# Patient Record
Sex: MALE | Race: BLACK OR AFRICAN AMERICAN | Employment: OTHER | URBAN - METROPOLITAN AREA
[De-identification: names, ages, dates, MRNs, and addresses within clinical notes are randomized per-mention and may not be internally consistent; named-entity substitution may affect disease eponyms.]

---

## 2018-01-25 ENCOUNTER — HOSPITAL ENCOUNTER (EMERGENCY)
Age: 73
Discharge: HOME | End: 2018-01-25
Attending: EMERGENCY MEDICINE
Payer: MEDICARE

## 2018-01-25 VITALS
DIASTOLIC BLOOD PRESSURE: 73 MMHG | TEMPERATURE: 98 F | SYSTOLIC BLOOD PRESSURE: 147 MMHG | HEIGHT: 69 IN | HEART RATE: 69 BPM | RESPIRATION RATE: 16 BRPM | OXYGEN SATURATION: 100 % | WEIGHT: 223 LBS | BODY MASS INDEX: 33.03 KG/M2

## 2018-01-25 DIAGNOSIS — M54.2 CHRONIC NECK PAIN: ICD-10-CM

## 2018-01-25 DIAGNOSIS — G89.29 CHRONIC NECK PAIN: ICD-10-CM

## 2018-01-25 DIAGNOSIS — Z86.39 HISTORY OF GOITER: Primary | ICD-10-CM

## 2018-01-25 PROCEDURE — 99282 EMERGENCY DEPT VISIT SF MDM: CPT

## 2018-01-25 RX ORDER — HYDROCODONE BITARTRATE AND ACETAMINOPHEN 5; 325 MG/1; MG/1
1 TABLET ORAL EVERY 6 HOURS PRN
Qty: 20 TABLET | Refills: 0 | Status: SHIPPED | OUTPATIENT
Start: 2018-01-25 | End: 2018-02-01

## 2018-01-25 RX ORDER — CYCLOBENZAPRINE HCL 10 MG
10 TABLET ORAL 3 TIMES DAILY PRN
Qty: 20 TABLET | Refills: 0 | Status: SHIPPED | OUTPATIENT
Start: 2018-01-25 | End: 2018-02-04

## 2018-01-25 RX ORDER — ZINC GLUCONATE 50 MG
50 TABLET ORAL DAILY
COMMUNITY
End: 2019-05-24 | Stop reason: ALTCHOICE

## 2018-01-25 ASSESSMENT — ENCOUNTER SYMPTOMS
COLOR CHANGE: 0
VOMITING: 0
NAUSEA: 0
SORE THROAT: 0
TROUBLE SWALLOWING: 0
SHORTNESS OF BREATH: 0
BACK PAIN: 0
COUGH: 0
ABDOMINAL PAIN: 0
VOICE CHANGE: 0

## 2018-01-25 ASSESSMENT — PAIN SCALES - GENERAL: PAINLEVEL_OUTOF10: 10

## 2018-01-25 ASSESSMENT — PAIN DESCRIPTION - ORIENTATION: ORIENTATION: POSTERIOR

## 2018-01-25 ASSESSMENT — PAIN SCALES - WONG BAKER: WONGBAKER_NUMERICALRESPONSE: 0

## 2018-01-25 ASSESSMENT — PAIN DESCRIPTION - LOCATION: LOCATION: NECK

## 2018-01-25 ASSESSMENT — PAIN DESCRIPTION - DESCRIPTORS: DESCRIPTORS: SHARP;STABBING;ACHING

## 2018-01-25 ASSESSMENT — PAIN DESCRIPTION - PAIN TYPE: TYPE: CHRONIC PAIN

## 2018-04-27 ENCOUNTER — HOSPITAL ENCOUNTER (EMERGENCY)
Age: 73
Discharge: HOME | End: 2018-04-27
Attending: EMERGENCY MEDICINE
Payer: MEDICARE

## 2018-04-27 ENCOUNTER — APPOINTMENT (OUTPATIENT)
Dept: CT IMAGING | Age: 73
End: 2018-04-27
Payer: MEDICARE

## 2018-04-27 ENCOUNTER — APPOINTMENT (OUTPATIENT)
Dept: GENERAL RADIOLOGY | Age: 73
End: 2018-04-27
Payer: MEDICARE

## 2018-04-27 VITALS
HEART RATE: 51 BPM | SYSTOLIC BLOOD PRESSURE: 150 MMHG | RESPIRATION RATE: 16 BRPM | OXYGEN SATURATION: 99 % | DIASTOLIC BLOOD PRESSURE: 77 MMHG | TEMPERATURE: 98 F

## 2018-04-27 DIAGNOSIS — J44.1 COPD EXACERBATION (HCC): Primary | ICD-10-CM

## 2018-04-27 DIAGNOSIS — E04.1 THYROID NODULE: ICD-10-CM

## 2018-04-27 LAB
% CKMB: 2 % (ref 0–3.5)
ABSOLUTE EOS #: 0.1 K/UL (ref 0–0.4)
ABSOLUTE IMMATURE GRANULOCYTE: ABNORMAL K/UL (ref 0–0.3)
ABSOLUTE LYMPH #: 1.6 K/UL (ref 1–4.8)
ABSOLUTE MONO #: 0.6 K/UL (ref 0.2–0.8)
ANION GAP SERPL CALCULATED.3IONS-SCNC: 10 MMOL/L (ref 9–17)
BASOPHILS # BLD: 1 % (ref 0–2)
BASOPHILS ABSOLUTE: 0 K/UL (ref 0–0.2)
BNP INTERPRETATION: NORMAL
BUN BLDV-MCNC: 11 MG/DL (ref 8–23)
BUN/CREAT BLD: 9 (ref 9–20)
CALCIUM SERPL-MCNC: 9.5 MG/DL (ref 8.6–10.4)
CHLORIDE BLD-SCNC: 101 MMOL/L (ref 98–107)
CK MB: 8.5 NG/ML
CKMB INTERPRETATION: ABNORMAL
CO2: 28 MMOL/L (ref 20–31)
CREAT SERPL-MCNC: 1.16 MG/DL (ref 0.7–1.2)
D-DIMER QUANTITATIVE: 1.61 MG/L FEU
DIFFERENTIAL TYPE: ABNORMAL
EKG ATRIAL RATE: 44 BPM
EKG P AXIS: 8 DEGREES
EKG P-R INTERVAL: 142 MS
EKG Q-T INTERVAL: 476 MS
EKG QRS DURATION: 84 MS
EKG QTC CALCULATION (BAZETT): 406 MS
EKG R AXIS: -34 DEGREES
EKG T AXIS: 23 DEGREES
EKG VENTRICULAR RATE: 44 BPM
EOSINOPHILS RELATIVE PERCENT: 2 % (ref 1–4)
GFR AFRICAN AMERICAN: >60 ML/MIN
GFR NON-AFRICAN AMERICAN: >60 ML/MIN
GFR SERPL CREATININE-BSD FRML MDRD: ABNORMAL ML/MIN/{1.73_M2}
GFR SERPL CREATININE-BSD FRML MDRD: ABNORMAL ML/MIN/{1.73_M2}
GLUCOSE BLD-MCNC: 153 MG/DL (ref 70–99)
HCT VFR BLD CALC: 42.3 % (ref 41–53)
HEMOGLOBIN: 13.5 G/DL (ref 13.5–17.5)
IMMATURE GRANULOCYTES: ABNORMAL %
INR BLD: 1
LYMPHOCYTES # BLD: 22 % (ref 24–44)
MAGNESIUM: 2.1 MG/DL (ref 1.6–2.6)
MCH RBC QN AUTO: 24.8 PG (ref 26–34)
MCHC RBC AUTO-ENTMCNC: 32 G/DL (ref 31–37)
MCV RBC AUTO: 77.6 FL (ref 80–100)
MONOCYTES # BLD: 8 % (ref 1–7)
MYOGLOBIN: 184 NG/ML (ref 28–72)
NRBC AUTOMATED: ABNORMAL PER 100 WBC
PARTIAL THROMBOPLASTIN TIME: 26.1 SEC (ref 23–31)
PDW BLD-RTO: 16 % (ref 11.5–14.5)
PLATELET # BLD: 164 K/UL (ref 130–400)
PLATELET ESTIMATE: ABNORMAL
PMV BLD AUTO: ABNORMAL FL (ref 6–12)
POTASSIUM SERPL-SCNC: 4.6 MMOL/L (ref 3.7–5.3)
PRO-BNP: 224 PG/ML
PROTHROMBIN TIME: 10 SEC (ref 9.7–11.6)
RBC # BLD: 5.45 M/UL (ref 4.5–5.9)
RBC # BLD: ABNORMAL 10*6/UL
SEG NEUTROPHILS: 67 % (ref 36–66)
SEGMENTED NEUTROPHILS ABSOLUTE COUNT: 4.8 K/UL (ref 1.8–7.7)
SODIUM BLD-SCNC: 139 MMOL/L (ref 135–144)
TOTAL CK: 433 U/L (ref 39–308)
TROPONIN INTERP: ABNORMAL
TROPONIN T: <0.03 NG/ML
WBC # BLD: 7.1 K/UL (ref 3.5–11)
WBC # BLD: ABNORMAL 10*3/UL

## 2018-04-27 PROCEDURE — 83874 ASSAY OF MYOGLOBIN: CPT

## 2018-04-27 PROCEDURE — 85025 COMPLETE CBC W/AUTO DIFF WBC: CPT

## 2018-04-27 PROCEDURE — 82553 CREATINE MB FRACTION: CPT

## 2018-04-27 PROCEDURE — 83735 ASSAY OF MAGNESIUM: CPT

## 2018-04-27 PROCEDURE — 96375 TX/PRO/DX INJ NEW DRUG ADDON: CPT

## 2018-04-27 PROCEDURE — 93005 ELECTROCARDIOGRAM TRACING: CPT

## 2018-04-27 PROCEDURE — 2580000003 HC RX 258: Performed by: EMERGENCY MEDICINE

## 2018-04-27 PROCEDURE — 94150 VITAL CAPACITY TEST: CPT

## 2018-04-27 PROCEDURE — 6360000002 HC RX W HCPCS: Performed by: EMERGENCY MEDICINE

## 2018-04-27 PROCEDURE — 71260 CT THORAX DX C+: CPT

## 2018-04-27 PROCEDURE — 94640 AIRWAY INHALATION TREATMENT: CPT

## 2018-04-27 PROCEDURE — 85730 THROMBOPLASTIN TIME PARTIAL: CPT

## 2018-04-27 PROCEDURE — 84484 ASSAY OF TROPONIN QUANT: CPT

## 2018-04-27 PROCEDURE — 6360000004 HC RX CONTRAST MEDICATION: Performed by: EMERGENCY MEDICINE

## 2018-04-27 PROCEDURE — 96365 THER/PROPH/DIAG IV INF INIT: CPT

## 2018-04-27 PROCEDURE — 94664 DEMO&/EVAL PT USE INHALER: CPT

## 2018-04-27 PROCEDURE — 80048 BASIC METABOLIC PNL TOTAL CA: CPT

## 2018-04-27 PROCEDURE — 85610 PROTHROMBIN TIME: CPT

## 2018-04-27 PROCEDURE — 71046 X-RAY EXAM CHEST 2 VIEWS: CPT

## 2018-04-27 PROCEDURE — 85379 FIBRIN DEGRADATION QUANT: CPT

## 2018-04-27 PROCEDURE — 83880 ASSAY OF NATRIURETIC PEPTIDE: CPT

## 2018-04-27 PROCEDURE — 99285 EMERGENCY DEPT VISIT HI MDM: CPT

## 2018-04-27 PROCEDURE — 82550 ASSAY OF CK (CPK): CPT

## 2018-04-27 RX ORDER — ALBUTEROL SULFATE 90 UG/1
2 AEROSOL, METERED RESPIRATORY (INHALATION)
Status: DISCONTINUED | OUTPATIENT
Start: 2018-04-27 | End: 2018-04-27 | Stop reason: HOSPADM

## 2018-04-27 RX ORDER — SODIUM CHLORIDE 9 MG/ML
INJECTION, SOLUTION INTRAVENOUS CONTINUOUS
Status: DISCONTINUED | OUTPATIENT
Start: 2018-04-27 | End: 2018-04-27 | Stop reason: HOSPADM

## 2018-04-27 RX ORDER — PREDNISONE 20 MG/1
TABLET ORAL
Qty: 10 TABLET | Refills: 0 | Status: SHIPPED | OUTPATIENT
Start: 2018-04-27 | End: 2018-10-21 | Stop reason: ALTCHOICE

## 2018-04-27 RX ORDER — IPRATROPIUM BROMIDE AND ALBUTEROL SULFATE 2.5; .5 MG/3ML; MG/3ML
1 SOLUTION RESPIRATORY (INHALATION)
Status: DISCONTINUED | OUTPATIENT
Start: 2018-04-27 | End: 2018-04-27 | Stop reason: HOSPADM

## 2018-04-27 RX ORDER — MAGNESIUM SULFATE 1 G/100ML
1 INJECTION INTRAVENOUS
Status: DISPENSED | OUTPATIENT
Start: 2018-04-27 | End: 2018-04-27

## 2018-04-27 RX ORDER — ALBUTEROL SULFATE 2.5 MG/3ML
5 SOLUTION RESPIRATORY (INHALATION)
Status: DISCONTINUED | OUTPATIENT
Start: 2018-04-27 | End: 2018-04-27 | Stop reason: HOSPADM

## 2018-04-27 RX ORDER — SODIUM CHLORIDE 0.9 % (FLUSH) 0.9 %
10 SYRINGE (ML) INJECTION
Status: COMPLETED | OUTPATIENT
Start: 2018-04-27 | End: 2018-04-27

## 2018-04-27 RX ORDER — METHYLPREDNISOLONE SODIUM SUCCINATE 125 MG/2ML
125 INJECTION, POWDER, LYOPHILIZED, FOR SOLUTION INTRAMUSCULAR; INTRAVENOUS ONCE
Status: COMPLETED | OUTPATIENT
Start: 2018-04-27 | End: 2018-04-27

## 2018-04-27 RX ORDER — 0.9 % SODIUM CHLORIDE 0.9 %
50 INTRAVENOUS SOLUTION INTRAVENOUS ONCE
Status: COMPLETED | OUTPATIENT
Start: 2018-04-27 | End: 2018-04-27

## 2018-04-27 RX ADMIN — IOPAMIDOL 80 ML: 755 INJECTION, SOLUTION INTRAVENOUS at 13:34

## 2018-04-27 RX ADMIN — METHYLPREDNISOLONE SODIUM SUCCINATE 125 MG: 125 INJECTION, POWDER, FOR SOLUTION INTRAMUSCULAR; INTRAVENOUS at 12:23

## 2018-04-27 RX ADMIN — MAGNESIUM SULFATE HEPTAHYDRATE 1 G: 1 INJECTION, SOLUTION INTRAVENOUS at 12:51

## 2018-04-27 RX ADMIN — ALBUTEROL SULFATE 5 MG: 5 SOLUTION RESPIRATORY (INHALATION) at 12:36

## 2018-04-27 RX ADMIN — ALBUTEROL SULFATE 2.5 MG: 5 SOLUTION RESPIRATORY (INHALATION) at 12:41

## 2018-04-27 RX ADMIN — Medication 10 ML: at 13:34

## 2018-04-27 RX ADMIN — SODIUM CHLORIDE 50 ML: 9 INJECTION, SOLUTION INTRAVENOUS at 13:34

## 2018-07-30 ENCOUNTER — OFFICE VISIT (OUTPATIENT)
Dept: PULMONOLOGY | Age: 73
End: 2018-07-30
Payer: MEDICARE

## 2018-07-30 VITALS
HEIGHT: 69 IN | BODY MASS INDEX: 33.92 KG/M2 | WEIGHT: 229 LBS | DIASTOLIC BLOOD PRESSURE: 88 MMHG | OXYGEN SATURATION: 98 % | SYSTOLIC BLOOD PRESSURE: 157 MMHG | RESPIRATION RATE: 12 BRPM | HEART RATE: 50 BPM | TEMPERATURE: 97 F

## 2018-07-30 VITALS — HEART RATE: 54 BPM | WEIGHT: 229 LBS | HEIGHT: 69 IN | BODY MASS INDEX: 33.92 KG/M2 | OXYGEN SATURATION: 99 %

## 2018-07-30 DIAGNOSIS — J98.4 MIXED RESTRICTIVE AND OBSTRUCTIVE LUNG DISEASE (HCC): Primary | ICD-10-CM

## 2018-07-30 DIAGNOSIS — J43.9 MIXED RESTRICTIVE AND OBSTRUCTIVE LUNG DISEASE (HCC): Primary | ICD-10-CM

## 2018-07-30 DIAGNOSIS — G47.33 OSA (OBSTRUCTIVE SLEEP APNEA): ICD-10-CM

## 2018-07-30 DIAGNOSIS — J44.9 CHRONIC OBSTRUCTIVE PULMONARY DISEASE, UNSPECIFIED COPD TYPE (HCC): Primary | ICD-10-CM

## 2018-07-30 DIAGNOSIS — J43.2 CENTRILOBULAR EMPHYSEMA (HCC): ICD-10-CM

## 2018-07-30 DIAGNOSIS — R06.02 SHORTNESS OF BREATH: ICD-10-CM

## 2018-07-30 PROCEDURE — 94729 DIFFUSING CAPACITY: CPT | Performed by: INTERNAL MEDICINE

## 2018-07-30 PROCEDURE — 99204 OFFICE O/P NEW MOD 45 MIN: CPT | Performed by: INTERNAL MEDICINE

## 2018-07-30 PROCEDURE — 94375 RESPIRATORY FLOW VOLUME LOOP: CPT | Performed by: INTERNAL MEDICINE

## 2018-07-30 PROCEDURE — 94726 PLETHYSMOGRAPHY LUNG VOLUMES: CPT | Performed by: INTERNAL MEDICINE

## 2018-07-30 RX ORDER — KETOTIFEN FUMARATE 0.35 MG/ML
1 SOLUTION/ DROPS OPHTHALMIC 2 TIMES DAILY PRN
COMMUNITY

## 2018-07-30 RX ORDER — FERROUS SULFATE 325(65) MG
325 TABLET ORAL
COMMUNITY
End: 2019-05-24 | Stop reason: ALTCHOICE

## 2018-07-30 RX ORDER — FINASTERIDE 5 MG/1
5 TABLET, FILM COATED ORAL DAILY
COMMUNITY

## 2018-07-30 RX ORDER — AZITHROMYCIN 250 MG/1
250 TABLET, FILM COATED ORAL DAILY
COMMUNITY
End: 2018-10-21 | Stop reason: ALTCHOICE

## 2018-07-30 RX ORDER — ISOSORBIDE MONONITRATE 60 MG/1
30 TABLET, EXTENDED RELEASE ORAL DAILY
COMMUNITY

## 2018-07-30 NOTE — PROGRESS NOTES
REASON FOR THE CONSULTATION:  Question COPD  HISTORY OF PRESENT ILLNESS:    Caron Stratton is a 71y.o. year old male here for evaluation of questionable COPD. Patient walks 3 miles every day, which has improved from quarter of mile as he has been slowly improving his exercise capacity. He denies any cough, sputum or hemoptysis. He is on albuterol as needed which is a nebulized form and he needs that really only when he has a cold, but otherwise he is using Symbicort twice a day, which improved his ventilatory capacity and his exercise capacity. He did not have asthma as a child. Mostly his bronchitis acts up when the weather is changing. He is not sure if he ever had a PFT. I reviewed his PFT today with him. His FEV1 is 60%. Ratio of FEV1 to FVC 74%. Flow volume loop shows obstructive and restrictive flow volume loop. His total lung capacity and RV are decreased. Diffusion capacity is also decreased, but corrected for alveolar volume is better if resistance is increased. I believe this is due to an obstructive component due to obstructive lung disease like COPD but also due to restrictive lung disease due to obesity. His CT of the chest does not show any interstitial lung disease except for mild emphysema    He does have obstructive sleep apnea but he could not tolerate CPAP, so he is not using              LUNG CANCER SCREENING     1. CRITERIA MET    []     CT ORDERED  []      2. CRITERIA NOT MET   [x]      3. REFUSED                    []        REASON CRITERIA NOT MET     1. SMOKING LESS THAN 30 PY  []      2. AGE LESS THAN 55 or GREATER 77 YEARS  []      3. QUIT SMOKING 15 YEARS OR GREATER   []      4. RECENT CT WITH IN 11 MONTHS    [x]      5. LIFE EXPECTANCY < 5 YEARS   []      6.  SIGNS  AND SYMPTOMS OF LUNG CANCER   []         Immunization   Immunization History   Administered Date(s) Administered    Pneumococcal 13-valent Conjugate ([id number]) 07/30/2016        Pneumococcal Vaccine     [x] (65 Fe) MG tablet Take 325 mg by mouth daily (with breakfast)   Yes Historical Provider, MD   predniSONE (DELTASONE) 20 MG tablet Take 2 tablets by mouth every morning till gone.  4/27/18  Yes Ashley Uriarte MD   zinc gluconate 50 MG tablet Take 50 mg by mouth daily   Yes Historical Provider, MD   tamsulosin (FLOMAX) 0.4 MG capsule Take 0.4 mg by mouth daily   Yes Historical Provider, MD   fluticasone (FLONASE) 50 MCG/ACT nasal spray 1 spray by Nasal route 2 times daily 5/31/16  Yes Mary Connor DO   metoprolol (LOPRESSOR) 50 MG tablet Take 25 mg by mouth 2 times daily (Pt takes one-half of a 50mg tablet = 25mg BID)   Yes Historical Provider, MD   albuterol sulfate  (90 BASE) MCG/ACT inhaler Inhale 2 puffs into the lungs every 6 hours as needed for Wheezing   Yes Historical Provider, MD   albuterol (PROVENTIL) (2.5 MG/3ML) 0.083% nebulizer solution Take 2.5 mg by nebulization every 6 hours as needed for Wheezing   Yes Historical Provider, MD   aspirin 81 MG EC tablet Take 81 mg by mouth daily   Yes Historical Provider, MD   losartan (COZAAR) 25 MG tablet Take 25 mg by mouth daily   Yes Historical Provider, MD   nitroGLYCERIN (NITROSTAT) 0.4 MG SL tablet Place 0.4 mg under the tongue every 5 minutes as needed for Chest pain   Yes Historical Provider, MD   vitamin D (CHOLECALCIFEROL) 1000 UNIT TABS tablet Take 2,000 Units by mouth daily   Yes Historical Provider, MD   FLUoxetine (PROZAC) 20 MG capsule Take 20 mg by mouth daily   Yes Historical Provider, MD   furosemide (LASIX) 20 MG tablet Take 20 mg by mouth every morning 30 minutes before a meal   Yes Historical Provider, MD   metFORMIN ER (GLUCOPHAGE-XR) 500 MG XR tablet Take 1,000 mg by mouth daily (with breakfast)   Yes Historical Provider, MD   clopidogrel (PLAVIX) 75 MG tablet Take 75 mg by mouth daily   Yes Historical Provider, MD   atorvastatin (LIPITOR) 80 MG tablet Take 40 mg by mouth daily (Pt takes one-half of an 80mg tab = 40mg)   Yes Historical Provider, MD   Tiotropium Bromide Monohydrate (SPIRIVA HANDIHALER IN) Inhale 1 each into the lungs daily    Yes Historical Provider, MD   ROPINIROLE HCL PO Take 0.25 mg by mouth nightly as needed    Yes Historical Provider, MD   budesonide-formoterol (SYMBICORT) 160-4.5 MCG/ACT AERO Inhale 2 puffs into the lungs 2 times daily.    Yes Historical Provider, MD              REVIEW OF SYSTEMS:    CONSTITUTIONAL:  negative for  fevers, chills, sweats, fatigue, malaise, anorexia and weight loss  EYES:  negative for  double vision, blurred vision, dry eyes, eye discharge and redness  HEENT:  negative for  hearing loss, tinnitus, ear drainage, earaches and nasal congestion  RESPIRATORY:  See hpi  CARDIOVASCULAR:  negative for  chest pain,, palpitations, orthopnea, PND  GASTROINTESTINAL:  negative for nausea, vomiting, change in bowel habits, diarrhea, constipation, abdominal pain, pruritus, abdominal mass and abdominal distention  GENITOURINARY:  negative for frequency, dysuria, nocturia, urinary incontinence and hesitancy  INTEGUMENT  negative for rash, skin lesion(s), dryness, skin color change, changes in lesion, pruritus and changes in hair  HEMATOLOGIC/LYMPHATIC:  negative for easy bruising, bleeding, lymphadenopathy, petechiae and swelling/edema  ALLERGIC/IMMUNOLOGIC:  negative for recurrent infections, urticaria and drug reactions  ENDOCRINE:  negative for heat intolerance, cold intolerance, tremor, weight changes and change in bowel habits  MUSCULOSKELETAL:  negative for  myalgias, arthralgias, pain, joint swelling, stiff joints and decreased range of motion  NEUROLOGICAL:  negative for headaches, dizziness, seizures, memory problems, speech problems, visual disturbance and coordination problems  BEHAVIOR/PSYCH:  negative for poor appetite, increased appetite, decreased sleep, increased sleep, decreased energy level, increased energy level and poor concentration  Skin no rash no dermatitis  Vitals:  BP (!) 157/88 Pulse 50   Temp 97 °F (36.1 °C)   Resp 12   Ht 5' 9\" (1.753 m)   Wt 229 lb (103.9 kg)   SpO2 98%   BMI 33.82 kg/m²     PHYSICAL EXAM:  General Appearance:    Alert, cooperative, no distress, appears stated age   Head:    Normocephalic, without obvious abnormality, atraumatic      Eyes:    PERRL, conjunctiva/corneas clear, EOM's intact   Ears:    Normal  external ear canals, both ears   Nose:   Nares normal, septum midline, mucosa normal, no drainage        or sinus tenderness   Throat:   Lips, mucosa, and tongue normal; teeth and gums normal   Neck:    Short thick neck, low hanging soft palate, large tongue, large uvula. No adenopathy, no goiter,    Back:     Symmetric, no curvature, ROM normal, no CVA tenderness   Lungs:     Ventilating all lobes without any rales, rhonchi, wheezes. No dullness. No bronchial breath sounds mildly prolonged expiration    Chest Wall:    No tenderness or deformity      Heart:    Regular rate and rhythm, S1 and S2 normal, no murmur, rub        or gallop no rvh                           Abdomen:                                                 Pulses:                              Skin:                  Lymph nodes:                    Neurologic:                  Soft, non-tender, bowel sounds active all four quadrants,     no masses, no organomegaly         2+ and symmetric all extremities     Skin color, texture, turgor normal, no rashes or lesions       Cervical, supraclavicular not enlarged or matted or tender      CNII-XII intact, normal strength 5/5 . Sensation grossly normal  and reflexes normal 2+  throughout     Clubbing No  Lower ext edema no  Upper ext edema no         Musculoskeletal no synovitis.   No joint swelling or tenderness      4/27/2018  No evidence of pulmonary embolism or acute pulmonary abnormality.       Mild emphysema and pulmonary arterial enlargement are again demonstrated,   suggestive of pulmonary hypertension.       Thyroid enlargement with suspected

## 2018-07-31 PROBLEM — J43.2 CENTRILOBULAR EMPHYSEMA (HCC): Status: ACTIVE | Noted: 2018-07-31

## 2018-07-31 PROBLEM — J43.9 MIXED RESTRICTIVE AND OBSTRUCTIVE LUNG DISEASE (HCC): Status: ACTIVE | Noted: 2018-07-31

## 2018-07-31 PROBLEM — J98.4 MIXED RESTRICTIVE AND OBSTRUCTIVE LUNG DISEASE (HCC): Status: ACTIVE | Noted: 2018-07-31

## 2018-10-21 ENCOUNTER — APPOINTMENT (OUTPATIENT)
Dept: GENERAL RADIOLOGY | Age: 73
End: 2018-10-21
Payer: MEDICARE

## 2018-10-21 ENCOUNTER — HOSPITAL ENCOUNTER (EMERGENCY)
Age: 73
Discharge: HOME | End: 2018-10-21
Attending: EMERGENCY MEDICINE
Payer: MEDICARE

## 2018-10-21 VITALS
TEMPERATURE: 98.6 F | OXYGEN SATURATION: 100 % | HEART RATE: 60 BPM | HEIGHT: 69 IN | RESPIRATION RATE: 16 BRPM | SYSTOLIC BLOOD PRESSURE: 162 MMHG | DIASTOLIC BLOOD PRESSURE: 89 MMHG | WEIGHT: 233 LBS | BODY MASS INDEX: 34.51 KG/M2

## 2018-10-21 DIAGNOSIS — M25.552 HIP PAIN, ACUTE, LEFT: Primary | ICD-10-CM

## 2018-10-21 LAB
-: NORMAL
AMORPHOUS: NORMAL
BACTERIA: NORMAL
BILIRUBIN URINE: NEGATIVE
CASTS UA: NORMAL /LPF
COLOR: YELLOW
COMMENT UA: ABNORMAL
CRYSTALS, UA: NORMAL /HPF
EPITHELIAL CELLS UA: NORMAL /HPF (ref 0–5)
GLUCOSE URINE: NEGATIVE
KETONES, URINE: NEGATIVE
LEUKOCYTE ESTERASE, URINE: NEGATIVE
MUCUS: NORMAL
NITRITE, URINE: NEGATIVE
OTHER OBSERVATIONS UA: NORMAL
PH UA: 6 (ref 5–8)
PROTEIN UA: ABNORMAL
RBC UA: NORMAL /HPF (ref 0–2)
RENAL EPITHELIAL, UA: NORMAL /HPF
SPECIFIC GRAVITY UA: 1.02 (ref 1–1.03)
TRICHOMONAS: NORMAL
TURBIDITY: CLEAR
URINE HGB: NEGATIVE
UROBILINOGEN, URINE: NORMAL
WBC UA: NORMAL /HPF (ref 0–5)
YEAST: NORMAL

## 2018-10-21 PROCEDURE — 99283 EMERGENCY DEPT VISIT LOW MDM: CPT

## 2018-10-21 PROCEDURE — 81001 URINALYSIS AUTO W/SCOPE: CPT

## 2018-10-21 PROCEDURE — 73502 X-RAY EXAM HIP UNI 2-3 VIEWS: CPT

## 2018-10-21 PROCEDURE — 6360000002 HC RX W HCPCS: Performed by: EMERGENCY MEDICINE

## 2018-10-21 PROCEDURE — 96372 THER/PROPH/DIAG INJ SC/IM: CPT

## 2018-10-21 RX ORDER — AMOXICILLIN 875 MG/1
875 TABLET, COATED ORAL 2 TIMES DAILY
COMMUNITY
Start: 2018-10-19 | End: 2019-05-24 | Stop reason: ALTCHOICE

## 2018-10-21 RX ORDER — KETOROLAC TROMETHAMINE 30 MG/ML
30 INJECTION, SOLUTION INTRAMUSCULAR; INTRAVENOUS ONCE
Status: COMPLETED | OUTPATIENT
Start: 2018-10-21 | End: 2018-10-21

## 2018-10-21 RX ORDER — AZITHROMYCIN 250 MG/1
250 TABLET, FILM COATED ORAL DAILY
COMMUNITY
Start: 2018-10-19 | End: 2019-05-24 | Stop reason: ALTCHOICE

## 2018-10-21 RX ADMIN — KETOROLAC TROMETHAMINE 30 MG: 30 INJECTION, SOLUTION INTRAMUSCULAR at 13:29

## 2018-10-21 ASSESSMENT — PAIN DESCRIPTION - ORIENTATION: ORIENTATION: LOWER;RIGHT

## 2018-10-21 ASSESSMENT — PAIN SCALES - GENERAL
PAINLEVEL_OUTOF10: 10
PAINLEVEL_OUTOF10: 0
PAINLEVEL_OUTOF10: 10

## 2018-10-21 ASSESSMENT — PAIN DESCRIPTION - LOCATION: LOCATION: BACK;FLANK

## 2018-12-05 ENCOUNTER — APPOINTMENT (OUTPATIENT)
Dept: GENERAL RADIOLOGY | Age: 73
End: 2018-12-05
Payer: OTHER MISCELLANEOUS

## 2018-12-05 ENCOUNTER — APPOINTMENT (OUTPATIENT)
Dept: CT IMAGING | Age: 73
End: 2018-12-05
Payer: OTHER MISCELLANEOUS

## 2018-12-05 ENCOUNTER — HOSPITAL ENCOUNTER (EMERGENCY)
Age: 73
Discharge: HOME | End: 2018-12-05
Attending: EMERGENCY MEDICINE
Payer: OTHER MISCELLANEOUS

## 2018-12-05 VITALS
BODY MASS INDEX: 34.36 KG/M2 | HEIGHT: 69 IN | DIASTOLIC BLOOD PRESSURE: 87 MMHG | OXYGEN SATURATION: 100 % | WEIGHT: 232 LBS | HEART RATE: 51 BPM | TEMPERATURE: 98 F | RESPIRATION RATE: 18 BRPM | SYSTOLIC BLOOD PRESSURE: 188 MMHG

## 2018-12-05 DIAGNOSIS — S09.90XA INJURY OF HEAD, INITIAL ENCOUNTER: ICD-10-CM

## 2018-12-05 DIAGNOSIS — S16.1XXA STRAIN OF NECK MUSCLE, INITIAL ENCOUNTER: ICD-10-CM
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
## 2018-12-05 PROCEDURE — 6370000000 HC RX 637 (ALT 250 FOR IP): Performed by: NURSE PRACTITIONER

## 2018-12-05 PROCEDURE — 99284 EMERGENCY DEPT VISIT MOD MDM: CPT

## 2018-12-05 PROCEDURE — 70450 CT HEAD/BRAIN W/O DYE: CPT

## 2018-12-05 PROCEDURE — 72125 CT NECK SPINE W/O DYE: CPT

## 2018-12-05 PROCEDURE — 72040 X-RAY EXAM NECK SPINE 2-3 VW: CPT

## 2018-12-05 RX ORDER — ACETAMINOPHEN 325 MG/1
650 TABLET ORAL ONCE
Status: COMPLETED | OUTPATIENT
Start: 2018-12-05 | End: 2018-12-05

## 2018-12-05 RX ORDER — HYDROCODONE BITARTRATE AND ACETAMINOPHEN 5; 325 MG/1; MG/1
1 TABLET ORAL EVERY 6 HOURS PRN
Qty: 20 TABLET | Refills: 0 | Status: SHIPPED | OUTPATIENT
Start: 2018-12-05 | End: 2018-12-08

## 2018-12-05 RX ORDER — METHOCARBAMOL 500 MG/1
500 TABLET, FILM COATED ORAL 3 TIMES DAILY PRN
Qty: 20 TABLET | Refills: 0 | Status: SHIPPED | OUTPATIENT
Start: 2018-12-05 | End: 2018-12-15

## 2018-12-05 RX ADMIN — ACETAMINOPHEN 650 MG: 325 TABLET ORAL at 20:45

## 2018-12-05 ASSESSMENT — PAIN SCALES - GENERAL
PAINLEVEL_OUTOF10: 5
PAINLEVEL_OUTOF10: 5
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
## 2019-01-28 ENCOUNTER — OFFICE VISIT (OUTPATIENT)
Dept: PULMONOLOGY | Age: 74
End: 2019-01-28
Payer: MEDICARE

## 2019-01-28 VITALS
RESPIRATION RATE: 16 BRPM | HEIGHT: 69 IN | WEIGHT: 230 LBS | SYSTOLIC BLOOD PRESSURE: 138 MMHG | DIASTOLIC BLOOD PRESSURE: 82 MMHG | BODY MASS INDEX: 34.07 KG/M2 | HEART RATE: 66 BPM | OXYGEN SATURATION: 96 %

## 2019-01-28 DIAGNOSIS — J43.2 CENTRILOBULAR EMPHYSEMA (HCC): ICD-10-CM

## 2019-01-28 DIAGNOSIS — Z23 NEED FOR PNEUMOCOCCAL VACCINE: ICD-10-CM

## 2019-01-28 DIAGNOSIS — J43.9 MIXED RESTRICTIVE AND OBSTRUCTIVE LUNG DISEASE (HCC): Primary | ICD-10-CM

## 2019-01-28 DIAGNOSIS — Z87.891 PERSONAL HISTORY OF TOBACCO USE: ICD-10-CM

## 2019-01-28 DIAGNOSIS — J98.4 MIXED RESTRICTIVE AND OBSTRUCTIVE LUNG DISEASE (HCC): Primary | ICD-10-CM

## 2019-01-28 PROCEDURE — G0296 VISIT TO DETERM LDCT ELIG: HCPCS | Performed by: INTERNAL MEDICINE

## 2019-01-28 PROCEDURE — 99214 OFFICE O/P EST MOD 30 MIN: CPT | Performed by: INTERNAL MEDICINE

## 2019-04-17 ENCOUNTER — PATIENT MESSAGE (OUTPATIENT)
Dept: PULMONOLOGY | Age: 74
End: 2019-04-17

## 2019-04-17 NOTE — TELEPHONE ENCOUNTER
From: Gracie Favorite  To: Jaya Frausto MD  Sent: 4/17/2019 1:23 PM EDT  Subject: Non-Urgent Medical Question    Need a letter on doctor's letter head to confirm respiratory issues [ Roman Ortiz to sit idle without dosing off due to COPD issues.] to be excused from [place] duty at [address] on April 22 & 23 2019.

## 2019-04-17 NOTE — TELEPHONE ENCOUNTER
Dr Kirit Bernal, please see patient's my chart message. If this is something you are willing to write, please do so and I will make sure to get it onto a letterhead. If you are not willing to write this, please let me know. Thank you.

## 2019-04-22 ENCOUNTER — TELEPHONE (OUTPATIENT)
Dept: ONCOLOGY | Age: 74
End: 2019-04-22

## 2019-04-22 NOTE — LETTER
4/22/2019        Francine Naqvi  [address] [facility] [address] [facility]    Dear Francine Naqvi:    Your healthcare provider has ordered a low dose CT scan of the chest for lung cancer screening. You will find enclosed, information about CT lung screening. Please review the statement of understanding, you will be asked to sign a copy of this at the time of your CT scan    If you have not already been contacted to make the appointment for your scan, please call our scheduling department at [phone]    Keep in mind that CT lung screening does not take the place of smoking cessation. If you are a current smoker, you will find enclosed smoking cessation resources. Please do not hesitate to contact me if you have any questions or concerns.     [address] [facility],      [facility] Lung Screening Program  [phone]

## 2019-05-24 ENCOUNTER — APPOINTMENT (OUTPATIENT)
Dept: GENERAL RADIOLOGY | Age: 74
End: 2019-05-24
Payer: MEDICARE

## 2019-05-24 ENCOUNTER — APPOINTMENT (OUTPATIENT)
Dept: CT IMAGING | Age: 74
End: 2019-05-24
Payer: MEDICARE

## 2019-05-24 ENCOUNTER — HOSPITAL ENCOUNTER (EMERGENCY)
Age: 74
Discharge: HOME | End: 2019-05-24
Attending: EMERGENCY MEDICINE
Payer: MEDICARE

## 2019-05-24 VITALS
WEIGHT: 232 LBS | SYSTOLIC BLOOD PRESSURE: 127 MMHG | RESPIRATION RATE: 23 BRPM | TEMPERATURE: 97.9 F | HEART RATE: 74 BPM | DIASTOLIC BLOOD PRESSURE: 52 MMHG | OXYGEN SATURATION: 94 % | HEIGHT: 69 IN | BODY MASS INDEX: 34.36 KG/M2

## 2019-05-24 DIAGNOSIS — E04.1 THYROID NODULE: ICD-10-CM

## 2019-05-24 DIAGNOSIS — J40 BRONCHITIS: ICD-10-CM

## 2019-05-24 DIAGNOSIS — R93.89 ABNORMAL CT OF THE CHEST: ICD-10-CM

## 2019-05-24 DIAGNOSIS — J44.1 COPD EXACERBATION (HCC): Primary | ICD-10-CM

## 2019-05-24 LAB
ABSOLUTE EOS #: 0 K/UL (ref 0–0.44)
ABSOLUTE IMMATURE GRANULOCYTE: 0.17 K/UL (ref 0–0.3)
ABSOLUTE LYMPH #: 1.33 K/UL (ref 1.1–3.7)
ABSOLUTE MONO #: 1.66 K/UL (ref 0.1–1.2)
ANION GAP SERPL CALCULATED.3IONS-SCNC: 13 MMOL/L (ref 9–17)
BASOPHILS # BLD: 0 % (ref 0–2)
BASOPHILS ABSOLUTE: 0 K/UL (ref 0–0.2)
BNP INTERPRETATION: NORMAL
BUN BLDV-MCNC: 18 MG/DL (ref 8–23)
BUN/CREAT BLD: 12 (ref 9–20)
CALCIUM SERPL-MCNC: 9.1 MG/DL (ref 8.6–10.4)
CHLORIDE BLD-SCNC: 98 MMOL/L (ref 98–107)
CO2: 23 MMOL/L (ref 20–31)
CREAT SERPL-MCNC: 1.49 MG/DL (ref 0.7–1.2)
D-DIMER QUANTITATIVE: 0.72 MG/L FEU
DIFFERENTIAL TYPE: ABNORMAL
EOSINOPHILS RELATIVE PERCENT: 0 % (ref 1–4)
GFR AFRICAN AMERICAN: 56 ML/MIN
GFR NON-AFRICAN AMERICAN: 46 ML/MIN
GFR SERPL CREATININE-BSD FRML MDRD: ABNORMAL ML/MIN/{1.73_M2}
GFR SERPL CREATININE-BSD FRML MDRD: ABNORMAL ML/MIN/{1.73_M2}
GLUCOSE BLD-MCNC: 186 MG/DL (ref 70–99)
HCT VFR BLD CALC: 40.2 % (ref 40.7–50.3)
HEMOGLOBIN: 12.6 G/DL (ref 13–17)
IMMATURE GRANULOCYTES: 1 %
INR BLD: 1.1
LYMPHOCYTES # BLD: 8 % (ref 24–43)
MCH RBC QN AUTO: 24.3 PG (ref 25.2–33.5)
MCHC RBC AUTO-ENTMCNC: 31.3 G/DL (ref 28.4–34.8)
MCV RBC AUTO: 77.6 FL (ref 82.6–102.9)
MONOCYTES # BLD: 10 % (ref 3–12)
NRBC AUTOMATED: 0 PER 100 WBC
PARTIAL THROMBOPLASTIN TIME: 28.5 SEC (ref 23–31)
PDW BLD-RTO: 17.1 % (ref 11.8–14.4)
PLATELET # BLD: 137 K/UL (ref 138–453)
PLATELET ESTIMATE: ABNORMAL
PMV BLD AUTO: 13.5 FL (ref 8.1–13.5)
POTASSIUM SERPL-SCNC: 4 MMOL/L (ref 3.7–5.3)
PRO-BNP: 192 PG/ML
PROTHROMBIN TIME: 11.4 SEC (ref 9.7–11.6)
RBC # BLD: 5.18 M/UL (ref 4.21–5.77)
RBC # BLD: ABNORMAL 10*6/UL
SEG NEUTROPHILS: 81 % (ref 36–65)
SEGMENTED NEUTROPHILS ABSOLUTE COUNT: 13.44 K/UL (ref 1.5–8.1)
SODIUM BLD-SCNC: 134 MMOL/L (ref 135–144)
TROPONIN INTERP: ABNORMAL
TROPONIN T: ABNORMAL NG/ML
TROPONIN, HIGH SENSITIVITY: 39 NG/L (ref 0–22)
WBC # BLD: 16.6 K/UL (ref 3.5–11.3)
WBC # BLD: ABNORMAL 10*3/UL

## 2019-05-24 PROCEDURE — 6360000004 HC RX CONTRAST MEDICATION: Performed by: EMERGENCY MEDICINE

## 2019-05-24 PROCEDURE — 93005 ELECTROCARDIOGRAM TRACING: CPT | Performed by: EMERGENCY MEDICINE

## 2019-05-24 PROCEDURE — 85730 THROMBOPLASTIN TIME PARTIAL: CPT

## 2019-05-24 PROCEDURE — 80048 BASIC METABOLIC PNL TOTAL CA: CPT

## 2019-05-24 PROCEDURE — 71046 X-RAY EXAM CHEST 2 VIEWS: CPT

## 2019-05-24 PROCEDURE — 6360000002 HC RX W HCPCS: Performed by: EMERGENCY MEDICINE

## 2019-05-24 PROCEDURE — 85610 PROTHROMBIN TIME: CPT

## 2019-05-24 PROCEDURE — 85025 COMPLETE CBC W/AUTO DIFF WBC: CPT

## 2019-05-24 PROCEDURE — 6370000000 HC RX 637 (ALT 250 FOR IP): Performed by: EMERGENCY MEDICINE

## 2019-05-24 PROCEDURE — 85379 FIBRIN DEGRADATION QUANT: CPT

## 2019-05-24 PROCEDURE — 96374 THER/PROPH/DIAG INJ IV PUSH: CPT

## 2019-05-24 PROCEDURE — 94150 VITAL CAPACITY TEST: CPT

## 2019-05-24 PROCEDURE — 83880 ASSAY OF NATRIURETIC PEPTIDE: CPT

## 2019-05-24 PROCEDURE — 71275 CT ANGIOGRAPHY CHEST: CPT

## 2019-05-24 PROCEDURE — 99285 EMERGENCY DEPT VISIT HI MDM: CPT

## 2019-05-24 PROCEDURE — 2580000003 HC RX 258: Performed by: EMERGENCY MEDICINE

## 2019-05-24 PROCEDURE — 84484 ASSAY OF TROPONIN QUANT: CPT

## 2019-05-24 PROCEDURE — 94640 AIRWAY INHALATION TREATMENT: CPT

## 2019-05-24 RX ORDER — SODIUM CHLORIDE 0.9 % (FLUSH) 0.9 %
10 SYRINGE (ML) INJECTION PRN
Status: DISCONTINUED | OUTPATIENT
Start: 2019-05-24 | End: 2019-05-24 | Stop reason: HOSPADM

## 2019-05-24 RX ORDER — ALBUTEROL SULFATE 90 UG/1
2 AEROSOL, METERED RESPIRATORY (INHALATION)
Status: DISCONTINUED | OUTPATIENT
Start: 2019-05-24 | End: 2019-05-24

## 2019-05-24 RX ORDER — PREDNISONE 20 MG/1
60 TABLET ORAL DAILY
Qty: 30 TABLET | Refills: 0 | Status: SHIPPED | OUTPATIENT
Start: 2019-05-24 | End: 2019-06-03

## 2019-05-24 RX ORDER — METOPROLOL SUCCINATE 50 MG/1
50 TABLET, EXTENDED RELEASE ORAL DAILY
COMMUNITY

## 2019-05-24 RX ORDER — IPRATROPIUM BROMIDE AND ALBUTEROL SULFATE 2.5; .5 MG/3ML; MG/3ML
1 SOLUTION RESPIRATORY (INHALATION)
Status: DISCONTINUED | OUTPATIENT
Start: 2019-05-24 | End: 2019-05-24

## 2019-05-24 RX ORDER — METHYLPREDNISOLONE SODIUM SUCCINATE 125 MG/2ML
125 INJECTION, POWDER, LYOPHILIZED, FOR SOLUTION INTRAMUSCULAR; INTRAVENOUS ONCE
Status: COMPLETED | OUTPATIENT
Start: 2019-05-24 | End: 2019-05-24

## 2019-05-24 RX ORDER — BENZONATATE 100 MG/1
100 CAPSULE ORAL 2 TIMES DAILY PRN
Qty: 20 CAPSULE | Refills: 0 | Status: SHIPPED | OUTPATIENT
Start: 2019-05-24 | End: 2019-05-31

## 2019-05-24 RX ORDER — IPRATROPIUM BROMIDE AND ALBUTEROL SULFATE 2.5; .5 MG/3ML; MG/3ML
1 SOLUTION RESPIRATORY (INHALATION) EVERY 4 HOURS PRN
Status: DISCONTINUED | OUTPATIENT
Start: 2019-05-24 | End: 2019-05-24 | Stop reason: HOSPADM

## 2019-05-24 RX ORDER — 0.9 % SODIUM CHLORIDE 0.9 %
50 INTRAVENOUS SOLUTION INTRAVENOUS ONCE
Status: COMPLETED | OUTPATIENT
Start: 2019-05-24 | End: 2019-05-24

## 2019-05-24 RX ORDER — AZITHROMYCIN 250 MG/1
250 TABLET, FILM COATED ORAL SEE ADMIN INSTRUCTIONS
Qty: 6 TABLET | Refills: 0 | Status: SHIPPED | OUTPATIENT
Start: 2019-05-24 | End: 2019-05-29

## 2019-05-24 RX ORDER — ALBUTEROL SULFATE 2.5 MG/3ML
5 SOLUTION RESPIRATORY (INHALATION)
Status: DISCONTINUED | OUTPATIENT
Start: 2019-05-24 | End: 2019-05-24

## 2019-05-24 RX ORDER — AZITHROMYCIN 250 MG/1
500 TABLET, FILM COATED ORAL ONCE
Status: COMPLETED | OUTPATIENT
Start: 2019-05-24 | End: 2019-05-24

## 2019-05-24 RX ADMIN — METHYLPREDNISOLONE SODIUM SUCCINATE 125 MG: 125 INJECTION, POWDER, FOR SOLUTION INTRAMUSCULAR; INTRAVENOUS at 09:05

## 2019-05-24 RX ADMIN — SODIUM CHLORIDE 75 ML: 9 INJECTION, SOLUTION INTRAVENOUS at 10:56

## 2019-05-24 RX ADMIN — ALBUTEROL SULFATE 5 MG: 5 SOLUTION RESPIRATORY (INHALATION) at 09:05

## 2019-05-24 RX ADMIN — AZITHROMYCIN 500 MG: 250 TABLET, FILM COATED ORAL at 11:57

## 2019-05-24 RX ADMIN — IOPAMIDOL 100 ML: 755 INJECTION, SOLUTION INTRAVENOUS at 10:56

## 2019-05-24 RX ADMIN — Medication 10 ML: at 10:57

## 2019-05-24 RX ADMIN — ALBUTEROL SULFATE 5 MG: 5 SOLUTION RESPIRATORY (INHALATION) at 08:55

## 2019-05-24 ASSESSMENT — ENCOUNTER SYMPTOMS
SHORTNESS OF BREATH: 1
ABDOMINAL DISTENTION: 0
COUGH: 1
BACK PAIN: 0
EYE DISCHARGE: 0
ABDOMINAL PAIN: 0
CHEST TIGHTNESS: 0
FACIAL SWELLING: 0
EYE PAIN: 0

## 2019-05-24 NOTE — ED PROVIDER NOTES
HISTORY       Past Surgical History:   Procedure Laterality Date    CARDIAC SURGERY  07/2015    1 stent    NECK SURGERY      TOE AMPUTATION Right greater     CURRENT MEDICATIONS       Previous Medications    ALBUTEROL (PROVENTIL) (2.5 MG/3ML) 0.083% NEBULIZER SOLUTION    Take 2.5 mg by nebulization every 6 hours as needed for Wheezing    ALBUTEROL SULFATE  (90 BASE) MCG/ACT INHALER    Inhale 2 puffs into the lungs every 6 hours as needed for Wheezing    ASPIRIN 81 MG EC TABLET    Take 81 mg by mouth daily    ATORVASTATIN (LIPITOR) 80 MG TABLET    Take 40 mg by mouth daily (Pt takes one-half of an 80mg tab = 40mg)    BUDESONIDE-FORMOTEROL (SYMBICORT) 160-4.5 MCG/ACT AERO    Inhale 2 puffs into the lungs 2 times daily.     CARBOXYMETHYLCELLULOSE 1 % OPHTHALMIC SOLUTION    Place 1 drop into both eyes 3 times daily as needed for Dry Eyes     CLOPIDOGREL (PLAVIX) 75 MG TABLET    Take 75 mg by mouth daily    FINASTERIDE (PROSCAR) 5 MG TABLET    Take 5 mg by mouth daily    FLUOXETINE (PROZAC) 20 MG CAPSULE    Take 20 mg by mouth daily    FLUTICASONE (FLONASE) 50 MCG/ACT NASAL SPRAY    1 spray by Nasal route 2 times daily    FUROSEMIDE (LASIX) 20 MG TABLET    Take 20 mg by mouth daily as needed (swelling)     ISOSORBIDE MONONITRATE (IMDUR) 60 MG EXTENDED RELEASE TABLET    Take 60 mg by mouth daily    KETOTIFEN (ZADITOR) 0.025 % OPHTHALMIC SOLUTION    Place 1 drop into both eyes 2 times daily as needed (itchy eyes)     LOSARTAN (COZAAR) 25 MG TABLET    Take 25 mg by mouth daily    METOPROLOL (LOPRESSOR) 50 MG TABLET    Take 25 mg by mouth 2 times daily (Pt takes one-half of a 50mg tablet = 25mg BID)    NITROGLYCERIN (NITROSTAT) 0.4 MG SL TABLET    Place 0.4 mg under the tongue every 5 minutes as needed for Chest pain    ROPINIROLE HCL PO    Take 0.25 mg by mouth nightly as needed     TAMSULOSIN (FLOMAX) 0.4 MG CAPSULE    Take 0.4 mg by mouth daily    TIOTROPIUM BROMIDE MONOHYDRATE (SPIRIVA HANDIHALER IN)    Inhale 1 each into the lungs daily      ALLERGIES     has No Known Allergies. FAMILY HISTORY     has no family status information on file. SOCIAL HISTORY       Social History     Tobacco Use    Smoking status: Former Smoker     Packs/day: 0.75     Years: 56.00     Pack years: 42.00     Start date: 1957     Last attempt to quit: 2013     Years since quittin.8    Smokeless tobacco: Never Used   Substance Use Topics    Alcohol use: No    Drug use: No     PHYSICAL EXAM     INITIAL VITALS: BP (!) 127/52   Pulse 74   Temp 97.9 °F (36.6 °C) (Oral)   Resp 23   Ht 5' 9\" (1.753 m)   Wt 232 lb (105.2 kg)   SpO2 94%   BMI 34.26 kg/m²    Physical Exam   Constitutional: He is oriented to person, place, and time. He appears well-developed and well-nourished. HENT:   Head: Normocephalic and atraumatic. Eyes: Pupils are equal, round, and reactive to light. EOM are normal.   Neck: Normal range of motion. Neck supple. Cardiovascular: Normal rate and regular rhythm. Pulmonary/Chest: Effort normal and breath sounds normal. No respiratory distress. Bilateral expiratory wheezing, no tachypnea, no retraction or sensory muscle use appreciated   Abdominal: Soft. Bowel sounds are normal.   Musculoskeletal: Normal range of motion. Neurological: He is alert and oriented to person, place, and time. Skin: Skin is warm. Nursing note and vitals reviewed. MEDICAL DECISION MAKING:   The patient was seen and examined. Patient 67-year-old male presented to the ED secondary to cough and congestion. Differential diagnoses included acute COPD exacerbation, pneumonia versus bronchitis, CHF exacerbation. PE.  EKG obtained, labs patient received DuoNeb breathing treatments received Solu-Medrol. Patient will be reevaluated. Laboratory analysis revealed the slightly elevated white count of 16 slightly elevated d-dimer patient underwent a CTA was negative for PE but did reveal thyroid nodule.   Results discussed with patient. Patient initiated on antibiotics with Z-Jim. Discharged home with a prescription for Z-Jim, steroids and outpatient follow-up. CRITICAL CARE:              NIH STROKE SCALE:            PROCEDURES:    Procedures    DIAGNOSTIC RESULTS   EKG:All EKG's are interpreted by the Emergency Department Physician who either signs or Co-signs this chart in the absence of a cardiologist.        RADIOLOGY:All plain film, CT, MRI, and formal ultrasound images (except ED bedside ultrasound) are read by the radiologist, see reports below, unless otherwisenoted in MDM or here. CTA PULMONARY W CONTRAST   Preliminary Result   1. No evidence of pulmonary embolism. 2.  Enlarged heterogeneous thyroid with dominant nodule of 18 mm, substernal.   Ultrasound follow-up advised. This can be performed on a nonemergent basis. 3.  Emphysematous changes in the lungs without worrisome nodules or localized   consolidation. 4.  Abdominal aortic aneurysm. Follow-up below. 5.  Enlarged right axillary lymph nodes. RECOMMENDATIONS:   1. Ultrasound of the right axilla. 2.  Ultrasound of the thyroid. Managing Abdominal Aortic Aneurysms      3.0-3.4 cm: Every 3 years. *For abdominal aortas with maximum diameter of 2.6-2.9 cm meeting criteria   for AAA (>50% of proximal normal segment). Reference:      J Vasc Surg. 2009 Oct;50(4 Suppl):S2-49         XR CHEST STANDARD (2 VW)   Final Result   No acute cardiopulmonary process. LABS: All lab results were reviewed by myself, and all abnormals are listed below.   Labs Reviewed   BASIC METABOLIC PANEL W/ REFLEX TO MG FOR LOW K - Abnormal; Notable for the following components:       Result Value    Glucose 186 (*)     CREATININE 1.49 (*)     Sodium 134 (*)     GFR Non- 46 (*)     GFR  56 (*)     All other components within normal limits   CBC WITH AUTO DIFFERENTIAL - Abnormal; Notable for the following chest          DISPOSITION/PLAN   DISPOSITION        PATIENT REFERRED TO:  No follow-up provider specified.   DISCHARGE MEDICATIONS:  New Prescriptions    No medications on file     Chery Moore MD  Attending Emergency Physician                    Chery Moore MD  [phone]

## 2019-05-24 NOTE — PROGRESS NOTES
Transitions of Care Pharmacy Service   Medication Review    The patient's list of current home medications has been reviewed and updated. Source(s) of information: [address] [facility] pharmacy, patient    Please feel free to call with any questions about this encounter. Thank you. Nga India, [address] [facility]  Transitions of Care Pharmacy Service  Phone:  [phone]  Fax: [phone]      Prior to Admission medications    Medication Sig Start Date End Date Taking? Authorizing Provider                           metoprolol succinate (TOPROL XL) 50 MG extended release tablet Take 50 mg by mouth daily   Yes Historical Provider, MD   tiotropium (SPIRIVA RESPIMAT) 2.5 MCG/ACT AERS inhaler Inhale 2 puffs into the lungs daily   Yes Historical Provider, MD   isosorbide mononitrate (IMDUR) 60 MG extended release tablet Take 30 mg by mouth daily Indications: 1/2 tablet (30mg)    Yes Historical Provider, MD   albuterol sulfate  (90 BASE) MCG/ACT inhaler Inhale 2 puffs into the lungs every 6 hours as needed for Wheezing   Yes Historical Provider, MD   albuterol (PROVENTIL) (2.5 MG/3ML) 0.083% nebulizer solution Take 2.5 mg by nebulization every 6 hours as needed for Wheezing   Yes Historical Provider, MD   aspirin 81 MG EC tablet Take 81 mg by mouth daily   Yes Historical Provider, MD   losartan (COZAAR) 100 MG tablet Take 100 mg by mouth daily   Yes Historical Provider, MD   nitroGLYCERIN (NITROSTAT) 0.4 MG SL tablet Place 0.4 mg under the tongue every 5 minutes as needed for Chest pain   Yes Historical Provider, MD   FLUoxetine (PROZAC) 20 MG capsule Take 40 mg by mouth daily   Yes Historical Provider, MD   atorvastatin (LIPITOR) 80 MG tablet Take 40 mg by mouth daily (Pt takes one-half of an 80mg tab = 40mg)   Yes Historical Provider, MD   budesonide-formoterol (SYMBICORT) 160-4.5 MCG/ACT AERO Inhale 2 puffs into the lungs 2 times daily.    Yes Historical Provider, MD                   carboxymethylcellulose 1 % ophthalmic solution Place 1 drop into both eyes 3 times daily as needed for Dry Eyes     Historical Provider, MD   ketotifen (ZADITOR) 0.025 % ophthalmic solution Place 1 drop into both eyes 2 times daily as needed (itchy eyes)     Historical Provider, MD   finasteride (PROSCAR) 5 MG tablet Take 5 mg by mouth daily    Historical Provider, MD                   tamsulosin (FLOMAX) 0.4 MG capsule Take 0.4 mg by mouth daily    Historical Provider, MD   fluticasone (FLONASE) 50 MCG/ACT nasal spray 1 spray by Nasal route 2 times daily  Patient taking differently: 1 spray by Nasal route 2 times daily as needed for Rhinitis  5/31/16   Francisco Connor DO   furosemide (LASIX) 20 MG tablet Take 20 mg by mouth daily as needed (swelling)     Historical Provider, MD                   clopidogrel (PLAVIX) 75 MG tablet Take 75 mg by mouth daily    Historical Provider, MD   rOPINIRole (REQUIP) 0.25 MG tablet Take 0.25 mg by mouth nightly as needed     Historical Provider, MD

## 2019-05-24 NOTE — ED NOTES
Pt presents to er with c/o shortness of breath. Pt states he has hx of copd and has been feeling sob since April. Pt states he was seen at the [place] in April and was put on a round of prednisone. Pt states he was not put on any antibiotics. Pt states this morning he had increased sob. Pt denies recent fever or chills. Pt denies chest pain. Pt a&ox3. Skin warm and dry. resp labored.       Seda Reese RN  05/24/19 [id number]

## 2019-05-24 NOTE — PROGRESS NOTES
· Bronchodilator assessment   []    Bronchodilator Assessment    FEV1 % PREDICTED 34%  FEV1 actual: 0.9  PEFR  164  PEFR % Predicted 36%  RR 29  Bronchodilator assessment at level  4  BRONCHODILATOR ASSESSMENT SCORE  Score 1 2 3 4   Breath Sounds   []  Clear []  Mild Wheezing with good aeration []  Moderate I/E wheezing with adequate aeration [x]  Poor Aeration or diffuse wheezing   Respiratory Rate []  Less than 20 []  20-25 [x]  Greater than 25  []  Greater than 35    Dyspnea []  No SOB  []  SOB with minimal activity [x]  Speaking in partial sentences []  Acute/ At rest   Peakflow (asthma) []  80 % or greater predicted/PB  []  Unable []  70% or greater predicted/PB  []  Unable []  51%-70% predicted/PB  []  Unable [x]  Less than 50% predicted/PB  []  Unable due to distress   FEV1 % Predicted []  Greater than 69%  []  Unable  []  Less than 50%-69%  []  Unable  []  Less than 35%-49%  []  Unable  [x]  Less than 35%  []  Unable due to distress     · Bronchodilator assessment   []    Bronchodilator Assessment    FEV1 % PREDICTED 34%  FEV1 actual: 0.9  PEFR  177  PEFR % Predicted 8% increase  RR 20  Bronchodilator assessment at level  4  BRONCHODILATOR ASSESSMENT SCORE  Score 1 2 3 4   Breath Sounds   []  Clear []  Mild Wheezing with good aeration []  Moderate I/E wheezing with adequate aeration [x]  Poor Aeration or diffuse wheezing   Respiratory Rate []  Less than 20 [x]  20-25 []  Greater than 25  []  Greater than 35    Dyspnea []  No SOB  []  SOB with minimal activity []  Speaking in partial sentences []  Acute/ At rest   Peakflow (asthma) []  80 % or greater predicted/PB  []  Unable []  70% or greater predicted/PB  []  Unable []  51%-70% predicted/PB  []  Unable [x]  Less than 50% predicted/PB  []  Unable due to distress   FEV1 % Predicted []  Greater than 69%  []  Unable  []  Less than 50%-69%  []  Unable  []  Less than 35%-49%  []  Unable  [x]  Less than 35%  []  Unable due to distress   · Bronchodilator assessment   []    Bronchodilator Assessment    FEV1 % PREDICTED ***  FEV1 actual: ***  PEFR  ***  PEFR % Predicted ***  RR ***  Bronchodilator assessment at level  ***  BRONCHODILATOR ASSESSMENT SCORE  Score 1 2 3 4   Breath Sounds   []  Clear []  Mild Wheezing with good aeration []  Moderate I/E wheezing with adequate aeration []  Poor Aeration or diffuse wheezing   Respiratory Rate []  Less than 20 []  20-25 []  Greater than 25  []  Greater than 35    Dyspnea []  No SOB  []  SOB with minimal activity []  Speaking in partial sentences []  Acute/ At rest   Peakflow (asthma) []  80 % or greater predicted/PB  []  Unable []  70% or greater predicted/PB  []  Unable []  51%-70% predicted/PB  []  Unable []  Less than 50% predicted/PB  []  Unable due to distress   FEV1 % Predicted []  Greater than 69%  []  Unable  []  Less than 50%-69%  []  Unable  []  Less than 35%-49%  []  Unable  []  Less than 35%  []  Unable due to distress     MDI Instruction ***

## 2019-05-25 LAB
EKG ATRIAL RATE: 70 BPM
EKG P-R INTERVAL: 140 MS
EKG Q-T INTERVAL: 402 MS
EKG QRS DURATION: 90 MS
EKG QTC CALCULATION (BAZETT): 434 MS
EKG R AXIS: -46 DEGREES
EKG T AXIS: 80 DEGREES
EKG VENTRICULAR RATE: 70 BPM

## 2019-07-10 ENCOUNTER — APPOINTMENT (OUTPATIENT)
Dept: GENERAL RADIOLOGY | Age: 74
End: 2019-07-10
Payer: COMMERCIAL

## 2019-07-10 ENCOUNTER — HOSPITAL ENCOUNTER (EMERGENCY)
Age: 74
Discharge: HOME | End: 2019-07-10
Attending: EMERGENCY MEDICINE
Payer: COMMERCIAL

## 2019-07-10 VITALS
OXYGEN SATURATION: 98 % | RESPIRATION RATE: 18 BRPM | HEART RATE: 53 BPM | TEMPERATURE: 98.6 F | SYSTOLIC BLOOD PRESSURE: 151 MMHG | DIASTOLIC BLOOD PRESSURE: 76 MMHG
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
## 2019-07-10 DIAGNOSIS — R03.0 ELEVATED BLOOD PRESSURE READING: ICD-10-CM

## 2019-07-10 DIAGNOSIS — S16.1XXA ACUTE STRAIN OF NECK MUSCLE, INITIAL ENCOUNTER: Primary | ICD-10-CM

## 2019-07-10 PROCEDURE — 6370000000 HC RX 637 (ALT 250 FOR IP): Performed by: PHYSICIAN ASSISTANT

## 2019-07-10 PROCEDURE — 99283 EMERGENCY DEPT VISIT LOW MDM: CPT

## 2019-07-10 PROCEDURE — 72040 X-RAY EXAM NECK SPINE 2-3 VW: CPT

## 2019-07-10 RX ORDER — IBUPROFEN 800 MG/1
800 TABLET ORAL ONCE
Status: COMPLETED | OUTPATIENT
Start: 2019-07-10 | End: 2019-07-10

## 2019-07-10 RX ORDER — METHOCARBAMOL 750 MG/1
750 TABLET, FILM COATED ORAL 4 TIMES DAILY
Qty: 40 TABLET | Refills: 0 | Status: SHIPPED | OUTPATIENT
Start: 2019-07-10 | End: 2019-07-20

## 2019-07-10 RX ORDER — NAPROXEN 500 MG/1
500 TABLET ORAL 2 TIMES DAILY WITH MEALS
Qty: 20 TABLET | Refills: 0 | Status: SHIPPED | OUTPATIENT
Start: 2019-07-10 | End: 2021-01-01 | Stop reason: HOSPADM

## 2019-07-10 RX ORDER — METAXALONE 800 MG/1
800 TABLET ORAL ONCE
Status: COMPLETED | OUTPATIENT
Start: 2019-07-10 | End: 2019-07-10

## 2019-07-10 RX ADMIN — IBUPROFEN 800 MG: 800 TABLET, FILM COATED ORAL at 11:42

## 2019-07-10 RX ADMIN — METAXALONE 800 MG: 800 TABLET ORAL at 11:42

## 2019-07-10 ASSESSMENT — PAIN SCALES - GENERAL
PAINLEVEL_OUTOF10: 9
PAINLEVEL_OUTOF10: 9

## 2019-07-18 ENCOUNTER — TELEPHONE (OUTPATIENT)
Dept: PULMONOLOGY | Age: 74
End: 2019-07-18

## 2019-07-24 ENCOUNTER — TELEPHONE (OUTPATIENT)
Dept: PULMONOLOGY | Age: 74
End: 2019-07-24

## 2019-09-04 ENCOUNTER — APPOINTMENT (OUTPATIENT)
Dept: CT IMAGING | Age: 74
End: 2019-09-04
Payer: MEDICARE

## 2019-09-04 ENCOUNTER — APPOINTMENT (OUTPATIENT)
Dept: GENERAL RADIOLOGY | Age: 74
End: 2019-09-04
Payer: MEDICARE

## 2019-09-04 ENCOUNTER — HOSPITAL ENCOUNTER (EMERGENCY)
Age: 74
Discharge: HOME | End: 2019-09-04
Attending: EMERGENCY MEDICINE
Payer: MEDICARE

## 2019-09-04 VITALS
TEMPERATURE: 98.5 F | RESPIRATION RATE: 21 BRPM | SYSTOLIC BLOOD PRESSURE: 152 MMHG | DIASTOLIC BLOOD PRESSURE: 74 MMHG | OXYGEN SATURATION: 98 % | HEART RATE: 52 BPM

## 2019-09-04 DIAGNOSIS — G89.29 CHRONIC LEFT SHOULDER PAIN: ICD-10-CM

## 2019-09-04 DIAGNOSIS — R42 LIGHTHEADEDNESS: Primary | ICD-10-CM

## 2019-09-04 DIAGNOSIS — M25.512 CHRONIC LEFT SHOULDER PAIN: ICD-10-CM

## 2019-09-04 LAB
ABSOLUTE EOS #: 0.06 K/UL (ref 0–0.44)
ABSOLUTE IMMATURE GRANULOCYTE: 0.03 K/UL (ref 0–0.3)
ABSOLUTE LYMPH #: 1.6 K/UL (ref 1.1–3.7)
ABSOLUTE MONO #: 0.48 K/UL (ref 0.1–1.2)
ANION GAP SERPL CALCULATED.3IONS-SCNC: 12 MMOL/L (ref 9–17)
BASOPHILS # BLD: 1 % (ref 0–2)
BASOPHILS ABSOLUTE: 0.04 K/UL (ref 0–0.2)
BUN BLDV-MCNC: 17 MG/DL (ref 8–23)
BUN/CREAT BLD: 14 (ref 9–20)
CALCIUM SERPL-MCNC: 9.1 MG/DL (ref 8.6–10.4)
CHLORIDE BLD-SCNC: 101 MMOL/L (ref 98–107)
CO2: 24 MMOL/L (ref 20–31)
CREAT SERPL-MCNC: 1.19 MG/DL (ref 0.7–1.2)
D-DIMER QUANTITATIVE: 0.76 MG/L FEU
DIFFERENTIAL TYPE: ABNORMAL
EKG ATRIAL RATE: 52 BPM
EKG P AXIS: 59 DEGREES
EKG P-R INTERVAL: 146 MS
EKG Q-T INTERVAL: 442 MS
EKG QRS DURATION: 84 MS
EKG QTC CALCULATION (BAZETT): 411 MS
EKG R AXIS: -26 DEGREES
EKG T AXIS: 12 DEGREES
EKG VENTRICULAR RATE: 52 BPM
EOSINOPHILS RELATIVE PERCENT: 1 % (ref 1–4)
GFR AFRICAN AMERICAN: >60 ML/MIN
GFR NON-AFRICAN AMERICAN: >60 ML/MIN
GFR SERPL CREATININE-BSD FRML MDRD: ABNORMAL ML/MIN/{1.73_M2}
GFR SERPL CREATININE-BSD FRML MDRD: ABNORMAL ML/MIN/{1.73_M2}
GLUCOSE BLD-MCNC: 135 MG/DL (ref 70–99)
HCT VFR BLD CALC: 39.5 % (ref 40.7–50.3)
HEMOGLOBIN: 12.1 G/DL (ref 13–17)
IMMATURE GRANULOCYTES: 0 %
LYMPHOCYTES # BLD: 23 % (ref 24–43)
MCH RBC QN AUTO: 24 PG (ref 25.2–33.5)
MCHC RBC AUTO-ENTMCNC: 30.6 G/DL (ref 28.4–34.8)
MCV RBC AUTO: 78.2 FL (ref 82.6–102.9)
MONOCYTES # BLD: 7 % (ref 3–12)
MYOGLOBIN: 173 NG/ML (ref 28–72)
NRBC AUTOMATED: 0 PER 100 WBC
PDW BLD-RTO: 16.3 % (ref 11.8–14.4)
PLATELET # BLD: 145 K/UL (ref 138–453)
PLATELET ESTIMATE: ABNORMAL
PMV BLD AUTO: 12.2 FL (ref 8.1–13.5)
POTASSIUM SERPL-SCNC: 3.8 MMOL/L (ref 3.7–5.3)
RBC # BLD: 5.05 M/UL (ref 4.21–5.77)
RBC # BLD: ABNORMAL 10*6/UL
SEG NEUTROPHILS: 68 % (ref 36–65)
SEGMENTED NEUTROPHILS ABSOLUTE COUNT: 4.72 K/UL (ref 1.5–8.1)
SODIUM BLD-SCNC: 137 MMOL/L (ref 135–144)
TROPONIN INTERP: ABNORMAL
TROPONIN T: ABNORMAL NG/ML
TROPONIN, HIGH SENSITIVITY: 42 NG/L (ref 0–22)
WBC # BLD: 6.9 K/UL (ref 3.5–11.3)
WBC # BLD: ABNORMAL 10*3/UL

## 2019-09-04 PROCEDURE — 99284 EMERGENCY DEPT VISIT MOD MDM: CPT

## 2019-09-04 PROCEDURE — 85025 COMPLETE CBC W/AUTO DIFF WBC: CPT

## 2019-09-04 PROCEDURE — 6360000004 HC RX CONTRAST MEDICATION: Performed by: EMERGENCY MEDICINE

## 2019-09-04 PROCEDURE — 81003 URINALYSIS AUTO W/O SCOPE: CPT

## 2019-09-04 PROCEDURE — 2580000003 HC RX 258: Performed by: EMERGENCY MEDICINE

## 2019-09-04 PROCEDURE — 71045 X-RAY EXAM CHEST 1 VIEW: CPT

## 2019-09-04 PROCEDURE — 84484 ASSAY OF TROPONIN QUANT: CPT

## 2019-09-04 PROCEDURE — 83874 ASSAY OF MYOGLOBIN: CPT

## 2019-09-04 PROCEDURE — 80048 BASIC METABOLIC PNL TOTAL CA: CPT

## 2019-09-04 PROCEDURE — 85379 FIBRIN DEGRADATION QUANT: CPT

## 2019-09-04 PROCEDURE — 71260 CT THORAX DX C+: CPT

## 2019-09-04 PROCEDURE — 93005 ELECTROCARDIOGRAM TRACING: CPT | Performed by: EMERGENCY MEDICINE

## 2019-09-04 RX ORDER — SODIUM CHLORIDE 0.9 % (FLUSH) 0.9 %
10 SYRINGE (ML) INJECTION PRN
Status: DISCONTINUED | OUTPATIENT
Start: 2019-09-04 | End: 2019-09-04 | Stop reason: HOSPADM

## 2019-09-04 RX ORDER — HYDROCODONE BITARTRATE AND ACETAMINOPHEN 5; 325 MG/1; MG/1
1 TABLET ORAL EVERY 6 HOURS PRN
Qty: 8 TABLET | Refills: 0 | Status: SHIPPED | OUTPATIENT
Start: 2019-09-04 | End: 2019-09-06

## 2019-09-04 RX ORDER — 0.9 % SODIUM CHLORIDE 0.9 %
80 INTRAVENOUS SOLUTION INTRAVENOUS ONCE
Status: COMPLETED | OUTPATIENT
Start: 2019-09-04 | End: 2019-09-04

## 2019-09-04 RX ADMIN — IOPAMIDOL 75 ML: 755 INJECTION, SOLUTION INTRAVENOUS at 16:05

## 2019-09-04 RX ADMIN — SODIUM CHLORIDE 80 ML: 9 INJECTION, SOLUTION INTRAVENOUS at 16:05

## 2019-09-04 RX ADMIN — Medication 10 ML: at 16:05

## 2019-09-04 ASSESSMENT — PAIN DESCRIPTION - FREQUENCY: FREQUENCY: INTERMITTENT

## 2019-09-04 ASSESSMENT — ENCOUNTER SYMPTOMS
RHINORRHEA: 0
VOMITING: 0
SINUS PRESSURE: 0
WHEEZING: 0
SHORTNESS OF BREATH: 0
COUGH: 0
SORE THROAT: 0
COLOR CHANGE: 0
DIARRHEA: 0
CONSTIPATION: 0
NAUSEA: 0
ABDOMINAL PAIN: 0

## 2019-09-04 ASSESSMENT — PAIN SCALES - GENERAL: PAINLEVEL_OUTOF10: 5

## 2019-09-04 ASSESSMENT — PAIN DESCRIPTION - DESCRIPTORS: DESCRIPTORS: SHARP

## 2019-10-15 ENCOUNTER — TELEPHONE (OUTPATIENT)
Dept: ADMINISTRATIVE | Age: 74
End: 2019-10-15

## 2020-06-02 ENCOUNTER — APPOINTMENT (OUTPATIENT)
Dept: CT IMAGING | Age: 75
End: 2020-06-02
Payer: OTHER GOVERNMENT

## 2020-06-02 ENCOUNTER — HOSPITAL ENCOUNTER (EMERGENCY)
Age: 75
Discharge: HOME | End: 2020-06-02
Attending: EMERGENCY MEDICINE
Payer: OTHER GOVERNMENT

## 2020-06-02 VITALS
DIASTOLIC BLOOD PRESSURE: 100 MMHG | SYSTOLIC BLOOD PRESSURE: 177 MMHG | WEIGHT: 230 LBS | OXYGEN SATURATION: 96 % | HEIGHT: 69 IN | HEART RATE: 66 BPM | BODY MASS INDEX: 34.07 KG/M2 | RESPIRATION RATE: 18 BRPM | TEMPERATURE: 98.4 F

## 2020-06-02 LAB
ABSOLUTE EOS #: 0.09 K/UL (ref 0–0.44)
ABSOLUTE IMMATURE GRANULOCYTE: 0.03 K/UL (ref 0–0.3)
ABSOLUTE LYMPH #: 2.04 K/UL (ref 1.1–3.7)
ABSOLUTE MONO #: 0.84 K/UL (ref 0.1–1.2)
ALBUMIN SERPL-MCNC: 4.2 G/DL (ref 3.5–5.2)
ALBUMIN/GLOBULIN RATIO: ABNORMAL (ref 1–2.5)
ALP BLD-CCNC: 77 U/L (ref 40–129)
ALT SERPL-CCNC: 37 U/L (ref 5–41)
ANION GAP SERPL CALCULATED.3IONS-SCNC: 14 MMOL/L (ref 9–17)
AST SERPL-CCNC: 71 U/L
BASOPHILS # BLD: 1 % (ref 0–2)
BASOPHILS ABSOLUTE: 0.05 K/UL (ref 0–0.2)
BILIRUB SERPL-MCNC: 0.5 MG/DL (ref 0.3–1.2)
BUN BLDV-MCNC: 12 MG/DL (ref 8–23)
BUN/CREAT BLD: 9 (ref 9–20)
CALCIUM SERPL-MCNC: 9.7 MG/DL (ref 8.6–10.4)
CHLORIDE BLD-SCNC: 101 MMOL/L (ref 98–107)
CO2: 24 MMOL/L (ref 20–31)
CREAT SERPL-MCNC: 1.4 MG/DL (ref 0.7–1.2)
DIFFERENTIAL TYPE: ABNORMAL
EOSINOPHILS RELATIVE PERCENT: 1 % (ref 1–4)
GFR AFRICAN AMERICAN: >60 ML/MIN
GFR NON-AFRICAN AMERICAN: 50 ML/MIN
GFR SERPL CREATININE-BSD FRML MDRD: ABNORMAL ML/MIN/{1.73_M2}
GFR SERPL CREATININE-BSD FRML MDRD: ABNORMAL ML/MIN/{1.73_M2}
GLUCOSE BLD-MCNC: 161 MG/DL (ref 70–99)
HCT VFR BLD CALC: 43.4 % (ref 40.7–50.3)
HEMOGLOBIN: 13.4 G/DL (ref 13–17)
IMMATURE GRANULOCYTES: 0 %
LYMPHOCYTES # BLD: 25 % (ref 24–43)
MCH RBC QN AUTO: 24.3 PG (ref 25.2–33.5)
MCHC RBC AUTO-ENTMCNC: 30.9 G/DL (ref 28.4–34.8)
MCV RBC AUTO: 78.6 FL (ref 82.6–102.9)
MONOCYTES # BLD: 10 % (ref 3–12)
NRBC AUTOMATED: 0 PER 100 WBC
PDW BLD-RTO: 16.2 % (ref 11.8–14.4)
PLATELET # BLD: 160 K/UL (ref 138–453)
PLATELET ESTIMATE: ABNORMAL
PMV BLD AUTO: 12.4 FL (ref 8.1–13.5)
POTASSIUM SERPL-SCNC: 3.7 MMOL/L (ref 3.7–5.3)
RBC # BLD: 5.52 M/UL (ref 4.21–5.77)
RBC # BLD: ABNORMAL 10*6/UL
SEG NEUTROPHILS: 62 % (ref 36–65)
SEGMENTED NEUTROPHILS ABSOLUTE COUNT: 4.99 K/UL (ref 1.5–8.1)
SODIUM BLD-SCNC: 139 MMOL/L (ref 135–144)
TOTAL PROTEIN: 7.6 G/DL (ref 6.4–8.3)
WBC # BLD: 8 K/UL (ref 3.5–11.3)
WBC # BLD: ABNORMAL 10*3/UL

## 2020-06-02 PROCEDURE — 96375 TX/PRO/DX INJ NEW DRUG ADDON: CPT

## 2020-06-02 PROCEDURE — 96376 TX/PRO/DX INJ SAME DRUG ADON: CPT

## 2020-06-02 PROCEDURE — 6360000002 HC RX W HCPCS: Performed by: EMERGENCY MEDICINE

## 2020-06-02 PROCEDURE — 6370000000 HC RX 637 (ALT 250 FOR IP): Performed by: EMERGENCY MEDICINE

## 2020-06-02 PROCEDURE — 72131 CT LUMBAR SPINE W/O DYE: CPT

## 2020-06-02 PROCEDURE — 80053 COMPREHEN METABOLIC PANEL: CPT

## 2020-06-02 PROCEDURE — 96374 THER/PROPH/DIAG INJ IV PUSH: CPT

## 2020-06-02 PROCEDURE — 2580000003 HC RX 258: Performed by: EMERGENCY MEDICINE

## 2020-06-02 PROCEDURE — 85025 COMPLETE CBC W/AUTO DIFF WBC: CPT

## 2020-06-02 PROCEDURE — 99283 EMERGENCY DEPT VISIT LOW MDM: CPT

## 2020-06-02 RX ORDER — IBUPROFEN 800 MG/1
800 TABLET ORAL EVERY 8 HOURS PRN
Qty: 30 TABLET | Refills: 0 | Status: SHIPPED | OUTPATIENT
Start: 2020-06-02

## 2020-06-02 RX ORDER — HYDROMORPHONE HYDROCHLORIDE 1 MG/ML
0.5 INJECTION, SOLUTION INTRAMUSCULAR; INTRAVENOUS; SUBCUTANEOUS ONCE
Status: COMPLETED | OUTPATIENT
Start: 2020-06-02 | End: 2020-06-02

## 2020-06-02 RX ORDER — METHOCARBAMOL 750 MG/1
750 TABLET, FILM COATED ORAL 4 TIMES DAILY
Qty: 40 TABLET | Refills: 0 | Status: SHIPPED | OUTPATIENT
Start: 2020-06-02 | End: 2020-06-12

## 2020-06-02 RX ORDER — LIDOCAINE 50 MG/G
1 PATCH TOPICAL DAILY
Qty: 30 PATCH | Refills: 0 | Status: SHIPPED | OUTPATIENT
Start: 2020-06-02

## 2020-06-02 RX ORDER — LOSARTAN POTASSIUM 100 MG/1
100 TABLET ORAL ONCE
Status: COMPLETED | OUTPATIENT
Start: 2020-06-02 | End: 2020-06-02

## 2020-06-02 RX ORDER — OXYCODONE HYDROCHLORIDE AND ACETAMINOPHEN 5; 325 MG/1; MG/1
1 TABLET ORAL EVERY 4 HOURS PRN
Qty: 20 TABLET | Refills: 0 | Status: SHIPPED | OUTPATIENT
Start: 2020-06-02 | End: 2020-06-07

## 2020-06-02 RX ORDER — ONDANSETRON 2 MG/ML
8 INJECTION INTRAMUSCULAR; INTRAVENOUS ONCE
Status: DISCONTINUED | OUTPATIENT
Start: 2020-06-02 | End: 2020-06-03 | Stop reason: HOSPADM

## 2020-06-02 RX ORDER — METAXALONE 800 MG/1
800 TABLET ORAL ONCE
Status: COMPLETED | OUTPATIENT
Start: 2020-06-02 | End: 2020-06-02

## 2020-06-02 RX ORDER — KETOROLAC TROMETHAMINE 30 MG/ML
30 INJECTION, SOLUTION INTRAMUSCULAR; INTRAVENOUS ONCE
Status: COMPLETED | OUTPATIENT
Start: 2020-06-02 | End: 2020-06-02

## 2020-06-02 RX ORDER — TAMSULOSIN HYDROCHLORIDE 0.4 MG/1
0.4 CAPSULE ORAL ONCE
Status: COMPLETED | OUTPATIENT
Start: 2020-06-02 | End: 2020-06-02

## 2020-06-02 RX ORDER — HYDROMORPHONE HYDROCHLORIDE 1 MG/ML
1 INJECTION, SOLUTION INTRAMUSCULAR; INTRAVENOUS; SUBCUTANEOUS ONCE
Status: COMPLETED | OUTPATIENT
Start: 2020-06-02 | End: 2020-06-02

## 2020-06-02 RX ORDER — SODIUM CHLORIDE 9 MG/ML
INJECTION, SOLUTION INTRAVENOUS CONTINUOUS
Status: DISCONTINUED | OUTPATIENT
Start: 2020-06-02 | End: 2020-06-03 | Stop reason: HOSPADM

## 2020-06-02 RX ADMIN — METAXALONE 800 MG: 800 TABLET ORAL at 22:36

## 2020-06-02 RX ADMIN — METOPROLOL TARTRATE 50 MG: 25 TABLET, FILM COATED ORAL at 21:20

## 2020-06-02 RX ADMIN — KETOROLAC TROMETHAMINE 30 MG: 30 INJECTION, SOLUTION INTRAMUSCULAR at 22:36

## 2020-06-02 RX ADMIN — LOSARTAN POTASSIUM 100 MG: 100 TABLET, FILM COATED ORAL at 21:47

## 2020-06-02 RX ADMIN — TAMSULOSIN HYDROCHLORIDE 0.4 MG: 0.4 CAPSULE ORAL at 21:20

## 2020-06-02 RX ADMIN — HYDROMORPHONE HYDROCHLORIDE 1 MG: 1 INJECTION, SOLUTION INTRAMUSCULAR; INTRAVENOUS; SUBCUTANEOUS at 21:20

## 2020-06-02 RX ADMIN — SODIUM CHLORIDE: 9 INJECTION, SOLUTION INTRAVENOUS at 21:20

## 2020-06-02 RX ADMIN — HYDROMORPHONE HYDROCHLORIDE 0.5 MG: 1 INJECTION, SOLUTION INTRAMUSCULAR; INTRAVENOUS; SUBCUTANEOUS at 22:37

## 2020-06-02 ASSESSMENT — PAIN SCALES - GENERAL
PAINLEVEL_OUTOF10: 10
PAINLEVEL_OUTOF10: 5
PAINLEVEL_OUTOF10: 10

## 2020-06-02 ASSESSMENT — PAIN DESCRIPTION - FREQUENCY: FREQUENCY: CONTINUOUS

## 2020-06-02 ASSESSMENT — PAIN DESCRIPTION - LOCATION: LOCATION: BACK

## 2020-06-03 ENCOUNTER — CARE COORDINATION (OUTPATIENT)
Dept: CARE COORDINATION | Age: 75
End: 2020-06-03

## 2020-06-03 NOTE — ED NOTES
Pt presents to the ED via private auto for lower back pain. Pt states the pain is lower on his right side. Pt denies any history of back pain. States he has been moving and carrying heavy boxes over the past 3 days. Pt states the pain is a 10/10 and he can barely walk. Pt states the pain is so bad he could not take any of his medications today.       Nataliai Balderas RN  06/02/20 2133       Nataliia Balderas RN  06/02/20 2134

## 2020-06-03 NOTE — ED PROVIDER NOTES
EC TABLET    Take 81 mg by mouth daily    ATORVASTATIN (LIPITOR) 80 MG TABLET    Take 40 mg by mouth daily (Pt takes one-half of an 80mg tab = 40mg)    BUDESONIDE-FORMOTEROL (SYMBICORT) 160-4.5 MCG/ACT AERO    Inhale 2 puffs into the lungs 2 times daily.     CARBOXYMETHYLCELLULOSE 1 % OPHTHALMIC SOLUTION    Place 1 drop into both eyes 3 times daily as needed for Dry Eyes     CLOPIDOGREL (PLAVIX) 75 MG TABLET    Take 75 mg by mouth daily    FINASTERIDE (PROSCAR) 5 MG TABLET    Take 5 mg by mouth daily    FLUOXETINE (PROZAC) 20 MG CAPSULE    Take 40 mg by mouth daily     FLUTICASONE (FLONASE) 50 MCG/ACT NASAL SPRAY    1 spray by Nasal route 2 times daily    FUROSEMIDE (LASIX) 20 MG TABLET    Take 20 mg by mouth daily as needed (swelling)     ISOSORBIDE MONONITRATE (IMDUR) 60 MG EXTENDED RELEASE TABLET    Take 30 mg by mouth daily Indications: 1/2 tablet (30mg)     KETOTIFEN (ZADITOR) 0.025 % OPHTHALMIC SOLUTION    Place 1 drop into both eyes 2 times daily as needed (itchy eyes)     LOSARTAN (COZAAR) 100 MG TABLET    Take 100 mg by mouth daily     METOPROLOL SUCCINATE (TOPROL XL) 50 MG EXTENDED RELEASE TABLET    Take 50 mg by mouth daily    NAPROXEN (NAPROSYN) 500 MG TABLET    Take 1 tablet by mouth 2 times daily (with meals)    NITROGLYCERIN (NITROSTAT) 0.4 MG SL TABLET    Place 0.4 mg under the tongue every 5 minutes as needed for Chest pain    ROPINIROLE (REQUIP) 0.25 MG TABLET    Take 0.25 mg by mouth nightly as needed     TAMSULOSIN (FLOMAX) 0.4 MG CAPSULE    Take 0.4 mg by mouth daily    TIOTROPIUM (SPIRIVA RESPIMAT) 2.5 MCG/ACT AERS INHALER    Inhale 2 puffs into the lungs daily       PAST MEDICAL HISTORY         Diagnosis Date    Centrilobular emphysema ([address]     COPD (chronic obstructive pulmonary disease) ([address]     Depression     Diabetes mellitus ([address]     pt refuses to take previously prescribed metformin (states PCP aware)    Former smoker     Hyperlipidemia     on 4/27/18 pt states \"I stopped taking images such as CT, Ultrasound and MRI are read by the radiologist. Plain radiographic images are visualized and preliminarily interpreted by the emergency physician with the below findings:        Interpretation per the Radiologist below, if available at the time of this note:    CT LUMBAR SPINE WO CONTRAST   Final Result   Mild diffuse degenerative disc disease. LABS:  Labs Reviewed   CBC WITH AUTO DIFFERENTIAL - Abnormal; Notable for the following components:       Result Value    MCV 78.6 (*)     MCH 24.3 (*)     RDW 16.2 (*)     All other components within normal limits   COMPREHENSIVE METABOLIC PANEL - Abnormal; Notable for the following components:    Glucose 161 (*)     CREATININE 1.40 (*)     AST 71 (*)     GFR Non- 50 (*)     All other components within normal limits       All other labs were within normal range or not returned as of this dictation. EMERGENCY DEPARTMENT COURSE and DIFFERENTIAL DIAGNOSIS/MDM:   Vitals:    Vitals:    06/02/20 2044   BP: (!) 177/100   Pulse: 66   Resp: 18   Temp: 98.4 °F (36.9 °C)   TempSrc: Oral   SpO2: 96%   Weight: 230 lb (104.3 kg)   Height: 5' 9\" (1.753 m)     Patient is treated symptomatically. He does have some relief with medications given. Given the severity of his presentation laboratory and imaging studies are reviewed. He does demonstrate diffuse degenerative disc disease although no other acute pathology. I did discuss findings with him. We did discuss disposition. Patient will prefer outpatient management. He is discharged home with prescriptions for pain inflammation and muscle spasm. He is advised further follow-up with his family physician    CONSULTS:  None    PROCEDURES:  None    FINAL IMPRESSION      1. Acute right-sided low back pain, unspecified whether sciatica present          DISPOSITION/PLAN   DISPOSITION    Decision to DIscharge    PATIENT REFERRED TO:   [facility]  [address].   [place]

## 2020-06-03 NOTE — CARE COORDINATION
1st outreach call to follow up with patient for ED visit,no answer, lvm to return call to this nurse at [phone]. If no return call, ACM will attempt outreach call again tomorrow.

## 2020-06-04 ENCOUNTER — CARE COORDINATION (OUTPATIENT)
Dept: CARE COORDINATION | Age: 75
End: 2020-06-04

## 2020-06-04 NOTE — CARE COORDINATION
2ndoutreach call to follow up with patient for ED visit,no answer, left detailed vm to return call to this nurse. Patient returns call and inquires \"what do you want, this is your last chance\". Introduced self and informed of reason for call. Patient states \"I am just fine, thank you for calling\" and then hung up the phone. No further outreach calls per ACM.

## 2021-01-01 ENCOUNTER — APPOINTMENT (OUTPATIENT)
Dept: MRI IMAGING | Age: 76
DRG: 056 | End: 2021-01-01
Attending: PHYSICAL MEDICINE & REHABILITATION
Payer: MEDICARE

## 2021-01-01 ENCOUNTER — HOSPITAL ENCOUNTER (INPATIENT)
Age: 76
LOS: 7 days | Discharge: NURSING, REHAB OR LONG-TERM CARE | DRG: 065 | End: 2021-07-20
Attending: EMERGENCY MEDICINE | Admitting: INTERNAL MEDICINE
Payer: OTHER GOVERNMENT

## 2021-01-01 ENCOUNTER — APPOINTMENT (OUTPATIENT)
Dept: GENERAL RADIOLOGY | Age: 76
DRG: 065 | End: 2021-01-01
Payer: OTHER GOVERNMENT

## 2021-01-01 ENCOUNTER — ANESTHESIA (OUTPATIENT)
Dept: ENDOSCOPY | Age: 76
End: 2021-01-01

## 2021-01-01 ENCOUNTER — OFFICE VISIT (OUTPATIENT)
Dept: PRIMARY CARE CLINIC | Age: 76
End: 2021-01-01
Payer: MEDICARE

## 2021-01-01 ENCOUNTER — APPOINTMENT (OUTPATIENT)
Dept: ULTRASOUND IMAGING | Age: 76
DRG: 065 | End: 2021-01-01
Payer: OTHER GOVERNMENT

## 2021-01-01 ENCOUNTER — APPOINTMENT (OUTPATIENT)
Dept: MRI IMAGING | Age: 76
DRG: 065 | End: 2021-01-01
Payer: OTHER GOVERNMENT

## 2021-01-01 ENCOUNTER — APPOINTMENT (OUTPATIENT)
Dept: GENERAL RADIOLOGY | Age: 76
End: 2021-01-01
Payer: MEDICARE

## 2021-01-01 ENCOUNTER — APPOINTMENT (OUTPATIENT)
Dept: CT IMAGING | Age: 76
DRG: 065 | End: 2021-01-01
Payer: OTHER GOVERNMENT

## 2021-01-01 ENCOUNTER — HOSPITAL ENCOUNTER (INPATIENT)
Age: 76
LOS: 8 days | Discharge: HOSPICE | DRG: 064 | End: 2021-08-17
Attending: INTERNAL MEDICINE | Admitting: INTERNAL MEDICINE
Payer: OTHER GOVERNMENT

## 2021-01-01 ENCOUNTER — APPOINTMENT (OUTPATIENT)
Dept: CT IMAGING | Age: 76
End: 2021-01-01
Payer: OTHER GOVERNMENT

## 2021-01-01 ENCOUNTER — HOSPITAL ENCOUNTER (OUTPATIENT)
Dept: CARDIAC CATH/INVASIVE PROCEDURES | Age: 76
Discharge: HOME | End: 2021-08-10
Payer: OTHER GOVERNMENT

## 2021-01-01 ENCOUNTER — APPOINTMENT (OUTPATIENT)
Dept: GENERAL RADIOLOGY | Age: 76
DRG: 064 | End: 2021-01-01
Attending: INTERNAL MEDICINE
Payer: OTHER GOVERNMENT

## 2021-01-01 ENCOUNTER — TELEPHONE (OUTPATIENT)
Dept: INTERNAL MEDICINE CLINIC | Age: 76
End: 2021-01-01

## 2021-01-01 ENCOUNTER — ANESTHESIA EVENT (OUTPATIENT)
Dept: ENDOSCOPY | Age: 76
End: 2021-01-01

## 2021-01-01 ENCOUNTER — HOSPITAL ENCOUNTER (EMERGENCY)
Age: 76
Discharge: HOME | End: 2021-05-02
Attending: EMERGENCY MEDICINE
Payer: OTHER GOVERNMENT

## 2021-01-01 ENCOUNTER — APPOINTMENT (OUTPATIENT)
Dept: CT IMAGING | Age: 76
DRG: 056 | End: 2021-01-01
Attending: PHYSICAL MEDICINE & REHABILITATION
Payer: MEDICARE

## 2021-01-01 ENCOUNTER — HOSPITAL ENCOUNTER (INPATIENT)
Age: 76
LOS: 19 days | Discharge: ANOTHER HOSPITAL | DRG: 056 | End: 2021-08-08
Attending: PHYSICAL MEDICINE & REHABILITATION | Admitting: PHYSICAL MEDICINE & REHABILITATION
Payer: MEDICARE

## 2021-01-01 ENCOUNTER — HOSPITAL ENCOUNTER (EMERGENCY)
Age: 76
Discharge: HOME | End: 2021-01-04
Payer: MEDICARE

## 2021-01-01 ENCOUNTER — APPOINTMENT (OUTPATIENT)
Dept: CT IMAGING | Age: 76
DRG: 064 | End: 2021-01-01
Attending: INTERNAL MEDICINE
Payer: OTHER GOVERNMENT

## 2021-01-01 VITALS
HEIGHT: 70 IN | SYSTOLIC BLOOD PRESSURE: 162 MMHG | WEIGHT: 210 LBS | BODY MASS INDEX: 30.06 KG/M2 | TEMPERATURE: 97.8 F | HEART RATE: 60 BPM | DIASTOLIC BLOOD PRESSURE: 81 MMHG | OXYGEN SATURATION: 98 % | RESPIRATION RATE: 20 BRPM

## 2021-01-01 VITALS
SYSTOLIC BLOOD PRESSURE: 124 MMHG | HEIGHT: 70 IN | WEIGHT: 214.29 LBS | DIASTOLIC BLOOD PRESSURE: 69 MMHG | TEMPERATURE: 98.8 F | HEART RATE: 76 BPM | BODY MASS INDEX: 30.68 KG/M2 | RESPIRATION RATE: 18 BRPM | OXYGEN SATURATION: 99 %

## 2021-01-01 VITALS
HEART RATE: 84 BPM | OXYGEN SATURATION: 94 % | DIASTOLIC BLOOD PRESSURE: 64 MMHG | RESPIRATION RATE: 20 BRPM | WEIGHT: 198 LBS | SYSTOLIC BLOOD PRESSURE: 121 MMHG | HEIGHT: 70 IN | TEMPERATURE: 98.4 F | BODY MASS INDEX: 28.35 KG/M2

## 2021-01-01 VITALS
TEMPERATURE: 98 F | SYSTOLIC BLOOD PRESSURE: 144 MMHG | OXYGEN SATURATION: 100 % | DIASTOLIC BLOOD PRESSURE: 91 MMHG | HEART RATE: 65 BPM

## 2021-01-01 VITALS
HEART RATE: 79 BPM | HEIGHT: 69 IN | OXYGEN SATURATION: 98 % | DIASTOLIC BLOOD PRESSURE: 94 MMHG | RESPIRATION RATE: 18 BRPM | WEIGHT: 230 LBS | TEMPERATURE: 98.3 F | BODY MASS INDEX: 34.07 KG/M2 | SYSTOLIC BLOOD PRESSURE: 124 MMHG

## 2021-01-01 VITALS
SYSTOLIC BLOOD PRESSURE: 117 MMHG | TEMPERATURE: 98 F | HEART RATE: 60 BPM | DIASTOLIC BLOOD PRESSURE: 53 MMHG | RESPIRATION RATE: 22 BRPM | OXYGEN SATURATION: 100 %

## 2021-01-01 VITALS
SYSTOLIC BLOOD PRESSURE: 169 MMHG | RESPIRATION RATE: 24 BRPM | DIASTOLIC BLOOD PRESSURE: 81 MMHG | OXYGEN SATURATION: 100 %

## 2021-01-01 VITALS
DIASTOLIC BLOOD PRESSURE: 105 MMHG | BODY MASS INDEX: 33.97 KG/M2 | TEMPERATURE: 98.3 F | RESPIRATION RATE: 16 BRPM | OXYGEN SATURATION: 98 % | WEIGHT: 230 LBS | HEART RATE: 85 BPM | SYSTOLIC BLOOD PRESSURE: 191 MMHG
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
## 2021-01-01 DIAGNOSIS — S16.1XXA CERVICAL STRAIN, ACUTE, INITIAL ENCOUNTER: Primary | ICD-10-CM

## 2021-01-01 DIAGNOSIS — M54.50 ACUTE EXACERBATION OF CHRONIC LOW BACK PAIN: Primary | ICD-10-CM

## 2021-01-01 DIAGNOSIS — M54.2 NECK PAIN: Primary | ICD-10-CM

## 2021-01-01 DIAGNOSIS — T79.6XXA TRAUMATIC RHABDOMYOLYSIS, INITIAL ENCOUNTER (HCC): ICD-10-CM

## 2021-01-01 DIAGNOSIS — M79.89 LEFT LEG SWELLING: ICD-10-CM
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
## 2021-01-01 DIAGNOSIS — E04.1 THYROID NODULE INCIDENTALLY NOTED ON IMAGING STUDY: ICD-10-CM

## 2021-01-01 DIAGNOSIS — I63.9 CEREBROVASCULAR ACCIDENT (CVA), UNSPECIFIED MECHANISM (HCC): Primary | ICD-10-CM

## 2021-01-01 DIAGNOSIS — G89.29 ACUTE EXACERBATION OF CHRONIC LOW BACK PAIN: Primary | ICD-10-CM

## 2021-01-01 LAB
% CKMB: 1 % (ref 0–3.5)
-: ABNORMAL
-: NORMAL
-: NORMAL
ABO/RH: NORMAL
ABSOLUTE EOS #: 0 K/UL (ref 0–0.4)
ABSOLUTE EOS #: 0.07 K/UL (ref 0–0.44)
ABSOLUTE EOS #: 0.1 K/UL (ref 0–0.44)
ABSOLUTE EOS #: 0.15 K/UL (ref 0–0.4)
ABSOLUTE EOS #: <0.03 K/UL (ref 0–0.44)
ABSOLUTE IMMATURE GRANULOCYTE: 0 K/UL (ref 0–0.3)
ABSOLUTE IMMATURE GRANULOCYTE: 0 K/UL (ref 0–0.3)
ABSOLUTE IMMATURE GRANULOCYTE: 0.08 K/UL (ref 0–0.3)
ABSOLUTE IMMATURE GRANULOCYTE: 0.09 K/UL (ref 0–0.3)
ABSOLUTE IMMATURE GRANULOCYTE: 0.1 K/UL (ref 0–0.3)
ABSOLUTE IMMATURE GRANULOCYTE: 0.33 K/UL (ref 0–0.3)
ABSOLUTE IMMATURE GRANULOCYTE: 0.34 K/UL (ref 0–0.3)
ABSOLUTE IMMATURE GRANULOCYTE: 0.35 K/UL (ref 0–0.3)
ABSOLUTE IMMATURE GRANULOCYTE: 0.64 K/UL (ref 0–0.3)
ABSOLUTE IMMATURE GRANULOCYTE: ABNORMAL K/UL (ref 0–0.3)
ABSOLUTE IMMATURE GRANULOCYTE: ABNORMAL K/UL (ref 0–0.3)
ABSOLUTE LYMPH #: 1.03 K/UL (ref 1.1–3.7)
ABSOLUTE LYMPH #: 1.14 K/UL (ref 1–4.8)
ABSOLUTE LYMPH #: 1.3 K/UL (ref 1.1–3.7)
ABSOLUTE LYMPH #: 1.4 K/UL (ref 1.1–3.7)
ABSOLUTE LYMPH #: 1.41 K/UL (ref 1.1–3.7)
ABSOLUTE LYMPH #: 1.68 K/UL (ref 1–4.8)
ABSOLUTE LYMPH #: 1.75 K/UL (ref 1–4.8)
ABSOLUTE LYMPH #: 1.8 K/UL (ref 1.1–3.7)
ABSOLUTE LYMPH #: 2.12 K/UL (ref 1–4.8)
ABSOLUTE LYMPH #: 2.78 K/UL (ref 1–4.8)
ABSOLUTE LYMPH #: 4.28 K/UL (ref 1–4.8)
ABSOLUTE MONO #: 0.15 K/UL (ref 0.1–0.8)
ABSOLUTE MONO #: 0.21 K/UL (ref 0.1–1.2)
ABSOLUTE MONO #: 0.57 K/UL (ref 0.1–1.3)
ABSOLUTE MONO #: 0.61 K/UL (ref 0.1–1.2)
ABSOLUTE MONO #: 0.7 K/UL (ref 0.1–0.8)
ABSOLUTE MONO #: 0.73 K/UL (ref 0.1–1.2)
ABSOLUTE MONO #: 0.8 K/UL (ref 0.1–0.8)
ABSOLUTE MONO #: 0.9 K/UL (ref 0.1–1.2)
ABSOLUTE MONO #: 0.98 K/UL (ref 0.1–1.3)
ABSOLUTE MONO #: 1.14 K/UL (ref 0.1–1.2)
ABSOLUTE MONO #: 1.24 K/UL (ref 0.1–0.8)
ABSOLUTE RETIC #: 0.1 M/UL (ref 0.03–0.08)
ALBUMIN SERPL-MCNC: 2.6 G/DL (ref 3.5–5.2)
ALBUMIN SERPL-MCNC: 2.8 G/DL (ref 3.5–5.2)
ALBUMIN SERPL-MCNC: 2.9 G/DL (ref 3.5–5.2)
ALBUMIN SERPL-MCNC: 2.9 G/DL (ref 3.5–5.2)
ALBUMIN SERPL-MCNC: 3.1 G/DL (ref 3.5–5.2)
ALBUMIN SERPL-MCNC: 3.3 G/DL (ref 3.5–5.2)
ALBUMIN SERPL-MCNC: 3.8 G/DL (ref 3.5–5.2)
ALBUMIN/GLOBULIN RATIO: 1.1 (ref 1–2.5)
ALBUMIN/GLOBULIN RATIO: 1.1 (ref 1–2.5)
ALBUMIN/GLOBULIN RATIO: ABNORMAL (ref 1–2.5)
ALLEN TEST: ABNORMAL
ALLEN TEST: ABNORMAL
ALP BLD-CCNC: 111 U/L (ref 40–129)
ALP BLD-CCNC: 184 U/L (ref 40–129)
ALP BLD-CCNC: 192 U/L (ref 40–129)
ALP BLD-CCNC: 227 U/L (ref 40–129)
ALP BLD-CCNC: 237 U/L (ref 40–129)
ALP BLD-CCNC: 263 U/L (ref 40–129)
ALP BLD-CCNC: 93 U/L (ref 40–129)
ALT SERPL-CCNC: 163 U/L (ref 5–41)
ALT SERPL-CCNC: 245 U/L (ref 5–41)
ALT SERPL-CCNC: 54 U/L (ref 5–41)
ALT SERPL-CCNC: 65 U/L (ref 5–41)
ALT SERPL-CCNC: 79 U/L (ref 5–41)
ALT SERPL-CCNC: 83 U/L (ref 5–41)
ALT SERPL-CCNC: 94 U/L (ref 5–41)
AMMONIA: 18 UMOL/L (ref 16–60)
AMMONIA: 30 UMOL/L (ref 16–60)
AMORPHOUS: ABNORMAL
AMPHETAMINE SCREEN URINE: NEGATIVE
ANION GAP SERPL CALCULATED.3IONS-SCNC: 10 MMOL/L (ref 9–17)
ANION GAP SERPL CALCULATED.3IONS-SCNC: 11 MMOL/L (ref 9–17)
ANION GAP SERPL CALCULATED.3IONS-SCNC: 12 MMOL/L (ref 9–17)
ANION GAP SERPL CALCULATED.3IONS-SCNC: 13 MMOL/L (ref 9–17)
ANION GAP SERPL CALCULATED.3IONS-SCNC: 13 MMOL/L (ref 9–17)
ANION GAP SERPL CALCULATED.3IONS-SCNC: 14 MMOL/L (ref 9–17)
ANION GAP SERPL CALCULATED.3IONS-SCNC: 15 MMOL/L (ref 9–17)
ANION GAP SERPL CALCULATED.3IONS-SCNC: 5 MMOL/L (ref 9–17)
ANION GAP SERPL CALCULATED.3IONS-SCNC: 8 MMOL/L (ref 9–17)
ANION GAP SERPL CALCULATED.3IONS-SCNC: 9 MMOL/L (ref 9–17)
ANION GAP: 9 MMOL/L (ref 7–16)
ANTI-XA UNFRAC HEPARIN: 0.21 IU/L (ref 0.3–0.7)
ANTI-XA UNFRAC HEPARIN: 0.29 IU/L (ref 0.3–0.7)
ANTI-XA UNFRAC HEPARIN: 0.37 IU/L (ref 0.3–0.7)
ANTI-XA UNFRAC HEPARIN: 0.38 IU/L (ref 0.3–0.7)
ANTI-XA UNFRAC HEPARIN: 0.39 IU/L (ref 0.3–0.7)
ANTI-XA UNFRAC HEPARIN: 0.45 IU/L (ref 0.3–0.7)
ANTI-XA UNFRAC HEPARIN: 0.48 IU/L (ref 0.3–0.7)
ANTI-XA UNFRAC HEPARIN: 0.49 IU/L (ref 0.3–0.7)
ANTI-XA UNFRAC HEPARIN: 0.55 IU/L (ref 0.3–0.7)
ANTI-XA UNFRAC HEPARIN: 0.67 IU/L (ref 0.3–0.7)
ANTI-XA UNFRAC HEPARIN: 0.73 IU/L (ref 0.3–0.7)
ANTI-XA UNFRAC HEPARIN: 0.75 IU/L (ref 0.3–0.7)
ANTI-XA UNFRAC HEPARIN: 0.76 IU/L (ref 0.3–0.7)
ANTIBODY IDENTIFICATION: NORMAL
ANTIBODY SCREEN: POSITIVE
ARM BAND NUMBER: NORMAL
AST SERPL-CCNC: 129 U/L
AST SERPL-CCNC: 161 U/L
AST SERPL-CCNC: 187 U/L
AST SERPL-CCNC: 62 U/L
AST SERPL-CCNC: 63 U/L
AST SERPL-CCNC: 74 U/L
AST SERPL-CCNC: 81 U/L
BACTERIA: ABNORMAL
BARBITURATE SCREEN URINE: NEGATIVE
BASOPHILS # BLD: 0 % (ref 0–2)
BASOPHILS # BLD: 1 % (ref 0–2)
BASOPHILS ABSOLUTE: 0 K/UL (ref 0–0.2)
BASOPHILS ABSOLUTE: 0.03 K/UL (ref 0–0.2)
BASOPHILS ABSOLUTE: 0.04 K/UL (ref 0–0.2)
BASOPHILS ABSOLUTE: 0.07 K/UL (ref 0–0.2)
BASOPHILS ABSOLUTE: 0.1 K/UL (ref 0–0.2)
BASOPHILS ABSOLUTE: 0.1 K/UL (ref 0–0.2)
BASOPHILS ABSOLUTE: 0.14 K/UL (ref 0–0.2)
BASOPHILS ABSOLUTE: <0.03 K/UL (ref 0–0.2)
BENZODIAZEPINE SCREEN, URINE: NEGATIVE
BILIRUB SERPL-MCNC: 0.21 MG/DL (ref 0.3–1.2)
BILIRUB SERPL-MCNC: 0.24 MG/DL (ref 0.3–1.2)
BILIRUB SERPL-MCNC: 0.26 MG/DL (ref 0.3–1.2)
BILIRUB SERPL-MCNC: 0.32 MG/DL (ref 0.3–1.2)
BILIRUB SERPL-MCNC: 0.44 MG/DL (ref 0.3–1.2)
BILIRUB SERPL-MCNC: 0.8 MG/DL (ref 0.3–1.2)
BILIRUB SERPL-MCNC: 1.03 MG/DL (ref 0.3–1.2)
BILIRUBIN DIRECT: 0.1 MG/DL
BILIRUBIN DIRECT: 0.1 MG/DL
BILIRUBIN DIRECT: 0.12 MG/DL
BILIRUBIN DIRECT: 0.14 MG/DL
BILIRUBIN DIRECT: 0.3 MG/DL
BILIRUBIN URINE: NEGATIVE
BILIRUBIN, INDIRECT: 0.11 MG/DL (ref 0–1)
BILIRUBIN, INDIRECT: 0.14 MG/DL (ref 0–1)
BILIRUBIN, INDIRECT: 0.14 MG/DL (ref 0–1)
BILIRUBIN, INDIRECT: 0.18 MG/DL (ref 0–1)
BILIRUBIN, INDIRECT: 0.5 MG/DL (ref 0–1)
BLD PROD TYP BPU: NORMAL
BLOOD BANK SPECIMEN: NORMAL
BUN BLDV-MCNC: 22 MG/DL (ref 8–23)
BUN BLDV-MCNC: 23 MG/DL (ref 8–23)
BUN BLDV-MCNC: 24 MG/DL (ref 8–23)
BUN BLDV-MCNC: 25 MG/DL (ref 8–23)
BUN BLDV-MCNC: 26 MG/DL (ref 8–23)
BUN BLDV-MCNC: 27 MG/DL (ref 8–23)
BUN BLDV-MCNC: 27 MG/DL (ref 8–23)
BUN BLDV-MCNC: 28 MG/DL (ref 8–23)
BUN BLDV-MCNC: 29 MG/DL (ref 8–23)
BUN BLDV-MCNC: 31 MG/DL (ref 8–23)
BUN BLDV-MCNC: 32 MG/DL (ref 8–23)
BUN BLDV-MCNC: 33 MG/DL (ref 8–23)
BUN BLDV-MCNC: 34 MG/DL (ref 8–23)
BUN BLDV-MCNC: 35 MG/DL (ref 8–23)
BUN BLDV-MCNC: 35 MG/DL (ref 8–23)
BUN/CREAT BLD: ABNORMAL (ref 9–20)
BUPRENORPHINE URINE: NORMAL
CALCIUM SERPL-MCNC: 7.8 MG/DL (ref 8.6–10.4)
CALCIUM SERPL-MCNC: 7.9 MG/DL (ref 8.6–10.4)
CALCIUM SERPL-MCNC: 8 MG/DL (ref 8.6–10.4)
CALCIUM SERPL-MCNC: 8 MG/DL (ref 8.6–10.4)
CALCIUM SERPL-MCNC: 8.1 MG/DL (ref 8.6–10.4)
CALCIUM SERPL-MCNC: 8.2 MG/DL (ref 8.6–10.4)
CALCIUM SERPL-MCNC: 8.3 MG/DL (ref 8.6–10.4)
CALCIUM SERPL-MCNC: 8.4 MG/DL (ref 8.6–10.4)
CALCIUM SERPL-MCNC: 8.5 MG/DL (ref 8.6–10.4)
CALCIUM SERPL-MCNC: 8.6 MG/DL (ref 8.6–10.4)
CALCIUM SERPL-MCNC: 8.7 MG/DL (ref 8.6–10.4)
CALCIUM SERPL-MCNC: 8.8 MG/DL (ref 8.6–10.4)
CALCIUM SERPL-MCNC: 8.9 MG/DL (ref 8.6–10.4)
CALCIUM SERPL-MCNC: 9 MG/DL (ref 8.6–10.4)
CALCIUM SERPL-MCNC: 9.1 MG/DL (ref 8.6–10.4)
CALCIUM SERPL-MCNC: 9.1 MG/DL (ref 8.6–10.4)
CALCIUM SERPL-MCNC: 9.2 MG/DL (ref 8.6–10.4)
CALCIUM SERPL-MCNC: 9.3 MG/DL (ref 8.6–10.4)
CALCIUM SERPL-MCNC: 9.3 MG/DL (ref 8.6–10.4)
CALCIUM SERPL-MCNC: 9.6 MG/DL (ref 8.6–10.4)
CALCIUM SERPL-MCNC: 9.8 MG/DL (ref 8.6–10.4)
CANNABINOID SCREEN URINE: NEGATIVE
CARBOXYHEMOGLOBIN: 2.1 % (ref 0–5)
CASTS UA: ABNORMAL /LPF
CHLORIDE BLD-SCNC: 102 MMOL/L (ref 98–107)
CHLORIDE BLD-SCNC: 102 MMOL/L (ref 98–107)
CHLORIDE BLD-SCNC: 103 MMOL/L (ref 98–107)
CHLORIDE BLD-SCNC: 103 MMOL/L (ref 98–107)
CHLORIDE BLD-SCNC: 105 MMOL/L (ref 98–107)
CHLORIDE BLD-SCNC: 107 MMOL/L (ref 98–107)
CHLORIDE BLD-SCNC: 109 MMOL/L (ref 98–107)
CHLORIDE BLD-SCNC: 110 MMOL/L (ref 98–107)
CHLORIDE BLD-SCNC: 111 MMOL/L (ref 98–107)
CHLORIDE BLD-SCNC: 111 MMOL/L (ref 98–107)
CHLORIDE BLD-SCNC: 112 MMOL/L (ref 98–107)
CHLORIDE BLD-SCNC: 112 MMOL/L (ref 98–107)
CHLORIDE BLD-SCNC: 114 MMOL/L (ref 98–107)
CHLORIDE BLD-SCNC: 114 MMOL/L (ref 98–107)
CHLORIDE BLD-SCNC: 115 MMOL/L (ref 98–107)
CHLORIDE BLD-SCNC: 116 MMOL/L (ref 98–107)
CHLORIDE BLD-SCNC: 116 MMOL/L (ref 98–107)
CHLORIDE BLD-SCNC: 117 MMOL/L (ref 98–107)
CHLORIDE BLD-SCNC: 119 MMOL/L (ref 98–107)
CHLORIDE BLD-SCNC: 120 MMOL/L (ref 98–107)
CHLORIDE BLD-SCNC: 120 MMOL/L (ref 98–107)
CHLORIDE BLD-SCNC: 121 MMOL/L (ref 98–107)
CHLORIDE BLD-SCNC: 122 MMOL/L (ref 98–107)
CHLORIDE BLD-SCNC: 124 MMOL/L (ref 98–107)
CHOLESTEROL/HDL RATIO: 4.4
CHOLESTEROL: 186 MG/DL
CK MB: 14.6 NG/ML
CKMB INTERPRETATION: ABNORMAL
CO2: 17 MMOL/L (ref 20–31)
CO2: 17 MMOL/L (ref 20–31)
CO2: 18 MMOL/L (ref 20–31)
CO2: 19 MMOL/L (ref 20–31)
CO2: 19 MMOL/L (ref 20–31)
CO2: 20 MMOL/L (ref 20–31)
CO2: 21 MMOL/L (ref 20–31)
CO2: 22 MMOL/L (ref 20–31)
CO2: 23 MMOL/L (ref 20–31)
CO2: 24 MMOL/L (ref 20–31)
CO2: 24 MMOL/L (ref 20–31)
CO2: 25 MMOL/L (ref 20–31)
CO2: 26 MMOL/L (ref 20–31)
CO2: 27 MMOL/L (ref 20–31)
CO2: 29 MMOL/L (ref 20–31)
COCAINE METABOLITE, URINE: NEGATIVE
COLOR: YELLOW
COMMENT UA: ABNORMAL
CORTISOL COLLECTION INFO: NORMAL
CORTISOL: 17.9 UG/DL (ref 2.7–18.4)
CREAT SERPL-MCNC: 1 MG/DL (ref 0.7–1.2)
CREAT SERPL-MCNC: 1.05 MG/DL (ref 0.7–1.2)
CREAT SERPL-MCNC: 1.15 MG/DL (ref 0.7–1.2)
CREAT SERPL-MCNC: 1.15 MG/DL (ref 0.7–1.2)
CREAT SERPL-MCNC: 1.17 MG/DL (ref 0.7–1.2)
CREAT SERPL-MCNC: 1.21 MG/DL (ref 0.7–1.2)
CREAT SERPL-MCNC: 1.26 MG/DL (ref 0.7–1.2)
CREAT SERPL-MCNC: 1.34 MG/DL (ref 0.7–1.2)
CREAT SERPL-MCNC: 1.34 MG/DL (ref 0.7–1.2)
CREAT SERPL-MCNC: 1.35 MG/DL (ref 0.7–1.2)
CREAT SERPL-MCNC: 1.4 MG/DL (ref 0.7–1.2)
CREAT SERPL-MCNC: 1.41 MG/DL (ref 0.7–1.2)
CREAT SERPL-MCNC: 1.43 MG/DL (ref 0.7–1.2)
CREAT SERPL-MCNC: 1.45 MG/DL (ref 0.7–1.2)
CREAT SERPL-MCNC: 1.45 MG/DL (ref 0.7–1.2)
CREAT SERPL-MCNC: 1.46 MG/DL (ref 0.7–1.2)
CREAT SERPL-MCNC: 1.47 MG/DL (ref 0.7–1.2)
CREAT SERPL-MCNC: 1.48 MG/DL (ref 0.7–1.2)
CREAT SERPL-MCNC: 1.49 MG/DL (ref 0.7–1.2)
CREAT SERPL-MCNC: 1.49 MG/DL (ref 0.7–1.2)
CREAT SERPL-MCNC: 1.54 MG/DL (ref 0.7–1.2)
CREAT SERPL-MCNC: 1.55 MG/DL (ref 0.7–1.2)
CREAT SERPL-MCNC: 1.56 MG/DL (ref 0.7–1.2)
CREAT SERPL-MCNC: 1.57 MG/DL (ref 0.7–1.2)
CREAT SERPL-MCNC: 1.59 MG/DL (ref 0.7–1.2)
CREAT SERPL-MCNC: 1.6 MG/DL (ref 0.7–1.2)
CREAT SERPL-MCNC: 1.64 MG/DL (ref 0.7–1.2)
CREAT SERPL-MCNC: 1.71 MG/DL (ref 0.7–1.2)
CREAT SERPL-MCNC: 1.79 MG/DL (ref 0.7–1.2)
CREAT SERPL-MCNC: 1.86 MG/DL (ref 0.7–1.2)
CREAT SERPL-MCNC: 1.89 MG/DL (ref 0.7–1.2)
CREAT SERPL-MCNC: 1.96 MG/DL (ref 0.7–1.2)
CROSSMATCH RESULT: NORMAL
CROSSMATCH RESULT: NORMAL
CRYSTALS, UA: ABNORMAL /HPF
CULTURE: NORMAL
CULTURE: NORMAL
DAT IGG: POSITIVE
DIFFERENTIAL TYPE: ABNORMAL
DIRECT EXAM: NORMAL
DISPENSE STATUS BLOOD BANK: NORMAL
EKG ATRIAL RATE: 39 BPM
EKG ATRIAL RATE: 40 BPM
EKG ATRIAL RATE: 44 BPM
EKG ATRIAL RATE: 54 BPM
EKG ATRIAL RATE: 71 BPM
EKG ATRIAL RATE: 82 BPM
EKG P AXIS: 110 DEGREES
EKG P AXIS: 35 DEGREES
EKG P AXIS: 54 DEGREES
EKG P AXIS: 59 DEGREES
EKG P AXIS: 60 DEGREES
EKG P AXIS: 65 DEGREES
EKG P-R INTERVAL: 136 MS
EKG P-R INTERVAL: 136 MS
EKG P-R INTERVAL: 140 MS
EKG P-R INTERVAL: 142 MS
EKG P-R INTERVAL: 142 MS
EKG P-R INTERVAL: 146 MS
EKG Q-T INTERVAL: 382 MS
EKG Q-T INTERVAL: 426 MS
EKG Q-T INTERVAL: 456 MS
EKG Q-T INTERVAL: 556 MS
EKG Q-T INTERVAL: 568 MS
EKG Q-T INTERVAL: 576 MS
EKG QRS DURATION: 104 MS
EKG QRS DURATION: 106 MS
EKG QRS DURATION: 106 MS
EKG QRS DURATION: 78 MS
EKG QRS DURATION: 80 MS
EKG QRS DURATION: 92 MS
EKG QTC CALCULATION (BAZETT): 403 MS
EKG QTC CALCULATION (BAZETT): 446 MS
EKG QTC CALCULATION (BAZETT): 462 MS
EKG QTC CALCULATION (BAZETT): 463 MS
EKG QTC CALCULATION (BAZETT): 475 MS
EKG QTC CALCULATION (BAZETT): 495 MS
EKG R AXIS: -25 DEGREES
EKG R AXIS: -26 DEGREES
EKG R AXIS: -26 DEGREES
EKG R AXIS: -27 DEGREES
EKG R AXIS: -35 DEGREES
EKG R AXIS: -44 DEGREES
EKG T AXIS: -14 DEGREES
EKG T AXIS: -60 DEGREES
EKG T AXIS: -77 DEGREES
EKG T AXIS: -85 DEGREES
EKG T AXIS: 68 DEGREES
EKG T AXIS: 73 DEGREES
EKG VENTRICULAR RATE: 39 BPM
EKG VENTRICULAR RATE: 40 BPM
EKG VENTRICULAR RATE: 44 BPM
EKG VENTRICULAR RATE: 54 BPM
EKG VENTRICULAR RATE: 71 BPM
EKG VENTRICULAR RATE: 82 BPM
EOSINOPHILS RELATIVE PERCENT: 0 % (ref 0–4)
EOSINOPHILS RELATIVE PERCENT: 0 % (ref 0–4)
EOSINOPHILS RELATIVE PERCENT: 0 % (ref 1–4)
EOSINOPHILS RELATIVE PERCENT: 1 % (ref 1–4)
EPITHELIAL CELLS UA: ABNORMAL /HPF
ESTIMATED AVERAGE GLUCOSE: 174 MG/DL
EXPIRATION DATE: NORMAL
FIBRINOGEN: 507 MG/DL (ref 140–420)
FIO2: ABNORMAL
FIO2: ABNORMAL
FOLATE: 5.2 NG/ML
GFR AFRICAN AMERICAN: 41 ML/MIN
GFR AFRICAN AMERICAN: 42 ML/MIN
GFR AFRICAN AMERICAN: 43 ML/MIN
GFR AFRICAN AMERICAN: 45 ML/MIN
GFR AFRICAN AMERICAN: 48 ML/MIN
GFR AFRICAN AMERICAN: 50 ML/MIN
GFR AFRICAN AMERICAN: 51 ML/MIN
GFR AFRICAN AMERICAN: 52 ML/MIN
GFR AFRICAN AMERICAN: 53 ML/MIN
GFR AFRICAN AMERICAN: 54 ML/MIN
GFR AFRICAN AMERICAN: 56 ML/MIN
GFR AFRICAN AMERICAN: 57 ML/MIN
GFR AFRICAN AMERICAN: 57 ML/MIN
GFR AFRICAN AMERICAN: 58 ML/MIN
GFR AFRICAN AMERICAN: 58 ML/MIN
GFR AFRICAN AMERICAN: 59 ML/MIN
GFR AFRICAN AMERICAN: 60 ML/MIN
GFR AFRICAN AMERICAN: >60 ML/MIN
GFR NON-AFRICAN AMERICAN: 34 ML/MIN
GFR NON-AFRICAN AMERICAN: 35 ML/MIN
GFR NON-AFRICAN AMERICAN: 36 ML/MIN
GFR NON-AFRICAN AMERICAN: 37 ML/MIN
GFR NON-AFRICAN AMERICAN: 39 ML/MIN
GFR NON-AFRICAN AMERICAN: 41 ML/MIN
GFR NON-AFRICAN AMERICAN: 42 ML/MIN
GFR NON-AFRICAN AMERICAN: 43 ML/MIN
GFR NON-AFRICAN AMERICAN: 43 ML/MIN
GFR NON-AFRICAN AMERICAN: 44 ML/MIN
GFR NON-AFRICAN AMERICAN: 46 ML/MIN
GFR NON-AFRICAN AMERICAN: 47 ML/MIN
GFR NON-AFRICAN AMERICAN: 47 ML/MIN
GFR NON-AFRICAN AMERICAN: 48 ML/MIN
GFR NON-AFRICAN AMERICAN: 49 ML/MIN
GFR NON-AFRICAN AMERICAN: 50 ML/MIN
GFR NON-AFRICAN AMERICAN: 52 ML/MIN
GFR NON-AFRICAN AMERICAN: 56 ML/MIN
GFR NON-AFRICAN AMERICAN: 59 ML/MIN
GFR NON-AFRICAN AMERICAN: >60 ML/MIN
GFR NON-AFRICAN AMERICAN: ABNORMAL ML/MIN
GFR SERPL CREATININE-BSD FRML MDRD: ABNORMAL ML/MIN
GFR SERPL CREATININE-BSD FRML MDRD: ABNORMAL ML/MIN/{1.73_M2}
GLOBULIN: ABNORMAL G/DL (ref 1.5–3.8)
GLUCOSE BLD-MCNC: 103 MG/DL (ref 75–110)
GLUCOSE BLD-MCNC: 104 MG/DL (ref 70–99)
GLUCOSE BLD-MCNC: 105 MG/DL (ref 70–99)
GLUCOSE BLD-MCNC: 105 MG/DL (ref 75–110)
GLUCOSE BLD-MCNC: 105 MG/DL (ref 75–110)
GLUCOSE BLD-MCNC: 106 MG/DL (ref 75–110)
GLUCOSE BLD-MCNC: 107 MG/DL (ref 75–110)
GLUCOSE BLD-MCNC: 108 MG/DL (ref 75–110)
GLUCOSE BLD-MCNC: 109 MG/DL (ref 75–110)
GLUCOSE BLD-MCNC: 111 MG/DL (ref 70–99)
GLUCOSE BLD-MCNC: 112 MG/DL (ref 75–110)
GLUCOSE BLD-MCNC: 112 MG/DL (ref 75–110)
GLUCOSE BLD-MCNC: 113 MG/DL (ref 75–110)
GLUCOSE BLD-MCNC: 114 MG/DL (ref 75–110)
GLUCOSE BLD-MCNC: 115 MG/DL (ref 75–110)
GLUCOSE BLD-MCNC: 117 MG/DL (ref 70–99)
GLUCOSE BLD-MCNC: 117 MG/DL (ref 70–99)
GLUCOSE BLD-MCNC: 117 MG/DL (ref 75–110)
GLUCOSE BLD-MCNC: 117 MG/DL (ref 75–110)
GLUCOSE BLD-MCNC: 118 MG/DL (ref 75–110)
GLUCOSE BLD-MCNC: 119 MG/DL (ref 75–110)
GLUCOSE BLD-MCNC: 119 MG/DL (ref 75–110)
GLUCOSE BLD-MCNC: 120 MG/DL (ref 75–110)
GLUCOSE BLD-MCNC: 121 MG/DL (ref 75–110)
GLUCOSE BLD-MCNC: 122 MG/DL (ref 70–99)
GLUCOSE BLD-MCNC: 123 MG/DL (ref 70–99)
GLUCOSE BLD-MCNC: 123 MG/DL (ref 75–110)
GLUCOSE BLD-MCNC: 124 MG/DL (ref 75–110)
GLUCOSE BLD-MCNC: 124 MG/DL (ref 75–110)
GLUCOSE BLD-MCNC: 127 MG/DL (ref 70–99)
GLUCOSE BLD-MCNC: 127 MG/DL (ref 75–110)
GLUCOSE BLD-MCNC: 127 MG/DL (ref 75–110)
GLUCOSE BLD-MCNC: 128 MG/DL (ref 75–110)
GLUCOSE BLD-MCNC: 130 MG/DL (ref 75–110)
GLUCOSE BLD-MCNC: 130 MG/DL (ref 75–110)
GLUCOSE BLD-MCNC: 131 MG/DL (ref 75–110)
GLUCOSE BLD-MCNC: 131 MG/DL (ref 75–110)
GLUCOSE BLD-MCNC: 132 MG/DL (ref 75–110)
GLUCOSE BLD-MCNC: 132 MG/DL (ref 75–110)
GLUCOSE BLD-MCNC: 133 MG/DL (ref 70–99)
GLUCOSE BLD-MCNC: 133 MG/DL (ref 75–110)
GLUCOSE BLD-MCNC: 134 MG/DL (ref 75–110)
GLUCOSE BLD-MCNC: 135 MG/DL (ref 75–110)
GLUCOSE BLD-MCNC: 136 MG/DL (ref 75–110)
GLUCOSE BLD-MCNC: 138 MG/DL (ref 75–110)
GLUCOSE BLD-MCNC: 139 MG/DL (ref 75–110)
GLUCOSE BLD-MCNC: 140 MG/DL (ref 75–110)
GLUCOSE BLD-MCNC: 141 MG/DL (ref 75–110)
GLUCOSE BLD-MCNC: 143 MG/DL (ref 75–110)
GLUCOSE BLD-MCNC: 143 MG/DL (ref 75–110)
GLUCOSE BLD-MCNC: 144 MG/DL (ref 75–110)
GLUCOSE BLD-MCNC: 147 MG/DL (ref 70–99)
GLUCOSE BLD-MCNC: 147 MG/DL (ref 75–110)
GLUCOSE BLD-MCNC: 147 MG/DL (ref 75–110)
GLUCOSE BLD-MCNC: 148 MG/DL (ref 70–99)
GLUCOSE BLD-MCNC: 148 MG/DL (ref 75–110)
GLUCOSE BLD-MCNC: 149 MG/DL (ref 75–110)
GLUCOSE BLD-MCNC: 150 MG/DL (ref 70–99)
GLUCOSE BLD-MCNC: 152 MG/DL (ref 75–110)
GLUCOSE BLD-MCNC: 152 MG/DL (ref 75–110)
GLUCOSE BLD-MCNC: 153 MG/DL (ref 75–110)
GLUCOSE BLD-MCNC: 154 MG/DL (ref 70–99)
GLUCOSE BLD-MCNC: 154 MG/DL (ref 75–110)
GLUCOSE BLD-MCNC: 155 MG/DL (ref 75–110)
GLUCOSE BLD-MCNC: 157 MG/DL (ref 75–110)
GLUCOSE BLD-MCNC: 157 MG/DL (ref 75–110)
GLUCOSE BLD-MCNC: 158 MG/DL (ref 75–110)
GLUCOSE BLD-MCNC: 159 MG/DL (ref 75–110)
GLUCOSE BLD-MCNC: 161 MG/DL (ref 75–110)
GLUCOSE BLD-MCNC: 163 MG/DL (ref 75–110)
GLUCOSE BLD-MCNC: 163 MG/DL (ref 75–110)
GLUCOSE BLD-MCNC: 164 MG/DL (ref 75–110)
GLUCOSE BLD-MCNC: 165 MG/DL (ref 70–99)
GLUCOSE BLD-MCNC: 165 MG/DL (ref 75–110)
GLUCOSE BLD-MCNC: 165 MG/DL (ref 75–110)
GLUCOSE BLD-MCNC: 166 MG/DL (ref 75–110)
GLUCOSE BLD-MCNC: 166 MG/DL (ref 75–110)
GLUCOSE BLD-MCNC: 167 MG/DL (ref 75–110)
GLUCOSE BLD-MCNC: 168 MG/DL (ref 75–110)
GLUCOSE BLD-MCNC: 168 MG/DL (ref 75–110)
GLUCOSE BLD-MCNC: 169 MG/DL (ref 75–110)
GLUCOSE BLD-MCNC: 170 MG/DL (ref 75–110)
GLUCOSE BLD-MCNC: 171 MG/DL (ref 75–110)
GLUCOSE BLD-MCNC: 173 MG/DL (ref 75–110)
GLUCOSE BLD-MCNC: 174 MG/DL (ref 75–110)
GLUCOSE BLD-MCNC: 175 MG/DL (ref 75–110)
GLUCOSE BLD-MCNC: 176 MG/DL (ref 70–99)
GLUCOSE BLD-MCNC: 176 MG/DL (ref 75–110)
GLUCOSE BLD-MCNC: 179 MG/DL (ref 70–99)
GLUCOSE BLD-MCNC: 179 MG/DL (ref 75–110)
GLUCOSE BLD-MCNC: 179 MG/DL (ref 75–110)
GLUCOSE BLD-MCNC: 180 MG/DL (ref 70–99)
GLUCOSE BLD-MCNC: 180 MG/DL (ref 70–99)
GLUCOSE BLD-MCNC: 182 MG/DL (ref 75–110)
GLUCOSE BLD-MCNC: 183 MG/DL (ref 70–99)
GLUCOSE BLD-MCNC: 184 MG/DL (ref 75–110)
GLUCOSE BLD-MCNC: 184 MG/DL (ref 75–110)
GLUCOSE BLD-MCNC: 185 MG/DL (ref 75–110)
GLUCOSE BLD-MCNC: 186 MG/DL (ref 75–110)
GLUCOSE BLD-MCNC: 188 MG/DL (ref 70–99)
GLUCOSE BLD-MCNC: 188 MG/DL (ref 75–110)
GLUCOSE BLD-MCNC: 189 MG/DL (ref 75–110)
GLUCOSE BLD-MCNC: 189 MG/DL (ref 75–110)
GLUCOSE BLD-MCNC: 190 MG/DL (ref 75–110)
GLUCOSE BLD-MCNC: 190 MG/DL (ref 75–110)
GLUCOSE BLD-MCNC: 192 MG/DL (ref 70–99)
GLUCOSE BLD-MCNC: 192 MG/DL (ref 75–110)
GLUCOSE BLD-MCNC: 195 MG/DL (ref 70–99)
GLUCOSE BLD-MCNC: 195 MG/DL (ref 75–110)
GLUCOSE BLD-MCNC: 197 MG/DL (ref 75–110)
GLUCOSE BLD-MCNC: 198 MG/DL (ref 75–110)
GLUCOSE BLD-MCNC: 200 MG/DL (ref 70–99)
GLUCOSE BLD-MCNC: 202 MG/DL (ref 70–99)
GLUCOSE BLD-MCNC: 203 MG/DL (ref 70–99)
GLUCOSE BLD-MCNC: 207 MG/DL (ref 70–99)
GLUCOSE BLD-MCNC: 207 MG/DL (ref 74–100)
GLUCOSE BLD-MCNC: 207 MG/DL (ref 75–110)
GLUCOSE BLD-MCNC: 210 MG/DL (ref 75–110)
GLUCOSE BLD-MCNC: 211 MG/DL (ref 75–110)
GLUCOSE BLD-MCNC: 211 MG/DL (ref 75–110)
GLUCOSE BLD-MCNC: 214 MG/DL (ref 75–110)
GLUCOSE BLD-MCNC: 222 MG/DL (ref 75–110)
GLUCOSE BLD-MCNC: 223 MG/DL (ref 75–110)
GLUCOSE BLD-MCNC: 225 MG/DL (ref 75–110)
GLUCOSE BLD-MCNC: 226 MG/DL (ref 75–110)
GLUCOSE BLD-MCNC: 227 MG/DL (ref 75–110)
GLUCOSE BLD-MCNC: 228 MG/DL (ref 75–110)
GLUCOSE BLD-MCNC: 232 MG/DL (ref 75–110)
GLUCOSE BLD-MCNC: 233 MG/DL (ref 75–110)
GLUCOSE BLD-MCNC: 236 MG/DL (ref 75–110)
GLUCOSE BLD-MCNC: 238 MG/DL (ref 75–110)
GLUCOSE BLD-MCNC: 248 MG/DL (ref 75–110)
GLUCOSE BLD-MCNC: 252 MG/DL (ref 75–110)
GLUCOSE BLD-MCNC: 253 MG/DL (ref 75–110)
GLUCOSE BLD-MCNC: 253 MG/DL (ref 75–110)
GLUCOSE BLD-MCNC: 257 MG/DL (ref 75–110)
GLUCOSE BLD-MCNC: 259 MG/DL (ref 75–110)
GLUCOSE BLD-MCNC: 262 MG/DL (ref 75–110)
GLUCOSE BLD-MCNC: 263 MG/DL (ref 75–110)
GLUCOSE BLD-MCNC: 270 MG/DL (ref 75–110)
GLUCOSE BLD-MCNC: 280 MG/DL (ref 75–110)
GLUCOSE BLD-MCNC: 288 MG/DL (ref 75–110)
GLUCOSE BLD-MCNC: 291 MG/DL (ref 75–110)
GLUCOSE BLD-MCNC: 299 MG/DL (ref 70–99)
GLUCOSE BLD-MCNC: 301 MG/DL (ref 70–99)
GLUCOSE BLD-MCNC: 88 MG/DL (ref 70–99)
GLUCOSE BLD-MCNC: 92 MG/DL (ref 70–99)
GLUCOSE BLD-MCNC: 92 MG/DL (ref 75–110)
GLUCOSE BLD-MCNC: 93 MG/DL (ref 75–110)
GLUCOSE BLD-MCNC: 95 MG/DL (ref 70–99)
GLUCOSE BLD-MCNC: 95 MG/DL (ref 75–110)
GLUCOSE BLD-MCNC: 96 MG/DL (ref 75–110)
GLUCOSE BLD-MCNC: 97 MG/DL (ref 75–110)
GLUCOSE BLD-MCNC: 98 MG/DL (ref 70–99)
GLUCOSE BLD-MCNC: 99 MG/DL (ref 75–110)
GLUCOSE URINE: NEGATIVE
HBA1C MFR BLD: 7.7 % (ref 4–6)
HCO3 VENOUS: 26.1 MMOL/L (ref 22–29)
HCO3 VENOUS: 28.2 MMOL/L (ref 24–30)
HCT VFR BLD CALC: 31.9 % (ref 40.7–50.3)
HCT VFR BLD CALC: 33.8 % (ref 40.7–50.3)
HCT VFR BLD CALC: 34.4 % (ref 40.7–50.3)
HCT VFR BLD CALC: 34.5 % (ref 40.7–50.3)
HCT VFR BLD CALC: 34.6 % (ref 40.7–50.3)
HCT VFR BLD CALC: 34.6 % (ref 41–53)
HCT VFR BLD CALC: 34.6 % (ref 41–53)
HCT VFR BLD CALC: 35.2 % (ref 41–53)
HCT VFR BLD CALC: 35.4 % (ref 41–53)
HCT VFR BLD CALC: 35.8 % (ref 41–53)
HCT VFR BLD CALC: 36 % (ref 41–53)
HCT VFR BLD CALC: 36.3 % (ref 41–53)
HCT VFR BLD CALC: 36.4 % (ref 41–53)
HCT VFR BLD CALC: 36.5 % (ref 41–53)
HCT VFR BLD CALC: 36.8 % (ref 41–53)
HCT VFR BLD CALC: 37.3 % (ref 41–53)
HCT VFR BLD CALC: 37.4 % (ref 40.7–50.3)
HCT VFR BLD CALC: 37.7 % (ref 40.7–50.3)
HCT VFR BLD CALC: 37.7 % (ref 41–53)
HCT VFR BLD CALC: 38.9 % (ref 41–53)
HCT VFR BLD CALC: 39.4 % (ref 40.7–50.3)
HCT VFR BLD CALC: 41.3 % (ref 40.7–50.3)
HCT VFR BLD CALC: 42.3 % (ref 41–53)
HCT VFR BLD CALC: 43.9 % (ref 41–53)
HDLC SERPL-MCNC: 42 MG/DL
HEMOGLOBIN: 10 G/DL (ref 13–17)
HEMOGLOBIN: 10 G/DL (ref 13–17)
HEMOGLOBIN: 10.2 G/DL (ref 13–17)
HEMOGLOBIN: 10.4 G/DL (ref 13–17)
HEMOGLOBIN: 10.7 G/DL (ref 13.5–17.5)
HEMOGLOBIN: 10.9 G/DL (ref 13.5–17.5)
HEMOGLOBIN: 11.2 G/DL (ref 13–17)
HEMOGLOBIN: 11.3 G/DL (ref 13.5–17.5)
HEMOGLOBIN: 11.4 G/DL (ref 13.5–17.5)
HEMOGLOBIN: 11.6 G/DL (ref 13.5–17.5)
HEMOGLOBIN: 11.7 G/DL (ref 13.5–17.5)
HEMOGLOBIN: 11.7 G/DL (ref 13.5–17.5)
HEMOGLOBIN: 11.7 G/DL (ref 13–17)
HEMOGLOBIN: 11.7 G/DL (ref 13–17)
HEMOGLOBIN: 11.9 G/DL (ref 13.5–17.5)
HEMOGLOBIN: 12 G/DL (ref 13.5–17.5)
HEMOGLOBIN: 12.1 G/DL (ref 13–17)
HEMOGLOBIN: 13.4 G/DL (ref 13.5–17.5)
HEMOGLOBIN: 13.7 G/DL (ref 13.5–17.5)
HEMOGLOBIN: 9.1 G/DL (ref 13–17)
HEPATITIS C ANTIBODY: NONREACTIVE
HIV AG/AB: NONREACTIVE
IMMATURE GRANULOCYTES: 0 %
IMMATURE GRANULOCYTES: 0 %
IMMATURE GRANULOCYTES: 1 %
IMMATURE GRANULOCYTES: 2 %
IMMATURE GRANULOCYTES: 4 %
IMMATURE GRANULOCYTES: ABNORMAL %
IMMATURE GRANULOCYTES: ABNORMAL %
IMMATURE RETIC FRACT: 29.5 % (ref 2.7–18.3)
INR BLD: 1.1
INR BLD: 1.1
INR BLD: 1.4
KETONES, URINE: NEGATIVE
LACTIC ACID, WHOLE BLOOD: 1.9 MMOL/L (ref 0.7–2.1)
LACTIC ACID, WHOLE BLOOD: 2.4 MMOL/L (ref 0.7–2.1)
LACTIC ACID: 2 MMOL/L (ref 0.5–2.2)
LDL CHOLESTEROL: 129 MG/DL (ref 0–130)
LEGIONELLA PNEUMOPHILIA AG, URINE: NEGATIVE
LEUKOCYTE ESTERASE, URINE: NEGATIVE
LV EF: 53 %
LVEF MODALITY: NORMAL
LYMPHOCYTES # BLD: 10 % (ref 24–43)
LYMPHOCYTES # BLD: 11 % (ref 24–44)
LYMPHOCYTES # BLD: 12 % (ref 24–44)
LYMPHOCYTES # BLD: 13 % (ref 24–43)
LYMPHOCYTES # BLD: 18 % (ref 24–43)
LYMPHOCYTES # BLD: 20 % (ref 24–44)
LYMPHOCYTES # BLD: 28 % (ref 24–44)
LYMPHOCYTES # BLD: 8 % (ref 24–43)
LYMPHOCYTES # BLD: 9 % (ref 24–43)
Lab: NORMAL
MAGNESIUM: 2.2 MG/DL (ref 1.6–2.6)
MAGNESIUM: 2.2 MG/DL (ref 1.6–2.6)
MAGNESIUM: 2.3 MG/DL (ref 1.6–2.6)
MAGNESIUM: 2.4 MG/DL (ref 1.6–2.6)
MAGNESIUM: 2.5 MG/DL (ref 1.6–2.6)
MAGNESIUM: 2.6 MG/DL (ref 1.6–2.6)
MCH RBC QN AUTO: 23.8 PG (ref 25.2–33.5)
MCH RBC QN AUTO: 23.8 PG (ref 25.2–33.5)
MCH RBC QN AUTO: 23.9 PG (ref 25.2–33.5)
MCH RBC QN AUTO: 23.9 PG (ref 26–34)
MCH RBC QN AUTO: 24 PG (ref 25.2–33.5)
MCH RBC QN AUTO: 24 PG (ref 26–34)
MCH RBC QN AUTO: 24.1 PG (ref 25.2–33.5)
MCH RBC QN AUTO: 24.1 PG (ref 25.2–33.5)
MCH RBC QN AUTO: 24.1 PG (ref 26–34)
MCH RBC QN AUTO: 24.1 PG (ref 26–34)
MCH RBC QN AUTO: 24.3 PG (ref 25.2–33.5)
MCH RBC QN AUTO: 24.3 PG (ref 26–34)
MCH RBC QN AUTO: 24.3 PG (ref 26–34)
MCH RBC QN AUTO: 24.4 PG (ref 26–34)
MCH RBC QN AUTO: 24.5 PG (ref 26–34)
MCH RBC QN AUTO: 24.7 PG (ref 26–34)
MCH RBC QN AUTO: 24.7 PG (ref 26–34)
MCH RBC QN AUTO: 24.9 PG (ref 26–34)
MCHC RBC AUTO-ENTMCNC: 28.5 G/DL (ref 28.4–34.8)
MCHC RBC AUTO-ENTMCNC: 28.9 G/DL (ref 28.4–34.8)
MCHC RBC AUTO-ENTMCNC: 29.3 G/DL (ref 28.4–34.8)
MCHC RBC AUTO-ENTMCNC: 29.6 G/DL (ref 28.4–34.8)
MCHC RBC AUTO-ENTMCNC: 29.7 G/DL (ref 28.4–34.8)
MCHC RBC AUTO-ENTMCNC: 30.1 G/DL (ref 28.4–34.8)
MCHC RBC AUTO-ENTMCNC: 30.4 G/DL (ref 31–37)
MCHC RBC AUTO-ENTMCNC: 30.6 G/DL (ref 31–37)
MCHC RBC AUTO-ENTMCNC: 30.8 G/DL (ref 31–37)
MCHC RBC AUTO-ENTMCNC: 30.9 G/DL (ref 31–37)
MCHC RBC AUTO-ENTMCNC: 31 G/DL (ref 31–37)
MCHC RBC AUTO-ENTMCNC: 31.3 G/DL (ref 28.4–34.8)
MCHC RBC AUTO-ENTMCNC: 31.3 G/DL (ref 31–37)
MCHC RBC AUTO-ENTMCNC: 31.5 G/DL (ref 31–37)
MCHC RBC AUTO-ENTMCNC: 31.6 G/DL (ref 31–37)
MCHC RBC AUTO-ENTMCNC: 31.7 G/DL (ref 31–37)
MCHC RBC AUTO-ENTMCNC: 31.8 G/DL (ref 31–37)
MCHC RBC AUTO-ENTMCNC: 32 G/DL (ref 31–37)
MCHC RBC AUTO-ENTMCNC: 32.1 G/DL (ref 31–37)
MCV RBC AUTO: 76.6 FL (ref 80–100)
MCV RBC AUTO: 76.6 FL (ref 80–100)
MCV RBC AUTO: 76.8 FL (ref 80–100)
MCV RBC AUTO: 77 FL (ref 82.6–102.9)
MCV RBC AUTO: 77.4 FL (ref 80–100)
MCV RBC AUTO: 77.6 FL (ref 80–100)
MCV RBC AUTO: 77.7 FL (ref 80–100)
MCV RBC AUTO: 77.8 FL (ref 80–100)
MCV RBC AUTO: 77.9 FL (ref 80–100)
MCV RBC AUTO: 77.9 FL (ref 80–100)
MCV RBC AUTO: 78.2 FL (ref 80–100)
MCV RBC AUTO: 78.4 FL (ref 80–100)
MCV RBC AUTO: 78.7 FL (ref 80–100)
MCV RBC AUTO: 78.8 FL (ref 80–100)
MCV RBC AUTO: 78.9 FL (ref 80–100)
MCV RBC AUTO: 79 FL (ref 80–100)
MCV RBC AUTO: 79.7 FL (ref 82.6–102.9)
MCV RBC AUTO: 80 FL (ref 82.6–102.9)
MCV RBC AUTO: 80.4 FL (ref 82.6–102.9)
MCV RBC AUTO: 80.7 FL (ref 82.6–102.9)
MCV RBC AUTO: 81.2 FL (ref 82.6–102.9)
MCV RBC AUTO: 81.6 FL (ref 82.6–102.9)
MCV RBC AUTO: 83.9 FL (ref 82.6–102.9)
MCV RBC AUTO: 84.2 FL (ref 82.6–102.9)
MDMA URINE: NORMAL
METHADONE SCREEN, URINE: NEGATIVE
METHAMPHETAMINE, URINE: NORMAL
METHEMOGLOBIN: 0.8 % (ref 0–1.9)
MODE: ABNORMAL
MODE: ABNORMAL
MONOCYTES # BLD: 1 % (ref 1–7)
MONOCYTES # BLD: 2 % (ref 3–12)
MONOCYTES # BLD: 4 % (ref 3–12)
MONOCYTES # BLD: 5 % (ref 1–7)
MONOCYTES # BLD: 5 % (ref 1–7)
MONOCYTES # BLD: 6 % (ref 1–7)
MONOCYTES # BLD: 7 % (ref 1–7)
MONOCYTES # BLD: 7 % (ref 1–7)
MONOCYTES # BLD: 7 % (ref 3–12)
MONOCYTES # BLD: 7 % (ref 3–12)
MONOCYTES # BLD: 9 % (ref 3–12)
MORPHOLOGY: ABNORMAL
MUCUS: ABNORMAL
MYCOPLASMA PNEUMONIAE IGM: 0.24
MYOGLOBIN: 1147 NG/ML (ref 28–72)
MYOGLOBIN: 1482 NG/ML (ref 28–72)
MYOGLOBIN: 1551 NG/ML (ref 28–72)
MYOGLOBIN: 280 NG/ML (ref 28–72)
MYOGLOBIN: 3030 NG/ML (ref 28–72)
MYOGLOBIN: 950 NG/ML (ref 28–72)
NEGATIVE BASE EXCESS, VEN: ABNORMAL (ref 0–2)
NEGATIVE BASE EXCESS, VEN: ABNORMAL MMOL/L (ref 0–2)
NITRITE, URINE: NEGATIVE
NOTIFICATION TIME: ABNORMAL
NOTIFICATION: ABNORMAL
NRBC AUTOMATED: 0.2 PER 100 WBC
NRBC AUTOMATED: 0.3 PER 100 WBC
NRBC AUTOMATED: 0.6 PER 100 WBC
NRBC AUTOMATED: 0.6 PER 100 WBC
NRBC AUTOMATED: 1.3 PER 100 WBC
NRBC AUTOMATED: 1.4 PER 100 WBC
NRBC AUTOMATED: 1.5 PER 100 WBC
NRBC AUTOMATED: 2.1 PER 100 WBC
NRBC AUTOMATED: 4 PER 100 WBC
NRBC AUTOMATED: ABNORMAL PER 100 WBC
NUCLEATED RED BLOOD CELLS: 1 PER 100 WBC
NUCLEATED RED BLOOD CELLS: 3 PER 100 WBC
NUCLEATED RED BLOOD CELLS: 3 PER 100 WBC
NUCLEATED RED BLOOD CELLS: 4 PER 100 WBC
O2 DEVICE/FLOW/%: ABNORMAL
O2 DEVICE/FLOW/%: ABNORMAL
O2 SAT, VEN: 19 % (ref 60–85)
O2 SAT, VEN: 69.2 % (ref 60–85)
OPIATES, URINE: NEGATIVE
OTHER OBSERVATIONS UA: ABNORMAL
OXYCODONE SCREEN URINE: NEGATIVE
OXYHEMOGLOBIN: ABNORMAL % (ref 95–98)
PARTIAL THROMBOPLASTIN TIME: 18 SEC (ref 20.5–30.5)
PARTIAL THROMBOPLASTIN TIME: 45.7 SEC (ref 24–36)
PATHOLOGIST REVIEW: NORMAL
PATHOLOGIST REVIEW: NORMAL
PATIENT TEMP: ABNORMAL
PATIENT TEMP: ABNORMAL
PCO2, VEN, TEMP ADJ: ABNORMAL MMHG (ref 39–55)
PCO2, VEN: 43.4 MM HG (ref 41–51)
PCO2, VEN: 44.5 (ref 39–55)
PDW BLD-RTO: 15.7 % (ref 11.8–14.4)
PDW BLD-RTO: 16.1 % (ref 11.8–14.4)
PDW BLD-RTO: 16.2 % (ref 11.5–14.9)
PDW BLD-RTO: 16.6 % (ref 11.5–14.9)
PDW BLD-RTO: 17 % (ref 11.5–14.9)
PDW BLD-RTO: 17.1 % (ref 11.5–14.9)
PDW BLD-RTO: 17.2 % (ref 11.8–14.4)
PDW BLD-RTO: 17.3 % (ref 11.5–14.9)
PDW BLD-RTO: 17.6 % (ref 11.5–14.9)
PDW BLD-RTO: 17.7 % (ref 11.5–14.9)
PDW BLD-RTO: 17.7 % (ref 11.5–14.9)
PDW BLD-RTO: 17.7 % (ref 11.8–14.4)
PDW BLD-RTO: 17.8 % (ref 11.5–14.9)
PDW BLD-RTO: 17.8 % (ref 11.5–14.9)
PDW BLD-RTO: 17.8 % (ref 11.8–14.4)
PDW BLD-RTO: 17.9 % (ref 11.5–14.9)
PDW BLD-RTO: 18 % (ref 11.5–14.9)
PDW BLD-RTO: 18.1 % (ref 11.5–14.9)
PDW BLD-RTO: 18.2 % (ref 11.5–14.9)
PDW BLD-RTO: 18.2 % (ref 11.5–14.9)
PDW BLD-RTO: 18.6 % (ref 11.8–14.4)
PDW BLD-RTO: 18.7 % (ref 11.8–14.4)
PEEP/CPAP: ABNORMAL
PH UA: 6.5 (ref 5–8)
PH VENOUS: 7.39 (ref 7.32–7.43)
PH VENOUS: 7.41 (ref 7.32–7.42)
PH, VEN, TEMP ADJ: ABNORMAL (ref 7.32–7.42)
PHENCYCLIDINE, URINE: NEGATIVE
PHOSPHORUS: 2.7 MG/DL (ref 2.5–4.5)
PHOSPHORUS: 2.8 MG/DL (ref 2.5–4.5)
PHOSPHORUS: 3.3 MG/DL (ref 2.5–4.5)
PHOSPHORUS: 3.4 MG/DL (ref 2.5–4.5)
PHOSPHORUS: 3.9 MG/DL (ref 2.5–4.5)
PHOSPHORUS: 4.6 MG/DL (ref 2.5–4.5)
PHOSPHORUS: 4.7 MG/DL (ref 2.5–4.5)
PLATELET # BLD: 103 K/UL (ref 150–450)
PLATELET # BLD: 121 K/UL (ref 150–450)
PLATELET # BLD: 122 K/UL (ref 150–450)
PLATELET # BLD: 132 K/UL (ref 150–450)
PLATELET # BLD: 135 K/UL (ref 150–450)
PLATELET # BLD: 136 K/UL (ref 150–450)
PLATELET # BLD: 140 K/UL (ref 150–450)
PLATELET # BLD: 141 K/UL (ref 150–450)
PLATELET # BLD: 153 K/UL (ref 150–450)
PLATELET # BLD: 172 K/UL (ref 150–450)
PLATELET # BLD: 218 K/UL (ref 150–450)
PLATELET # BLD: 231 K/UL (ref 150–450)
PLATELET # BLD: 71 K/UL (ref 150–450)
PLATELET # BLD: 76 K/UL (ref 150–450)
PLATELET # BLD: ABNORMAL K/UL (ref 138–453)
PLATELET # BLD: ABNORMAL K/UL (ref 150–450)
PLATELET # BLD: NORMAL K/UL (ref 138–453)
PLATELET ESTIMATE: ABNORMAL
PLATELET, FLUORESCENCE: 11 K/UL (ref 138–453)
PLATELET, FLUORESCENCE: 11 K/UL (ref 138–453)
PLATELET, FLUORESCENCE: 118 K/UL (ref 138–453)
PLATELET, FLUORESCENCE: 125 K/UL (ref 138–453)
PLATELET, FLUORESCENCE: 134 K/UL (ref 138–453)
PLATELET, FLUORESCENCE: 4 K/UL (ref 138–453)
PLATELET, FLUORESCENCE: 42 K/UL (ref 138–453)
PLATELET, FLUORESCENCE: 73 K/UL (ref 138–453)
PLATELET, FLUORESCENCE: 9 K/UL (ref 138–453)
PLATELET, FLUORESCENCE: 93 K/UL (ref 138–453)
PLATELET, IMMATURE FRACTION: 10.9 % (ref 1.1–10.3)
PLATELET, IMMATURE FRACTION: 12.1 % (ref 1.1–10.3)
PLATELET, IMMATURE FRACTION: 14 % (ref 1.1–10.3)
PLATELET, IMMATURE FRACTION: 17.5 % (ref 1.1–10.3)
PLATELET, IMMATURE FRACTION: 20.7 % (ref 1.1–10.3)
PLATELET, IMMATURE FRACTION: 24.3 % (ref 1.1–10.3)
PLATELET, IMMATURE FRACTION: 33.3 % (ref 1.1–10.3)
PLATELET, IMMATURE FRACTION: 39.8 % (ref 1.1–10.3)
PLATELET, IMMATURE FRACTION: 44.9 % (ref 1.1–10.3)
PMV BLD AUTO: 10.4 FL (ref 6–12)
PMV BLD AUTO: 10.5 FL (ref 6–12)
PMV BLD AUTO: 10.5 FL (ref 6–12)
PMV BLD AUTO: 10.7 FL (ref 6–12)
PMV BLD AUTO: 10.8 FL (ref 6–12)
PMV BLD AUTO: 10.9 FL (ref 6–12)
PMV BLD AUTO: 11 FL (ref 6–12)
PMV BLD AUTO: 11.4 FL (ref 6–12)
PMV BLD AUTO: 11.6 FL (ref 6–12)
PMV BLD AUTO: 11.9 FL (ref 6–12)
PMV BLD AUTO: 11.9 FL (ref 6–12)
PMV BLD AUTO: 12 FL (ref 6–12)
PMV BLD AUTO: 12.2 FL (ref 6–12)
PMV BLD AUTO: 12.3 FL (ref 6–12)
PMV BLD AUTO: 12.4 FL (ref 6–12)
PMV BLD AUTO: ABNORMAL FL (ref 8.1–13.5)
PO2, VEN, TEMP ADJ: ABNORMAL MMHG (ref 30–50)
PO2, VEN: 15.2 MM HG (ref 30–50)
PO2, VEN: 37.7 (ref 30–50)
POC BUN: 34 MG/DL (ref 8–26)
POC CHLORIDE: 110 MMOL/L (ref 98–107)
POC CREATININE: 1.46 MG/DL (ref 0.51–1.19)
POC HEMATOCRIT: 39 % (ref 41–53)
POC HEMOGLOBIN: 13.2 G/DL (ref 13.5–17.5)
POC IONIZED CALCIUM: 1.18 MMOL/L (ref 1.15–1.33)
POC LACTIC ACID: 1.83 MMOL/L (ref 0.56–1.39)
POC PCO2 TEMP: ABNORMAL MM HG
POC PH TEMP: ABNORMAL
POC PO2 TEMP: ABNORMAL MM HG
POC POTASSIUM: 3.9 MMOL/L (ref 3.5–4.5)
POC SODIUM: 145 MMOL/L (ref 138–146)
POC TCO2: 27 MMOL/L (ref 22–30)
POSITIVE BASE EXCESS, VEN: 1 (ref 0–3)
POSITIVE BASE EXCESS, VEN: 3.5 MMOL/L (ref 0–2)
POTASSIUM SERPL-SCNC: 3.5 MMOL/L (ref 3.7–5.3)
POTASSIUM SERPL-SCNC: 3.7 MMOL/L (ref 3.7–5.3)
POTASSIUM SERPL-SCNC: 3.7 MMOL/L (ref 3.7–5.3)
POTASSIUM SERPL-SCNC: 3.8 MMOL/L (ref 3.7–5.3)
POTASSIUM SERPL-SCNC: 3.9 MMOL/L (ref 3.7–5.3)
POTASSIUM SERPL-SCNC: 4 MMOL/L (ref 3.7–5.3)
POTASSIUM SERPL-SCNC: 4.1 MMOL/L (ref 3.7–5.3)
POTASSIUM SERPL-SCNC: 4.2 MMOL/L (ref 3.7–5.3)
POTASSIUM SERPL-SCNC: 4.3 MMOL/L (ref 3.7–5.3)
POTASSIUM SERPL-SCNC: 4.3 MMOL/L (ref 3.7–5.3)
POTASSIUM SERPL-SCNC: 4.4 MMOL/L (ref 3.7–5.3)
POTASSIUM SERPL-SCNC: 4.8 MMOL/L (ref 3.7–5.3)
POTASSIUM SERPL-SCNC: 5.1 MMOL/L (ref 3.7–5.3)
POTASSIUM SERPL-SCNC: 5.3 MMOL/L (ref 3.7–5.3)
POTASSIUM SERPL-SCNC: 5.4 MMOL/L (ref 3.7–5.3)
POTASSIUM SERPL-SCNC: 5.6 MMOL/L (ref 3.7–5.3)
POTASSIUM SERPL-SCNC: 5.8 MMOL/L (ref 3.7–5.3)
PREALBUMIN: 13.4 MG/DL (ref 20–40)
PROCALCITONIN: 0.26 NG/ML
PROPOXYPHENE, URINE: NORMAL
PROTEIN UA: ABNORMAL
PROTHROMBIN TIME: 11.8 SEC (ref 9.1–12.3)
PROTHROMBIN TIME: 14.7 SEC (ref 11.8–14.6)
PROTHROMBIN TIME: 17.1 SEC (ref 11.8–14.6)
PSV: ABNORMAL
PT. POSITION: ABNORMAL
RBC # BLD: 3.8 M/UL (ref 4.21–5.77)
RBC # BLD: 4.11 M/UL (ref 4.21–5.77)
RBC # BLD: 4.19 M/UL (ref 4.21–5.77)
RBC # BLD: 4.28 M/UL (ref 4.21–5.77)
RBC # BLD: 4.33 M/UL (ref 4.21–5.77)
RBC # BLD: 4.38 M/UL (ref 4.5–5.9)
RBC # BLD: 4.41 M/UL (ref 4.5–5.9)
RBC # BLD: 4.48 M/UL (ref 4.5–5.9)
RBC # BLD: 4.48 M/UL (ref 4.5–5.9)
RBC # BLD: 4.67 M/UL (ref 4.5–5.9)
RBC # BLD: 4.68 M/UL (ref 4.5–5.9)
RBC # BLD: 4.7 M/UL (ref 4.5–5.9)
RBC # BLD: 4.71 M/UL (ref 4.21–5.77)
RBC # BLD: 4.71 M/UL (ref 4.5–5.9)
RBC # BLD: 4.71 M/UL (ref 4.5–5.9)
RBC # BLD: 4.73 M/UL (ref 4.5–5.9)
RBC # BLD: 4.74 M/UL (ref 4.5–5.9)
RBC # BLD: 4.85 M/UL (ref 4.21–5.77)
RBC # BLD: 4.86 M/UL (ref 4.21–5.77)
RBC # BLD: 4.86 M/UL (ref 4.5–5.9)
RBC # BLD: 5.01 M/UL (ref 4.5–5.9)
RBC # BLD: 5.06 M/UL (ref 4.21–5.77)
RBC # BLD: 5.43 M/UL (ref 4.5–5.9)
RBC # BLD: 5.64 M/UL (ref 4.5–5.9)
RBC # BLD: ABNORMAL 10*6/UL
RBC UA: ABNORMAL /HPF
REASON FOR REJECTION: NORMAL
REASON FOR REJECTION: NORMAL
RENAL EPITHELIAL, UA: ABNORMAL /HPF
RESPIRATORY RATE: ABNORMAL
RETIC %: 2.2 % (ref 0.5–1.9)
RETIC HEMOGLOBIN: 27.8 PG (ref 28.2–35.7)
SAMPLE SITE: ABNORMAL
SAMPLE SITE: ABNORMAL
SARS-COV-2, RAPID: NOT DETECTED
SEG NEUTROPHILS: 70 % (ref 36–65)
SEG NEUTROPHILS: 70 % (ref 36–66)
SEG NEUTROPHILS: 75 % (ref 36–66)
SEG NEUTROPHILS: 78 % (ref 36–65)
SEG NEUTROPHILS: 79 % (ref 36–66)
SEG NEUTROPHILS: 80 % (ref 36–66)
SEG NEUTROPHILS: 80 % (ref 36–66)
SEG NEUTROPHILS: 81 % (ref 36–66)
SEG NEUTROPHILS: 82 % (ref 36–65)
SEG NEUTROPHILS: 86 % (ref 36–65)
SEG NEUTROPHILS: 87 % (ref 36–65)
SEGMENTED NEUTROPHILS ABSOLUTE COUNT: 10.42 K/UL (ref 1.8–7.7)
SEGMENTED NEUTROPHILS ABSOLUTE COUNT: 10.72 K/UL (ref 1.8–7.7)
SEGMENTED NEUTROPHILS ABSOLUTE COUNT: 11.2 K/UL (ref 1.3–9.1)
SEGMENTED NEUTROPHILS ABSOLUTE COUNT: 12.71 K/UL (ref 1.8–7.7)
SEGMENTED NEUTROPHILS ABSOLUTE COUNT: 13.55 K/UL (ref 1.5–8.1)
SEGMENTED NEUTROPHILS ABSOLUTE COUNT: 13.99 K/UL (ref 1.8–7.7)
SEGMENTED NEUTROPHILS ABSOLUTE COUNT: 14.04 K/UL (ref 1.5–8.1)
SEGMENTED NEUTROPHILS ABSOLUTE COUNT: 7 K/UL (ref 1.5–8.1)
SEGMENTED NEUTROPHILS ABSOLUTE COUNT: 7.69 K/UL (ref 1.3–9.1)
SEGMENTED NEUTROPHILS ABSOLUTE COUNT: 8.33 K/UL (ref 1.5–8.1)
SEGMENTED NEUTROPHILS ABSOLUTE COUNT: 9.32 K/UL (ref 1.5–8.1)
SET RATE: ABNORMAL
SODIUM BLD-SCNC: 135 MMOL/L (ref 135–144)
SODIUM BLD-SCNC: 135 MMOL/L (ref 135–144)
SODIUM BLD-SCNC: 136 MMOL/L (ref 135–144)
SODIUM BLD-SCNC: 139 MMOL/L (ref 135–144)
SODIUM BLD-SCNC: 141 MMOL/L (ref 135–144)
SODIUM BLD-SCNC: 142 MMOL/L (ref 135–144)
SODIUM BLD-SCNC: 142 MMOL/L (ref 135–144)
SODIUM BLD-SCNC: 143 MMOL/L (ref 135–144)
SODIUM BLD-SCNC: 144 MMOL/L (ref 135–144)
SODIUM BLD-SCNC: 145 MMOL/L (ref 135–144)
SODIUM BLD-SCNC: 146 MMOL/L (ref 135–144)
SODIUM BLD-SCNC: 146 MMOL/L (ref 135–144)
SODIUM BLD-SCNC: 147 MMOL/L (ref 135–144)
SODIUM BLD-SCNC: 149 MMOL/L (ref 135–144)
SODIUM BLD-SCNC: 149 MMOL/L (ref 135–144)
SODIUM BLD-SCNC: 150 MMOL/L (ref 135–144)
SODIUM BLD-SCNC: 151 MMOL/L (ref 135–144)
SODIUM BLD-SCNC: 152 MMOL/L (ref 135–144)
SODIUM BLD-SCNC: 154 MMOL/L (ref 135–144)
SOURCE: NORMAL
SPECIFIC GRAVITY UA: 1.02 (ref 1–1.03)
SPECIMEN DESCRIPTION: NORMAL
STREP PNEUMONIAE ANTIGEN: NEGATIVE
SURGICAL PATHOLOGY REPORT: NORMAL
TEST INFORMATION: NORMAL
TEXT FOR RESPIRATORY: ABNORMAL
THYROXINE, FREE: 1.18 NG/DL (ref 0.93–1.7)
TOTAL CK: 1422 U/L (ref 39–308)
TOTAL CK: 151 U/L (ref 39–308)
TOTAL CK: 3552 U/L (ref 39–308)
TOTAL CK: 7454 U/L (ref 39–308)
TOTAL CO2, VENOUS: ABNORMAL MMOL/L (ref 23–30)
TOTAL HB: ABNORMAL G/DL (ref 12–16)
TOTAL PROTEIN: 5.9 G/DL (ref 6.4–8.3)
TOTAL PROTEIN: 5.9 G/DL (ref 6.4–8.3)
TOTAL PROTEIN: 6.1 G/DL (ref 6.4–8.3)
TOTAL PROTEIN: 6.3 G/DL (ref 6.4–8.3)
TOTAL PROTEIN: 6.3 G/DL (ref 6.4–8.3)
TOTAL PROTEIN: 6.6 G/DL (ref 6.4–8.3)
TOTAL PROTEIN: 7.2 G/DL (ref 6.4–8.3)
TOTAL RATE: ABNORMAL
TRANSFUSION STATUS: NORMAL
TRICHOMONAS: ABNORMAL
TRICYCLIC ANTIDEPRESSANTS, UR: NORMAL
TRIGL SERPL-MCNC: 59 MG/DL
TRIGL SERPL-MCNC: 77 MG/DL
TROPONIN INTERP: ABNORMAL
TROPONIN T: ABNORMAL NG/ML
TROPONIN, HIGH SENSITIVITY: 46 NG/L (ref 0–22)
TROPONIN, HIGH SENSITIVITY: 48 NG/L (ref 0–22)
TROPONIN, HIGH SENSITIVITY: 50 NG/L (ref 0–22)
TROPONIN, HIGH SENSITIVITY: 52 NG/L (ref 0–22)
TSH SERPL DL<=0.05 MIU/L-ACNC: 0.28 MIU/L (ref 0.3–5)
TURBIDITY: CLEAR
UNIT DIVISION: 0
UNIT NUMBER: NORMAL
URINE HGB: NEGATIVE
UROBILINOGEN, URINE: NORMAL
VITAMIN B-12: 667 PG/ML (ref 232–1245)
VLDLC SERPL CALC-MCNC: NORMAL MG/DL (ref 1–30)
VT: ABNORMAL
WBC # BLD: 10 K/UL (ref 3.5–11.3)
WBC # BLD: 10.4 K/UL (ref 3.5–11)
WBC # BLD: 10.7 K/UL (ref 3.5–11.3)
WBC # BLD: 10.7 K/UL (ref 3.5–11.3)
WBC # BLD: 11.4 K/UL (ref 3.5–11)
WBC # BLD: 13.5 K/UL (ref 3.5–11)
WBC # BLD: 13.5 K/UL (ref 3.5–11)
WBC # BLD: 13.9 K/UL (ref 3.5–11.3)
WBC # BLD: 14 K/UL (ref 3.5–11)
WBC # BLD: 14.4 K/UL (ref 3.5–11)
WBC # BLD: 15.3 K/UL (ref 3.5–11.3)
WBC # BLD: 15.8 K/UL (ref 3.5–11)
WBC # BLD: 15.9 K/UL (ref 3.5–11.3)
WBC # BLD: 16.3 K/UL (ref 3.5–11.3)
WBC # BLD: 16.5 K/UL (ref 3.5–11)
WBC # BLD: 16.5 K/UL (ref 3.5–11.3)
WBC # BLD: 16.6 K/UL (ref 3.5–11)
WBC # BLD: 17.7 K/UL (ref 3.5–11.3)
WBC # BLD: 8.3 K/UL (ref 3.5–11)
WBC # BLD: 9 K/UL (ref 3.5–11)
WBC # BLD: 9.1 K/UL (ref 3.5–11)
WBC # BLD: 9.2 K/UL (ref 3.5–11)
WBC # BLD: 9.3 K/UL (ref 3.5–11)
WBC # BLD: 9.5 K/UL (ref 3.5–11)
WBC # BLD: ABNORMAL 10*3/UL
WBC UA: ABNORMAL /HPF
YEAST: ABNORMAL
ZZ NTE CLEAN UP: ORDERED TEST: NORMAL
ZZ NTE CLEAN UP: ORDERED TEST: NORMAL
ZZ NTE WITH NAME CLEAN UP: SPECIMEN SOURCE: NORMAL
ZZ NTE WITH NAME CLEAN UP: SPECIMEN SOURCE: NORMAL

## 2021-01-01 PROCEDURE — 6370000000 HC RX 637 (ALT 250 FOR IP): Performed by: INTERNAL MEDICINE

## 2021-01-01 PROCEDURE — 6370000000 HC RX 637 (ALT 250 FOR IP): Performed by: PHYSICAL MEDICINE & REHABILITATION

## 2021-01-01 PROCEDURE — 6360000002 HC RX W HCPCS: Performed by: STUDENT IN AN ORGANIZED HEALTH CARE EDUCATION/TRAINING PROGRAM

## 2021-01-01 PROCEDURE — 99233 SBSQ HOSP IP/OBS HIGH 50: CPT | Performed by: PSYCHIATRY & NEUROLOGY

## 2021-01-01 PROCEDURE — 85520 HEPARIN ASSAY: CPT

## 2021-01-01 PROCEDURE — 97530 THERAPEUTIC ACTIVITIES: CPT

## 2021-01-01 PROCEDURE — 92526 ORAL FUNCTION THERAPY: CPT

## 2021-01-01 PROCEDURE — 82800 BLOOD PH: CPT

## 2021-01-01 PROCEDURE — 3700000000 HC ANESTHESIA ATTENDED CARE: Performed by: SURGERY

## 2021-01-01 PROCEDURE — 6370000000 HC RX 637 (ALT 250 FOR IP)

## 2021-01-01 PROCEDURE — 97535 SELF CARE MNGMENT TRAINING: CPT

## 2021-01-01 PROCEDURE — 94640 AIRWAY INHALATION TREATMENT: CPT

## 2021-01-01 PROCEDURE — 82947 ASSAY GLUCOSE BLOOD QUANT: CPT

## 2021-01-01 PROCEDURE — 97110 THERAPEUTIC EXERCISES: CPT

## 2021-01-01 PROCEDURE — 80048 BASIC METABOLIC PNL TOTAL CA: CPT

## 2021-01-01 PROCEDURE — 93970 EXTREMITY STUDY: CPT

## 2021-01-01 PROCEDURE — 99232 SBSQ HOSP IP/OBS MODERATE 35: CPT | Performed by: INTERNAL MEDICINE

## 2021-01-01 PROCEDURE — 93005 ELECTROCARDIOGRAM TRACING: CPT | Performed by: PSYCHIATRY & NEUROLOGY

## 2021-01-01 PROCEDURE — 92507 TX SP LANG VOICE COMM INDIV: CPT

## 2021-01-01 PROCEDURE — 83874 ASSAY OF MYOGLOBIN: CPT

## 2021-01-01 PROCEDURE — 70450 CT HEAD/BRAIN W/O DYE: CPT

## 2021-01-01 PROCEDURE — 7100000000 HC PACU RECOVERY - FIRST 15 MIN: Performed by: SURGERY

## 2021-01-01 PROCEDURE — 82550 ASSAY OF CK (CPK): CPT

## 2021-01-01 PROCEDURE — 87040 BLOOD CULTURE FOR BACTERIA: CPT

## 2021-01-01 PROCEDURE — 97542 WHEELCHAIR MNGMENT TRAINING: CPT

## 2021-01-01 PROCEDURE — 71045 X-RAY EXAM CHEST 1 VIEW: CPT

## 2021-01-01 PROCEDURE — 0DH63UZ INSERTION OF FEEDING DEVICE INTO STOMACH, PERCUTANEOUS APPROACH: ICD-10-PCS | Performed by: SURGERY

## 2021-01-01 PROCEDURE — 6370000000 HC RX 637 (ALT 250 FOR IP): Performed by: PSYCHIATRY & NEUROLOGY

## 2021-01-01 PROCEDURE — 36415 COLL VENOUS BLD VENIPUNCTURE: CPT

## 2021-01-01 PROCEDURE — 94761 N-INVAS EAR/PLS OXIMETRY MLT: CPT

## 2021-01-01 PROCEDURE — 80051 ELECTROLYTE PANEL: CPT

## 2021-01-01 PROCEDURE — 99223 1ST HOSP IP/OBS HIGH 75: CPT | Performed by: INTERNAL MEDICINE

## 2021-01-01 PROCEDURE — 99215 OFFICE O/P EST HI 40 MIN: CPT | Performed by: PSYCHIATRY & NEUROLOGY

## 2021-01-01 PROCEDURE — 6360000002 HC RX W HCPCS

## 2021-01-01 PROCEDURE — 85027 COMPLETE CBC AUTOMATED: CPT

## 2021-01-01 PROCEDURE — 51798 US URINE CAPACITY MEASURE: CPT

## 2021-01-01 PROCEDURE — 82607 VITAMIN B-12: CPT

## 2021-01-01 PROCEDURE — 6360000004 HC RX CONTRAST MEDICATION: Performed by: GENERAL PRACTICE

## 2021-01-01 PROCEDURE — 1200000000 HC SEMI PRIVATE

## 2021-01-01 PROCEDURE — 85025 COMPLETE CBC W/AUTO DIFF WBC: CPT

## 2021-01-01 PROCEDURE — 84295 ASSAY OF SERUM SODIUM: CPT

## 2021-01-01 PROCEDURE — 1180000000 HC REHAB R&B

## 2021-01-01 PROCEDURE — 83735 ASSAY OF MAGNESIUM: CPT

## 2021-01-01 PROCEDURE — 94660 CPAP INITIATION&MGMT: CPT

## 2021-01-01 PROCEDURE — 99232 SBSQ HOSP IP/OBS MODERATE 35: CPT | Performed by: PSYCHIATRY & NEUROLOGY

## 2021-01-01 PROCEDURE — 82805 BLOOD GASES W/O2 SATURATION: CPT

## 2021-01-01 PROCEDURE — 84132 ASSAY OF SERUM POTASSIUM: CPT

## 2021-01-01 PROCEDURE — 82803 BLOOD GASES ANY COMBINATION: CPT

## 2021-01-01 PROCEDURE — 97112 NEUROMUSCULAR REEDUCATION: CPT

## 2021-01-01 PROCEDURE — 2060000000 HC ICU INTERMEDIATE R&B

## 2021-01-01 PROCEDURE — 2580000003 HC RX 258: Performed by: STUDENT IN AN ORGANIZED HEALTH CARE EDUCATION/TRAINING PROGRAM

## 2021-01-01 PROCEDURE — 97116 GAIT TRAINING THERAPY: CPT

## 2021-01-01 PROCEDURE — 2500000003 HC RX 250 WO HCPCS: Performed by: SURGERY

## 2021-01-01 PROCEDURE — 6360000002 HC RX W HCPCS: Performed by: INTERNAL MEDICINE

## 2021-01-01 PROCEDURE — 80053 COMPREHEN METABOLIC PANEL: CPT

## 2021-01-01 PROCEDURE — 84134 ASSAY OF PREALBUMIN: CPT

## 2021-01-01 PROCEDURE — 86738 MYCOPLASMA ANTIBODY: CPT

## 2021-01-01 PROCEDURE — 85384 FIBRINOGEN ACTIVITY: CPT

## 2021-01-01 PROCEDURE — G0378 HOSPITAL OBSERVATION PER HR: HCPCS

## 2021-01-01 PROCEDURE — 6370000000 HC RX 637 (ALT 250 FOR IP): Performed by: STUDENT IN AN ORGANIZED HEALTH CARE EDUCATION/TRAINING PROGRAM

## 2021-01-01 PROCEDURE — 83605 ASSAY OF LACTIC ACID: CPT

## 2021-01-01 PROCEDURE — 86901 BLOOD TYPING SEROLOGIC RH(D): CPT

## 2021-01-01 PROCEDURE — 86920 COMPATIBILITY TEST SPIN: CPT

## 2021-01-01 PROCEDURE — 99232 SBSQ HOSP IP/OBS MODERATE 35: CPT | Performed by: STUDENT IN AN ORGANIZED HEALTH CARE EDUCATION/TRAINING PROGRAM

## 2021-01-01 PROCEDURE — 2700000000 HC OXYGEN THERAPY PER DAY

## 2021-01-01 PROCEDURE — 85610 PROTHROMBIN TIME: CPT

## 2021-01-01 PROCEDURE — 93010 ELECTROCARDIOGRAM REPORT: CPT | Performed by: INTERNAL MEDICINE

## 2021-01-01 PROCEDURE — 92610 EVALUATE SWALLOWING FUNCTION: CPT

## 2021-01-01 PROCEDURE — 85055 RETICULATED PLATELET ASSAY: CPT

## 2021-01-01 PROCEDURE — 99231 SBSQ HOSP IP/OBS SF/LOW 25: CPT | Performed by: INTERNAL MEDICINE

## 2021-01-01 PROCEDURE — 80076 HEPATIC FUNCTION PANEL: CPT

## 2021-01-01 PROCEDURE — 82533 TOTAL CORTISOL: CPT

## 2021-01-01 PROCEDURE — 70551 MRI BRAIN STEM W/O DYE: CPT

## 2021-01-01 PROCEDURE — 84520 ASSAY OF UREA NITROGEN: CPT

## 2021-01-01 PROCEDURE — 6370000000 HC RX 637 (ALT 250 FOR IP): Performed by: SURGERY

## 2021-01-01 PROCEDURE — 84100 ASSAY OF PHOSPHORUS: CPT

## 2021-01-01 PROCEDURE — 99233 SBSQ HOSP IP/OBS HIGH 50: CPT | Performed by: INTERNAL MEDICINE

## 2021-01-01 PROCEDURE — 85730 THROMBOPLASTIN TIME PARTIAL: CPT

## 2021-01-01 PROCEDURE — 2580000003 HC RX 258

## 2021-01-01 PROCEDURE — 82746 ASSAY OF FOLIC ACID SERUM: CPT

## 2021-01-01 PROCEDURE — 84439 ASSAY OF FREE THYROXINE: CPT

## 2021-01-01 PROCEDURE — 84145 PROCALCITONIN (PCT): CPT

## 2021-01-01 PROCEDURE — 36430 TRANSFUSION BLD/BLD COMPNT: CPT

## 2021-01-01 PROCEDURE — 72125 CT NECK SPINE W/O DYE: CPT

## 2021-01-01 PROCEDURE — 94760 N-INVAS EAR/PLS OXIMETRY 1: CPT

## 2021-01-01 PROCEDURE — 87899 AGENT NOS ASSAY W/OPTIC: CPT

## 2021-01-01 PROCEDURE — 70544 MR ANGIOGRAPHY HEAD W/O DYE: CPT

## 2021-01-01 PROCEDURE — 99255 IP/OBS CONSLTJ NEW/EST HI 80: CPT | Performed by: INTERNAL MEDICINE

## 2021-01-01 PROCEDURE — 6360000002 HC RX W HCPCS: Performed by: PSYCHIATRY & NEUROLOGY

## 2021-01-01 PROCEDURE — 84484 ASSAY OF TROPONIN QUANT: CPT

## 2021-01-01 PROCEDURE — 76705 ECHO EXAM OF ABDOMEN: CPT

## 2021-01-01 PROCEDURE — 96372 THER/PROPH/DIAG INJ SC/IM: CPT

## 2021-01-01 PROCEDURE — 6360000002 HC RX W HCPCS: Performed by: NURSE PRACTITIONER

## 2021-01-01 PROCEDURE — 99285 EMERGENCY DEPT VISIT HI MDM: CPT

## 2021-01-01 PROCEDURE — 6370000000 HC RX 637 (ALT 250 FOR IP): Performed by: GENERAL PRACTICE

## 2021-01-01 PROCEDURE — 86803 HEPATITIS C AB TEST: CPT

## 2021-01-01 PROCEDURE — 99213 OFFICE O/P EST LOW 20 MIN: CPT | Performed by: NURSE PRACTITIONER

## 2021-01-01 PROCEDURE — 2500000003 HC RX 250 WO HCPCS: Performed by: STUDENT IN AN ORGANIZED HEALTH CARE EDUCATION/TRAINING PROGRAM

## 2021-01-01 PROCEDURE — 80061 LIPID PANEL: CPT

## 2021-01-01 PROCEDURE — 99222 1ST HOSP IP/OBS MODERATE 55: CPT | Performed by: PSYCHIATRY & NEUROLOGY

## 2021-01-01 PROCEDURE — 99223 1ST HOSP IP/OBS HIGH 75: CPT | Performed by: PHYSICAL MEDICINE & REHABILITATION

## 2021-01-01 PROCEDURE — 99232 SBSQ HOSP IP/OBS MODERATE 35: CPT | Performed by: PHYSICAL MEDICINE & REHABILITATION

## 2021-01-01 PROCEDURE — 3609013300 HC EGD TUBE PLACEMENT: Performed by: SURGERY

## 2021-01-01 PROCEDURE — 86850 RBC ANTIBODY SCREEN: CPT

## 2021-01-01 PROCEDURE — 93005 ELECTROCARDIOGRAM TRACING: CPT | Performed by: GENERAL PRACTICE

## 2021-01-01 PROCEDURE — 97161 PT EVAL LOW COMPLEX 20 MIN: CPT

## 2021-01-01 PROCEDURE — 86880 COOMBS TEST DIRECT: CPT

## 2021-01-01 PROCEDURE — 97163 PT EVAL HIGH COMPLEX 45 MIN: CPT

## 2021-01-01 PROCEDURE — 70547 MR ANGIOGRAPHY NECK W/O DYE: CPT

## 2021-01-01 PROCEDURE — 92523 SPEECH SOUND LANG COMPREHEN: CPT

## 2021-01-01 PROCEDURE — 84443 ASSAY THYROID STIM HORMONE: CPT

## 2021-01-01 PROCEDURE — 87635 SARS-COV-2 COVID-19 AMP PRB: CPT

## 2021-01-01 PROCEDURE — 99231 SBSQ HOSP IP/OBS SF/LOW 25: CPT | Performed by: PSYCHIATRY & NEUROLOGY

## 2021-01-01 PROCEDURE — 99223 1ST HOSP IP/OBS HIGH 75: CPT | Performed by: PSYCHIATRY & NEUROLOGY

## 2021-01-01 PROCEDURE — 7100000001 HC PACU RECOVERY - ADDTL 15 MIN: Performed by: SURGERY

## 2021-01-01 PROCEDURE — 6370000000 HC RX 637 (ALT 250 FOR IP): Performed by: PHYSICIAN ASSISTANT

## 2021-01-01 PROCEDURE — 82140 ASSAY OF AMMONIA: CPT

## 2021-01-01 PROCEDURE — 93005 ELECTROCARDIOGRAM TRACING: CPT | Performed by: STUDENT IN AN ORGANIZED HEALTH CARE EDUCATION/TRAINING PROGRAM

## 2021-01-01 PROCEDURE — G0379 DIRECT REFER HOSPITAL OBSERV: HCPCS

## 2021-01-01 PROCEDURE — 85045 AUTOMATED RETICULOCYTE COUNT: CPT

## 2021-01-01 PROCEDURE — 80307 DRUG TEST PRSMV CHEM ANLYZR: CPT

## 2021-01-01 PROCEDURE — 73502 X-RAY EXAM HIP UNI 2-3 VIEWS: CPT

## 2021-01-01 PROCEDURE — 85049 AUTOMATED PLATELET COUNT: CPT

## 2021-01-01 PROCEDURE — 93005 ELECTROCARDIOGRAM TRACING: CPT

## 2021-01-01 PROCEDURE — 81001 URINALYSIS AUTO W/SCOPE: CPT

## 2021-01-01 PROCEDURE — 99222 1ST HOSP IP/OBS MODERATE 55: CPT | Performed by: INTERNAL MEDICINE

## 2021-01-01 PROCEDURE — 93971 EXTREMITY STUDY: CPT

## 2021-01-01 PROCEDURE — 6360000002 HC RX W HCPCS: Performed by: NURSE ANESTHETIST, CERTIFIED REGISTERED

## 2021-01-01 PROCEDURE — 2500000003 HC RX 250 WO HCPCS: Performed by: NURSE ANESTHETIST, CERTIFIED REGISTERED

## 2021-01-01 PROCEDURE — 93005 ELECTROCARDIOGRAM TRACING: CPT | Performed by: INTERNAL MEDICINE

## 2021-01-01 PROCEDURE — 97167 OT EVAL HIGH COMPLEX 60 MIN: CPT

## 2021-01-01 PROCEDURE — 82553 CREATINE MB FRACTION: CPT

## 2021-01-01 PROCEDURE — 82330 ASSAY OF CALCIUM: CPT

## 2021-01-01 PROCEDURE — 99222 1ST HOSP IP/OBS MODERATE 55: CPT | Performed by: PHYSICAL MEDICINE & REHABILITATION

## 2021-01-01 PROCEDURE — 85014 HEMATOCRIT: CPT

## 2021-01-01 PROCEDURE — 90792 PSYCH DIAG EVAL W/MED SRVCS: CPT | Performed by: PSYCHIATRY & NEUROLOGY

## 2021-01-01 PROCEDURE — 93306 TTE W/DOPPLER COMPLETE: CPT

## 2021-01-01 PROCEDURE — 6360000002 HC RX W HCPCS: Performed by: PHYSICIAN ASSISTANT

## 2021-01-01 PROCEDURE — 0DJ08ZZ INSPECTION OF UPPER INTESTINAL TRACT, VIA NATURAL OR ARTIFICIAL OPENING ENDOSCOPIC: ICD-10-PCS | Performed by: SURGERY

## 2021-01-01 PROCEDURE — 97166 OT EVAL MOD COMPLEX 45 MIN: CPT

## 2021-01-01 PROCEDURE — 99283 EMERGENCY DEPT VISIT LOW MDM: CPT

## 2021-01-01 PROCEDURE — 3700000001 HC ADD 15 MINUTES (ANESTHESIA): Performed by: SURGERY

## 2021-01-01 PROCEDURE — 82565 ASSAY OF CREATININE: CPT

## 2021-01-01 PROCEDURE — 2709999900 HC NON-CHARGEABLE SUPPLY: Performed by: SURGERY

## 2021-01-01 PROCEDURE — 86870 RBC ANTIBODY IDENTIFICATION: CPT

## 2021-01-01 PROCEDURE — 84478 ASSAY OF TRIGLYCERIDES: CPT

## 2021-01-01 PROCEDURE — 97165 OT EVAL LOW COMPLEX 30 MIN: CPT

## 2021-01-01 PROCEDURE — 83036 HEMOGLOBIN GLYCOSYLATED A1C: CPT

## 2021-01-01 PROCEDURE — 99239 HOSP IP/OBS DSCHRG MGMT >30: CPT | Performed by: INTERNAL MEDICINE

## 2021-01-01 PROCEDURE — 87338 HPYLORI STOOL AG IA: CPT

## 2021-01-01 PROCEDURE — 86900 BLOOD TYPING SEROLOGIC ABO: CPT

## 2021-01-01 PROCEDURE — 87449 NOS EACH ORGANISM AG IA: CPT

## 2021-01-01 PROCEDURE — 70496 CT ANGIOGRAPHY HEAD: CPT

## 2021-01-01 PROCEDURE — 2720000010 HC SURG SUPPLY STERILE: Performed by: SURGERY

## 2021-01-01 PROCEDURE — P9073 PLATELETS PHERESIS PATH REDU: HCPCS

## 2021-01-01 PROCEDURE — 87389 HIV-1 AG W/HIV-1&-2 AB AG IA: CPT

## 2021-01-01 RX ORDER — CLOPIDOGREL BISULFATE 75 MG/1
75 TABLET ORAL DAILY
Status: CANCELLED | OUTPATIENT
Start: 2021-01-01

## 2021-01-01 RX ORDER — BISACODYL 10 MG
10 SUPPOSITORY, RECTAL RECTAL DAILY PRN
Status: DISCONTINUED | OUTPATIENT
Start: 2021-01-01 | End: 2021-01-01 | Stop reason: HOSPADM

## 2021-01-01 RX ORDER — LANOLIN ALCOHOL/MO/W.PET/CERES
3 CREAM (GRAM) TOPICAL NIGHTLY PRN
Status: DISCONTINUED | OUTPATIENT
Start: 2021-01-01 | End: 2021-01-01

## 2021-01-01 RX ORDER — ROSUVASTATIN CALCIUM 40 MG/1
40 TABLET, COATED ORAL NIGHTLY
Status: DISCONTINUED | OUTPATIENT
Start: 2021-01-01 | End: 2021-01-01 | Stop reason: HOSPADM

## 2021-01-01 RX ORDER — ASPIRIN 81 MG/1
81 TABLET, CHEWABLE ORAL
Status: DISCONTINUED | OUTPATIENT
Start: 2021-01-01 | End: 2021-01-01

## 2021-01-01 RX ORDER — FLUOXETINE HYDROCHLORIDE 20 MG/1
40 CAPSULE ORAL
Status: DISCONTINUED | OUTPATIENT
Start: 2021-01-01 | End: 2021-01-01

## 2021-01-01 RX ORDER — LOSARTAN POTASSIUM 50 MG/1
50 TABLET ORAL DAILY
Status: DISCONTINUED | OUTPATIENT
Start: 2021-01-01 | End: 2021-01-01 | Stop reason: HOSPADM

## 2021-01-01 RX ORDER — ALBUTEROL SULFATE 90 UG/1
2 AEROSOL, METERED RESPIRATORY (INHALATION) EVERY 6 HOURS PRN
Status: DISCONTINUED | OUTPATIENT
Start: 2021-01-01 | End: 2021-01-01 | Stop reason: HOSPADM

## 2021-01-01 RX ORDER — ASPIRIN 81 MG/1
81 TABLET, CHEWABLE ORAL DAILY
Status: DISCONTINUED | OUTPATIENT
Start: 2021-01-01 | End: 2021-01-01 | Stop reason: HOSPADM

## 2021-01-01 RX ORDER — ACETAMINOPHEN 325 MG/1
650 TABLET ORAL EVERY 6 HOURS PRN
Status: DISCONTINUED | OUTPATIENT
Start: 2021-01-01 | End: 2021-01-01 | Stop reason: HOSPADM

## 2021-01-01 RX ORDER — SODIUM CHLORIDE 450 MG/100ML
INJECTION, SOLUTION INTRAVENOUS CONTINUOUS
Status: DISCONTINUED | OUTPATIENT
Start: 2021-01-01 | End: 2021-01-01

## 2021-01-01 RX ORDER — HYDRALAZINE HYDROCHLORIDE 10 MG/1
10 TABLET, FILM COATED ORAL EVERY 8 HOURS SCHEDULED
Status: CANCELLED | OUTPATIENT
Start: 2021-01-01

## 2021-01-01 RX ORDER — ROPINIROLE 0.25 MG/1
0.25 TABLET, FILM COATED ORAL NIGHTLY
Status: CANCELLED | OUTPATIENT
Start: 2021-01-01

## 2021-01-01 RX ORDER — OLANZAPINE 5 MG/1
5 TABLET, ORALLY DISINTEGRATING ORAL NIGHTLY
Status: DISCONTINUED | OUTPATIENT
Start: 2021-01-01 | End: 2021-01-01

## 2021-01-01 RX ORDER — NICOTINE POLACRILEX 4 MG
15 LOZENGE BUCCAL PRN
Status: DISCONTINUED | OUTPATIENT
Start: 2021-01-01 | End: 2021-01-01 | Stop reason: HOSPADM

## 2021-01-01 RX ORDER — FAMOTIDINE 20 MG/1
20 TABLET, FILM COATED ORAL 2 TIMES DAILY
Status: DISCONTINUED | OUTPATIENT
Start: 2021-01-01 | End: 2021-01-01 | Stop reason: HOSPADM

## 2021-01-01 RX ORDER — OLANZAPINE 10 MG/1
5 TABLET ORAL NIGHTLY
Status: DISCONTINUED | OUTPATIENT
Start: 2021-01-01 | End: 2021-01-01 | Stop reason: HOSPADM

## 2021-01-01 RX ORDER — ASPIRIN 81 MG/1
81 TABLET, CHEWABLE ORAL NIGHTLY
Status: DISCONTINUED | OUTPATIENT
Start: 2021-01-01 | End: 2021-01-01

## 2021-01-01 RX ORDER — ALBUTEROL SULFATE 90 UG/1
2 AEROSOL, METERED RESPIRATORY (INHALATION) EVERY 6 HOURS PRN
Status: CANCELLED | OUTPATIENT
Start: 2021-01-01

## 2021-01-01 RX ORDER — ONDANSETRON 4 MG/1
4 TABLET, ORALLY DISINTEGRATING ORAL ONCE
Status: COMPLETED | OUTPATIENT
Start: 2021-01-01 | End: 2021-01-01

## 2021-01-01 RX ORDER — ROSUVASTATIN CALCIUM 20 MG/1
40 TABLET, COATED ORAL NIGHTLY
Status: DISCONTINUED | OUTPATIENT
Start: 2021-01-01 | End: 2021-01-01 | Stop reason: HOSPADM

## 2021-01-01 RX ORDER — METHYLPREDNISOLONE SODIUM SUCCINATE 40 MG/ML
40 INJECTION, POWDER, LYOPHILIZED, FOR SOLUTION INTRAMUSCULAR; INTRAVENOUS EVERY 12 HOURS
Status: DISCONTINUED | OUTPATIENT
Start: 2021-01-01 | End: 2021-01-01

## 2021-01-01 RX ORDER — FLUOXETINE HYDROCHLORIDE 20 MG/1
60 CAPSULE ORAL
Status: DISCONTINUED | OUTPATIENT
Start: 2021-01-01 | End: 2021-01-01

## 2021-01-01 RX ORDER — LORAZEPAM 2 MG/ML
0.5 INJECTION INTRAMUSCULAR EVERY 6 HOURS PRN
Status: DISCONTINUED | OUTPATIENT
Start: 2021-01-01 | End: 2021-01-01

## 2021-01-01 RX ORDER — LANOLIN ALCOHOL/MO/W.PET/CERES
6 CREAM (GRAM) TOPICAL NIGHTLY
Status: DISCONTINUED | OUTPATIENT
Start: 2021-01-01 | End: 2021-01-01 | Stop reason: HOSPADM

## 2021-01-01 RX ORDER — LORAZEPAM 2 MG/ML
2 INJECTION INTRAMUSCULAR ONCE
Status: COMPLETED | OUTPATIENT
Start: 2021-01-01 | End: 2021-01-01

## 2021-01-01 RX ORDER — ACETAMINOPHEN 325 MG/1
650 TABLET ORAL EVERY 4 HOURS PRN
Status: DISCONTINUED | OUTPATIENT
Start: 2021-01-01 | End: 2021-01-01 | Stop reason: HOSPADM

## 2021-01-01 RX ORDER — ROPINIROLE 0.25 MG/1
0.25 TABLET, FILM COATED ORAL NIGHTLY
Status: DISCONTINUED | OUTPATIENT
Start: 2021-01-01 | End: 2021-01-01

## 2021-01-01 RX ORDER — METHYLPREDNISOLONE SODIUM SUCCINATE 40 MG/ML
40 INJECTION, POWDER, LYOPHILIZED, FOR SOLUTION INTRAMUSCULAR; INTRAVENOUS EVERY 12 HOURS
Qty: 5 EACH | Refills: 0 | Status: SHIPPED | OUTPATIENT
Start: 2021-01-01

## 2021-01-01 RX ORDER — METHYLPREDNISOLONE SODIUM SUCCINATE 40 MG/ML
40 INJECTION, POWDER, LYOPHILIZED, FOR SOLUTION INTRAMUSCULAR; INTRAVENOUS EVERY 12 HOURS
Status: CANCELLED | OUTPATIENT
Start: 2021-01-01

## 2021-01-01 RX ORDER — LIDOCAINE HYDROCHLORIDE 10 MG/ML
INJECTION, SOLUTION EPIDURAL; INFILTRATION; INTRACAUDAL; PERINEURAL PRN
Status: DISCONTINUED | OUTPATIENT
Start: 2021-01-01 | End: 2021-01-01 | Stop reason: ALTCHOICE

## 2021-01-01 RX ORDER — INSULIN GLARGINE 100 [IU]/ML
8 INJECTION, SOLUTION SUBCUTANEOUS NIGHTLY
Status: DISCONTINUED | OUTPATIENT
Start: 2021-01-01 | End: 2021-01-01 | Stop reason: HOSPADM

## 2021-01-01 RX ORDER — MORPHINE SULFATE 4 MG/ML
4 INJECTION, SOLUTION INTRAMUSCULAR; INTRAVENOUS ONCE
Status: COMPLETED | OUTPATIENT
Start: 2021-01-01 | End: 2021-01-01

## 2021-01-01 RX ORDER — FINASTERIDE 5 MG/1
5 TABLET, FILM COATED ORAL DAILY
Status: CANCELLED | OUTPATIENT
Start: 2021-01-01

## 2021-01-01 RX ORDER — HEPARIN SODIUM 1000 [USP'U]/ML
4000 INJECTION, SOLUTION INTRAVENOUS; SUBCUTANEOUS ONCE
Status: COMPLETED | OUTPATIENT
Start: 2021-01-01 | End: 2021-01-01

## 2021-01-01 RX ORDER — DEXTROSE MONOHYDRATE 50 MG/ML
100 INJECTION, SOLUTION INTRAVENOUS PRN
Status: DISCONTINUED | OUTPATIENT
Start: 2021-01-01 | End: 2021-01-01 | Stop reason: HOSPADM

## 2021-01-01 RX ORDER — LABETALOL HYDROCHLORIDE 5 MG/ML
5 INJECTION, SOLUTION INTRAVENOUS EVERY 10 MIN PRN
Status: DISCONTINUED | OUTPATIENT
Start: 2021-01-01 | End: 2021-01-01

## 2021-01-01 RX ORDER — HEPARIN SODIUM 1000 [USP'U]/ML
4000 INJECTION, SOLUTION INTRAVENOUS; SUBCUTANEOUS PRN
Status: DISCONTINUED | OUTPATIENT
Start: 2021-01-01 | End: 2021-01-01

## 2021-01-01 RX ORDER — HEPARIN SODIUM 1000 [USP'U]/ML
2000 INJECTION, SOLUTION INTRAVENOUS; SUBCUTANEOUS PRN
Status: DISCONTINUED | OUTPATIENT
Start: 2021-01-01 | End: 2021-01-01

## 2021-01-01 RX ORDER — ISOSORBIDE MONONITRATE 30 MG/1
30 TABLET, EXTENDED RELEASE ORAL DAILY
Status: DISCONTINUED | OUTPATIENT
Start: 2021-01-01 | End: 2021-01-01

## 2021-01-01 RX ORDER — LANOLIN ALCOHOL/MO/W.PET/CERES
6 CREAM (GRAM) TOPICAL ONCE
Status: COMPLETED | OUTPATIENT
Start: 2021-01-01 | End: 2021-01-01

## 2021-01-01 RX ORDER — QUETIAPINE FUMARATE 25 MG/1
25 TABLET, FILM COATED ORAL NIGHTLY PRN
Status: DISCONTINUED | OUTPATIENT
Start: 2021-01-01 | End: 2021-01-01 | Stop reason: HOSPADM

## 2021-01-01 RX ORDER — ASPIRIN 81 MG/1
81 TABLET ORAL DAILY
Status: DISCONTINUED | OUTPATIENT
Start: 2021-01-01 | End: 2021-01-01

## 2021-01-01 RX ORDER — POLYETHYLENE GLYCOL 3350 17 G/17G
17 POWDER, FOR SOLUTION ORAL DAILY
Status: DISCONTINUED | OUTPATIENT
Start: 2021-01-01 | End: 2021-01-01

## 2021-01-01 RX ORDER — POLYETHYLENE GLYCOL 3350 17 G/17G
17 POWDER, FOR SOLUTION ORAL
Status: DISCONTINUED | OUTPATIENT
Start: 2021-01-01 | End: 2021-01-01

## 2021-01-01 RX ORDER — SODIUM CHLORIDE 9 MG/ML
INJECTION, SOLUTION INTRAVENOUS CONTINUOUS
Status: DISCONTINUED | OUTPATIENT
Start: 2021-01-01 | End: 2021-01-01

## 2021-01-01 RX ORDER — FAMOTIDINE 20 MG/1
20 TABLET, FILM COATED ORAL DAILY
Status: DISCONTINUED | OUTPATIENT
Start: 2021-01-01 | End: 2021-01-01

## 2021-01-01 RX ORDER — ISOSORBIDE MONONITRATE 30 MG/1
30 TABLET, EXTENDED RELEASE ORAL DAILY
Status: CANCELLED | OUTPATIENT
Start: 2021-01-01

## 2021-01-01 RX ORDER — DEXTROSE MONOHYDRATE 25 G/50ML
12.5 INJECTION, SOLUTION INTRAVENOUS PRN
Status: DISCONTINUED | OUTPATIENT
Start: 2021-01-01 | End: 2021-01-01 | Stop reason: HOSPADM

## 2021-01-01 RX ORDER — HYDRALAZINE HYDROCHLORIDE 10 MG/1
10 TABLET, FILM COATED ORAL EVERY 8 HOURS SCHEDULED
Status: DISCONTINUED | OUTPATIENT
Start: 2021-01-01 | End: 2021-01-01 | Stop reason: HOSPADM

## 2021-01-01 RX ORDER — MORPHINE SULFATE 2 MG/ML
2 INJECTION, SOLUTION INTRAMUSCULAR; INTRAVENOUS EVERY 5 MIN PRN
Status: DISCONTINUED | OUTPATIENT
Start: 2021-01-01 | End: 2021-01-01

## 2021-01-01 RX ORDER — CLOPIDOGREL BISULFATE 75 MG/1
75 TABLET ORAL DAILY
Status: DISCONTINUED | OUTPATIENT
Start: 2021-01-01 | End: 2021-01-01 | Stop reason: HOSPADM

## 2021-01-01 RX ORDER — SODIUM CHLORIDE 0.9 % (FLUSH) 0.9 %
5-40 SYRINGE (ML) INJECTION EVERY 12 HOURS SCHEDULED
Status: DISCONTINUED | OUTPATIENT
Start: 2021-01-01 | End: 2021-01-01 | Stop reason: HOSPADM

## 2021-01-01 RX ORDER — HEPARIN SODIUM 5000 [USP'U]/ML
5000 INJECTION, SOLUTION INTRAVENOUS; SUBCUTANEOUS EVERY 8 HOURS SCHEDULED
Status: DISCONTINUED | OUTPATIENT
Start: 2021-01-01 | End: 2021-01-01 | Stop reason: HOSPADM

## 2021-01-01 RX ORDER — HYDRALAZINE HYDROCHLORIDE 20 MG/ML
10 INJECTION INTRAMUSCULAR; INTRAVENOUS EVERY 8 HOURS PRN
Status: DISCONTINUED | OUTPATIENT
Start: 2021-01-01 | End: 2021-01-01 | Stop reason: HOSPADM

## 2021-01-01 RX ORDER — SODIUM CHLORIDE 9 MG/ML
INJECTION, SOLUTION INTRAVENOUS PRN
Status: DISCONTINUED | OUTPATIENT
Start: 2021-01-01 | End: 2021-01-01 | Stop reason: HOSPADM

## 2021-01-01 RX ORDER — LANOLIN ALCOHOL/MO/W.PET/CERES
3 CREAM (GRAM) TOPICAL NIGHTLY PRN
Status: DISCONTINUED | OUTPATIENT
Start: 2021-01-01 | End: 2021-01-01 | Stop reason: HOSPADM

## 2021-01-01 RX ORDER — CLOPIDOGREL BISULFATE 75 MG/1
75 TABLET ORAL DAILY
Status: DISCONTINUED | OUTPATIENT
Start: 2021-01-01 | End: 2021-01-01

## 2021-01-01 RX ORDER — FAMOTIDINE 20 MG/1
20 TABLET, FILM COATED ORAL DAILY
Status: DISCONTINUED | OUTPATIENT
Start: 2021-01-01 | End: 2021-01-01 | Stop reason: HOSPADM

## 2021-01-01 RX ORDER — OLANZAPINE 5 MG/1
5 TABLET ORAL NIGHTLY
Status: CANCELLED | OUTPATIENT
Start: 2021-01-01

## 2021-01-01 RX ORDER — PROPOFOL 10 MG/ML
INJECTION, EMULSION INTRAVENOUS PRN
Status: DISCONTINUED | OUTPATIENT
Start: 2021-01-01 | End: 2021-01-01 | Stop reason: SDUPTHER

## 2021-01-01 RX ORDER — OLANZAPINE 5 MG/1
5 TABLET ORAL NIGHTLY
Status: DISCONTINUED | OUTPATIENT
Start: 2021-01-01 | End: 2021-01-01

## 2021-01-01 RX ORDER — METHYLPREDNISOLONE SODIUM SUCCINATE 125 MG/2ML
80 INJECTION, POWDER, LYOPHILIZED, FOR SOLUTION INTRAMUSCULAR; INTRAVENOUS EVERY 8 HOURS
Status: DISCONTINUED | OUTPATIENT
Start: 2021-01-01 | End: 2021-01-01

## 2021-01-01 RX ORDER — LOSARTAN POTASSIUM 50 MG/1
100 TABLET ORAL ONCE
Status: COMPLETED | OUTPATIENT
Start: 2021-01-01 | End: 2021-01-01

## 2021-01-01 RX ORDER — LORAZEPAM 0.5 MG/1
0.5 TABLET ORAL EVERY 4 HOURS PRN
Status: DISCONTINUED | OUTPATIENT
Start: 2021-01-01 | End: 2021-01-01

## 2021-01-01 RX ORDER — CLOPIDOGREL BISULFATE 75 MG/1
75 TABLET ORAL NIGHTLY
Status: DISCONTINUED | OUTPATIENT
Start: 2021-01-01 | End: 2021-01-01

## 2021-01-01 RX ORDER — HYDRALAZINE HYDROCHLORIDE 20 MG/ML
5 INJECTION INTRAMUSCULAR; INTRAVENOUS EVERY 10 MIN PRN
Status: DISCONTINUED | OUTPATIENT
Start: 2021-01-01 | End: 2021-01-01

## 2021-01-01 RX ORDER — ALBUTEROL SULFATE 2.5 MG/3ML
2.5 SOLUTION RESPIRATORY (INHALATION)
Status: DISCONTINUED | OUTPATIENT
Start: 2021-01-01 | End: 2021-01-01 | Stop reason: HOSPADM

## 2021-01-01 RX ORDER — NICOTINE POLACRILEX 4 MG
15 LOZENGE BUCCAL PRN
Status: CANCELLED | OUTPATIENT
Start: 2021-01-01

## 2021-01-01 RX ORDER — FINASTERIDE 5 MG/1
5 TABLET, FILM COATED ORAL DAILY
Status: DISCONTINUED | OUTPATIENT
Start: 2021-01-01 | End: 2021-01-01

## 2021-01-01 RX ORDER — FENTANYL CITRATE 50 UG/ML
25 INJECTION, SOLUTION INTRAMUSCULAR; INTRAVENOUS EVERY 5 MIN PRN
Status: DISCONTINUED | OUTPATIENT
Start: 2021-01-01 | End: 2021-01-01

## 2021-01-01 RX ORDER — HEPARIN SODIUM 5000 [USP'U]/ML
5000 INJECTION, SOLUTION INTRAVENOUS; SUBCUTANEOUS EVERY 8 HOURS SCHEDULED
Status: CANCELLED | OUTPATIENT
Start: 2021-01-01

## 2021-01-01 RX ORDER — ROSUVASTATIN CALCIUM 20 MG/1
40 TABLET, COATED ORAL NIGHTLY
Status: DISCONTINUED | OUTPATIENT
Start: 2021-01-01 | End: 2021-01-01

## 2021-01-01 RX ORDER — POLYETHYLENE GLYCOL 3350 17 G/17G
17 POWDER, FOR SOLUTION ORAL DAILY PRN
Status: DISCONTINUED | OUTPATIENT
Start: 2021-01-01 | End: 2021-01-01 | Stop reason: HOSPADM

## 2021-01-01 RX ORDER — ROSUVASTATIN CALCIUM 40 MG/1
40 TABLET, COATED ORAL NIGHTLY
Status: DISCONTINUED | OUTPATIENT
Start: 2021-01-01 | End: 2021-01-01

## 2021-01-01 RX ORDER — METOPROLOL TARTRATE 5 MG/5ML
INJECTION INTRAVENOUS PRN
Status: DISCONTINUED | OUTPATIENT
Start: 2021-01-01 | End: 2021-01-01 | Stop reason: SDUPTHER

## 2021-01-01 RX ORDER — FLUOXETINE HYDROCHLORIDE 20 MG/5ML
40 LIQUID ORAL DAILY
Status: DISCONTINUED | OUTPATIENT
Start: 2021-01-01 | End: 2021-01-01 | Stop reason: HOSPADM

## 2021-01-01 RX ORDER — QUETIAPINE FUMARATE 25 MG/1
25 TABLET, FILM COATED ORAL NIGHTLY PRN
Status: DISPENSED | OUTPATIENT
Start: 2021-01-01 | End: 2021-01-01

## 2021-01-01 RX ORDER — AMLODIPINE BESYLATE 5 MG/1
5 TABLET ORAL DAILY
Status: DISCONTINUED | OUTPATIENT
Start: 2021-01-01 | End: 2021-01-01

## 2021-01-01 RX ORDER — FLUOXETINE HYDROCHLORIDE 20 MG/1
40 CAPSULE ORAL DAILY
Status: CANCELLED | OUTPATIENT
Start: 2021-01-01

## 2021-01-01 RX ORDER — METOCLOPRAMIDE HYDROCHLORIDE 5 MG/ML
10 INJECTION INTRAMUSCULAR; INTRAVENOUS
Status: DISCONTINUED | OUTPATIENT
Start: 2021-01-01 | End: 2021-01-01

## 2021-01-01 RX ORDER — HYDROCODONE BITARTRATE AND ACETAMINOPHEN 5; 325 MG/1; MG/1
1-2 TABLET ORAL EVERY 8 HOURS PRN
Qty: 12 TABLET | Refills: 0 | Status: SHIPPED | OUTPATIENT
Start: 2021-01-01 | End: 2021-01-01

## 2021-01-01 RX ORDER — FINASTERIDE 5 MG/1
5 TABLET, FILM COATED ORAL DAILY
Status: DISCONTINUED | OUTPATIENT
Start: 2021-01-01 | End: 2021-01-01 | Stop reason: HOSPADM

## 2021-01-01 RX ORDER — METHYLPREDNISOLONE SODIUM SUCCINATE 40 MG/ML
40 INJECTION, POWDER, LYOPHILIZED, FOR SOLUTION INTRAMUSCULAR; INTRAVENOUS EVERY 12 HOURS
Status: DISCONTINUED | OUTPATIENT
Start: 2021-01-01 | End: 2021-01-01 | Stop reason: HOSPADM

## 2021-01-01 RX ORDER — SENNA PLUS 8.6 MG/1
2 TABLET ORAL DAILY PRN
Status: DISCONTINUED | OUTPATIENT
Start: 2021-01-01 | End: 2021-01-01 | Stop reason: HOSPADM

## 2021-01-01 RX ORDER — HEPARIN SODIUM 5000 [USP'U]/ML
5000 INJECTION, SOLUTION INTRAVENOUS; SUBCUTANEOUS EVERY 8 HOURS SCHEDULED
Status: DISCONTINUED | OUTPATIENT
Start: 2021-01-01 | End: 2021-01-01

## 2021-01-01 RX ORDER — ACETAMINOPHEN 650 MG/1
650 SUPPOSITORY RECTAL EVERY 6 HOURS PRN
Status: DISCONTINUED | OUTPATIENT
Start: 2021-01-01 | End: 2021-01-01 | Stop reason: HOSPADM

## 2021-01-01 RX ORDER — HYDROCODONE BITARTRATE AND ACETAMINOPHEN 5; 325 MG/1; MG/1
1 TABLET ORAL PRN
Status: DISCONTINUED | OUTPATIENT
Start: 2021-01-01 | End: 2021-01-01

## 2021-01-01 RX ORDER — DEXTROSE AND SODIUM CHLORIDE 5; .45 G/100ML; G/100ML
INJECTION, SOLUTION INTRAVENOUS CONTINUOUS
Status: DISCONTINUED | OUTPATIENT
Start: 2021-01-01 | End: 2021-01-01

## 2021-01-01 RX ORDER — LIDOCAINE HYDROCHLORIDE 10 MG/ML
INJECTION, SOLUTION INFILTRATION; PERINEURAL PRN
Status: DISCONTINUED | OUTPATIENT
Start: 2021-01-01 | End: 2021-01-01 | Stop reason: SDUPTHER

## 2021-01-01 RX ORDER — FAMOTIDINE 20 MG/1
20 TABLET, FILM COATED ORAL DAILY
Status: CANCELLED | OUTPATIENT
Start: 2021-01-01

## 2021-01-01 RX ORDER — LOSARTAN POTASSIUM 50 MG/1
50 TABLET ORAL DAILY
Status: CANCELLED | OUTPATIENT
Start: 2021-01-01

## 2021-01-01 RX ORDER — 0.9 % SODIUM CHLORIDE 0.9 %
1000 INTRAVENOUS SOLUTION INTRAVENOUS ONCE
Status: DISCONTINUED | OUTPATIENT
Start: 2021-01-01 | End: 2021-01-01 | Stop reason: HOSPADM

## 2021-01-01 RX ORDER — CALCIUM GLUCONATE 94 MG/ML
1000 INJECTION, SOLUTION INTRAVENOUS ONCE
Status: DISCONTINUED | OUTPATIENT
Start: 2021-01-01 | End: 2021-01-01

## 2021-01-01 RX ORDER — ONDANSETRON 2 MG/ML
4 INJECTION INTRAMUSCULAR; INTRAVENOUS
Status: DISCONTINUED | OUTPATIENT
Start: 2021-01-01 | End: 2021-01-01

## 2021-01-01 RX ORDER — NAPROXEN 500 MG/1
500 TABLET ORAL 2 TIMES DAILY WITH MEALS
Qty: 20 TABLET | Refills: 0 | Status: SHIPPED | OUTPATIENT
Start: 2021-01-01

## 2021-01-01 RX ORDER — HEPARIN SODIUM 5000 [USP'U]/ML
5000 INJECTION, SOLUTION INTRAVENOUS; SUBCUTANEOUS EVERY 8 HOURS SCHEDULED
Qty: 5 VIAL | Refills: 0 | Status: SHIPPED | OUTPATIENT
Start: 2021-01-01

## 2021-01-01 RX ORDER — ONDANSETRON 4 MG/1
4 TABLET, ORALLY DISINTEGRATING ORAL EVERY 8 HOURS PRN
Status: DISCONTINUED | OUTPATIENT
Start: 2021-01-01 | End: 2021-01-01 | Stop reason: HOSPADM

## 2021-01-01 RX ORDER — FLUOXETINE HYDROCHLORIDE 20 MG/5ML
60 LIQUID ORAL DAILY
Status: DISCONTINUED | OUTPATIENT
Start: 2021-01-01 | End: 2021-01-01

## 2021-01-01 RX ORDER — INSULIN GLARGINE 100 [IU]/ML
10 INJECTION, SOLUTION SUBCUTANEOUS NIGHTLY
Status: DISCONTINUED | OUTPATIENT
Start: 2021-01-01 | End: 2021-01-01 | Stop reason: HOSPADM

## 2021-01-01 RX ORDER — METOPROLOL TARTRATE 5 MG/5ML
5 INJECTION INTRAVENOUS EVERY 6 HOURS
Status: DISPENSED | OUTPATIENT
Start: 2021-01-01 | End: 2021-01-01

## 2021-01-01 RX ORDER — QUETIAPINE FUMARATE 25 MG/1
25 TABLET, FILM COATED ORAL NIGHTLY PRN
Status: CANCELLED | OUTPATIENT
Start: 2021-01-01 | End: 2021-01-01

## 2021-01-01 RX ORDER — TAMSULOSIN HYDROCHLORIDE 0.4 MG/1
0.4 CAPSULE ORAL DAILY
Status: CANCELLED | OUTPATIENT
Start: 2021-01-01

## 2021-01-01 RX ORDER — ISOSORBIDE DINITRATE 10 MG/1
10 TABLET ORAL 3 TIMES DAILY
Status: CANCELLED | OUTPATIENT
Start: 2021-01-01

## 2021-01-01 RX ORDER — FLUOXETINE HYDROCHLORIDE 20 MG/5ML
40 LIQUID ORAL DAILY
Status: DISCONTINUED | OUTPATIENT
Start: 2021-01-01 | End: 2021-01-01

## 2021-01-01 RX ORDER — FLUOXETINE HYDROCHLORIDE 20 MG/1
40 CAPSULE ORAL DAILY
Status: DISCONTINUED | OUTPATIENT
Start: 2021-01-01 | End: 2021-01-01

## 2021-01-01 RX ORDER — METHOCARBAMOL 750 MG/1
750 TABLET, FILM COATED ORAL 4 TIMES DAILY
Qty: 40 TABLET | Refills: 0 | Status: SHIPPED | OUTPATIENT
Start: 2021-01-01 | End: 2021-01-01

## 2021-01-01 RX ORDER — ATORVASTATIN CALCIUM 40 MG/1
40 TABLET, FILM COATED ORAL NIGHTLY
Status: DISCONTINUED | OUTPATIENT
Start: 2021-01-01 | End: 2021-01-01

## 2021-01-01 RX ORDER — DEXTROSE MONOHYDRATE 50 MG/ML
100 INJECTION, SOLUTION INTRAVENOUS PRN
Status: CANCELLED | OUTPATIENT
Start: 2021-01-01

## 2021-01-01 RX ORDER — OLANZAPINE 5 MG/1
5 TABLET ORAL NIGHTLY
Status: DISCONTINUED | OUTPATIENT
Start: 2021-01-01 | End: 2021-01-01 | Stop reason: HOSPADM

## 2021-01-01 RX ORDER — DIPHENHYDRAMINE HYDROCHLORIDE 50 MG/ML
12.5 INJECTION INTRAMUSCULAR; INTRAVENOUS
Status: DISCONTINUED | OUTPATIENT
Start: 2021-01-01 | End: 2021-01-01

## 2021-01-01 RX ORDER — TAMSULOSIN HYDROCHLORIDE 0.4 MG/1
0.4 CAPSULE ORAL DAILY
Status: DISCONTINUED | OUTPATIENT
Start: 2021-01-01 | End: 2021-01-01

## 2021-01-01 RX ORDER — ALBUTEROL SULFATE 2.5 MG/3ML
2.5 SOLUTION RESPIRATORY (INHALATION) 3 TIMES DAILY
Status: DISCONTINUED | OUTPATIENT
Start: 2021-01-01 | End: 2021-01-01 | Stop reason: HOSPADM

## 2021-01-01 RX ORDER — DEXTROSE MONOHYDRATE 50 MG/ML
INJECTION, SOLUTION INTRAVENOUS CONTINUOUS
Status: DISCONTINUED | OUTPATIENT
Start: 2021-01-01 | End: 2021-01-01

## 2021-01-01 RX ORDER — FLUOXETINE HYDROCHLORIDE 20 MG/5ML
40 LIQUID ORAL DAILY
Status: CANCELLED | OUTPATIENT
Start: 2021-01-01

## 2021-01-01 RX ORDER — ROSUVASTATIN CALCIUM 20 MG/1
40 TABLET, COATED ORAL NIGHTLY
Status: CANCELLED | OUTPATIENT
Start: 2021-01-01

## 2021-01-01 RX ORDER — POLYETHYLENE GLYCOL 3350 17 G/17G
17 POWDER, FOR SOLUTION ORAL DAILY PRN
Status: CANCELLED | OUTPATIENT
Start: 2021-01-01

## 2021-01-01 RX ORDER — NALOXONE HYDROCHLORIDE 0.4 MG/ML
INJECTION, SOLUTION INTRAMUSCULAR; INTRAVENOUS; SUBCUTANEOUS
Status: COMPLETED
Start: 2021-01-01 | End: 2021-01-01

## 2021-01-01 RX ORDER — PREDNISONE 20 MG/1
40 TABLET ORAL DAILY
Status: DISCONTINUED | OUTPATIENT
Start: 2021-01-01 | End: 2021-01-01

## 2021-01-01 RX ORDER — HYDRALAZINE HYDROCHLORIDE 25 MG/1
25 TABLET, FILM COATED ORAL EVERY 8 HOURS SCHEDULED
Status: DISCONTINUED | OUTPATIENT
Start: 2021-01-01 | End: 2021-01-01

## 2021-01-01 RX ORDER — POTASSIUM CHLORIDE 20 MEQ/1
40 TABLET, EXTENDED RELEASE ORAL PRN
Status: DISCONTINUED | OUTPATIENT
Start: 2021-01-01 | End: 2021-01-01 | Stop reason: HOSPADM

## 2021-01-01 RX ORDER — TIZANIDINE 2 MG/1
2 TABLET ORAL 4 TIMES DAILY PRN
Qty: 40 TABLET | Refills: 0 | Status: SHIPPED | OUTPATIENT
Start: 2021-01-01

## 2021-01-01 RX ORDER — TAMSULOSIN HYDROCHLORIDE 0.4 MG/1
0.4 CAPSULE ORAL
Status: DISCONTINUED | OUTPATIENT
Start: 2021-01-01 | End: 2021-01-01

## 2021-01-01 RX ORDER — HYDROCODONE BITARTRATE AND ACETAMINOPHEN 5; 325 MG/1; MG/1
2 TABLET ORAL PRN
Status: DISCONTINUED | OUTPATIENT
Start: 2021-01-01 | End: 2021-01-01

## 2021-01-01 RX ORDER — SENNA PLUS 8.6 MG/1
2 TABLET ORAL DAILY PRN
Status: CANCELLED | OUTPATIENT
Start: 2021-01-01

## 2021-01-01 RX ORDER — FAMOTIDINE 20 MG/1
20 TABLET, FILM COATED ORAL 2 TIMES DAILY
Status: DISCONTINUED | OUTPATIENT
Start: 2021-01-01 | End: 2021-01-01

## 2021-01-01 RX ORDER — ACETAMINOPHEN 325 MG/1
650 TABLET ORAL EVERY 4 HOURS PRN
Status: CANCELLED | OUTPATIENT
Start: 2021-01-01

## 2021-01-01 RX ORDER — BISACODYL 10 MG
10 SUPPOSITORY, RECTAL RECTAL DAILY PRN
Status: CANCELLED | OUTPATIENT
Start: 2021-01-01

## 2021-01-01 RX ORDER — ISOSORBIDE DINITRATE 10 MG/1
10 TABLET ORAL 3 TIMES DAILY
Status: DISCONTINUED | OUTPATIENT
Start: 2021-01-01 | End: 2021-01-01 | Stop reason: HOSPADM

## 2021-01-01 RX ORDER — ASPIRIN 81 MG/1
81 TABLET, CHEWABLE ORAL DAILY
Status: CANCELLED | OUTPATIENT
Start: 2021-01-01

## 2021-01-01 RX ORDER — LOSARTAN POTASSIUM 50 MG/1
100 TABLET ORAL DAILY
Status: DISCONTINUED | OUTPATIENT
Start: 2021-01-01 | End: 2021-01-01

## 2021-01-01 RX ORDER — DEXAMETHASONE 4 MG/1
40 TABLET ORAL DAILY
Status: DISCONTINUED | OUTPATIENT
Start: 2021-01-01 | End: 2021-01-01

## 2021-01-01 RX ORDER — ATROPINE SULFATE 0.4 MG/ML
0.4 AMPUL (ML) INJECTION ONCE
Status: DISCONTINUED | OUTPATIENT
Start: 2021-01-01 | End: 2021-01-01 | Stop reason: HOSPADM

## 2021-01-01 RX ORDER — ATORVASTATIN CALCIUM 80 MG/1
80 TABLET, FILM COATED ORAL ONCE
Status: DISCONTINUED | OUTPATIENT
Start: 2021-01-01 | End: 2021-01-01

## 2021-01-01 RX ORDER — MEPERIDINE HYDROCHLORIDE 25 MG/ML
12.5 INJECTION INTRAMUSCULAR; INTRAVENOUS; SUBCUTANEOUS EVERY 5 MIN PRN
Status: DISCONTINUED | OUTPATIENT
Start: 2021-01-01 | End: 2021-01-01

## 2021-01-01 RX ORDER — DEXTROSE MONOHYDRATE 25 G/50ML
12.5 INJECTION, SOLUTION INTRAVENOUS PRN
Status: CANCELLED | OUTPATIENT
Start: 2021-01-01

## 2021-01-01 RX ORDER — AMOXICILLIN AND CLAVULANATE POTASSIUM 875; 125 MG/1; MG/1
1 TABLET, FILM COATED ORAL EVERY 12 HOURS SCHEDULED
Status: DISPENSED | OUTPATIENT
Start: 2021-01-01 | End: 2021-01-01

## 2021-01-01 RX ORDER — METOPROLOL TARTRATE 50 MG/1
50 TABLET, FILM COATED ORAL ONCE
Status: COMPLETED | OUTPATIENT
Start: 2021-01-01 | End: 2021-01-01

## 2021-01-01 RX ORDER — HEPARIN SODIUM 10000 [USP'U]/100ML
10.9 INJECTION, SOLUTION INTRAVENOUS CONTINUOUS
Status: DISCONTINUED | OUTPATIENT
Start: 2021-01-01 | End: 2021-01-01

## 2021-01-01 RX ORDER — QUETIAPINE FUMARATE 25 MG/1
25 TABLET, FILM COATED ORAL NIGHTLY PRN
Qty: 5 TABLET | Refills: 0 | Status: SHIPPED | OUTPATIENT
Start: 2021-01-01 | End: 2021-01-01

## 2021-01-01 RX ORDER — ISOSORBIDE DINITRATE 10 MG/1
10 TABLET ORAL 3 TIMES DAILY
Status: DISCONTINUED | OUTPATIENT
Start: 2021-01-01 | End: 2021-01-01

## 2021-01-01 RX ORDER — LANOLIN ALCOHOL/MO/W.PET/CERES
6 CREAM (GRAM) TOPICAL NIGHTLY
Status: DISCONTINUED | OUTPATIENT
Start: 2021-01-01 | End: 2021-01-01

## 2021-01-01 RX ORDER — ISOSORBIDE MONONITRATE 30 MG/1
30 TABLET, EXTENDED RELEASE ORAL
Status: DISCONTINUED | OUTPATIENT
Start: 2021-01-01 | End: 2021-01-01

## 2021-01-01 RX ORDER — SODIUM CHLORIDE 9 MG/ML
25 INJECTION, SOLUTION INTRAVENOUS PRN
Status: DISCONTINUED | OUTPATIENT
Start: 2021-01-01 | End: 2021-01-01 | Stop reason: HOSPADM

## 2021-01-01 RX ORDER — ROSUVASTATIN CALCIUM 40 MG/1
40 TABLET, COATED ORAL NIGHTLY
Status: CANCELLED | OUTPATIENT
Start: 2021-01-01

## 2021-01-01 RX ORDER — POTASSIUM CHLORIDE 7.45 MG/ML
10 INJECTION INTRAVENOUS PRN
Status: DISCONTINUED | OUTPATIENT
Start: 2021-01-01 | End: 2021-01-01 | Stop reason: HOSPADM

## 2021-01-01 RX ORDER — NALOXONE HYDROCHLORIDE 0.4 MG/ML
0.4 INJECTION, SOLUTION INTRAMUSCULAR; INTRAVENOUS; SUBCUTANEOUS PRN
Status: DISCONTINUED | OUTPATIENT
Start: 2021-01-01 | End: 2021-01-01 | Stop reason: HOSPADM

## 2021-01-01 RX ORDER — SODIUM CHLORIDE 0.9 % (FLUSH) 0.9 %
5-40 SYRINGE (ML) INJECTION PRN
Status: DISCONTINUED | OUTPATIENT
Start: 2021-01-01 | End: 2021-01-01 | Stop reason: HOSPADM

## 2021-01-01 RX ORDER — ATORVASTATIN CALCIUM 80 MG/1
40 TABLET, FILM COATED ORAL DAILY
Qty: 30 TABLET | Refills: 3 | Status: SHIPPED | OUTPATIENT
Start: 2021-01-01

## 2021-01-01 RX ORDER — SODIUM CHLORIDE 9 MG/ML
INJECTION, SOLUTION INTRAVENOUS CONTINUOUS
Status: DISCONTINUED | OUTPATIENT
Start: 2021-01-01 | End: 2021-01-01 | Stop reason: HOSPADM

## 2021-01-01 RX ORDER — FENTANYL CITRATE 50 UG/ML
50 INJECTION, SOLUTION INTRAMUSCULAR; INTRAVENOUS EVERY 5 MIN PRN
Status: DISCONTINUED | OUTPATIENT
Start: 2021-01-01 | End: 2021-01-01

## 2021-01-01 RX ORDER — CLOPIDOGREL BISULFATE 75 MG/1
75 TABLET ORAL
Status: DISCONTINUED | OUTPATIENT
Start: 2021-01-01 | End: 2021-01-01

## 2021-01-01 RX ORDER — ONDANSETRON 2 MG/ML
4 INJECTION INTRAMUSCULAR; INTRAVENOUS EVERY 6 HOURS PRN
Status: DISCONTINUED | OUTPATIENT
Start: 2021-01-01 | End: 2021-01-01 | Stop reason: HOSPADM

## 2021-01-01 RX ORDER — ASPIRIN 81 MG/1
81 TABLET, CHEWABLE ORAL DAILY
Status: DISCONTINUED | OUTPATIENT
Start: 2021-01-01 | End: 2021-01-01

## 2021-01-01 RX ORDER — BUDESONIDE AND FORMOTEROL FUMARATE DIHYDRATE 160; 4.5 UG/1; UG/1
2 AEROSOL RESPIRATORY (INHALATION) 2 TIMES DAILY
Status: DISCONTINUED | OUTPATIENT
Start: 2021-01-01 | End: 2021-01-01 | Stop reason: HOSPADM

## 2021-01-01 RX ORDER — LANOLIN ALCOHOL/MO/W.PET/CERES
6 CREAM (GRAM) TOPICAL NIGHTLY
Status: CANCELLED | OUTPATIENT
Start: 2021-01-01

## 2021-01-01 RX ORDER — METOPROLOL TARTRATE 50 MG/1
25 TABLET, FILM COATED ORAL DAILY
Status: ON HOLD | COMMUNITY
End: 2021-01-01 | Stop reason: HOSPADM

## 2021-01-01 RX ORDER — FINASTERIDE 5 MG/1
5 TABLET, FILM COATED ORAL
Status: DISCONTINUED | OUTPATIENT
Start: 2021-01-01 | End: 2021-01-01

## 2021-01-01 RX ORDER — LANOLIN ALCOHOL/MO/W.PET/CERES
3 CREAM (GRAM) TOPICAL NIGHTLY PRN
Qty: 30 TABLET | Refills: 0 | Status: SHIPPED | OUTPATIENT
Start: 2021-01-01

## 2021-01-01 RX ORDER — 0.9 % SODIUM CHLORIDE 0.9 %
1000 INTRAVENOUS SOLUTION INTRAVENOUS ONCE
Status: COMPLETED | OUTPATIENT
Start: 2021-01-01 | End: 2021-01-01

## 2021-01-01 RX ADMIN — FAMOTIDINE 20 MG: 10 INJECTION INTRAVENOUS at 10:00

## 2021-01-01 RX ADMIN — FINASTERIDE 5 MG: 5 TABLET, FILM COATED ORAL at 07:56

## 2021-01-01 RX ADMIN — INSULIN GLARGINE 10 UNITS: 100 INJECTION, SOLUTION SUBCUTANEOUS at 20:49

## 2021-01-01 RX ADMIN — ROPINIROLE HYDROCHLORIDE 0.25 MG: 0.25 TABLET, FILM COATED ORAL at 22:05

## 2021-01-01 RX ADMIN — NYSTATIN 500000 UNITS: 100000 SUSPENSION ORAL at 21:39

## 2021-01-01 RX ADMIN — QUETIAPINE FUMARATE 25 MG: 25 TABLET ORAL at 21:46

## 2021-01-01 RX ADMIN — ISOSORBIDE MONONITRATE 30 MG: 30 TABLET, EXTENDED RELEASE ORAL at 08:24

## 2021-01-01 RX ADMIN — METHYLPREDNISOLONE SODIUM SUCCINATE 40 MG: 40 INJECTION, POWDER, FOR SOLUTION INTRAMUSCULAR; INTRAVENOUS at 00:07

## 2021-01-01 RX ADMIN — HYDRALAZINE HYDROCHLORIDE 10 MG: 10 TABLET, FILM COATED ORAL at 22:45

## 2021-01-01 RX ADMIN — BUDESONIDE AND FORMOTEROL FUMARATE DIHYDRATE 2 PUFF: 160; 4.5 AEROSOL RESPIRATORY (INHALATION) at 08:08

## 2021-01-01 RX ADMIN — LORAZEPAM 2 MG: 2 INJECTION INTRAMUSCULAR; INTRAVENOUS at 02:27

## 2021-01-01 RX ADMIN — TICAGRELOR 90 MG: 90 TABLET ORAL at 10:28

## 2021-01-01 RX ADMIN — HYDRALAZINE HYDROCHLORIDE 10 MG: 10 TABLET, FILM COATED ORAL at 06:11

## 2021-01-01 RX ADMIN — PROPOFOL 20 MG: 10 INJECTION, EMULSION INTRAVENOUS at 15:25

## 2021-01-01 RX ADMIN — FINASTERIDE 5 MG: 5 TABLET, FILM COATED ORAL at 08:28

## 2021-01-01 RX ADMIN — PROPOFOL 20 MG: 10 INJECTION, EMULSION INTRAVENOUS at 15:21

## 2021-01-01 RX ADMIN — TICAGRELOR 90 MG: 90 TABLET ORAL at 11:16

## 2021-01-01 RX ADMIN — FINASTERIDE 5 MG: 5 TABLET, FILM COATED ORAL at 09:53

## 2021-01-01 RX ADMIN — NYSTATIN 500000 UNITS: 100000 SUSPENSION ORAL at 14:24

## 2021-01-01 RX ADMIN — ROSUVASTATIN CALCIUM 40 MG: 20 TABLET, FILM COATED ORAL at 23:00

## 2021-01-01 RX ADMIN — INSULIN GLARGINE 10 UNITS: 100 INJECTION, SOLUTION SUBCUTANEOUS at 21:39

## 2021-01-01 RX ADMIN — HEPARIN SODIUM 5000 UNITS: 5000 INJECTION INTRAVENOUS; SUBCUTANEOUS at 13:51

## 2021-01-01 RX ADMIN — INSULIN LISPRO 2 UNITS: 100 INJECTION, SOLUTION INTRAVENOUS; SUBCUTANEOUS at 17:12

## 2021-01-01 RX ADMIN — HEPARIN SODIUM 10.9 UNITS/KG/HR: 10000 INJECTION, SOLUTION INTRAVENOUS at 21:52

## 2021-01-01 RX ADMIN — LOSARTAN POTASSIUM 50 MG: 50 TABLET, FILM COATED ORAL at 09:33

## 2021-01-01 RX ADMIN — INSULIN LISPRO 1 UNITS: 100 INJECTION, SOLUTION INTRAVENOUS; SUBCUTANEOUS at 00:33

## 2021-01-01 RX ADMIN — AMPICILLIN SODIUM AND SULBACTAM SODIUM 1500 MG: 1; .5 INJECTION, POWDER, FOR SOLUTION INTRAMUSCULAR; INTRAVENOUS at 13:13

## 2021-01-01 RX ADMIN — TIOTROPIUM BROMIDE INHALATION SPRAY 2 PUFF: 3.12 SPRAY, METERED RESPIRATORY (INHALATION) at 10:51

## 2021-01-01 RX ADMIN — INSULIN LISPRO 2 UNITS: 100 INJECTION, SOLUTION INTRAVENOUS; SUBCUTANEOUS at 08:13

## 2021-01-01 RX ADMIN — ALBUTEROL SULFATE 2 PUFF: 90 AEROSOL, METERED RESPIRATORY (INHALATION) at 17:46

## 2021-01-01 RX ADMIN — HEPARIN SODIUM 5000 UNITS: 5000 INJECTION INTRAVENOUS; SUBCUTANEOUS at 14:20

## 2021-01-01 RX ADMIN — INSULIN GLARGINE 8 UNITS: 100 INJECTION, SOLUTION SUBCUTANEOUS at 21:53

## 2021-01-01 RX ADMIN — INSULIN LISPRO 6 UNITS: 100 INJECTION, SOLUTION INTRAVENOUS; SUBCUTANEOUS at 18:49

## 2021-01-01 RX ADMIN — Medication 6 MG: at 23:11

## 2021-01-01 RX ADMIN — BUDESONIDE AND FORMOTEROL FUMARATE DIHYDRATE 2 PUFF: 160; 4.5 AEROSOL RESPIRATORY (INHALATION) at 19:59

## 2021-01-01 RX ADMIN — SODIUM CHLORIDE, PRESERVATIVE FREE 10 ML: 5 INJECTION INTRAVENOUS at 21:35

## 2021-01-01 RX ADMIN — CLOPIDOGREL BISULFATE 75 MG: 75 TABLET ORAL at 08:28

## 2021-01-01 RX ADMIN — INSULIN LISPRO 1 UNITS: 100 INJECTION, SOLUTION INTRAVENOUS; SUBCUTANEOUS at 23:11

## 2021-01-01 RX ADMIN — Medication 6 MG: at 22:02

## 2021-01-01 RX ADMIN — BISACODYL 10 MG: 10 SUPPOSITORY RECTAL at 23:08

## 2021-01-01 RX ADMIN — INSULIN GLARGINE 8 UNITS: 100 INJECTION, SOLUTION SUBCUTANEOUS at 00:49

## 2021-01-01 RX ADMIN — SODIUM CHLORIDE, PRESERVATIVE FREE 10 ML: 5 INJECTION INTRAVENOUS at 21:56

## 2021-01-01 RX ADMIN — HYDRALAZINE HYDROCHLORIDE 10 MG: 10 TABLET, FILM COATED ORAL at 00:14

## 2021-01-01 RX ADMIN — AMPICILLIN SODIUM AND SULBACTAM SODIUM 1500 MG: 1; .5 INJECTION, POWDER, FOR SOLUTION INTRAMUSCULAR; INTRAVENOUS at 23:23

## 2021-01-01 RX ADMIN — OLANZAPINE 5 MG: 5 TABLET, FILM COATED ORAL at 21:48

## 2021-01-01 RX ADMIN — HYDRALAZINE HYDROCHLORIDE 10 MG: 10 TABLET, FILM COATED ORAL at 05:24

## 2021-01-01 RX ADMIN — ALBUTEROL SULFATE 2.5 MG: 2.5 SOLUTION RESPIRATORY (INHALATION) at 06:52

## 2021-01-01 RX ADMIN — ROSUVASTATIN CALCIUM 40 MG: 40 TABLET, COATED ORAL at 21:30

## 2021-01-01 RX ADMIN — ROSUVASTATIN CALCIUM 40 MG: 20 TABLET, FILM COATED ORAL at 21:46

## 2021-01-01 RX ADMIN — OLANZAPINE 5 MG: 10 TABLET, FILM COATED ORAL at 21:32

## 2021-01-01 RX ADMIN — FAMOTIDINE 20 MG: 20 TABLET ORAL at 20:32

## 2021-01-01 RX ADMIN — ALBUTEROL SULFATE 2.5 MG: 2.5 SOLUTION RESPIRATORY (INHALATION) at 19:22

## 2021-01-01 RX ADMIN — ROSUVASTATIN CALCIUM 40 MG: 40 TABLET, COATED ORAL at 23:51

## 2021-01-01 RX ADMIN — DEXAMETHASONE 40 MG: 4 TABLET ORAL at 10:09

## 2021-01-01 RX ADMIN — SODIUM CHLORIDE: 9 INJECTION, SOLUTION INTRAVENOUS at 07:20

## 2021-01-01 RX ADMIN — HEPARIN SODIUM 2000 UNITS: 1000 INJECTION INTRAVENOUS; SUBCUTANEOUS at 04:25

## 2021-01-01 RX ADMIN — ROPINIROLE HYDROCHLORIDE 0.25 MG: 0.25 TABLET, FILM COATED ORAL at 20:31

## 2021-01-01 RX ADMIN — ISOSORBIDE DINITRATE 10 MG: 10 TABLET ORAL at 08:02

## 2021-01-01 RX ADMIN — INSULIN LISPRO 3 UNITS: 100 INJECTION, SOLUTION INTRAVENOUS; SUBCUTANEOUS at 20:59

## 2021-01-01 RX ADMIN — FAMOTIDINE 20 MG: 20 TABLET, FILM COATED ORAL at 09:33

## 2021-01-01 RX ADMIN — AMPICILLIN SODIUM AND SULBACTAM SODIUM 1500 MG: 1; .5 INJECTION, POWDER, FOR SOLUTION INTRAMUSCULAR; INTRAVENOUS at 17:37

## 2021-01-01 RX ADMIN — HEPARIN SODIUM 5000 UNITS: 5000 INJECTION INTRAVENOUS; SUBCUTANEOUS at 14:38

## 2021-01-01 RX ADMIN — HEPARIN SODIUM 5000 UNITS: 5000 INJECTION INTRAVENOUS; SUBCUTANEOUS at 06:07

## 2021-01-01 RX ADMIN — ACETAMINOPHEN 650 MG: 325 TABLET ORAL at 08:37

## 2021-01-01 RX ADMIN — HEPARIN SODIUM 5000 UNITS: 5000 INJECTION INTRAVENOUS; SUBCUTANEOUS at 05:24

## 2021-01-01 RX ADMIN — Medication 2 PUFF: at 07:19

## 2021-01-01 RX ADMIN — SODIUM CHLORIDE, PRESERVATIVE FREE 10 ML: 5 INJECTION INTRAVENOUS at 20:42

## 2021-01-01 RX ADMIN — Medication 2 PUFF: at 08:26

## 2021-01-01 RX ADMIN — OLANZAPINE 5 MG: 10 TABLET, FILM COATED ORAL at 20:41

## 2021-01-01 RX ADMIN — HYDRALAZINE HYDROCHLORIDE 10 MG: 10 TABLET, FILM COATED ORAL at 22:52

## 2021-01-01 RX ADMIN — HEPARIN SODIUM 5000 UNITS: 5000 INJECTION INTRAVENOUS; SUBCUTANEOUS at 20:31

## 2021-01-01 RX ADMIN — INSULIN LISPRO 2 UNITS: 100 INJECTION, SOLUTION INTRAVENOUS; SUBCUTANEOUS at 12:10

## 2021-01-01 RX ADMIN — INSULIN LISPRO 3 UNITS: 100 INJECTION, SOLUTION INTRAVENOUS; SUBCUTANEOUS at 17:39

## 2021-01-01 RX ADMIN — INSULIN LISPRO 2 UNITS: 100 INJECTION, SOLUTION INTRAVENOUS; SUBCUTANEOUS at 06:03

## 2021-01-01 RX ADMIN — NYSTATIN 500000 UNITS: 100000 SUSPENSION ORAL at 11:15

## 2021-01-01 RX ADMIN — TIOTROPIUM BROMIDE INHALATION SPRAY 2 PUFF: 3.12 SPRAY, METERED RESPIRATORY (INHALATION) at 08:08

## 2021-01-01 RX ADMIN — INSULIN LISPRO 1 UNITS: 100 INJECTION, SOLUTION INTRAVENOUS; SUBCUTANEOUS at 17:38

## 2021-01-01 RX ADMIN — CLOPIDOGREL 75 MG: 75 TABLET, FILM COATED ORAL at 14:39

## 2021-01-01 RX ADMIN — HEPARIN SODIUM 5000 UNITS: 5000 INJECTION INTRAVENOUS; SUBCUTANEOUS at 13:15

## 2021-01-01 RX ADMIN — INSULIN LISPRO 1 UNITS: 100 INJECTION, SOLUTION INTRAVENOUS; SUBCUTANEOUS at 12:06

## 2021-01-01 RX ADMIN — NYSTATIN 500000 UNITS: 100000 SUSPENSION ORAL at 20:42

## 2021-01-01 RX ADMIN — HYDRALAZINE HYDROCHLORIDE 10 MG: 10 TABLET, FILM COATED ORAL at 21:33

## 2021-01-01 RX ADMIN — ISOSORBIDE DINITRATE 10 MG: 10 TABLET ORAL at 16:00

## 2021-01-01 RX ADMIN — ROSUVASTATIN CALCIUM 40 MG: 20 TABLET, FILM COATED ORAL at 20:46

## 2021-01-01 RX ADMIN — Medication 2 PUFF: at 12:57

## 2021-01-01 RX ADMIN — ROSUVASTATIN CALCIUM 40 MG: 40 TABLET, COATED ORAL at 23:31

## 2021-01-01 RX ADMIN — INSULIN LISPRO 1 UNITS: 100 INJECTION, SOLUTION INTRAVENOUS; SUBCUTANEOUS at 16:48

## 2021-01-01 RX ADMIN — LOSARTAN POTASSIUM 50 MG: 50 TABLET, FILM COATED ORAL at 10:28

## 2021-01-01 RX ADMIN — PREDNISONE 40 MG: 20 TABLET ORAL at 07:32

## 2021-01-01 RX ADMIN — ROSUVASTATIN CALCIUM 40 MG: 20 TABLET, FILM COATED ORAL at 21:42

## 2021-01-01 RX ADMIN — HEPARIN SODIUM 5000 UNITS: 5000 INJECTION INTRAVENOUS; SUBCUTANEOUS at 21:18

## 2021-01-01 RX ADMIN — SODIUM CHLORIDE, PRESERVATIVE FREE 5 ML: 5 INJECTION INTRAVENOUS at 09:12

## 2021-01-01 RX ADMIN — FLUOXETINE HYDROCHLORIDE 40 MG: 20 SOLUTION ORAL at 09:40

## 2021-01-01 RX ADMIN — HEPARIN SODIUM 5000 UNITS: 5000 INJECTION INTRAVENOUS; SUBCUTANEOUS at 05:05

## 2021-01-01 RX ADMIN — ROSUVASTATIN CALCIUM 40 MG: 40 TABLET, COATED ORAL at 20:32

## 2021-01-01 RX ADMIN — AMPICILLIN SODIUM AND SULBACTAM SODIUM 1500 MG: 1; .5 INJECTION, POWDER, FOR SOLUTION INTRAMUSCULAR; INTRAVENOUS at 12:39

## 2021-01-01 RX ADMIN — TIOTROPIUM BROMIDE INHALATION SPRAY 2 PUFF: 3.12 SPRAY, METERED RESPIRATORY (INHALATION) at 11:13

## 2021-01-01 RX ADMIN — BUDESONIDE AND FORMOTEROL FUMARATE DIHYDRATE 2 PUFF: 160; 4.5 AEROSOL RESPIRATORY (INHALATION) at 21:14

## 2021-01-01 RX ADMIN — SODIUM CHLORIDE, PRESERVATIVE FREE 10 ML: 5 INJECTION INTRAVENOUS at 20:14

## 2021-01-01 RX ADMIN — CLOPIDOGREL BISULFATE 75 MG: 75 TABLET ORAL at 08:46

## 2021-01-01 RX ADMIN — Medication 2 PUFF: at 11:44

## 2021-01-01 RX ADMIN — HEPARIN SODIUM 5000 UNITS: 5000 INJECTION INTRAVENOUS; SUBCUTANEOUS at 22:43

## 2021-01-01 RX ADMIN — AMOXICILLIN AND CLAVULANATE POTASSIUM 1 TABLET: 875; 125 TABLET, FILM COATED ORAL at 20:32

## 2021-01-01 RX ADMIN — HYDRALAZINE HYDROCHLORIDE 10 MG: 10 TABLET ORAL at 14:40

## 2021-01-01 RX ADMIN — FLUOXETINE HYDROCHLORIDE 40 MG: 20 SOLUTION ORAL at 08:04

## 2021-01-01 RX ADMIN — Medication 6 MG: at 21:39

## 2021-01-01 RX ADMIN — FAMOTIDINE 20 MG: 10 INJECTION INTRAVENOUS at 09:13

## 2021-01-01 RX ADMIN — POLYETHYLENE GLYCOL 3350 17 G: 17 POWDER, FOR SOLUTION ORAL at 21:42

## 2021-01-01 RX ADMIN — SODIUM CHLORIDE, PRESERVATIVE FREE 10 ML: 5 INJECTION INTRAVENOUS at 10:00

## 2021-01-01 RX ADMIN — NYSTATIN 500000 UNITS: 100000 SUSPENSION ORAL at 21:44

## 2021-01-01 RX ADMIN — INSULIN LISPRO 1 UNITS: 100 INJECTION, SOLUTION INTRAVENOUS; SUBCUTANEOUS at 20:49

## 2021-01-01 RX ADMIN — NYSTATIN 500000 UNITS: 100000 SUSPENSION ORAL at 17:19

## 2021-01-01 RX ADMIN — ROSUVASTATIN CALCIUM 40 MG: 40 TABLET, COATED ORAL at 21:22

## 2021-01-01 RX ADMIN — INSULIN LISPRO 1 UNITS: 100 INJECTION, SOLUTION INTRAVENOUS; SUBCUTANEOUS at 17:06

## 2021-01-01 RX ADMIN — INSULIN LISPRO 1 UNITS: 100 INJECTION, SOLUTION INTRAVENOUS; SUBCUTANEOUS at 11:42

## 2021-01-01 RX ADMIN — INSULIN LISPRO 6 UNITS: 100 INJECTION, SOLUTION INTRAVENOUS; SUBCUTANEOUS at 18:46

## 2021-01-01 RX ADMIN — METOPROLOL TARTRATE 1 MG: 1 INJECTION, SOLUTION INTRAVENOUS at 15:29

## 2021-01-01 RX ADMIN — ASPIRIN 81 MG: 81 TABLET, CHEWABLE ORAL at 08:12

## 2021-01-01 RX ADMIN — BUDESONIDE AND FORMOTEROL FUMARATE DIHYDRATE 2 PUFF: 160; 4.5 AEROSOL RESPIRATORY (INHALATION) at 10:51

## 2021-01-01 RX ADMIN — METHYLPREDNISOLONE SODIUM SUCCINATE 80 MG: 125 INJECTION, POWDER, FOR SOLUTION INTRAMUSCULAR; INTRAVENOUS at 20:25

## 2021-01-01 RX ADMIN — TICAGRELOR 90 MG: 90 TABLET ORAL at 09:33

## 2021-01-01 RX ADMIN — Medication 6 MG: at 21:22

## 2021-01-01 RX ADMIN — ROPINIROLE HYDROCHLORIDE 0.25 MG: 0.25 TABLET, FILM COATED ORAL at 22:24

## 2021-01-01 RX ADMIN — SENNOSIDES 17.2 MG: 8.6 TABLET, FILM COATED ORAL at 11:39

## 2021-01-01 RX ADMIN — ALBUTEROL SULFATE 2.5 MG: 2.5 SOLUTION RESPIRATORY (INHALATION) at 12:56

## 2021-01-01 RX ADMIN — ISOSORBIDE DINITRATE 10 MG: 10 TABLET ORAL at 15:14

## 2021-01-01 RX ADMIN — NYSTATIN 500000 UNITS: 100000 SUSPENSION ORAL at 10:11

## 2021-01-01 RX ADMIN — INSULIN GLARGINE 8 UNITS: 100 INJECTION, SOLUTION SUBCUTANEOUS at 21:18

## 2021-01-01 RX ADMIN — INSULIN LISPRO 2 UNITS: 100 INJECTION, SOLUTION INTRAVENOUS; SUBCUTANEOUS at 14:10

## 2021-01-01 RX ADMIN — AMPICILLIN SODIUM AND SULBACTAM SODIUM 1500 MG: 1; .5 INJECTION, POWDER, FOR SOLUTION INTRAMUSCULAR; INTRAVENOUS at 23:07

## 2021-01-01 RX ADMIN — INSULIN LISPRO 6 UNITS: 100 INJECTION, SOLUTION INTRAVENOUS; SUBCUTANEOUS at 17:19

## 2021-01-01 RX ADMIN — CLOPIDOGREL BISULFATE 75 MG: 75 TABLET ORAL at 09:51

## 2021-01-01 RX ADMIN — METHYLPREDNISOLONE SODIUM SUCCINATE 80 MG: 125 INJECTION, POWDER, FOR SOLUTION INTRAMUSCULAR; INTRAVENOUS at 12:07

## 2021-01-01 RX ADMIN — AMLODIPINE BESYLATE 5 MG: 5 TABLET ORAL at 09:22

## 2021-01-01 RX ADMIN — SODIUM CHLORIDE: 9 INJECTION, SOLUTION INTRAVENOUS at 10:09

## 2021-01-01 RX ADMIN — FAMOTIDINE 20 MG: 20 TABLET, FILM COATED ORAL at 18:52

## 2021-01-01 RX ADMIN — HEPARIN SODIUM 5000 UNITS: 5000 INJECTION INTRAVENOUS; SUBCUTANEOUS at 06:22

## 2021-01-01 RX ADMIN — HEPARIN SODIUM 5000 UNITS: 5000 INJECTION INTRAVENOUS; SUBCUTANEOUS at 12:32

## 2021-01-01 RX ADMIN — HEPARIN SODIUM 5000 UNITS: 5000 INJECTION INTRAVENOUS; SUBCUTANEOUS at 13:56

## 2021-01-01 RX ADMIN — NYSTATIN 500000 UNITS: 100000 SUSPENSION ORAL at 17:16

## 2021-01-01 RX ADMIN — ROSUVASTATIN CALCIUM 40 MG: 20 TABLET, FILM COATED ORAL at 21:53

## 2021-01-01 RX ADMIN — QUETIAPINE FUMARATE 25 MG: 25 TABLET ORAL at 22:06

## 2021-01-01 RX ADMIN — ROSUVASTATIN CALCIUM 40 MG: 40 TABLET, COATED ORAL at 21:48

## 2021-01-01 RX ADMIN — INSULIN LISPRO 2 UNITS: 100 INJECTION, SOLUTION INTRAVENOUS; SUBCUTANEOUS at 18:39

## 2021-01-01 RX ADMIN — TICAGRELOR 90 MG: 90 TABLET ORAL at 21:44

## 2021-01-01 RX ADMIN — METHYLPREDNISOLONE SODIUM SUCCINATE 40 MG: 40 INJECTION, POWDER, FOR SOLUTION INTRAMUSCULAR; INTRAVENOUS at 01:22

## 2021-01-01 RX ADMIN — SODIUM CHLORIDE, PRESERVATIVE FREE 20 ML: 5 INJECTION INTRAVENOUS at 08:58

## 2021-01-01 RX ADMIN — HEPARIN SODIUM 5000 UNITS: 5000 INJECTION INTRAVENOUS; SUBCUTANEOUS at 14:10

## 2021-01-01 RX ADMIN — INSULIN LISPRO 2 UNITS: 100 INJECTION, SOLUTION INTRAVENOUS; SUBCUTANEOUS at 17:13

## 2021-01-01 RX ADMIN — INSULIN LISPRO 2 UNITS: 100 INJECTION, SOLUTION INTRAVENOUS; SUBCUTANEOUS at 12:32

## 2021-01-01 RX ADMIN — ASPIRIN 81 MG: 81 TABLET, CHEWABLE ORAL at 08:46

## 2021-01-01 RX ADMIN — Medication 2 PUFF: at 12:03

## 2021-01-01 RX ADMIN — HYDRALAZINE HYDROCHLORIDE 10 MG: 10 TABLET, FILM COATED ORAL at 05:38

## 2021-01-01 RX ADMIN — HYDRALAZINE HYDROCHLORIDE 10 MG: 10 TABLET, FILM COATED ORAL at 23:00

## 2021-01-01 RX ADMIN — HEPARIN SODIUM 5000 UNITS: 5000 INJECTION INTRAVENOUS; SUBCUTANEOUS at 13:02

## 2021-01-01 RX ADMIN — ROPINIROLE HYDROCHLORIDE 0.25 MG: 0.25 TABLET, FILM COATED ORAL at 22:57

## 2021-01-01 RX ADMIN — HEPARIN SODIUM 5000 UNITS: 5000 INJECTION INTRAVENOUS; SUBCUTANEOUS at 21:39

## 2021-01-01 RX ADMIN — ASPIRIN 81 MG: 81 TABLET, CHEWABLE ORAL at 08:58

## 2021-01-01 RX ADMIN — ASPIRIN 81 MG: 81 TABLET, CHEWABLE ORAL at 09:53

## 2021-01-01 RX ADMIN — Medication 6 MG: at 20:30

## 2021-01-01 RX ADMIN — FLUOXETINE HYDROCHLORIDE 40 MG: 20 SOLUTION ORAL at 08:47

## 2021-01-01 RX ADMIN — NYSTATIN 500000 UNITS: 100000 SUSPENSION ORAL at 19:50

## 2021-01-01 RX ADMIN — FAMOTIDINE 20 MG: 20 TABLET, FILM COATED ORAL at 20:46

## 2021-01-01 RX ADMIN — FLUOXETINE HYDROCHLORIDE 40 MG: 20 CAPSULE ORAL at 07:32

## 2021-01-01 RX ADMIN — ROPINIROLE HYDROCHLORIDE 0.25 MG: 0.25 TABLET, FILM COATED ORAL at 21:40

## 2021-01-01 RX ADMIN — BUDESONIDE AND FORMOTEROL FUMARATE DIHYDRATE 2 PUFF: 160; 4.5 AEROSOL RESPIRATORY (INHALATION) at 08:21

## 2021-01-01 RX ADMIN — INSULIN GLARGINE 8 UNITS: 100 INJECTION, SOLUTION SUBCUTANEOUS at 21:28

## 2021-01-01 RX ADMIN — NYSTATIN 500000 UNITS: 100000 SUSPENSION ORAL at 18:46

## 2021-01-01 RX ADMIN — HEPARIN SODIUM 5000 UNITS: 5000 INJECTION INTRAVENOUS; SUBCUTANEOUS at 06:27

## 2021-01-01 RX ADMIN — ASPIRIN 81 MG: 81 TABLET, CHEWABLE ORAL at 14:39

## 2021-01-01 RX ADMIN — ISOSORBIDE DINITRATE 10 MG: 10 TABLET ORAL at 12:56

## 2021-01-01 RX ADMIN — HEPARIN SODIUM 5000 UNITS: 5000 INJECTION INTRAVENOUS; SUBCUTANEOUS at 15:39

## 2021-01-01 RX ADMIN — QUETIAPINE FUMARATE 25 MG: 25 TABLET ORAL at 00:05

## 2021-01-01 RX ADMIN — AMPICILLIN SODIUM AND SULBACTAM SODIUM 1500 MG: 1; .5 INJECTION, POWDER, FOR SOLUTION INTRAMUSCULAR; INTRAVENOUS at 22:20

## 2021-01-01 RX ADMIN — BUDESONIDE AND FORMOTEROL FUMARATE DIHYDRATE 2 PUFF: 160; 4.5 AEROSOL RESPIRATORY (INHALATION) at 20:11

## 2021-01-01 RX ADMIN — INSULIN LISPRO 3 UNITS: 100 INJECTION, SOLUTION INTRAVENOUS; SUBCUTANEOUS at 06:14

## 2021-01-01 RX ADMIN — ISOSORBIDE MONONITRATE 30 MG: 30 TABLET, EXTENDED RELEASE ORAL at 07:32

## 2021-01-01 RX ADMIN — OLANZAPINE 5 MG: 10 TABLET, FILM COATED ORAL at 21:38

## 2021-01-01 RX ADMIN — Medication 6 MG: at 21:30

## 2021-01-01 RX ADMIN — ROSUVASTATIN CALCIUM 40 MG: 40 TABLET, COATED ORAL at 20:58

## 2021-01-01 RX ADMIN — AMOXICILLIN AND CLAVULANATE POTASSIUM 1 TABLET: 875; 125 TABLET, FILM COATED ORAL at 22:01

## 2021-01-01 RX ADMIN — NYSTATIN 500000 UNITS: 100000 SUSPENSION ORAL at 14:02

## 2021-01-01 RX ADMIN — ROSUVASTATIN CALCIUM 40 MG: 40 TABLET, COATED ORAL at 22:24

## 2021-01-01 RX ADMIN — INSULIN LISPRO 1 UNITS: 100 INJECTION, SOLUTION INTRAVENOUS; SUBCUTANEOUS at 20:30

## 2021-01-01 RX ADMIN — HYDRALAZINE HYDROCHLORIDE 10 MG: 10 TABLET, FILM COATED ORAL at 20:58

## 2021-01-01 RX ADMIN — PROPOFOL 20 MG: 10 INJECTION, EMULSION INTRAVENOUS at 15:16

## 2021-01-01 RX ADMIN — LOSARTAN POTASSIUM 50 MG: 50 TABLET, FILM COATED ORAL at 08:46

## 2021-01-01 RX ADMIN — HEPARIN SODIUM 5000 UNITS: 5000 INJECTION INTRAVENOUS; SUBCUTANEOUS at 15:00

## 2021-01-01 RX ADMIN — ASPIRIN 81 MG: 81 TABLET, CHEWABLE ORAL at 10:08

## 2021-01-01 RX ADMIN — LOSARTAN POTASSIUM 50 MG: 50 TABLET, FILM COATED ORAL at 15:14

## 2021-01-01 RX ADMIN — CLOPIDOGREL 75 MG: 75 TABLET, FILM COATED ORAL at 08:12

## 2021-01-01 RX ADMIN — ACETAMINOPHEN 650 MG: 325 TABLET ORAL at 08:23

## 2021-01-01 RX ADMIN — HYDRALAZINE HYDROCHLORIDE 10 MG: 20 INJECTION INTRAMUSCULAR; INTRAVENOUS at 00:23

## 2021-01-01 RX ADMIN — HEPARIN SODIUM 5000 UNITS: 5000 INJECTION INTRAVENOUS; SUBCUTANEOUS at 06:14

## 2021-01-01 RX ADMIN — FLUOXETINE HYDROCHLORIDE 40 MG: 20 SOLUTION ORAL at 08:28

## 2021-01-01 RX ADMIN — FAMOTIDINE 20 MG: 20 TABLET, FILM COATED ORAL at 09:37

## 2021-01-01 RX ADMIN — FAMOTIDINE 20 MG: 20 TABLET, FILM COATED ORAL at 11:14

## 2021-01-01 RX ADMIN — ROPINIROLE HYDROCHLORIDE 0.25 MG: 0.25 TABLET, FILM COATED ORAL at 23:31

## 2021-01-01 RX ADMIN — ROSUVASTATIN CALCIUM 40 MG: 40 TABLET, COATED ORAL at 21:38

## 2021-01-01 RX ADMIN — METHYLPREDNISOLONE SODIUM SUCCINATE 80 MG: 125 INJECTION, POWDER, FOR SOLUTION INTRAMUSCULAR; INTRAVENOUS at 05:29

## 2021-01-01 RX ADMIN — METHYLPREDNISOLONE SODIUM SUCCINATE 40 MG: 40 INJECTION, POWDER, FOR SOLUTION INTRAMUSCULAR; INTRAVENOUS at 13:17

## 2021-01-01 RX ADMIN — Medication 6 MG: at 21:48

## 2021-01-01 RX ADMIN — METHYLPREDNISOLONE SODIUM SUCCINATE 80 MG: 125 INJECTION, POWDER, FOR SOLUTION INTRAMUSCULAR; INTRAVENOUS at 05:08

## 2021-01-01 RX ADMIN — HYDRALAZINE HYDROCHLORIDE 10 MG: 10 TABLET, FILM COATED ORAL at 22:02

## 2021-01-01 RX ADMIN — MORPHINE SULFATE 4 MG: 4 INJECTION, SOLUTION INTRAMUSCULAR; INTRAVENOUS at 18:16

## 2021-01-01 RX ADMIN — Medication 6 MG: at 21:38

## 2021-01-01 RX ADMIN — ISOSORBIDE DINITRATE 10 MG: 10 TABLET ORAL at 11:59

## 2021-01-01 RX ADMIN — ISOSORBIDE DINITRATE 10 MG: 10 TABLET ORAL at 16:59

## 2021-01-01 RX ADMIN — ROSUVASTATIN CALCIUM 40 MG: 40 TABLET, COATED ORAL at 22:57

## 2021-01-01 RX ADMIN — Medication 2 PUFF: at 09:53

## 2021-01-01 RX ADMIN — AMPICILLIN SODIUM AND SULBACTAM SODIUM 1500 MG: 1; .5 INJECTION, POWDER, FOR SOLUTION INTRAMUSCULAR; INTRAVENOUS at 04:40

## 2021-01-01 RX ADMIN — ROSUVASTATIN CALCIUM 40 MG: 20 TABLET, FILM COATED ORAL at 21:20

## 2021-01-01 RX ADMIN — INSULIN LISPRO 4 UNITS: 100 INJECTION, SOLUTION INTRAVENOUS; SUBCUTANEOUS at 18:55

## 2021-01-01 RX ADMIN — OLANZAPINE 5 MG: 10 TABLET, FILM COATED ORAL at 22:43

## 2021-01-01 RX ADMIN — INSULIN LISPRO 2 UNITS: 100 INJECTION, SOLUTION INTRAVENOUS; SUBCUTANEOUS at 12:11

## 2021-01-01 RX ADMIN — INSULIN LISPRO 1 UNITS: 100 INJECTION, SOLUTION INTRAVENOUS; SUBCUTANEOUS at 21:18

## 2021-01-01 RX ADMIN — HYDRALAZINE HYDROCHLORIDE 10 MG: 10 TABLET, FILM COATED ORAL at 15:11

## 2021-01-01 RX ADMIN — ALBUTEROL SULFATE 2.5 MG: 2.5 SOLUTION RESPIRATORY (INHALATION) at 19:55

## 2021-01-01 RX ADMIN — INSULIN LISPRO 1 UNITS: 100 INJECTION, SOLUTION INTRAVENOUS; SUBCUTANEOUS at 06:37

## 2021-01-01 RX ADMIN — ISOSORBIDE MONONITRATE 30 MG: 30 TABLET, EXTENDED RELEASE ORAL at 09:51

## 2021-01-01 RX ADMIN — FAMOTIDINE 20 MG: 20 TABLET, FILM COATED ORAL at 21:20

## 2021-01-01 RX ADMIN — NYSTATIN 500000 UNITS: 100000 SUSPENSION ORAL at 23:52

## 2021-01-01 RX ADMIN — PROPOFOL 50 MG: 10 INJECTION, EMULSION INTRAVENOUS at 15:11

## 2021-01-01 RX ADMIN — ASPIRIN 81 MG: 81 TABLET, CHEWABLE ORAL at 10:06

## 2021-01-01 RX ADMIN — NYSTATIN 500000 UNITS: 100000 SUSPENSION ORAL at 14:09

## 2021-01-01 RX ADMIN — AMPICILLIN SODIUM AND SULBACTAM SODIUM 1500 MG: 1; .5 INJECTION, POWDER, FOR SOLUTION INTRAMUSCULAR; INTRAVENOUS at 17:33

## 2021-01-01 RX ADMIN — HEPARIN SODIUM 5000 UNITS: 5000 INJECTION INTRAVENOUS; SUBCUTANEOUS at 21:53

## 2021-01-01 RX ADMIN — HYDRALAZINE HYDROCHLORIDE 10 MG: 10 TABLET, FILM COATED ORAL at 15:30

## 2021-01-01 RX ADMIN — ALBUTEROL SULFATE 2.5 MG: 2.5 SOLUTION RESPIRATORY (INHALATION) at 19:43

## 2021-01-01 RX ADMIN — ACETAMINOPHEN 650 MG: 325 TABLET ORAL at 12:11

## 2021-01-01 RX ADMIN — HEPARIN SODIUM 5000 UNITS: 5000 INJECTION INTRAVENOUS; SUBCUTANEOUS at 15:14

## 2021-01-01 RX ADMIN — Medication 6 MG: at 20:58

## 2021-01-01 RX ADMIN — ROSUVASTATIN CALCIUM 40 MG: 40 TABLET, COATED ORAL at 21:32

## 2021-01-01 RX ADMIN — HYDRALAZINE HYDROCHLORIDE 10 MG: 10 TABLET, FILM COATED ORAL at 14:02

## 2021-01-01 RX ADMIN — INSULIN LISPRO 1 UNITS: 100 INJECTION, SOLUTION INTRAVENOUS; SUBCUTANEOUS at 12:53

## 2021-01-01 RX ADMIN — PROPOFOL 20 MG: 10 INJECTION, EMULSION INTRAVENOUS at 15:23

## 2021-01-01 RX ADMIN — FAMOTIDINE 20 MG: 20 TABLET ORAL at 07:56

## 2021-01-01 RX ADMIN — INSULIN GLARGINE 10 UNITS: 100 INJECTION, SOLUTION SUBCUTANEOUS at 22:44

## 2021-01-01 RX ADMIN — NYSTATIN 500000 UNITS: 100000 SUSPENSION ORAL at 13:03

## 2021-01-01 RX ADMIN — INSULIN LISPRO 2 UNITS: 100 INJECTION, SOLUTION INTRAVENOUS; SUBCUTANEOUS at 06:33

## 2021-01-01 RX ADMIN — AMPICILLIN SODIUM AND SULBACTAM SODIUM 1500 MG: 1; .5 INJECTION, POWDER, FOR SOLUTION INTRAMUSCULAR; INTRAVENOUS at 05:21

## 2021-01-01 RX ADMIN — HEPARIN SODIUM 5000 UNITS: 5000 INJECTION INTRAVENOUS; SUBCUTANEOUS at 15:41

## 2021-01-01 RX ADMIN — FAMOTIDINE 20 MG: 20 TABLET, FILM COATED ORAL at 08:56

## 2021-01-01 RX ADMIN — HEPARIN SODIUM 5000 UNITS: 5000 INJECTION INTRAVENOUS; SUBCUTANEOUS at 06:08

## 2021-01-01 RX ADMIN — AMPICILLIN SODIUM AND SULBACTAM SODIUM 1500 MG: 1; .5 INJECTION, POWDER, FOR SOLUTION INTRAMUSCULAR; INTRAVENOUS at 05:13

## 2021-01-01 RX ADMIN — POLYETHYLENE GLYCOL 3350 17 G: 17 POWDER, FOR SOLUTION ORAL at 21:55

## 2021-01-01 RX ADMIN — Medication 2 PUFF: at 12:00

## 2021-01-01 RX ADMIN — ROSUVASTATIN CALCIUM 40 MG: 40 TABLET, COATED ORAL at 20:30

## 2021-01-01 RX ADMIN — METHYLPREDNISOLONE SODIUM SUCCINATE 40 MG: 40 INJECTION, POWDER, FOR SOLUTION INTRAMUSCULAR; INTRAVENOUS at 13:05

## 2021-01-01 RX ADMIN — METHYLPREDNISOLONE SODIUM SUCCINATE 80 MG: 125 INJECTION, POWDER, FOR SOLUTION INTRAMUSCULAR; INTRAVENOUS at 20:42

## 2021-01-01 RX ADMIN — Medication 3 MG: at 23:01

## 2021-01-01 RX ADMIN — TICAGRELOR 90 MG: 90 TABLET ORAL at 21:06

## 2021-01-01 RX ADMIN — SODIUM CHLORIDE: 9 INJECTION, SOLUTION INTRAVENOUS at 09:38

## 2021-01-01 RX ADMIN — HYDRALAZINE HYDROCHLORIDE 10 MG: 10 TABLET, FILM COATED ORAL at 06:09

## 2021-01-01 RX ADMIN — FINASTERIDE 5 MG: 5 TABLET, FILM COATED ORAL at 09:52

## 2021-01-01 RX ADMIN — HEPARIN SODIUM 5000 UNITS: 5000 INJECTION INTRAVENOUS; SUBCUTANEOUS at 05:40

## 2021-01-01 RX ADMIN — INSULIN LISPRO 9 UNITS: 100 INJECTION, SOLUTION INTRAVENOUS; SUBCUTANEOUS at 09:32

## 2021-01-01 RX ADMIN — LOSARTAN POTASSIUM 50 MG: 50 TABLET, FILM COATED ORAL at 07:56

## 2021-01-01 RX ADMIN — AMPICILLIN SODIUM AND SULBACTAM SODIUM 1500 MG: 1; .5 INJECTION, POWDER, FOR SOLUTION INTRAMUSCULAR; INTRAVENOUS at 04:51

## 2021-01-01 RX ADMIN — INSULIN LISPRO 1 UNITS: 100 INJECTION, SOLUTION INTRAVENOUS; SUBCUTANEOUS at 11:59

## 2021-01-01 RX ADMIN — FLUOXETINE HYDROCHLORIDE 60 MG: 20 SOLUTION ORAL at 08:29

## 2021-01-01 RX ADMIN — NYSTATIN 500000 UNITS: 100000 SUSPENSION ORAL at 08:56

## 2021-01-01 RX ADMIN — HEPARIN SODIUM 5000 UNITS: 5000 INJECTION INTRAVENOUS; SUBCUTANEOUS at 06:18

## 2021-01-01 RX ADMIN — AMPICILLIN SODIUM AND SULBACTAM SODIUM 1500 MG: 1; .5 INJECTION, POWDER, FOR SOLUTION INTRAMUSCULAR; INTRAVENOUS at 17:05

## 2021-01-01 RX ADMIN — HYDRALAZINE HYDROCHLORIDE 10 MG: 10 TABLET, FILM COATED ORAL at 22:16

## 2021-01-01 RX ADMIN — FLUOXETINE HYDROCHLORIDE 60 MG: 20 SOLUTION ORAL at 17:46

## 2021-01-01 RX ADMIN — QUETIAPINE FUMARATE 25 MG: 25 TABLET ORAL at 20:32

## 2021-01-01 RX ADMIN — NALOXONE HYDROCHLORIDE 0.4 MG: 0.4 INJECTION, SOLUTION INTRAMUSCULAR; INTRAVENOUS; SUBCUTANEOUS at 10:02

## 2021-01-01 RX ADMIN — CLOPIDOGREL BISULFATE 75 MG: 75 TABLET ORAL at 07:56

## 2021-01-01 RX ADMIN — HEPARIN SODIUM 2000 UNITS: 1000 INJECTION INTRAVENOUS; SUBCUTANEOUS at 21:43

## 2021-01-01 RX ADMIN — INSULIN LISPRO 1 UNITS: 100 INJECTION, SOLUTION INTRAVENOUS; SUBCUTANEOUS at 16:59

## 2021-01-01 RX ADMIN — Medication 6 MG: at 22:52

## 2021-01-01 RX ADMIN — INSULIN LISPRO 1 UNITS: 100 INJECTION, SOLUTION INTRAVENOUS; SUBCUTANEOUS at 23:59

## 2021-01-01 RX ADMIN — Medication 2 PUFF: at 11:03

## 2021-01-01 RX ADMIN — FAMOTIDINE 20 MG: 20 TABLET ORAL at 22:06

## 2021-01-01 RX ADMIN — HEPARIN SODIUM 5000 UNITS: 5000 INJECTION INTRAVENOUS; SUBCUTANEOUS at 05:49

## 2021-01-01 RX ADMIN — FAMOTIDINE 20 MG: 20 TABLET ORAL at 09:52

## 2021-01-01 RX ADMIN — ROSUVASTATIN CALCIUM 40 MG: 40 TABLET, COATED ORAL at 22:54

## 2021-01-01 RX ADMIN — HEPARIN SODIUM 5000 UNITS: 5000 INJECTION INTRAVENOUS; SUBCUTANEOUS at 22:40

## 2021-01-01 RX ADMIN — ALBUTEROL SULFATE 2.5 MG: 2.5 SOLUTION RESPIRATORY (INHALATION) at 19:56

## 2021-01-01 RX ADMIN — AMPICILLIN SODIUM AND SULBACTAM SODIUM 1500 MG: 1; .5 INJECTION, POWDER, FOR SOLUTION INTRAMUSCULAR; INTRAVENOUS at 18:49

## 2021-01-01 RX ADMIN — BUDESONIDE AND FORMOTEROL FUMARATE DIHYDRATE 2 PUFF: 160; 4.5 AEROSOL RESPIRATORY (INHALATION) at 07:27

## 2021-01-01 RX ADMIN — HEPARIN SODIUM 5000 UNITS: 5000 INJECTION INTRAVENOUS; SUBCUTANEOUS at 22:20

## 2021-01-01 RX ADMIN — FAMOTIDINE 20 MG: 20 TABLET ORAL at 08:24

## 2021-01-01 RX ADMIN — TICAGRELOR 90 MG: 90 TABLET ORAL at 21:41

## 2021-01-01 RX ADMIN — HYDRALAZINE HYDROCHLORIDE 10 MG: 10 TABLET, FILM COATED ORAL at 23:30

## 2021-01-01 RX ADMIN — LOSARTAN POTASSIUM 100 MG: 50 TABLET, FILM COATED ORAL at 08:24

## 2021-01-01 RX ADMIN — INSULIN LISPRO 2 UNITS: 100 INJECTION, SOLUTION INTRAVENOUS; SUBCUTANEOUS at 21:39

## 2021-01-01 RX ADMIN — INSULIN LISPRO 2 UNITS: 100 INJECTION, SOLUTION INTRAVENOUS; SUBCUTANEOUS at 16:06

## 2021-01-01 RX ADMIN — HEPARIN SODIUM 5000 UNITS: 5000 INJECTION INTRAVENOUS; SUBCUTANEOUS at 13:31

## 2021-01-01 RX ADMIN — HEPARIN SODIUM 5000 UNITS: 5000 INJECTION INTRAVENOUS; SUBCUTANEOUS at 15:08

## 2021-01-01 RX ADMIN — INSULIN LISPRO 1 UNITS: 100 INJECTION, SOLUTION INTRAVENOUS; SUBCUTANEOUS at 11:48

## 2021-01-01 RX ADMIN — ROSUVASTATIN CALCIUM 40 MG: 40 TABLET, COATED ORAL at 21:57

## 2021-01-01 RX ADMIN — ISOSORBIDE DINITRATE 10 MG: 10 TABLET ORAL at 21:22

## 2021-01-01 RX ADMIN — INSULIN LISPRO 1 UNITS: 100 INJECTION, SOLUTION INTRAVENOUS; SUBCUTANEOUS at 06:17

## 2021-01-01 RX ADMIN — ASPIRIN 81 MG: 81 TABLET, CHEWABLE ORAL at 07:56

## 2021-01-01 RX ADMIN — AMPICILLIN SODIUM AND SULBACTAM SODIUM 1500 MG: 1; .5 INJECTION, POWDER, FOR SOLUTION INTRAMUSCULAR; INTRAVENOUS at 10:10

## 2021-01-01 RX ADMIN — INSULIN LISPRO 2 UNITS: 100 INJECTION, SOLUTION INTRAVENOUS; SUBCUTANEOUS at 10:18

## 2021-01-01 RX ADMIN — HEPARIN SODIUM 5000 UNITS: 5000 INJECTION INTRAVENOUS; SUBCUTANEOUS at 21:25

## 2021-01-01 RX ADMIN — POLYETHYLENE GLYCOL 3350 17 G: 17 POWDER, FOR SOLUTION ORAL at 09:51

## 2021-01-01 RX ADMIN — ISOSORBIDE DINITRATE 10 MG: 10 TABLET ORAL at 15:43

## 2021-01-01 RX ADMIN — HEPARIN SODIUM 5000 UNITS: 5000 INJECTION INTRAVENOUS; SUBCUTANEOUS at 22:27

## 2021-01-01 RX ADMIN — SODIUM CHLORIDE: 9 INJECTION, SOLUTION INTRAVENOUS at 01:25

## 2021-01-01 RX ADMIN — ALBUTEROL SULFATE 2.5 MG: 2.5 SOLUTION RESPIRATORY (INHALATION) at 19:02

## 2021-01-01 RX ADMIN — HEPARIN SODIUM 5000 UNITS: 5000 INJECTION INTRAVENOUS; SUBCUTANEOUS at 05:32

## 2021-01-01 RX ADMIN — HEPARIN SODIUM 5000 UNITS: 5000 INJECTION INTRAVENOUS; SUBCUTANEOUS at 06:25

## 2021-01-01 RX ADMIN — AMOXICILLIN AND CLAVULANATE POTASSIUM 1 TABLET: 875; 125 TABLET, FILM COATED ORAL at 09:53

## 2021-01-01 RX ADMIN — ISOSORBIDE DINITRATE 10 MG: 10 TABLET ORAL at 09:53

## 2021-01-01 RX ADMIN — HYDRALAZINE HYDROCHLORIDE 10 MG: 10 TABLET, FILM COATED ORAL at 05:56

## 2021-01-01 RX ADMIN — QUETIAPINE FUMARATE 25 MG: 25 TABLET ORAL at 21:42

## 2021-01-01 RX ADMIN — ASPIRIN 81 MG: 81 TABLET, CHEWABLE ORAL at 09:40

## 2021-01-01 RX ADMIN — PREDNISONE 40 MG: 20 TABLET ORAL at 08:24

## 2021-01-01 RX ADMIN — FAMOTIDINE 20 MG: 20 TABLET, FILM COATED ORAL at 14:39

## 2021-01-01 RX ADMIN — SODIUM CHLORIDE, PRESERVATIVE FREE 20 ML: 5 INJECTION INTRAVENOUS at 09:37

## 2021-01-01 RX ADMIN — PROPOFOL 30 MG: 10 INJECTION, EMULSION INTRAVENOUS at 15:26

## 2021-01-01 RX ADMIN — HYDRALAZINE HYDROCHLORIDE 10 MG: 10 TABLET, FILM COATED ORAL at 04:27

## 2021-01-01 RX ADMIN — INSULIN LISPRO 6 UNITS: 100 INJECTION, SOLUTION INTRAVENOUS; SUBCUTANEOUS at 05:35

## 2021-01-01 RX ADMIN — ISOSORBIDE DINITRATE 10 MG: 10 TABLET ORAL at 08:37

## 2021-01-01 RX ADMIN — INSULIN LISPRO 1 UNITS: 100 INJECTION, SOLUTION INTRAVENOUS; SUBCUTANEOUS at 00:27

## 2021-01-01 RX ADMIN — TIOTROPIUM BROMIDE INHALATION SPRAY 2 PUFF: 3.12 SPRAY, METERED RESPIRATORY (INHALATION) at 07:27

## 2021-01-01 RX ADMIN — CLOPIDOGREL BISULFATE 75 MG: 75 TABLET ORAL at 09:40

## 2021-01-01 RX ADMIN — HYDRALAZINE HYDROCHLORIDE 10 MG: 10 TABLET, FILM COATED ORAL at 06:08

## 2021-01-01 RX ADMIN — HYDRALAZINE HYDROCHLORIDE 10 MG: 10 TABLET, FILM COATED ORAL at 05:05

## 2021-01-01 RX ADMIN — TAMSULOSIN HYDROCHLORIDE 0.4 MG: 0.4 CAPSULE ORAL at 08:24

## 2021-01-01 RX ADMIN — HYDRALAZINE HYDROCHLORIDE 10 MG: 10 TABLET ORAL at 21:46

## 2021-01-01 RX ADMIN — METHYLPREDNISOLONE SODIUM SUCCINATE 40 MG: 40 INJECTION, POWDER, FOR SOLUTION INTRAMUSCULAR; INTRAVENOUS at 12:14

## 2021-01-01 RX ADMIN — AMOXICILLIN AND CLAVULANATE POTASSIUM 1 TABLET: 875; 125 TABLET, FILM COATED ORAL at 22:52

## 2021-01-01 RX ADMIN — Medication 6 MG: at 22:43

## 2021-01-01 RX ADMIN — INSULIN LISPRO 1 UNITS: 100 INJECTION, SOLUTION INTRAVENOUS; SUBCUTANEOUS at 16:54

## 2021-01-01 RX ADMIN — FAMOTIDINE 20 MG: 10 INJECTION INTRAVENOUS at 20:25

## 2021-01-01 RX ADMIN — INSULIN GLARGINE 8 UNITS: 100 INJECTION, SOLUTION SUBCUTANEOUS at 20:46

## 2021-01-01 RX ADMIN — ISOSORBIDE DINITRATE 10 MG: 10 TABLET ORAL at 08:46

## 2021-01-01 RX ADMIN — INSULIN LISPRO 1 UNITS: 100 INJECTION, SOLUTION INTRAVENOUS; SUBCUTANEOUS at 22:42

## 2021-01-01 RX ADMIN — ALBUTEROL SULFATE 2.5 MG: 2.5 SOLUTION RESPIRATORY (INHALATION) at 07:15

## 2021-01-01 RX ADMIN — INSULIN LISPRO 1 UNITS: 100 INJECTION, SOLUTION INTRAVENOUS; SUBCUTANEOUS at 22:20

## 2021-01-01 RX ADMIN — HEPARIN SODIUM 5000 UNITS: 5000 INJECTION INTRAVENOUS; SUBCUTANEOUS at 21:57

## 2021-01-01 RX ADMIN — INSULIN LISPRO 9 UNITS: 100 INJECTION, SOLUTION INTRAVENOUS; SUBCUTANEOUS at 11:55

## 2021-01-01 RX ADMIN — HEPARIN SODIUM 5000 UNITS: 5000 INJECTION INTRAVENOUS; SUBCUTANEOUS at 14:30

## 2021-01-01 RX ADMIN — HYDRALAZINE HYDROCHLORIDE 10 MG: 10 TABLET, FILM COATED ORAL at 21:41

## 2021-01-01 RX ADMIN — Medication 2 PUFF: at 07:33

## 2021-01-01 RX ADMIN — FINASTERIDE 5 MG: 5 TABLET, FILM COATED ORAL at 07:32

## 2021-01-01 RX ADMIN — Medication 6 MG: at 20:31

## 2021-01-01 RX ADMIN — HEPARIN SODIUM 5000 UNITS: 5000 INJECTION INTRAVENOUS; SUBCUTANEOUS at 22:52

## 2021-01-01 RX ADMIN — HEPARIN SODIUM 5000 UNITS: 5000 INJECTION INTRAVENOUS; SUBCUTANEOUS at 06:03

## 2021-01-01 RX ADMIN — HEPARIN SODIUM 5000 UNITS: 5000 INJECTION INTRAVENOUS; SUBCUTANEOUS at 21:34

## 2021-01-01 RX ADMIN — Medication 2 PUFF: at 10:53

## 2021-01-01 RX ADMIN — Medication 2 PUFF: at 13:27

## 2021-01-01 RX ADMIN — HEPARIN SODIUM 5000 UNITS: 5000 INJECTION INTRAVENOUS; SUBCUTANEOUS at 06:11

## 2021-01-01 RX ADMIN — METOPROLOL TARTRATE 50 MG: 50 TABLET, FILM COATED ORAL at 11:38

## 2021-01-01 RX ADMIN — AMOXICILLIN AND CLAVULANATE POTASSIUM 1 TABLET: 875; 125 TABLET, FILM COATED ORAL at 22:05

## 2021-01-01 RX ADMIN — OLANZAPINE 5 MG: 10 TABLET, FILM COATED ORAL at 20:58

## 2021-01-01 RX ADMIN — FAMOTIDINE 20 MG: 20 TABLET, FILM COATED ORAL at 10:28

## 2021-01-01 RX ADMIN — HEPARIN SODIUM 5000 UNITS: 5000 INJECTION INTRAVENOUS; SUBCUTANEOUS at 06:17

## 2021-01-01 RX ADMIN — BUDESONIDE AND FORMOTEROL FUMARATE DIHYDRATE 2 PUFF: 160; 4.5 AEROSOL RESPIRATORY (INHALATION) at 19:58

## 2021-01-01 RX ADMIN — AMOXICILLIN AND CLAVULANATE POTASSIUM 1 TABLET: 875; 125 TABLET, FILM COATED ORAL at 23:30

## 2021-01-01 RX ADMIN — Medication 2 PUFF: at 08:31

## 2021-01-01 RX ADMIN — FAMOTIDINE 20 MG: 20 TABLET, FILM COATED ORAL at 09:22

## 2021-01-01 RX ADMIN — ALBUTEROL SULFATE 2 PUFF: 90 AEROSOL, METERED RESPIRATORY (INHALATION) at 16:22

## 2021-01-01 RX ADMIN — CLOPIDOGREL BISULFATE 75 MG: 75 TABLET ORAL at 07:33

## 2021-01-01 RX ADMIN — INSULIN LISPRO 1 UNITS: 100 INJECTION, SOLUTION INTRAVENOUS; SUBCUTANEOUS at 21:30

## 2021-01-01 RX ADMIN — HEPARIN SODIUM 5000 UNITS: 5000 INJECTION INTRAVENOUS; SUBCUTANEOUS at 05:28

## 2021-01-01 RX ADMIN — FAMOTIDINE 20 MG: 20 TABLET ORAL at 22:52

## 2021-01-01 RX ADMIN — HYDRALAZINE HYDROCHLORIDE 10 MG: 10 TABLET ORAL at 05:49

## 2021-01-01 RX ADMIN — INSULIN LISPRO 1 UNITS: 100 INJECTION, SOLUTION INTRAVENOUS; SUBCUTANEOUS at 16:41

## 2021-01-01 RX ADMIN — INSULIN LISPRO 3 UNITS: 100 INJECTION, SOLUTION INTRAVENOUS; SUBCUTANEOUS at 20:14

## 2021-01-01 RX ADMIN — INSULIN LISPRO 2 UNITS: 100 INJECTION, SOLUTION INTRAVENOUS; SUBCUTANEOUS at 09:43

## 2021-01-01 RX ADMIN — TIOTROPIUM BROMIDE INHALATION SPRAY 2 PUFF: 3.12 SPRAY, METERED RESPIRATORY (INHALATION) at 08:20

## 2021-01-01 RX ADMIN — Medication 6 MG: at 22:48

## 2021-01-01 RX ADMIN — FAMOTIDINE 20 MG: 20 TABLET, FILM COATED ORAL at 21:27

## 2021-01-01 RX ADMIN — DESMOPRESSIN ACETATE 40 MG: 0.2 TABLET ORAL at 20:26

## 2021-01-01 RX ADMIN — SODIUM CHLORIDE: 9 INJECTION, SOLUTION INTRAVENOUS at 20:13

## 2021-01-01 RX ADMIN — HEPARIN SODIUM 5000 UNITS: 5000 INJECTION INTRAVENOUS; SUBCUTANEOUS at 12:52

## 2021-01-01 RX ADMIN — CALCIUM GLUCONATE 1000 MG: 98 INJECTION, SOLUTION INTRAVENOUS at 11:50

## 2021-01-01 RX ADMIN — SODIUM CHLORIDE, PRESERVATIVE FREE 10 ML: 5 INJECTION INTRAVENOUS at 08:12

## 2021-01-01 RX ADMIN — FAMOTIDINE 20 MG: 20 TABLET, FILM COATED ORAL at 10:08

## 2021-01-01 RX ADMIN — INSULIN LISPRO 6 UNITS: 100 INJECTION, SOLUTION INTRAVENOUS; SUBCUTANEOUS at 12:25

## 2021-01-01 RX ADMIN — TICAGRELOR 90 MG: 90 TABLET ORAL at 22:46

## 2021-01-01 RX ADMIN — NYSTATIN 500000 UNITS: 100000 SUSPENSION ORAL at 09:33

## 2021-01-01 RX ADMIN — SODIUM CHLORIDE: 9 INJECTION, SOLUTION INTRAVENOUS at 16:47

## 2021-01-01 RX ADMIN — HEPARIN SODIUM 5000 UNITS: 5000 INJECTION INTRAVENOUS; SUBCUTANEOUS at 21:45

## 2021-01-01 RX ADMIN — OLANZAPINE 5 MG: 10 TABLET, FILM COATED ORAL at 21:22

## 2021-01-01 RX ADMIN — Medication 6 MG: at 21:32

## 2021-01-01 RX ADMIN — OLANZAPINE 5 MG: 10 TABLET, FILM COATED ORAL at 21:39

## 2021-01-01 RX ADMIN — INSULIN LISPRO 1 UNITS: 100 INJECTION, SOLUTION INTRAVENOUS; SUBCUTANEOUS at 00:43

## 2021-01-01 RX ADMIN — ROSUVASTATIN CALCIUM 40 MG: 40 TABLET, COATED ORAL at 22:16

## 2021-01-01 RX ADMIN — INSULIN LISPRO 1 UNITS: 100 INJECTION, SOLUTION INTRAVENOUS; SUBCUTANEOUS at 17:07

## 2021-01-01 RX ADMIN — FLUOXETINE HYDROCHLORIDE 40 MG: 20 SOLUTION ORAL at 10:10

## 2021-01-01 RX ADMIN — SODIUM CHLORIDE: 9 INJECTION, SOLUTION INTRAVENOUS at 09:22

## 2021-01-01 RX ADMIN — SODIUM CHLORIDE, PRESERVATIVE FREE 10 ML: 5 INJECTION INTRAVENOUS at 20:24

## 2021-01-01 RX ADMIN — ISOSORBIDE DINITRATE 10 MG: 10 TABLET ORAL at 17:06

## 2021-01-01 RX ADMIN — Medication 3 MG: at 22:27

## 2021-01-01 RX ADMIN — ASPIRIN 81 MG: 81 TABLET, CHEWABLE ORAL at 10:28

## 2021-01-01 RX ADMIN — INSULIN LISPRO 1 UNITS: 100 INJECTION, SOLUTION INTRAVENOUS; SUBCUTANEOUS at 23:00

## 2021-01-01 RX ADMIN — Medication 2 PUFF: at 07:23

## 2021-01-01 RX ADMIN — FAMOTIDINE 20 MG: 20 TABLET ORAL at 23:30

## 2021-01-01 RX ADMIN — Medication 6 MG: at 21:55

## 2021-01-01 RX ADMIN — FAMOTIDINE 20 MG: 20 TABLET, FILM COATED ORAL at 21:53

## 2021-01-01 RX ADMIN — HEPARIN SODIUM 5000 UNITS: 5000 INJECTION INTRAVENOUS; SUBCUTANEOUS at 14:02

## 2021-01-01 RX ADMIN — FAMOTIDINE 20 MG: 20 TABLET, FILM COATED ORAL at 21:46

## 2021-01-01 RX ADMIN — ONDANSETRON 4 MG: 4 TABLET, ORALLY DISINTEGRATING ORAL at 18:16

## 2021-01-01 RX ADMIN — PREDNISONE 40 MG: 20 TABLET ORAL at 09:53

## 2021-01-01 RX ADMIN — SODIUM CHLORIDE: 9 INJECTION, SOLUTION INTRAVENOUS at 22:50

## 2021-01-01 RX ADMIN — HEPARIN SODIUM 5000 UNITS: 5000 INJECTION INTRAVENOUS; SUBCUTANEOUS at 05:33

## 2021-01-01 RX ADMIN — ISOSORBIDE DINITRATE 10 MG: 10 TABLET ORAL at 11:39

## 2021-01-01 RX ADMIN — LORAZEPAM 0.5 MG: 2 INJECTION INTRAMUSCULAR; INTRAVENOUS at 03:09

## 2021-01-01 RX ADMIN — HEPARIN SODIUM 5000 UNITS: 5000 INJECTION INTRAVENOUS; SUBCUTANEOUS at 15:37

## 2021-01-01 RX ADMIN — INSULIN LISPRO 4 UNITS: 100 INJECTION, SOLUTION INTRAVENOUS; SUBCUTANEOUS at 11:36

## 2021-01-01 RX ADMIN — HEPARIN SODIUM 5000 UNITS: 5000 INJECTION INTRAVENOUS; SUBCUTANEOUS at 06:15

## 2021-01-01 RX ADMIN — HEPARIN SODIUM 5000 UNITS: 5000 INJECTION INTRAVENOUS; SUBCUTANEOUS at 05:35

## 2021-01-01 RX ADMIN — NYSTATIN 500000 UNITS: 100000 SUSPENSION ORAL at 22:46

## 2021-01-01 RX ADMIN — SODIUM CHLORIDE: 9 INJECTION, SOLUTION INTRAVENOUS at 15:31

## 2021-01-01 RX ADMIN — ALBUTEROL SULFATE 2.5 MG: 2.5 SOLUTION RESPIRATORY (INHALATION) at 15:21

## 2021-01-01 RX ADMIN — NYSTATIN 500000 UNITS: 100000 SUSPENSION ORAL at 20:59

## 2021-01-01 RX ADMIN — Medication 2 PUFF: at 11:54

## 2021-01-01 RX ADMIN — NYSTATIN 500000 UNITS: 100000 SUSPENSION ORAL at 18:51

## 2021-01-01 RX ADMIN — INSULIN LISPRO 3 UNITS: 100 INJECTION, SOLUTION INTRAVENOUS; SUBCUTANEOUS at 21:44

## 2021-01-01 RX ADMIN — ASPIRIN 81 MG: 81 TABLET, CHEWABLE ORAL at 18:51

## 2021-01-01 RX ADMIN — FLUOXETINE HYDROCHLORIDE 60 MG: 20 SOLUTION ORAL at 09:05

## 2021-01-01 RX ADMIN — ROPINIROLE HYDROCHLORIDE 0.25 MG: 0.25 TABLET, FILM COATED ORAL at 23:00

## 2021-01-01 RX ADMIN — HEPARIN SODIUM 5000 UNITS: 5000 INJECTION INTRAVENOUS; SUBCUTANEOUS at 18:49

## 2021-01-01 RX ADMIN — LOSARTAN POTASSIUM 50 MG: 50 TABLET, FILM COATED ORAL at 10:06

## 2021-01-01 RX ADMIN — INSULIN LISPRO 2 UNITS: 100 INJECTION, SOLUTION INTRAVENOUS; SUBCUTANEOUS at 12:59

## 2021-01-01 RX ADMIN — INSULIN LISPRO 1 UNITS: 100 INJECTION, SOLUTION INTRAVENOUS; SUBCUTANEOUS at 22:01

## 2021-01-01 RX ADMIN — PROPOFOL 20 MG: 10 INJECTION, EMULSION INTRAVENOUS at 15:13

## 2021-01-01 RX ADMIN — HEPARIN SODIUM 5000 UNITS: 5000 INJECTION INTRAVENOUS; SUBCUTANEOUS at 22:02

## 2021-01-01 RX ADMIN — NYSTATIN 500000 UNITS: 100000 SUSPENSION ORAL at 17:05

## 2021-01-01 RX ADMIN — TAMSULOSIN HYDROCHLORIDE 0.4 MG: 0.4 CAPSULE ORAL at 07:33

## 2021-01-01 RX ADMIN — AMPICILLIN SODIUM AND SULBACTAM SODIUM 1500 MG: 1; .5 INJECTION, POWDER, FOR SOLUTION INTRAMUSCULAR; INTRAVENOUS at 23:29

## 2021-01-01 RX ADMIN — ROSUVASTATIN CALCIUM 40 MG: 40 TABLET, COATED ORAL at 20:59

## 2021-01-01 RX ADMIN — AMPICILLIN SODIUM AND SULBACTAM SODIUM 1500 MG: 1; .5 INJECTION, POWDER, FOR SOLUTION INTRAMUSCULAR; INTRAVENOUS at 23:52

## 2021-01-01 RX ADMIN — LOSARTAN POTASSIUM 50 MG: 50 TABLET, FILM COATED ORAL at 11:14

## 2021-01-01 RX ADMIN — AMOXICILLIN AND CLAVULANATE POTASSIUM 1 TABLET: 875; 125 TABLET, FILM COATED ORAL at 07:32

## 2021-01-01 RX ADMIN — HEPARIN SODIUM 5000 UNITS: 5000 INJECTION INTRAVENOUS; SUBCUTANEOUS at 20:50

## 2021-01-01 RX ADMIN — HEPARIN SODIUM 5000 UNITS: 5000 INJECTION INTRAVENOUS; SUBCUTANEOUS at 05:41

## 2021-01-01 RX ADMIN — INSULIN LISPRO 2 UNITS: 100 INJECTION, SOLUTION INTRAVENOUS; SUBCUTANEOUS at 09:29

## 2021-01-01 RX ADMIN — INSULIN LISPRO 1 UNITS: 100 INJECTION, SOLUTION INTRAVENOUS; SUBCUTANEOUS at 08:55

## 2021-01-01 RX ADMIN — FINASTERIDE 5 MG: 5 TABLET, FILM COATED ORAL at 08:23

## 2021-01-01 RX ADMIN — ASPIRIN 81 MG: 81 TABLET, CHEWABLE ORAL at 07:32

## 2021-01-01 RX ADMIN — CEFAZOLIN 2000 MG: 10 INJECTION, POWDER, FOR SOLUTION INTRAVENOUS at 15:10

## 2021-01-01 RX ADMIN — TICAGRELOR 90 MG: 90 TABLET ORAL at 09:36

## 2021-01-01 RX ADMIN — HEPARIN SODIUM 5000 UNITS: 5000 INJECTION INTRAVENOUS; SUBCUTANEOUS at 23:12

## 2021-01-01 RX ADMIN — HYDRALAZINE HYDROCHLORIDE 10 MG: 10 TABLET, FILM COATED ORAL at 14:23

## 2021-01-01 RX ADMIN — HYDRALAZINE HYDROCHLORIDE 10 MG: 10 TABLET, FILM COATED ORAL at 15:43

## 2021-01-01 RX ADMIN — HEPARIN SODIUM 9.9 UNITS/KG/HR: 10000 INJECTION, SOLUTION INTRAVENOUS at 21:34

## 2021-01-01 RX ADMIN — ROPINIROLE HYDROCHLORIDE 0.25 MG: 0.25 TABLET, FILM COATED ORAL at 20:32

## 2021-01-01 RX ADMIN — HEPARIN SODIUM 5000 UNITS: 5000 INJECTION INTRAVENOUS; SUBCUTANEOUS at 20:58

## 2021-01-01 RX ADMIN — FLUOXETINE HYDROCHLORIDE 40 MG: 20 CAPSULE ORAL at 09:53

## 2021-01-01 RX ADMIN — SODIUM CHLORIDE, PRESERVATIVE FREE 20 ML: 5 INJECTION INTRAVENOUS at 09:22

## 2021-01-01 RX ADMIN — HEPARIN SODIUM 5000 UNITS: 5000 INJECTION INTRAVENOUS; SUBCUTANEOUS at 22:07

## 2021-01-01 RX ADMIN — HEPARIN SODIUM 5000 UNITS: 5000 INJECTION INTRAVENOUS; SUBCUTANEOUS at 05:47

## 2021-01-01 RX ADMIN — AMOXICILLIN AND CLAVULANATE POTASSIUM 1 TABLET: 875; 125 TABLET, FILM COATED ORAL at 08:25

## 2021-01-01 RX ADMIN — PROPOFOL 20 MG: 10 INJECTION, EMULSION INTRAVENOUS at 15:18

## 2021-01-01 RX ADMIN — LOSARTAN POTASSIUM 100 MG: 50 TABLET, FILM COATED ORAL at 12:26

## 2021-01-01 RX ADMIN — INSULIN LISPRO 1 UNITS: 100 INJECTION, SOLUTION INTRAVENOUS; SUBCUTANEOUS at 20:51

## 2021-01-01 RX ADMIN — ALBUTEROL SULFATE 2 PUFF: 90 AEROSOL, METERED RESPIRATORY (INHALATION) at 10:51

## 2021-01-01 RX ADMIN — AMOXICILLIN AND CLAVULANATE POTASSIUM 1 TABLET: 875; 125 TABLET, FILM COATED ORAL at 01:30

## 2021-01-01 RX ADMIN — CLOPIDOGREL 75 MG: 75 TABLET, FILM COATED ORAL at 12:02

## 2021-01-01 RX ADMIN — FAMOTIDINE 20 MG: 20 TABLET ORAL at 08:46

## 2021-01-01 RX ADMIN — FAMOTIDINE 20 MG: 20 TABLET, FILM COATED ORAL at 08:12

## 2021-01-01 RX ADMIN — INSULIN LISPRO 6 UNITS: 100 INJECTION, SOLUTION INTRAVENOUS; SUBCUTANEOUS at 08:15

## 2021-01-01 RX ADMIN — INSULIN LISPRO 4 UNITS: 100 INJECTION, SOLUTION INTRAVENOUS; SUBCUTANEOUS at 14:19

## 2021-01-01 RX ADMIN — HEPARIN SODIUM 5000 UNITS: 5000 INJECTION INTRAVENOUS; SUBCUTANEOUS at 15:43

## 2021-01-01 RX ADMIN — INSULIN GLARGINE 10 UNITS: 100 INJECTION, SOLUTION SUBCUTANEOUS at 20:59

## 2021-01-01 RX ADMIN — NYSTATIN 500000 UNITS: 100000 SUSPENSION ORAL at 10:09

## 2021-01-01 RX ADMIN — SODIUM CHLORIDE: 4.5 INJECTION, SOLUTION INTRAVENOUS at 00:54

## 2021-01-01 RX ADMIN — INSULIN LISPRO 1 UNITS: 100 INJECTION, SOLUTION INTRAVENOUS; SUBCUTANEOUS at 17:25

## 2021-01-01 RX ADMIN — INSULIN LISPRO 3 UNITS: 100 INJECTION, SOLUTION INTRAVENOUS; SUBCUTANEOUS at 22:43

## 2021-01-01 RX ADMIN — FINASTERIDE 5 MG: 5 TABLET, FILM COATED ORAL at 08:46

## 2021-01-01 RX ADMIN — LOSARTAN POTASSIUM 50 MG: 50 TABLET, FILM COATED ORAL at 08:28

## 2021-01-01 RX ADMIN — INSULIN GLARGINE 8 UNITS: 100 INJECTION, SOLUTION SUBCUTANEOUS at 21:45

## 2021-01-01 RX ADMIN — ASPIRIN 81 MG: 81 TABLET, CHEWABLE ORAL at 08:28

## 2021-01-01 RX ADMIN — FAMOTIDINE 20 MG: 20 TABLET, FILM COATED ORAL at 10:06

## 2021-01-01 RX ADMIN — METHYLPREDNISOLONE SODIUM SUCCINATE 80 MG: 125 INJECTION, POWDER, FOR SOLUTION INTRAMUSCULAR; INTRAVENOUS at 12:10

## 2021-01-01 RX ADMIN — INSULIN LISPRO 4 UNITS: 100 INJECTION, SOLUTION INTRAVENOUS; SUBCUTANEOUS at 12:43

## 2021-01-01 RX ADMIN — FAMOTIDINE 20 MG: 20 TABLET, FILM COATED ORAL at 08:58

## 2021-01-01 RX ADMIN — AMPICILLIN SODIUM AND SULBACTAM SODIUM 1500 MG: 1; .5 INJECTION, POWDER, FOR SOLUTION INTRAMUSCULAR; INTRAVENOUS at 18:53

## 2021-01-01 RX ADMIN — METHYLPREDNISOLONE SODIUM SUCCINATE 80 MG: 125 INJECTION, POWDER, FOR SOLUTION INTRAMUSCULAR; INTRAVENOUS at 04:20

## 2021-01-01 RX ADMIN — NYSTATIN 500000 UNITS: 100000 SUSPENSION ORAL at 21:32

## 2021-01-01 RX ADMIN — ASPIRIN 81 MG: 81 TABLET, CHEWABLE ORAL at 09:52

## 2021-01-01 RX ADMIN — INSULIN LISPRO 1 UNITS: 100 INJECTION, SOLUTION INTRAVENOUS; SUBCUTANEOUS at 18:09

## 2021-01-01 RX ADMIN — ALBUTEROL SULFATE 2.5 MG: 2.5 SOLUTION RESPIRATORY (INHALATION) at 15:00

## 2021-01-01 RX ADMIN — INSULIN LISPRO 2 UNITS: 100 INJECTION, SOLUTION INTRAVENOUS; SUBCUTANEOUS at 13:01

## 2021-01-01 RX ADMIN — AMPICILLIN SODIUM AND SULBACTAM SODIUM 1500 MG: 1; .5 INJECTION, POWDER, FOR SOLUTION INTRAMUSCULAR; INTRAVENOUS at 05:20

## 2021-01-01 RX ADMIN — SODIUM CHLORIDE: 9 INJECTION, SOLUTION INTRAVENOUS at 18:17

## 2021-01-01 RX ADMIN — FLUOXETINE HYDROCHLORIDE 40 MG: 20 SOLUTION ORAL at 09:53

## 2021-01-01 RX ADMIN — NYSTATIN 500000 UNITS: 100000 SUSPENSION ORAL at 18:01

## 2021-01-01 RX ADMIN — SODIUM CHLORIDE: 9 INJECTION, SOLUTION INTRAVENOUS at 14:26

## 2021-01-01 RX ADMIN — LOSARTAN POTASSIUM 100 MG: 50 TABLET, FILM COATED ORAL at 20:32

## 2021-01-01 RX ADMIN — SODIUM CHLORIDE: 9 INJECTION, SOLUTION INTRAVENOUS at 18:03

## 2021-01-01 RX ADMIN — ASPIRIN 81 MG: 81 TABLET, CHEWABLE ORAL at 11:14

## 2021-01-01 RX ADMIN — SODIUM CHLORIDE: 4.5 INJECTION, SOLUTION INTRAVENOUS at 21:20

## 2021-01-01 RX ADMIN — ALBUTEROL SULFATE 2.5 MG: 2.5 SOLUTION RESPIRATORY (INHALATION) at 07:22

## 2021-01-01 RX ADMIN — OLANZAPINE 5 MG: 5 TABLET, FILM COATED ORAL at 23:12

## 2021-01-01 RX ADMIN — HEPARIN SODIUM 5000 UNITS: 5000 INJECTION INTRAVENOUS; SUBCUTANEOUS at 17:14

## 2021-01-01 RX ADMIN — SODIUM CHLORIDE 1000 ML: 9 INJECTION, SOLUTION INTRAVENOUS at 10:09

## 2021-01-01 RX ADMIN — TAMSULOSIN HYDROCHLORIDE 0.4 MG: 0.4 CAPSULE ORAL at 09:51

## 2021-01-01 RX ADMIN — INSULIN LISPRO 2 UNITS: 100 INJECTION, SOLUTION INTRAVENOUS; SUBCUTANEOUS at 00:26

## 2021-01-01 RX ADMIN — HEPARIN SODIUM 5000 UNITS: 5000 INJECTION INTRAVENOUS; SUBCUTANEOUS at 22:59

## 2021-01-01 RX ADMIN — FLUOXETINE HYDROCHLORIDE 40 MG: 20 CAPSULE ORAL at 08:24

## 2021-01-01 RX ADMIN — HEPARIN SODIUM 11.9 UNITS/KG/HR: 10000 INJECTION, SOLUTION INTRAVENOUS at 00:04

## 2021-01-01 RX ADMIN — Medication 6 MG: at 20:41

## 2021-01-01 RX ADMIN — Medication 2 PUFF: at 11:57

## 2021-01-01 RX ADMIN — HYDRALAZINE HYDROCHLORIDE 10 MG: 20 INJECTION INTRAMUSCULAR; INTRAVENOUS at 09:37

## 2021-01-01 RX ADMIN — NYSTATIN 500000 UNITS: 100000 SUSPENSION ORAL at 10:28

## 2021-01-01 RX ADMIN — AMOXICILLIN AND CLAVULANATE POTASSIUM 1 TABLET: 875; 125 TABLET, FILM COATED ORAL at 09:41

## 2021-01-01 RX ADMIN — METHYLPREDNISOLONE SODIUM SUCCINATE 40 MG: 40 INJECTION, POWDER, FOR SOLUTION INTRAMUSCULAR; INTRAVENOUS at 12:11

## 2021-01-01 RX ADMIN — ROSUVASTATIN CALCIUM 40 MG: 40 TABLET, COATED ORAL at 20:41

## 2021-01-01 RX ADMIN — ALBUTEROL SULFATE 2.5 MG: 2.5 SOLUTION RESPIRATORY (INHALATION) at 08:29

## 2021-01-01 RX ADMIN — ASPIRIN 81 MG: 81 TABLET, CHEWABLE ORAL at 12:02

## 2021-01-01 RX ADMIN — ALBUTEROL SULFATE 2.5 MG: 2.5 SOLUTION RESPIRATORY (INHALATION) at 07:18

## 2021-01-01 RX ADMIN — INSULIN LISPRO 1 UNITS: 100 INJECTION, SOLUTION INTRAVENOUS; SUBCUTANEOUS at 20:46

## 2021-01-01 RX ADMIN — ROSUVASTATIN CALCIUM 40 MG: 20 TABLET, FILM COATED ORAL at 21:27

## 2021-01-01 RX ADMIN — FAMOTIDINE 20 MG: 20 TABLET ORAL at 08:28

## 2021-01-01 RX ADMIN — INSULIN LISPRO 2 UNITS: 100 INJECTION, SOLUTION INTRAVENOUS; SUBCUTANEOUS at 11:27

## 2021-01-01 RX ADMIN — LIDOCAINE HYDROCHLORIDE 50 MG: 10 INJECTION, SOLUTION INFILTRATION; PERINEURAL at 15:11

## 2021-01-01 RX ADMIN — LOSARTAN POTASSIUM 50 MG: 50 TABLET, FILM COATED ORAL at 09:40

## 2021-01-01 RX ADMIN — ALBUTEROL SULFATE 2.5 MG: 2.5 SOLUTION RESPIRATORY (INHALATION) at 13:44

## 2021-01-01 RX ADMIN — HYDRALAZINE HYDROCHLORIDE 10 MG: 10 TABLET, FILM COATED ORAL at 18:01

## 2021-01-01 RX ADMIN — SODIUM CHLORIDE, PRESERVATIVE FREE 10 ML: 5 INJECTION INTRAVENOUS at 21:55

## 2021-01-01 RX ADMIN — HYDRALAZINE HYDROCHLORIDE 10 MG: 10 TABLET, FILM COATED ORAL at 21:45

## 2021-01-01 RX ADMIN — CLOPIDOGREL BISULFATE 75 MG: 75 TABLET ORAL at 09:52

## 2021-01-01 RX ADMIN — INSULIN LISPRO 6 UNITS: 100 INJECTION, SOLUTION INTRAVENOUS; SUBCUTANEOUS at 05:38

## 2021-01-01 RX ADMIN — FLUOXETINE HYDROCHLORIDE 40 MG: 20 SOLUTION ORAL at 18:52

## 2021-01-01 RX ADMIN — Medication 2 PUFF: at 10:42

## 2021-01-01 RX ADMIN — IOPAMIDOL 90 ML: 755 INJECTION, SOLUTION INTRAVENOUS at 13:13

## 2021-01-01 RX ADMIN — ASPIRIN 81 MG: 81 TABLET, CHEWABLE ORAL at 08:23

## 2021-01-01 RX ADMIN — ROSUVASTATIN CALCIUM 40 MG: 40 TABLET, COATED ORAL at 22:47

## 2021-01-01 RX ADMIN — INSULIN LISPRO 1 UNITS: 100 INJECTION, SOLUTION INTRAVENOUS; SUBCUTANEOUS at 21:28

## 2021-01-01 RX ADMIN — HEPARIN SODIUM 10.9 UNITS/KG/HR: 10000 INJECTION, SOLUTION INTRAVENOUS at 19:22

## 2021-01-01 RX ADMIN — BUDESONIDE AND FORMOTEROL FUMARATE DIHYDRATE 2 PUFF: 160; 4.5 AEROSOL RESPIRATORY (INHALATION) at 11:13

## 2021-01-01 RX ADMIN — POLYETHYLENE GLYCOL 3350 17 G: 17 POWDER, FOR SOLUTION ORAL at 07:33

## 2021-01-01 RX ADMIN — FAMOTIDINE 20 MG: 20 TABLET ORAL at 09:40

## 2021-01-01 RX ADMIN — FINASTERIDE 5 MG: 5 TABLET, FILM COATED ORAL at 09:40

## 2021-01-01 RX ADMIN — ISOSORBIDE DINITRATE 10 MG: 10 TABLET ORAL at 09:41

## 2021-01-01 RX ADMIN — HEPARIN SODIUM 4000 UNITS: 1000 INJECTION INTRAVENOUS; SUBCUTANEOUS at 19:23

## 2021-01-01 RX ADMIN — OLANZAPINE 5 MG: 10 TABLET, FILM COATED ORAL at 21:30

## 2021-01-01 RX ADMIN — INSULIN LISPRO 3 UNITS: 100 INJECTION, SOLUTION INTRAVENOUS; SUBCUTANEOUS at 06:18

## 2021-01-01 RX ADMIN — INSULIN LISPRO 1 UNITS: 100 INJECTION, SOLUTION INTRAVENOUS; SUBCUTANEOUS at 22:06

## 2021-01-01 RX ADMIN — FAMOTIDINE 20 MG: 20 TABLET ORAL at 22:02

## 2021-01-01 RX ADMIN — Medication 6 MG: at 00:55

## 2021-01-01 RX ADMIN — AMPICILLIN SODIUM AND SULBACTAM SODIUM 1500 MG: 1; .5 INJECTION, POWDER, FOR SOLUTION INTRAMUSCULAR; INTRAVENOUS at 22:32

## 2021-01-01 RX ADMIN — FAMOTIDINE 20 MG: 20 TABLET ORAL at 07:32

## 2021-01-01 RX ADMIN — CLOPIDOGREL BISULFATE 75 MG: 75 TABLET ORAL at 08:24

## 2021-01-01 RX ADMIN — TICAGRELOR 90 MG: 90 TABLET ORAL at 20:41

## 2021-01-01 RX ADMIN — AMPICILLIN SODIUM AND SULBACTAM SODIUM 1500 MG: 1; .5 INJECTION, POWDER, FOR SOLUTION INTRAMUSCULAR; INTRAVENOUS at 18:51

## 2021-01-01 RX ADMIN — BUDESONIDE AND FORMOTEROL FUMARATE DIHYDRATE 2 PUFF: 160; 4.5 AEROSOL RESPIRATORY (INHALATION) at 20:49

## 2021-01-01 RX ADMIN — ASPIRIN 81 MG: 81 TABLET, CHEWABLE ORAL at 09:33

## 2021-01-01 RX ADMIN — INSULIN LISPRO 4 UNITS: 100 INJECTION, SOLUTION INTRAVENOUS; SUBCUTANEOUS at 17:03

## 2021-01-01 ASSESSMENT — PULMONARY FUNCTION TESTS
PIF_VALUE: 0
PIF_VALUE: 1
PIF_VALUE: 0
PIF_VALUE: 1
PIF_VALUE: 0

## 2021-01-01 ASSESSMENT — PAIN SCALES - GENERAL
PAINLEVEL_OUTOF10: 0
PAINLEVEL_OUTOF10: 10
PAINLEVEL_OUTOF10: 4
PAINLEVEL_OUTOF10: 0
PAINLEVEL_OUTOF10: 3
PAINLEVEL_OUTOF10: 3
PAINLEVEL_OUTOF10: 0

## 2021-01-01 ASSESSMENT — ENCOUNTER SYMPTOMS
PHOTOPHOBIA: 0
VISUAL CHANGE: 0
SORE THROAT: 0
CHOKING: 0
NAUSEA: 0
ABDOMINAL DISTENTION: 0
EYE DISCHARGE: 0
COUGH: 0
SHORTNESS OF BREATH: 1
VOMITING: 0
TROUBLE SWALLOWING: 1
SHORTNESS OF BREATH: 0
COUGH: 0
WHEEZING: 0
CONSTIPATION: 0
VOMITING: 0
STRIDOR: 0
PHOTOPHOBIA: 0
EYE PAIN: 0
NAUSEA: 0
COLOR CHANGE: 1
CHEST TIGHTNESS: 0
DIARRHEA: 0
TROUBLE SWALLOWING: 0
TROUBLE SWALLOWING: 0
APNEA: 0
ABDOMINAL PAIN: 0
ABDOMINAL PAIN: 0

## 2021-01-01 ASSESSMENT — PAIN DESCRIPTION - FREQUENCY: FREQUENCY: INTERMITTENT

## 2021-01-01 ASSESSMENT — PAIN DESCRIPTION - DESCRIPTORS
DESCRIPTORS: ACHING
DESCRIPTORS: ACHING

## 2021-01-01 ASSESSMENT — PAIN - FUNCTIONAL ASSESSMENT
PAIN_FUNCTIONAL_ASSESSMENT: PREVENTS OR INTERFERES SOME ACTIVE ACTIVITIES AND ADLS
PAIN_FUNCTIONAL_ASSESSMENT: 0-10
PAIN_FUNCTIONAL_ASSESSMENT: FACES
PAIN_FUNCTIONAL_ASSESSMENT: 0-10

## 2021-01-01 ASSESSMENT — PAIN SCALES - WONG BAKER
WONGBAKER_NUMERICALRESPONSE: 0

## 2021-01-01 ASSESSMENT — PAIN DESCRIPTION - PAIN TYPE: TYPE: ACUTE PAIN

## 2021-01-01 ASSESSMENT — PAIN DESCRIPTION - ONSET: ONSET: ON-GOING

## 2021-01-01 ASSESSMENT — PAIN DESCRIPTION - LOCATION
LOCATION: ARM;SHOULDER
LOCATION: NECK

## 2021-01-01 ASSESSMENT — PAIN DESCRIPTION - PROGRESSION
CLINICAL_PROGRESSION: NOT CHANGED

## 2021-01-01 ASSESSMENT — PAIN DESCRIPTION - ORIENTATION
ORIENTATION: RIGHT
ORIENTATION: RIGHT

## 2021-01-01 ASSESSMENT — COPD QUESTIONNAIRES: CAT_SEVERITY: NO INTERVAL CHANGE

## 2021-01-05 NOTE — ED PROVIDER NOTES
[address] [place] [address] ED  [facility]      Pt Name: Frederic Diego  MRN: [id number]  [place] 1945  Date ofevaluation: 1/4/2021  Provider: Gagan Alonso Dr       Chief Complaint   Patient presents with    Back Pain    Hip Pain     L side         HISTORY OF PRESENT ILLNESS  (Location/Symptom, Timing/Onset, Context/Setting, Quality, Duration, Modifying Factors, Severity.)   Frederic Diego is a 77 y.o. male who presents to the emergency department with left-sided hip pain rating down the leg. Pain worse with movement relieved with rest.  States he has experienced hyperlipidemia times in the past.  Reports history of sciatica. Pain worse with movement and relieved with rest.  Denies any falls or injuries. No other complaints. Nursing Notes were reviewed. ALLERGIES     Patient has no known allergies.     CURRENT MEDICATIONS       Discharge Medication List as of 1/4/2021  6:30 PM      CONTINUE these medications which have NOT CHANGED    Details   ibuprofen (ADVIL;MOTRIN) 800 MG tablet Take 1 tablet by mouth every 8 hours as needed for Pain, Disp-30 tablet, R-0Print      lidocaine (LIDODERM) 5 % Place 1 patch onto the skin daily 12 hours on, 12 hours off., Disp-30 patch, R-0Print      metoprolol succinate (TOPROL XL) 50 MG extended release tablet Take 50 mg by mouth dailyHistorical Med      tiotropium (SPIRIVA RESPIMAT) 2.5 MCG/ACT AERS inhaler Inhale 2 puffs into the lungs dailyHistorical Med      carboxymethylcellulose 1 % ophthalmic solution Place 1 drop into both eyes 3 times daily as needed for Dry Eyes Historical Med      ketotifen (ZADITOR) 0.025 % ophthalmic solution Place 1 drop into both eyes 2 times daily as needed (itchy eyes) Historical Med      isosorbide mononitrate (IMDUR) 60 MG extended release tablet Take 30 mg by mouth daily Indications: 1/2 tablet (30mg) Historical Med finasteride (PROSCAR) 5 MG tablet Take 5 mg by mouth dailyHistorical Med      tamsulosin (FLOMAX) 0.4 MG capsule Take 0.4 mg by mouth daily      fluticasone (FLONASE) 50 MCG/ACT nasal spray 1 spray by Nasal route 2 times daily, Disp-1 Bottle, R-0      albuterol sulfate  (90 BASE) MCG/ACT inhaler Inhale 2 puffs into the lungs every 6 hours as needed for Wheezing      albuterol (PROVENTIL) (2.5 MG/3ML) 0.083% nebulizer solution Take 2.5 mg by nebulization every 6 hours as needed for Wheezing      aspirin 81 MG EC tablet Take 81 mg by mouth daily      losartan (COZAAR) 100 MG tablet Take 100 mg by mouth daily Historical Med      nitroGLYCERIN (NITROSTAT) 0.4 MG SL tablet Place 0.4 mg under the tongue every 5 minutes as needed for Chest pain      FLUoxetine (PROZAC) 20 MG capsule Take 40 mg by mouth daily Historical Med      furosemide (LASIX) 20 MG tablet Take 20 mg by mouth daily as needed (swelling) Historical Med      clopidogrel (PLAVIX) 75 MG tablet Take 75 mg by mouth daily      atorvastatin (LIPITOR) 80 MG tablet Take 40 mg by mouth daily (Pt takes one-half of an 80mg tab = 40mg)      rOPINIRole (REQUIP) 0.25 MG tablet Take 0.25 mg by mouth nightly as needed Historical Med      budesonide-formoterol (SYMBICORT) 160-4.5 MCG/ACT AERO Inhale 2 puffs into the lungs 2 times daily.              PAST MEDICAL HISTORY         Diagnosis Date    Centrilobular emphysema ([address]     COPD (chronic obstructive pulmonary disease) ([address]     Depression     Diabetes mellitus ([address]     pt refuses to take previously prescribed metformin (states PCP aware)    Former smoker     Hyperlipidemia     on 4/27/18 pt states \"I stopped taking that about a year ago\"    Hypertension     Mixed restrictive and obstructive lung disease ([address]     Need for pneumococcal vaccine     Personal history of tobacco use        SURGICAL HISTORY           Procedure Laterality Date    CARDIAC SURGERY  07/2015    1 stent    NECK SURGERY  TOE AMPUTATION Right greater         FAMILY HISTORY     No family history on file. No family status information on file. SOCIAL HISTORY      reports that he quit smoking about 7 years ago. He started smoking about 64 years ago. He has a 42.00 pack-year smoking history. He has never used smokeless tobacco. He reports that he does not drink alcohol or use drugs. REVIEW OFSYSTEMS    (2-9 systems for level 4, 10 or more for level 5)   Review of Systems    Except as noted above the remainder of the review of systems was reviewed and negative. PHYSICAL EXAM    (up to 7 for level 4, 8 or more for level 5)     ED Triage Vitals [01/04/21 1647]   BP Temp Temp Source Pulse Resp SpO2 Height Weight   (!) 209/118 98.3 °F (36.8 °C) Oral 79 18 98 % 5' 9\" (1.753 m) 230 lb (104.3 kg)      Physical Exam  Constitutional:       Appearance: He is well-developed. HENT:      Head: Normocephalic and atraumatic. Neck:      Musculoskeletal: Normal range of motion and neck supple. Cardiovascular:      Rate and Rhythm: Normal rate and regular rhythm. Pulmonary:      Effort: Pulmonary effort is normal.      Breath sounds: Normal breath sounds. Abdominal:      Palpations: Abdomen is soft. Musculoskeletal: Normal range of motion. Left hip: He exhibits tenderness. He exhibits normal range of motion and normal strength. Left lower leg: He exhibits swelling. Skin:     General: Skin is warm. Neurological:      Mental Status: He is alert and oriented to person, place, and time.    Psychiatric:         Behavior: Behavior normal.                 DIAGNOSTIC RESULTS     EKG: All EKG's are interpreted by the Emergency Department Physician who either signs or Co-signs this chart in the absence of a cardiologist.        RADIOLOGY: Non-plain film images such as CT, Ultrasound and MRI are read by the radiologist. Plain radiographic images arevisualized and preliminarily interpreted by the emergency physician with the below findings:        Interpretation per the Radiologist below, if available at thetime of this note:          ED BEDSIDE ULTRASOUND:   Performed by ED Physician - none    LABS:  Labs Reviewed - No data to display    All other labs were within normal range or not returned as of this dictation. EMERGENCY DEPARTMENT COURSE and DIFFERENTIAL DIAGNOSIS/MDM:   Vitals:    Vitals:    01/04/21 1647 01/04/21 1836   BP: (!) 209/118 (!) 124/94   Pulse: 79    Resp: 18    Temp: 98.3 °F (36.8 °C)    TempSrc: Oral    SpO2: 98%    Weight: 230 lb (104.3 kg)    Height: 5' 9\" (1.753 m)      Pressure improved. X-ray did not reveal any acute process. Will treat symptomatically and discharged home    NO DVT. PATIENT WAS CONCERNED about left leg swelling    CONSULTS:  None    PROCEDURES:  Procedures        FINAL IMPRESSION      1. Acute exacerbation of chronic low back pain    2. Left leg swelling          DISPOSITION/PLAN   DISPOSITION Decision To Discharge 01/04/2021 06:27:03 PM      PATIENTREFERRED TO:   No follow-up provider specified. DISCHARGE MEDICATIONS:     Discharge Medication List as of 1/4/2021  6:30 PM      START taking these medications    Details   HYDROcodone-acetaminophen (NORCO) 5-325 MG per tablet Take 1-2 tablets by mouth every 8 hours as needed for Pain for up to 3 days. , Disp-12 tablet, R-0Print      methocarbamol (ROBAXIN-750) 750 MG tablet Take 1 tablet by mouth 4 times daily for 10 days, Disp-40 tablet, R-0Print                 (Please note that portions of this note were completed with a voice recognition program.  Efforts were made to edit thedictations but occasionally words are mis-transcribed.)    KARI Karimi PA-C  01/05/21 0006

## 2021-01-05 NOTE — ED PROVIDER NOTES
eMERGENCY dEPARTMENT eNCOUnter   Independent Attestation     Pt Name: Ebony Santos  MRN: [id number]  [place] 1945  Date of evaluation: 1/5/21     Ebony Santos is a 77 y.o. male with CC: Back Pain and Hip Pain (L side)      Based on the medical record the care appears appropriate. I was personally available for consultation in the Emergency Department.     Jose Alberto Rm MD  Attending Emergency Physician                    Jose Alberto Rm MD  01/05/21 5629
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
## 2021-05-07 NOTE — PATIENT INSTRUCTIONS
Ice/heat to affected area  Return worse  BenGay or other muscle rubs  Chiropractor  Massage therapy  Follow-up with your family doctor in [place] for recheck and possible physical therapy  Patient Education        Neck Strain: Care Instructions  Your Care Instructions     You have strained the muscles and ligaments in your neck. A sudden, awkward movement can strain the neck. This often occurs with falls or car accidents or during certain sports. Everyday activities like working on a computer or sleeping can also cause neck strain if they force you to hold your neck in an awkward position for a long time. It is common for neck pain to get worse for a day or two after an injury, but it should start to feel better after that. You may have more pain and stiffness for several days before it gets better. This is expected. It may take a few weeks or longer for it to heal completely. Good home treatment can help you get better faster and avoid future neck problems. Follow-up care is a key part of your treatment and safety. Be sure to make and go to all appointments, and call your doctor if you are having problems. It's also a good idea to know your test results and keep a list of the medicines you take. How can you care for yourself at home? · If you were given a neck brace (cervical collar) to limit neck motion, wear it as instructed for as many days as your doctor tells you to. Do not wear it longer than you were told to. Wearing a brace for too long can make neck stiffness worse and weaken the neck muscles. · You can try using heat or ice to see if it helps. ? Try using a heating pad on a low or medium setting for 15 to 20 minutes every 2 to 3 hours. Try a warm shower in place of one session with the heating pad. You can also buy single-use heat wraps that last up to 8 hours. ? You can also try an ice pack for 10 to 15 minutes every 2 to 3 hours. · Take pain medicines exactly as directed.   ? If the doctor gave you a prescription medicine for pain, take it as prescribed. ? If you are not taking a prescription pain medicine, ask your doctor if you can take an over-the-counter medicine. · Gently rub the area to relieve pain and help with blood flow. Do not massage the area if it hurts to do so. · Do not do anything that makes the pain worse. Take it easy for a couple of days. You can do your usual activities if they do not hurt your neck or put it at risk for more stress or injury. · Try sleeping on a special neck pillow. Place it under your neck, not under your head. Placing a tightly rolled-up towel under your neck while you sleep will also work. If you use a neck pillow or rolled towel, do not use your regular pillow at the same time. · To prevent future neck pain, do exercises to stretch and strengthen your neck and back. Learn how to use good posture, safe lifting techniques, and proper body mechanics. When should you call for help? Call 911 anytime you think you may need emergency care. For example, call if:    · You are unable to move an arm or a leg at all. Call your doctor now or seek immediate medical care if:    · You have new or worse symptoms in your arms, legs, chest, belly, or buttocks. Symptoms may include:  ? Numbness or tingling. ? Weakness. ? Pain.     · You lose bladder or bowel control. Watch closely for changes in your health, and be sure to contact your doctor if:    · You are not getting better as expected. Where can you learn more? Go to [email or web address]://[email or web address].Babelgum. org and sign in to your [employer] account. Enter M253 in the [facility] box to learn more about \"Neck Strain: Care Instructions. \"     If you do not have an account, please click on the \"Sign Up Now\" link. Current as of: November 16, 2020               Content Version: 12.8  © 3529-8856 Healthwise, Incorporated. Care instructions adapted under license by [facility] ([facility]).  If you have questions about a medical condition or this instruction, always ask your healthcare professional. [place] [address] any warranty or liability for your use of this information.
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
## 2021-05-07 NOTE — PROGRESS NOTES
Visit Information    Have you changed or started any medications since your last visit including any over-the-counter medicines, vitamins, or herbal medicines? no   Are you having any side effects from any of your medications? -  no  Have you stopped taking any of your medications? Is so, why? -  no    Have you seen any other physician or provider since your last visit? Yes - Records Obtained  Have you had any other diagnostic tests since your last visit? Yes - Records Obtained  Have you been seen in the emergency room and/or had an admission to a hospital since we last saw you? Yes - Records Obtained  Have you had your routine dental cleaning in the past 6 months? no    Have you activated your [employer] account? If not, what are your barriers?  Yes     Patient Care Team:  Kelly Sargent MD as Consulting Physician (Pulmonology)  Yuan Adam RN as Nurse Navigator    Medical History Review  Past Medical, Family, and Social History reviewed and does not contribute to the patient presenting condition    Health Maintenance   Topic Date Due    Hepatitis C screen  Never done    Lipid screen  Never done    Colon cancer screen colonoscopy  Never done    Low dose CT lung screening  Never done    Shingles Vaccine (2 of 2) 11/20/2020    COVID-19 Vaccine (2 - Pfizer 2-dose series) 03/10/2021    Potassium monitoring  06/02/2021    Creatinine monitoring  06/02/2021    Flu vaccine (Season Ended) 09/01/2021    DTaP/Tdap/Td vaccine (2 - Td) 09/25/2030    Pneumococcal 65+ years Vaccine  Completed    AAA screen  Completed    Hepatitis A vaccine  Aged Out    Hepatitis B vaccine  Aged Out    Hib vaccine  Aged Out    Meningococcal (ACWY) vaccine  Aged Out

## 2021-07-13 PROBLEM — E78.5 HYPERLIPIDEMIA: Status: ACTIVE | Noted: 2021-01-01

## 2021-07-13 PROBLEM — I63.9 ACUTE CEREBROVASCULAR ACCIDENT (CVA) (HCC): Status: ACTIVE | Noted: 2021-01-01

## 2021-07-13 PROBLEM — I25.10 CAD S/P PERCUTANEOUS CORONARY ANGIOPLASTY: Status: ACTIVE | Noted: 2021-01-01

## 2021-07-13 PROBLEM — I10 HTN (HYPERTENSION): Status: ACTIVE | Noted: 2021-01-01

## 2021-07-13 PROBLEM — M62.82 RHABDOMYOLYSIS: Status: ACTIVE | Noted: 2021-01-01

## 2021-07-13 PROBLEM — Z98.61 CAD S/P PERCUTANEOUS CORONARY ANGIOPLASTY: Status: ACTIVE | Noted: 2021-01-01

## 2021-07-13 PROBLEM — J44.9 COPD (CHRONIC OBSTRUCTIVE PULMONARY DISEASE) (HCC): Status: ACTIVE | Noted: 2021-01-01

## 2021-07-13 PROBLEM — E13.9 DIABETES 1.5, MANAGED AS TYPE 2 (HCC): Status: ACTIVE | Noted: 2021-01-01

## 2021-07-13 NOTE — CONSULTS
Neurology Consult Note      Reason for Consult:  Stroke alert  Requesting Physician:  Dr. Stephanie Wade:  Right sided weakness    History Obtained From:  patient, electronic medical record       HISTORY OF PRESENT ILLNESS:    The patient is a 77 y.o. male with PVD, CAD, HTN, HLD, and COPD, and previous strokes, who presents after being found down on the floor by his sister, BONNIE 2 days ago. Right sided facial droop, right sided weakness and severe dysarthria. History of CVA, remote left temproal lobe infarct and remote bilateral occipital lobe infarcts. He is supposed to be on ASA/PLavix however he says he cannot remember when the last time he took them was.      On examination, he is AOx3, significant contractures in the RUE/RLE and is able to antigravity RUE/RLE. Moderate to severe right facial droop with severe dysarthria, limiting the HPI.      Elevated, LA, Toponin, CK, and creatinine.         NIH Stroke Scale   1a  Level of consciousness: 0=alert; keenly responsive   1b. LOC questions:  0=Performs both tasks correctly   1c. LOC commands: 0=Performs both tasks correctly   2. Best Gaze: 0=normal   3. Visual: 0=No visual loss   4. Facial Palsy: 2=Partial paralysis (total or near total paralysis of the lower face)   5a. Motor left arm: 0=No drift, limb holds 90 (or 45) degrees for full 10 seconds   5b. Motor right arm: 1=Drift, limb holds 90 (or 45) degrees but drifts down before full 10 seconds: does not hit bed   6a. motor left le=No drift, limb holds 90 (or 45) degrees for full 10 seconds   6b  Motor right le=Drift, limb holds 90 (or 45) degrees but drifts down before full 10 seconds: does not hit bed   7. Limb Ataxia: 0=Absent   8. Sensory: 0=Normal; no sensory loss   9. Best Language:  0=No aphasia, normal   10. Dysarthria: 2=Severe; patient speech is so slurred as to be unintelligible in the absence of or our of proportion to any dysphagia, or is mute/anarthric   11.  Extinction and Inattention: 0=No abnormality   12. Distal motor function: 0=Normal     Total:  6         CTH:            Past Medical History:        Diagnosis Date    [facility]rilob[facility]se[facility] [address]     COPD (chronic obstructive pulmonary disease) [facility] [address]     Depression     Diabetes mellitus ([facility] [address]     pt refuses to take previously prescribed metformin (states PCP aware)    Former smoker     Hyperlipidemia     on 4/27/18 pt states \"I stopped taking that about a year ago\"    Hypertension     Mixed restrictive and obstructive lung disease ([facility] [address]     Need for pneumococcal vaccine     Personal history of tobacco use      Past Surgical History:        Procedure Laterality Date    CARDIAC SURGERY  07/2015    1 stent    NECK SURGERY      TOE AMPUTATION Right greater     Current Medications:   Current Facility-Administered Medications: 0.9 % sodium chloride bolus, 1,000 mL, Intravenous, Once  Allergies:  Patient has no known allergies. Social History:  TOBACCO:   reports that he quit smoking about 8 years ago. He started smoking about 64 years ago. He has a 42.00 pack-year smoking history. He has never used smokeless tobacco.  ETOH:   reports no history of alcohol use. DRUGS:   reports no history of drug use. ACTIVITIES OF DAILY LIVING:    INSTRUMENTAL ACTIVITIES OF DAILY LIVING:  Patient is able to perform all instrumental activities of daily living. Family History:   No family history on file. REVIEW OF SYSTEMS:  Review of Systems   Constitutional: Negative for activity change, appetite change, chills, diaphoresis, fatigue, fever and unexpected weight change. HENT: Negative for tinnitus and trouble swallowing. Eyes: Negative for photophobia, pain and visual disturbance. Respiratory: Negative for apnea, cough, choking, chest tightness, shortness of breath, wheezing and stridor. Cardiovascular: Negative for chest pain, palpitations and leg swelling.    Gastrointestinal: Negative for abdominal distention, abdominal pain, constipation, diarrhea, nausea and vomiting. Genitourinary: Negative for dysuria. Musculoskeletal: Negative for gait problem, neck pain and neck stiffness. Skin: Negative for rash. Neurological: Positive for facial asymmetry and weakness. Negative for dizziness, tremors, seizures, syncope, speech difficulty, light-headedness, numbness and headaches. PHYSICAL EXAM:    Vitals:  BP (!) 145/94   Pulse 77   Temp 98.4 °F (36.9 °C) (Oral)   Resp 27   SpO2 99%        Physical Exam  Vitals and nursing note reviewed. Constitutional:       General: He is not in acute distress. Appearance: He is well-developed. He is not diaphoretic. HENT:      Head: Normocephalic and atraumatic. Right Ear: External ear normal.      Left Ear: External ear normal.   Eyes:      General: No scleral icterus. Right eye: No discharge. Left eye: No discharge. Conjunctiva/sclera: Conjunctivae normal.      Pupils: Pupils are equal, round, and reactive to light. Pulmonary:      Effort: Pulmonary effort is normal.   Abdominal:      General: There is no distension. Palpations: Abdomen is soft. Tenderness: There is no abdominal tenderness. There is no guarding. Musculoskeletal:         General: No tenderness or deformity. Normal range of motion. Cervical back: Normal range of motion. Skin:     General: Skin is warm and dry. Capillary Refill: Capillary refill takes less than 2 seconds. Findings: No erythema or rash. Neurological:      Mental Status: He is alert and oriented to person, place, and time. GCS: GCS eye subscore is 4. GCS verbal subscore is 5. GCS motor subscore is 6. Cranial Nerves: Dysarthria and facial asymmetry present. No cranial nerve deficit. Sensory: Sensation is intact. No sensory deficit. Motor: Weakness present. No tremor, abnormal muscle tone or seizure activity.       Coordination: Coordination normal.      Deep Tendon Reflexes: Reflexes are normal and symmetric. Reflexes normal.      Reflex Scores:       Tricep reflexes are 2+ on the right side and 2+ on the left side. Bicep reflexes are 2+ on the right side and 2+ on the left side. Brachioradialis reflexes are 2+ on the right side and 2+ on the left side. Patellar reflexes are 2+ on the right side and 2+ on the left side. Achilles reflexes are 2+ on the right side and 2+ on the left side.   Psychiatric:         Behavior: Behavior normal.                DATA  Recent Results (from the past 24 hour(s))   Venous Blood Gas, POC    Collection Time: 07/13/21 11:32 AM   Result Value Ref Range    pH, David 7.387 7.320 - 7.430    pCO2, David 43.4 41.0 - 51.0 mm Hg    pO2, David 15.2 (L) 30 - 50 mm Hg    HCO3, Venous 26.1 22.0 - 29.0 mmol/L    Total CO2, Venous NOT REPORTED 23.0 - 30.0 mmol/L    Negative Base Excess, David NOT REPORTED 0.0 - 2.0    Positive Base Excess, David 1 0.0 - 3.0    O2 Sat, David 19 (L) 60.0 - 85.0 %    O2 Device/Flow/% NOT REPORTED     Adelfo Test NOT REPORTED     Sample Site NOT REPORTED     Mode NOT REPORTED     FIO2 NOT REPORTED     Pt Temp NOT REPORTED     POC pH Temp NOT REPORTED     POC pCO2 Temp NOT REPORTED mm Hg    POC pO2 Temp NOT REPORTED mm Hg   ELECTROLYTES PLUS    Collection Time: 07/13/21 11:32 AM   Result Value Ref Range    POC Sodium 145 138 - 146 mmol/L    POC Potassium 3.9 3.5 - 4.5 mmol/L    POC Chloride 110 (H) 98 - 107 mmol/L    POC TCO2 27 22 - 30 mmol/L    Anion Gap 9 7 - 16 mmol/L   Hemoglobin and hematocrit, blood    Collection Time: 07/13/21 11:32 AM   Result Value Ref Range    POC Hemoglobin 13.2 (L) 13.5 - 17.5 g/dL    POC Hematocrit 39 (L) 41 - 53 %   Creatinine W/GFR Point of Care    Collection Time: 07/13/21 11:32 AM   Result Value Ref Range    POC Creatinine 1.46 (H) 0.51 - 1.19 mg/dL    GFR Comment NOT REPORTED >60 mL/min    GFR Non- NOT REPORTED >60 mL/min    GFR Comment         CALCIUM, IONIC (POC)    Collection Time: 07/13/21 11:32 AM   Result Value Ref Range    POC Ionized Calcium 1.18 1.15 - 1.33 mmol/L   POCT urea (BUN)    Collection Time: 07/13/21 11:32 AM   Result Value Ref Range    POC BUN 34 (H) 8 - 26 mg/dL   Lactic Acid, POC    Collection Time: 07/13/21 11:32 AM   Result Value Ref Range    POC Lactic Acid 1.83 (H) 0.56 - 1.39 mmol/L   POCT Glucose    Collection Time: 07/13/21 11:32 AM   Result Value Ref Range    POC Glucose 207 (H) 74 - 100 mg/dL   MYOGLOBIN, SERUM    Collection Time: 07/13/21 11:35 AM   Result Value Ref Range    Myoglobin 3,030 (H) 28 - 72 ng/mL   CK    Collection Time: 07/13/21 11:35 AM   Result Value Ref Range    Total CK 7,454 (H) 39 - 308 U/L   CBC WITH AUTO DIFFERENTIAL    Collection Time: 07/13/21 11:35 AM   Result Value Ref Range    WBC 10.7 3.5 - 11.3 k/uL    RBC 4.86 4.21 - 5.77 m/uL    Hemoglobin 11.7 (L) 13.0 - 17.0 g/dL    Hematocrit 37.4 (L) 40.7 - 50.3 %    MCV 77.0 (L) 82.6 - 102.9 fL    MCH 24.1 (L) 25.2 - 33.5 pg    MCHC 31.3 28.4 - 34.8 g/dL    RDW 15.7 (H) 11.8 - 14.4 %    Platelets See Reflexed IPF Result 138 - 453 k/uL    MPV NOT REPORTED 8.1 - 13.5 fL    NRBC Automated 0.2 (H) 0.0 per 100 WBC    Differential Type NOT REPORTED     WBC Morphology NOT REPORTED     RBC Morphology ANISOCYTOSIS PRESENT     Platelet Estimate NOT REPORTED     Seg Neutrophils 78 (H) 36 - 65 %    Lymphocytes 13 (L) 24 - 43 %    Monocytes 7 3 - 12 %    Eosinophils % 1 1 - 4 %    Basophils 1 0 - 2 %    Immature Granulocytes 1 (H) 0 %    Segs Absolute 8.33 (H) 1.50 - 8.10 k/uL    Absolute Lymph # 1.41 1.10 - 3.70 k/uL    Absolute Mono # 0.73 0.10 - 1.20 k/uL    Absolute Eos # 0.07 0.00 - 0.44 k/uL    Basophils Absolute 0.07 0.00 - 0.20 k/uL    Absolute Immature Granulocyte 0.08 0.00 - 0.30 k/uL   COMPREHENSIVE METABOLIC PANEL    Collection Time: 07/13/21 11:35 AM   Result Value Ref Range    Glucose 192 (H) 70 - 99 mg/dL    BUN 29 (H) 8 - 23 mg/dL    CREATININE 1.47 (H) 0.70 - 1.20 mg/dL    Bun/Cre Ratio NOT REPORTED 9 - 20    Calcium 9.3 8.6 - 10.4 mg/dL    Sodium 142 135 - 144 mmol/L    Potassium 4.0 3.7 - 5.3 mmol/L    Chloride 107 98 - 107 mmol/L    CO2 25 20 - 31 mmol/L    Anion Gap 10 9 - 17 mmol/L    Alkaline Phosphatase 111 40 - 129 U/L    ALT 65 (H) 5 - 41 U/L     (H) <40 U/L    Total Bilirubin 1.03 0.3 - 1.2 mg/dL    Total Protein 7.2 6.4 - 8.3 g/dL    Albumin 3.8 3.5 - 5.2 g/dL    Albumin/Globulin Ratio 1.1 1.0 - 2.5    GFR Non- 47 (L) >60 mL/min    GFR  57 (L) >60 mL/min    GFR Comment          GFR Staging NOT REPORTED    LACTIC ACID, WHOLE BLOOD    Collection Time: 07/13/21 11:35 AM   Result Value Ref Range    Lactic Acid, Whole Blood 2.4 (H) 0.7 - 2.1 mmol/L   Troponin    Collection Time: 07/13/21 11:35 AM   Result Value Ref Range    Troponin, High Sensitivity 52 (HH) 0 - 22 ng/L    Troponin T NOT REPORTED <0.03 ng/mL    Troponin Interp NOT REPORTED    Immature Platelet Fraction    Collection Time: 07/13/21 11:35 AM   Result Value Ref Range    Platelet, Immature Fraction 44.9 (H) 1.1 - 10.3 %    Platelet, Fluorescence 4 (LL) 138 - 453 k/uL         AED LEVELS:    No results found for: KEPPRA, PHENYTOIN, VALPROATE, LAMICTAL, TOPIRA, CBMZ, PHENOBARB          IMAGING    CT HEAD WO CONTRAST    Result Date: 7/13/2021  1. New ovoid low-attenuation area in the left frontal corona radiata compatible with acute/subacute infarction. This measures 2 x 1.4 cm. No associated hemorrhage. 2. Diffuse parenchymal volume loss and sequela of severe chronic microvascular ischemic changes. Findings were discussed with Dr. Genny Sparrow at 1:31 pm on 7/13/2021. XR CHEST PORTABLE    Result Date: 7/13/2021  Clear lungs. Cardiomegaly. No acute cardiopulmonary abnormality. CTA HEAD NECK W CONTRAST    Result Date: 7/13/2021  1. Severe stenosis in the V1 segment of the left vertebral artery.  2. Extensive intracranial atherosclerotic plaque with near complete occlusion of the right PCA. 3. Severe stenoses in the proximal A3 segments of the ACAs bilaterally. 4. Moderate-to-severe proximal M2 segment stenosis in the right MCA. Moderate stenosis in the M1 and M2 segments of the left MCA. 5. Moderate stenosis in the P1/P2 junction of the left PCA. 6. Enlarged heterogeneous thyroid gland with 2.6 cm left thyroid lobe nodule. Nonemergent/outpatient thyroid ultrasound recommended. Findings were discussed with Dr. Marianne Adams at 1:43 pm on 7/13/2021. RECOMMENDATIONS: 2.6 cm incidental left thyroid nodule with heterogeneous and enlarged thyroid. Recommend thyroid US. Reference: J Am Bruno Radiol. 2015 Feb;12(2): 143-50         IMPRESSION     Case of a 77 y.o. male who presents after being found down by his sister with right sided paresis, right facial droop and severe dysarthria concerning for an acute stroke.      RECOMMENDATIONS:     - CTH WO - acute/subacute infarct in the left frontal corona radiata measuring 2 x 1.4 cm without hemorrhage.                  - CTA H/N - ICAD, worst narrowing occurring at the R PCA.                          - MRI Brain WO                          - ECHO                          - resume ASA 81mg daily                          - resume [id number] daily                           - Folic acid 1mg BID                          - Lipitor 40mg nightly                           - lipid panel, target LDL <70                          - HgA1c level                          - Fasting Lipid panel                          - PT, OT, Speech eval                           - Hydrate with 500cc bolus then IVF NS @ 75cc/hr                           - Telemetry                           - Neuro checks per protocol              - We recommend SBP <180              - Blood glucose goal less than 180              - elevated troponin, CK, LA and myoglobin              - Thrombocytopenia, platelets 4.               - medical management per primary service, IM - Please avoid dextrose containing solutions    Thank you for the consultation. Will follow.        Lj Owen MD, [facility]  PGY-4 Neurology  7/13/2021 at 2:09 PM

## 2021-07-13 NOTE — ED NOTES
Bed: 16  Expected date:   Expected time:   Means of arrival:   Comments:  MARY ALICE [id number] MERARY Sanon RN  07/13/21 1114

## 2021-07-13 NOTE — CONSULTS
Department of Endovascular Neurosurgery                                         Resident Consult Note  Stroke Alert paged @ no page received   ER Room # 16  Arrival to patient bedside @ 1150        Reason for Consult:  Stroke consult  Requesting Physician:  Dr. Lynette Bro  Endovascular Neurosurgeon:   [x]Dr. Milla Gonzáles  []Dr. Elsie Carvajal    History Obtained From:  patient, electronic medical record    CHIEF COMPLAINT:       Found down on floor    HISTORY OF PRESENT ILLNESS:       The patient is a 77 y.o. male with PVD, CAD, HTN, HLD, and COPD, and previous strokes, who presents after being found down on the floor by his sister, BONNIE 2 days ago. Right sided facial droop, right sided weakness and severe dysarthria. History of CVA, remote left temproal lobe infarct and remote bilateral occipital lobe infarcts. He is supposed to be on ASA/PLavix however he says he cannot remember when the last time he took them was. On examination, he is AOx3, significant contractures in the RUE/RLE and is able to antigravity RUE/RLE. Moderate to severe right facial droop with severe dysarthria, limiting the HPI. Elevated, LA, Toponin, CK, and creatinine. NIH Stroke Scale   1a  Level of consciousness: 0=alert; keenly responsive   1b. LOC questions:  0=Performs both tasks correctly   1c. LOC commands: 0=Performs both tasks correctly   2. Best Gaze: 0=normal   3. Visual: 0=No visual loss   4. Facial Palsy: 2=Partial paralysis (total or near total paralysis of the lower face)   5a. Motor left arm: 0=No drift, limb holds 90 (or 45) degrees for full 10 seconds   5b. Motor right arm: 1=Drift, limb holds 90 (or 45) degrees but drifts down before full 10 seconds: does not hit bed   6a. motor left le=No drift, limb holds 90 (or 45) degrees for full 10 seconds   6b  Motor right le=Drift, limb holds 90 (or 45) degrees but drifts down before full 10 seconds: does not hit bed   7. Limb Ataxia: 0=Absent   8.   Sensory: 0=Normal; no sensory loss   9. Best Language:  0=No aphasia, normal   10. Dysarthria: 2=Severe; patient speech is so slurred as to be unintelligible in the absence of or our of proportion to any dysphagia, or is mute/anarthric   11. Extinction and Inattention: 0=No abnormality   12.  Distal motor function: 0=Normal    Total:  6       CTH:          PAST MEDICAL HISTORY :       Past Medical History:        Diagnosis Date    [facility]rilobular emphysema ([facility]     COPD (chronic obstructive pulmonary disease) ([facility]     Depression     Diabetes mellitus ([facility]     pt refuses to take previously prescribed metformin (states PCP aware)    Former smoker     Hyperlipidemia     on 18 pt states \"I stopped taking that about a year ago\"    Hypertension     Mixed restrictive and obstructive lung disease ([facility]     Need for pneumococcal vaccine     Personal history of tobacco use        Past Surgical History:        Procedure Laterality Date    CARDIAC SURGERY  2015    1 stent    NECK SURGERY      TOE AMPUTATION Right greater       Social History:   Social History     Socioeconomic History    Marital status:      Spouse name: Not on file    Number of children: Not on file    Years of education: Not on file    Highest education level: Not on file   Occupational History    Not on file   Tobacco Use    Smoking status: Former Smoker     Packs/day: 0.75     Years: 56.00     Pack years: 42.00     Start date: 1957     Quit date: 2013     Years since quittin.0    Smokeless tobacco: Never Used   Vaping Use    Vaping Use: Never used   Substance and Sexual Activity    Alcohol use: No    Drug use: No    Sexual activity: Not on file   Other Topics Concern    Not on file   Social History Narrative    Not on file     Social Determinants of Health     Financial Resource Strain:     Difficulty of Paying Living Expenses:    Food Insecurity:     Worried About Running Out of Food in the Last Year:     Ran Out of Food in the Last Year:    Transportation Needs:     Lack of Transportation (Medical):  Lack of Transportation (Non-Medical):    Physical Activity:     Days of Exercise per Week:     Minutes of Exercise per Session:    Stress:     Feeling of Stress :    Social Connections:     Frequency of Communication with Friends and Family:     Frequency of Social Gatherings with Friends and Family:     Attends [employer] Services:     Active Member of Clubs or Organizations:     Attends Club or Organization Meetings:     Marital Status:    Intimate Partner Violence:     Fear of Current or Ex-Partner:     Emotionally Abused:     Physically Abused:     Sexually Abused:        Family History:   No family history on file. Allergies:  Patient has no known allergies. Home Medications:  Prior to Admission medications    Medication Sig Start Date End Date Taking?  Authorizing Provider   metoprolol tartrate (LOPRESSOR) 50 MG tablet Take 25 mg by mouth daily    Historical Provider, MD   tiZANidine (ZANAFLEX) 2 MG tablet Take 1 tablet by mouth 4 times daily as needed (muscle spasms) 5/7/21   JOLIE Gallo CNP   naproxen (NAPROSYN) 500 MG tablet Take 1 tablet by mouth 2 times daily (with meals) 1/4/21   Luis Miguel Louise PA-C   ibuprofen (ADVIL;MOTRIN) 800 MG tablet Take 1 tablet by mouth every 8 hours as needed for Pain 6/2/20   Loida Patel MD   lidocaine (LIDODERM) 5 % Place 1 patch onto the skin daily 12 hours on, 12 hours off. 6/2/20   Loida Patel MD   metoprolol succinate (TOPROL XL) 50 MG extended release tablet Take 50 mg by mouth daily    Historical Provider, MD   tiotropium (SPIRIVA RESPIMAT) 2.5 MCG/ACT AERS inhaler Inhale 2 puffs into the lungs daily    Historical Provider, MD   carboxymethylcellulose 1 % ophthalmic solution Place 1 drop into both eyes 3 times daily as needed for Dry Eyes     Historical Provider, MD   ketotifen (ZADITOR) 0.025 % ophthalmic solution Place 1 drop into both eyes 2 times daily as needed (itchy eyes)     Historical Provider, MD   isosorbide mononitrate (IMDUR) 60 MG extended release tablet Take 30 mg by mouth daily Indications: 1/2 tablet (30mg)     Historical Provider, MD   finasteride (PROSCAR) 5 MG tablet Take 5 mg by mouth daily    Historical Provider, MD   tamsulosin (FLOMAX) 0.4 MG capsule Take 0.4 mg by mouth daily    Historical Provider, MD   fluticasone (FLONASE) 50 MCG/ACT nasal spray 1 spray by Nasal route 2 times daily  Patient taking differently: 1 spray by Nasal route 2 times daily as needed for Rhinitis  5/31/16   Francisco Connor DO   albuterol sulfate  (90 BASE) MCG/ACT inhaler Inhale 2 puffs into the lungs every 6 hours as needed for Wheezing    Historical Provider, MD   albuterol (PROVENTIL) (2.5 MG/3ML) 0.083% nebulizer solution Take 2.5 mg by nebulization every 6 hours as needed for Wheezing    Historical Provider, MD   aspirin 81 MG EC tablet Take 81 mg by mouth daily    Historical Provider, MD   losartan (COZAAR) 100 MG tablet Take 100 mg by mouth daily     Historical Provider, MD   nitroGLYCERIN (NITROSTAT) 0.4 MG SL tablet Place 0.4 mg under the tongue every 5 minutes as needed for Chest pain    Historical Provider, MD   FLUoxetine (PROZAC) 20 MG capsule Take 40 mg by mouth daily     Historical Provider, MD   furosemide (LASIX) 20 MG tablet Take 20 mg by mouth daily as needed (swelling)     Historical Provider, MD   clopidogrel (PLAVIX) 75 MG tablet Take 75 mg by mouth daily    Historical Provider, MD   atorvastatin (LIPITOR) 80 MG tablet Take 40 mg by mouth daily (Pt takes one-half of an 80mg tab = 40mg)    Historical Provider, MD   rOPINIRole (REQUIP) 0.25 MG tablet Take 0.25 mg by mouth nightly as needed     Historical Provider, MD   budesonide-formoterol (SYMBICORT) 160-4.5 MCG/ACT AERO Inhale 2 puffs into the lungs 2 times daily.     Historical Provider, MD       Current Medications:   Current Facility-Administered Medications: 0.9 % sodium chloride bolus, 1,000 mL, Intravenous, Once    REVIEW OF SYSTEMS:       CONSTITUTIONAL: negative for fatigue and malaise   EYES: negative for double vision and photophobia    HEENT: negative for tinnitus and sore throat   RESPIRATORY: negative for cough, shortness of breath   CARDIOVASCULAR: negative for chest pain, palpitations   GASTROINTESTINAL: negative for nausea, vomiting   GENITOURINARY: negative for incontinence   MUSCULOSKELETAL: negative for neck or back pain   NEUROLOGICAL: negative for seizures   PSYCHIATRIC: negative for fatigue     Review of systems otherwise negative. PHYSICAL EXAM:       BP (!) 145/94   Pulse 77   Temp 98.4 °F (36.9 °C) (Oral)   Resp 27   SpO2 99%     Physical Exam  Vitals and nursing note reviewed. Constitutional:       General: He is not in acute distress. Appearance: He is well-developed. He is not diaphoretic. HENT:      Head: Normocephalic and atraumatic. Right Ear: External ear normal.      Left Ear: External ear normal.   Eyes:      General: No scleral icterus. Right eye: No discharge. Left eye: No discharge. Conjunctiva/sclera: Conjunctivae normal.      Pupils: Pupils are equal, round, and reactive to light. Pulmonary:      Effort: Pulmonary effort is normal.   Abdominal:      General: There is no distension. Palpations: Abdomen is soft. Tenderness: There is no abdominal tenderness. There is no guarding. Musculoskeletal:         General: No tenderness or deformity. Normal range of motion. Cervical back: Normal range of motion. Skin:     General: Skin is warm and dry. Capillary Refill: Capillary refill takes less than 2 seconds. Findings: No erythema or rash. Neurological:      Mental Status: He is alert and oriented to person, place, and time. GCS: GCS eye subscore is 4. GCS verbal subscore is 5. GCS motor subscore is 6.       Cranial Nerves: No cranial nerve deficit. Sensory: No sensory deficit. Motor: No abnormal muscle tone or seizure activity. Coordination: Coordination normal.      Deep Tendon Reflexes: Reflexes are normal and symmetric. Reflexes normal.      Reflex Scores:       Tricep reflexes are 2+ on the right side and 2+ on the left side. Bicep reflexes are 2+ on the right side and 2+ on the left side. Brachioradialis reflexes are 2+ on the right side and 2+ on the left side. Patellar reflexes are 2+ on the right side and 2+ on the left side. Achilles reflexes are 2+ on the right side and 2+ on the left side.   Psychiatric:         Behavior: Behavior normal.                Modified [place] Score Scale:     [] Zero: No symptoms at all   [] 1: No significant disability despite symptoms; able to carry out all usual duties and activities   [x] 2: Slight disability; unable to carry out all previous activities, but able to look after own affairs without assistance   [] 3:Moderate disability; requiring some help, but able to walk without assistance   [] 4: Moderately severe disability; unable to walk and attend to bodily needs without assistance   [] 5:Severe disability; bedridden, incontinent and requiring constant nursing care and attention    LABS AND IMAGING:     CBC with Differential:    Lab Results   Component Value Date    WBC 10.7 07/13/2021    RBC 4.86 07/13/2021    RBC 4.88 05/03/2012    HGB 11.7 07/13/2021    HCT 37.4 07/13/2021    PLT See Reflexed IPF Result 07/13/2021     05/03/2012    MCV 77.0 07/13/2021    MCH 24.1 07/13/2021    MCHC 31.3 07/13/2021    RDW 15.7 07/13/2021    LYMPHOPCT 13 07/13/2021    MONOPCT 7 07/13/2021    BASOPCT 1 07/13/2021    MONOSABS 0.73 07/13/2021    LYMPHSABS 1.41 07/13/2021    EOSABS 0.07 07/13/2021    BASOSABS 0.07 07/13/2021    DIFFTYPE NOT REPORTED 07/13/2021     BMP:    Lab Results   Component Value Date     07/13/2021    K 4.0 07/13/2021     07/13/2021    CO2 25 07/13/2021    BUN 29 07/13/2021    LABALBU 3.8 07/13/2021    CREATININE 1.47 07/13/2021    CREATININE 1.46 07/13/2021    CALCIUM 9.3 07/13/2021    GFRAA 57 07/13/2021    LABGLOM 47 07/13/2021    GLUCOSE 192 07/13/2021    GLUCOSE 122 05/03/2012       Radiology Review:    1.) CT Head without contrast: (Reviewed at 130PM)  Impression   1. New ovoid low-attenuation area in the left frontal corona radiata   compatible with acute/subacute infarction.  This measures 2 x 1.4 cm.  No   associated hemorrhage. 2. Diffuse parenchymal volume loss and sequela of severe chronic   microvascular ischemic changes. 2.) CTA Head/Neck: (Reviewed at [facility])  Impression   1. Severe stenosis in the V1 segment of the left vertebral artery. 2. Extensive intracranial atherosclerotic plaque with near complete occlusion   of the right PCA. 3. Severe stenosis in the proximal A3 segments of the ACAs bilaterally. 4. Moderate to severe proximal M2 segment stenosis in the right MCA. Moderate stenosis in the M1 and M2 segments of the left MCA. 5. Moderate stenosis in the P1/P2 junction of the left PCA. 6. Enlarged, heterogeneous thyroid gland with 2.6 cm left thyroid lobe   nodule.  Nonemergent/outpatient thyroid ultrasound recommended. ASSESSMENT AND PLAN:       Patient Active Problem List   Diagnosis    Centrilobular emphysema ([facility])    Mixed restrictive and obstructive lung disease ([address]       Assessment                 77 y.o. male who presents after being found down by his sister with right sided paresis, right facial droop and severe dysarthria concerning for an acute stroke. 1. Last Known Well (date and time): x2 days ago 7/11/21    2. Candidate for IV tPA therapy     Yes []     No  [x] due to the following exclusion criteria: out of window    3.  Candidate for Thrombectomy    Yes []      No [x] due to the following exclusion criteria: out of window    - Discussed with Dr. Dasha Doe     Recommendations:    []

## 2021-07-13 NOTE — ED PROVIDER NOTES
[facility]  Emergency Department Encounter  EmergencyMedicine Resident     Pt Name:Anna Cope  MRN: [id number]  [place] 1945  Date of evaluation: 21  PCP:  No primary care provider on file. CHIEF COMPLAINT       Chief Complaint   Patient presents with    Cerebrovascular Accident    Fall       HISTORY OF PRESENT ILLNESS  (Location/Symptom, Timing/Onset, Context/Setting, Quality, Duration, Modifying Factors, Severity.)      Leesa Garduno is a 77 y.o. male who presents with generalized weakness was found on floor for 2 days, right-sided weakness right-sided facial droop, able to move right arm and weakness of right leg. Patient is a poor historian having difficult with speech exam limited secondary to mental status. PAST MEDICAL / SURGICAL / SOCIAL / FAMILY HISTORY      has a past medical history of Centrilobular emphysema ([address] COPD (chronic obstructive pulmonary disease) ([address] Depression, Diabetes mellitus ([address] Former smoker, Hyperlipidemia, Hypertension, Mixed restrictive and obstructive lung disease ([address] Need for pneumococcal vaccine, and Personal history of tobacco use.     has a past surgical history that includes Neck surgery; Toe amputation (Right, greater); and Cardiac surgery (2015).     Social History     Socioeconomic History    Marital status:      Spouse name: Not on file    Number of children: Not on file    Years of education: Not on file    Highest education level: Not on file   Occupational History    Not on file   Tobacco Use    Smoking status: Former Smoker     Packs/day: 0.75     Years: 56.00     Pack years: 42.00     Start date: 1957     Quit date: 2013     Years since quittin.0    Smokeless tobacco: Never Used   Vaping Use    Vaping Use: Never used   Substance and Sexual Activity    Alcohol use: No    Drug use: No    Sexual activity: Not on file   Other Topics Concern    Not on file   Social History Narrative    Not on file     Social Determinants of Health     Financial Resource Strain:     Difficulty of Paying Living Expenses:    Food Insecurity:     Worried About Running Out of Food in the Last Year:     [address] in the Last Year:    Transportation Needs:     Lack of Transportation (Medical):  Lack of Transportation (Non-Medical):    Physical Activity:     Days of Exercise per Week:     Minutes of Exercise per Session:    Stress:     Feeling of Stress :    Social Connections:     Frequency of Communication with Friends and Family:     Frequency of Social Gatherings with Friends and Family:     Attends [employer] Services:     Active Member of Clubs or Organizations:     Attends Club or Organization Meetings:     Marital Status:    Intimate Partner Violence:     Fear of Current or Ex-Partner:     Emotionally Abused:     Physically Abused:     Sexually Abused:        No family history on file. Allergies:  Patient has no known allergies. Home Medications:  Prior to Admission medications    Medication Sig Start Date End Date Taking?  Authorizing Provider   metoprolol tartrate (LOPRESSOR) 50 MG tablet Take 25 mg by mouth daily    Historical Provider, MD   tiZANidine (ZANAFLEX) 2 MG tablet Take 1 tablet by mouth 4 times daily as needed (muscle spasms) 5/7/21   JOLIE Navas CNP   naproxen (NAPROSYN) 500 MG tablet Take 1 tablet by mouth 2 times daily (with meals) 1/4/21   Carlos Kwong PA-C   ibuprofen (ADVIL;MOTRIN) 800 MG tablet Take 1 tablet by mouth every 8 hours as needed for Pain 6/2/20   Angela Miller MD   lidocaine (LIDODERM) 5 % Place 1 patch onto the skin daily 12 hours on, 12 hours off. 6/2/20   Angela Miller MD   metoprolol succinate (TOPROL XL) 50 MG extended release tablet Take 50 mg by mouth daily    Historical Provider, MD   tiotropium (SPIRIVA RESPIMAT) 2.5 MCG/ACT AERS inhaler Inhale 2 puffs into the lungs daily    Historical Provider, MD carboxymethylcellulose 1 % ophthalmic solution Place 1 drop into both eyes 3 times daily as needed for Dry Eyes     Historical Provider, MD   ketotifen (ZADITOR) 0.025 % ophthalmic solution Place 1 drop into both eyes 2 times daily as needed (itchy eyes)     Historical Provider, MD   isosorbide mononitrate (IMDUR) 60 MG extended release tablet Take 30 mg by mouth daily Indications: 1/2 tablet (30mg)     Historical Provider, MD   finasteride (PROSCAR) 5 MG tablet Take 5 mg by mouth daily    Historical Provider, MD   tamsulosin (FLOMAX) 0.4 MG capsule Take 0.4 mg by mouth daily    Historical Provider, MD   fluticasone (FLONASE) 50 MCG/ACT nasal spray 1 spray by Nasal route 2 times daily  Patient taking differently: 1 spray by Nasal route 2 times daily as needed for Rhinitis  5/31/16   Francisco Connor DO   albuterol sulfate  (90 BASE) MCG/ACT inhaler Inhale 2 puffs into the lungs every 6 hours as needed for Wheezing    Historical Provider, MD   albuterol (PROVENTIL) (2.5 MG/3ML) 0.083% nebulizer solution Take 2.5 mg by nebulization every 6 hours as needed for Wheezing    Historical Provider, MD   aspirin 81 MG EC tablet Take 81 mg by mouth daily    Historical Provider, MD   losartan (COZAAR) 100 MG tablet Take 100 mg by mouth daily     Historical Provider, MD   nitroGLYCERIN (NITROSTAT) 0.4 MG SL tablet Place 0.4 mg under the tongue every 5 minutes as needed for Chest pain    Historical Provider, MD   FLUoxetine (PROZAC) 20 MG capsule Take 40 mg by mouth daily     Historical Provider, MD   furosemide (LASIX) 20 MG tablet Take 20 mg by mouth daily as needed (swelling)     Historical Provider, MD   clopidogrel (PLAVIX) 75 MG tablet Take 75 mg by mouth daily    Historical Provider, MD   atorvastatin (LIPITOR) 80 MG tablet Take 40 mg by mouth daily (Pt takes one-half of an 80mg tab = 40mg)    Historical Provider, MD   rOPINIRole (REQUIP) 0.25 MG tablet Take 0.25 mg by mouth nightly as needed     Historical Provider, MD   budesonide-formoterol (SYMBICORT) 160-4.5 MCG/ACT AERO Inhale 2 puffs into the lungs 2 times daily. Historical Provider, MD       REVIEW OF SYSTEMS    (2-9 systems for level 4, 10 or more for level 5)      Review of Systems   Unable to perform ROS: Acuity of condition       PHYSICAL EXAM   (up to 7 for level 4, 8 or more for level 5)      INITIAL VITALS:   BP (!) 173/116   Pulse 81   Temp 98.4 °F (36.9 °C) (Oral)   Resp 27   SpO2 100%     Physical Exam  Vitals reviewed. Constitutional:       General: He is not in acute distress. Appearance: He is obese. He is ill-appearing. He is not toxic-appearing or diaphoretic. HENT:      Head: Normocephalic and atraumatic. Mouth/Throat:      Mouth: Mucous membranes are moist.   Eyes:      Extraocular Movements: Extraocular movements intact. Pupils: Pupils are equal, round, and reactive to light. Comments: Patient initially had loss of vision in right eye   Cardiovascular:      Rate and Rhythm: Normal rate. Pulmonary:      Effort: Pulmonary effort is normal.      Breath sounds: Normal breath sounds. Abdominal:      General: Abdomen is flat. There is no distension. Palpations: Abdomen is soft. Tenderness: There is no abdominal tenderness. There is no guarding. Musculoskeletal:         General: No swelling, tenderness, deformity or signs of injury. Skin:     Findings: Bruising present. Comments: Patient has bruising to arm chest and flank consistent with pressure wounds   Neurological:      Mental Status: He is alert.       Comments: Right arm is 0 out of 5, decreased sensation, right lower extremity is 3/5 strength, sensation intact, patient has difficulty with finger-to-nose of left upper extremity, dysarthria         DIFFERENTIAL  DIAGNOSIS     PLAN (LABS / IMAGING / EKG):  Orders Placed This Encounter   Procedures    XR CHEST PORTABLE    CT HEAD WO CONTRAST    CTA HEAD NECK W CONTRAST    MRI BRAIN WO k/uL    Absolute Mono # 0.73 0.10 - 1.20 k/uL    Absolute Eos # 0.07 0.00 - 0.44 k/uL    Basophils Absolute 0.07 0.00 - 0.20 k/uL    Absolute Immature Granulocyte 0.08 0.00 - 0.30 k/uL   COMPREHENSIVE METABOLIC PANEL   Result Value Ref Range    Glucose 192 (H) 70 - 99 mg/dL    BUN 29 (H) 8 - 23 mg/dL    CREATININE 1.47 (H) 0.70 - 1.20 mg/dL    Bun/Cre Ratio NOT REPORTED 9 - 20    Calcium 9.3 8.6 - 10.4 mg/dL    Sodium 142 135 - 144 mmol/L    Potassium 4.0 3.7 - 5.3 mmol/L    Chloride 107 98 - 107 mmol/L    CO2 25 20 - 31 mmol/L    Anion Gap 10 9 - 17 mmol/L    Alkaline Phosphatase 111 40 - 129 U/L    ALT 65 (H) 5 - 41 U/L     (H) <40 U/L    Total Bilirubin 1.03 0.3 - 1.2 mg/dL    Total Protein 7.2 6.4 - 8.3 g/dL    Albumin 3.8 3.5 - 5.2 g/dL    Albumin/Globulin Ratio 1.1 1.0 - 2.5    GFR Non- 47 (L) >60 mL/min    GFR  57 (L) >60 mL/min    GFR Comment          GFR Staging NOT REPORTED    LACTIC ACID, WHOLE BLOOD   Result Value Ref Range    Lactic Acid, Whole Blood 2.4 (H) 0.7 - 2.1 mmol/L   Troponin   Result Value Ref Range    Troponin, High Sensitivity 52 (HH) 0 - 22 ng/L    Troponin T NOT REPORTED <0.03 ng/mL    Troponin Interp NOT REPORTED    ELECTROLYTES PLUS   Result Value Ref Range    POC Sodium 145 138 - 146 mmol/L    POC Potassium 3.9 3.5 - 4.5 mmol/L    POC Chloride 110 (H) 98 - 107 mmol/L    POC TCO2 27 22 - 30 mmol/L    Anion Gap 9 7 - 16 mmol/L   Hemoglobin and hematocrit, blood   Result Value Ref Range    POC Hemoglobin 13.2 (L) 13.5 - 17.5 g/dL    POC Hematocrit 39 (L) 41 - 53 %   CALCIUM, IONIC (POC)   Result Value Ref Range    POC Ionized Calcium 1.18 1.15 - 1.33 mmol/L   Immature Platelet Fraction   Result Value Ref Range    Platelet, Immature Fraction 44.9 (H) 1.1 - 10.3 %    Platelet, Fluorescence 4 (LL) 138 - 453 k/uL   Troponin   Result Value Ref Range    Troponin, High Sensitivity 46 (H) 0 - 22 ng/L    Troponin T NOT REPORTED <0.03 ng/mL    Troponin Interp NOT REPORTED    Venous Blood Gas, POC   Result Value Ref Range    pH, David 7.387 7.320 - 7.430    pCO2, David 43.4 41.0 - 51.0 mm Hg    pO2, David 15.2 (L) 30 - 50 mm Hg    HCO3, Venous 26.1 22.0 - 29.0 mmol/L    Total CO2, Venous NOT REPORTED 23.0 - 30.0 mmol/L    Negative Base Excess, David NOT REPORTED 0.0 - 2.0    Positive Base Excess, David 1 0.0 - 3.0    O2 Sat, David 19 (L) 60.0 - 85.0 %    O2 Device/Flow/% NOT REPORTED     Adelfo Test NOT REPORTED     Sample Site NOT REPORTED     Mode NOT REPORTED     FIO2 NOT REPORTED     Pt Temp NOT REPORTED     POC pH Temp NOT REPORTED     POC pCO2 Temp NOT REPORTED mm Hg    POC pO2 Temp NOT REPORTED mm Hg   Creatinine W/GFR Point of Care   Result Value Ref Range    POC Creatinine 1.46 (H) 0.51 - 1.19 mg/dL    GFR Comment NOT REPORTED >60 mL/min    GFR Non- NOT REPORTED >60 mL/min    GFR Comment         POCT urea (BUN)   Result Value Ref Range    POC BUN 34 (H) 8 - 26 mg/dL   Lactic Acid, POC   Result Value Ref Range    POC Lactic Acid 1.83 (H) 0.56 - 1.39 mmol/L   POCT Glucose   Result Value Ref Range    POC Glucose 207 (H) 74 - 100 mg/dL           RADIOLOGY:  Impression: Clear lungs. Cardiomegaly. No acute cardiopulmonary abnormality. EXAMINATION:   CTA OF THE HEAD AND NECK WITH CONTRAST 7/13/2021 1:11 pm:       TECHNIQUE:   CTA of the head and neck was performed with the administration of intravenous   contrast. Multiplanar reformatted images are provided for review.  MIP images   are provided for review. Stenosis of the internal carotid arteries measured   using NASCET criteria. Dose modulation, iterative reconstruction, and/or   weight based adjustment of the mA/kV was utilized to reduce the radiation   dose to as low as reasonably achievable.  3D surface reformatted and curved   MIP images were submitted for review.       COMPARISON:   None.       HISTORY:   ORDERING SYSTEM PROVIDED HISTORY: stroke   TECHNOLOGIST PROVIDED HISTORY:   stroke   Decision Support Exception - unselect if not a suspected or confirmed   emergency medical condition->Emergency Medical Condition (MA)   Reason for Exam: stroke, Cerebrovascular Accident; Fall       FINDINGS:       CTA NECK:       AORTIC ARCH/ARCH VESSELS: No dissection or arterial injury.  No significant   stenosis of the brachiocephalic or subclavian arteries.       CAROTID ARTERIES: No dissection, arterial injury, or hemodynamically   significant stenosis by NASCET criteria.       VERTEBRAL ARTERIES: No dissection, arterial injury, or significant stenosis   in the right vertebral artery.  Severe stenosis in the V1 segment of the left   vertebral artery.       SOFT TISSUES: The lung apices are clear.  No cervical or superior mediastinal   lymphadenopathy.  The larynx and pharynx are unremarkable.  No acute   abnormality of the salivary glands.  Thyroid gland is diffusely enlarged and   heterogeneous and contains left thyroid lobe nodule measuring 2.6 cm.       BONES: Multilevel degenerative spondylosis throughout the cervical spine with   fusion at C5-C6.           CTA HEAD:       ANTERIOR CIRCULATION: Calcified atherosclerotic plaque in the cavernous   segments of the internal carotid arteries bilaterally results in   mild-to-moderate cavernous ICA stenosis bilaterally.  Mild stenosis in the   proximal A2 segment of the right anterior cerebral artery.  Severe stenosis   in the proximal A3 segments of the anterior cerebral arteries bilaterally.       Moderate-to-severe stenosis within M2 segment of the right middle cerebral   artery.  Moderate stenosis in the M1 and proximal M2 segments of the left   middle cerebral artery.       POSTERIOR CIRCULATION: No significant stenosis in the right intradural   vertebral artery.  Severe stenosis in the intracranial left vertebral artery.    Mild stenosis in the basilar artery.  Moderate stenosis in the P1/P2 junction   of the left PCA.  Severe stenosis and near complete occlusion of the right imaging obtained. Concern for rhabdo, normal saline was ordered CK myoglobin elevated. CT scan shows findings consistent with CVA. Neurology requested the patient be admitted to medicine service with neurology following. Patient not a TPA candidate. Patient mated to medicine service. Patient's troponin and creatinine elevated. Medicine team aware will trend troponins, likely secondary to kidney injury      PROCEDURES:  None    CONSULTS:  IP CONSULT TO STROKE TEAM  IP CONSULT TO INTERNAL MEDICINE  IP CONSULT TO CASE MANAGEMENT  IP CONSULT TO ONCOLOGY    CRITICAL CARE:  None    FINAL IMPRESSION      1. Cerebrovascular accident (CVA), unspecified mechanism ([facility] [facility]    2. Traumatic rhabdomyolysis, initial encounter ([facility] [address] [facility]          DISPOSITION / [address] Admitted 07/13/2021 01:07:53 PM      PATIENT REFERRED TO:  No follow-up provider specified.     DISCHARGE MEDICATIONS:  Current Discharge Medication List          Rico Tracy DO  Emergency Medicine Resident    (Please note that portions of thisnote were completed with a voice recognition program.  Efforts were made to edit the dictations but occasionally words are mis-transcribed.)     Rico Tracy DO  Resident  07/13/21 0302

## 2021-07-13 NOTE — CARE COORDINATION
Case Management Initial Discharge Plan  Valentine Abdul,             Met with:patient, pts sister Nathaniel Garcia to discuss discharge plans. Information verified: address, contacts, phone number, , insurance Yes  Insurance Provider: Hilary Cobian*    Emergency Contact/Next of Kin name & number:   Kevin Tate  2. Danae Avila      Brother/Sister  Niece/Nephew [phone]       Who are involved in patient's support system? Monica Aparicio    PCP: pts sister does not know  Date of last visit:       Discharge Planning    Living Arrangements:        Home has 1 stories  4 stairs to climb to get into front door, 0stairs to climb to reach second floor  Location of bedroom/bathroom in home main    Patient able to perform ADL's:Independent    Current Services (outpatient & in home) yes sister Nathaniel Garcia does not know name of agency  DME equipment: cane  DME provider:     Is patient receiving oral anticoagulation therapy?  unknown    If indicated:   Physician managing anticoagulation treatment: n/a  Where does patient obtain lab work for ATC treatment? n/a      Potential Assistance Needed:       Patient agreeable to home care: Yes  Freedom of choice provided:  yes    Prior SNF/Rehab Placement and Facility: none  Agreeable to SNF/Rehab: sister will discuss with pt. Freedom of choice provided: yes     Evaluation: no    Expected Discharge date:       Patient expects to be discharged to: If home: is the family and/or caregiver wiling & able to provide support at home? Nathaniel Garcia sister  Who will be providing this support? sister    Follow Up Appointment: Best Day/ Time:      Transportation provider: family  Transportation arrangements needed for discharge: tbd    Readmission Risk              Risk of Unplanned Readmission:  12             Does patient have a readmission risk score greater than 14?: No  If yes, follow-up appointment must be made within 7 days of discharge.      Goals of Care: to

## 2021-07-13 NOTE — CONSULTS
Today's Date: 7/13/2021  Patient Name: Madhu Willson  Date of admission: 7/13/2021 11:18 AM  Patient's age: 77 y.o., 1945  Admission Dx: Acute cerebrovascular accident (CVA) ([facility] [I63.9]  Acute cerebrovascular accident (CVA) ([facility] [I63.9]    Reason for Consult: management recommendations  Requesting Physician: Gilford Meth, MD    CHIEF COMPLAINT: Acute stroke. Thrombocytopenia. History Obtained From:  patient, electronic medical record    HISTORY OF PRESENT ILLNESS:      The patient is a 77 y.o.  male who is admitted to the hospital for acute stroke    Brought to the hospital after being found down on the floor. Patient was last seen well about 2 days ago. Patient has a history of peripheral vascular disease coronary artery disease hypertension COPD and previous strokes. Patient has significant contracture on the right side also right-sided facial droop with severe dysarthria limiting history of presenting illness. Patient's CBC at presentation shows hemoglobin 11.7 with MCV 77 WBC count 10 and platelet count of 4 with immature platelet fraction of 44%. Patient previous CBC noted in the system from June 2020 shows blood count of 160. Patient was also noted to have elevated myoglobin and CK. Creatinine is 1.74 lactic acid is 2.4. Patient has dysarthria not able to give much history. Past Medical History:   has a past medical history of Centrilobular emphysema ([facility] COPD (chronic obstructive pulmonary disease) ([facility] Depression, Diabetes mellitus ([facility] Former smoker, Hyperlipidemia, Hypertension, Mixed restrictive and obstructive lung disease ([facility] Need for pneumococcal vaccine, and Personal history of tobacco use. Past Surgical History:   has a past surgical history that includes Neck surgery; Toe amputation (Right, greater); and Cardiac surgery (07/2015). Medications:    Prior to Admission medications    Medication Sig Start Date End Date Taking? Authorizing Provider   metoprolol tartrate (LOPRESSOR) 50 MG tablet Take 25 mg by mouth daily    Historical Provider, MD   tiZANidine (ZANAFLEX) 2 MG tablet Take 1 tablet by mouth 4 times daily as needed (muscle spasms) 5/7/21   JOLIE Leo CNP   naproxen (NAPROSYN) 500 MG tablet Take 1 tablet by mouth 2 times daily (with meals) 1/4/21   Fredy Unger PA-C   ibuprofen (ADVIL;MOTRIN) 800 MG tablet Take 1 tablet by mouth every 8 hours as needed for Pain 6/2/20   Ky Toth MD   lidocaine (LIDODERM) 5 % Place 1 patch onto the skin daily 12 hours on, 12 hours off. 6/2/20   Ky Toth MD   metoprolol succinate (TOPROL XL) 50 MG extended release tablet Take 50 mg by mouth daily    Historical Provider, MD   tiotropium (SPIRIVA RESPIMAT) 2.5 MCG/ACT AERS inhaler Inhale 2 puffs into the lungs daily    Historical Provider, MD   carboxymethylcellulose 1 % ophthalmic solution Place 1 drop into both eyes 3 times daily as needed for Dry Eyes     Historical Provider, MD   ketotifen (ZADITOR) 0.025 % ophthalmic solution Place 1 drop into both eyes 2 times daily as needed (itchy eyes)     Historical Provider, MD   isosorbide mononitrate (IMDUR) 60 MG extended release tablet Take 30 mg by mouth daily Indications: 1/2 tablet (30mg)     Historical Provider, MD   finasteride (PROSCAR) 5 MG tablet Take 5 mg by mouth daily    Historical Provider, MD   tamsulosin (FLOMAX) 0.4 MG capsule Take 0.4 mg by mouth daily    Historical Provider, MD   fluticasone (FLONASE) 50 MCG/ACT nasal spray 1 spray by Nasal route 2 times daily  Patient taking differently: 1 spray by Nasal route 2 times daily as needed for Rhinitis  5/31/16   Francisco Connor DO   albuterol sulfate  (90 BASE) MCG/ACT inhaler Inhale 2 puffs into the lungs every 6 hours as needed for Wheezing    Historical Provider, MD   albuterol (PROVENTIL) (2.5 MG/3ML) 0.083% nebulizer solution Take 2.5 mg by nebulization every 6 hours as needed for Wheezing    Historical Provider, MD   aspirin 81 MG EC tablet Take 81 mg by mouth daily    Historical Provider, MD   losartan (COZAAR) 100 MG tablet Take 100 mg by mouth daily     Historical Provider, MD   nitroGLYCERIN (NITROSTAT) 0.4 MG SL tablet Place 0.4 mg under the tongue every 5 minutes as needed for Chest pain    Historical Provider, MD   FLUoxetine (PROZAC) 20 MG capsule Take 40 mg by mouth daily     Historical Provider, MD   furosemide (LASIX) 20 MG tablet Take 20 mg by mouth daily as needed (swelling)     Historical Provider, MD   clopidogrel (PLAVIX) 75 MG tablet Take 75 mg by mouth daily    Historical Provider, MD   atorvastatin (LIPITOR) 80 MG tablet Take 40 mg by mouth daily (Pt takes one-half of an 80mg tab = 40mg)    Historical Provider, MD   rOPINIRole (REQUIP) 0.25 MG tablet Take 0.25 mg by mouth nightly as needed     Historical Provider, MD   budesonide-formoterol (SYMBICORT) 160-4.5 MCG/ACT AERO Inhale 2 puffs into the lungs 2 times daily.     Historical Provider, MD     Current Facility-Administered Medications   Medication Dose Route Frequency Provider Last Rate Last Admin    0.9 % sodium chloride bolus  1,000 mL Intravenous Once Wei Osborne MD        albuterol sulfate  (90 Base) MCG/ACT inhaler 2 puff  2 puff Inhalation Q6H PRN Wei Osborne MD        aspirin EC tablet 81 mg  81 mg Oral Daily Wei Osborne MD        atorvastatin (LIPITOR) tablet 80 mg  80 mg Oral Once Wei Osborne MD        budesonide-formoterol (SYMBICORT) 160-4.5 MCG/ACT inhaler 2 puff  2 puff Inhalation BID Wei Osborne MD        tiotropium (SPIRIVA RESPIMAT) 2.5 MCG/ACT inhaler 2 puff  2 puff Inhalation Daily Wei Osborne MD        sodium chloride flush 0.9 % injection 5-40 mL  5-40 mL Intravenous 2 times per day Wei Osborne MD        sodium chloride flush 0.9 % injection 5-40 mL  5-40 mL Intravenous PRN Wei Osborne MD        0.9 % sodium chloride infusion  25 mL Intravenous PRN Merritt Vargas MD        ondansetron (ZOFRAN-ODT) disintegrating tablet 4 mg  4 mg Oral Q8H PRN Merritt Vargas MD        Or    ondansetron [facility]) injection 4 mg  4 mg Intravenous Q6H PRN Merritt Vargas MD        polyethylene glycol (GLYCOLAX) packet 17 g  17 g Oral Daily PRN Merritt Vargas MD        acetaminophen (TYLENOL) tablet 650 mg  650 mg Oral Q6H PRN Merritt Vargas MD        Or    acetaminophen (TYLENOL) suppository 650 mg  650 mg Rectal Q6H PRN Merritt Vargas MD        0.9 % sodium chloride infusion   Intravenous Continuous Merritt Vargas  mL/hr at 21 1647 New Bag at 21 1647    potassium chloride (KLOR-CON M) extended release tablet 40 mEq  40 mEq Oral PRN Merritt Vargas MD        Or    potassium bicarb-citric acid (EFFER-K) effervescent tablet 40 mEq  40 mEq Oral PRN Merritt Vargas MD        Or    potassium chloride 10 mEq/100 mL IVPB (Peripheral Line)  10 mEq Intravenous PRN Merritt Vargas MD        famotidine (PEPCID) injection 20 mg  20 mg Intravenous BID Merritt Vargas MD           Allergies:  Patient has no known allergies. Social History:   reports that he quit smoking about 8 years ago. He started smoking about 64 years ago. He has a 42.00 pack-year smoking history. He has never used smokeless tobacco. He reports that he does not drink alcohol and does not use drugs.      Family History: Hypertension    REVIEW OF SYSTEMS:      Patient's review of system is limited due to dysarthria    PHYSICAL EXAM:        BP (!) 118/97   Pulse 81   Temp 98.3 °F (36.8 °C) (Oral)   Resp 16   SpO2 99%    Temp (24hrs), Av.2 °F (36.8 °C), Min:98 °F (36.7 °C), Max:98.4 °F (36.9 °C)      General appearance -ill appearing, no in pain or distress   Mental status -alert and awake  Eyes - pupils equal and reactive, extraocular eye movements intact   Ears - bilateral TM's and external ear canals normal   Mouth - mucous membranes moist, pharynx normal without lesions   Neck - supple, no significant adenopathy   Lymphatics - no palpable lymphadenopathy, no hepatosplenomegaly   Chest -decreased breathing sounds  Heart - normal rate, regular rhythm, normal S1, S2, no murmurs  Abdomen - soft, nontender, nondistended, no masses or organomegaly   Neurological -speech altered. Patient is awake.   Follows simple commands  Musculoskeletal - no joint tenderness, deformity or swelling   Extremities -right upper and lower extremity contracture  Skin - normal coloration and turgor, no rashes, no suspicious skin lesions noted ,      DATA:      Labs:     Results for orders placed or performed during the hospital encounter of 07/13/21   MYOGLOBIN, SERUM   Result Value Ref Range    Myoglobin 3,030 (H) 28 - 72 ng/mL   CK   Result Value Ref Range    Total CK 7,454 (H) 39 - 308 U/L   CBC WITH AUTO DIFFERENTIAL   Result Value Ref Range    WBC 10.7 3.5 - 11.3 k/uL    RBC 4.86 4.21 - 5.77 m/uL    Hemoglobin 11.7 (L) 13.0 - 17.0 g/dL    Hematocrit 37.4 (L) 40.7 - 50.3 %    MCV 77.0 (L) 82.6 - 102.9 fL    MCH 24.1 (L) 25.2 - 33.5 pg    MCHC 31.3 28.4 - 34.8 g/dL    RDW 15.7 (H) 11.8 - 14.4 %    Platelets See Reflexed IPF Result 138 - 453 k/uL    MPV NOT REPORTED 8.1 - 13.5 fL    NRBC Automated 0.2 (H) 0.0 per 100 WBC    Differential Type NOT REPORTED     WBC Morphology NOT REPORTED     RBC Morphology ANISOCYTOSIS PRESENT     Platelet Estimate NOT REPORTED     Seg Neutrophils 78 (H) 36 - 65 %    Lymphocytes 13 (L) 24 - 43 %    Monocytes 7 3 - 12 %    Eosinophils % 1 1 - 4 %    Basophils 1 0 - 2 %    Immature Granulocytes 1 (H) 0 %    Segs Absolute 8.33 (H) 1.50 - 8.10 k/uL    Absolute Lymph # 1.41 1.10 - 3.70 k/uL    Absolute Mono # 0.73 0.10 - 1.20 k/uL    Absolute Eos # 0.07 0.00 - 0.44 k/uL    Basophils Absolute 0.07 0.00 - 0.20 k/uL    Absolute Immature Granulocyte 0.08 0.00 - 0.30 k/uL   COMPREHENSIVE METABOLIC PANEL   Result Value Ref Range 30.0 mmol/L    Negative Base Excess, David NOT REPORTED 0.0 - 2.0    Positive Base Excess, David 1 0.0 - 3.0    O2 Sat, David 19 (L) 60.0 - 85.0 %    O2 Device/Flow/% NOT REPORTED     Adelfo Test NOT REPORTED     Sample Site NOT REPORTED     Mode NOT REPORTED     FIO2 NOT REPORTED     Pt Temp NOT REPORTED     POC pH Temp NOT REPORTED     POC pCO2 Temp NOT REPORTED mm Hg    POC pO2 Temp NOT REPORTED mm Hg   Creatinine W/GFR Point of Care   Result Value Ref Range    POC Creatinine 1.46 (H) 0.51 - 1.19 mg/dL    GFR Comment NOT REPORTED >60 mL/min    GFR Non- NOT REPORTED >60 mL/min    GFR Comment         POCT urea (BUN)   Result Value Ref Range    POC BUN 34 (H) 8 - 26 mg/dL   Lactic Acid, POC   Result Value Ref Range    POC Lactic Acid 1.83 (H) 0.56 - 1.39 mmol/L   POCT Glucose   Result Value Ref Range    POC Glucose 207 (H) 74 - 100 mg/dL   POC Glucose Fingerstick   Result Value Ref Range    POC Glucose 174 (H) 75 - 110 mg/dL         IMAGING DATA:    CT HEAD WO CONTRAST    Result Date: 7/13/2021  EXAMINATION: CT OF THE HEAD WITHOUT CONTRAST  7/13/2021 1:10 pm TECHNIQUE: CT of the head was performed without the administration of intravenous contrast. Dose modulation, iterative reconstruction, and/or weight based adjustment of the mA/kV was utilized to reduce the radiation dose to as low as reasonably achievable.  COMPARISON: 05/02/2021 HISTORY: ORDERING SYSTEM PROVIDED HISTORY: Last known well 2 days right-sided facial droop right-sided weakness TECHNOLOGIST PROVIDED HISTORY: Last known well 2 days right-sided facial droop right-sided weakness Decision Support Exception - unselect if not a suspected or confirmed emergency medical condition->Emergency Medical Condition (MA) Reason for Exam: Last known well 2 days right-sided facial droop right-sided weakness FINDINGS: BRAIN/VENTRICLES: Ovoid area of low attenuation in the left frontal corona radiata measures 2 x 1.4 cm, new from prior study (series 2, image 44).  No acute intracranial hemorrhage. No mass effect or midline shift. No hydrocephalus. Diffuse parenchymal volume loss with enlargement of the ventricles and cerebral sulci. Old infarction in the right basal ganglia. There are nonspecific areas of hypoattenuation within the periventricular and subcortical white matter, which likely represent chronic microvascular ischemic change. Intracranial atherosclerotic disease. ORBITS: The visualized portion of the orbits demonstrate no acute abnormality. SINUSES: The visualized paranasal sinuses and mastoid air cells demonstrate no acute abnormality. SOFT TISSUES/SKULL: No acute abnormality of the visualized skull or soft tissues. 1. New ovoid low-attenuation area in the left frontal corona radiata compatible with acute/subacute infarction. This measures 2 x 1.4 cm. No associated hemorrhage. 2. Diffuse parenchymal volume loss and sequela of severe chronic microvascular ischemic changes. Findings were discussed with Dr. Zeke Daniel at 1:31 pm on 7/13/2021. XR CHEST PORTABLE    Result Date: 7/13/2021  EXAMINATION: ONE XRAY VIEW OF THE CHEST 7/13/2021 10:30 am COMPARISON: September 24, 2019. HISTORY: ORDERING SYSTEM PROVIDED HISTORY: Found down TECHNOLOGIST PROVIDED HISTORY: Found down Reason for Exam: supine Acuity: Acute Type of Exam: Initial FINDINGS: A portable upright frontal view chest radiograph was obtained. The heart is enlarged. The mediastinal contour and pleural spaces are otherwise within normal limits. The lungs are grossly clear. There is no focal consolidation or pneumothorax. The pulmonary vascular pattern is within normal limits. No acute thoracic osseous abnormality. Clear lungs. Cardiomegaly. No acute cardiopulmonary abnormality.      CTA HEAD NECK W CONTRAST    Result Date: 7/13/2021  EXAMINATION: CTA OF THE HEAD AND NECK WITH CONTRAST 7/13/2021 1:11 pm: TECHNIQUE: CTA of the head and neck was performed with the administration of intravenous contrast. Multiplanar reformatted images are provided for review. MIP images are provided for review. Stenosis of the internal carotid arteries measured using NASCET criteria. Dose modulation, iterative reconstruction, and/or weight based adjustment of the mA/kV was utilized to reduce the radiation dose to as low as reasonably achievable. 3D surface reformatted and curved MIP images were submitted for review. COMPARISON: None. HISTORY: ORDERING SYSTEM PROVIDED HISTORY: stroke TECHNOLOGIST PROVIDED HISTORY: stroke Decision Support Exception - unselect if not a suspected or confirmed emergency medical condition->Emergency Medical Condition (MA) Reason for Exam: stroke, Cerebrovascular Accident; Fall FINDINGS: CTA NECK: AORTIC ARCH/ARCH VESSELS: No dissection or arterial injury. No significant stenosis of the brachiocephalic or subclavian arteries. CAROTID ARTERIES: No dissection, arterial injury, or hemodynamically significant stenosis by NASCET criteria. VERTEBRAL ARTERIES: No dissection, arterial injury, or significant stenosis in the right vertebral artery. Severe stenosis in the V1 segment of the left vertebral artery. SOFT TISSUES: The lung apices are clear. No cervical or superior mediastinal lymphadenopathy. The larynx and pharynx are unremarkable. No acute abnormality of the salivary glands. Thyroid gland is diffusely enlarged and heterogeneous and contains left thyroid lobe nodule measuring 2.6 cm. BONES: Multilevel degenerative spondylosis throughout the cervical spine with fusion at C5-C6. CTA HEAD: ANTERIOR CIRCULATION: Calcified atherosclerotic plaque in the cavernous segments of the internal carotid arteries bilaterally results in mild-to-moderate cavernous ICA stenosis bilaterally. Mild stenosis in the proximal A2 segment of the right anterior cerebral artery. Severe stenosis in the proximal A3 segments of the anterior cerebral arteries bilaterally.  Moderate-to-severe stenosis within M2 segment of the right middle cerebral artery. Moderate stenosis in the M1 and proximal M2 segments of the left middle cerebral artery. POSTERIOR CIRCULATION: No significant stenosis in the right intradural vertebral artery. Severe stenosis in the intracranial left vertebral artery. Mild stenosis in the basilar artery. Moderate stenosis in the P1/P2 junction of the left PCA. Severe stenosis and near complete occlusion of the right PCA. OTHER: No dural venous sinus thrombosis on this non-dedicated study. 1. Severe stenosis in the V1 segment of the left vertebral artery. 2. Extensive intracranial atherosclerotic plaque with near complete occlusion of the right PCA. 3. Severe stenoses in the proximal A3 segments of the ACAs bilaterally. 4. Moderate-to-severe proximal M2 segment stenosis in the right MCA. Moderate stenosis in the M1 and M2 segments of the left MCA. 5. Moderate stenosis in the P1/P2 junction of the left PCA. 6. Enlarged heterogeneous thyroid gland with 2.6 cm left thyroid lobe nodule. Nonemergent/outpatient thyroid ultrasound recommended. Findings were discussed with Dr. Hussain Short at 1:43 pm on 7/13/2021. RECOMMENDATIONS: 2.6 cm incidental left thyroid nodule with heterogeneous and enlarged thyroid. Recommend thyroid US. Reference: J Am Bruno Radiol.  2015 Feb;12(2): 143-50         IMPRESSION:   Primary Problem  Acute cerebrovascular accident (CVA) [facility])    Active Hospital Problems    Diagnosis Date Noted    Acute cerebrovascular accident (CVA) ([facility] [I63.9] 07/13/2021    COPD (chronic obstructive pulmonary disease) ([facility] [J44.9] 07/13/2021    HTN (hypertension) [I10] 07/13/2021    Diabetes 1.5, managed as type 2 ([facility] [E13.9] 07/13/2021    Hyperlipidemia [E78.5] 07/13/2021    CAD S/P percutaneous coronary angioplasty [I25.10, Z98.61] 07/13/2021    Rhabdomyolysis [M62.82] 07/13/2021    Intracranial atherosclerosis [I67.2]     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 I personally reviewed results of lab work-up imaging studies and other relevant clinical data. 2. Patient is seriously ill with platelet count of 4 only  3. Patient has risk of spontaneous bleeding including brain bleeds  4. Immature platelet fraction is syncopal elevated suggesting thrombocytopenia secondary to peripheral destruction as opposed to myelosuppression  5. Clinically suspect ITP given other cell counts are relatively not affected  6. Mild macrocytic anemia can be explained by iron deficiency due to bleeding from thrombocytopenia  7. Discussed with neurology attending  8. I will start patient on steroids given high suspicion of ITP  9. Given platelet count of 4 we will also challenge patient with blood transfusion of 1 unit platelet  10. If response is adequate we will give Gammagard  11. Obtain peripheral smear  12. Rule out nutritional deficiency  13. Clinical suspicion for TMA or MAHA is low  14. Clinical suspicion of HIT is low given clinical presentation  15. Patient is seriously ill        Discussed with patient and Nurse. Thank you for asking us to see this patient. Hal Luong MD          This note is created with the assistance of a speech recognition program.  While intending to generate a document that actually reflects the content of the visit, the document can still have some errors including those of syntax and sound a like substitutions which may escape proof reading. It such instances, actual meaning can be extrapolated by contextual diversion.

## 2021-07-13 NOTE — ED TRIAGE NOTES
Pt was found at home on the floor by his sister. LKW 2 days ago. Pt presents weak and with left side facial droop, garbled speech and left side weakness.

## 2021-07-13 NOTE — ED PROVIDER NOTES
[id number] [facility]     Emergency Department     Faculty Attestation    I performed a history and physical examination of the patient and discussed management with the resident. I have reviewed and agree with the residents findings including all diagnostic interpretations, and treatment plans as written at the time of my review. Any areas of disagreement are noted on the chart. I was personally present for the key portions of any procedures. I have documented in the chart those procedures where I was not present during the key portions. For Physician Assistant/ Nurse Practitioner cases/documentation I have personally evaluated this patient and have completed at least one if not all key elements of the E/M (history, physical exam, and MDM). Additional findings are as noted. This patient was evaluated in the Emergency Department for symptoms described in the history of present illness. The patient was evaluated in the context of the global COVID-19 pandemic, which necessitated consideration that the patient might be at risk for infection with the SARS-CoV-2 virus that causes COVID-19. Institutional protocols and algorithms that pertain to the evaluation of patients at risk for COVID-19 are in a state of rapid change based on information released by regulatory bodies including the CDC and federal and state organizations. These policies and algorithms were followed during the patient's care in the ED. Primary Care Physician: No primary care provider on file. History: This is a 77 y.o. male who presents to the Emergency Department with complaint of found down. Last known well was 2 days ago. Found down on the ground by his sister EMS was called. Glucose was over 200 per EMS. They did notice a right-sided deficits inside his speech. Physical:   oral temperature is 98.4 °F (36.9 °C). His blood pressure is 145/94 (abnormal) and his pulse is 77.  His respiration is 27 and oxygen saturation is 99%. Patient is awake he does have slurred speech and obvious right-sided weakness. Impression: Fall, right-sided weakness    Plan: CT scan, EKG, chest x-ray, CBC, BMP, troponin, urinalysis, myoglobin, CPK      EKG Interpretation    Interpreted by me  Sinus rhythm with occasional premature ventricular complex and a ventricular rate 71, left axis deviation, normal IL interval, normal QRS duration, ST and T wave abnormality consider anterior ischemia,   Compared EKG of September 4, 2019, anterior T wave changes appear to be new, occasional PVC is new    (Please note that portions of this note were completed with a voice recognition program.  Efforts were made to edit the dictations but occasionally words are mis-transcribed.)    Lola Dears.  Adeline Cornell MD, [facility]  Attending Emergency Medicine Physician        Juan Engel MD  07/13/21 [id number]

## 2021-07-14 NOTE — PROGRESS NOTES
Today's Date: 7/14/2021  Patient Name: Evelina Gottron  Date of admission: 7/13/2021 11:18 AM  Patient's age: 77 y.o., 1945  Admission Dx: Acute cerebrovascular accident (CVA) ([facility] [address] [I63.9]  Acute cerebrovascular accident (CVA) ([facility] [address] [I63.9]    Reason for Consult: management recommendations  Requesting Physician: Leidy Rodriguez MD    CHIEF COMPLAINT: Acute stroke. Thrombocytopenia. History Obtained From:  patient, electronic medical record      Interval history:    Patient seen and examined  Laboratory reviewed  Patient on steroids. Tolerating it well so far  No fever chills  Patient is on steroid. Platelet count improved to 11. HISTORY OF PRESENT ILLNESS:      The patient is a 77 y.o.  male who is admitted to the hospital for acute stroke    Brought to the hospital after being found down on the floor. Patient was last seen well about 2 days ago. Patient has a history of peripheral vascular disease coronary artery disease hypertension COPD and previous strokes. Patient has significant contracture on the right side also right-sided facial droop with severe dysarthria limiting history of presenting illness. Patient's CBC at presentation shows hemoglobin 11.7 with MCV 77 WBC count 10 and platelet count of 4 with immature platelet fraction of 44%. Patient previous CBC noted in the system from June 2020 shows blood count of 160. Patient was also noted to have elevated myoglobin and CK. Creatinine is 1.74 lactic acid is 2.4. Patient has dysarthria not able to give much history. Past Medical History:   has a past medical history of Centrilobular emphysema ([facility] [address] COPD (chronic obstructive pulmonary disease) ([facility] [address] Depression, Diabetes mellitus ([facility] [address] Former smoker, Hyperlipidemia, Hypertension, Mixed restrictive and obstructive lung disease ([address] Need for pneumococcal vaccine, and Personal history of tobacco use.     Past Surgical History:   has a past surgical history that includes Neck surgery; Toe amputation (Right, greater); and Cardiac surgery (07/2015). Medications:    Prior to Admission medications    Medication Sig Start Date End Date Taking?  Authorizing Provider   metoprolol tartrate (LOPRESSOR) 50 MG tablet Take 25 mg by mouth daily    Historical Provider, MD   tiZANidine (ZANAFLEX) 2 MG tablet Take 1 tablet by mouth 4 times daily as needed (muscle spasms) 5/7/21   JOLIE Hunt CNP   naproxen (NAPROSYN) 500 MG tablet Take 1 tablet by mouth 2 times daily (with meals) 1/4/21   Aditi Rivera PA-C   ibuprofen (ADVIL;MOTRIN) 800 MG tablet Take 1 tablet by mouth every 8 hours as needed for Pain 6/2/20   Madeline Gamboa MD   lidocaine (LIDODERM) 5 % Place 1 patch onto the skin daily 12 hours on, 12 hours off. 6/2/20   Madeline Gamboa MD   metoprolol succinate (TOPROL XL) 50 MG extended release tablet Take 50 mg by mouth daily    Historical Provider, MD   tiotropium (SPIRIVA RESPIMAT) 2.5 MCG/ACT AERS inhaler Inhale 2 puffs into the lungs daily    Historical Provider, MD   carboxymethylcellulose 1 % ophthalmic solution Place 1 drop into both eyes 3 times daily as needed for Dry Eyes     Historical Provider, MD   ketotifen (ZADITOR) 0.025 % ophthalmic solution Place 1 drop into both eyes 2 times daily as needed (itchy eyes)     Historical Provider, MD   isosorbide mononitrate (IMDUR) 60 MG extended release tablet Take 30 mg by mouth daily Indications: 1/2 tablet (30mg)     Historical Provider, MD   finasteride (PROSCAR) 5 MG tablet Take 5 mg by mouth daily    Historical Provider, MD   tamsulosin (FLOMAX) 0.4 MG capsule Take 0.4 mg by mouth daily    Historical Provider, MD   fluticasone (FLONASE) 50 MCG/ACT nasal spray 1 spray by Nasal route 2 times daily  Patient taking differently: 1 spray by Nasal route 2 times daily as needed for Rhinitis  5/31/16   Francisco Connor DO   albuterol sulfate  (90 BASE) MCG/ACT inhaler Inhale 2 puffs Glory Ny MD        0.9 % sodium chloride bolus  1,000 mL Intravenous Once Krystin Hernandez MD        albuterol sulfate  (90 Base) MCG/ACT inhaler 2 puff  2 puff Inhalation Q6H PRN Krystin Hernandez MD        budesonide-formoterol (SYMBICORT) 160-4.5 MCG/ACT inhaler 2 puff  2 puff Inhalation BID Krystin Hernandez MD   2 puff at 07/14/21 0808    tiotropium (SPIRIVA RESPIMAT) 2.5 MCG/ACT inhaler 2 puff  2 puff Inhalation Daily Krystin Hernandez MD   2 puff at 07/14/21 0808    sodium chloride flush 0.9 % injection 5-40 mL  5-40 mL Intravenous 2 times per day Krystin Hernandez MD   5 mL at 07/14/21 0912    sodium chloride flush 0.9 % injection 5-40 mL  5-40 mL Intravenous PRN Krystin Hernandez MD        0.9 % sodium chloride infusion  25 mL Intravenous PRN Krystin Hernandez MD        ondansetron (ZOFRAN-ODT) disintegrating tablet 4 mg  4 mg Oral Q8H PRN Krystin Hernandez MD        Or    ondansetron (ZOFRAN) injection 4 mg  4 mg Intravenous Q6H PRN Krystin Hernandez MD        polyethylene glycol (GLYCOLAX) packet 17 g  17 g Oral Daily PRN Krystin Hernandez MD        acetaminophen (TYLENOL) tablet 650 mg  650 mg Oral Q6H PRN Krystin Hernandez MD        Or    acetaminophen (TYLENOL) suppository 650 mg  650 mg Rectal Q6H PRN Krystin Hernandez MD        0.9 % sodium chloride infusion   Intravenous Continuous Krystin Hernandez  mL/hr at 07/14/21 0922 New Bag at 07/14/21 0922    potassium chloride (KLOR-CON M) extended release tablet 40 mEq  40 mEq Oral PRN Krystin Hernandez MD        Or    potassium bicarb-citric acid (EFFER-K) effervescent tablet 40 mEq  40 mEq Oral PRN Krystin Hernandez MD        Or    potassium chloride 10 mEq/100 mL IVPB (Peripheral Line)  10 mEq Intravenous PRN Krystin Hernandez MD        famotidine (PEPCID) injection 20 mg  20 mg Intravenous BID Krystin Hernandez MD   20 mg at 07/14/21 0913    0.9 % sodium chloride infusion   Intravenous PRN Sondra Hampton MD        methylPREDNISolone sodium (SOLU-MEDROL) injection 80 mg  80 mg Intravenous Q8H Sondra Hampton MD   80 mg at 21 1207       Allergies:  Patient has no known allergies. Social History:   reports that he quit smoking about 8 years ago. He started smoking about 64 years ago. He has a 42.00 pack-year smoking history. He has never used smokeless tobacco. He reports that he does not drink alcohol and does not use drugs. Family History: Hypertension    REVIEW OF SYSTEMS:      Patient's review of system is limited due to dysarthria    PHYSICAL EXAM:        BP (!) 155/67   Pulse 71   Temp 98 °F (36.7 °C) (Oral)   Resp 20   Ht 5' 10\" (1.778 m)   Wt 210 lb (95.3 kg)   SpO2 98%   BMI 30.13 kg/m²    Temp (24hrs), Av.3 °F (36.8 °C), Min:98 °F (36.7 °C), Max:98.6 °F (37 °C)      General appearance -ill appearing, no in pain or distress   Mental status -alert and awake  Eyes - pupils equal and reactive, extraocular eye movements intact   Ears - bilateral TM's and external ear canals normal   Mouth - mucous membranes moist, pharynx normal without lesions   Neck - supple, no significant adenopathy   Lymphatics - no palpable lymphadenopathy, no hepatosplenomegaly   Chest -decreased breathing sounds  Heart - normal rate, regular rhythm, normal S1, S2, no murmurs  Abdomen - soft, nontender, nondistended, no masses or organomegaly   Neurological -speech altered. Patient is awake.   Follows simple commands  Musculoskeletal - no joint tenderness, deformity or swelling   Extremities -right upper and lower extremity contracture  Skin - normal coloration and turgor, no rashes, no suspicious skin lesions noted ,      DATA:      Labs:     Results for orders placed or performed during the hospital encounter of 21   MYOGLOBIN, SERUM   Result Value Ref Range    Myoglobin 3,030 (H) 28 - 72 ng/mL   CK   Result Value Ref Range    Total CK 7,454 (H) 39 - 308 U/L   CBC WITH AUTO DIFFERENTIAL Result Value Ref Range    WBC 10.7 3.5 - 11.3 k/uL    RBC 4.86 4.21 - 5.77 m/uL    Hemoglobin 11.7 (L) 13.0 - 17.0 g/dL    Hematocrit 37.4 (L) 40.7 - 50.3 %    MCV 77.0 (L) 82.6 - 102.9 fL    MCH 24.1 (L) 25.2 - 33.5 pg    MCHC 31.3 28.4 - 34.8 g/dL    RDW 15.7 (H) 11.8 - 14.4 %    Platelets See Reflexed IPF Result 138 - 453 k/uL    MPV NOT REPORTED 8.1 - 13.5 fL    NRBC Automated 0.2 (H) 0.0 per 100 WBC    Differential Type NOT REPORTED     WBC Morphology NOT REPORTED     RBC Morphology ANISOCYTOSIS PRESENT     Platelet Estimate NOT REPORTED     Seg Neutrophils 78 (H) 36 - 65 %    Lymphocytes 13 (L) 24 - 43 %    Monocytes 7 3 - 12 %    Eosinophils % 1 1 - 4 %    Basophils 1 0 - 2 %    Immature Granulocytes 1 (H) 0 %    Segs Absolute 8.33 (H) 1.50 - 8.10 k/uL    Absolute Lymph # 1.41 1.10 - 3.70 k/uL    Absolute Mono # 0.73 0.10 - 1.20 k/uL    Absolute Eos # 0.07 0.00 - 0.44 k/uL    Basophils Absolute 0.07 0.00 - 0.20 k/uL    Absolute Immature Granulocyte 0.08 0.00 - 0.30 k/uL   COMPREHENSIVE METABOLIC PANEL   Result Value Ref Range    Glucose 192 (H) 70 - 99 mg/dL    BUN 29 (H) 8 - 23 mg/dL    CREATININE 1.47 (H) 0.70 - 1.20 mg/dL    Bun/Cre Ratio NOT REPORTED 9 - 20    Calcium 9.3 8.6 - 10.4 mg/dL    Sodium 142 135 - 144 mmol/L    Potassium 4.0 3.7 - 5.3 mmol/L    Chloride 107 98 - 107 mmol/L    CO2 25 20 - 31 mmol/L    Anion Gap 10 9 - 17 mmol/L    Alkaline Phosphatase 111 40 - 129 U/L    ALT 65 (H) 5 - 41 U/L     (H) <40 U/L    Total Bilirubin 1.03 0.3 - 1.2 mg/dL    Total Protein 7.2 6.4 - 8.3 g/dL    Albumin 3.8 3.5 - 5.2 g/dL    Albumin/Globulin Ratio 1.1 1.0 - 2.5    GFR Non- 47 (L) >60 mL/min    GFR  57 (L) >60 mL/min    GFR Comment          GFR Staging NOT REPORTED    LACTIC ACID, WHOLE BLOOD   Result Value Ref Range    Lactic Acid, Whole Blood 2.4 (H) 0.7 - 2.1 mmol/L   Troponin   Result Value Ref Range    Troponin, High Sensitivity 52 (HH) 0 - 22 ng/L    Troponin T NOT REPORTED <0.03 ng/mL    Troponin Interp NOT REPORTED    ELECTROLYTES PLUS   Result Value Ref Range    POC Sodium 145 138 - 146 mmol/L    POC Potassium 3.9 3.5 - 4.5 mmol/L    POC Chloride 110 (H) 98 - 107 mmol/L    POC TCO2 27 22 - 30 mmol/L    Anion Gap 9 7 - 16 mmol/L   Hemoglobin and hematocrit, blood   Result Value Ref Range    POC Hemoglobin 13.2 (L) 13.5 - 17.5 g/dL    POC Hematocrit 39 (L) 41 - 53 %   CALCIUM, IONIC (POC)   Result Value Ref Range    POC Ionized Calcium 1.18 1.15 - 1.33 mmol/L   Immature Platelet Fraction   Result Value Ref Range    Platelet, Immature Fraction 44.9 (H) 1.1 - 10.3 %    Platelet, Fluorescence 4 (LL) 138 - 453 k/uL   Troponin   Result Value Ref Range    Troponin, High Sensitivity 46 (H) 0 - 22 ng/L    Troponin T NOT REPORTED <0.03 ng/mL    Troponin Interp NOT REPORTED    DRUG SCREEN MULTI URINE   Result Value Ref Range    Amphetamine Screen, Ur NEGATIVE NEGATIVE    Barbiturate Screen, Ur NEGATIVE NEGATIVE    Benzodiazepine Screen, Urine NEGATIVE NEGATIVE    Cocaine Metabolite, Urine NEGATIVE NEGATIVE    Methadone Screen, Urine NEGATIVE NEGATIVE    Opiates, Urine NEGATIVE NEGATIVE    Phencyclidine, Urine NEGATIVE NEGATIVE    Propoxyphene, Urine NOT REPORTED NEGATIVE    Cannabinoid Scrn, Ur NEGATIVE NEGATIVE    Oxycodone Screen, Ur NEGATIVE NEGATIVE    Methamphetamine, Urine NOT REPORTED NEGATIVE    Tricyclic Antidepressants, Urine NOT REPORTED NEGATIVE    MDMA, Urine NOT REPORTED NEGATIVE    Buprenorphine Urine NOT REPORTED NEGATIVE    Test Information       Assay provides medical screening only. The absence of expected drug(s) and/or metabolite(s) may indicate diluted or adulterated urine, limitations of testing or timing of collection.    Basic Metabolic Panel w/ Reflex to MG   Result Value Ref Range    Glucose 207 (H) 70 - 99 mg/dL    BUN 31 (H) 8 - 23 mg/dL    CREATININE 1.45 (H) 0.70 - 1.20 mg/dL    Bun/Cre Ratio NOT REPORTED 9 - 20    Calcium 8.4 (L) 8.6 - 10.4 mg/dL Sodium 143 135 - 144 mmol/L    Potassium 3.8 3.7 - 5.3 mmol/L    Chloride 110 (H) 98 - 107 mmol/L    CO2 22 20 - 31 mmol/L    Anion Gap 11 9 - 17 mmol/L    GFR Non-African American 48 (L) >60 mL/min    GFR  58 (L) >60 mL/min    GFR Comment          GFR Staging NOT REPORTED    Hepatic function panel   Result Value Ref Range    Albumin 3.3 (L) 3.5 - 5.2 g/dL    Alkaline Phosphatase 93 40 - 129 U/L    ALT 54 (H) 5 - 41 U/L    AST 81 (H) <40 U/L    Total Bilirubin 0.80 0.3 - 1.2 mg/dL    Bilirubin, Direct 0.30 <0.31 mg/dL    Bilirubin, Indirect 0.50 0.00 - 1.00 mg/dL    Total Protein 6.3 (L) 6.4 - 8.3 g/dL    Globulin NOT REPORTED 1.5 - 3.8 g/dL    Albumin/Globulin Ratio 1.1 1.0 - 2.5   CBC auto differential   Result Value Ref Range    WBC 10.7 3.5 - 11.3 k/uL    RBC 4.33 4.21 - 5.77 m/uL    Hemoglobin 10.4 (L) 13.0 - 17.0 g/dL    Hematocrit 34.5 (L) 40.7 - 50.3 %    MCV 79.7 (L) 82.6 - 102.9 fL    MCH 24.0 (L) 25.2 - 33.5 pg    MCHC 30.1 28.4 - 34.8 g/dL    RDW 15.7 (H) 11.8 - 14.4 %    Platelets See Reflexed IPF Result 138 - 453 k/uL    MPV NOT REPORTED 8.1 - 13.5 fL    NRBC Automated 0.6 (H) 0.0 per 100 WBC    Differential Type NOT REPORTED     WBC Morphology NOT REPORTED     RBC Morphology ANISOCYTOSIS PRESENT     Platelet Estimate NOT REPORTED     Seg Neutrophils 87 (H) 36 - 65 %    Lymphocytes 10 (L) 24 - 43 %    Monocytes 2 (L) 3 - 12 %    Eosinophils % 0 (L) 1 - 4 %    Basophils 0 0 - 2 %    Immature Granulocytes 1 (H) 0 %    Segs Absolute 9.32 (H) 1.50 - 8.10 k/uL    Absolute Lymph # 1.03 (L) 1.10 - 3.70 k/uL    Absolute Mono # 0.21 0.10 - 1.20 k/uL    Absolute Eos # <0.03 0.00 - 0.44 k/uL    Basophils Absolute 0.04 0.00 - 0.20 k/uL    Absolute Immature Granulocyte 0.09 0.00 - 0.30 k/uL   Hemoglobin A1c   Result Value Ref Range    Hemoglobin A1C 7.7 (H) 4.0 - 6.0 %    Estimated Avg Glucose 174 mg/dL   Lipid panel - fasting   Result Value Ref Range    Cholesterol 186 <200 mg/dL    HDL 42 >40 mg/dL    LDL Cholesterol 129 0 - 130 mg/dL    Chol/HDL Ratio 4.4 <5    Triglycerides 77 <150 mg/dL    VLDL NOT REPORTED 1 - 30 mg/dL   PERIPHERAL BLOOD SMEAR, PATH REVIEW   Result Value Ref Range    Pathologist Review SEE REPORT    Fibrinogen   Result Value Ref Range    Fibrinogen 507 (H) 140 - 420 mg/dL   PROTIME-INR   Result Value Ref Range    Protime 11.8 9.1 - 12.3 sec    INR 1.1    APTT   Result Value Ref Range    PTT 18.0 (L) 20.5 - 30.5 sec   CBC Auto Differential   Result Value Ref Range    WBC 10.0 3.5 - 11.3 k/uL    RBC 4.71 4.21 - 5.77 m/uL    Hemoglobin 11.2 (L) 13.0 - 17.0 g/dL    Hematocrit 37.7 (L) 40.7 - 50.3 %    MCV 80.0 (L) 82.6 - 102.9 fL    MCH 23.8 (L) 25.2 - 33.5 pg    MCHC 29.7 28.4 - 34.8 g/dL    RDW 15.7 (H) 11.8 - 14.4 %    Platelets See Reflexed IPF Result 138 - 453 k/uL    MPV NOT REPORTED 8.1 - 13.5 fL    NRBC Automated 0.6 (H) 0.0 per 100 WBC    Differential Type NOT REPORTED     WBC Morphology NOT REPORTED     RBC Morphology NOT REPORTED     Platelet Estimate NOT REPORTED     Immature Granulocytes 1 (H) 0 %    Seg Neutrophils 70 (H) 36 - 65 %    Lymphocytes 18 (L) 24 - 43 %    Monocytes 9 3 - 12 %    Eosinophils % 1 1 - 4 %    Basophils 1 0 - 2 %    Absolute Immature Granulocyte 0.10 0.00 - 0.30 k/uL    Segs Absolute 7.00 1.50 - 8.10 k/uL    Absolute Lymph # 1.80 1.10 - 3.70 k/uL    Absolute Mono # 0.90 0.10 - 1.20 k/uL    Absolute Eos # 0.10 0.00 - 0.44 k/uL    Basophils Absolute 0.10 0.00 - 0.20 k/uL    Morphology 1+ POLYCHROMASIA     Morphology ANISOCYTOSIS PRESENT    Reticulocytes   Result Value Ref Range    Retic % 2.2 (H) 0.5 - 1.9 %    Absolute Retic # 0.100 (H) 0.030 - 0.080 M/uL    Immature Retic Fract 29.500 (H) 2.7 - 18.3 %    Retic Hemoglobin 27.8 (L) 28.2 - 35.7 pg   Immature Platelet Fraction   Result Value Ref Range    Platelet, Fluorescence 9 (LL) 138 - 453 k/uL   PLATELET COUNT   Result Value Ref Range    Platelets See Reflexed IPF Result 138 - 453 k/uL   Immature Platelet Fraction   Result Value Ref Range    Platelet, Immature Fraction 33.3 (H) 1.1 - 10.3 %    Platelet, Fluorescence 11 (LL) 138 - 453 k/uL   Immature Platelet Fraction   Result Value Ref Range    Platelet, Immature Fraction 39.8 (H) 1.1 - 10.3 %    Platelet, Fluorescence 11 (LL) 138 - 453 k/uL   CK   Result Value Ref Range    Total CK 3,552 (H) 39 - 308 U/L   LACTIC ACID, WHOLE BLOOD   Result Value Ref Range    Lactic Acid, Whole Blood 1.9 0.7 - 2.1 mmol/L   Surgical Pathology   Result Value Ref Range    Surgical Pathology Report       [id number]  [employer]  [facility]  ANATOMIC PATHOLOGY  [address] [place] [address], [facility]. [place], [address]  [phone]  Fax: [phone]  SURGICAL PATHOLOGY CONSULTATION    Patient Name: Porsha Munoz  MR#: [id number]  Specimen #[id number]    Procedures/Addenda  PERIPHERAL BLOOD REPORT     Date Ordered:     7/14/2021     Status:  Signed Out       Date Complete:     7/14/2021     By: Argentina Cardona M.D. Date Reported:     7/14/2021       INTERPRETATION  PERIPHERAL BLOOD:  SEVERE THROMBOCYTOPENIA. MILD MICROCYTIC ANEMIA. PATIENT HAS PERSISTENT MILD MICROCYTOSIS WITH HEMOGLOBIN RANGING FROM  NORMAL TO MILDLY DECREASED. SUGGEST THALASSEMIA TRAIT AND RARE  HEMOGLOBINOPATHY (HEMOGLOBIN E), AS WELL AS, IRON DEFICIENCY ANEMIA,  ANEMIA OF CHRONIC DISEASE, SIDEROBLASTIC ANEMIA. PERIPHERAL BLOOD STUDY    CBC: Please see the electronic health record for CBC parameters CBC  from 7/13/2021                             PLATELETS: Platelets show large  platelets. LEUKOCYTES: White blood cells show normal morphology. There are no  blasts. ERYTHROCYTES: Red blood cells show mild microcytosis and anisocytosis           Argentina Cardona M.D.                    Source:  1: PERIPHERAL BLOOD FOR REVIEW BY PATHOLOGIST     TSH with Reflex   Result Value Ref Range    TSH 0.28 (L) 0.30 - 5.00 mIU/L   T4, Free   Result Value Ref Range Thyroxine, Free 1.18 0.93 - 1.70 ng/dL   Venous Blood Gas, POC   Result Value Ref Range    pH, David 7.387 7.320 - 7.430    pCO2, David 43.4 41.0 - 51.0 mm Hg    pO2, David 15.2 (L) 30 - 50 mm Hg    HCO3, Venous 26.1 22.0 - 29.0 mmol/L    Total CO2, Venous NOT REPORTED 23.0 - 30.0 mmol/L    Negative Base Excess, David NOT REPORTED 0.0 - 2.0    Positive Base Excess, David 1 0.0 - 3.0    O2 Sat, David 19 (L) 60.0 - 85.0 %    O2 Device/Flow/% NOT REPORTED     Adelfo Test NOT REPORTED     Sample Site NOT REPORTED     Mode NOT REPORTED     FIO2 NOT REPORTED     Pt Temp NOT REPORTED     POC pH Temp NOT REPORTED     POC pCO2 Temp NOT REPORTED mm Hg    POC pO2 Temp NOT REPORTED mm Hg   Creatinine W/GFR Point of Care   Result Value Ref Range    POC Creatinine 1.46 (H) 0.51 - 1.19 mg/dL    GFR Comment NOT REPORTED >60 mL/min    GFR Non- NOT REPORTED >60 mL/min    GFR Comment         POCT urea (BUN)   Result Value Ref Range    POC BUN 34 (H) 8 - 26 mg/dL   Lactic Acid, POC   Result Value Ref Range    POC Lactic Acid 1.83 (H) 0.56 - 1.39 mmol/L   POCT Glucose   Result Value Ref Range    POC Glucose 207 (H) 74 - 100 mg/dL   POC Glucose Fingerstick   Result Value Ref Range    POC Glucose 174 (H) 75 - 110 mg/dL   POC Glucose Fingerstick   Result Value Ref Range    POC Glucose 136 (H) 75 - 110 mg/dL   POC Glucose Fingerstick   Result Value Ref Range    POC Glucose 190 (H) 75 - 110 mg/dL   POC Glucose Fingerstick   Result Value Ref Range    POC Glucose 211 (H) 75 - 110 mg/dL   POC Glucose Fingerstick   Result Value Ref Range    POC Glucose 225 (H) 75 - 110 mg/dL   EKG 12 Lead   Result Value Ref Range    Ventricular Rate 71 BPM    Atrial Rate 71 BPM    P-R Interval 140 ms    QRS Duration 78 ms    Q-T Interval 456 ms    QTc Calculation (Bazett) 495 ms    P Axis 59 degrees    R Axis -44 degrees    T Axis -60 degrees   TYPE AND SCREEN   Result Value Ref Range    Expiration Date 07/16/2021,2359     Arm Band Number [id number]     ABO/Rh B POSITIVE     Antibody Screen POSITIVE     Antibody ID WARM NON-SPECIFIC AUTOAGGLUTININ     MARILYN IgG POSITIVE     Unit Number [id number]     Product Code Leukocyte Reduced Red Cell     Unit Divison 00     Dispense Status ALLOCATED     Transfusion Status OK TO TRANSFUSE     Crossmatch Result COMPATIBLE     Unit Number [id number]     Product Code Leukocyte Reduced Red Cell     Unit Divison 00     Dispense Status ALLOCATED     Transfusion Status OK TO TRANSFUSE     Crossmatch Result COMPATIBLE    PREPARE PLATELETS, 1 Product   Result Value Ref Range    Unit Number [id number]     Product Code PthRePlt PAS     Unit Divison 00     Dispense Status DISCARDED + AUTOCLAVED     Transfusion Status OK TO TRANSFUSE     Unit Number [id number]     Product Code PthRePlt PAS     Unit Divison 00     Dispense Status TRANSFUSED     Transfusion Status OK TO TRANSFUSE    BLOOD BANK SPECIMEN   Result Value Ref Range    Blood Bank Specimen NOT REPORTED          IMAGING DATA:    CT HEAD WO CONTRAST    Result Date: 7/13/2021  EXAMINATION: CT OF THE HEAD WITHOUT CONTRAST  7/13/2021 1:10 pm TECHNIQUE: CT of the head was performed without the administration of intravenous contrast. Dose modulation, iterative reconstruction, and/or weight based adjustment of the mA/kV was utilized to reduce the radiation dose to as low as reasonably achievable.  COMPARISON: 05/02/2021 HISTORY: ORDERING SYSTEM PROVIDED HISTORY: Last known well 2 days right-sided facial droop right-sided weakness TECHNOLOGIST PROVIDED HISTORY: Last known well 2 days right-sided facial droop right-sided weakness Decision Support Exception - unselect if not a suspected or confirmed emergency medical condition->Emergency Medical Condition (MA) Reason for Exam: Last known well 2 days right-sided facial droop right-sided weakness FINDINGS: BRAIN/VENTRICLES: Ovoid area of low attenuation in the left frontal corona radiata measures 2 x 1.4 cm, new from prior study (series 2, image 39). No acute intracranial hemorrhage. No mass effect or midline shift. No hydrocephalus. Diffuse parenchymal volume loss with enlargement of the ventricles and cerebral sulci. Old infarction in the right basal ganglia. There are nonspecific areas of hypoattenuation within the periventricular and subcortical white matter, which likely represent chronic microvascular ischemic change. Intracranial atherosclerotic disease. ORBITS: The visualized portion of the orbits demonstrate no acute abnormality. SINUSES: The visualized paranasal sinuses and mastoid air cells demonstrate no acute abnormality. SOFT TISSUES/SKULL: No acute abnormality of the visualized skull or soft tissues. 1. New ovoid low-attenuation area in the left frontal corona radiata compatible with acute/subacute infarction. This measures 2 x 1.4 cm. No associated hemorrhage. 2. Diffuse parenchymal volume loss and sequela of severe chronic microvascular ischemic changes. Findings were discussed with Dr. Shy Patel at 1:31 pm on 7/13/2021. XR CHEST PORTABLE    Result Date: 7/13/2021  EXAMINATION: ONE XRAY VIEW OF THE CHEST 7/13/2021 10:30 am COMPARISON: September 24, 2019. HISTORY: ORDERING SYSTEM PROVIDED HISTORY: Found down TECHNOLOGIST PROVIDED HISTORY: Found down Reason for Exam: supine Acuity: Acute Type of Exam: Initial FINDINGS: A portable upright frontal view chest radiograph was obtained. The heart is enlarged. The mediastinal contour and pleural spaces are otherwise within normal limits. The lungs are grossly clear. There is no focal consolidation or pneumothorax. The pulmonary vascular pattern is within normal limits. No acute thoracic osseous abnormality. Clear lungs. Cardiomegaly. No acute cardiopulmonary abnormality.      CTA HEAD NECK W CONTRAST    Result Date: 7/13/2021  EXAMINATION: CTA OF THE HEAD AND NECK WITH CONTRAST 7/13/2021 1:11 pm: TECHNIQUE: CTA of the head and neck was performed with the administration of intravenous contrast. Multiplanar reformatted images are provided for review. MIP images are provided for review. Stenosis of the internal carotid arteries measured using NASCET criteria. Dose modulation, iterative reconstruction, and/or weight based adjustment of the mA/kV was utilized to reduce the radiation dose to as low as reasonably achievable. 3D surface reformatted and curved MIP images were submitted for review. COMPARISON: None. HISTORY: ORDERING SYSTEM PROVIDED HISTORY: stroke TECHNOLOGIST PROVIDED HISTORY: stroke Decision Support Exception - unselect if not a suspected or confirmed emergency medical condition->Emergency Medical Condition (MA) Reason for Exam: stroke, Cerebrovascular Accident; Fall FINDINGS: CTA NECK: AORTIC ARCH/ARCH VESSELS: No dissection or arterial injury. No significant stenosis of the brachiocephalic or subclavian arteries. CAROTID ARTERIES: No dissection, arterial injury, or hemodynamically significant stenosis by NASCET criteria. VERTEBRAL ARTERIES: No dissection, arterial injury, or significant stenosis in the right vertebral artery. Severe stenosis in the V1 segment of the left vertebral artery. SOFT TISSUES: The lung apices are clear. No cervical or superior mediastinal lymphadenopathy. The larynx and pharynx are unremarkable. No acute abnormality of the salivary glands. Thyroid gland is diffusely enlarged and heterogeneous and contains left thyroid lobe nodule measuring 2.6 cm. BONES: Multilevel degenerative spondylosis throughout the cervical spine with fusion at C5-C6. CTA HEAD: ANTERIOR CIRCULATION: Calcified atherosclerotic plaque in the cavernous segments of the internal carotid arteries bilaterally results in mild-to-moderate cavernous ICA stenosis bilaterally. Mild stenosis in the proximal A2 segment of the right anterior cerebral artery. Severe stenosis in the proximal A3 segments of the anterior cerebral arteries bilaterally.  Moderate-to-severe stenosis within M2 segment of the right middle cerebral artery. Moderate stenosis in the M1 and proximal M2 segments of the left middle cerebral artery. POSTERIOR CIRCULATION: No significant stenosis in the right intradural vertebral artery. Severe stenosis in the intracranial left vertebral artery. Mild stenosis in the basilar artery. Moderate stenosis in the P1/P2 junction of the left PCA. Severe stenosis and near complete occlusion of the right PCA. OTHER: No dural venous sinus thrombosis on this non-dedicated study. 1. Severe stenosis in the V1 segment of the left vertebral artery. 2. Extensive intracranial atherosclerotic plaque with near complete occlusion of the right PCA. 3. Severe stenoses in the proximal A3 segments of the ACAs bilaterally. 4. Moderate-to-severe proximal M2 segment stenosis in the right MCA. Moderate stenosis in the M1 and M2 segments of the left MCA. 5. Moderate stenosis in the P1/P2 junction of the left PCA. 6. Enlarged heterogeneous thyroid gland with 2.6 cm left thyroid lobe nodule. Nonemergent/outpatient thyroid ultrasound recommended. Findings were discussed with Dr. Kelli Walsh at 1:43 pm on 7/13/2021. RECOMMENDATIONS: 2.6 cm incidental left thyroid nodule with heterogeneous and enlarged thyroid. Recommend thyroid US. Reference: J Am Bruno Radiol.  2015 Feb;12(2): 143-50         IMPRESSION:   Primary Problem  Cerebrovascular accident (CVA) [facility])    Active Hospital Problems    Diagnosis Date Noted    Noncompliance with medications [Z91.14]     [facility] [I63.9] 07/13/2021    COPD (chronic obstructive pulmonary disease) ([facility] [J44.9] 07/13/2021    HTN (hypertension) [I10] 07/13/2021    Diabetes 1.5, managed as type 2 ([facility] [E13.9] 07/13/2021    Hyperlipidemia [E78.5] 07/13/2021    CAD S/P percutaneous coronary angioplasty [I25.10, Z98.61] 07/13/2021    Rhabdomyolysis [M62.82] 07/13/2021    Intracranial atherosclerosis [I67.2]     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 I personally reviewed results of lab work-up imaging studies and other relevant clinical data. 2. Patient is seriously ill with platelet count of 4 only  3. Patient has risk of spontaneous bleeding including brain bleeds  4. Immature platelet fraction is syncopal elevated suggesting thrombocytopenia secondary to peripheral destruction as opposed to myelosuppression  5. Patient s/p IV IgG and steroid. Platelet count improved to 11  6. Continue steroids  7. We will give platelet transfusion if there is any concern regarding life-threatening bleeding or ongoing bleeding  8. Mild macrocytic anemia can be explained by iron deficiency due to bleeding from thrombocytopenia  9. Obtain peripheral smear  10. Rule out nutritional deficiency  11. Clinical suspicion for TMA or MAHA is low  12. Clinical suspicion of HIT is low given clinical presentation  13. Patient is seriously ill        Discussed with patient and Nurse. Thank you for asking us to see this patient. Lorie Luong MD          This note is created with the assistance of a speech recognition program.  While intending to generate a document that actually reflects the content of the visit, the document can still have some errors including those of syntax and sound a like substitutions which may escape proof reading. It such instances, actual meaning can be extrapolated by contextual diversion.

## 2021-07-14 NOTE — FLOWSHEET NOTE
07/14/21 1138   Encounter Summary   Services provided to: Patient   Referral/Consult From: [id number] members   Place of [address] [facility] No   Continue Visiting   (7/14/21)   Complexity of Encounter Low   Length of Encounter 15 minutes   Spiritual Assessment Completed Yes   Routine   Type Initial   Assessment Approachable;Passive;Coping   Intervention Active listening;Explored feelings, thoughts, concerns;Nurtured hope;Prayer;Sustaining presence/ Ministry of presence; Discussed belief system/Quaker practices/tamiko   Outcome Acceptance;Comfort;Expressed gratitude

## 2021-07-14 NOTE — PROGRESS NOTES
[facility]  Internal Medicine Teaching Residency Program  Inpatient Daily Progress Note  ______________________________________________________________________________    Patient: Diandra Brito  YOB: 1945   FEM:[id number]    Acct: [de-identified]     Room: 690/2639-46  Admit date: 7/13/2021  Today's date: 07/14/21  Number of days in the hospital: 1    SUBJECTIVE   Admitting Diagnosis: Cerebrovascular accident (CVA) ([facility]  CC: found down    Pt examined at bedside. Chart & results reviewed. Patient's dysarthria improved, Patient still have R facial weakness, R arm and hand weakness and Right leg weakness. Patient hemodynamically stable, afebrile. Patient given 1 platelet transfusion, platelet 4--> 11. MRI pending, echo ordered      ROS:  Constitutional:  negative for chills, fevers, sweats  Respiratory:  negative for cough, dyspnea on exertion, hemoptysis, shortness of breath, wheezing  Cardiovascular:  negative for chest pain, chest pressure/discomfort, lower extremity edema, palpitations  Gastrointestinal:  negative for abdominal pain, constipation, diarrhea, nausea, vomiting  Neurological:  negative for dizziness, headache. Positive for right sided weakness  BRIEF HISTORY     The patient is a pleasant 77 y.o. male presents with a chief complaint of being found down by the sister. Patient's sister was informed that he had not been seen, she went to check on him. Sister found him down, called 911 for help. In the ED, he is found to have R facial weakness, R sided arm and leg weakness, and dysarthria. Patient has multiple ecchymosis on R side of arm and chest. Patients lab in ED showed, creatinine 1.47 (baseline 1.40/1.19), BUN 29, GFR 57, Lactic acid 2.4. CXR showed clear lungs, cardiomegaly, no acute cardiopulmonary abnormality.     Patient also found to have elevated Total CK 8454, myoglobin 3030, troponin 52 (baseline 42).  Patient given 1L NS Regular without murmur, clicks, gallops or rubs. Abdomen: Slightly rounded and soft without organomegaly. No rebound, rigidity or guarding was appreciated. Lymphatic: No lymphadenopathy. Musculoskeletal: Normal curvature of the spine. No gross muscle weakness. Extremities:  No lower extremity edema, ulcerations, tenderness, varicosities or erythema. Muscle size, tone and strength are normal.  No involuntary movements are noted. Skin:  Warm and dry. Good color, turgor and pigmentation. No lesions or scars.   No cyanosis or clubbing  Neurological/Psychiatric: The patient's general behavior, level of consciousness, thought content and emotional status is normal.        Medications:  Scheduled Medications:    sodium chloride  1,000 mL Intravenous Once    atorvastatin  80 mg Oral Once    budesonide-formoterol  2 puff Inhalation BID    tiotropium  2 puff Inhalation Daily    sodium chloride flush  5-40 mL Intravenous 2 times per day    famotidine (PEPCID) injection  20 mg Intravenous BID    methylPREDNISolone  80 mg Intravenous Q8H     Continuous Infusions:    sodium chloride      sodium chloride 125 mL/hr at 07/14/21 0125    sodium chloride       PRN Medicationsalbuterol sulfate HFA, 2 puff, Q6H PRN  sodium chloride flush, 5-40 mL, PRN  sodium chloride, 25 mL, PRN  ondansetron, 4 mg, Q8H PRN   Or  ondansetron, 4 mg, Q6H PRN  polyethylene glycol, 17 g, Daily PRN  acetaminophen, 650 mg, Q6H PRN   Or  acetaminophen, 650 mg, Q6H PRN  potassium chloride, 40 mEq, PRN   Or  potassium alternative oral replacement, 40 mEq, PRN   Or  potassium chloride, 10 mEq, PRN  sodium chloride, , PRN        Diagnostic Labs:  CBC:   Recent Labs     07/13/21  1135 07/13/21  2023 07/13/21  2356   WBC 10.7 10.0  --    RBC 4.86 4.71  --    HGB 11.7* 11.2*  --    HCT 37.4* 37.7*  --    MCV 77.0* 80.0*  --    RDW 15.7* 15.7*  --    PLT See Reflexed IPF Result See Reflexed IPF Result See Reflexed IPF Result     BMP:   Recent Labs     07/13/21  1132 07/13/21  1135   NA  --  142   K  --  4.0   CL  --  107   CO2  --  25   BUN  --  29*   CREATININE 1.46* 1.47*     BNP: No results for input(s): BNP in the last 72 hours. PT/INR:   Recent Labs     07/13/21 2023   PROTIME 11.8   INR 1.1     APTT:   Recent Labs     07/13/21 2023   APTT 18.0*     CARDIAC ENZYMES: No results for input(s): CKMB, CKMBINDEX, TROPONINI in the last 72 hours. Invalid input(s): CKTOTAL;3  FASTING LIPID PANEL:No results found for: CHOL, HDL, TRIG  LIVER PROFILE:   Recent Labs     07/13/21  1135   *   ALT 65*   BILITOT 1.03   ALKPHOS 111      MICROBIOLOGY:   Lab Results   Component Value Date/Time    CULTURE NO SIGNIFICANT GROWTH 04/04/2016 12:30 PM    CULTURE  04/04/2016 12:30 PM     Performed at [address] [place] [address], [address] [place] [address] [phone]       Imaging:    CT HEAD WO CONTRAST    Result Date: 7/13/2021  1. New ovoid low-attenuation area in the left frontal corona radiata compatible with acute/subacute infarction. This measures 2 x 1.4 cm. No associated hemorrhage. 2. Diffuse parenchymal volume loss and sequela of severe chronic microvascular ischemic changes. Findings were discussed with Dr. Melly Fierro at 1:31 pm on 7/13/2021. XR CHEST PORTABLE    Result Date: 7/13/2021  Clear lungs. Cardiomegaly. No acute cardiopulmonary abnormality. CTA HEAD NECK W CONTRAST    Result Date: 7/13/2021  1. Severe stenosis in the V1 segment of the left vertebral artery. 2. Extensive intracranial atherosclerotic plaque with near complete occlusion of the right PCA. 3. Severe stenoses in the proximal A3 segments of the ACAs bilaterally. 4. Moderate-to-severe proximal M2 segment stenosis in the right MCA. Moderate stenosis in the M1 and M2 segments of the left MCA. 5. Moderate stenosis in the P1/P2 junction of the left PCA. 6. Enlarged heterogeneous thyroid gland with 2.6 cm left thyroid lobe nodule.  Nonemergent/outpatient thyroid ultrasound recommended. Findings were discussed with Dr. Marianne Adams at 1:43 pm on 7/13/2021. RECOMMENDATIONS: 2.6 cm incidental left thyroid nodule with heterogeneous and enlarged thyroid. Recommend thyroid US. Reference: J Am Bruno Radiol. 2015 Feb;12(2): 143-50       ASSESSMENT & PLAN     ASSESSMENT / PLAN:     Active Problems:  Principal Problem:    Acute cerebrovascular accident (CVA) ([facility]  Active Problems:    COPD (chronic obstructive pulmonary disease) (HCC)    HTN (hypertension)    Diabetes 1.5, managed as type 2 ([facility]    Hyperlipidemia  Resolved Problems:    * No resolved hospital problems. *        Acute cerebrovascular accident (CVA)  - Patient CT head showed New ovoid low-attenuation area in the left frontal corona radiata compatible with acute/subacute infarction.  This measures 2 x 1.4 cm.  No associated hemorrhage. 2. Diffuse parenchymal volume loss and sequela of severe chronic microvascular ischemic changes. Neuro on board. - Neuro recommended MRI brain WO, resume ASA, plavix 75, folic acid, lipitor, lipid panel, hba1c, speech eval, SBP < 180, blood glucose < 180, avoid dextrose containing solutions. -CT head/neck showed severe stenosis in V1 segment of left vertebral artery, extensive intracranial atherosclerotic plaque with near complete occusion of the right PCA. Severe stenosis in proximal A3 segments of RAHEEM, moderate to severe proximal M2 segment stenosis of left MCA. Moderate stenosis in P1/P2 junction of left PCA. Enlarged, heterogenous thyroid gland with 2.6 cm left thyroid lobe nodule. - Neuro on board. MRI pending, echo ordered     Rhabdomyolysis  -  elevated Total CK 8454, myoglobin 3030, troponin 52 (baseline 42).  Patient given 1L NS bolus.     Coronary artery disease, s/p NSTEMI 06/15, bare metal stent  - ASA, ATORVASTATIN 80, plavix 75, isosorbide mononitrate 60, home meds     Hypertension  - On toprol XL 50, losartan 100, home med     Diabetes mellitis, type 2  -On glipizide 5, home med     Thrombocytopenia  - platelet count 1-->16 (s/p 1 platelet), at risk of spontaneous bleed as per hem/onc  - immature platelet fraction is syncopal elevated suggesting thrombocytopenia secondary to peripheral destruction as per hem/onc consult  - ITP suspected as per hem/onc, recommended 1 U platelet, if response adequate give gammagard    DVT ppx : held due to thrombocytopenia  GI ppx:     PT/OT: pt/ot consult  Discharge Planning / SW:  on board    Nilo Beard MD  Internal Medicine Resident, PGY-1  [address], [place]  7/14/2021, 3:47 AM

## 2021-07-14 NOTE — PLAN OF CARE
Problem: HEMODYNAMIC STATUS  Goal: Patient has stable vital signs and fluid balance  Outcome: Ongoing     Problem: ACTIVITY INTOLERANCE/IMPAIRED MOBILITY  Goal: Mobility/activity is maintained at optimum level for patient  Outcome: Ongoing     Problem: COMMUNICATION IMPAIRMENT  Goal: Ability to express needs and understand communication  Outcome: Ongoing     Problem: Swallowing - Impaired:  Goal: Able to swallow without choking  Description: Able to swallow without choking  Outcome: Ongoing     Problem: Swallowing - Impaired:  Goal: Absence of aspiration  Description: Absence of aspiration  Outcome: Ongoing     Problem: Skin Integrity:  Goal: Will show no infection signs and symptoms  Description: Will show no infection signs and symptoms  Outcome: Ongoing     Problem: Skin Integrity:  Goal: Absence of new skin breakdown  Description: Absence of new skin breakdown  Outcome: Ongoing     Problem: Falls - Risk of:  Goal: Will remain free from falls  Description: Will remain free from falls  Outcome: Ongoing     Problem: Falls - Risk of:  Goal: Absence of physical injury  Description: Absence of physical injury  Outcome: Ongoing

## 2021-07-14 NOTE — PROGRESS NOTES
NEUROLOGY INPATIENT PROGRESS NOTE    7/14/2021         Briefly, 67-year-old male with past medical history of CAD, ischemic strokes with residual right hemiparesis, HTN, HLD, PVD who was brought to the hospital after being found down by his sister miya BERRY 2 days prior to presentation. NIH SS of 6 significant for aphasia, right hemiparesis. CTA of the head and neck showed right PCA occlusion and diffuse iCAD. Was started on dual antiplatelets. Was not started on antiplatelets due to thrombocytopenia. Platelets of only 4 on presentation likely due to ITP was started on euthymic needs on a 2 mg IV every 8 hours. Also being managed for rhabdomyolysis. Subjective: no new symptoms. No active complaints. Denies headache, visual disturbances. Expressive aphasia and confusion. No current facility-administered medications on file prior to encounter. Current Outpatient Medications on File Prior to Encounter   Medication Sig Dispense Refill    metoprolol tartrate (LOPRESSOR) 50 MG tablet Take 25 mg by mouth daily      tiZANidine (ZANAFLEX) 2 MG tablet Take 1 tablet by mouth 4 times daily as needed (muscle spasms) 40 tablet 0    naproxen (NAPROSYN) 500 MG tablet Take 1 tablet by mouth 2 times daily (with meals) 20 tablet 0    ibuprofen (ADVIL;MOTRIN) 800 MG tablet Take 1 tablet by mouth every 8 hours as needed for Pain 30 tablet 0    lidocaine (LIDODERM) 5 % Place 1 patch onto the skin daily 12 hours on, 12 hours off.  30 patch 0    metoprolol succinate (TOPROL XL) 50 MG extended release tablet Take 50 mg by mouth daily      tiotropium (SPIRIVA RESPIMAT) 2.5 MCG/ACT AERS inhaler Inhale 2 puffs into the lungs daily      carboxymethylcellulose 1 % ophthalmic solution Place 1 drop into both eyes 3 times daily as needed for Dry Eyes       ketotifen (ZADITOR) 0.025 % ophthalmic solution Place 1 drop into both eyes 2 times daily as needed (itchy eyes)       isosorbide mononitrate (IMDUR) 60 MG extended release tablet Take 30 mg by mouth daily Indications: 1/2 tablet (30mg)       finasteride (PROSCAR) 5 MG tablet Take 5 mg by mouth daily      tamsulosin (FLOMAX) 0.4 MG capsule Take 0.4 mg by mouth daily      fluticasone (FLONASE) 50 MCG/ACT nasal spray 1 spray by Nasal route 2 times daily (Patient taking differently: 1 spray by Nasal route 2 times daily as needed for Rhinitis ) 1 Bottle 0    albuterol sulfate  (90 BASE) MCG/ACT inhaler Inhale 2 puffs into the lungs every 6 hours as needed for Wheezing      albuterol (PROVENTIL) (2.5 MG/3ML) 0.083% nebulizer solution Take 2.5 mg by nebulization every 6 hours as needed for Wheezing      aspirin 81 MG EC tablet Take 81 mg by mouth daily      losartan (COZAAR) 100 MG tablet Take 100 mg by mouth daily       nitroGLYCERIN (NITROSTAT) 0.4 MG SL tablet Place 0.4 mg under the tongue every 5 minutes as needed for Chest pain      FLUoxetine (PROZAC) 20 MG capsule Take 40 mg by mouth daily       furosemide (LASIX) 20 MG tablet Take 20 mg by mouth daily as needed (swelling)       clopidogrel (PLAVIX) 75 MG tablet Take 75 mg by mouth daily      atorvastatin (LIPITOR) 80 MG tablet Take 40 mg by mouth daily (Pt takes one-half of an 80mg tab = 40mg)      rOPINIRole (REQUIP) 0.25 MG tablet Take 0.25 mg by mouth nightly as needed       budesonide-formoterol (SYMBICORT) 160-4.5 MCG/ACT AERO Inhale 2 puffs into the lungs 2 times daily. Allergies: Peggy Cobb has No Known Allergies.     Past Medical History:   Diagnosis Date    Centrilobular emphysema ([address]     COPD (chronic obstructive pulmonary disease) ([address]     Depression     Diabetes mellitus ([address]     pt refuses to take previously prescribed metformin (states PCP aware)    Former smoker     Hyperlipidemia     on 4/27/18 pt states \"I stopped taking that about a year ago\"    Hypertension     Mixed restrictive and obstructive lung disease ([address]     Need for pneumococcal vaccine     Personal history of tobacco use        Past Surgical History:   Procedure Laterality Date    CARDIAC SURGERY  07/2015    1 stent    NECK SURGERY      TOE AMPUTATION Right greater       Medications:     insulin lispro  0-12 Units Subcutaneous TID WC    insulin lispro  0-6 Units Subcutaneous Nightly    sodium chloride  1,000 mL Intravenous Once    atorvastatin  80 mg Oral Once    budesonide-formoterol  2 puff Inhalation BID    tiotropium  2 puff Inhalation Daily    sodium chloride flush  5-40 mL Intravenous 2 times per day    famotidine (PEPCID) injection  20 mg Intravenous BID    methylPREDNISolone  80 mg Intravenous Q8H     PRN Meds include: glucose, dextrose, glucagon (rDNA), dextrose, albuterol sulfate HFA, sodium chloride flush, sodium chloride, ondansetron **OR** ondansetron, polyethylene glycol, acetaminophen **OR** acetaminophen, potassium chloride **OR** potassium alternative oral replacement **OR** potassium chloride, sodium chloride    Objective:   BP (!) 152/82   Pulse 56   Temp 98.2 °F (36.8 °C) (Oral)   Resp 21   SpO2 99%     Blood pressure range: Systolic (76UZZ), BYN:050 , Min:118 , PGL:195   ; Diastolic (43HIA), COJ:31, Min:47, Max:118      ROS:  Review of Systems   Unable to perform ROS: Mental status change         NEUROLOGIC EXAMINATION  Physical Exam  HENT:      Head: Normocephalic and atraumatic. Eyes:      Extraocular Movements: Extraocular movements intact. Pupils: Pupils are equal, round, and reactive to light. Cardiovascular:      Rate and Rhythm: Normal rate and regular rhythm. Pulmonary:      Effort: Pulmonary effort is normal.      Breath sounds: Wheezing present. Abdominal:      General: Abdomen is flat. Palpations: Abdomen is soft. Musculoskeletal:         General: Normal range of motion. Cervical back: Normal range of motion and neck supple. Skin:     General: Skin is warm and dry. Neurological:      Mental Status: He is alert.       GCS: GCS eye subscore is cm.  No associated hemorrhage. 2. Diffuse parenchymal volume loss and sequela of severe chronic microvascular ischemic changes. CTA head and neck  1. Severe stenosis in the V1 segment of the left vertebral artery. 2. Extensive intracranial atherosclerotic plaque with near complete occlusion of the right PCA. 3. Severe stenoses in the proximal A3 segments of the ACAs bilaterally. 4. Moderate-to-severe proximal M2 segment stenosis in the right MCA. Moderate stenosis in the M1 and M2 segments of the left MCA. 5. Moderate stenosis in the P1/P2 junction of the left PCA. 6. Enlarged heterogeneous thyroid gland with 2.6 cm left thyroid lobe nodule. ASSESSMENT  · Right hemiparesis likely chronic from previous ischemic strokes. Although CT head showing left corona radiata hypodensity concerning for acute stroke. We will get an MRI to rule out acute ischemic stroke. We will hold antiplatelets given his recent thrombocytopenia. 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     Recommendations    · ITP management as per heme-onc  · Glucose less than 180  · Keep SBP more than 120  · Follow-up MRI brain without contrast  · Follow Hemoglobin A1c  · Lipitor 40 mg nightly   · Recommend holding antiplatelets at this time  · PT OT speech therapy    Thank you, will continue to follow    Lio Christianson MD  PGY-4 Neurology Resident

## 2021-07-14 NOTE — PROGRESS NOTES
Physical Therapy        Physical Therapy Cancel Note      DATE: 2021    NAME: Fahad Storm  MRN: [id number]   : 1945      Patient not seen this date for Physical Therapy due to:    Patient Declined: Pt eating      Electronically signed by RETA Vallejo on 2021 at 3:04 PM    This treatment/evaluation completed by signing SPT. Signing PT agrees with treatment and documentation.

## 2021-07-14 NOTE — CARE COORDINATION
Met with pt to assess for support and needs. Pt stated he lives alone. Stated he does not work, has [employer] Lown in place. Stated he has a sister and niece in town that are supportive and able to help as needed. Pt reports he has a hx of depression and follows with his PCP for medication. Pt stated he did not know if he felt the meds were helpful. No prior counseling. Discussed counseling and pt stated he was not interested in any counseling or resources. PHQ-9 completed  NAN notified of score of 5    Patient Health Questionnaire-9 (PHQ-9)    Over the last 2 weeks, how often have you been bothered by any of the following problems? 1. Little Interest or pleasure in doing things? [] Not at all  [] Several Days  [] More than half the day  [x]  Nearly every day    2. Feeling down, depressed or hopeless? [] Not at all  [x] Several Days  [] More than half the day  []  Nearly every day    3. Trouble falling or staying asleep, or sleeping too much? [] Not at all  [x] Several Days  [] More than half the day  []  Nearly every day    4. Feeling tired or having little energy? [x] Not at all  [] Several Days  [] More than half the day  []  Nearly every day    5. Poor apettite or overeating? [x] Not at all  [] Several Days  [] More than half the day  []  Nearly every day    6. Feeling bad about yourself-or that you are a failure or have let yourself or your family down? [x] Not at all  [] Several Days  [] More than half the day  []  Nearly every day    7. Trouble concentrating on things, such as reading the newspaper or watching television? [x] Not at all  [] Several Days  [] More than half the day  []  Nearly every day    8. Moving or speaking so slowly that other people could have noticed? Or the opposite-being so fidgety or restless that you have been moving around a lot more than usual?   [x] Not at all  [] Several Days  [] More than half the day  []  Nearly every day    9.  Thoughts that you would be better off dead or of hurting yourself in some way? [x] Not at all  [] Several Days  [] More than half the day  []  Nearly every day    Total Score: 5    If you checked off any problems, how difficult have these problems made it for you to do your work, take care of things at home, or get along with other people?    [x] Not difficult at all  [] Somewhat Difficult  [] Very Difficult  []  Extremely Difficult

## 2021-07-14 NOTE — PROGRESS NOTES
Speech Language Pathology  Facility/Department: [facility]URO   CLINICAL BEDSIDE SWALLOW EVALUATION    NAME: Radha Baird  : 1945  MRN: [id number]    ADMISSION DATE: 2021  ADMITTING DIAGNOSIS: has Centrilobular emphysema ([address] Mixed restrictive and obstructive lung disease ([address] Cerebrovascular accident (CVA) ([address] COPD (chronic obstructive pulmonary disease) ([address] HTN (hypertension); Diabetes 1.5, managed as type 2 ([address] Hyperlipidemia; CAD (coronary artery disease); Rhabdomyolysis; Intracranial atherosclerosis; 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    Date of Eval: 2021  Evaluating Therapist: Jona Lara    Current Diet level:  Current Diet : NPO  Current Liquid Diet : NPO      Primary ComplaintThe patient is a pleasant 77 y.o. male presents with a chief complaint of being found down by the sister. Patient's sister was informed that he had not been seen, she went to check on him. Sister found him down, called 911 for help. In the ED, he is found to have R facial weakness, R sided arm and leg weakness, and dysarthria. Patient has multiple ecchymosis on R side of arm and chest. Patients lab in ED showed, creatinine 1.47 (baseline 1.40/1.19), BUN 29, GFR 57, Lactic acid 2.4. CXR showed clear lungs, cardiomegaly, no acute cardiopulmonary abnormality. Pain:  Pain Assessment  Pain Assessment: 0-10  Pain Level: 0    Reason for Referral  Radha Baird was referred for a bedside swallow evaluation to assess the efficiency of his swallow function, identify signs and symptoms of aspiration and make recommendations regarding safe dietary consistencies, effective compensatory strategies, and safe eating environment.     Impression  Patient presents with probable safe swallow for  Dysphagia Pureed (Dysphagia I) with mildly thick (nectar) liquids as evidenced by no overt s/s of aspiration noted

## 2021-07-14 NOTE — PROGRESS NOTES
Speech Language Pathology  Facility/Department: [facility] NEURO  Initial Speech/Language/Cognitive Assessment    NAME: Michelle Dia  : 1945   MRN: [id number]  ADMISSION DATE: 2021  ADMITTING DIAGNOSIS: has Centrilobular emphysema ([address] Mixed restrictive and obstructive lung disease ([address] Cerebrovascular accident (CVA) ([address] COPD (chronic obstructive pulmonary disease) ([address] HTN (hypertension); Diabetes 1.5, managed as type 2 ([address] Hyperlipidemia; CAD (coronary artery disease); Rhabdomyolysis; Intracranial atherosclerosis; 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    Date of Eval: 2021   Evaluating Therapist: Jessica Castillo    RECENT RESULTS  CT OF HEAD/MRI:   1. New ovoid low-attenuation area in the left frontal corona radiata   compatible with acute/subacute infarction.  This measures 2 x 1.4 cm.  No   associated hemorrhage. 2. Diffuse parenchymal volume loss and sequela of severe chronic   microvascular ischemic changes. Primary Complaint: The patient is a pleasant 77 y.o. male presents with a chief complaint of being found down by the sister. Patient's sister was informed that he had not been seen, she went to check on him. Sister found him down, called 911 for help. In the ED, he is found to have R facial weakness, R sided arm and leg weakness, and dysarthria. Patient has multiple ecchymosis on R side of arm and chest. Patients lab in ED showed, creatinine 1.47 (baseline 1.40/1.19), BUN 29, GFR 57, Lactic acid 2.4. CXR showed clear lungs, cardiomegaly, no acute cardiopulmonary abnormality. Pain:  Pain Assessment  Pain Assessment: 0-10  Pain Level: 0    Assessment:  Pt presents with moderate to severe cognitive-linguistic deficits characterized by impaired following of 2 step directions, word associations, verbal sequencing, similarities/differences, and divergent naming.  Pt. Presents with moderate to severe dysarthria with moderate to severely decreased intelligibility characterized by articulatory imprecision and reduced vocal intensity. Pt. With right facial weakness and reduced right labial ROM at this time. Cognitive evaluation limited due to no response from pt. ST to follow up and provide treatment to address noted deficits. Education provided. Further therapy recommended at discharge. Recommendations:  Requires SLP Intervention: Yes  Duration/Frequency of Treatment: 3-5x/week  D/C Recommendations: Further therapy recommended at discharge. Plan:   Goals:  Short-term Goals  Goal 1: Pt. will complete OMEX for facial weakness 10-20x/session  Goal 2: Pt. will utilize dysarthria compensatory strategies to increase speech clarity  Goal 3: Pt. will complete thought organizational tasks with 90% accuracy  Goal 4: Pt. will complete abstract reasoning tasks with 90% accuracy  Goal 5: Pt will generate 4-5 members of a category with 90% accuracy  Goal 6: Further assess as appropriate   Patient/family involved in developing goals and treatment plan: yes    Subjective:  General  Chart Reviewed: Yes  Family / Caregiver Present: No  Social/Functional History  Lives With: Alone  Active : Yes  Occupation: Retired        Objective:  Oral/Motor  Oral Motor: Exceptions to [facility]  Labial ROM: Reduced right  Labial Symmetry: Abnormal symmetry right    Auditory Comprehension  Comprehension: Exceptions  Two Step Basic Commands:  Moderate (2/4.)    Motor Speech  Motor Speech: Exceptions to [facility]  Intelligibility: Moderate-Severe  Dysarthria : Moderate-Severe (articulatory imprecision, reduced vocal intensity)      Cognition:   Orientation  Overall Orientation Status: Within Functional Limits (Not oriented to age or president)  Attention  Attention: Within Functional Limits  Memory  Memory: Unable to assess  Problem Solving  Problem Solving: Exceptions to [facility]  Verbal Reasoning Skills: Severe (Word associations: 1/3. Verbal sequencin/4. Similarities/differences: .)  Abstract Reasoning  Abstract Reasoning: Exceptions to [facility]  Divergent Thinking: Severe (2 per 30 sec)  Safety/Judgement  Safety/Judgement: Unable to assess      Prognosis:  Speech Therapy Prognosis  Prognosis: Fair  Individuals consulted  Consulted and agree with results and recommendations: Patient    Education:  Patient Education: yes  Patient Education Response: Verbalizes understanding          Therapy Time:   Individual Concurrent Group Co-treatment   Time In 1007         Time Out 1022         Minutes 15              Completed by: [address]  Clinician    Cosigned By: Kelle Garcia. S.CCC/SLP    2021 12:47 PM

## 2021-07-15 NOTE — PROGRESS NOTES
Today's Date: 7/15/2021  Patient Name: Milla Rowell  Date of admission: 7/13/2021 11:18 AM  Patient's age: 77 y.o., 1945  Admission Dx: Acute cerebrovascular accident (CVA) ([facility] [address] [I63.9]  Acute cerebrovascular accident (CVA) ([facility] [address] [I63.9]    Reason for Consult: management recommendations  Requesting Physician: Juan Martinez MD    CHIEF COMPLAINT: Acute stroke. Thrombocytopenia. History Obtained From:  patient, electronic medical record      Interval history:    Patient seen and examined  Laboratory reviewed  Patient continues to be on steroids  Platelet count is improving  Denies fever chills          HISTORY OF PRESENT ILLNESS:      The patient is a 77 y.o.  male who is admitted to the hospital for acute stroke    Brought to the hospital after being found down on the floor. Patient was last seen well about 2 days ago. Patient has a history of peripheral vascular disease coronary artery disease hypertension COPD and previous strokes. Patient has significant contracture on the right side also right-sided facial droop with severe dysarthria limiting history of presenting illness. Patient's CBC at presentation shows hemoglobin 11.7 with MCV 77 WBC count 10 and platelet count of 4 with immature platelet fraction of 44%. Patient previous CBC noted in the system from June 2020 shows blood count of 160. Patient was also noted to have elevated myoglobin and CK. Creatinine is 1.74 lactic acid is 2.4. Patient has dysarthria not able to give much history. Past Medical History:   has a past medical history of Centrilobular emphysema ([facility] [address] COPD (chronic obstructive pulmonary disease) ([facility] [address] Depression, Diabetes mellitus ([facility] [address] Former smoker, Hyperlipidemia, Hypertension, Mixed restrictive and obstructive lung disease ([facility] [address] Need for pneumococcal vaccine, and Personal history of tobacco use. Past Surgical History:   has a past surgical history that includes Neck surgery;  Toe amputation (Right, greater); and Cardiac surgery (07/2015). Medications:    Prior to Admission medications    Medication Sig Start Date End Date Taking?  Authorizing Provider   metoprolol tartrate (LOPRESSOR) 50 MG tablet Take 25 mg by mouth daily    Historical Provider, MD   tiZANidine (ZANAFLEX) 2 MG tablet Take 1 tablet by mouth 4 times daily as needed (muscle spasms) 5/7/21   Yuridia IramJOLIE caraballo CNP   naproxen (NAPROSYN) 500 MG tablet Take 1 tablet by mouth 2 times daily (with meals) 1/4/21   Hernan Irizarry PA-C   ibuprofen (ADVIL;MOTRIN) 800 MG tablet Take 1 tablet by mouth every 8 hours as needed for Pain 6/2/20   Lemuel Ventura MD   lidocaine (LIDODERM) 5 % Place 1 patch onto the skin daily 12 hours on, 12 hours off. 6/2/20   Lemuel Ventura MD   metoprolol succinate (TOPROL XL) 50 MG extended release tablet Take 50 mg by mouth daily    Historical Provider, MD   tiotropium (SPIRIVA RESPIMAT) 2.5 MCG/ACT AERS inhaler Inhale 2 puffs into the lungs daily    Historical Provider, MD   carboxymethylcellulose 1 % ophthalmic solution Place 1 drop into both eyes 3 times daily as needed for Dry Eyes     Historical Provider, MD   ketotifen (ZADITOR) 0.025 % ophthalmic solution Place 1 drop into both eyes 2 times daily as needed (itchy eyes)     Historical Provider, MD   isosorbide mononitrate (IMDUR) 60 MG extended release tablet Take 30 mg by mouth daily Indications: 1/2 tablet (30mg)     Historical Provider, MD   finasteride (PROSCAR) 5 MG tablet Take 5 mg by mouth daily    Historical Provider, MD   tamsulosin (FLOMAX) 0.4 MG capsule Take 0.4 mg by mouth daily    Historical Provider, MD   fluticasone (FLONASE) 50 MCG/ACT nasal spray 1 spray by Nasal route 2 times daily  Patient taking differently: 1 spray by Nasal route 2 times daily as needed for Rhinitis  5/31/16   Francisco Connor DO   albuterol sulfate  (90 BASE) MCG/ACT inhaler Inhale 2 puffs into the lungs every 6 hours as needed for Wheezing    Historical Provider, MD   albuterol (PROVENTIL) (2.5 MG/3ML) 0.083% nebulizer solution Take 2.5 mg by nebulization every 6 hours as needed for Wheezing    Historical Provider, MD   aspirin 81 MG EC tablet Take 81 mg by mouth daily    Historical Provider, MD   losartan (COZAAR) 100 MG tablet Take 100 mg by mouth daily     Historical Provider, MD   nitroGLYCERIN (NITROSTAT) 0.4 MG SL tablet Place 0.4 mg under the tongue every 5 minutes as needed for Chest pain    Historical Provider, MD   FLUoxetine (PROZAC) 20 MG capsule Take 40 mg by mouth daily     Historical Provider, MD   furosemide (LASIX) 20 MG tablet Take 20 mg by mouth daily as needed (swelling)     Historical Provider, MD   clopidogrel (PLAVIX) 75 MG tablet Take 75 mg by mouth daily    Historical Provider, MD   atorvastatin (LIPITOR) 80 MG tablet Take 40 mg by mouth daily (Pt takes one-half of an 80mg tab = 40mg)    Historical Provider, MD   rOPINIRole (REQUIP) 0.25 MG tablet Take 0.25 mg by mouth nightly as needed     Historical Provider, MD   budesonide-formoterol (SYMBICORT) 160-4.5 MCG/ACT AERO Inhale 2 puffs into the lungs 2 times daily.     Historical Provider, MD     Current Facility-Administered Medications   Medication Dose Route Frequency Provider Last Rate Last Admin    rosuvastatin (CRESTOR) tablet 40 mg  40 mg Oral Nightly Shari Prieto MD        clopidogrel (PLAVIX) tablet 75 mg  75 mg Oral Daily Daphney Card MD   75 mg at 07/15/21 1202    aspirin chewable tablet 81 mg  81 mg Oral Daily Daphney Card MD   81 mg at 07/15/21 1202    heparin (porcine) injection 5,000 Units  5,000 Units Subcutaneous 3 times per day Daphney Card MD        insulin lispro (HUMALOG) injection vial 0-12 Units  0-12 Units Subcutaneous TID  Sony Michelle MD   2 Units at 07/15/21 1210    insulin lispro (HUMALOG) injection vial 0-6 Units  0-6 Units Subcutaneous Nightly Sony Michelle MD   3 Units at 07/14/21 2014    glucose (GLUTOSE) 40 % oral gel 15 g  15 g Oral PRN Ana Paula Valera MD        dextrose 50 % IV solution  12.5 g Intravenous PRN Ana Paula Valera MD        glucagon (rDNA) injection 1 mg  1 mg Intramuscular PRN Ana Paula Valera MD        dextrose 5 % solution  100 mL/hr Intravenous PRN Ana Paula Valera MD        insulin glargine (LANTUS) injection vial 8 Units  8 Units Subcutaneous Nightly Jaylen Stewart Segura MD   8 Units at 07/15/21 0049    0.9 % sodium chloride bolus  1,000 mL Intravenous Once Ana Paula Valera MD        albuterol sulfate  (90 Base) MCG/ACT inhaler 2 puff  2 puff Inhalation Q6H PRN Ana Paula Valera MD   2 puff at 07/15/21 1051    budesonide-formoterol (SYMBICORT) 160-4.5 MCG/ACT inhaler 2 puff  2 puff Inhalation BID Ana Paula Valera MD   2 puff at 07/15/21 1051    tiotropium (SPIRIVA RESPIMAT) 2.5 MCG/ACT inhaler 2 puff  2 puff Inhalation Daily Ana Paula Valera MD   2 puff at 07/15/21 1051    sodium chloride flush 0.9 % injection 5-40 mL  5-40 mL Intravenous 2 times per day Ana Paula Valera MD   10 mL at 07/15/21 1000    sodium chloride flush 0.9 % injection 5-40 mL  5-40 mL Intravenous PRN Ana Paula Valera MD        0.9 % sodium chloride infusion  25 mL Intravenous PRN Ana Paula Valera MD        ondansetron (ZOFRAN-ODT) disintegrating tablet 4 mg  4 mg Oral Q8H PRN Ana Paula Valera MD        Or    ondansetron (ZOFRAN) injection 4 mg  4 mg Intravenous Q6H PRN Ana Paula Valera MD        polyethylene glycol (GLYCOLAX) packet 17 g  17 g Oral Daily PRN Ana Paula Valera MD        acetaminophen (TYLENOL) tablet 650 mg  650 mg Oral Q6H PRN Ana Paula Valera MD        Or    acetaminophen (TYLENOL) suppository 650 mg  650 mg Rectal Q6H PRN Ana Paula Valera MD        potassium chloride (KLOR-CON M) extended release tablet 40 mEq  40 mEq Oral PRN Ana Paula Valera MD        Or    potassium bicarb-citric acid (EFFER-K) effervescent tablet 40 mEq  40 mEq Oral PRN Naomi Leavitt MD        Or    potassium chloride 10 mEq/100 mL IVPB (Peripheral Line)  10 mEq Intravenous PRN Naomi Leavitt MD        famotidine (PEPCID) injection 20 mg  20 mg Intravenous BID Naomi Leavitt MD   20 mg at 07/15/21 1000    0.9 % sodium chloride infusion   Intravenous PRN [facility], MD        methylPREDNISolone sodium (SOLU-MEDROL) injection 80 mg  80 mg Intravenous [id number] [place] MD Ji   80 mg at 07/15/21 1210       Allergies:  Patient has no known allergies. Social History:   reports that he quit smoking about 8 years ago. He started smoking about 64 years ago. He has a 42.00 pack-year smoking history. He has never used smokeless tobacco. He reports that he does not drink alcohol and does not use drugs. Family History: Hypertension    REVIEW OF SYSTEMS:      Patient's review of system is limited due to dysarthria    PHYSICAL EXAM:        BP (!) 171/74   Pulse 78   Temp 98.5 °F (36.9 °C) (Oral)   Resp 25   Ht 5' 10\" (1.778 m)   Wt 210 lb (95.3 kg)   SpO2 100%   BMI 30.13 kg/m²    Temp (24hrs), Av.4 °F (36.9 °C), Min:97.9 °F (36.6 °C), Max:98.8 °F (37.1 °C)      General appearance -ill appearing, no in pain or distress   Mental status -alert and awake  Eyes - pupils equal and reactive, extraocular eye movements intact   Ears - bilateral TM's and external ear canals normal   Mouth - mucous membranes moist, pharynx normal without lesions   Neck - supple, no significant adenopathy   Lymphatics - no palpable lymphadenopathy, no hepatosplenomegaly   Chest -decreased breathing sounds  Heart - normal rate, regular rhythm, normal S1, S2, no murmurs  Abdomen - soft, nontender, nondistended, no masses or organomegaly   Neurological -speech altered. Patient is awake.   Follows simple commands  Musculoskeletal - no joint tenderness, deformity or swelling   Extremities -right upper and lower extremity contracture  Skin - normal coloration and turgor, no rashes, no suspicious skin lesions noted ,      DATA:      Labs:     Results for orders placed or performed during the hospital encounter of 07/13/21   MYOGLOBIN, SERUM   Result Value Ref Range    Myoglobin 3,030 (H) 28 - 72 ng/mL   CK   Result Value Ref Range    Total CK 7,454 (H) 39 - 308 U/L   CBC WITH AUTO DIFFERENTIAL   Result Value Ref Range    WBC 10.7 3.5 - 11.3 k/uL    RBC 4.86 4.21 - 5.77 m/uL    Hemoglobin 11.7 (L) 13.0 - 17.0 g/dL    Hematocrit 37.4 (L) 40.7 - 50.3 %    MCV 77.0 (L) 82.6 - 102.9 fL    MCH 24.1 (L) 25.2 - 33.5 pg    MCHC 31.3 28.4 - 34.8 g/dL    RDW 15.7 (H) 11.8 - 14.4 %    Platelets See Reflexed IPF Result 138 - 453 k/uL    MPV NOT REPORTED 8.1 - 13.5 fL    NRBC Automated 0.2 (H) 0.0 per 100 WBC    Differential Type NOT REPORTED     WBC Morphology NOT REPORTED     RBC Morphology ANISOCYTOSIS PRESENT     Platelet Estimate NOT REPORTED     Seg Neutrophils 78 (H) 36 - 65 %    Lymphocytes 13 (L) 24 - 43 %    Monocytes 7 3 - 12 %    Eosinophils % 1 1 - 4 %    Basophils 1 0 - 2 %    Immature Granulocytes 1 (H) 0 %    Segs Absolute 8.33 (H) 1.50 - 8.10 k/uL    Absolute Lymph # 1.41 1.10 - 3.70 k/uL    Absolute Mono # 0.73 0.10 - 1.20 k/uL    Absolute Eos # 0.07 0.00 - 0.44 k/uL    Basophils Absolute 0.07 0.00 - 0.20 k/uL    Absolute Immature Granulocyte 0.08 0.00 - 0.30 k/uL   COMPREHENSIVE METABOLIC PANEL   Result Value Ref Range    Glucose 192 (H) 70 - 99 mg/dL    BUN 29 (H) 8 - 23 mg/dL    CREATININE 1.47 (H) 0.70 - 1.20 mg/dL    Bun/Cre Ratio NOT REPORTED 9 - 20    Calcium 9.3 8.6 - 10.4 mg/dL    Sodium 142 135 - 144 mmol/L    Potassium 4.0 3.7 - 5.3 mmol/L    Chloride 107 98 - 107 mmol/L    CO2 25 20 - 31 mmol/L    Anion Gap 10 9 - 17 mmol/L    Alkaline Phosphatase 111 40 - 129 U/L    ALT 65 (H) 5 - 41 U/L     (H) <40 U/L    Total Bilirubin 1.03 0.3 - 1.2 mg/dL    Total Protein 7.2 6.4 - 8.3 g/dL    Albumin 3.8 3.5 - 5.2 g/dL    Albumin/Globulin Ratio 1.1 1.0 - 2.5    GFR Non- 47 (L) >60 mL/min    GFR  57 (L) >60 mL/min    GFR Comment          GFR Staging NOT REPORTED    LACTIC ACID, WHOLE BLOOD   Result Value Ref Range    Lactic Acid, Whole Blood 2.4 (H) 0.7 - 2.1 mmol/L   Troponin   Result Value Ref Range    Troponin, High Sensitivity 52 (HH) 0 - 22 ng/L    Troponin T NOT REPORTED <0.03 ng/mL    Troponin Interp NOT REPORTED    ELECTROLYTES PLUS   Result Value Ref Range    POC Sodium 145 138 - 146 mmol/L    POC Potassium 3.9 3.5 - 4.5 mmol/L    POC Chloride 110 (H) 98 - 107 mmol/L    POC TCO2 27 22 - 30 mmol/L    Anion Gap 9 7 - 16 mmol/L   Hemoglobin and hematocrit, blood   Result Value Ref Range    POC Hemoglobin 13.2 (L) 13.5 - 17.5 g/dL    POC Hematocrit 39 (L) 41 - 53 %   CALCIUM, IONIC (POC)   Result Value Ref Range    POC Ionized Calcium 1.18 1.15 - 1.33 mmol/L   Immature Platelet Fraction   Result Value Ref Range    Platelet, Immature Fraction 44.9 (H) 1.1 - 10.3 %    Platelet, Fluorescence 4 (LL) 138 - 453 k/uL   Troponin   Result Value Ref Range    Troponin, High Sensitivity 46 (H) 0 - 22 ng/L    Troponin T NOT REPORTED <0.03 ng/mL    Troponin Interp NOT REPORTED    DRUG SCREEN MULTI URINE   Result Value Ref Range    Amphetamine Screen, Ur NEGATIVE NEGATIVE    Barbiturate Screen, Ur NEGATIVE NEGATIVE    Benzodiazepine Screen, Urine NEGATIVE NEGATIVE    Cocaine Metabolite, Urine NEGATIVE NEGATIVE    Methadone Screen, Urine NEGATIVE NEGATIVE    Opiates, Urine NEGATIVE NEGATIVE    Phencyclidine, Urine NEGATIVE NEGATIVE    Propoxyphene, Urine NOT REPORTED NEGATIVE    Cannabinoid Scrn, Ur NEGATIVE NEGATIVE    Oxycodone Screen, Ur NEGATIVE NEGATIVE    Methamphetamine, Urine NOT REPORTED NEGATIVE    Tricyclic Antidepressants, Urine NOT REPORTED NEGATIVE    MDMA, Urine NOT REPORTED NEGATIVE    Buprenorphine Urine NOT REPORTED NEGATIVE    Test Information       Assay provides medical screening only.   The absence of expected drug(s) and/or metabolite(s) may indicate diluted or adulterated urine, limitations of testing or timing of collection.    Basic Metabolic Panel w/ Reflex to MG   Result Value Ref Range    Glucose 207 (H) 70 - 99 mg/dL    BUN 31 (H) 8 - 23 mg/dL    CREATININE 1.45 (H) 0.70 - 1.20 mg/dL    Bun/Cre Ratio NOT REPORTED 9 - 20    Calcium 8.4 (L) 8.6 - 10.4 mg/dL    Sodium 143 135 - 144 mmol/L    Potassium 3.8 3.7 - 5.3 mmol/L    Chloride 110 (H) 98 - 107 mmol/L    CO2 22 20 - 31 mmol/L    Anion Gap 11 9 - 17 mmol/L    GFR Non-African American 48 (L) >60 mL/min    GFR  58 (L) >60 mL/min    GFR Comment          GFR Staging NOT REPORTED    Hepatic function panel   Result Value Ref Range    Albumin 3.3 (L) 3.5 - 5.2 g/dL    Alkaline Phosphatase 93 40 - 129 U/L    ALT 54 (H) 5 - 41 U/L    AST 81 (H) <40 U/L    Total Bilirubin 0.80 0.3 - 1.2 mg/dL    Bilirubin, Direct 0.30 <0.31 mg/dL    Bilirubin, Indirect 0.50 0.00 - 1.00 mg/dL    Total Protein 6.3 (L) 6.4 - 8.3 g/dL    Globulin NOT REPORTED 1.5 - 3.8 g/dL    Albumin/Globulin Ratio 1.1 1.0 - 2.5   CBC auto differential   Result Value Ref Range    WBC 10.7 3.5 - 11.3 k/uL    RBC 4.33 4.21 - 5.77 m/uL    Hemoglobin 10.4 (L) 13.0 - 17.0 g/dL    Hematocrit 34.5 (L) 40.7 - 50.3 %    MCV 79.7 (L) 82.6 - 102.9 fL    MCH 24.0 (L) 25.2 - 33.5 pg    MCHC 30.1 28.4 - 34.8 g/dL    RDW 15.7 (H) 11.8 - 14.4 %    Platelets See Reflexed IPF Result 138 - 453 k/uL    MPV NOT REPORTED 8.1 - 13.5 fL    NRBC Automated 0.6 (H) 0.0 per 100 WBC    Differential Type NOT REPORTED     WBC Morphology NOT REPORTED     RBC Morphology ANISOCYTOSIS PRESENT     Platelet Estimate NOT REPORTED     Seg Neutrophils 87 (H) 36 - 65 %    Lymphocytes 10 (L) 24 - 43 %    Monocytes 2 (L) 3 - 12 %    Eosinophils % 0 (L) 1 - 4 %    Basophils 0 0 - 2 %    Immature Granulocytes 1 (H) 0 %    Segs Absolute 9.32 (H) 1.50 - 8.10 k/uL    Absolute Lymph # 1.03 (L) 1.10 - 3.70 k/uL    Absolute Mono # 0.21 0.10 - 1.20 k/uL    Absolute Eos # <0.03 0.00 - 0.44 k/uL    Basophils Absolute 0.04 0.00 - 0.20 k/uL    Absolute Immature Granulocyte 0.09 0.00 - 0.30 k/uL   Hemoglobin A1c   Result Value Ref Range    Hemoglobin A1C 7.7 (H) 4.0 - 6.0 %    Estimated Avg Glucose 174 mg/dL   Lipid panel - fasting   Result Value Ref Range    Cholesterol 186 <200 mg/dL    HDL 42 >40 mg/dL    LDL Cholesterol 129 0 - 130 mg/dL    Chol/HDL Ratio 4.4 <5    Triglycerides 77 <150 mg/dL    VLDL NOT REPORTED 1 - 30 mg/dL   PERIPHERAL BLOOD SMEAR, PATH REVIEW   Result Value Ref Range    Pathologist Review SEE REPORT    Fibrinogen   Result Value Ref Range    Fibrinogen 507 (H) 140 - 420 mg/dL   PROTIME-INR   Result Value Ref Range    Protime 11.8 9.1 - 12.3 sec    INR 1.1    APTT   Result Value Ref Range    PTT 18.0 (L) 20.5 - 30.5 sec   Hepatitis C Antibody   Result Value Ref Range    Hepatitis C Ab NONREACTIVE NONREACTIVE   Vitamin B12 & Folate   Result Value Ref Range    Vitamin B-12 667 232 - 1245 pg/mL    Folate 5.2 >4.8 ng/mL   HIV Screen   Result Value Ref Range    HIV Ag/Ab NONREACTIVE NONREACTIVE   CBC Auto Differential   Result Value Ref Range    WBC 10.0 3.5 - 11.3 k/uL    RBC 4.71 4.21 - 5.77 m/uL    Hemoglobin 11.2 (L) 13.0 - 17.0 g/dL    Hematocrit 37.7 (L) 40.7 - 50.3 %    MCV 80.0 (L) 82.6 - 102.9 fL    MCH 23.8 (L) 25.2 - 33.5 pg    MCHC 29.7 28.4 - 34.8 g/dL    RDW 15.7 (H) 11.8 - 14.4 %    Platelets See Reflexed IPF Result 138 - 453 k/uL    MPV NOT REPORTED 8.1 - 13.5 fL    NRBC Automated 0.6 (H) 0.0 per 100 WBC    Differential Type NOT REPORTED     WBC Morphology NOT REPORTED     RBC Morphology NOT REPORTED     Platelet Estimate NOT REPORTED     Immature Granulocytes 1 (H) 0 %    Seg Neutrophils 70 (H) 36 - 65 %    Lymphocytes 18 (L) 24 - 43 %    Monocytes 9 3 - 12 %    Eosinophils % 1 1 - 4 %    Basophils 1 0 - 2 %    Absolute Immature Granulocyte 0.10 0.00 - 0.30 k/uL    Segs Absolute 7.00 1.50 - 8.10 k/uL    Absolute Lymph # 1.80 1.10 - 3.70 k/uL    Absolute Mono # 0.90 0.10 - 1.20 k/uL    Absolute Eos # 0.10 0.00 - 0.44 k/uL    Basophils Absolute 0.10 0.00 - 0.20 k/uL    Morphology 1+ POLYCHROMASIA     Morphology ANISOCYTOSIS PRESENT    Reticulocytes   Result Value Ref Range    Retic % 2.2 (H) 0.5 - 1.9 %    Absolute Retic # 0.100 (H) 0.030 - 0.080 M/uL    Immature Retic Fract 29.500 (H) 2.7 - 18.3 %    Retic Hemoglobin 27.8 (L) 28.2 - 35.7 pg   Immature Platelet Fraction   Result Value Ref Range    Platelet, Fluorescence 9 (LL) 138 - 453 k/uL   PLATELET COUNT   Result Value Ref Range    Platelets See Reflexed IPF Result 138 - 453 k/uL   Immature Platelet Fraction   Result Value Ref Range    Platelet, Immature Fraction 33.3 (H) 1.1 - 10.3 %    Platelet, Fluorescence 11 (LL) 138 - 453 k/uL   Immature Platelet Fraction   Result Value Ref Range    Platelet, Immature Fraction 39.8 (H) 1.1 - 10.3 %    Platelet, Fluorescence 11 (LL) 138 - 453 k/uL   CK   Result Value Ref Range    Total CK 3,552 (H) 39 - 308 U/L   LACTIC ACID, WHOLE BLOOD   Result Value Ref Range    Lactic Acid, Whole Blood 1.9 0.7 - 2.1 mmol/L   Surgical Pathology   Result Value Ref Range    Surgical Pathology Report       [id number]  [facility]  ANATOMIC PATHOLOGY  [address] [place] [address]. [facility], [address]  [phone]  Fax: [phone]  SURGICAL PATHOLOGY CONSULTATION    Patient Name: Allison Ware  MR#: [id number]  Specimen #[id number]    Procedures/Addenda  PERIPHERAL BLOOD REPORT     Date Ordered:     7/14/2021     Status:  Signed Out       Date Complete:     7/14/2021     By: Lio Pa M.D. Date Reported:     7/14/2021       INTERPRETATION  PERIPHERAL BLOOD:  SEVERE THROMBOCYTOPENIA. MILD MICROCYTIC ANEMIA. PATIENT HAS PERSISTENT MILD MICROCYTOSIS WITH HEMOGLOBIN RANGING FROM  NORMAL TO MILDLY DECREASED.   SUGGEST THALASSEMIA TRAIT AND RARE  HEMOGLOBINOPATHY (HEMOGLOBIN E), AS WELL AS, IRON DEFICIENCY ANEMIA,  ANEMIA OF CHRONIC DISEASE, SIDEROBLASTIC ANEMIA. PERIPHERAL BLOOD STUDY    CBC: Please see the electronic health record for CBC parameters CBC  from 7/13/2021                             PLATELETS: Platelets show large  platelets. LEUKOCYTES: White blood cells show normal morphology. There are no  blasts. ERYTHROCYTES: Red blood cells show mild microcytosis and anisocytosis           Argentina Cardona M.D.                    Source:  1: PERIPHERAL BLOOD FOR REVIEW BY PATHOLOGIST     TSH with Reflex   Result Value Ref Range    TSH 0.28 (L) 0.30 - 5.00 mIU/L   T4, Free   Result Value Ref Range    Thyroxine, Free 1.18 0.93 - 1.70 ng/dL   Basic Metabolic Panel w/ Reflex to MG   Result Value Ref Range    Glucose 203 (H) 70 - 99 mg/dL    BUN 32 (H) 8 - 23 mg/dL    CREATININE 1.26 (H) 0.70 - 1.20 mg/dL    Bun/Cre Ratio NOT REPORTED 9 - 20    Calcium 8.7 8.6 - 10.4 mg/dL    Sodium 149 (H) 135 - 144 mmol/L    Potassium 3.7 3.7 - 5.3 mmol/L    Chloride 117 (H) 98 - 107 mmol/L    CO2 21 20 - 31 mmol/L    Anion Gap 11 9 - 17 mmol/L    GFR Non-African American 56 (L) >60 mL/min    GFR African American >60 >60 mL/min    GFR Comment          GFR Staging NOT REPORTED    CBC auto differential   Result Value Ref Range    WBC 16.5 (H) 3.5 - 11.3 k/uL    RBC 4.28 4.21 - 5.77 m/uL    Hemoglobin 10.2 (L) 13.0 - 17.0 g/dL    Hematocrit 34.4 (L) 40.7 - 50.3 %    MCV 80.4 (L) 82.6 - 102.9 fL    MCH 23.8 (L) 25.2 - 33.5 pg    MCHC 29.7 28.4 - 34.8 g/dL    RDW 16.1 (H) 11.8 - 14.4 %    Platelets See Reflexed IPF Result 138 - 453 k/uL    MPV NOT REPORTED 8.1 - 13.5 fL    NRBC Automated 1.3 (H) 0.0 per 100 WBC    Differential Type NOT REPORTED     WBC Morphology NOT REPORTED     RBC Morphology ANISOCYTOSIS PRESENT     Platelet Estimate NOT REPORTED     Seg Neutrophils 82 (H) 36 - 65 %    Lymphocytes 9 (L) 24 - 43 %    Monocytes 7 3 - 12 %    Eosinophils % 0 (L) 1 - 4 %    Basophils 0 0 - 2 %    Immature Granulocytes 2 (H) 0 %    Segs Absolute 13.55 (H) 1.50 - 8.10 k/uL    Absolute Lymph # 1.40 1.10 - 3.70 k/uL    Absolute Mono # 1.14 0.10 - 1.20 k/uL    Absolute Eos # <0.03 0.00 - 0.44 k/uL    Basophils Absolute 0.03 0.00 - 0.20 k/uL    Absolute Immature Granulocyte 0.33 (H) 0.00 - 0.30 k/uL   Immature Platelet Fraction   Result Value Ref Range    Platelet, Immature Fraction 20.7 (H) 1.1 - 10.3 %    Platelet, Fluorescence 73 (L) 138 - 453 k/uL   Venous Blood Gas, POC   Result Value Ref Range    pH, David 7.387 7.320 - 7.430    pCO2, David 43.4 41.0 - 51.0 mm Hg    pO2, David 15.2 (L) 30 - 50 mm Hg    HCO3, Venous 26.1 22.0 - 29.0 mmol/L    Total CO2, Venous NOT REPORTED 23.0 - 30.0 mmol/L    Negative Base Excess, David NOT REPORTED 0.0 - 2.0    Positive Base Excess, David 1 0.0 - 3.0    O2 Sat, David 19 (L) 60.0 - 85.0 %    O2 Device/Flow/% NOT REPORTED     Adelfo Test NOT REPORTED     Sample Site NOT REPORTED     Mode NOT REPORTED     FIO2 NOT REPORTED     Pt Temp NOT REPORTED     POC pH Temp NOT REPORTED     POC pCO2 Temp NOT REPORTED mm Hg    POC pO2 Temp NOT REPORTED mm Hg   Creatinine W/GFR Point of Care   Result Value Ref Range    POC Creatinine 1.46 (H) 0.51 - 1.19 mg/dL    GFR Comment NOT REPORTED >60 mL/min    GFR Non- NOT REPORTED >60 mL/min    GFR Comment         POCT urea (BUN)   Result Value Ref Range    POC BUN 34 (H) 8 - 26 mg/dL   Lactic Acid, POC   Result Value Ref Range    POC Lactic Acid 1.83 (H) 0.56 - 1.39 mmol/L   POCT Glucose   Result Value Ref Range    POC Glucose 207 (H) 74 - 100 mg/dL   POC Glucose Fingerstick   Result Value Ref Range    POC Glucose 174 (H) 75 - 110 mg/dL   POC Glucose Fingerstick   Result Value Ref Range    POC Glucose 136 (H) 75 - 110 mg/dL   POC Glucose Fingerstick   Result Value Ref Range    POC Glucose 190 (H) 75 - 110 mg/dL   POC Glucose Fingerstick   Result Value Ref Range    POC Glucose 211 (H) 75 - 110 mg/dL   POC Glucose Fingerstick   Result Value Ref Range    POC Glucose 225 (H) 75 - 110 mg/dL   POC Glucose weight based adjustment of the mA/kV was utilized to reduce the radiation dose to as low as reasonably achievable. COMPARISON: 05/02/2021 HISTORY: ORDERING SYSTEM PROVIDED HISTORY: Last known well 2 days right-sided facial droop right-sided weakness TECHNOLOGIST PROVIDED HISTORY: Last known well 2 days right-sided facial droop right-sided weakness Decision Support Exception - unselect if not a suspected or confirmed emergency medical condition->Emergency Medical Condition ([place]) Reason for Exam: Last known well 2 days right-sided facial droop right-sided weakness FINDINGS: BRAIN/VENTRICLES: Ovoid area of low attenuation in the left frontal corona radiata measures 2 x 1.4 cm, new from prior study (series 2, image 39). No acute intracranial hemorrhage. No mass effect or midline shift. No hydrocephalus. Diffuse parenchymal volume loss with enlargement of the ventricles and cerebral sulci. Old infarction in the right basal ganglia. There are nonspecific areas of hypoattenuation within the periventricular and subcortical white matter, which likely represent chronic microvascular ischemic change. Intracranial atherosclerotic disease. ORBITS: The visualized portion of the orbits demonstrate no acute abnormality. SINUSES: The visualized paranasal sinuses and mastoid air cells demonstrate no acute abnormality. SOFT TISSUES/SKULL: No acute abnormality of the visualized skull or soft tissues. 1. New ovoid low-attenuation area in the left frontal corona radiata compatible with acute/subacute infarction. This measures 2 x 1.4 cm. No associated hemorrhage. 2. Diffuse parenchymal volume loss and sequela of severe chronic microvascular ischemic changes. Findings were discussed with Dr. Abbey Hernandez at 1:31 pm on 7/13/2021. XR CHEST PORTABLE    Result Date: 7/13/2021  EXAMINATION: ONE XRAY VIEW OF THE CHEST 7/13/2021 10:30 am COMPARISON: September 24, 2019.  HISTORY: ORDERING SYSTEM PROVIDED HISTORY: Found down TECHNOLOGIST PROVIDED HISTORY: Found down Reason for Exam: supine Acuity: Acute Type of Exam: Initial FINDINGS: A portable upright frontal view chest radiograph was obtained. The heart is enlarged. The mediastinal contour and pleural spaces are otherwise within normal limits. The lungs are grossly clear. There is no focal consolidation or pneumothorax. The pulmonary vascular pattern is within normal limits. No acute thoracic osseous abnormality. Clear lungs. Cardiomegaly. No acute cardiopulmonary abnormality. CTA HEAD NECK W CONTRAST    Result Date: 7/13/2021  EXAMINATION: CTA OF THE HEAD AND NECK WITH CONTRAST 7/13/2021 1:11 pm: TECHNIQUE: CTA of the head and neck was performed with the administration of intravenous contrast. Multiplanar reformatted images are provided for review. MIP images are provided for review. Stenosis of the internal carotid arteries measured using NASCET criteria. Dose modulation, iterative reconstruction, and/or weight based adjustment of the mA/kV was utilized to reduce the radiation dose to as low as reasonably achievable. 3D surface reformatted and curved MIP images were submitted for review. COMPARISON: None. HISTORY: ORDERING SYSTEM PROVIDED HISTORY: stroke TECHNOLOGIST PROVIDED HISTORY: stroke Decision Support Exception - unselect if not a suspected or confirmed emergency medical condition->Emergency Medical Condition (MA) Reason for Exam: stroke, Cerebrovascular Accident; Fall FINDINGS: CTA NECK: AORTIC ARCH/ARCH VESSELS: No dissection or arterial injury. No significant stenosis of the brachiocephalic or subclavian arteries. CAROTID ARTERIES: No dissection, arterial injury, or hemodynamically significant stenosis by NASCET criteria. VERTEBRAL ARTERIES: No dissection, arterial injury, or significant stenosis in the right vertebral artery. Severe stenosis in the V1 segment of the left vertebral artery. SOFT TISSUES: The lung apices are clear.   No cervical or superior mediastinal lymphadenopathy. The larynx and pharynx are unremarkable. No acute abnormality of the salivary glands. Thyroid gland is diffusely enlarged and heterogeneous and contains left thyroid lobe nodule measuring 2.6 cm. BONES: Multilevel degenerative spondylosis throughout the cervical spine with fusion at C5-C6. CTA HEAD: ANTERIOR CIRCULATION: Calcified atherosclerotic plaque in the cavernous segments of the internal carotid arteries bilaterally results in mild-to-moderate cavernous ICA stenosis bilaterally. Mild stenosis in the proximal A2 segment of the right anterior cerebral artery. Severe stenosis in the proximal A3 segments of the anterior cerebral arteries bilaterally. Moderate-to-severe stenosis within M2 segment of the right middle cerebral artery. Moderate stenosis in the M1 and proximal M2 segments of the left middle cerebral artery. POSTERIOR CIRCULATION: No significant stenosis in the right intradural vertebral artery. Severe stenosis in the intracranial left vertebral artery. Mild stenosis in the basilar artery. Moderate stenosis in the P1/P2 junction of the left PCA. Severe stenosis and near complete occlusion of the right PCA. OTHER: No dural venous sinus thrombosis on this non-dedicated study. 1. Severe stenosis in the V1 segment of the left vertebral artery. 2. Extensive intracranial atherosclerotic plaque with near complete occlusion of the right PCA. 3. Severe stenoses in the proximal A3 segments of the ACAs bilaterally. 4. Moderate-to-severe proximal M2 segment stenosis in the right MCA. Moderate stenosis in the M1 and M2 segments of the left MCA. 5. Moderate stenosis in the P1/P2 junction of the left PCA. 6. Enlarged heterogeneous thyroid gland with 2.6 cm left thyroid lobe nodule. Nonemergent/outpatient thyroid ultrasound recommended. Findings were discussed with Dr. Marianne Adams at 1:43 pm on 7/13/2021.  RECOMMENDATIONS: 2.6 cm incidental left thyroid nodule with heterogeneous and enlarged thyroid. Recommend thyroid US. Reference: J Am Bruno Radiol. 2015 Feb;12(2): 143-50         IMPRESSION:   Primary Problem  Acute cerebrovascular accident (CVA) [facility])    Active Hospital Problems    Diagnosis Date Noted    Noncompliance with medications [Z91.14]     [facility] [I63.9] 07/13/2021    COPD (chronic obstructive pulmonary disease) ([facility] [J44.9] 07/13/2021    HTN (hypertension) [I10] 07/13/2021    Diabetes 1.5, managed as type 2 ([facility] [E13.9] 07/13/2021    Hyperlipidemia [E78.5] 07/13/2021    CAD S/P percutaneous coronary angioplasty [I25.10, Z98.61] 07/13/2021    Rhabdomyolysis [M62.82] 07/13/2021    Intracranial atherosclerosis [I67.2]     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 I personally reviewed results of lab work-up imaging studies and other relevant clinical data. 2. Patient has had a good response to steroid. Platelet count now up to 70,000  3. This is consistent with ITP  4. Continue Decadron 40 mg by mouth total 4 doses  5. Okay to discharge once medically stable from medical oncology standpoint. Patient will need monitoring for platelet count as an outpatient due to risk of relapse of ITP  Okay to give antiplatelet therapy as long as platelet count is above 50,000  Patient follows up with the [place] however we will be happy to follow-up if patient wants    Discussed with patient and Nurse. Thank you for asking us to see this patient.           Chin Luong MD          This note is created with the assistance of a speech recognition program.  While intending to generate a document that actually reflects the content of the visit, the document can still have some errors including those of syntax and sound a like substitutions which may escape proof reading. It such instances, actual meaning can be extrapolated by contextual diversion.

## 2021-07-15 NOTE — PROGRESS NOTES
[facility]  Internal Medicine Teaching Residency Program  Inpatient Daily Progress Note  ______________________________________________________________________________    Patient: Gayla Mojica  YOB: 1945   NKN:[id number]    Acct: [de-identified]     Room: [facility]169-10  Admit date: 7/13/2021  Today's date: 07/15/21  Number of days in the hospital: 2    SUBJECTIVE   Admitting Diagnosis: Acute cerebrovascular accident (CVA) ([facility]  CC: found down    Pt examined at bedside. Chart & results reviewed. Patient hemodynamically stable, afebrile. MRI showed acute infarct in L and R basal ganglia, Left more than right. Patient pulled out his condom catheter. As per RN page, patient had not urinated today, ordered, PVR was 140. Later RN paged, patient was just incontinent. Patient platelet came to 73 after 1 platelet, IVIG and steroid, patient restarted on ASA, plavix, heparin for DVT prophylaxis. Creatinine improved 1.45 --> 1.26. If creatinine normalizes, will start on metformin. Outpatient, repeat CBC and TSH/FT4.     ROS:  Constitutional:  negative for chills, fevers, sweats  Respiratory:  negative for cough, dyspnea on exertion, hemoptysis, shortness of breath, wheezing  Cardiovascular:  negative for chest pain, chest pressure/discomfort, lower extremity edema, palpitations  Gastrointestinal:  negative for abdominal pain, constipation, diarrhea, nausea, vomiting  Neurological:  negative for dizziness, headache  BRIEF HISTORY        The patient is a pleasant 75 y. o. male presents with a chief complaint of being found down by the sister. Patient's sister was informed that he had not been seen, she went to check on him. Sister found him down, called 911 for help. In the ED, he is found to have R facial weakness, R sided arm and leg weakness, and dysarthria.  Patient has multiple ecchymosis on R side of arm and chest. Patients lab in ED showed, creatinine 1.47 (baseline 1. 40/1.19), BUN 29, GFR 57, Lactic acid 2.4. CXR showed clear lungs, cardiomegaly, no acute cardiopulmonary abnormality.     Patient also found to have elevated Total CK 8454, myoglobin 3030, troponin 52 (baseline 42).  Patient given 1L NS bolus.     Patient CT head showed New ovoid low-attenuation area in the left frontal corona radiata compatible with acute/subacute infarction.  This measures 2 x 1.4 cm.  No associated hemorrhage. 2. Diffuse parenchymal volume loss and sequela of severe chronic microvascular ischemic changes.   Neuro on board. Neuro recommended MRI brain WO, resume ASA, plavix 75, folic acid, lipitor, lipid panel, hba1c, speech eval, SBP < 180, blood glucose < 180, avoid dextrose containing solutions.     CT head/neck showed severe stenosis in V1 segment of left vertebral artery, extensive intracranial atherosclerotic plaque with near complete occusion of the right PCA. Severe stenosis in proximal A3 segments of RAHEEM, moderate to severe proximal M2 segment stenosis of left MCA. Moderate stenosis in P1/P2 junction of left PCA. Enlarged, heterogenous thyroid gland with 2.6 cm left thyroid lobe nodule.      Patient fluorescence platelet, came 4, hem/onc consult placed for dvt prophylaxis, plavix recommendation. MRI showed acute infarct in L and R basal ganglia, Left more than right. OBJECTIVE     Vital Signs:  BP (!) 171/74   Pulse 78   Temp 98.5 °F (36.9 °C) (Oral)   Resp 25   Ht 5' 10\" (1.778 m)   Wt 210 lb (95.3 kg)   SpO2 100%   BMI 30.13 kg/m²     Temp (24hrs), Av.4 °F (36.9 °C), Min:97.9 °F (36.6 °C), Max:98.8 °F (37.1 °C)    In: 1850   Out: -     Physical Exam:  Constitutional: This is a well developed, well nourished, 30-34.9 - Obesity Grade I 77y.o. year old male who is alert, oriented, cooperative and in no apparent distress. Head:normocephalic and atraumatic. EENT:  PERRLA. No conjunctival injections.    Septum was midline, mucosa was without erythema, exudates or cobblestoning. No thrush was noted. Neck: Supple without thyromegaly. No elevated JVP. Trachea was midline. Respiratory: Chest was symmetrical without dullness to percussion. Breath sounds bilaterally were clear to auscultation. There were no wheezes, rhonchi or rales. There is no intercostal retraction or use of accessory muscles. No egophony noted. Cardiovascular: Regular without murmur, clicks, gallops or rubs. Abdomen: Slightly rounded and soft without organomegaly. No rebound, rigidity or guarding was appreciated. Lymphatic: No lymphadenopathy. Musculoskeletal: Normal curvature of the spine. No gross muscle weakness. Extremities:  No lower extremity edema, ulcerations, tenderness, varicosities or erythema. Muscle size, tone and strength are normal.  No involuntary movements are noted. Skin:  Warm and dry. Good color, turgor and pigmentation. No lesions or scars.   No cyanosis or clubbing  Neurological/Psychiatric: The patient's general behavior, level of consciousness, thought content and emotional status is normal.        Medications:  Scheduled Medications:    rosuvastatin  40 mg Oral Nightly    clopidogrel  75 mg Oral Daily    aspirin  81 mg Oral Daily    heparin (porcine)  5,000 Units Subcutaneous 3 times per day    insulin lispro  0-12 Units Subcutaneous TID WC    insulin lispro  0-6 Units Subcutaneous Nightly    insulin glargine  8 Units Subcutaneous Nightly    sodium chloride  1,000 mL Intravenous Once    budesonide-formoterol  2 puff Inhalation BID    tiotropium  2 puff Inhalation Daily    sodium chloride flush  5-40 mL Intravenous 2 times per day    famotidine (PEPCID) injection  20 mg Intravenous BID    methylPREDNISolone  80 mg Intravenous Q8H     Continuous Infusions:    dextrose      sodium chloride      sodium chloride       PRN Medicationsglucose, 15 g, PRN  dextrose, 12.5 g, PRN  glucagon (rDNA), 1 mg, PRN  dextrose, 100 mL/hr, PRN  albuterol sulfate HFA, 2 7/13/2021  1. New ovoid low-attenuation area in the left frontal corona radiata compatible with acute/subacute infarction. This measures 2 x 1.4 cm. No associated hemorrhage. 2. Diffuse parenchymal volume loss and sequela of severe chronic microvascular ischemic changes. Findings were discussed with Dr. Magan Hughes at 1:31 pm on 7/13/2021. XR CHEST PORTABLE    Result Date: 7/13/2021  Clear lungs. Cardiomegaly. No acute cardiopulmonary abnormality. US SPLEEN    Result Date: 7/14/2021  Suboptimal study. Mild splenomegaly. CTA HEAD NECK W CONTRAST    Result Date: 7/13/2021  1. Severe stenosis in the V1 segment of the left vertebral artery. 2. Extensive intracranial atherosclerotic plaque with near complete occlusion of the right PCA. 3. Severe stenoses in the proximal A3 segments of the ACAs bilaterally. 4. Moderate-to-severe proximal M2 segment stenosis in the right MCA. Moderate stenosis in the M1 and M2 segments of the left MCA. 5. Moderate stenosis in the P1/P2 junction of the left PCA. 6. Enlarged heterogeneous thyroid gland with 2.6 cm left thyroid lobe nodule. Nonemergent/outpatient thyroid ultrasound recommended. Findings were discussed with Dr. Magan Hughes at 1:43 pm on 7/13/2021. RECOMMENDATIONS: 2.6 cm incidental left thyroid nodule with heterogeneous and enlarged thyroid. Recommend thyroid US. Reference: J Am Bruno Radiol. 2015 Feb;12(2): 143-50     MRI BRAIN WO CONTRAST    Result Date: 7/14/2021  Acute infarct in the left and right basal ganglia greater on the left. Diffuse volume loss with extensive chronic white matter changes. ASSESSMENT & PLAN     ASSESSMENT / PLAN:        Active Problems:  Principal Problem:    Acute cerebrovascular accident (CVA) ([address]  Active Problems:    COPD (chronic obstructive pulmonary disease) (HCC)    HTN (hypertension)    Diabetes 1.5, managed as type 2 ([address]    Hyperlipidemia  Resolved Problems:    * No resolved hospital problems.  *        Acute cerebrovascular accident (CVA)  - Patient CT head showed New ovoid low-attenuation area in the left frontal corona radiata compatible with acute/subacute infarction.  This measures 2 x 1.4 cm.  No associated hemorrhage. 2. Diffuse parenchymal volume loss and sequela of severe chronic microvascular ischemic changes.   Neuro on board. - Neuro recommended MRI brain WO, resume ASA, plavix 75, folic acid, lipitor, lipid panel, hba1c, speech eval, SBP < 180, blood glucose < 180, avoid dextrose containing solutions. -CT head/neck showed severe stenosis in V1 segment of left vertebral artery, extensive intracranial atherosclerotic plaque with near complete occusion of the right PCA. Severe stenosis in proximal A3 segments of RAHEEM, moderate to severe proximal M2 segment stenosis of left MCA. Moderate stenosis in P1/P2 junction of left PCA. Enlarged, heterogenous thyroid gland with 2.6 cm left thyroid lobe nodule. - Neuro on board. MRI showed acute infarct in L and R basal ganglia, Left more than right. 7/13, neuro recommended no intervention for now, to continue medical management  - Echo with bubble study, pending result  - patient started on ASA, plavix.      Rhabdomyolysis  -  elevated Total CK 8454, myoglobin 3030, troponin 52 (baseline 42).  Patient given 1L NS bolus.     Coronary artery disease, s/p NSTEMI 06/15, bare metal stent  - ASA, atorvastatin 80, plavix 75, isosorbide mononitrate 60, home meds     Hypertension  - On toprol XL 50, losartan 100, home med     Diabetes mellitis, type 2  -On glipizide 5, home med     Thrombocytopenia  - platelet count 5-->51 --> 73 (s/p 1 platelet), at risk of spontaneous bleed as per hem/onc  - immature platelet fraction is syncopal elevated suggesting thrombocytopenia secondary to peripheral destruction as per hem/onc consult  - ITP suspected as per hem/onc, recommended 1 U platelet, if response adequate give gammagard.     DVT ppx : heparin  GI ppx:     PT/OT: pt/ot on board  Discharge Planning / SW:  on board    Elena Howell MD  Internal Medicine Resident, PGY-1  [facility];  [place], [place]  7/15/2021, 12:27 PM

## 2021-07-15 NOTE — PROGRESS NOTES
[facility] Neurology   IN-PATIENT SERVICE      NEUROLOGY PROGRESS  NOTE            Date:   7/15/2021  Patient name:  Peggy Cobb  Date of admission:  7/13/2021  YOB: 1945      Interval History:   Peggy Cobb is a  77 y.o. male admitted on 7/13/2021 with Acute cerebrovascular accident (CVA) [facility]) [I63.9]  Acute cerebrovascular accident (CVA) ([facility] [I63.9]. This is a follow-up neurology progress note. The patient was seen and examined and the chart was reviewed. Did not Sleep well, confused. With physical therapy Sat end of the bed  With minimum assist  Echo with bubble study completed pending read  Platelets: 73, platelet immature fraction 20.7    History of Present Illness:       29-year-old male with Hx of hypertension, hyperlipidemia, PVD, CAD and previous stroke was admitted on 7/13/2021 with chief complaint of found down on the floor by his sister, with last well-known 2 days ago before the admission. On admission patient's examination was significant for right-sided facial droop, right-sided weakness and severe dysarthria. Patient has significant history with recurrent stroke, remote left temporal lobe ischemic infarct and remote bilateral occipital lobe infarct and patient was supposed to be on dual antiplatelet therapy with aspirin Plavix however patient was not sure when he took the last medication. CT head: New left cortical ischemic stroke  CTA head and neck: Diffuse intracranial atherosclerosis and near complete occlusion of right PCA. Endovascular consulted, no intervention recommended to the optimize medical management with aspirin Plavix statin with target LDL goal less than 70.             Past Medical History:     Past Medical History:   Diagnosis Date    Centrilobular emphysema ([facility]     COPD (chronic obstructive pulmonary disease) ([facility]     Depression     Diabetes mellitus ([facility]     pt refuses to take previously prescribed metformin (states PCP aware)    Former smoker     Hyperlipidemia     on 18 pt states \"I stopped taking that about a year ago\"    Hypertension     Mixed restrictive and obstructive lung disease ([address]     Need for pneumococcal vaccine     Personal history of tobacco use         Past Surgical History:     Past Surgical History:   Procedure Laterality Date    CARDIAC SURGERY  2015    1 stent    NECK SURGERY      TOE AMPUTATION Right greater        Medications during admission:      rosuvastatin  40 mg Oral Nightly    clopidogrel  75 mg Oral Daily    aspirin  81 mg Oral Daily    heparin (porcine)  5,000 Units Subcutaneous 3 times per day    insulin lispro  0-12 Units Subcutaneous TID WC    insulin lispro  0-6 Units Subcutaneous Nightly    insulin glargine  8 Units Subcutaneous Nightly    sodium chloride  1,000 mL Intravenous Once    budesonide-formoterol  2 puff Inhalation BID    tiotropium  2 puff Inhalation Daily    sodium chloride flush  5-40 mL Intravenous 2 times per day    famotidine (PEPCID) injection  20 mg Intravenous BID    methylPREDNISolone  80 mg Intravenous Q8H         Physical Exam:   /72   Pulse 72   Temp 97.9 °F (36.6 °C) (Oral)   Resp 21   Ht 5' 10\" (1.778 m)   Wt 210 lb (95.3 kg)   SpO2 90%   BMI 30.13 kg/m²   Temp (24hrs), Av.4 °F (36.9 °C), Min:97.9 °F (36.6 °C), Max:98.8 °F (37.1 °C)        General examination:      General Appearance:  alert, well appearing, and in no acute distress  HEENT: Normocephalic, atraumatic, moist mucus membranes  Neck: supple, no carotid bruits, (-) nuchal rigidity  Lungs:  Respirations unlabored, chest wall no deformity, BS normal  Cardiovascular: normal rate, regular rhythm  Abdomen: Soft, nontender, nondistended, normal bowel sounds  Skin: No gross lesions, rashes, bruising or bleeding on exposed skin area  Extremities:  peripheral pulses palpable, clubbing or edema  Psych: normal affect      Neurological examination:      Mental status   Alert LABA1C 7.7 (H) 07/14/2021    [id number] 07/13/2021         No results found for: PHENYTOIN, PHENYTOIN, VALPROATE, CBMZ        Imaging/Diagnostics:          CTA head and neck  7/13/2021  Impression   1. Severe stenosis in the V1 segment of the left vertebral artery. 2. Extensive intracranial atherosclerotic plaque with near complete occlusion   of the right PCA. 3. Severe stenoses in the proximal A3 segments of the ACAs bilaterally. 4. Moderate-to-severe proximal M2 segment stenosis in the right MCA. Moderate stenosis in the M1 and M2 segments of the left MCA. 5. Moderate stenosis in the P1/P2 junction of the left PCA. CT head  7/13/2021  Impression   1. New ovoid low-attenuation area in the left frontal corona radiata   compatible with acute/subacute infarction.  This measures 2 x 1.4 cm.  No   associated hemorrhage. 2. Diffuse parenchymal volume loss and sequela of severe chronic   microvascular ischemic changes. MRI Brain 7/14/2021  Impression   Acute infarct in the left and right basal ganglia greater on the left.    Diffuse volume loss with extensive chronic white matter changes.               2D ECHO with bubble study: EF 50-55 %  Negative bubble study                    Assessment and plan        -Right hemiparesis likely chronic from previous ischemic stroke  -Severe thrombocytopenia possibly due to ITP  -Rhabdomyolysis  -Intracranial atherosclerotic disease with diffuse right PCA occlusion  -MRI brain acute infarct in left and right basal ganglia    Hba1c:7.7    Vitamin B12 667,  Folate 5.2     Crestor 40, with LDL goal <70   ASA 81   Plavix 75    ITP management as per primary    Echo with bubble study   Keep SBP greater than 120   PT/OT/speech therapy   Endovascular, no intervention for now, continue medical management    Fall precautions               Electronically signed by Alexandru Phan MD on 7/15/2021 at 10:45 AM      Carole Summers MD  PGY 3 Neurology Resident  Neurology [facility]

## 2021-07-15 NOTE — CONSULTS
Physical Medicine & Rehabilitation  Consult Note      Admitting Physician:   Lucy Malave MD    Primary Care Provider:   No primary care provider on file. Reason for Consult:  Acute Inpatient Rehabilitation    Chief Complaint: Found down    History of Present Illness:  Referring Provider is requesting an evaluation for appropriate placement upon discharge from acute care. History from chart review and patient and sister. Masha Toledo is a 77 y.o. RHD male admitted to [facility] on 7/13/2021. Patient admitted after being found down by his family with R facial weakness and R arm/leg weakness as well as dysarthria. Diagnostic studies showed multiple areas of stenosis in vertebral, PCA, RAHEEM and MCA. Stroke being medically managed. Hematology consulted for thrombocytopenia - likely secondary to peripheral destruction of platelets - suspecting ITP. Treating with steroids and blood transfusion. Platelet count improved to 11 after IVIG. Patient with significant dysarthria and expressive aphasia. Dominant RUE weakness is worse than RLE weakness.      Review of Systems:  Constitutional: negative for anorexia, chills, fatigue, fevers, sweats and weight loss  Eyes: negative for redness and visual disturbance  Ears, nose, mouth, throat, and face: negative for earaches, sore throat and tinnitus  Respiratory: negative for cough and shortness of breath  Cardiovascular: negative for chest pain, dyspnea and palpitations  Gastrointestinal: negative for abdominal pain, change in bowel habits, constipation, nausea and vomiting  Genitourinary:negative for dysuria, frequency, hesitancy and urinary incontinence  Integument/breast: negative for pruritus and rash  Musculoskeletal:negative for stiff joints  Neurological: negative for dizziness, headaches   Behavioral/Psych: negative for decreased appetite, depression and fatigue       Premorbid function:  Independent    Current function:    PT: Restrictions/Precautions: Up as Tolerated, General Precautions, Fall Risk  Other position/activity restrictions: Up w/ assistance; check platelet count   Transfers  Sit to Stand: 2 Person Assistance, Maximum Assistance  Stand to sit: Maximum Assistance, 2 Person Assistance  Stand Pivot Transfers: Maximum Assistance, 2 Person Assistance (transfer to transport bed)       Transfers  Sit to Stand: 2 Person Assistance, Maximum Assistance  Stand to sit: Maximum Assistance, 2 Person Assistance  Stand Pivot Transfers: Maximum Assistance, 2 Person Assistance (transfer to transport bed)  Ambulation  Ambulation?: No (Pt took steps to turn around; required maxA+2)                 OT:   ADL  Feeding: Minimal assistance, Setup, Verbal cueing, Increased time to complete, Adaptive utensils (comment) (Pt provided a built-up handle which improved LUE  on spoon, unable to use RUE d/t increased weakness, increased tone in R elbow, pt held drinks and spoon in L hand without dropping them, cousin educated on nectar-thick drinks)  Grooming: Setup, Increased time to complete, Maximum assistance, Verbal cueing  UE Bathing: Setup, Increased time to complete, Verbal cueing, Maximum assistance  LE Bathing: Setup, Verbal cueing, Increased time to complete, Maximum assistance  UE Dressing: Setup, Verbal cueing, Increased time to complete, Maximum assistance (Writer assisted with gown change while pt supine in bed with HOB elevated)  LE Dressing: Setup, Verbal cueing, Increased time to complete, Maximum assistance  Toileting: Setup, Increased time to complete, Maximum assistance  Additional Comments: Pt limited by confusion, RUE weakness, and poor standing tolerance this date         Balance  Sitting Balance: Stand by assistance (Pt sat EOB unsupported for ~17 min this date, feeding activity while supine/sitting this date)  Standing Balance:  Moderate assistance   Standing Balance  Time: 2 min  Activity: Pt stood bedside, func mob at bedside pt. ST to follow up and provide treatment to address noted deficits. Education provided. Further therapy recommended at discharge.     Past Medical History:        Diagnosis Date    [facility]rilob[facility]     [facility]     Depression     Diabetes mellitus ([facility]     pt refuses to take previously prescribed metformin (states PCP aware)    Former smoker     Hyperlipidemia     on 4/27/18 pt states \"I stopped taking that about a year ago\"    Hypertension     Mixed restrictive and obstructive lung disease ([facility]     Need for pneumococcal vaccine     Personal history of tobacco use        Past Surgical History:        Procedure Laterality Date    CARDIAC SURGERY  07/2015    1 stent    NECK SURGERY      TOE AMPUTATION Right greater       Allergies:    No Known Allergies     Current Medications:   Current Facility-Administered Medications: rosuvastatin (CRESTOR) tablet 40 mg, 40 mg, Oral, Nightly  clopidogrel (PLAVIX) tablet 75 mg, 75 mg, Oral, Daily  aspirin chewable tablet 81 mg, 81 mg, Oral, Daily  heparin (porcine) injection 5,000 Units, 5,000 Units, Subcutaneous, 3 times per day  famotidine (PEPCID) tablet 20 mg, 20 mg, Oral, BID  insulin lispro (HUMALOG) injection vial 0-12 Units, 0-12 Units, Subcutaneous, TID WC  insulin lispro (HUMALOG) injection vial 0-6 Units, 0-6 Units, Subcutaneous, Nightly  glucose (GLUTOSE) 40 % oral gel 15 g, 15 g, Oral, PRN  dextrose 50 % IV solution, 12.5 g, Intravenous, PRN  glucagon (rDNA) injection 1 mg, 1 mg, Intramuscular, PRN  dextrose 5 % solution, 100 mL/hr, Intravenous, PRN  insulin glargine (LANTUS) injection vial 8 Units, 8 Units, Subcutaneous, Nightly  0.9 % sodium chloride bolus, 1,000 mL, Intravenous, Once  albuterol sulfate  (90 Base) MCG/ACT inhaler 2 puff, 2 puff, Inhalation, Q6H PRN  budesonide-formoterol (SYMBICORT) 160-4.5 MCG/ACT inhaler 2 puff, 2 puff, Inhalation, BID  tiotropium (SPIRIVA RESPIMAT) 2.5 MCG/ACT inhaler 2 puff, 2 puff, Inhalation, Daily  sodium chloride flush 0.9 % injection 5-40 mL, 5-40 mL, Intravenous, 2 times per day  sodium chloride flush 0.9 % injection 5-40 mL, 5-40 mL, Intravenous, PRN  0.9 % sodium chloride infusion, 25 mL, Intravenous, PRN  ondansetron (ZOFRAN-ODT) disintegrating tablet 4 mg, 4 mg, Oral, Q8H PRN **OR** ondansetron (ZOFRAN) injection 4 mg, 4 mg, Intravenous, Q6H PRN  polyethylene glycol (GLYCOLAX) packet 17 g, 17 g, Oral, Daily PRN  acetaminophen (TYLENOL) tablet 650 mg, 650 mg, Oral, Q6H PRN **OR** acetaminophen (TYLENOL) suppository 650 mg, 650 mg, Rectal, Q6H PRN  potassium chloride (KLOR-CON M) extended release tablet 40 mEq, 40 mEq, Oral, PRN **OR** potassium bicarb-citric acid (EFFER-K) effervescent tablet 40 mEq, 40 mEq, Oral, PRN **OR** potassium chloride 10 mEq/100 mL IVPB (Peripheral Line), 10 mEq, Intravenous, PRN  0.9 % sodium chloride infusion, , Intravenous, PRN  methylPREDNISolone sodium (SOLU-MEDROL) injection 80 mg, 80 mg, Intravenous, Q8H    Social History:  Lives With: Alone  Type of Home: House  Home Layout: One level  Home Access: Stairs to enter with rails  Entrance Stairs - Number of Steps: 4  Home Equipment: Trudell Organ  Ambulation Assistance: Independent  Transfer Assistance: Independent  Active : Yes  Occupation: Retired  Additional Comments: Information obtained from case management note; Pt unable to verbalize much  Social History     Socioeconomic History    Marital status:      Spouse name: Not on file    Number of children: Not on file    Years of education: Not on file    Highest education level: Not on file   Occupational History    Not on file   Tobacco Use    Smoking status: Former Smoker     Packs/day: 0.75     Years: 56.00     Pack years: 42.00     Start date: 1957     Quit date: 2013     Years since quittin.0    Smokeless tobacco: Never Used   Vaping Use    Vaping Use: Never used   Substance and Sexual Activity    Alcohol use: No    Drug use: No    Sexual activity: Not on file   Other Topics Concern    Not on file   Social History Narrative    Not on file     Social Determinants of Health     Financial Resource Strain:     Difficulty of Paying Living Expenses:    Food Insecurity:     Worried About Running Out of Food in the Last Year:     [address] in the Last Year:    Transportation Needs:     Lack of Transportation (Medical):  Lack of Transportation (Non-Medical):    Physical Activity:     Days of Exercise per Week:     Minutes of Exercise per Session:    Stress:     Feeling of Stress :    Social Connections:     Frequency of Communication with Friends and Family:     Frequency of Social Gatherings with Friends and Family:     Attends Jewish Services:     Active Member of Clubs or Organizations:     Attends Club or Organization Meetings:     Marital Status:    Intimate Partner Violence:     Fear of Current or Ex-Partner:     Emotionally Abused:     Physically Abused:     Sexually Abused:        Family History:   No family history on file. Diagnostics:    CT HEAD 7/13/21  FINDINGS:   BRAIN/VENTRICLES: Ovoid area of low attenuation in the left frontal corona   radiata measures 2 x 1.4 cm, new from prior study (series 2, image 39).  No   acute intracranial hemorrhage.  No mass effect or midline shift.  No   hydrocephalus.  Diffuse parenchymal volume loss with enlargement of the   ventricles and cerebral sulci.  Old infarction in the right basal ganglia.    There are nonspecific areas of hypoattenuation within the periventricular and   subcortical white matter, which likely represent chronic microvascular   ischemic change.  Intracranial atherosclerotic disease.       ORBITS: The visualized portion of the orbits demonstrate no acute abnormality.       SINUSES: The visualized paranasal sinuses and mastoid air cells demonstrate   no acute abnormality.       SOFT TISSUES/SKULL: No acute abnormality of the visualized skull or soft   tissues.           Impression   1. New ovoid low-attenuation area in the left frontal corona radiata   compatible with acute/subacute infarction.  This measures 2 x 1.4 cm.  No   associated hemorrhage. 2. Diffuse parenchymal volume loss and sequela of severe chronic   microvascular ischemic changes.      CTA HEAD NECK 7/13/21  FINDINGS:       CTA NECK:       AORTIC ARCH/ARCH VESSELS: No dissection or arterial injury.  No significant   stenosis of the brachiocephalic or subclavian arteries.       CAROTID ARTERIES: No dissection, arterial injury, or hemodynamically   significant stenosis by NASCET criteria.       VERTEBRAL ARTERIES: No dissection, arterial injury, or significant stenosis   in the right vertebral artery.  Severe stenosis in the V1 segment of the left   vertebral artery.       SOFT TISSUES: The lung apices are clear.  No cervical or superior mediastinal   lymphadenopathy.  The larynx and pharynx are unremarkable.  No acute   abnormality of the salivary glands.  Thyroid gland is diffusely enlarged and   heterogeneous and contains left thyroid lobe nodule measuring 2.6 cm.       BONES: Multilevel degenerative spondylosis throughout the cervical spine with   fusion at C5-C6.           CTA HEAD:       ANTERIOR CIRCULATION: Calcified atherosclerotic plaque in the cavernous   segments of the internal carotid arteries bilaterally results in   mild-to-moderate cavernous ICA stenosis bilaterally.  Mild stenosis in the   proximal A2 segment of the right anterior cerebral artery.  Severe stenosis   in the proximal A3 segments of the anterior cerebral arteries bilaterally.       Moderate-to-severe stenosis within M2 segment of the right middle cerebral   artery.  Moderate stenosis in the M1 and proximal M2 segments of the left   middle cerebral artery.       POSTERIOR CIRCULATION: No significant stenosis in the right intradural   vertebral artery. Shonda Gandhi --  10.7 16.5*   RBC 4.71  --   --  4.33 4.28   HGB 11.2*  --   --  10.4* 10.2*   HCT 37.7*  --   --  34.5* 34.4*   MCV 80.0*  --   --  79.7* 80.4*   RDW 15.7*  --   --  15.7* 16.1*   PLT See Reflexed IPF Result   < > See Reflexed IPF Result See Reflexed IPF Result See Reflexed IPF Result    < > = values in this interval not displayed. BMP:   Recent Labs     07/13/21  1135 07/14/21  0353 07/15/21  0618    143 149*   K 4.0 3.8 3.7    110* 117*   CO2 25 22 21   BUN 29* 31* 32*   CREATININE 1.47* 1.45* 1.26*   GLUCOSE 192* 207* 203*      HbA1c:   Lab Results   Component Value Date    LABA1C 7.7 (H) 07/14/2021     BNP: No results for input(s): BNP in the last 72 hours. PT/INR:   Recent Labs     07/13/21 2023   PROTIME 11.8   INR 1.1     APTT:   Recent Labs     07/13/21 2023   APTT 18.0*     CARDIAC ENZYMES:   Recent Labs     07/13/21  1135 07/13/21  1348   TROPONINT NOT REPORTED NOT REPORTED     FASTING LIPID PANEL:  Lab Results   Component Value Date    CHOL 186 07/14/2021    HDL 42 07/14/2021    TRIG 77 07/14/2021     LIVER PROFILE:   Recent Labs     07/13/21  1135 07/14/21  0353   * 81*   ALT 65* 54*   BILIDIR  --  0.30   BILITOT 1.03 0.80   ALKPHOS 111 93          Physical Exam:  BP (!) 185/81   Pulse 84   Temp 98.4 °F (36.9 °C) (Oral)   Resp 18   Ht 5' 10\" (1.778 m)   Wt 210 lb (95.3 kg)   SpO2 96%   BMI 30.13 kg/m²     GEN: Well developed, well nourished, no acute distress. HEENT: NCAT, PERRL, EOMI, mucous membranes pink and moist.  RESP: Lungs clear to auscultation. No rales or rhonchi. Respirations WNL and unlabored. CV: Regular rate rhythm. No murmurs, rubs, or gallops. ABD: soft, non-distended, non-tender. BS+ and equal.  NEURO: A&O x 3. Sensation intact to light touch. DTRs 2+. Dysarthria. R facial droop  MSK: Functional ROM. Muscle tone and bulk are normal bilaterally. Strength 1/5 R shoulder shrug and R bicep.  0/5 key muscles R  and wrist. R hip flexion 1/5, R knee extension 2/5. LUE and LLE key muscles 4+/5. EXT: No calf tenderness to palpation or edema BLEs. Edema R hand. SKIN: Warm dry and intact with good turgor. PSYCH: Mood WNL. Affect WNL. Appropriately interactive. Impression:  1. Ischemic CVA  2. Rhabdomyolysis  3. CAD: history NSTEMI with bare metal stent  4. HTN  5. DM II  6. Thrombocytopenia: Platelet count improving on steroid and IVIG    Recommendations:    1. Diagnosis:  Ischemic CVA  2. Therapy: Has PT/OT/SLP needs  3. Medical Necessity: As above  4. Support: Has supportive sister  5. Rehab Recommendation: The patient will benefit from acute inpatient rehabilitation once medically stable per primary and consulting services. Anticipate he will be able to tolerate 3 hours of therapy per day or 900 minutes per week in rehabilitation. The patient requires multidisciplinary rehabilitation treatment including medical management by a PM&R physician, 24 hour rehabilitation nursing, Physical/Occupational therapy, speech therapy, rehabilitation social work, and nutrition services. Patient and family also require education in post-hospital precautions and home exercise routine, adaptive techniques and deficit compensation strategies, strengthening and conditioning, equipment prescription and instructions in use. 6. DVT Prophylaxis: Chemoprophylaxis contraindicated for thrombocytopenia - would need BLE doppler 24-48 hours prior to rehab admission    It was my pleasure to evaluate [facility] today. Please call with questions. Yamilka Cannon MD        This note is created with the assistance of a speech recognition program.  While intending to generate a document that actually reflects the content of the visit, the document can still have some errors including those of syntax and sound a like substitutions which may escape proof reading. In such instances, actual meaning can be extrapolated by contextual diversion.

## 2021-07-15 NOTE — PROGRESS NOTES
Notified ROOSEVELT Huang CM, that per Dr Live Gerard pt is approp for ARU. Pt will need dopplers d/t no chemical anticoagulation r/t thrombocytopenia.

## 2021-07-15 NOTE — PROGRESS NOTES
Speech Language Pathology  Speech Language Pathology  [facility]    Speech Language Treatment Note    Date: 7/15/2021  Patients Name: Gayla Mojica  MRN: [id number]  Diagnosis:   Patient Active Problem List   Diagnosis Code    Centrilobular emphysema (HCC) J43.2    Mixed restrictive and obstructive lung disease ([facility] J43.9, J98.4    Acute cerebrovascular accident (CVA) ([facility] I63.9    COPD (chronic obstructive pulmonary disease) ([facility] J44.9    HTN (hypertension) I10    Diabetes 1.5, 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    Hyperlipidemia E78.5    CAD S/P percutaneous coronary angioplasty I25.10, Z98.61    Rhabdomyolysis M62.82    Intracranial atherosclerosis I67.2    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 Verbal Expression: Naming 3 members of a category: 4/9 increased to 7/9 with min to max verbal cues. Other: O/M exercise program for dysphagia attempted. Pt. Unable to follow commands to complete exercises at this time. Plan:  [x] Continue  services    [] Discharge from :      Discharge recommendations: [] Inpatient Rehab   [] [facility]   [] Outpatient Therapy  [] Follow up at trauma clinic   [x] Other:  Further therapy recommended at discharge. Completed by: [id number]  Clinician    Cosigned By: Kristine Chavez S.CCC/SLP

## 2021-07-15 NOTE — PROGRESS NOTES
Physical Therapy    Facility/Department: [facility] NEURO  Initial Assessment    NAME: Cecy Crawford  : 1945  MRN: [id number]    Date of Service: 7/15/2021  Copied from H+P: The patient is a pleasant 77 y.o. male presents with a chief complaint of being found down by the sister. In the ED, he is found to have R facial weakness, R sided arm and leg weakness, and dysarthria. Patient has multiple ecchymosis on R side of arm and chest.  Discharge Recommendations:    Further therapy recommended at discharge. PT Equipment Recommendations  Equipment Needed: No    Assessment    Pt lying in bed upon PT arrival. Pt maxA+1 bed mob to R and modA+1 bed mob to L w/ HOB elevated fully. Pt maxA+2 transfers w/o AD. Pt has high tone on R side and lacks ROM in the elbow and ankle. PT eval this date was limited d/t transport to [place]. Pt would benefit from further therapy at this time in order to address deficits. Body structures, Functions, Activity limitations: Decreased functional mobility ; Decreased ROM; Decreased strength;Decreased balance;Decreased safe awareness;Decreased endurance;Decreased posture  Prognosis: Fair  Decision Making: High Complexity  PT Education: Goals;PT Role;Plan of Care;General Safety  REQUIRES PT FOLLOW UP: Yes  Activity Tolerance  Activity Tolerance: Patient limited by endurance; Patient Tolerated treatment well; Other (transport to echo limited eval time)       Patient Diagnosis(es): The primary encounter diagnosis was Cerebrovascular accident (CVA), unspecified mechanism ([address] A diagnosis of Traumatic rhabdomyolysis, initial encounter [facility]) was also pertinent to this visit.      has a past medical history of Centrilobular emphysema ([address] COPD (chronic obstructive pulmonary disease) ([address] Depression, Diabetes mellitus ([address] Former smoker, Hyperlipidemia, Hypertension, Mixed restrictive and obstructive lung disease ([address] Need for pneumococcal vaccine, and Personal history of tobacco use.   has a past surgical history that includes Neck surgery; Toe amputation (Right, greater); and Cardiac surgery (07/2015).     Restrictions  Restrictions/Precautions  Restrictions/Precautions: Up as Tolerated, General Precautions, Fall Risk  Required Braces or Orthoses?: No  Position Activity Restriction  Other position/activity restrictions: Up w/ assistance; check platelet count  Vision/Hearing  Hearing: Within functional limits     Subjective  General  Patient assessed for rehabilitation services?: Yes  Response To Previous Treatment: Not applicable  Family / Caregiver Present: No  Follows Commands: Within Functional Limits  Pain Screening  Patient Currently in Pain: Denies  Vital Signs  Patient Currently in Pain: Denies     Social/Functional History  Social/Functional History  Lives With: Alone  Type of Home: House  Home Layout: One level  Home Access: Stairs to enter with rails  Entrance Stairs - Number of Steps: 4  Home Equipment: [employer]  Ambulation Assistance: Independent  Transfer Assistance: Independent  Active : Yes  Occupation: Retired  Additional Comments: Information obtained from case management note; Pt unable to verbalize much    Objective     Observation/Palpation  Posture: Fair    PROM RLE (degrees)  RLE PROM: Exceptions (hip and knee WFL; ankle lacking 15 degrees to neutral PF/DF)  PROM LLE (degrees)  LLE PROM: WFL  PROM RUE (degrees)  RUE PROM: Exceptions (shoulder n/t; elbow lacking 10 degrees from full extension; hand WFL)  RUE General PROM: Shoulder N/T d/t transport to [place]  PROM LUE (degrees)  LUE PROM: [facility]  Strength RLE  Strength RLE: Exception (hip and knee 2/5, ankle N/T)  Strength LLE  Strength LLE: Exception (At least 3/5; moved against gravity)  Strength RUE  Strength RUE: Exception (shoulder 1/5, elbow 1/5, hand 2/5)  Strength LUE  Strength LUE: Exception (shoulder distal at least 3/5; pt moved against gravity)  Strength Other  Other: MMT limited d/t transport to [place]  Tone RLE  RLE Tone: Hypertonic  Tone Description: high tone throughout ROM  Tone LLE  LLE Tone: Normotonic  Motor Control  Gross Motor?: Exceptions (unable to move RUE/LE much and has to move LUE when trying to move RUE)     Bed mobility  Supine to Sit: Maximum assistance; Moderate assistance (trunk and leg progression; better to L than to R)  Sit to Supine: Maximum assistance;2 Person assistance (trunk and BLE progression)  Scooting: Maximal assistance  Comment: HOB elevated fully for supine to sit;  Transfers  Sit to Stand: 2 Person Assistance;Maximum Assistance  Stand to sit: Maximum Assistance;2 Person Assistance  Stand Pivot Transfers: Maximum Assistance;2 Person Assistance (transfer to transport bed)  Ambulation  Ambulation?: No (Pt took steps to turn around; required maxA+2)  Stairs/Curb  Stairs?: No     Balance  Posture: Poor  Sitting - Static: Fair  Sitting - Dynamic: Fair  Standing - Static: Poor (maxA+2 w/o AD)  Standing - Dynamic: Poor (maxA+2 w/o AD)  Exercises  Comments: Sat EOB ~8 min Marisol-SBA     Plan   Plan  Times per week: 5-6 visits  Times per day: Daily  Current Treatment Recommendations: Strengthening, ROM, Balance Training, Functional Mobility Training, Gait Training, Endurance Training, Transfer Training, Safety Education & Training  Safety Devices  Type of devices: Gait belt, Patient at risk for falls (Pt to transport at end of eval this date)  Restraints  Initially in place:  (all 4 bedrails were up)      AM-PAC Score  AM-PAC Inpatient Mobility Raw Score : 10 (07/15/21 0847)  AM-PAC Inpatient T-Scale Score : 32.29 (07/15/21 0847)  Mobility Inpatient CMS 0-100% Score: 76.75 (07/15/21 0847)  Mobility Inpatient CMS G-Code Modifier : CL (07/15/21 0847)          Goals  Short term goals  Time Frame for Short term goals: 12  Short term goal 1: Perform bed mob IND  Short term goal 2: Perform transfers Marisol+1  Short term goal 3: Amb 22' w/ modA+2 w/ appropriate AD  Short term goal 4: Prevent contractures  Patient Goals Patient goals : unable to state       Therapy Time   Individual Concurrent Group Co-treatment   Time In 0755         Time Out 0819         Minutes 24         Timed Code Treatment Minutes: 15 Minutes       RETA Kern    This treatment/evaluation completed by signing SPT. Signing PT agrees with treatment and documentation.

## 2021-07-15 NOTE — DISCHARGE INSTR - COC
Continuity of Care Form    Patient Name: Peggy Cobb   :  1945  MRN:  [id number]    Admit date:  2021  Discharge date:  2021    Code Status Order: Full Code   Advance Directives:      Admitting Physician:  Mateusz Vasquez MD  PCP: No primary care provider on file.     Discharging Nurse: Bereket Lau RN  Discharging Hospital Unit/Room#: 4857/4036-16  Discharging Unit Phone Number: [phone]    Emergency Contact:   Extended Emergency Contact Information  Primary Emergency Contact: Rj Graham  Address: [place]   [address] Phone: [phone]  Relation: Brother/Sister  Secondary Emergency Contact: Milioliver Stevenson  Address: [address] [address] Phone: [phone]  Relation: Niece/Nephew    Past Surgical History:  Past Surgical History:   Procedure Laterality Date    CARDIAC SURGERY  2015    1 stent    NECK SURGERY      TOE AMPUTATION Right greater       Immunization History:   Immunization History   Administered Date(s) Administered    COVID-19, Pfizer, PF, 30mcg/0.3mL 2021    Pneumococcal Conjugate 13-valent Kerry ) 2016       Active Problems:  Patient Active Problem List   Diagnosis Code    Centrilobular emphysema ([facility] J43.2    Mixed restrictive and obstructive lung disease ([facility] J43.9, J98.4    Acute cerebrovascular accident (CVA) ([facility] I63.9    COPD (chronic obstructive pulmonary disease) ([facility] J44.9    HTN (hypertension) I10    Diabetes 1.5, managed as type 2 ([facility] E13.9    Hyperlipidemia E78.5    CAD S/P percutaneous coronary angioplasty I25.10, Z98.61    Rhabdomyolysis M62.82    Intracranial atherosclerosis I67.2    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 I66.21    Thrombocytopenia ([facility] D69.6    Right sided weakness R53.1    Noncompliance with medications Z91.14       Isolation/Infection:   Isolation            No Isolation          Patient Infection Status       None to display            Nurse Assessment:  Last Vital Signs: BP (!) 171/74   Pulse 78   Temp 98.5 °F (36.9 °C) (Oral)   Resp 25   Ht 5' 10\" (1.778 m)   Wt 210 lb (95.3 kg)   SpO2 100%   BMI 30.13 kg/m²     Last documented pain score (0-10 scale): Pain Level: 0  Last Weight:   Wt Readings from Last 1 Encounters:   07/14/21 210 lb (95.3 kg)     Mental Status:  disoriented and alert    IV Access:  - None    Nursing Mobility/ADLs:  Walking   Dependent  Transfer  Dependent  Bathing  Dependent  Dressing  Dependent  Toileting  Dependent  Feeding  Assisted  Med Admin  Assisted  Med Delivery   whole and prefers mixed with apple sauce    Wound Care Documentation and Therapy:        Elimination:  Continence:   · Bowel: No  · Bladder: No  Urinary Catheter: None   Colostomy/Ileostomy/Ileal Conduit: No       Date of Last BM: 7/20/21    Intake/Output Summary (Last 24 hours) at 7/15/2021 1316  Last data filed at 7/14/2021 1830  Gross per 24 hour   Intake 1850 ml   Output    Net 1850 ml     I/O last 3 completed shifts: In: 1910 [P.O.:410; I.V.:1500]  Out: 125 [Urine:125]    Safety Concerns: At Risk for Falls and Aspiration Risk    Impairments/Disabilities:      Speech, Language Barrier - expressive asphasia and Paralysis - right side    Nutrition Therapy:  Current Nutrition Therapy:   - Oral Diet:  Dysphagia 1 pureed    Routes of Feeding: Oral  Liquids: Nectar Thick Liquids  Daily Fluid Restriction: no  Last Modified Barium Swallow with Video (Video Swallowing Test): not done    Treatments at the Time of Hospital Discharge:   Respiratory Treatments: ***  Oxygen Therapy:  is not on home oxygen therapy.   Ventilator:    - No ventilator support    Rehab Therapies: Physical Therapy, Occupational Therapy and Speech/Language Therapy  Weight Bearing Status/Restrictions: No weight bearing restirctions  Other Medical Equipment (for information only, NOT a DME order):  Usman-walker or jeet steady  Other Treatments: ***    Patient's personal belongings (please select all that are sent with patient):  None    RN SIGNATURE:  Electronically signed by Marlys Esposito RN on 7/20/21 at 12:57 PM EDT    CASE MANAGEMENT/SOCIAL WORK SECTION    Inpatient Status Date: 7/13/2021    Readmission Risk Assessment Score:  Readmission Risk              Risk of Unplanned Readmission:  15           Discharging to Facility/ Agency   · Name: [facility]  · Address: [address] [place] [address]  · Phone: [phone]  · Fax: [phone]      / signature: Electronically signed by Mika Salvador RN on 7/19/21 at 9:06 AM EDT    PHYSICIAN SECTION    Prognosis: {Prognosis:3223305501:::0}    Condition at Discharge: [address] [place] [address] Patient Condition:479313414:::0}    Rehab Potential (if transferring to Rehab): {Prognosis:9490070193:::0}    Recommended Labs or Other Treatments After Discharge: ***    Physician Certification: I certify the above information and transfer of Beauty Shave  is necessary for the continuing treatment of the diagnosis listed and that he requires {Admit to Appropriate Level of Care:89121:::0} for {GREATER/LESS:775500068} 30 days.      Update Admission H&P: {CHP DME Changes in HandP:462449423:::0}    PHYSICIAN SIGNATURE:  Electronically signed by April Dang MD on 7/20/2021 at 12:44 PM

## 2021-07-15 NOTE — PROGRESS NOTES
Occupational Therapy   Occupational Therapy Initial Assessment  Date: 7/15/2021   Patient Name: Diandra Brito  MRN: [id number]     : 1945    Date of Service: 7/15/2021  Chief Complaint   Patient presents with    Cerebrovascular Accident    Fall       Discharge Recommendations: Further therapy recommended at discharge. The patient should be able to tolerate at least three hours of therapy per day over 5 days or 15 hours over 7 days. Patient would benefit from continued therapy after discharge     Assessment   Performance deficits / Impairments: Decreased functional mobility ; Decreased endurance;Decreased ADL status; Decreased balance;Decreased high-level IADLs;Decreased safe awareness;Decreased cognition;Decreased strength;Decreased ROM; Decreased sensation;Decreased coordination;Decreased fine motor control  Prognosis: Fair  Decision Making: Medium Complexity  OT Education: Plan of Care;OT Role;Precautions;Transfer Training;ADL Adaptive Strategies  Patient Education: Poor return from pt  REQUIRES OT FOLLOW UP: Yes  Activity Tolerance  Activity Tolerance: Patient limited by fatigue;Treatment limited secondary to decreased cognition  Safety Devices  Safety Devices in place: Yes  Type of devices: All fall risk precautions in place;Call light within reach;Gait belt;Left in bed;Nurse notified  Restraints  Initially in place: No         Patient Diagnosis(es): The primary encounter diagnosis was Cerebrovascular accident (CVA), unspecified mechanism ([facility] A diagnosis of Traumatic rhabdomyolysis, initial encounter [facility]) was also pertinent to this visit.      has a past medical history of Centrilobular emphysema ([address] COPD (chronic obstructive pulmonary disease) ([facility] Depression, Diabetes mellitus ([facility] Former smoker, Hyperlipidemia, Hypertension, Mixed restrictive and obstructive lung disease ([address] Need for pneumococcal vaccine, and Personal history of tobacco use.   has a past surgical history that includes Neck surgery; Toe amputation (Right, greater); and Cardiac surgery (07/2015). Restrictions  Restrictions/Precautions  Restrictions/Precautions: Up as Tolerated, General Precautions, Fall Risk  Required Braces or Orthoses?: No  Position Activity Restriction  Other position/activity restrictions: Up w/ assistance; check platelet count    Subjective   General  Patient assessed for rehabilitation services?: Yes  Family / Caregiver Present: Yes (Cousin)  Diagnosis: L/R basal ganglia infarcts, found down, R weakness, Rhabdomyelosis  Patient Currently in Pain: Denies  Pain Assessment  Pain Assessment: 0-10  Pain Level: 0  Vital Signs  Level of Consciousness: Alert (0)  Patient Currently in Pain: Denies  Social/Functional History  Social/Functional History  Lives With: Alone  Type of Home: House  Home Layout: One level  Home Access: Stairs to enter with rails  Entrance Stairs - Number of Steps: 4  Home Equipment: Netta [employer]  Ambulation Assistance: Independent  Transfer Assistance: Independent  Active : Yes  Occupation: Retired  Type of occupation: [employer]   Additional Comments: Information obtained from case management note; Pt and cousin unable to provide much information, cousin states pt had no UB weakness prior to this admission     Objective   Vision: Impaired  Vision Exceptions: Wears glasses at all times  Hearing: Within functional limits    Orientation  Overall Orientation Status: Within Functional Limits     Balance  Sitting Balance: Stand by assistance (Pt sat EOB unsupported for ~17 min this date, feeding activity while supine/sitting this date)  Standing Balance: Moderate assistance  Standing Balance  Time: 2 min  Activity: Pt stood bedside, func mob at bedside only  Functional Mobility  Functional - Mobility Device: Rolling Walker  Activity: Other  Assist Level:  Moderate assistance  Functional Mobility Comments: Pt took 2 steps to L at bedside, pt required assist to progress BLE's to L, became easier for pt as func mob to L progressed, major fall risk  ADL  Feeding: Minimal assistance;Setup;Verbal cueing; Increased time to complete; Adaptive utensils (comment) (Pt provided a built-up handle which improved LUE  on spoon, unable to use RUE d/t increased weakness, increased tone in R elbow, pt held drinks and spoon in L hand without dropping them, cousin educated on nectar-thick drinks)  Grooming: Setup; Increased time to complete;Maximum assistance;Verbal cueing  UE Bathing: Setup; Increased time to complete;Verbal cueing;Maximum assistance  LE Bathing: Setup;Verbal cueing; Increased time to complete;Maximum assistance  UE Dressing: Setup;Verbal cueing; Increased time to complete;Maximum assistance (Writer assisted with gown change while pt supine in bed with HOB elevated)  LE Dressing: Setup;Verbal cueing; Increased time to complete;Maximum assistance  Toileting: Setup; Increased time to complete;Maximum assistance  Additional Comments: Pt limited by confusion, RUE weakness, and poor standing tolerance this date  Tone LUE  LUE Tone: Normotonic  Coordination  Movements Are Fluid And Coordinated: No  Coordination and Movement description: Fine motor impairments;Gross motor impairments;Right UE     Bed mobility  Supine to Sit: Maximum assistance  Sit to Supine: 2 Person assistance; Moderate assistance  Scooting: Maximal assistance  Comment: Pt supine in bed upon arrival/exit with HOB elevated, cousin in room  Transfers  Sit to stand: Maximum assistance  Stand to sit: Minimal assistance;2 Person assistance     Cognition  Overall Cognitive Status: Exceptions  Attention Span: Difficulty dividing attention  Safety Judgement: Decreased awareness of need for assistance  Insights: Decreased awareness of deficits  Initiation: Requires cues for some  Sequencing: Requires cues for some     Sensation  Overall Sensation Status: Impaired  Light Touch: Partial deficits in the RUE;Partial deficits in the RLE      LUE AROM (degrees)  LUE AROM : WFL  RUE PROM (degrees)  RUE PROM: WFL  R Elbow Flexion 0-145: WFL, however increased tone noted in elbow and took ~30 seconds to acheive full PROM  R Elbow Extension 145-0: WFL  RUE AROM (degrees)  RUE AROM : Exceptions  RUE General AROM: Pt demo'd some movement in R hand/wrist, no active movement in elbow/shoulder  LUE Strength  Gross LUE Strength: WFL  L Shoulder Flex: 5/5  L Elbow Flex: 5/5  L Elbow Ext: 5/5  L Hand General: 5/5  RUE Strength  Gross RUE Strength: Exceptions to [facility]  R Shoulder Flex: 0/5  R Elbow Flex: 1/5  R Elbow Ext: 1/5  R Hand General: 1+/5      Plan   Plan  Times per week: 4-5x    AM-PAC Score        AM-[facility] Inpatient Daily Activity Raw Score: 14 (07/15/21 1605)  AM-PAC Inpatient ADL T-Scale Score : 33.39 (07/15/21 1605)  ADL Inpatient CMS 0-100% Score: 59.67 (07/15/21 1605)  ADL Inpatient CMS G-Code Modifier : CK (07/15/21 1605)    Goals  Short term goals  Time Frame for Short term goals: Pt will by discharge  Short term goal 1: demo ADL UB bathing/dressing activity with adaptive tech's, setup, and SBA  Short term goal 2: demo ADL LB bathing/dressing activity with adaptive tech's, setup, and min A  Short term goal 3: demo 4-step func activity with 1 vc only to address cognitive concerns  Short term goal 4: demo good safety awareness during func mob around room using LRD PRN and CGA  Short term goal 5: demo AAROM/PROM to WFL's at all joints x10 for prolonged stretch with assist PRN     Therapy Time   Individual Concurrent Group Co-treatment   Time In 1352         Time Out 1420         Minutes 28         Timed Code Treatment Minutes: 23 Minutes       Dimple Tobin, OTR/L

## 2021-07-15 NOTE — PROGRESS NOTES
Attending Physician Statement  I have discussed the care of Jak Driver with the resident team. I have examined the patient myself and taken ros and hpi , including pertinent history and exam findings,  with the resident. I have reviewed the key elements of all parts of the encounter with the resident. I agree with the assessment, plan and orders as documented by the resident. Principal Problem:    Acute cerebrovascular accident (CVA) ([facility]  Active Problems:    COPD (chronic obstructive pulmonary disease) (HCC)    HTN (hypertension)    Diabetes 1.5, managed as type 2 ([facility]    Hyperlipidemia    CAD S/P percutaneous coronary angioplasty    Rhabdomyolysis    Intracranial atherosclerosis    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    Thrombocytopenia (HCC)    Right sided weakness    Noncompliance with medications  Resolved Problems:    * No resolved hospital problems. *    MRI confirms acute infarct in the left and right basal ganglia. No shunt on echo  Aspirin Plavix to be resumed today as platelet count is better. PT OT assessment. Failed swallow studycurrently on dysphagia diet. Platelet count improved to 73 after IVIG and 1 unit platelet transfusiondiscussed with hematology okay to resume aspirin and Plavix. CK improving, CELESTINO resolving. Start Metformin for newly diagnosed type 2 diabetes from tomorrow  Patient will likely need rehab or SNF at the time of dischargewill liaise with    Discharge planning underway    Patient will need thyroid studies repeated outpatient as borderline hyperthyroid, hormone level slightly influenced by acute illness. Thyromegaly present. Full note to follow.       Electronically signed by Kalie Clinton MD

## 2021-07-15 NOTE — CARE COORDINATION
Spoke to patient and sister Nathaniel Garcia at bedside. I explained that I have been informed by   [facility] [phone] at the [place] that if the patient would like he could transfer into the [facility] in [place] if there are beds available. I also provided them with the list of Skilled facilities along with the list form [facility]. Nathaniel Garcia states they will discuss it and will let me know.

## 2021-07-16 PROBLEM — D69.3 ACUTE IDIOPATHIC THROMBOCYTOPENIC PURPURA (HCC): Status: ACTIVE | Noted: 2021-01-01

## 2021-07-16 NOTE — PROGRESS NOTES
Spoke with ROOSEVELT Olivier CM, who states pt is requesting [place]. Itzel Larios faxed pt's insurance info through [facility]. Attempted to initiate precert for ARU. with Lizette @ [facility] who states pt's insurance is active through [employer]. Spoke with Radu Pompa @ [employer] who states  pt's insurance is active through [employer]. Spoke with Joey Hernandez @ [facility] who completes auths for [employer] [facility]. Initiated precert for ARU. with Joey Hernandez with pending [facility] #[id number].

## 2021-07-16 NOTE — PROGRESS NOTES
Occupational Therapy  Facility/Department: [facility]  Daily Treatment Note  NAME: Ximena Morrow  : 1945  MRN: [id number]    Date of Service: 2021    Discharge Recommendations:  Patient would benefit from continued therapy after discharge to increase func mobility, endurance, and ADL status  Further therapy recommended at discharge. The patient should be able to tolerate at least three hours of therapy per day over 5 days or 15 hours over 7 days. Assessment   Performance deficits / Impairments: Decreased functional mobility ; Decreased endurance;Decreased ADL status; Decreased balance;Decreased high-level IADLs;Decreased safe awareness;Decreased cognition;Decreased strength;Decreased ROM; Decreased sensation;Decreased coordination;Decreased fine motor control  Prognosis: Fair  OT Education: OT Role;Plan of Care;Orientation;Transfer Training;ADL Adaptive Strategies  Patient Education: Poor return from pt  REQUIRES OT FOLLOW UP: Yes  Activity Tolerance  Activity Tolerance: Treatment limited secondary to decreased cognition;Patient Tolerated treatment well  Safety Devices  Safety Devices in place: Yes  Type of devices: All fall risk precautions in place;Call light within reach;Gait belt;Nurse notified; Left in chair;Chair alarm in place  Restraints  Initially in place: No     Patient Diagnosis(es): The primary encounter diagnosis was Cerebrovascular accident (CVA), unspecified mechanism ([facility] [address] A diagnosis of Traumatic rhabdomyolysis, initial encounter [facility]) was also pertinent to this visit. has a past medical history of Centrilobular emphysema ([address] COPD (chronic obstructive pulmonary disease) ([facility] [address] Depression, Diabetes mellitus ([facility] [address] Former smoker, Hyperlipidemia, Hypertension, Mixed restrictive and obstructive lung disease ([address] Need for pneumococcal vaccine, and Personal history of tobacco use.   has a past surgical history that includes Neck surgery;  Toe amputation (Right, greater); and Cardiac surgery (07/2015). Restrictions  Restrictions/Precautions  Restrictions/Precautions: Up as Tolerated, General Precautions, Fall Risk  Required Braces or Orthoses?: No  Position Activity Restriction  Other position/activity restrictions: Up w/ assistance; check platelet count  Subjective   General  Chart Reviewed: Yes  Patient assessed for rehabilitation services?: Yes  Family / Caregiver Present: No  Diagnosis: L/R basal ganglia infarcts, found down, R weakness, Rhabdomyelosis  Vital Signs  Patient Currently in Pain: Denies   Orientation  Orientation  Overall Orientation Status: Impaired  Orientation Level:  (KYA d/t expressive aphasia)  Objective    ADL  Grooming: Setup; Increased time to complete;Verbal cueing;Stand by assistance (Pt washed face sitting in chair, req v/c to initiate task)  UE Dressing: Setup;Verbal cueing; Increased time to complete;Maximum assistance (Max A to adjust gown sitting EOB d/t R sided weakness)  LE Dressing: Setup;Verbal cueing; Increased time to complete;Maximum assistance (Don socks sitting EOB. 4 figure technique attempted with P return)  Balance  Sitting Balance: Stand by assistance (Seated EOB)  Standing Balance: Moderate assistance (Mod A x2)  Standing Balance  Time: 2 min  Activity: Standing EOB, stand pivot to chair  Comment: No LOB, pt req v/c, t/c to initiate movement with LLE, req assistance with RLE d/t weakness  Functional Mobility  Functional - Mobility Device: No device (Gait belt, hand held assistance x2)  Activity: Other  Assist Level: Moderate assistance  Functional Mobility Comments: Pt major fall risk  Bed mobility  Supine to Sit: Moderate assistance  Scooting: Moderate assistance  Comment: Pt retired to chair at end of session, Mod A for BLE and trunk progression  Transfers  Sit to stand:  Moderate assistance;2 Person assistance  Stand to sit: Moderate assistance;2 Person assistance     Cognition  Overall Cognitive Status: Exceptions  Arousal/Alertness: Appropriate responses to stimuli  Following Commands: Follows one step commands with increased time; Follows one step commands with repetition  Attention Span: Attends with cues to redirect  Safety Judgement: Decreased awareness of need for assistance;Decreased awareness of need for safety  Problem Solving: Assistance required to correct errors made;Assistance required to identify errors made;Assistance required to generate solutions;Assistance required to implement solutions  Insights: Decreased awareness of deficits  Initiation: Requires cues for some  Sequencing: Requires cues for some     LUE AROM (degrees)  LUE AROM : WFL  RUE PROM (degrees)  RUE PROM: WFL  R Elbow Flexion 0-145: WFL, however increased tone noted in elbow and took ~30 seconds to acheive full PROM  R Elbow Extension 145-0: WFL  Right Hand PROM (degrees)  Right Hand PROM: [facility]    Plan   Plan  Times per week: 4-5x    Goals  Short term goals  Time Frame for Short term goals: Pt will by discharge  Short term goal 1: demo ADL UB bathing/dressing activity with adaptive tech's, setup, and SBA  Short term goal 2: demo ADL LB bathing/dressing activity with adaptive tech's, setup, and min A  Short term goal 3: demo 4-step func activity with 1 vc only to address cognitive concerns  Short term goal 4: demo good safety awareness during func mob around room using LRD PRN and CGA  Short term goal 5: demo AAROM/PROM to WFL's at all joints x10 for prolonged stretch with assist PRN       Therapy Time   Individual Concurrent Group Co-treatment   Time In 0849         Time Out 0923         Minutes 34         Timed Code Treatment Minutes: 23 Minutes (11 min co tx PT)   Pt in bed upon arrival, pleasant and agreeable to tx this date. Pt retired to chair at end of session, call light within reach, chair alarm on, RN notified.      SAEED Smith/VENITA

## 2021-07-16 NOTE — PROGRESS NOTES
Attending Physician Statement  I have discussed the care of Mignon Garcia with the resident team. I have examined the patient myself and taken ros and hpi , including pertinent history and exam findings,  with the resident. I have reviewed the key elements of all parts of the encounter with the resident. I agree with the assessment, plan and orders as documented by the resident. 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 *    Aspirin Plavix resumed, hemoglobin stable, platelets improving. Oral steroids for ITP per hematology recommendations  Hyponatremia today with bump in creatininelikely dehydrationwe will give saline infusion and recheck BMP this afternoon-Metformin held until creatinine improves  Plan for discharge to ARU  Full note to follow.       Electronically signed by Sampson Peterson MD

## 2021-07-16 NOTE — PROGRESS NOTES
[facility]  Internal Medicine Teaching Residency Program  Inpatient Daily Progress Note  ______________________________________________________________________________    Patient: Rm Clifton  YOB: 1945   ZWT:[id number]    Acct: [de-identified]     Room: [facility]-  Admit date: 7/13/2021  Today's date: 07/16/21  Number of days in the hospital: 3    SUBJECTIVE   Admitting Diagnosis: Acute ischemic stroke ([facility] [address]  CC: found down    No acute event over night. Had some episode of bradycardia. Pt examined at bedside. Chart & results reviewed. Patient hemodynamically stable, afebrile. Na this am 152 with water deficit 5 liter. Started with normal saline . Recent Na  came down to 150 . Will follow with na if needed will change normal saline to half normal saline for 125 ml/hr for 4 hours. Awaiting precert acute rehab. Platelet improved to 118. Methylprednisolone switched to decadron 40 mg daily for 4 days. ROS:  Constitutional:  negative for chills, fevers, sweats  Respiratory:  negative for cough, dyspnea on exertion, hemoptysis, shortness of breath, wheezing  Cardiovascular:  negative for chest pain, chest pressure/discomfort, lower extremity edema, palpitations  Gastrointestinal:  negative for abdominal pain, constipation, diarrhea, nausea, vomiting  Neurological:  negative for dizziness, headache  BRIEF HISTORY        The patient is a pleasant 75 y. o. male presents with a chief complaint of being found down by the sister. Patient's sister was informed that he had not been seen, she went to check on him. Sister found him down, called 911 for help. In the ED, he is found to have R facial weakness, R sided arm and leg weakness, and dysarthria.  Patient has multiple ecchymosis on R side of arm and chest. Patients lab in ED showed, creatinine 1.47 (baseline 1.40/1.19), BUN 29, GFR 57, Lactic acid 2.4. CXR showed clear lungs, cardiomegaly, no acute cardiopulmonary abnormality.     Patient also found to have elevated Total CK 8454, myoglobin 3030, troponin 52 (baseline 42).  Patient given 1L NS bolus.     Patient CT head showed New ovoid low-attenuation area in the left frontal corona radiata compatible with acute/subacute infarction.  This measures 2 x 1.4 cm.  No associated hemorrhage. 2. Diffuse parenchymal volume loss and sequela of severe chronic microvascular ischemic changes.   Neuro on board. Neuro recommended MRI brain WO, resume ASA, plavix 75, folic acid, lipitor, lipid panel, hba1c, speech eval, SBP < 180, blood glucose < 180, avoid dextrose containing solutions.     CT head/neck showed severe stenosis in V1 segment of left vertebral artery, extensive intracranial atherosclerotic plaque with near complete occusion of the right PCA. Severe stenosis in proximal A3 segments of RAHEEM, moderate to severe proximal M2 segment stenosis of left MCA. Moderate stenosis in P1/P2 junction of left PCA. Enlarged, heterogenous thyroid gland with 2.6 cm left thyroid lobe nodule.      Patient fluorescence platelet, came 4, hem/onc consult placed for dvt prophylaxis, plavix recommendation. MRI showed acute infarct in L and R basal ganglia, Left more than right. OBJECTIVE     Vital Signs:  BP (!) 166/81   Pulse 74   Temp 98.3 °F (36.8 °C) (Oral)   Resp 20   Ht 5' 10\" (1.778 m)   Wt 210 lb (95.3 kg)   SpO2 97%   BMI 30.13 kg/m²     Temp (24hrs), Av.2 °F (36.8 °C), Min:98 °F (36.7 °C), Max:98.4 °F (36.9 °C)    No intake/output data recorded. Physical Exam:  Constitutional: This is a well developed, well nourished, 30-34.9 - Obesity Grade I 77y.o. year old male who is alert, oriented, cooperative and in no apparent distress. Head:normocephalic and atraumatic.  significant for severe dysarthric speech with left hemianopsia; right UMN facial palsy  EENT:  PERRLA. No conjunctival injections.    Septum was midline, mucosa was without erythema, exudates or cobblestoning. No thrush was noted. Neck: Supple without thyromegaly. No elevated JVP. Trachea was midline. Respiratory: Chest was symmetrical without dullness to percussion. Breath sounds bilaterally were clear to auscultation. There were no wheezes, rhonchi or rales. There is no intercostal retraction or use of accessory muscles. No egophony noted. Cardiovascular: Regular without murmur, clicks, gallops or rubs. Abdomen: Slightly rounded and soft without organomegaly. No rebound, rigidity or guarding was appreciated. Lymphatic: No lymphadenopathy. Musculoskeletal: Normal curvature of the spine. Extremities: significant weakness of grade 3/5 in right upper and right lower extremities with contractures and hypoactive DTRs with extensor plantar response on right side.    No lower extremity edema, ulcerations, tenderness, varicosities or erythema. Muscle size, tone normal.  No involuntary movements are noted. Skin:  Warm and dry. Good color, turgor and pigmentation. No lesions or scars.   No cyanosis or clubbing  Neurological/Psychiatric: The patient's general behavior, level of consciousness, thought content and emotional status is normal.        Medications:  Scheduled Medications:    dexamethasone  40 mg Oral Daily    rosuvastatin  40 mg Oral Nightly    clopidogrel  75 mg Oral Daily    aspirin  81 mg Oral Daily    heparin (porcine)  5,000 Units Subcutaneous 3 times per day    famotidine  20 mg Oral BID    insulin lispro  0-12 Units Subcutaneous TID WC    insulin lispro  0-6 Units Subcutaneous Nightly    insulin glargine  8 Units Subcutaneous Nightly    sodium chloride  1,000 mL Intravenous Once    budesonide-formoterol  2 puff Inhalation BID    tiotropium  2 puff Inhalation Daily    sodium chloride flush  5-40 mL Intravenous 2 times per day    [Held by provider] methylPREDNISolone  80 mg Intravenous Q8H     Continuous Infusions:    sodium chloride 125 mL/hr at 07/16/21 1009    dextrose      sodium chloride      sodium chloride       PRN Medicationsglucose, 15 g, PRN  dextrose, 12.5 g, PRN  glucagon (rDNA), 1 mg, PRN  dextrose, 100 mL/hr, PRN  albuterol sulfate HFA, 2 puff, Q6H PRN  sodium chloride flush, 5-40 mL, PRN  sodium chloride, 25 mL, PRN  ondansetron, 4 mg, Q8H PRN   Or  ondansetron, 4 mg, Q6H PRN  polyethylene glycol, 17 g, Daily PRN  acetaminophen, 650 mg, Q6H PRN   Or  acetaminophen, 650 mg, Q6H PRN  potassium chloride, 40 mEq, PRN   Or  potassium alternative oral replacement, 40 mEq, PRN   Or  potassium chloride, 10 mEq, PRN  sodium chloride, , PRN        Diagnostic Labs:  CBC:   Recent Labs     07/14/21  0353 07/15/21  0618 07/16/21  0510   WBC 10.7 16.5* 15.9*   RBC 4.33 4.28 4.19*   HGB 10.4* 10.2* 10.0*   HCT 34.5* 34.4* 33.8*   MCV 79.7* 80.4* 80.7*   RDW 15.7* 16.1* 17.2*   PLT See Reflexed IPF Result See Reflexed IPF Result See Reflexed IPF Result     BMP:   Recent Labs     07/14/21  0353 07/15/21  0618 07/16/21  0510    149* 152*   K 3.8 3.7 4.1   * 117* 119*   CO2 22 21 18*   BUN 31* 32* 34*   CREATININE 1.45* 1.26* 1.60*     BNP: No results for input(s): BNP in the last 72 hours. PT/INR:   Recent Labs     07/13/21 2023   PROTIME 11.8   INR 1.1     APTT:   Recent Labs     07/13/21 2023   APTT 18.0*     CARDIAC ENZYMES:   Recent Labs     07/16/21  0510   CKMB 14.6*     FASTING LIPID PANEL:  Lab Results   Component Value Date    CHOL 186 07/14/2021    HDL 42 07/14/2021    TRIG 77 07/14/2021     LIVER PROFILE:   Recent Labs     07/14/21 0353   AST 81*   ALT 54*   BILIDIR 0.30   BILITOT 0.80   ALKPHOS 93      MICROBIOLOGY:   Lab Results   Component Value Date/Time    CULTURE NO SIGNIFICANT GROWTH 04/04/2016 12:30 PM    CULTURE  04/04/2016 12:30 PM     Performed at [address] [place] [address] [phone]       Imaging:    CT HEAD WO CONTRAST    Result Date: 7/13/2021  1.  New ovoid low-attenuation area in the left frontal corona radiata compatible with acute/subacute infarction. This measures 2 x 1.4 cm. No associated hemorrhage. 2. Diffuse parenchymal volume loss and sequela of severe chronic microvascular ischemic changes. Findings were discussed with Dr. Pascual Beard at 1:31 pm on 7/13/2021. XR CHEST PORTABLE    Result Date: 7/13/2021  Clear lungs. Cardiomegaly. No acute cardiopulmonary abnormality. US SPLEEN    Result Date: 7/14/2021  Suboptimal study. Mild splenomegaly. CTA HEAD NECK W CONTRAST    Result Date: 7/13/2021  1. Severe stenosis in the V1 segment of the left vertebral artery. 2. Extensive intracranial atherosclerotic plaque with near complete occlusion of the right PCA. 3. Severe stenoses in the proximal A3 segments of the ACAs bilaterally. 4. Moderate-to-severe proximal M2 segment stenosis in the right MCA. Moderate stenosis in the M1 and M2 segments of the left MCA. 5. Moderate stenosis in the P1/P2 junction of the left PCA. 6. Enlarged heterogeneous thyroid gland with 2.6 cm left thyroid lobe nodule. Nonemergent/outpatient thyroid ultrasound recommended. Findings were discussed with Dr. Pascual Beard at 1:43 pm on 7/13/2021. RECOMMENDATIONS: 2.6 cm incidental left thyroid nodule with heterogeneous and enlarged thyroid. Recommend thyroid US. Reference: J Am Bruno Radiol. 2015 Feb;12(2): 143-50     MRI BRAIN WO CONTRAST    Result Date: 7/14/2021  Acute infarct in the left and right basal ganglia greater on the left. Diffuse volume loss with extensive chronic white matter changes. ASSESSMENT & PLAN     ASSESSMENT / PLAN:        Active Problems:  Principal Problem:    Acute cerebrovascular accident (CVA) ([address]  Active Problems:    COPD (chronic obstructive pulmonary disease) (HCC)    HTN (hypertension)    Diabetes 1.5, managed as type 2 ([address]    Hyperlipidemia  Resolved Problems:    * No resolved hospital problems.  *        Acute cerebrovascular accident (CVA)  - Patient CT head showed New ovoid low-attenuation area in the left frontal corona radiata compatible with acute/subacute infarction.  This measures 2 x 1.4 cm.  No associated hemorrhage. 2. Diffuse parenchymal volume loss and sequela of severe chronic microvascular ischemic changes.   Neuro on board. - Neuro recommended MRI brain WO, resume ASA, plavix 75, folic acid, lipitor, lipid panel, hba1c, speech eval, SBP < 180, blood glucose < 180, avoid dextrose containing solutions. -CT head/neck showed severe stenosis in V1 segment of left vertebral artery, extensive intracranial atherosclerotic plaque with near complete occusion of the right PCA. Severe stenosis in proximal A3 segments of RAHEEM, moderate to severe proximal M2 segment stenosis of left MCA. Moderate stenosis in P1/P2 junction of left PCA. Enlarged, heterogenous thyroid gland with 2.6 cm left thyroid lobe nodule. - Neuro on board. MRI showed acute infarct in L and R basal ganglia, Left more than right. 7/13, neuro recommended no intervention for now, to continue medical management  - Echo with bubble study-negative for PFO  - patient started on ASA, plavix.      Rhabdomyolysis  -  elevated Total CK 8454, myoglobin 3030, troponin 52 (baseline 42).  Patient given 1L NS bolus.     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 - decadron started for 4 days.     Hypernatremia secondary to dehydration- fluid deficit of 5 liter  - continue to hydrate with normal saline. Repeat BMP-if not improving start on half normal saline at 125 ml /hr. DVT ppx : heparin  GI ppx: fomatidine    PT/OT: pt/ot on board  Discharge Planning / SW:  on board. Precert started. Gregoria Longoria MD  Internal Medicine Resident, PGY-2  [facility];  [place], [place]  7/16/2021, 3:31 PM

## 2021-07-16 NOTE — CARE COORDINATION
Spoke to [place] patient's sister via phone. We discussed the transition plan. She states that she was not aware that he was getting discharged and has not yet discussed with the patient the plan. She tells me that she is coming up now and will let me know once she gets here. Awaiting arrival    1 [place] as arrived to the room and has discussed plan with patient. They have agreed upon [facility].   Notified Roselyn of wanting their facility

## 2021-07-16 NOTE — PROGRESS NOTES
Physical Therapy  Facility/Department: [facility] NEURO  Daily Treatment Note  NAME: Valentine Abdul  : 1945  MRN: [id number]    Date of Service: 2021    Discharge Recommendations:    Further therapy recommended at discharge and the patient should be able to tolerate at least 3 hours per day over 5 days or 15 hours over 7 days. PT Equipment Recommendations  Equipment Needed: No    Assessment   Body structures, Functions, Activity limitations: Decreased functional mobility ; Decreased ROM; Decreased strength;Decreased balance;Decreased safe awareness;Decreased endurance;Decreased posture  Assessment: Pt amb bed to chair with HHA and MOD Ax2. LImited by weakness and B knee buckling with fatigue. Pt would benefit from aggressive PT after d/c to address deficits. Prognosis: Fair  REQUIRES PT FOLLOW UP: Yes  Activity Tolerance  Activity Tolerance: Patient Tolerated treatment well;Patient limited by endurance     Patient Diagnosis(es): The primary encounter diagnosis was Cerebrovascular accident (CVA), unspecified mechanism ([facility] [address] A diagnosis of Traumatic rhabdomyolysis, initial encounter [facility]) was also pertinent to this visit. has a past medical history of Centrilobular emphysema ([address] COPD (chronic obstructive pulmonary disease) ([address] Depression, Diabetes mellitus ([facility] [address] Former smoker, Hyperlipidemia, Hypertension, Mixed restrictive and obstructive lung disease ([address] Need for pneumococcal vaccine, and Personal history of tobacco use.   has a past surgical history that includes Neck surgery; Toe amputation (Right, greater); and Cardiac surgery (2015).     Restrictions  Restrictions/Precautions  Restrictions/Precautions: Up as Tolerated, General Precautions, Fall Risk  Required Braces or Orthoses?: No  Position Activity Restriction  Other position/activity restrictions: Up w/ assistance; check platelet count  Subjective   General  Response To Previous Treatment: Patient with no complaints from previous session. Family / Caregiver Present: No  Subjective  Subjective: Pt resting in bed upon arrival, eager to particiapte in PT, somewhat aphasic with mumbled speech, difficult to understand  Pain Screening  Patient Currently in Pain: Denies  Vital Signs  Patient Currently in Pain: Denies       Orientation  Orientation  Overall Orientation Status: Impaired  Orientation Level: Unable to assess (difficult to fully assess due to speech)  Cognition      Objective   Bed mobility  Rolling to Right: Contact guard assistance  Supine to Sit: Moderate assistance  Sit to Supine:  (retired to chair)  Scooting: Moderate assistance  Transfers  Sit to Stand: Moderate Assistance;2 Person Assistance  Stand to sit: Moderate Assistance;2 Person Assistance  Bed to Chair: Moderate assistance;2 Person Assistance  Comment: Pt stood x 2 trials from bed with MOD A x2, due to post and R lateral lean  Ambulation  Ambulation?: Yes  More Ambulation?: No  Ambulation 1  Surface: level tile  Device: Hand-Held Assist (L HHA)  Assistance:  Moderate assistance;2 Person assistance  Quality of Gait: very short, shuffled steps, difficulty advancing R LE, B knees buckling as pt fatigues  Gait Deviations: Shuffles  Distance: ~2 ft bed to chair  Stairs/Curb  Stairs?: No     Balance  Posture: Fair  Sitting - Static: Fair;+  Sitting - Dynamic: +  Standing - Static: Fair;-  Standing - Dynamic: Poor;+ (Pt stood x 3-4 min total, limited by knee buckling as pt fatigues)    Exercise  R LE and UE AAROM/PROM in all planes x 10  Pt with increased tone and very limited ROM to R     Goals  Short term goals  Time Frame for Short term goals: 12  Short term goal 1: Perform bed mob IND  Short term goal 2: Perform transfers Marisol+1  Short term goal 3: Amb 22' w/ modA+2 w/ appropriate AD  Short term goal 4: Prevent contractures  Patient Goals   Patient goals : unable to state    Plan    Plan  Times per week: 5-6 visits  Times per day: Daily  Current Treatment Recommendations: Strengthening, ROM, Balance Training, Functional Mobility Training, Gait Training, Endurance Training, Transfer Training, Safety Education & Training  Safety Devices  Type of devices: Gait belt, Patient at risk for falls, Left in chair, Call light within reach, Chair alarm in place  Restraints  Initially in place: No     Therapy Time   Individual Concurrent Group Co-treatment   Time In 0841         Time Out 0905         Minutes 24         Timed Code Treatment Minutes: 24 Minutes       Rukhsana Ulloa, PTA

## 2021-07-16 NOTE — PROGRESS NOTES
Today's Date: 7/16/2021  Patient Name: Ximena Morrow  Date of admission: 7/13/2021 11:18 AM  Patient's age: 77 y.o., 1945  Admission Dx: Acute cerebrovascular accident (CVA) ([facility] [address] [I63.9]  Acute cerebrovascular accident (CVA) ([facility] [address] [I63.9]    Reason for Consult: management recommendations  Requesting Physician: Jhonatan Huffman MD    CHIEF COMPLAINT: Acute stroke. Thrombocytopenia. History Obtained From:  patient, electronic medical record      Interval history:    Patient seen and examined  Laboratory reviewed  No new complaint or interval history per nurse. Patient unable to give detailed history due to dysarthria  Platelet count improved to 118,000. Hyponatremia with sodium 150      HISTORY OF PRESENT ILLNESS:      The patient is a 77 y.o.  male who is admitted to the hospital for acute stroke    Brought to the hospital after being found down on the floor. Patient was last seen well about 2 days ago. Patient has a history of peripheral vascular disease coronary artery disease hypertension COPD and previous strokes. Patient has significant contracture on the right side also right-sided facial droop with severe dysarthria limiting history of presenting illness. Patient's CBC at presentation shows hemoglobin 11.7 with MCV 77 WBC count 10 and platelet count of 4 with immature platelet fraction of 44%. Patient previous CBC noted in the system from June 2020 shows blood count of 160. Patient was also noted to have elevated myoglobin and CK. Creatinine is 1.74 lactic acid is 2.4. Patient has dysarthria not able to give much history.       Past Medical History:   has a past medical history of Centrilobular emphysema ([facility] [address] COPD (chronic obstructive pulmonary disease) ([facility] [address] Depression, Diabetes mellitus ([facility] [address] Former smoker, Hyperlipidemia, Hypertension, Mixed restrictive and obstructive lung disease ([address] Need for pneumococcal vaccine, and Personal history of tobacco use.    Past Surgical History:   has a past surgical history that includes Neck surgery; Toe amputation (Right, greater); and Cardiac surgery (07/2015). Medications:    Prior to Admission medications    Medication Sig Start Date End Date Taking?  Authorizing Provider   atorvastatin (LIPITOR) 80 MG tablet Take 0.5 tablets by mouth daily (Pt takes one-half of an 80mg tab = 40mg) 7/16/21  Yes Gregoria Longoria MD   dexamethasone 20 MG TABS Take 40 mg by mouth daily for 3 days 7/17/21 7/20/21 Yes Gregoria Longoria MD   tiZANidine (ZANAFLEX) 2 MG tablet Take 1 tablet by mouth 4 times daily as needed (muscle spasms) 5/7/21   JOLIE Ruiz CNP   naproxen (NAPROSYN) 500 MG tablet Take 1 tablet by mouth 2 times daily (with meals) 1/4/21   Cesar Guevara PA-C   ibuprofen (ADVIL;MOTRIN) 800 MG tablet Take 1 tablet by mouth every 8 hours as needed for Pain 6/2/20   Jeremy Kumar MD   lidocaine (LIDODERM) 5 % Place 1 patch onto the skin daily 12 hours on, 12 hours off. 6/2/20   Jeremy Kumar MD   metoprolol succinate (TOPROL XL) 50 MG extended release tablet Take 50 mg by mouth daily    Historical Provider, MD   tiotropium (SPIRIVA RESPIMAT) 2.5 MCG/ACT AERS inhaler Inhale 2 puffs into the lungs daily    Historical Provider, MD   carboxymethylcellulose 1 % ophthalmic solution Place 1 drop into both eyes 3 times daily as needed for Dry Eyes     Historical Provider, MD   ketotifen (ZADITOR) 0.025 % ophthalmic solution Place 1 drop into both eyes 2 times daily as needed (itchy eyes)     Historical Provider, MD   isosorbide mononitrate (IMDUR) 60 MG extended release tablet Take 30 mg by mouth daily Indications: 1/2 tablet (30mg)     Historical Provider, MD   finasteride (PROSCAR) 5 MG tablet Take 5 mg by mouth daily    Historical Provider, MD   tamsulosin (FLOMAX) 0.4 MG capsule Take 0.4 mg by mouth daily    Historical Provider, MD   fluticasone (FLONASE) 50 MCG/ACT nasal spray 1 spray by Nasal route 2 times daily  Patient taking differently: 1 spray by Nasal route 2 times daily as needed for Rhinitis  5/31/16   Francisco Connor DO   albuterol sulfate  (90 BASE) MCG/ACT inhaler Inhale 2 puffs into the lungs every 6 hours as needed for Wheezing    Historical Provider, MD   albuterol (PROVENTIL) (2.5 MG/3ML) 0.083% nebulizer solution Take 2.5 mg by nebulization every 6 hours as needed for Wheezing    Historical Provider, MD   aspirin 81 MG EC tablet Take 81 mg by mouth daily    Historical Provider, MD   losartan (COZAAR) 100 MG tablet Take 100 mg by mouth daily     Historical Provider, MD   nitroGLYCERIN (NITROSTAT) 0.4 MG SL tablet Place 0.4 mg under the tongue every 5 minutes as needed for Chest pain    Historical Provider, MD   FLUoxetine (PROZAC) 20 MG capsule Take 40 mg by mouth daily     Historical Provider, MD   furosemide (LASIX) 20 MG tablet Take 20 mg by mouth daily as needed (swelling)     Historical Provider, MD   clopidogrel (PLAVIX) 75 MG tablet Take 75 mg by mouth daily    Historical Provider, MD   rOPINIRole (REQUIP) 0.25 MG tablet Take 0.25 mg by mouth nightly as needed     Historical Provider, MD   budesonide-formoterol (SYMBICORT) 160-4.5 MCG/ACT AERO Inhale 2 puffs into the lungs 2 times daily.     Historical Provider, MD     Current Facility-Administered Medications   Medication Dose Route Frequency Provider Last Rate Last Admin    0.9 % sodium chloride infusion   Intravenous Continuous Migue Sofia  mL/hr at 07/16/21 1531 New Bag at 07/16/21 1531    rosuvastatin (CRESTOR) tablet 40 mg  40 mg Oral Nightly Lulu Hutchins MD   40 mg at 07/15/21 2046    clopidogrel (PLAVIX) tablet 75 mg  75 mg Oral Daily Benigno Ling MD   75 mg at 07/16/21 6797    aspirin chewable tablet 81 mg  81 mg Oral Daily Benigno Ling MD   81 mg at 07/16/21 8299    heparin (porcine) injection 5,000 Units  5,000 Units Subcutaneous 3 times per day Benigno Ling MD   5,000 Units at 07/16/21 1315    famotidine (PEPCID) tablet 20 mg  20 mg Oral BID April Dang MD   20 mg at 07/16/21 0812    insulin lispro (HUMALOG) injection vial 0-12 Units  0-12 Units Subcutaneous TID WC Ruben Crocker MD   2 Units at 07/16/21 1606    insulin lispro (HUMALOG) injection vial 0-6 Units  0-6 Units Subcutaneous Nightly Ruben Crocker MD   1 Units at 07/15/21 2046    glucose (GLUTOSE) 40 % oral gel 15 g  15 g Oral PRN Ruben Crocker MD        dextrose 50 % IV solution  12.5 g Intravenous PRN Ruben Crocker MD        glucagon (rDNA) injection 1 mg  1 mg Intramuscular PRN Ruben Crocker MD        dextrose 5 % solution  100 mL/hr Intravenous PRN Ruben Crocker MD        insulin glargine (LANTUS) injection vial 8 Units  8 Units Subcutaneous Nightly Jaylen Cagle MD   8 Units at 07/15/21 2046    0.9 % sodium chloride bolus  1,000 mL Intravenous Once Ruben Crocker MD        albuterol sulfate  (90 Base) MCG/ACT inhaler 2 puff  2 puff Inhalation Q6H PRN Ruben Crocker MD   2 puff at 07/15/21 1622    budesonide-formoterol (SYMBICORT) 160-4.5 MCG/ACT inhaler 2 puff  2 puff Inhalation BID Ruben Crocker MD   2 puff at 07/16/21 0821    tiotropium (SPIRIVA RESPIMAT) 2.5 MCG/ACT inhaler 2 puff  2 puff Inhalation Daily Ruben Crocker MD   2 puff at 07/16/21 0820    sodium chloride flush 0.9 % injection 5-40 mL  5-40 mL Intravenous 2 times per day Ruben Crocker MD   10 mL at 07/16/21 0812    sodium chloride flush 0.9 % injection 5-40 mL  5-40 mL Intravenous PRN Ruben Crocker MD        0.9 % sodium chloride infusion  25 mL Intravenous PRN Ruben Crocker MD        ondansetron (ZOFRAN-ODT) disintegrating tablet 4 mg  4 mg Oral Q8H PRN Ruben Crocker MD        Or    ondansetron (ZOFRAN) injection 4 mg  4 mg Intravenous Q6H PRN Ruben Crocker MD        polyethylene glycol (GLYCOLAX) packet 17 g  17 g Oral Daily PRN Ruben Crocker MD       [facility] acetaminophen (TYLENOL) tablet 650 mg  650 mg Oral Q6H PRN Angelique Patton MD        Or    acetaminophen (TYLENOL) suppository 650 mg  650 mg Rectal Q6H PRN Angelique Patton MD        potassium chloride (KLOR-CON M) extended release tablet 40 mEq  40 mEq Oral PRN Angelique Patton MD        Or    potassium bicarb-citric acid (EFFER-K) effervescent tablet 40 mEq  40 mEq Oral PRN Angelique Patton MD        Or    potassium chloride 10 mEq/100 mL IVPB (Peripheral Line)  10 mEq Intravenous PRN Angelique Patton MD        0.9 % sodium chloride infusion   Intravenous PRN Ankur Mack MD       [facility]] methylPREDNISolone sodium (SOLU-MEDROL) injection 80 mg  80 mg Intravenous Q8H Mitchell Luong MD   80 mg at 21 1654       Allergies:  Patient has no known allergies. Social History:   reports that he quit smoking about 8 years ago. He started smoking about 64 years ago. He has a 42.00 pack-year smoking history. He has never used smokeless tobacco. He reports that he does not drink alcohol and does not use drugs.      Family History: Hypertension    REVIEW OF SYSTEMS:      Patient's review of system is limited due to dysarthria    PHYSICAL EXAM:        BP (!) 152/65   Pulse 62   Temp 98.1 °F (36.7 °C) (Oral)   Resp 22   Ht 5' 10\" (1.778 m)   Wt 210 lb (95.3 kg)   SpO2 98%   BMI 30.13 kg/m²    Temp (24hrs), Av.2 °F (36.8 °C), Min:98 °F (36.7 °C), Max:98.4 °F (36.9 °C)      General appearance -ill appearing, no in pain or distress   Mental status -alert and awake  Eyes - pupils equal and reactive, extraocular eye movements intact   Ears - bilateral TM's and external ear canals normal   Mouth - mucous membranes moist, pharynx normal without lesions   Neck - supple, no significant adenopathy   Lymphatics - no palpable lymphadenopathy, no hepatosplenomegaly   Chest -decreased breathing sounds  Heart - normal rate, regular rhythm, normal S1, S2, no murmurs  Abdomen - soft, nontender, nondistended, no masses or organomegaly   Neurological -speech altered. Patient is awake.   Follows simple commands  Musculoskeletal - no joint tenderness, deformity or swelling   Extremities -right upper and lower extremity contracture  Skin - normal coloration and turgor, no rashes, no suspicious skin lesions noted ,      DATA:      Labs:     Results for orders placed or performed during the hospital encounter of 07/13/21   MYOGLOBIN, SERUM   Result Value Ref Range    Myoglobin 3,030 (H) 28 - 72 ng/mL   CK   Result Value Ref Range    Total CK 7,454 (H) 39 - 308 U/L   CBC WITH AUTO DIFFERENTIAL   Result Value Ref Range    WBC 10.7 3.5 - 11.3 k/uL    RBC 4.86 4.21 - 5.77 m/uL    Hemoglobin 11.7 (L) 13.0 - 17.0 g/dL    Hematocrit 37.4 (L) 40.7 - 50.3 %    MCV 77.0 (L) 82.6 - 102.9 fL    MCH 24.1 (L) 25.2 - 33.5 pg    MCHC 31.3 28.4 - 34.8 g/dL    RDW 15.7 (H) 11.8 - 14.4 %    Platelets See Reflexed IPF Result 138 - 453 k/uL    MPV NOT REPORTED 8.1 - 13.5 fL    NRBC Automated 0.2 (H) 0.0 per 100 WBC    Differential Type NOT REPORTED     WBC Morphology NOT REPORTED     RBC Morphology ANISOCYTOSIS PRESENT     Platelet Estimate NOT REPORTED     Seg Neutrophils 78 (H) 36 - 65 %    Lymphocytes 13 (L) 24 - 43 %    Monocytes 7 3 - 12 %    Eosinophils % 1 1 - 4 %    Basophils 1 0 - 2 %    Immature Granulocytes 1 (H) 0 %    Segs Absolute 8.33 (H) 1.50 - 8.10 k/uL    Absolute Lymph # 1.41 1.10 - 3.70 k/uL    Absolute Mono # 0.73 0.10 - 1.20 k/uL    Absolute Eos # 0.07 0.00 - 0.44 k/uL    Basophils Absolute 0.07 0.00 - 0.20 k/uL    Absolute Immature Granulocyte 0.08 0.00 - 0.30 k/uL   COMPREHENSIVE METABOLIC PANEL   Result Value Ref Range    Glucose 192 (H) 70 - 99 mg/dL    BUN 29 (H) 8 - 23 mg/dL    CREATININE 1.47 (H) 0.70 - 1.20 mg/dL    Bun/Cre Ratio NOT REPORTED 9 - 20    Calcium 9.3 8.6 - 10.4 mg/dL    Sodium 142 135 - 144 mmol/L    Potassium 4.0 3.7 - 5.3 mmol/L    Chloride 107 98 - 107 mmol/L    CO2 25 20 - 31 mmol/L Anion Gap 10 9 - 17 mmol/L    Alkaline Phosphatase 111 40 - 129 U/L    ALT 65 (H) 5 - 41 U/L     (H) <40 U/L    Total Bilirubin 1.03 0.3 - 1.2 mg/dL    Total Protein 7.2 6.4 - 8.3 g/dL    Albumin 3.8 3.5 - 5.2 g/dL    Albumin/Globulin Ratio 1.1 1.0 - 2.5    GFR Non- 47 (L) >60 mL/min    GFR  57 (L) >60 mL/min    GFR Comment          GFR Staging NOT REPORTED    LACTIC ACID, WHOLE BLOOD   Result Value Ref Range    Lactic Acid, Whole Blood 2.4 (H) 0.7 - 2.1 mmol/L   Troponin   Result Value Ref Range    Troponin, High Sensitivity 52 (HH) 0 - 22 ng/L    Troponin T NOT REPORTED <0.03 ng/mL    Troponin Interp NOT REPORTED    ELECTROLYTES PLUS   Result Value Ref Range    POC Sodium 145 138 - 146 mmol/L    POC Potassium 3.9 3.5 - 4.5 mmol/L    POC Chloride 110 (H) 98 - 107 mmol/L    POC TCO2 27 22 - 30 mmol/L    Anion Gap 9 7 - 16 mmol/L   Hemoglobin and hematocrit, blood   Result Value Ref Range    POC Hemoglobin 13.2 (L) 13.5 - 17.5 g/dL    POC Hematocrit 39 (L) 41 - 53 %   CALCIUM, IONIC (POC)   Result Value Ref Range    POC Ionized Calcium 1.18 1.15 - 1.33 mmol/L   Immature Platelet Fraction   Result Value Ref Range    Platelet, Immature Fraction 44.9 (H) 1.1 - 10.3 %    Platelet, Fluorescence 4 (LL) 138 - 453 k/uL   Troponin   Result Value Ref Range    Troponin, High Sensitivity 46 (H) 0 - 22 ng/L    Troponin T NOT REPORTED <0.03 ng/mL    Troponin Interp NOT REPORTED    DRUG SCREEN MULTI URINE   Result Value Ref Range    Amphetamine Screen, Ur NEGATIVE NEGATIVE    Barbiturate Screen, Ur NEGATIVE NEGATIVE    Benzodiazepine Screen, Urine NEGATIVE NEGATIVE    Cocaine Metabolite, Urine NEGATIVE NEGATIVE    Methadone Screen, Urine NEGATIVE NEGATIVE    Opiates, Urine NEGATIVE NEGATIVE    Phencyclidine, Urine NEGATIVE NEGATIVE    Propoxyphene, Urine NOT REPORTED NEGATIVE    Cannabinoid Scrn, Ur NEGATIVE NEGATIVE    Oxycodone Screen, Ur NEGATIVE NEGATIVE    Methamphetamine, Urine NOT REPORTED NEGATIVE    Tricyclic Antidepressants, Urine NOT REPORTED NEGATIVE    MDMA, Urine NOT REPORTED NEGATIVE    Buprenorphine Urine NOT REPORTED NEGATIVE    Test Information       Assay provides medical screening only. The absence of expected drug(s) and/or metabolite(s) may indicate diluted or adulterated urine, limitations of testing or timing of collection.    Basic Metabolic Panel w/ Reflex to MG   Result Value Ref Range    Glucose 207 (H) 70 - 99 mg/dL    BUN 31 (H) 8 - 23 mg/dL    CREATININE 1.45 (H) 0.70 - 1.20 mg/dL    Bun/Cre Ratio NOT REPORTED 9 - 20    Calcium 8.4 (L) 8.6 - 10.4 mg/dL    Sodium 143 135 - 144 mmol/L    Potassium 3.8 3.7 - 5.3 mmol/L    Chloride 110 (H) 98 - 107 mmol/L    CO2 22 20 - 31 mmol/L    Anion Gap 11 9 - 17 mmol/L    GFR Non-African American 48 (L) >60 mL/min    GFR  58 (L) >60 mL/min    GFR Comment          GFR Staging NOT REPORTED    Hepatic function panel   Result Value Ref Range    Albumin 3.3 (L) 3.5 - 5.2 g/dL    Alkaline Phosphatase 93 40 - 129 U/L    ALT 54 (H) 5 - 41 U/L    AST 81 (H) <40 U/L    Total Bilirubin 0.80 0.3 - 1.2 mg/dL    Bilirubin, Direct 0.30 <0.31 mg/dL    Bilirubin, Indirect 0.50 0.00 - 1.00 mg/dL    Total Protein 6.3 (L) 6.4 - 8.3 g/dL    Globulin NOT REPORTED 1.5 - 3.8 g/dL    Albumin/Globulin Ratio 1.1 1.0 - 2.5   CBC auto differential   Result Value Ref Range    WBC 10.7 3.5 - 11.3 k/uL    RBC 4.33 4.21 - 5.77 m/uL    Hemoglobin 10.4 (L) 13.0 - 17.0 g/dL    Hematocrit 34.5 (L) 40.7 - 50.3 %    MCV 79.7 (L) 82.6 - 102.9 fL    MCH 24.0 (L) 25.2 - 33.5 pg    MCHC 30.1 28.4 - 34.8 g/dL    RDW 15.7 (H) 11.8 - 14.4 %    Platelets See Reflexed IPF Result 138 - 453 k/uL    MPV NOT REPORTED 8.1 - 13.5 fL    NRBC Automated 0.6 (H) 0.0 per 100 WBC    Differential Type NOT REPORTED     WBC Morphology NOT REPORTED     RBC Morphology ANISOCYTOSIS PRESENT     Platelet Estimate NOT REPORTED     Seg Neutrophils 87 (H) 36 - 65 %    Lymphocytes 10 (L) 24 - 43 %    Monocytes 2 (L) 3 - 12 %    Eosinophils % 0 (L) 1 - 4 %    Basophils 0 0 - 2 %    Immature Granulocytes 1 (H) 0 %    Segs Absolute 9.32 (H) 1.50 - 8.10 k/uL    Absolute Lymph # 1.03 (L) 1.10 - 3.70 k/uL    Absolute Mono # 0.21 0.10 - 1.20 k/uL    Absolute Eos # <0.03 0.00 - 0.44 k/uL    Basophils Absolute 0.04 0.00 - 0.20 k/uL    Absolute Immature Granulocyte 0.09 0.00 - 0.30 k/uL   Hemoglobin A1c   Result Value Ref Range    Hemoglobin A1C 7.7 (H) 4.0 - 6.0 %    Estimated Avg Glucose 174 mg/dL   Lipid panel - fasting   Result Value Ref Range    Cholesterol 186 <200 mg/dL    HDL 42 >40 mg/dL    LDL Cholesterol 129 0 - 130 mg/dL    Chol/HDL Ratio 4.4 <5    Triglycerides 77 <150 mg/dL    VLDL NOT REPORTED 1 - 30 mg/dL   PERIPHERAL BLOOD SMEAR, PATH REVIEW   Result Value Ref Range    Pathologist Review SEE REPORT    Fibrinogen   Result Value Ref Range    Fibrinogen 507 (H) 140 - 420 mg/dL   PROTIME-INR   Result Value Ref Range    Protime 11.8 9.1 - 12.3 sec    INR 1.1    APTT   Result Value Ref Range    PTT 18.0 (L) 20.5 - 30.5 sec   Hepatitis C Antibody   Result Value Ref Range    Hepatitis C Ab NONREACTIVE NONREACTIVE   Vitamin B12 & Folate   Result Value Ref Range    Vitamin B-12 667 232 - 1245 pg/mL    Folate 5.2 >4.8 ng/mL   HIV Screen   Result Value Ref Range    HIV Ag/Ab NONREACTIVE NONREACTIVE   CBC Auto Differential   Result Value Ref Range    WBC 10.0 3.5 - 11.3 k/uL    RBC 4.71 4.21 - 5.77 m/uL    Hemoglobin 11.2 (L) 13.0 - 17.0 g/dL    Hematocrit 37.7 (L) 40.7 - 50.3 %    MCV 80.0 (L) 82.6 - 102.9 fL    MCH 23.8 (L) 25.2 - 33.5 pg    MCHC 29.7 28.4 - 34.8 g/dL    RDW 15.7 (H) 11.8 - 14.4 %    Platelets See Reflexed IPF Result 138 - 453 k/uL    MPV NOT REPORTED 8.1 - 13.5 fL    NRBC Automated 0.6 (H) 0.0 per 100 WBC    Differential Type NOT REPORTED     WBC Morphology NOT REPORTED     RBC Morphology NOT REPORTED     Platelet Estimate NOT REPORTED     Immature Granulocytes 1 (H) 0 %    Seg Neutrophils 70 (H) 36 - 65 %    Lymphocytes 18 (L) 24 - 43 %    Monocytes 9 3 - 12 %    Eosinophils % 1 1 - 4 %    Basophils 1 0 - 2 %    Absolute Immature Granulocyte 0.10 0.00 - 0.30 k/uL    Segs Absolute 7.00 1.50 - 8.10 k/uL    Absolute Lymph # 1.80 1.10 - 3.70 k/uL    Absolute Mono # 0.90 0.10 - 1.20 k/uL    Absolute Eos # 0.10 0.00 - 0.44 k/uL    Basophils Absolute 0.10 0.00 - 0.20 k/uL    Morphology 1+ POLYCHROMASIA     Morphology ANISOCYTOSIS PRESENT    Reticulocytes   Result Value Ref Range    Retic % 2.2 (H) 0.5 - 1.9 %    Absolute Retic # 0.100 (H) 0.030 - 0.080 M/uL    Immature Retic Fract 29.500 (H) 2.7 - 18.3 %    Retic Hemoglobin 27.8 (L) 28.2 - 35.7 pg   Immature Platelet Fraction   Result Value Ref Range    Platelet, Fluorescence 9 (LL) 138 - 453 k/uL   PLATELET COUNT   Result Value Ref Range    Platelets See Reflexed IPF Result 138 - 453 k/uL   Immature Platelet Fraction   Result Value Ref Range    Platelet, Immature Fraction 33.3 (H) 1.1 - 10.3 %    Platelet, Fluorescence 11 (LL) 138 - 453 k/uL   Immature Platelet Fraction   Result Value Ref Range    Platelet, Immature Fraction 39.8 (H) 1.1 - 10.3 %    Platelet, Fluorescence 11 (LL) 138 - 453 k/uL   CK   Result Value Ref Range    Total CK 3,552 (H) 39 - 308 U/L   LACTIC ACID, WHOLE BLOOD   Result Value Ref Range    Lactic Acid, Whole Blood 1.9 0.7 - 2.1 mmol/L   Surgical Pathology   Result Value Ref Range    Surgical Pathology Report       [id number]  [facility]  ANATOMIC PATHOLOGY  [address] [place] [address], [facility] [address]. [place], [address]  [phone]  Fax: [phone]  SURGICAL PATHOLOGY CONSULTATION    Patient Name: Lily Patel  MR#: [id number]  Specimen #[id number]    Procedures/Addenda  PERIPHERAL BLOOD REPORT     Date Ordered:     7/14/2021     Status:  Signed Out       Date Complete:     7/14/2021     By: Mosie Flack, M.D.        Date Reported:     7/14/2021       INTERPRETATION  PERIPHERAL BLOOD: SEVERE THROMBOCYTOPENIA. MILD MICROCYTIC ANEMIA. PATIENT HAS PERSISTENT MILD MICROCYTOSIS WITH HEMOGLOBIN RANGING FROM  NORMAL TO MILDLY DECREASED. SUGGEST THALASSEMIA TRAIT AND RARE  HEMOGLOBINOPATHY (HEMOGLOBIN E), AS WELL AS, IRON DEFICIENCY ANEMIA,  ANEMIA OF CHRONIC DISEASE, SIDEROBLASTIC ANEMIA. PERIPHERAL BLOOD STUDY    CBC: Please see the electronic health record for CBC parameters CBC  from 7/13/2021                             PLATELETS: Platelets show large  platelets. LEUKOCYTES: White blood cells show normal morphology. There are no  blasts. ERYTHROCYTES: Red blood cells show mild microcytosis and anisocytosis           Tigre Freedman M.D.                    Source:  1: PERIPHERAL BLOOD FOR REVIEW BY PATHOLOGIST     TSH with Reflex   Result Value Ref Range    TSH 0.28 (L) 0.30 - 5.00 mIU/L   T4, Free   Result Value Ref Range    Thyroxine, Free 1.18 0.93 - 1.70 ng/dL   Basic Metabolic Panel w/ Reflex to MG   Result Value Ref Range    Glucose 203 (H) 70 - 99 mg/dL    BUN 32 (H) 8 - 23 mg/dL    CREATININE 1.26 (H) 0.70 - 1.20 mg/dL    Bun/Cre Ratio NOT REPORTED 9 - 20    Calcium 8.7 8.6 - 10.4 mg/dL    Sodium 149 (H) 135 - 144 mmol/L    Potassium 3.7 3.7 - 5.3 mmol/L    Chloride 117 (H) 98 - 107 mmol/L    CO2 21 20 - 31 mmol/L    Anion Gap 11 9 - 17 mmol/L    GFR Non-African American 56 (L) >60 mL/min    GFR African American >60 >60 mL/min    GFR Comment          GFR Staging NOT REPORTED    CBC auto differential   Result Value Ref Range    WBC 16.5 (H) 3.5 - 11.3 k/uL    RBC 4.28 4.21 - 5.77 m/uL    Hemoglobin 10.2 (L) 13.0 - 17.0 g/dL    Hematocrit 34.4 (L) 40.7 - 50.3 %    MCV 80.4 (L) 82.6 - 102.9 fL    MCH 23.8 (L) 25.2 - 33.5 pg    MCHC 29.7 28.4 - 34.8 g/dL    RDW 16.1 (H) 11.8 - 14.4 %    Platelets See Reflexed IPF Result 138 - 453 k/uL    MPV NOT REPORTED 8.1 - 13.5 fL    NRBC Automated 1.3 (H) 0.0 per 100 WBC    Differential Type NOT REPORTED     WBC Morphology NOT REPORTED RBC Morphology ANISOCYTOSIS PRESENT     Platelet Estimate NOT REPORTED     Seg Neutrophils 82 (H) 36 - 65 %    Lymphocytes 9 (L) 24 - 43 %    Monocytes 7 3 - 12 %    Eosinophils % 0 (L) 1 - 4 %    Basophils 0 0 - 2 %    Immature Granulocytes 2 (H) 0 %    Segs Absolute 13.55 (H) 1.50 - 8.10 k/uL    Absolute Lymph # 1.40 1.10 - 3.70 k/uL    Absolute Mono # 1.14 0.10 - 1.20 k/uL    Absolute Eos # <0.03 0.00 - 0.44 k/uL    Basophils Absolute 0.03 0.00 - 0.20 k/uL    Absolute Immature Granulocyte 0.33 (H) 0.00 - 0.30 k/uL   Immature Platelet Fraction   Result Value Ref Range    Platelet, Immature Fraction 20.7 (H) 1.1 - 10.3 %    Platelet, Fluorescence 73 (L) 138 - 453 k/uL   Basic Metabolic Panel w/ Reflex to MG   Result Value Ref Range    Glucose 202 (H) 70 - 99 mg/dL    BUN 34 (H) 8 - 23 mg/dL    CREATININE 1.60 (H) 0.70 - 1.20 mg/dL    Bun/Cre Ratio NOT REPORTED 9 - 20    Calcium 8.6 8.6 - 10.4 mg/dL    Sodium 152 (H) 135 - 144 mmol/L    Potassium 4.1 3.7 - 5.3 mmol/L    Chloride 119 (H) 98 - 107 mmol/L    CO2 18 (L) 20 - 31 mmol/L    Anion Gap 15 9 - 17 mmol/L    GFR Non-African American 42 (L) >60 mL/min    GFR  51 (L) >60 mL/min    GFR Comment          GFR Staging NOT REPORTED    CBC auto differential   Result Value Ref Range    WBC 15.9 (H) 3.5 - 11.3 k/uL    RBC 4.19 (L) 4.21 - 5.77 m/uL    Hemoglobin 10.0 (L) 13.0 - 17.0 g/dL    Hematocrit 33.8 (L) 40.7 - 50.3 %    MCV 80.7 (L) 82.6 - 102.9 fL    MCH 23.9 (L) 25.2 - 33.5 pg    MCHC 29.6 28.4 - 34.8 g/dL    RDW 17.2 (H) 11.8 - 14.4 %    Platelets See Reflexed IPF Result 138 - 453 k/uL    MPV NOT REPORTED 8.1 - 13.5 fL    NRBC Automated 1.4 (H) 0.0 per 100 WBC    Differential Type NOT REPORTED     WBC Morphology NOT REPORTED     RBC Morphology NOT REPORTED     Platelet Estimate NOT REPORTED     Immature Granulocytes 4 (H) 0 %    Seg Neutrophils 80 (H) 36 - 66 %    Lymphocytes 11 (L) 24 - 44 %    Monocytes 5 1 - 7 %    Eosinophils % 0 (L) 1 - 4 %    Basophils 0 0 - 2 %    nRBC 1 (H) 0 per 100 WBC    Absolute Immature Granulocyte 0.64 (H) 0.00 - 0.30 k/uL    Segs Absolute 12.71 (H) 1.8 - 7.7 k/uL    Absolute Lymph # 1.75 1.0 - 4.8 k/uL    Absolute Mono # 0.80 0.1 - 0.8 k/uL    Absolute Eos # 0.00 0.0 - 0.4 k/uL    Basophils Absolute 0.00 0.0 - 0.2 k/uL    Morphology ANISOCYTOSIS PRESENT     Morphology MICROCYTOSIS PRESENT    Immature Platelet Fraction   Result Value Ref Range    Platelet, Immature Fraction 14.0 (H) 1.1 - 10.3 %    Platelet, Fluorescence 118 (L) 138 - 453 k/uL   CK isoenzymes   Result Value Ref Range    Total CK 1,422 (H) 39 - 308 U/L    CK-MB 14.6 (H) <10.5 ng/mL    % CKMB 1.0 0.0 - 3.5 %    CKMB Interpretation COMPATIBLE WITH SKELETAL MUSCLE ORIGIN    Myoglobin, Serum   Result Value Ref Range    Myoglobin 1,482 (H) 28 - 72 ng/mL   SODIUM   Result Value Ref Range    Sodium 150 (H) 135 - 144 mmol/L   Venous Blood Gas, POC   Result Value Ref Range    pH, David 7.387 7.320 - 7.430    pCO2, David 43.4 41.0 - 51.0 mm Hg    pO2, David 15.2 (L) 30 - 50 mm Hg    HCO3, Venous 26.1 22.0 - 29.0 mmol/L    Total CO2, Venous NOT REPORTED 23.0 - 30.0 mmol/L    Negative Base Excess, David NOT REPORTED 0.0 - 2.0    Positive Base Excess, David 1 0.0 - 3.0    O2 Sat, David 19 (L) 60.0 - 85.0 %    O2 Device/Flow/% NOT REPORTED     Adelfo Test NOT REPORTED     Sample Site NOT REPORTED     Mode NOT REPORTED     FIO2 NOT REPORTED     Pt Temp NOT REPORTED     POC pH Temp NOT REPORTED     POC pCO2 Temp NOT REPORTED mm Hg    POC pO2 Temp NOT REPORTED mm Hg   Creatinine W/GFR Point of Care   Result Value Ref Range    POC Creatinine 1.46 (H) 0.51 - 1.19 mg/dL    GFR Comment NOT REPORTED >60 mL/min    GFR Non- NOT REPORTED >60 mL/min    GFR Comment         POCT urea (BUN)   Result Value Ref Range    POC BUN 34 (H) 8 - 26 mg/dL   Lactic Acid, POC   Result Value Ref Range    POC Lactic Acid 1.83 (H) 0.56 - 1.39 mmol/L   POCT Glucose   Result Value Ref Range    POC Glucose 207 (H) 74 - 100 mg/dL   POC Glucose Fingerstick   Result Value Ref Range    POC Glucose 174 (H) 75 - 110 mg/dL   POC Glucose Fingerstick   Result Value Ref Range    POC Glucose 136 (H) 75 - 110 mg/dL   POC Glucose Fingerstick   Result Value Ref Range    POC Glucose 190 (H) 75 - 110 mg/dL   POC Glucose Fingerstick   Result Value Ref Range    POC Glucose 211 (H) 75 - 110 mg/dL   POC Glucose Fingerstick   Result Value Ref Range    POC Glucose 225 (H) 75 - 110 mg/dL   POC Glucose Fingerstick   Result Value Ref Range    POC Glucose 259 (H) 75 - 110 mg/dL   POC Glucose Fingerstick   Result Value Ref Range    POC Glucose 175 (H) 75 - 110 mg/dL   POC Glucose Fingerstick   Result Value Ref Range    POC Glucose 179 (H) 75 - 110 mg/dL   POC Glucose Fingerstick   Result Value Ref Range    POC Glucose 152 (H) 75 - 110 mg/dL   POC Glucose Fingerstick   Result Value Ref Range    POC Glucose 166 (H) 75 - 110 mg/dL   POC Glucose Fingerstick   Result Value Ref Range    POC Glucose 184 (H) 75 - 110 mg/dL   POC Glucose Fingerstick   Result Value Ref Range    POC Glucose 192 (H) 75 - 110 mg/dL   POC Glucose Fingerstick   Result Value Ref Range    POC Glucose 228 (H) 75 - 110 mg/dL   POC Glucose Fingerstick   Result Value Ref Range    POC Glucose 198 (H) 75 - 110 mg/dL   EKG 12 Lead   Result Value Ref Range    Ventricular Rate 71 BPM    Atrial Rate 71 BPM    P-R Interval 140 ms    QRS Duration 78 ms    Q-T Interval 456 ms    QTc Calculation (Bazett) 495 ms    P Axis 59 degrees    R Axis -44 degrees    T Axis -60 degrees   TYPE AND SCREEN   Result Value Ref Range    Expiration Date 07/16/2021,2359     Arm Band Number [id number]     ABO/Rh B POSITIVE     Antibody Screen POSITIVE     Antibody ID WARM NON-SPECIFIC AUTOAGGLUTININ     MARILYN IgG POSITIVE     Unit Number [id number]     Product Code Leukocyte Reduced Red Cell     Unit Divison 00     Dispense Status ALLOCATED     Transfusion Status OK TO TRANSFUSE     Crossmatch Result COMPATIBLE     Unit Number [id number]     Product Code Leukocyte Reduced Red Cell     Unit Divison 00     Dispense Status ALLOCATED     Transfusion Status OK TO TRANSFUSE     Crossmatch Result COMPATIBLE    PREPARE PLATELETS, 1 Product   Result Value Ref Range    Unit Number [id number]     Product Code PthRePlt PAS     Unit Divison 00     Dispense Status DISCARDED + AUTOCLAVED     Transfusion Status OK TO TRANSFUSE     Unit Number [id number]     Product Code PthRePlt PAS     Unit Divison 00     Dispense Status TRANSFUSED     Transfusion Status OK TO TRANSFUSE    BLOOD BANK SPECIMEN   Result Value Ref Range    Blood Bank Specimen NOT REPORTED          IMAGING DATA:    CT HEAD WO CONTRAST    Result Date: 7/13/2021  EXAMINATION: CT OF THE HEAD WITHOUT CONTRAST  7/13/2021 1:10 pm TECHNIQUE: CT of the head was performed without the administration of intravenous contrast. Dose modulation, iterative reconstruction, and/or weight based adjustment of the mA/kV was utilized to reduce the radiation dose to as low as reasonably achievable. COMPARISON: 05/02/2021 HISTORY: ORDERING SYSTEM PROVIDED HISTORY: Last known well 2 days right-sided facial droop right-sided weakness TECHNOLOGIST PROVIDED HISTORY: Last known well 2 days right-sided facial droop right-sided weakness Decision Support Exception - unselect if not a suspected or confirmed emergency medical condition->Emergency Medical Condition (MA) Reason for Exam: Last known well 2 days right-sided facial droop right-sided weakness FINDINGS: BRAIN/VENTRICLES: Ovoid area of low attenuation in the left frontal corona radiata measures 2 x 1.4 cm, new from prior study (series 2, image 39). No acute intracranial hemorrhage. No mass effect or midline shift. No hydrocephalus. Diffuse parenchymal volume loss with enlargement of the ventricles and cerebral sulci. Old infarction in the right basal ganglia.  There are nonspecific areas of hypoattenuation within the periventricular and subcortical white matter, which likely represent chronic microvascular ischemic change. Intracranial atherosclerotic disease. ORBITS: The visualized portion of the orbits demonstrate no acute abnormality. SINUSES: The visualized paranasal sinuses and mastoid air cells demonstrate no acute abnormality. SOFT TISSUES/SKULL: No acute abnormality of the visualized skull or soft tissues. 1. New ovoid low-attenuation area in the left frontal corona radiata compatible with acute/subacute infarction. This measures 2 x 1.4 cm. No associated hemorrhage. 2. Diffuse parenchymal volume loss and sequela of severe chronic microvascular ischemic changes. Findings were discussed with Dr. Jn Rodriguez at 1:31 pm on 7/13/2021. XR CHEST PORTABLE    Result Date: 7/13/2021  EXAMINATION: ONE XRAY VIEW OF THE CHEST 7/13/2021 10:30 am COMPARISON: September 24, 2019. HISTORY: ORDERING SYSTEM PROVIDED HISTORY: Found down TECHNOLOGIST PROVIDED HISTORY: Found down Reason for Exam: supine Acuity: Acute Type of Exam: Initial FINDINGS: A portable upright frontal view chest radiograph was obtained. The heart is enlarged. The mediastinal contour and pleural spaces are otherwise within normal limits. The lungs are grossly clear. There is no focal consolidation or pneumothorax. The pulmonary vascular pattern is within normal limits. No acute thoracic osseous abnormality. Clear lungs. Cardiomegaly. No acute cardiopulmonary abnormality. CTA HEAD NECK W CONTRAST    Result Date: 7/13/2021  EXAMINATION: CTA OF THE HEAD AND NECK WITH CONTRAST 7/13/2021 1:11 pm: TECHNIQUE: CTA of the head and neck was performed with the administration of intravenous contrast. Multiplanar reformatted images are provided for review. MIP images are provided for review. Stenosis of the internal carotid arteries measured using NASCET criteria.  Dose modulation, iterative reconstruction, and/or weight based adjustment of the mA/kV was utilized to reduce the radiation dose to as low as reasonably achievable. 3D surface reformatted and curved MIP images were submitted for review. COMPARISON: None. HISTORY: ORDERING SYSTEM PROVIDED HISTORY: stroke TECHNOLOGIST PROVIDED HISTORY: stroke Decision Support Exception - unselect if not a suspected or confirmed emergency medical condition->Emergency Medical Condition (MA) Reason for Exam: stroke, Cerebrovascular Accident; Fall FINDINGS: CTA NECK: AORTIC ARCH/ARCH VESSELS: No dissection or arterial injury. No significant stenosis of the brachiocephalic or subclavian arteries. CAROTID ARTERIES: No dissection, arterial injury, or hemodynamically significant stenosis by NASCET criteria. VERTEBRAL ARTERIES: No dissection, arterial injury, or significant stenosis in the right vertebral artery. Severe stenosis in the V1 segment of the left vertebral artery. SOFT TISSUES: The lung apices are clear. No cervical or superior mediastinal lymphadenopathy. The larynx and pharynx are unremarkable. No acute abnormality of the salivary glands. Thyroid gland is diffusely enlarged and heterogeneous and contains left thyroid lobe nodule measuring 2.6 cm. BONES: Multilevel degenerative spondylosis throughout the cervical spine with fusion at C5-C6. CTA HEAD: ANTERIOR CIRCULATION: Calcified atherosclerotic plaque in the cavernous segments of the internal carotid arteries bilaterally results in mild-to-moderate cavernous ICA stenosis bilaterally. Mild stenosis in the proximal A2 segment of the right anterior cerebral artery. Severe stenosis in the proximal A3 segments of the anterior cerebral arteries bilaterally. Moderate-to-severe stenosis within M2 segment of the right middle cerebral artery. Moderate stenosis in the M1 and proximal M2 segments of the left middle cerebral artery. POSTERIOR CIRCULATION: No significant stenosis in the right intradural vertebral artery.   Severe stenosis in the intracranial left vertebral CVA    RECOMMENDATIONS:  1. I personally reviewed results of lab work-up imaging studies and other relevant clinical data. 2. Platelet count continues to improve now 118  3. This is consistent with ITP  4. Patient received 4 days of high-dose steroids. We will discontinue Decadron  5. Patient was initially given Solu-Medrol due to concern regarding aspiration  6. Recommend outpatient surveillance for relapse of ITP  7. Okay to give antiplatelet therapy as long as platelet count is above 50,000  [facility] up with the [facility] however we will be happy to follow-up if patient wants    Discussed with patient and Nurse. Thank you for asking us to see this patient. Chin Luong MD          This note is created with the assistance of a speech recognition program.  While intending to generate a document that actually reflects the content of the visit, the document can still have some errors including those of syntax and sound a like substitutions which may escape proof reading. It such instances, actual meaning can be extrapolated by contextual diversion.

## 2021-07-16 NOTE — PROGRESS NOTES
[facility] Neurology   IN-PATIENT SERVICE      NEUROLOGY PROGRESS  NOTE            Date:   7/16/2021  Patient name:  Rm Clifton  Date of admission:  7/13/2021  YOB: 1945      Interval History:   Rm Clifton is a  77 y.o. male admitted on 7/13/2021 with Acute cerebrovascular accident (CVA) [facility]) [I63.9]  Acute cerebrovascular accident (CVA) ([facility] [I63.9]. This is a follow-up neurology progress note. The patient was seen and examined and the chart was reviewed. Patient did not have any acute events over night. Patient looks more alert and oriented and asking questions about this disposition. wbc 10.9, hemoglobin 10  Platelet 772, immature 14.0  Doppler study no evidence of superficial or deep venous thrombosis bilaterally. History of Present Illness:       71-year-old male with Hx of hypertension, hyperlipidemia, PVD, CAD and previous stroke was admitted on 7/13/2021 with chief complaint of found down on the floor by his sister, with last well-known 2 days ago before the admission. On admission patient's examination was significant for right-sided facial droop, right-sided weakness and severe dysarthria. Patient has significant history with recurrent stroke, remote left temporal lobe ischemic infarct and remote bilateral occipital lobe infarct and patient was supposed to be on dual antiplatelet therapy with aspirin Plavix however patient was not sure when he took the last medication. CT head: New left cortical ischemic stroke  CTA head and neck: Diffuse intracranial atherosclerosis and near complete occlusion of right PCA. Endovascular consulted, no intervention recommended to the optimize medical management with aspirin Plavix statin with target LDL goal less than 70.             Past Medical History:     Past Medical History:   Diagnosis Date    Centrilobular emphysema ([address]     COPD (chronic obstructive pulmonary disease) ([facility] [address] [facility]     Depression     Diabetes mellitus ([facility]     pt refuses to take previously prescribed metformin (states PCP aware)    Former smoker     Hyperlipidemia     on 18 pt states \"I stopped taking that about a year ago\"    Hypertension     Mixed restrictive and obstructive lung disease ([facility]     Need for pneumococcal vaccine     Personal history of tobacco use         Past Surgical History:     Past Surgical History:   Procedure Laterality Date    CARDIAC SURGERY  2015    1 stent    NECK SURGERY      TOE AMPUTATION Right greater        Medications during admission:      metoprolol  5 mg Intravenous Q6H    dexamethasone  40 mg Oral Daily    rosuvastatin  40 mg Oral Nightly    clopidogrel  75 mg Oral Daily    aspirin  81 mg Oral Daily    heparin (porcine)  5,000 Units Subcutaneous 3 times per day    famotidine  20 mg Oral BID    insulin lispro  0-12 Units Subcutaneous TID WC    insulin lispro  0-6 Units Subcutaneous Nightly    insulin glargine  8 Units Subcutaneous Nightly    sodium chloride  1,000 mL Intravenous Once    budesonide-formoterol  2 puff Inhalation BID    tiotropium  2 puff Inhalation Daily    sodium chloride flush  5-40 mL Intravenous 2 times per day    [Held by provider] methylPREDNISolone  80 mg Intravenous Q8H         Physical Exam:   BP (!) 158/91   Pulse 74   Temp 98 °F (36.7 °C) (Oral)   Resp 17   Ht 5' 10\" (1.778 m)   Wt 210 lb (95.3 kg)   SpO2 98%   BMI 30.13 kg/m²   Temp (24hrs), Av.2 °F (36.8 °C), Min:98 °F (36.7 °C), Max:98.5 °F (36.9 °C)        General examination:      General Appearance:  alert, well appearing, and in no acute distress  HEENT: Normocephalic, atraumatic, moist mucus membranes  Neck: supple, no carotid bruits, (-) nuchal rigidity  Lungs:  Respirations unlabored, chest wall no deformity, BS normal  Cardiovascular: normal rate, regular rhythm  Abdomen: Soft, nontender, nondistended, normal bowel sounds  Skin: No gross lesions, rashes, bruising or bleeding on exposed skin area  Extremities:  peripheral pulses palpable, clubbing or edema  Psych: normal affect      Neurological examination:      Mental status   Alert and oriented x 2; patient follows simple commands, speech is significantly dysarthric       Cranial nerves   II - left hemianopsia,   III, IV, VI  extraocular muscles intact; no MAGDALENO; no nystagmus; no ptosis   V - normal facial sensation                                                               VII - abnormal facial symmetry                                                             VIII - intact hearing                                                                             IX, X - symmetrical palate elevation                                               XI - symmetrical shoulder shrug                                                       XII - midline tongue without atrophy or fasciculation     Motor function  Strength:   3/5 RUE, 3/5 RLE  5/5 LUE, 5/5  LLE  Normal bulk and tone. Sensory function Intact to touch, throughout     Cerebellar Intact finger-nose-finger testing on left side. No tremors                        Reflex function 1/4 symmetric throughout .  Downgoing plantar response  Left, extensor on right (-)Grayson's sign bilaterally      Gait                   not assessed              Diagnostics:      Laboratory Testing:  CBC:   Recent Labs     07/14/21  0353 07/15/21  0618 07/16/21  0510   WBC 10.7 16.5* 15.9*   HGB 10.4* 10.2* 10.0*   PLT See Reflexed IPF Result See Reflexed IPF Result See Reflexed IPF Result       BMP:    Recent Labs     07/14/21  0353 07/15/21  0618 07/16/21  0510    149* 152*   K 3.8 3.7 4.1   * 117* 119*   CO2 22 21 18*   BUN 31* 32* 34*   CREATININE 1.45* 1.26* 1.60*   GLUCOSE 207* 203* 202*         Lab Results   Component Value Date    CHOL 186 07/14/2021    LDLCHOLESTEROL 129 07/14/2021    HDL 42 07/14/2021    TRIG 77 07/14/2021    ALT 54 (H) 07/14/2021    AST 81 (H) 07/14/2021    TSH 0.28 (L) 07/14/2021    INR 1.1 07/13/2021    LABA1C 7.7 (H) 07/14/2021    [id number] 07/13/2021         No results found for: PHENYTOIN, PHENYTOIN, VALPROATE, CBMZ        Imaging/Diagnostics:          CTA head and neck  7/13/2021  Impression   1. Severe stenosis in the V1 segment of the left vertebral artery. 2. Extensive intracranial atherosclerotic plaque with near complete occlusion   of the right PCA. 3. Severe stenoses in the proximal A3 segments of the ACAs bilaterally. 4. Moderate-to-severe proximal M2 segment stenosis in the right MCA. Moderate stenosis in the M1 and M2 segments of the left MCA. 5. Moderate stenosis in the P1/P2 junction of the left PCA. CT head  7/13/2021  Impression   1. New ovoid low-attenuation area in the left frontal corona radiata   compatible with acute/subacute infarction.  This measures 2 x 1.4 cm.  No   associated hemorrhage. 2. Diffuse parenchymal volume loss and sequela of severe chronic   microvascular ischemic changes. MRI Brain 7/14/2021  Impression   Acute infarct in the left and right basal ganglia greater on the left.    Diffuse volume loss with extensive chronic white matter changes.               2D ECHO with bubble study: EF 50-55 %  Negative bubble study                    Assessment and plan        -Right hemiparesis likely chronic from previous ischemic stroke  -Severe thrombocytopenia possibly due to ITP  -Rhabdomyolysis  -Intracranial atherosclerotic disease with diffuse right PCA occlusion  -MRI brain acute infarct in left and right basal ganglia    Hba1c:7.7    Vitamin B12 667,  Folate 5.2     Crestor 40, with LDL goal <70   ASA 81   Plavix 75    ITP management as per primary    Echo with bubble study   Keep SBP greater than 120   PT/OT/speech therapy   Endovascular, no intervention for now, continue medical management    Fall precautions   Disposition as per primary                Electronically signed by Saige Narayanan MD on 7/16/2021 at 8:41 AM      Gretta Bettencourt MD  PGY 3 Neurology Resident  Neurology [facility]

## 2021-07-16 NOTE — PLAN OF CARE
Problem: Respiratory  Intervention: Administer medication as ordered  7/16/2021 0025 by Omid Mitchell RCP  Note: BRONCHOSPASM/BRONCHOCONSTRICTION     []         IMPROVE AERATION/BREATH SOUNDS  []   ADMINISTER BRONCHODILATOR THERAPY AS APPROPRIATE  []   ASSESS BREATH SOUNDS  []   IMPLEMENT AEROSOL/MDI PROTOCOL  []   PATIENT EDUCATION AS NEEDED        Problem: Swallowing - Impaired:  Goal: Able to swallow without choking  Description: Able to swallow without choking  Outcome: Ongoing   Pt resting in bed comfortably. Side rails up x2. Bed locked in lowest position. Call light within reach. Will continue to monitor.

## 2021-07-16 NOTE — PROGRESS NOTES
Speech Language Pathology  Speech Language Pathology  [address] [facility]    Speech Language Treatment Note    Date: 7/16/2021  Patients Name: Gayla Mojica  MRN: [id number]  Diagnosis:   Patient Active Problem List   Diagnosis Code    Centrilobular emphysema (HCC) J43.2    [facility] restrictive and [facility] J43.9, J98.4    Acute cerebrovascular accident (CVA) [facility] I63.9    COPD (chronic obstructive pulmonary disease) (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 D69.6    Right sided weakness R53.1    Noncompliance with medications Z91.14       Pain: 0/10    Speech and Language Treatment  Treatment time: 940-1005    Subjective: [x] Alert [x] Cooperative     [] Confused     [] Agitated    [] Lethargic      Objective/Assessment:  Auditory Comprehension: Following 1 step directions: 3/5 increased to 4/5 with max verbal cues. Yes/no question 3/6 increased to 5/6 with max cues. Pt. Internally distracted at this time. Verbal Expression: Naming 2 members of a category: 3/10 increased to 5/10 with  max verbal cues. Pt. Verbalizing mostly  unintelligible utterances throughout. Pt. Jackeline Savage wants to go home. Speech with O/M weakness, decreased artic precision and decreased volume. Education provided re: strategies to increase speech clarity. Pt. Did not demonstrate carryover with max cues    Other: O/M exercise program for dysphagia attempted. Pt. Did not attempt to follow commands to complete exercises at this time. Pt. Seen with breakfast.  Pt took 3-4 sips of thickened juice and 5 bites of puree pancakes fed by SLP prior to refusing further trials.   Pt. With no overt s/s of aspiration noted with these trials. Pt. With + extended oral manipulation and + oral stasis noted on right, with mod cues required to clear stasis. Recommend continue current Dysphagia Pureed (Dysphagia I) with Mildly Thick (Nectar) liquids at this time. Plan:  [x] Continue ST services    [] Discharge from ST:      Discharge recommendations: [] Inpatient Rehab   [] [place]   [] Outpatient Therapy  [] Follow up at trauma clinic   [x] Other:  Further therapy recommended at discharge. Donna DAVISCCC/SLP

## 2021-07-16 NOTE — PLAN OF CARE
Problem: ACTIVITY INTOLERANCE/IMPAIRED MOBILITY  Goal: Mobility/activity is maintained at optimum level for patient  Outcome: Ongoing  Note: Pt encouraged to turn q2h, right arm straightened away from his body on a pillow. Will continue to monitor.

## 2021-07-17 NOTE — PROGRESS NOTES
(07/2015). Restrictions  Restrictions/Precautions  Restrictions/Precautions: Up as Tolerated, General Precautions, Fall Risk  Required Braces or Orthoses?: No  Position Activity Restriction  Other position/activity restrictions: Up w/ assistance; check platelet count  Subjective   General  Chart Reviewed: Yes  Patient assessed for rehabilitation services?: Yes  Family / Caregiver Present: No  Diagnosis: L/R basal ganglia infarcts, found down, R weakness, Rhabdomyelosis  Vital Signs  Patient Currently in Pain: Denies   Orientation  Orientation  Overall Orientation Status: Impaired  Orientation Level:  (KYA d/t expressive aphasia)  Objective    ADL  Grooming: Setup; Increased time to complete;Verbal cueing;Stand by assistance (Pt washed face sitting EOB)  UE Dressing: Setup;Verbal cueing; Increased time to complete;Maximum assistance (Max A to adjust gown sitting EOB d/t R sided weakness)  Toileting: Setup; Increased time to complete;Maximum assistance (Bed rolling for brief change)  Additional Comments: Pt limited by confusion, RUE weakness, and poor standing tolerance this date        Balance  Sitting Balance: Moderate assistance (Mod A-Max A sitting EOB, tolerating approx 12 min)  Standing Balance: Unable to assess(comment)  Standing Balance  Comment: KYA d/t P sitting balance this date  Bed mobility  Rolling to Left: Maximum assistance  Rolling to Right: Minimal assistance  Supine to Sit: Moderate assistance  Sit to Supine: Moderate assistance;2 Person assistance  Scooting: Moderate assistance  Comment: Pt req assistance with BLE and trunk progression this date  Transfers  Transfer Comments: KYA d/t P sitting balance this date     Cognition  Overall Cognitive Status: Exceptions  Arousal/Alertness: Delayed responses to stimuli  Following Commands: Follows one step commands with increased time; Follows one step commands with repetition  Attention Span: Attends with cues to redirect  Safety Judgement: Decreased awareness of need for assistance;Decreased awareness of need for safety  Problem Solving: Assistance required to correct errors made;Assistance required to identify errors made;Assistance required to generate solutions;Assistance required to implement solutions  Insights: Decreased awareness of deficits  Initiation: Requires cues for some  Sequencing: Requires cues for some     LUE AROM (degrees)  LUE AROM : WFL  RUE PROM (degrees)  RUE PROM: WFL  R Elbow Flexion 0-145: WFL, however increased tone noted in elbow and took ~30 seconds to acheive full PROM  R Elbow Extension 145-0: WFL  RUE AROM (degrees)  RUE AROM : Exceptions  RUE General AROM: Pt demo'd some movement in R hand/wrist, no active movement in elbow/shoulder  Right Hand PROM (degrees)  Right Hand PROM: [facility]     Plan   Plan  Times per week: 4-5x    Goals  Short term goals  Time Frame for Short term goals: Pt will by discharge  Short term goal 1: demo ADL UB bathing/dressing activity with adaptive tech's, setup, and SBA  Short term goal 2: demo ADL LB bathing/dressing activity with adaptive tech's, setup, and min A  Short term goal 3: demo 4-step func activity with 1 vc only to address cognitive concerns  Short term goal 4: demo good safety awareness during func mob around room using LRD PRN and CGA  Short term goal 5: demo AAROM/PROM to WFL's at all joints x10 for prolonged stretch with assist PRN       Therapy Time   Individual Concurrent Group Co-treatment   Time In 1442         Time Out 1505         Minutes 23         Timed Code Treatment Minutes: 23 Minutes   Pt in bed upon arrival, agreeable to tx this date. Pt retired to bed at end of session, call light within reach, RN notified.  Sitter/Telesitter present     SAEED Bundy/VENITA

## 2021-07-17 NOTE — PROGRESS NOTES
Patient has multiple ecchymosis on R side of arm and chest. Patients lab in ED showed, creatinine 1.47 (baseline 1.40/1.19), BUN 29, GFR 57, Lactic acid 2.4. CXR showed clear lungs, cardiomegaly, no acute cardiopulmonary abnormality.     Patient also found to have elevated Total CK 8454, myoglobin 3030, troponin 52 (baseline 42).  Patient given 1L NS bolus.     Patient CT head showed New ovoid low-attenuation area in the left frontal corona radiata compatible with acute/subacute infarction.  This measures 2 x 1.4 cm.  No associated hemorrhage. 2. Diffuse parenchymal volume loss and sequela of severe chronic microvascular ischemic changes.   Neuro on board. Neuro recommended MRI brain WO, resume ASA, plavix 75, folic acid, lipitor, lipid panel, hba1c, speech eval, SBP < 180, blood glucose < 180, avoid dextrose containing solutions.     CT head/neck showed severe stenosis in V1 segment of left vertebral artery, extensive intracranial atherosclerotic plaque with near complete occusion of the right PCA. Severe stenosis in proximal A3 segments of RAHEEM, moderate to severe proximal M2 segment stenosis of left MCA. Moderate stenosis in P1/P2 junction of left PCA. Enlarged, heterogenous thyroid gland with 2.6 cm left thyroid lobe nodule.      Patient fluorescence platelet, came 4, hem/onc consult placed for dvt prophylaxis, plavix recommendation. MRI showed acute infarct in L and R basal ganglia, Left more than right. OBJECTIVE     Vital Signs:  BP (!) 156/89   Pulse 69   Temp 97.4 °F (36.3 °C) (Axillary)   Resp 16   Ht 5' 10\" (1.778 m)   Wt 210 lb (95.3 kg)   SpO2 100%   BMI 30.13 kg/m²     Temp (24hrs), Av.9 °F (36.6 °C), Min:97.4 °F (36.3 °C), Max:98.3 °F (36.8 °C)    In: 3804   Out: -     Physical Exam:  Constitutional: This is a well developed, well nourished, 30-34.9 - Obesity Grade I 77y.o. year old male who is alert, oriented, cooperative and in no apparent distress. Head:normocephalic and atraumatic. intracranial abnormality. No significant interval change. CT HEAD WO CONTRAST    Result Date: 7/13/2021  1. New ovoid low-attenuation area in the left frontal corona radiata compatible with acute/subacute infarction. This measures 2 x 1.4 cm. No associated hemorrhage. 2. Diffuse parenchymal volume loss and sequela of severe chronic microvascular ischemic changes. Findings were discussed with Dr. Marcin Paz at 1:31 pm on 7/13/2021. XR CHEST PORTABLE    Result Date: 7/13/2021  Clear lungs. Cardiomegaly. No acute cardiopulmonary abnormality. US SPLEEN    Result Date: 7/14/2021  Suboptimal study. Mild splenomegaly. CTA HEAD NECK W CONTRAST    Result Date: 7/13/2021  1. Severe stenosis in the V1 segment of the left vertebral artery. 2. Extensive intracranial atherosclerotic plaque with near complete occlusion of the right PCA. 3. Severe stenoses in the proximal A3 segments of the ACAs bilaterally. 4. Moderate-to-severe proximal M2 segment stenosis in the right MCA. Moderate stenosis in the M1 and M2 segments of the left MCA. 5. Moderate stenosis in the P1/P2 junction of the left PCA. 6. Enlarged heterogeneous thyroid gland with 2.6 cm left thyroid lobe nodule. Nonemergent/outpatient thyroid ultrasound recommended. Findings were discussed with Dr. Marcin Paz at 1:43 pm on 7/13/2021. RECOMMENDATIONS: 2.6 cm incidental left thyroid nodule with heterogeneous and enlarged thyroid. Recommend thyroid US. Reference: J Am Bruno Radiol. 2015 Feb;12(2): 143-50     MRI BRAIN WO CONTRAST    Result Date: 7/14/2021  Acute infarct in the left and right basal ganglia greater on the left. Diffuse volume loss with extensive chronic white matter changes.        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 - decadron started for 4 days.     Hypernatremia secondary to dehydration- fluid deficit of 5 liter  - continue to hydrate with normal saline. Repeat BMP  - on 5% dextrose on 200ml/hr and 0.45% bolus 1000ml    DVT ppx : heparin  GI ppx: famotidine    PT/OT: pt/ot on board  Discharge Planning / SW:  on board, waiting precert    Kate Dickson MD  Internal Medicine Resident, PGY-1  [address], [place]  7/17/2021, 11:06 AM     Attestation and add on       I have discussed the care of Harvey Quezada , including pertinent history and exam findings,      7/17/21    with the resident. I have seen and examined the patient and the key elements of all parts of the encounter have been performed by me . I agree with the assessment, plan and orders as documented by the resident.     -Bradycardia with lethargy: Cardiology is consulted--- ;     MD LISSET Cobb [address], [address] [place] [address].    Phone [phone]   Fax: [phone]  Answering Service: [phone]

## 2021-07-17 NOTE — PROGRESS NOTES
Pt very restless, pulling at brief/gown, trying to get out of bed, not listening to re-direction. Internal med messaged and Dr. Deyanira Kunz ordered melatonin. Another dose of melatonin was ordered by Dr. Hamilton Fuentes when the first dose did not help the pt rest or calm down. At @02:45 Internal med was messaged to come to bedside to evaluate pt because he was more agitated. Dr. Deyanira Kunz came to bedside and evaluated pt and ordered Ativan. Pts heart rate dropped to 30s-40s after getting the Ativan around 04:30. Maricarmen came to bedside and ordered an EKG. Dr. Nayla Ballesteros from neurology came to bedside to evaluate pt for leg twitches, and stated their was no current treatment at this time. Will continue to monitor.

## 2021-07-17 NOTE — PROGRESS NOTES
[facility]   IN-PATIENT SERVICE      NEUROLOGY PROGRESS  NOTE            Date:   7/17/2021  Patient name:  Fahad Storm  Date of admission:  7/13/2021  YOB: 1945      Interval History:   Fahad Storm is a  77 y.o. male admitted on 7/13/2021 with Acute cerebrovascular accident (CVA) [facility]) [I63.9]  Acute cerebrovascular accident (CVA) ([facility] [address] [I63.9]. This is a follow-up neurology progress note. The patient was seen and examined and the chart was reviewed. Over night patient was agitated, confused, received melatonin x2 and ativan heart rate dropped to 30-40s, noticed a left sided twitch as per the nursing staff    This morning patient is awake in the bed, denying any headache, pain, nausea or vomiting. wbc 10.9, hemoglobin 10-->9.1  Na 152,   Platelet 762, immature 14.0  Doppler study no evidence of superficial or deep venous thrombosis bilaterally. History of Present Illness:       16-year-old male with Hx of hypertension, hyperlipidemia, PVD, CAD and previous stroke was admitted on 7/13/2021 with chief complaint of found down on the floor by his sister, with last well-known 2 days ago before the admission. On admission patient's examination was significant for right-sided facial droop, right-sided weakness and severe dysarthria. Patient has significant history with recurrent stroke, remote left temporal lobe ischemic infarct and remote bilateral occipital lobe infarct and patient was supposed to be on dual antiplatelet therapy with aspirin Plavix however patient was not sure when he took the last medication. CT head: New left cortical ischemic stroke  CTA head and neck: Diffuse intracranial atherosclerosis and near complete occlusion of right PCA. Endovascular consulted, no intervention recommended to the optimize medical management with aspirin Plavix statin with target LDL goal less than 70.       Past Medical History:     Past Medical History: Diagnosis Date    Centrilobular emphysema ([facility] [address]     COPD (chronic obstructive pulmonary disease) ([facility] [address]     Depression     Diabetes mellitus ([facility] [address]     pt refuses to take previously prescribed metformin (states PCP aware)    Former smoker     Hyperlipidemia     on 19 pt states \"I stopped taking that about a year ago\"    Hypertension     Mixed restrictive and obstructive lung disease ([facility] [address]     Need for pneumococcal vaccine     Personal history of tobacco use         Past Surgical History:     Past Surgical History:   Procedure Laterality Date    CARDIAC SURGERY  2015    1 stent    NECK SURGERY      TOE AMPUTATION Right greater        Medications during admission:      rosuvastatin  40 mg Oral Nightly    clopidogrel  75 mg Oral Daily    aspirin  81 mg Oral Daily    heparin (porcine)  5,000 Units Subcutaneous 3 times per day    famotidine  20 mg Oral BID    insulin lispro  0-12 Units Subcutaneous TID WC    insulin lispro  0-6 Units Subcutaneous Nightly    insulin glargine  8 Units Subcutaneous Nightly    sodium chloride  1,000 mL Intravenous Once    budesonide-formoterol  2 puff Inhalation BID    tiotropium  2 puff Inhalation Daily    sodium chloride flush  5-40 mL Intravenous 2 times per day    [Held by provider] methylPREDNISolone  80 mg Intravenous Q8H         Physical Exam:   BP (!) 156/89   Pulse 69   Temp 97.4 °F (36.3 °C) (Axillary)   Resp 21   Ht 5' 10\" (1.778 m)   Wt 210 lb (95.3 kg)   SpO2 100%   BMI 30.13 kg/m²   Temp (24hrs), Av °F (36.7 °C), Min:97.4 °F (36.3 °C), Max:98.3 °F (36.8 °C)        General examination:      General Appearance:  alert, well appearing, and in no acute distress  HEENT: Normocephalic, atraumatic, moist mucus membranes  Neck: supple, no carotid bruits, (-) nuchal rigidity  Lungs:  Respirations unlabored, chest wall no deformity, BS normal  Cardiovascular: normal rate, regular rhythm  Abdomen: Soft, nontender, nondistended, normal bowel sounds  Skin: No gross lesions, rashes, bruising or bleeding on exposed skin area  Extremities:  peripheral pulses palpable, clubbing or edema  Psych: normal affect      Neurological examination:      Mental status   Alert and oriented x 1; patient follows simple commands, speech is significantly dysarthric       Cranial nerves   II - left hemianopsia,   III, IV, VI  extraocular muscles intact; no MAGDALENO; no nystagmus; no ptosis   V - normal facial sensation                                                               VII - abnormal facial symmetry                                                          VIII - intact hearing                                                                             IX, X - symmetrical palate elevation                                               XI - symmetrical shoulder shrug                                                       XII - midline tongue without atrophy or fasciculation     Motor function  Strength:   2/5 RUE, 2/5 RLE ; right contracture   4+/5 LUE, 4+/5  LLE  Normal bulk and tone. Sensory function Intact to touch, throughout     Cerebellar Intact finger-nose-finger testing on left side. No tremors                        Reflex function 1/4 symmetric throughout .  Downgoing plantar response  Left, extensor on right (-)Grayson's sign bilaterally      Gait                   not assessed              Diagnostics:      Laboratory Testing:  CBC:   Recent Labs     07/15/21  0618 07/16/21  0510 07/17/21  0342   WBC 16.5* 15.9* 17.7*   HGB 10.2* 10.0* 9.1*   PLT See Reflexed IPF Result See Reflexed IPF Result See Reflexed IPF Result       BMP:    Recent Labs     07/16/21  2344 07/17/21  0021 07/17/21  0342   * 152* 154*   K 4.0 4.1 4.0   * 122* 124*   CO2 20 21 18*   BUN 34* 35* 35*   CREATININE 1.55* 1.56* 1.57*   GLUCOSE 188* 176* 122*         Lab Results   Component Value Date    CHOL 186 07/14/2021    LDLCHOLESTEROL 129 07/14/2021    HDL 42 07/14/2021 TRIG 77 07/14/2021    ALT 54 (H) 07/14/2021    AST 81 (H) 07/14/2021    TSH 0.28 (L) 07/14/2021    INR 1.1 07/13/2021    LABA1C 7.7 (H) 07/14/2021    [id number] 667 07/13/2021         No results found for: PHENYTOIN, PHENYTOIN, VALPROATE, CBMZ        Imaging/Diagnostics:          CTA head and neck  7/13/2021  Impression   1. Severe stenosis in the V1 segment of the left vertebral artery. 2. Extensive intracranial atherosclerotic plaque with near complete occlusion   of the right PCA. 3. Severe stenoses in the proximal A3 segments of the ACAs bilaterally. 4. Moderate-to-severe proximal M2 segment stenosis in the right MCA. Moderate stenosis in the M1 and M2 segments of the left MCA. 5. Moderate stenosis in the P1/P2 junction of the left PCA. CT head  7/13/2021  Impression   1. New ovoid low-attenuation area in the left frontal corona radiata   compatible with acute/subacute infarction.  This measures 2 x 1.4 cm.  No   associated hemorrhage. 2. Diffuse parenchymal volume loss and sequela of severe chronic   microvascular ischemic changes. MRI Brain 7/14/2021  Impression   Acute infarct in the left and right basal ganglia greater on the left.    Diffuse volume loss with extensive chronic white matter changes.               2D ECHO with bubble study: EF 50-55 %  Negative bubble study                    Assessment and plan        -Right hemiparesis likely chronic from previous ischemic stroke  -Severe thrombocytopenia possibly due to ITP  -Rhabdomyolysis  -Intracranial atherosclerotic disease with diffuse right PCA occlusion  -MRI brain acute infarct in left and right basal ganglia    Hba1c:7.7    Vitamin B12 667,  Folate 5.2      Crestor 40, with LDL goal <70   ASA 81   Plavix 75    ITP management as per primary    Echo with bubble study   Keep SBP greater than 120   PT/OT/speech therapy   Endovascular, no intervention for now, continue medical management    Fall precautions   Hypernatremia and bun/cr management as per primary    Disposition as per primary                Electronically signed by Hao Taylor MD on 7/17/2021 at 7:25 AM      Lorenzo Pablo MD  PGY 3 Neurology Resident  Neurology [facility]

## 2021-07-17 NOTE — PROGRESS NOTES
a past surgical history that includes Neck surgery; Toe amputation (Right, greater); and Cardiac surgery (07/2015). Medications:    Prior to Admission medications    Medication Sig Start Date End Date Taking?  Authorizing Provider   atorvastatin (LIPITOR) 80 MG tablet Take 0.5 tablets by mouth daily (Pt takes one-half of an 80mg tab = 40mg) 7/16/21  Yes Te Lira MD   dexamethasone 20 MG TABS Take 40 mg by mouth daily for 3 days 7/17/21 7/20/21 Yes Te Lira MD   tiZANidine (ZANAFLEX) 2 MG tablet Take 1 tablet by mouth 4 times daily as needed (muscle spasms) 5/7/21   JOLIE Julian - CNP   naproxen (NAPROSYN) 500 MG tablet Take 1 tablet by mouth 2 times daily (with meals) 1/4/21   Su Nash PA-C   ibuprofen (ADVIL;MOTRIN) 800 MG tablet Take 1 tablet by mouth every 8 hours as needed for Pain 6/2/20   Mariana Rose MD   lidocaine (LIDODERM) 5 % Place 1 patch onto the skin daily 12 hours on, 12 hours off. 6/2/20   Mariana Rose MD   metoprolol succinate (TOPROL XL) 50 MG extended release tablet Take 50 mg by mouth daily    Historical Provider, MD   tiotropium (SPIRIVA RESPIMAT) 2.5 MCG/ACT AERS inhaler Inhale 2 puffs into the lungs daily    Historical Provider, MD   carboxymethylcellulose 1 % ophthalmic solution Place 1 drop into both eyes 3 times daily as needed for Dry Eyes     Historical Provider, MD   ketotifen (ZADITOR) 0.025 % ophthalmic solution Place 1 drop into both eyes 2 times daily as needed (itchy eyes)     Historical Provider, MD   isosorbide mononitrate (IMDUR) 60 MG extended release tablet Take 30 mg by mouth daily Indications: 1/2 tablet (30mg)     Historical Provider, MD   finasteride (PROSCAR) 5 MG tablet Take 5 mg by mouth daily    Historical Provider, MD   tamsulosin (FLOMAX) 0.4 MG capsule Take 0.4 mg by mouth daily    Historical Provider, MD   fluticasone (FLONASE) 50 MCG/ACT nasal spray 1 spray by Nasal route 2 times daily  Patient taking differently: 1 spray by Nasal route 2 times daily as needed for Rhinitis  5/31/16   Francisco Connor,    albuterol sulfate  (90 BASE) MCG/ACT inhaler Inhale 2 puffs into the lungs every 6 hours as needed for Wheezing    Historical Provider, MD   albuterol (PROVENTIL) (2.5 MG/3ML) 0.083% nebulizer solution Take 2.5 mg by nebulization every 6 hours as needed for Wheezing    Historical Provider, MD   aspirin 81 MG EC tablet Take 81 mg by mouth daily    Historical Provider, MD   losartan (COZAAR) 100 MG tablet Take 100 mg by mouth daily     Historical Provider, MD   nitroGLYCERIN (NITROSTAT) 0.4 MG SL tablet Place 0.4 mg under the tongue every 5 minutes as needed for Chest pain    Historical Provider, MD   FLUoxetine (PROZAC) 20 MG capsule Take 40 mg by mouth daily     Historical Provider, MD   furosemide (LASIX) 20 MG tablet Take 20 mg by mouth daily as needed (swelling)     Historical Provider, MD   clopidogrel (PLAVIX) 75 MG tablet Take 75 mg by mouth daily    Historical Provider, MD   rOPINIRole (REQUIP) 0.25 MG tablet Take 0.25 mg by mouth nightly as needed     Historical Provider, MD   budesonide-formoterol (SYMBICORT) 160-4.5 MCG/ACT AERO Inhale 2 puffs into the lungs 2 times daily.     Historical Provider, MD     Current Facility-Administered Medications   Medication Dose Route Frequency Provider Last Rate Last Admin    dextrose 5 % solution   Intravenous Continuous Aby Madera MD        sodium chloride 0.45 % bolus  1,000 mL Intravenous Once Aby Madera MD        atropine injection 0.4 mg  0.4 mg Intravenous Once Jesus Tyson MD        naloxone [facility]) injection 0.4 mg  0.4 mg Intravenous PRN Jesus Tyson MD   0.4 mg at 07/17/21 1002    melatonin tablet 3 mg  3 mg Oral Nightly PRN Maurice Bateman MD   3 mg at 07/16/21 2227    rosuvastatin (CRESTOR) tablet 40 mg  40 mg Oral Nightly Saran Hernández MD   40 mg at 07/16/21 2120    [Held by provider] clopidogrel (PLAVIX) tablet 75 mg  75 mg Oral Daily Luz Marina Gutiérrez MD   75 mg at 07/16/21 4499    [Held by provider] aspirin chewable tablet 81 mg  81 mg Oral Daily Luz Marina Gutiérrez MD   81 mg at 07/16/21 0812    [Held by provider] heparin (porcine) injection 5,000 Units  5,000 Units Subcutaneous 3 times per day Luz Marina Gutiérrez MD   5,000 Units at 07/17/21 0615    famotidine (PEPCID) tablet 20 mg  20 mg Oral BID Manan Mckeon MD   20 mg at 07/16/21 2120    insulin lispro (HUMALOG) injection vial 0-12 Units  0-12 Units Subcutaneous TID WC Marah Ramirez MD   2 Units at 07/16/21 1606    insulin lispro (HUMALOG) injection vial 0-6 Units  0-6 Units Subcutaneous Nightly Marah Ramirez MD   1 Units at 07/16/21 2118    glucose (GLUTOSE) 40 % oral gel 15 g  15 g Oral PRN Marah Ramirez MD        dextrose 50 % IV solution  12.5 g Intravenous PRN Marah Ramirez MD        glucagon (rDNA) injection 1 mg  1 mg Intramuscular PRN Marah Ramirez MD        dextrose 5 % solution  100 mL/hr Intravenous PRN Marah Ramirez MD        insulin glargine (LANTUS) injection vial 8 Units  8 Units Subcutaneous Nightly Jaylen Bryant MD   8 Units at 07/16/21 2118    0.9 % sodium chloride bolus  1,000 mL Intravenous Once Marah Ramirez MD        albuterol sulfate  (90 Base) MCG/ACT inhaler 2 puff  2 puff Inhalation Q6H PRN Marah Ramirez MD   2 puff at 07/15/21 1622    budesonide-formoterol (SYMBICORT) 160-4.5 MCG/ACT inhaler 2 puff  2 puff Inhalation BID Marah Ramirez MD   2 puff at 07/16/21 2114    tiotropium (SPIRIVA RESPIMAT) 2.5 MCG/ACT inhaler 2 puff  2 puff Inhalation Daily Marah Ramirez MD   2 puff at 07/16/21 0820    sodium chloride flush 0.9 % injection 5-40 mL  5-40 mL Intravenous 2 times per day Marah Ramirez MD   10 mL at 07/16/21 0812    sodium chloride flush 0.9 % injection 5-40 mL  5-40 mL Intravenous PRN Marah Ramirez MD        0.9 % sodium chloride infusion  25 mL Intravenous PRN Krystin Hernadnez MD        ondansetron (ZOFRAN-ODT) disintegrating tablet 4 mg  4 mg Oral Q8H PRN Krystin Hernandez MD        Or    [facility]) [facility] 4 mg  4 mg Intravenous Q6H PRN Krystin Hernandez MD        polyethylene glycol (GLYCOLAX) packet 17 g  17 g Oral Daily PRN Krystin Hernandez MD        acetaminophen (TYLENOL) tablet 650 mg  650 mg Oral Q6H PRN Krystin Hernandez MD        Or    acetaminophen (TYLENOL) suppository 650 mg  650 mg Rectal Q6H PRN Krystin Hernandez MD        potassium chloride (KLOR-CON M) extended release tablet 40 mEq  40 mEq Oral PRN Krystin Hernandez MD        Or    potassium bicarb-citric acid (EFFER-K) effervescent tablet 40 mEq  40 mEq Oral PRN Krystin Hernandez MD        Or    potassium chloride 10 mEq/100 mL IVPB (Peripheral Line)  10 mEq Intravenous PRN Krystin Hernandez MD        0.9 % sodium chloride infusion   Intravenous PRN Beatriz Arrieta MD       [facility] by provider] methylPREDNISolone sodium (SOLU-MEDROL) injection 80 mg  80 mg Intravenous Q8H Beatriz Arrieta MD   80 mg at 21 0432       Allergies:  Patient has no known allergies. Social History:   reports that he quit smoking about 8 years ago. He started smoking about 64 years ago. He has a 42.00 pack-year smoking history. He has never used smokeless tobacco. He reports that he does not drink alcohol and does not use drugs.      Family History: Hypertension    REVIEW OF SYSTEMS:      Patient's review of system is limited due to dysarthria    PHYSICAL EXAM:        BP (!) 144/76   Pulse 65   Temp 97.6 °F (36.4 °C) (Oral)   Resp 21   Ht 5' 10\" (1.778 m)   Wt 210 lb (95.3 kg)   SpO2 98%   BMI 30.13 kg/m²    Temp (24hrs), Av.8 °F (36.6 °C), Min:97.4 °F (36.3 °C), Max:98.3 °F (36.8 °C)      General appearance -ill appearing, no in pain or distress   Mental status -alert and awake  Eyes - pupils equal and reactive, extraocular eye movements intact   Ears - bilateral TM's and external ear canals normal   Mouth - mucous membranes moist, pharynx normal without lesions   Neck - supple, no significant adenopathy   Lymphatics - no palpable lymphadenopathy, no hepatosplenomegaly   Chest -decreased breathing sounds  Heart - normal rate, regular rhythm, normal S1, S2, no murmurs  Abdomen - soft, nontender, nondistended, no masses or organomegaly   Neurological -speech altered. Patient is awake.   Follows simple commands  Musculoskeletal - no joint tenderness, deformity or swelling   Extremities -right upper and lower extremity contracture  Skin - normal coloration and turgor, no rashes, no suspicious skin lesions noted ,      DATA:      Labs:     Results for orders placed or performed during the hospital encounter of 07/13/21   MYOGLOBIN, SERUM   Result Value Ref Range    Myoglobin 3,030 (H) 28 - 72 ng/mL   CK   Result Value Ref Range    Total CK 7,454 (H) 39 - 308 U/L   CBC WITH AUTO DIFFERENTIAL   Result Value Ref Range    WBC 10.7 3.5 - 11.3 k/uL    RBC 4.86 4.21 - 5.77 m/uL    Hemoglobin 11.7 (L) 13.0 - 17.0 g/dL    Hematocrit 37.4 (L) 40.7 - 50.3 %    MCV 77.0 (L) 82.6 - 102.9 fL    MCH 24.1 (L) 25.2 - 33.5 pg    MCHC 31.3 28.4 - 34.8 g/dL    RDW 15.7 (H) 11.8 - 14.4 %    Platelets See Reflexed IPF Result 138 - 453 k/uL    MPV NOT REPORTED 8.1 - 13.5 fL    NRBC Automated 0.2 (H) 0.0 per 100 WBC    Differential Type NOT REPORTED     WBC Morphology NOT REPORTED     RBC Morphology ANISOCYTOSIS PRESENT     Platelet Estimate NOT REPORTED     Seg Neutrophils 78 (H) 36 - 65 %    Lymphocytes 13 (L) 24 - 43 %    Monocytes 7 3 - 12 %    Eosinophils % 1 1 - 4 %    Basophils 1 0 - 2 %    Immature Granulocytes 1 (H) 0 %    Segs Absolute 8.33 (H) 1.50 - 8.10 k/uL    Absolute Lymph # 1.41 1.10 - 3.70 k/uL    Absolute Mono # 0.73 0.10 - 1.20 k/uL    Absolute Eos # 0.07 0.00 - 0.44 k/uL    Basophils Absolute 0.07 0.00 - 0.20 k/uL    Absolute Immature Granulocyte 0.08 0.00 - 0.30 k/uL COMPREHENSIVE METABOLIC PANEL   Result Value Ref Range    Glucose 192 (H) 70 - 99 mg/dL    BUN 29 (H) 8 - 23 mg/dL    CREATININE 1.47 (H) 0.70 - 1.20 mg/dL    Bun/Cre Ratio NOT REPORTED 9 - 20    Calcium 9.3 8.6 - 10.4 mg/dL    Sodium 142 135 - 144 mmol/L    Potassium 4.0 3.7 - 5.3 mmol/L    Chloride 107 98 - 107 mmol/L    CO2 25 20 - 31 mmol/L    Anion Gap 10 9 - 17 mmol/L    Alkaline Phosphatase 111 40 - 129 U/L    ALT 65 (H) 5 - 41 U/L     (H) <40 U/L    Total Bilirubin 1.03 0.3 - 1.2 mg/dL    Total Protein 7.2 6.4 - 8.3 g/dL    Albumin 3.8 3.5 - 5.2 g/dL    Albumin/Globulin Ratio 1.1 1.0 - 2.5    GFR Non- 47 (L) >60 mL/min    GFR  57 (L) >60 mL/min    GFR Comment          GFR Staging NOT REPORTED    LACTIC ACID, WHOLE BLOOD   Result Value Ref Range    Lactic Acid, Whole Blood 2.4 (H) 0.7 - 2.1 mmol/L   Troponin   Result Value Ref Range    Troponin, High Sensitivity 52 (HH) 0 - 22 ng/L    Troponin T NOT REPORTED <0.03 ng/mL    Troponin Interp NOT REPORTED    ELECTROLYTES PLUS   Result Value Ref Range    POC Sodium 145 138 - 146 mmol/L    POC Potassium 3.9 3.5 - 4.5 mmol/L    POC Chloride 110 (H) 98 - 107 mmol/L    POC TCO2 27 22 - 30 mmol/L    Anion Gap 9 7 - 16 mmol/L   Hemoglobin and hematocrit, blood   Result Value Ref Range    POC Hemoglobin 13.2 (L) 13.5 - 17.5 g/dL    POC Hematocrit 39 (L) 41 - 53 %   CALCIUM, IONIC (POC)   Result Value Ref Range    POC Ionized Calcium 1.18 1.15 - 1.33 mmol/L   Immature Platelet Fraction   Result Value Ref Range    Platelet, Immature Fraction 44.9 (H) 1.1 - 10.3 %    Platelet, Fluorescence 4 (LL) 138 - 453 k/uL   Troponin   Result Value Ref Range    Troponin, High Sensitivity 46 (H) 0 - 22 ng/L    Troponin T NOT REPORTED <0.03 ng/mL    Troponin Interp NOT REPORTED    DRUG SCREEN MULTI URINE   Result Value Ref Range    Amphetamine Screen, Ur NEGATIVE NEGATIVE    Barbiturate Screen, Ur NEGATIVE NEGATIVE    Benzodiazepine Screen, Urine NEGATIVE NEGATIVE    Cocaine Metabolite, Urine NEGATIVE NEGATIVE    Methadone Screen, Urine NEGATIVE NEGATIVE    Opiates, Urine NEGATIVE NEGATIVE    Phencyclidine, Urine NEGATIVE NEGATIVE    Propoxyphene, Urine NOT REPORTED NEGATIVE    Cannabinoid Scrn, Ur NEGATIVE NEGATIVE    Oxycodone Screen, Ur NEGATIVE NEGATIVE    Methamphetamine, Urine NOT REPORTED NEGATIVE    Tricyclic Antidepressants, Urine NOT REPORTED NEGATIVE    MDMA, Urine NOT REPORTED NEGATIVE    Buprenorphine Urine NOT REPORTED NEGATIVE    Test Information       Assay provides medical screening only. The absence of expected drug(s) and/or metabolite(s) may indicate diluted or adulterated urine, limitations of testing or timing of collection.    Basic Metabolic Panel w/ Reflex to MG   Result Value Ref Range    Glucose 207 (H) 70 - 99 mg/dL    BUN 31 (H) 8 - 23 mg/dL    CREATININE 1.45 (H) 0.70 - 1.20 mg/dL    Bun/Cre Ratio NOT REPORTED 9 - 20    Calcium 8.4 (L) 8.6 - 10.4 mg/dL    Sodium 143 135 - 144 mmol/L    Potassium 3.8 3.7 - 5.3 mmol/L    Chloride 110 (H) 98 - 107 mmol/L    CO2 22 20 - 31 mmol/L    Anion Gap 11 9 - 17 mmol/L    GFR Non-African American 48 (L) >60 mL/min    GFR  58 (L) >60 mL/min    GFR Comment          GFR Staging NOT REPORTED    Hepatic function panel   Result Value Ref Range    Albumin 3.3 (L) 3.5 - 5.2 g/dL    Alkaline Phosphatase 93 40 - 129 U/L    ALT 54 (H) 5 - 41 U/L    AST 81 (H) <40 U/L    Total Bilirubin 0.80 0.3 - 1.2 mg/dL    Bilirubin, Direct 0.30 <0.31 mg/dL    Bilirubin, Indirect 0.50 0.00 - 1.00 mg/dL    Total Protein 6.3 (L) 6.4 - 8.3 g/dL    Globulin NOT REPORTED 1.5 - 3.8 g/dL    Albumin/Globulin Ratio 1.1 1.0 - 2.5   CBC auto differential   Result Value Ref Range    WBC 10.7 3.5 - 11.3 k/uL    RBC 4.33 4.21 - 5.77 m/uL    Hemoglobin 10.4 (L) 13.0 - 17.0 g/dL    Hematocrit 34.5 (L) 40.7 - 50.3 %    MCV 79.7 (L) 82.6 - 102.9 fL    MCH 24.0 (L) 25.2 - 33.5 pg    MCHC 30.1 28.4 - 34.8 g/dL    RDW 15.7 (H) 11.8 - 14.4 %    Platelets See Reflexed IPF Result 138 - 453 k/uL    MPV NOT REPORTED 8.1 - 13.5 fL    NRBC Automated 0.6 (H) 0.0 per 100 WBC    Differential Type NOT REPORTED     WBC Morphology NOT REPORTED     RBC Morphology ANISOCYTOSIS PRESENT     Platelet Estimate NOT REPORTED     Seg Neutrophils 87 (H) 36 - 65 %    Lymphocytes 10 (L) 24 - 43 %    Monocytes 2 (L) 3 - 12 %    Eosinophils % 0 (L) 1 - 4 %    Basophils 0 0 - 2 %    Immature Granulocytes 1 (H) 0 %    Segs Absolute 9.32 (H) 1.50 - 8.10 k/uL    Absolute Lymph # 1.03 (L) 1.10 - 3.70 k/uL    Absolute Mono # 0.21 0.10 - 1.20 k/uL    Absolute Eos # <0.03 0.00 - 0.44 k/uL    Basophils Absolute 0.04 0.00 - 0.20 k/uL    Absolute Immature Granulocyte 0.09 0.00 - 0.30 k/uL   Hemoglobin A1c   Result Value Ref Range    Hemoglobin A1C 7.7 (H) 4.0 - 6.0 %    Estimated Avg Glucose 174 mg/dL   Lipid panel - fasting   Result Value Ref Range    Cholesterol 186 <200 mg/dL    HDL 42 >40 mg/dL    LDL Cholesterol 129 0 - 130 mg/dL    Chol/HDL Ratio 4.4 <5    Triglycerides 77 <150 mg/dL    VLDL NOT REPORTED 1 - 30 mg/dL   PERIPHERAL BLOOD SMEAR, PATH REVIEW   Result Value Ref Range    Pathologist Review SEE REPORT    Fibrinogen   Result Value Ref Range    Fibrinogen 507 (H) 140 - 420 mg/dL   PROTIME-INR   Result Value Ref Range    Protime 11.8 9.1 - 12.3 sec    INR 1.1    APTT   Result Value Ref Range    PTT 18.0 (L) 20.5 - 30.5 sec   Hepatitis C Antibody   Result Value Ref Range    Hepatitis C Ab NONREACTIVE NONREACTIVE   Vitamin B12 & Folate   Result Value Ref Range    Vitamin B-12 667 232 - 1245 pg/mL    Folate 5.2 >4.8 ng/mL   HIV Screen   Result Value Ref Range    HIV Ag/Ab NONREACTIVE NONREACTIVE   CBC Auto Differential   Result Value Ref Range    WBC 10.0 3.5 - 11.3 k/uL    RBC 4.71 4.21 - 5.77 m/uL    Hemoglobin 11.2 (L) 13.0 - 17.0 g/dL    Hematocrit 37.7 (L) 40.7 - 50.3 %    MCV 80.0 (L) 82.6 - 102.9 fL    MCH 23.8 (L) 25.2 - 33.5 pg    MCHC 29.7 28.4 - 34.8 g/dL    RDW 15.7 (H) 11.8 - 14.4 %    Platelets See Reflexed IPF Result 138 - 453 k/uL    MPV NOT REPORTED 8.1 - 13.5 fL    NRBC Automated 0.6 (H) 0.0 per 100 WBC    Differential Type NOT REPORTED     WBC Morphology NOT REPORTED     RBC Morphology NOT REPORTED     Platelet Estimate NOT REPORTED     Immature Granulocytes 1 (H) 0 %    Seg Neutrophils 70 (H) 36 - 65 %    Lymphocytes 18 (L) 24 - 43 %    Monocytes 9 3 - 12 %    Eosinophils % 1 1 - 4 %    Basophils 1 0 - 2 %    Absolute Immature Granulocyte 0.10 0.00 - 0.30 k/uL    Segs Absolute 7.00 1.50 - 8.10 k/uL    Absolute Lymph # 1.80 1.10 - 3.70 k/uL    Absolute Mono # 0.90 0.10 - 1.20 k/uL    Absolute Eos # 0.10 0.00 - 0.44 k/uL    Basophils Absolute 0.10 0.00 - 0.20 k/uL    Morphology 1+ POLYCHROMASIA     Morphology ANISOCYTOSIS PRESENT    Reticulocytes   Result Value Ref Range    Retic % 2.2 (H) 0.5 - 1.9 %    Absolute Retic # 0.100 (H) 0.030 - 0.080 M/uL    Immature Retic Fract 29.500 (H) 2.7 - 18.3 %    Retic Hemoglobin 27.8 (L) 28.2 - 35.7 pg   Immature Platelet Fraction   Result Value Ref Range    Platelet, Fluorescence 9 (LL) 138 - 453 k/uL   PLATELET COUNT   Result Value Ref Range    Platelets See Reflexed IPF Result 138 - 453 k/uL   Immature Platelet Fraction   Result Value Ref Range    Platelet, Immature Fraction 33.3 (H) 1.1 - 10.3 %    Platelet, Fluorescence 11 (LL) 138 - 453 k/uL   Immature Platelet Fraction   Result Value Ref Range    Platelet, Immature Fraction 39.8 (H) 1.1 - 10.3 %    Platelet, Fluorescence 11 (LL) 138 - 453 k/uL   CK   Result Value Ref Range    Total CK 3,552 (H) 39 - 308 U/L   LACTIC ACID, WHOLE BLOOD   Result Value Ref Range    Lactic Acid, Whole Blood 1.9 0.7 - 2.1 mmol/L   Surgical Pathology   Result Value Ref Range    Surgical Pathology Report       [id number]  [facility]  ANATOMIC PATHOLOGY  [address] [place] [address],  [address].   [facility], [address] [phone]  Fax: [phone]    Patient Name: Cheikh Fang  MR#: [id number]  Specimen #[id number]    Procedures/Addenda  PERIPHERAL BLOOD REPORT     Date Ordered:     7/14/2021     Status:  Signed Out       Date Complete:     7/14/2021     By: Garry Carrillo M.D. Date Reported:     7/14/2021       INTERPRETATION  PERIPHERAL BLOOD:  SEVERE THROMBOCYTOPENIA. MILD MICROCYTIC ANEMIA. PATIENT HAS PERSISTENT MILD MICROCYTOSIS WITH HEMOGLOBIN RANGING FROM  NORMAL TO MILDLY DECREASED. SUGGEST THALASSEMIA TRAIT AND RARE  HEMOGLOBINOPATHY (HEMOGLOBIN E), AS WELL AS, IRON DEFICIENCY ANEMIA,  ANEMIA OF CHRONIC DISEASE, SIDEROBLASTIC ANEMIA. PERIPHERAL BLOOD STUDY    CBC: Please see the electronic health record for CBC parameters CBC  from 7/13/2021                             PLATELETS: Platelets show large  platelets. LEUKOCYTES: White blood cells show normal morphology. There are no  blasts. ERYTHROCYTES: Red blood cells show mild microcytosis and anisocytosis           Garry Carrillo M.D.                    Source:  1: PERIPHERAL BLOOD FOR REVIEW BY PATHOLOGIST     TSH with Reflex   Result Value Ref Range    TSH 0.28 (L) 0.30 - 5.00 mIU/L   T4, Free   Result Value Ref Range    Thyroxine, Free 1.18 0.93 - 1.70 ng/dL   Basic Metabolic Panel w/ Reflex to MG   Result Value Ref Range    Glucose 203 (H) 70 - 99 mg/dL    BUN 32 (H) 8 - 23 mg/dL    CREATININE 1.26 (H) 0.70 - 1.20 mg/dL    Bun/Cre Ratio NOT REPORTED 9 - 20    Calcium 8.7 8.6 - 10.4 mg/dL    Sodium 149 (H) 135 - 144 mmol/L    Potassium 3.7 3.7 - 5.3 mmol/L    Chloride 117 (H) 98 - 107 mmol/L    CO2 21 20 - 31 mmol/L    Anion Gap 11 9 - 17 mmol/L    GFR Non-African American 56 (L) >60 mL/min    GFR African American >60 >60 mL/min    GFR Comment          GFR Staging NOT REPORTED    CBC auto differential   Result Value Ref Range    WBC 16.5 (H) 3.5 - 11.3 k/uL    RBC 4.28 4.21 - 5.77 m/uL    Hemoglobin 10.2 (L) 13.0 - 17.0 g/dL    Hematocrit 34.4 (L) 40.7 - 50.3 %    MCV 80.4 (L) 82.6 - 102.9 fL    MCH 23.8 (L) 25.2 - 33.5 pg    MCHC 29.7 28.4 - 34.8 g/dL    RDW 16.1 (H) 11.8 - 14.4 %    Platelets See Reflexed IPF Result 138 - 453 k/uL    MPV NOT REPORTED 8.1 - 13.5 fL    NRBC Automated 1.3 (H) 0.0 per 100 WBC    Differential Type NOT REPORTED     WBC Morphology NOT REPORTED     RBC Morphology ANISOCYTOSIS PRESENT     Platelet Estimate NOT REPORTED     Seg Neutrophils 82 (H) 36 - 65 %    Lymphocytes 9 (L) 24 - 43 %    Monocytes 7 3 - 12 %    Eosinophils % 0 (L) 1 - 4 %    Basophils 0 0 - 2 %    Immature Granulocytes 2 (H) 0 %    Segs Absolute 13.55 (H) 1.50 - 8.10 k/uL    Absolute Lymph # 1.40 1.10 - 3.70 k/uL    Absolute Mono # 1.14 0.10 - 1.20 k/uL    Absolute Eos # <0.03 0.00 - 0.44 k/uL    Basophils Absolute 0.03 0.00 - 0.20 k/uL    Absolute Immature Granulocyte 0.33 (H) 0.00 - 0.30 k/uL   Immature Platelet Fraction   Result Value Ref Range    Platelet, Immature Fraction 20.7 (H) 1.1 - 10.3 %    Platelet, Fluorescence 73 (L) 138 - 453 k/uL   Basic Metabolic Panel w/ Reflex to MG   Result Value Ref Range    Glucose 202 (H) 70 - 99 mg/dL    BUN 34 (H) 8 - 23 mg/dL    CREATININE 1.60 (H) 0.70 - 1.20 mg/dL    Bun/Cre Ratio NOT REPORTED 9 - 20    Calcium 8.6 8.6 - 10.4 mg/dL    Sodium 152 (H) 135 - 144 mmol/L    Potassium 4.1 3.7 - 5.3 mmol/L    Chloride 119 (H) 98 - 107 mmol/L    CO2 18 (L) 20 - 31 mmol/L    Anion Gap 15 9 - 17 mmol/L    GFR Non-African American 42 (L) >60 mL/min    GFR  51 (L) >60 mL/min    GFR Comment          GFR Staging NOT REPORTED    CBC auto differential   Result Value Ref Range    WBC 15.9 (H) 3.5 - 11.3 k/uL    RBC 4.19 (L) 4.21 - 5.77 m/uL    Hemoglobin 10.0 (L) 13.0 - 17.0 g/dL    Hematocrit 33.8 (L) 40.7 - 50.3 %    MCV 80.7 (L) 82.6 - 102.9 fL    MCH 23.9 (L) 25.2 - 33.5 pg    MCHC 29.6 28.4 - 34.8 g/dL    RDW 17.2 (H) 11.8 - 14.4 %    Platelets See Reflexed IPF Result 138 - 453 k/uL MPV NOT REPORTED 8.1 - 13.5 fL    NRBC Automated 1.4 (H) 0.0 per 100 WBC    Differential Type NOT REPORTED     WBC Morphology NOT REPORTED     RBC Morphology NOT REPORTED     Platelet Estimate NOT REPORTED     Immature Granulocytes 4 (H) 0 %    Seg Neutrophils 80 (H) 36 - 66 %    Lymphocytes 11 (L) 24 - 44 %    Monocytes 5 1 - 7 %    Eosinophils % 0 (L) 1 - 4 %    Basophils 0 0 - 2 %    nRBC 1 (H) 0 per 100 WBC    Absolute Immature Granulocyte 0.64 (H) 0.00 - 0.30 k/uL    Segs Absolute 12.71 (H) 1.8 - 7.7 k/uL    Absolute Lymph # 1.75 1.0 - 4.8 k/uL    Absolute Mono # 0.80 0.1 - 0.8 k/uL    Absolute Eos # 0.00 0.0 - 0.4 k/uL    Basophils Absolute 0.00 0.0 - 0.2 k/uL    Morphology ANISOCYTOSIS PRESENT     Morphology MICROCYTOSIS PRESENT    Immature Platelet Fraction   Result Value Ref Range    Platelet, Immature Fraction 14.0 (H) 1.1 - 10.3 %    Platelet, Fluorescence 118 (L) 138 - 453 k/uL   CK isoenzymes   Result Value Ref Range    Total CK 1,422 (H) 39 - 308 U/L    CK-MB 14.6 (H) <10.5 ng/mL    % CKMB 1.0 0.0 - 3.5 %    CKMB Interpretation COMPATIBLE WITH SKELETAL MUSCLE ORIGIN    Myoglobin, Serum   Result Value Ref Range    Myoglobin 1,482 (H) 28 - 72 ng/mL   SODIUM   Result Value Ref Range    Sodium 150 (H) 135 - 144 mmol/L   Basic Metabolic Panel w/ Reflex to MG   Result Value Ref Range    Glucose 179 (H) 70 - 99 mg/dL    BUN 34 (H) 8 - 23 mg/dL    CREATININE 1.59 (H) 0.70 - 1.20 mg/dL    Bun/Cre Ratio NOT REPORTED 9 - 20    Calcium 8.5 (L) 8.6 - 10.4 mg/dL    Sodium 152 (H) 135 - 144 mmol/L    Potassium 4.3 3.7 - 5.3 mmol/L    Chloride 121 (H) 98 - 107 mmol/L    CO2 20 20 - 31 mmol/L    Anion Gap 11 9 - 17 mmol/L    GFR Non-African American 43 (L) >60 mL/min    GFR  52 (L) >60 mL/min    GFR Comment          GFR Staging NOT REPORTED    Basic Metabolic Panel w/ Reflex to MG   Result Value Ref Range    Glucose 188 (H) 70 - 99 mg/dL    BUN 34 (H) 8 - 23 mg/dL    CREATININE 1.55 (H) 0.70 - 1.20 mg/dL    Bun/Cre Ratio NOT REPORTED 9 - 20    Calcium 8.4 (L) 8.6 - 10.4 mg/dL    Sodium 151 (H) 135 - 144 mmol/L    Potassium 4.0 3.7 - 5.3 mmol/L    Chloride 121 (H) 98 - 107 mmol/L    CO2 20 20 - 31 mmol/L    Anion Gap 10 9 - 17 mmol/L    GFR Non-African American 44 (L) >60 mL/min    GFR  53 (L) >60 mL/min    GFR Comment          GFR Staging NOT REPORTED    Basic Metabolic Panel w/ Reflex to MG   Result Value Ref Range    Glucose 122 (H) 70 - 99 mg/dL    BUN 35 (H) 8 - 23 mg/dL    CREATININE 1.57 (H) 0.70 - 1.20 mg/dL    Bun/Cre Ratio NOT REPORTED 9 - 20    Calcium 8.2 (L) 8.6 - 10.4 mg/dL    Sodium 154 (H) 135 - 144 mmol/L    Potassium 4.0 3.7 - 5.3 mmol/L    Chloride 124 (H) 98 - 107 mmol/L    CO2 18 (L) 20 - 31 mmol/L    Anion Gap 12 9 - 17 mmol/L    GFR Non-African American 43 (L) >60 mL/min    GFR  53 (L) >60 mL/min    GFR Comment          GFR Staging NOT REPORTED    CBC auto differential   Result Value Ref Range    WBC 17.7 (H) 3.5 - 11.3 k/uL    RBC 3.80 (L) 4.21 - 5.77 m/uL    Hemoglobin 9.1 (L) 13.0 - 17.0 g/dL    Hematocrit 31.9 (L) 40.7 - 50.3 %    MCV 83.9 82.6 - 102.9 fL    MCH 23.9 (L) 25.2 - 33.5 pg    MCHC 28.5 28.4 - 34.8 g/dL    RDW 17.8 (H) 11.8 - 14.4 %    Platelets See Reflexed IPF Result 138 - 453 k/uL    MPV NOT REPORTED 8.1 - 13.5 fL    NRBC Automated 2.1 (H) 0.0 per 100 WBC    Differential Type NOT REPORTED     WBC Morphology NOT REPORTED     RBC Morphology NOT REPORTED     Platelet Estimate NOT REPORTED     Immature Granulocytes 2 (H) 0 %    Seg Neutrophils 79 (H) 36 - 66 %    Lymphocytes 12 (L) 24 - 44 %    Monocytes 7 1 - 7 %    Eosinophils % 0 (L) 1 - 4 %    Basophils 0 0 - 2 %    nRBC 4 (H) 0 per 100 WBC    Absolute Immature Granulocyte 0.35 (H) 0.00 - 0.30 k/uL    Segs Absolute 13.99 (H) 1.8 - 7.7 k/uL    Absolute Lymph # 2.12 1.0 - 4.8 k/uL    Absolute Mono # 1.24 (H) 0.1 - 0.8 k/uL    Absolute Eos # 0.00 0.0 - 0.4 k/uL    Basophils Absolute 0.00 0.0 - 0.2 k/uL    Morphology ANISOCYTOSIS PRESENT     Morphology 1+ POLYCHROMASIA    Myoglobin, Serum   Result Value Ref Range    Myoglobin 1,551 (H) 28 - 72 ng/mL   Basic Metabolic Panel w/ Reflex to MG   Result Value Ref Range    Glucose 98 70 - 99 mg/dL    BUN 33 (H) 8 - 23 mg/dL    CREATININE 1.41 (H) 0.70 - 1.20 mg/dL    Bun/Cre Ratio NOT REPORTED 9 - 20    Calcium 8.5 (L) 8.6 - 10.4 mg/dL    Sodium 152 (H) 135 - 144 mmol/L    Potassium 3.7 3.7 - 5.3 mmol/L    Chloride 120 (H) 98 - 107 mmol/L    CO2 23 20 - 31 mmol/L    Anion Gap 9 9 - 17 mmol/L    GFR Non-African American 49 (L) >60 mL/min    GFR African American 60 (L) >60 mL/min    GFR Comment          GFR Staging NOT REPORTED    BASIC METABOLIC PANEL   Result Value Ref Range    Glucose 176 (H) 70 - 99 mg/dL    BUN 35 (H) 8 - 23 mg/dL    CREATININE 1.56 (H) 0.70 - 1.20 mg/dL    Bun/Cre Ratio NOT REPORTED 9 - 20    Calcium 8.4 (L) 8.6 - 10.4 mg/dL    Sodium 152 (H) 135 - 144 mmol/L    Potassium 4.1 3.7 - 5.3 mmol/L    Chloride 122 (H) 98 - 107 mmol/L    CO2 21 20 - 31 mmol/L    Anion Gap 9 9 - 17 mmol/L    GFR Non-African American 44 (L) >60 mL/min    GFR  53 (L) >60 mL/min    GFR Comment          GFR Staging NOT REPORTED    Immature Platelet Fraction   Result Value Ref Range    Platelet, Immature Fraction 10.9 (H) 1.1 - 10.3 %    Platelet, Fluorescence 134 (L) 138 - 453 k/uL   Troponin   Result Value Ref Range    Troponin, High Sensitivity 48 (H) 0 - 22 ng/L    Troponin T NOT REPORTED <0.03 ng/mL    Troponin Interp NOT REPORTED    Troponin   Result Value Ref Range    Troponin, High Sensitivity 50 (H) 0 - 22 ng/L    Troponin T NOT REPORTED <0.03 ng/mL    Troponin Interp NOT REPORTED    Basic Metabolic Panel w/ Reflex to MG   Result Value Ref Range    Glucose 95 70 - 99 mg/dL    BUN 33 (H) 8 - 23 mg/dL    CREATININE 1.34 (H) 0.70 - 1.20 mg/dL    Bun/Cre Ratio NOT REPORTED 9 - 20    Calcium 8.3 (L) 8.6 - 10.4 mg/dL    Sodium 151 (H) 135 - 144 mmol/L Potassium 4.2 3.7 - 5.3 mmol/L    Chloride 121 (H) 98 - 107 mmol/L    CO2 18 (L) 20 - 31 mmol/L    Anion Gap 12 9 - 17 mmol/L    GFR Non-African American 52 (L) >60 mL/min    GFR African American >60 >60 mL/min    GFR Comment          GFR Staging NOT REPORTED    Venous Blood Gas, POC   Result Value Ref Range    pH, David 7.387 7.320 - 7.430    pCO2, David 43.4 41.0 - 51.0 mm Hg    pO2, David 15.2 (L) 30 - 50 mm Hg    HCO3, Venous 26.1 22.0 - 29.0 mmol/L    Total CO2, Venous NOT REPORTED 23.0 - 30.0 mmol/L    Negative Base Excess, David NOT REPORTED 0.0 - 2.0    Positive Base Excess, David 1 0.0 - 3.0    O2 Sat, David 19 (L) 60.0 - 85.0 %    O2 Device/Flow/% NOT REPORTED     Adelfo Test NOT REPORTED     Sample Site NOT REPORTED     Mode NOT REPORTED     FIO2 NOT REPORTED     Pt Temp NOT REPORTED     POC pH Temp NOT REPORTED     POC pCO2 Temp NOT REPORTED mm Hg    POC pO2 Temp NOT REPORTED mm Hg   Creatinine W/GFR Point of Care   Result Value Ref Range    POC Creatinine 1.46 (H) 0.51 - 1.19 mg/dL    GFR Comment NOT REPORTED >60 mL/min    GFR Non- NOT REPORTED >60 mL/min    GFR Comment         POCT urea (BUN)   Result Value Ref Range    POC BUN 34 (H) 8 - 26 mg/dL   Lactic Acid, POC   Result Value Ref Range    POC Lactic Acid 1.83 (H) 0.56 - 1.39 mmol/L   POCT Glucose   Result Value Ref Range    POC Glucose 207 (H) 74 - 100 mg/dL   POC Glucose Fingerstick   Result Value Ref Range    POC Glucose 174 (H) 75 - 110 mg/dL   POC Glucose Fingerstick   Result Value Ref Range    POC Glucose 136 (H) 75 - 110 mg/dL   POC Glucose Fingerstick   Result Value Ref Range    POC Glucose 190 (H) 75 - 110 mg/dL   POC Glucose Fingerstick   Result Value Ref Range    POC Glucose 211 (H) 75 - 110 mg/dL   POC Glucose Fingerstick   Result Value Ref Range    POC Glucose 225 (H) 75 - 110 mg/dL   POC Glucose Fingerstick   Result Value Ref Range    POC Glucose 259 (H) 75 - 110 mg/dL   POC Glucose Fingerstick   Result Value Ref Range    POC Glucose 175 (H) 75 - 110 mg/dL   POC Glucose Fingerstick   Result Value Ref Range    POC Glucose 179 (H) 75 - 110 mg/dL   POC Glucose Fingerstick   Result Value Ref Range    POC Glucose 152 (H) 75 - 110 mg/dL   POC Glucose Fingerstick   Result Value Ref Range    POC Glucose 166 (H) 75 - 110 mg/dL   POC Glucose Fingerstick   Result Value Ref Range    POC Glucose 184 (H) 75 - 110 mg/dL   POC Glucose Fingerstick   Result Value Ref Range    POC Glucose 192 (H) 75 - 110 mg/dL   POC Glucose Fingerstick   Result Value Ref Range    POC Glucose 228 (H) 75 - 110 mg/dL   POC Glucose Fingerstick   Result Value Ref Range    POC Glucose 198 (H) 75 - 110 mg/dL   POC Glucose Fingerstick   Result Value Ref Range    POC Glucose 163 (H) 75 - 110 mg/dL   POC Glucose Fingerstick   Result Value Ref Range    POC Glucose 99 75 - 110 mg/dL   POC Glucose Fingerstick   Result Value Ref Range    POC Glucose 108 75 - 110 mg/dL   POC Glucose Fingerstick   Result Value Ref Range    POC Glucose 92 75 - 110 mg/dL   EKG 12 Lead   Result Value Ref Range    Ventricular Rate 71 BPM    Atrial Rate 71 BPM    P-R Interval 140 ms    QRS Duration 78 ms    Q-T Interval 456 ms    QTc Calculation (Bazett) 495 ms    P Axis 59 degrees    R Axis -44 degrees    T Axis -60 degrees   EKG 12 Lead   Result Value Ref Range    Ventricular Rate 44 BPM    Atrial Rate 44 BPM    P-R Interval 136 ms    QRS Duration 106 ms    Q-T Interval 556 ms    QTc Calculation (Bazett) 475 ms    P Axis 110 degrees    R Axis -25 degrees    T Axis -14 degrees   TYPE AND SCREEN   Result Value Ref Range    Expiration Date 07/16/2021,2359     Arm Band Number [id number]     ABO/Rh B POSITIVE     Antibody Screen POSITIVE     Antibody ID WARM NON-SPECIFIC AUTOAGGLUTININ     MARILYN IgG POSITIVE     Unit Number [id number]     Product Code Leukocyte Reduced Red Cell     Unit Divison 00     Dispense Status REL FROM [facility] [place]     Transfusion Status OK TO TRANSFUSE     Crossmatch Result COMPATIBLE     Unit Number [id number]     Product Code Leukocyte Reduced Red Cell     Unit Divison 00     Dispense Status REL FROM [facility]     Transfusion Status OK TO TRANSFUSE     Crossmatch Result COMPATIBLE    PREPARE PLATELETS, 1 Product   Result Value Ref Range    Unit Number [id number]     Product Code PthRePlt PAS     Unit Divison 00     Dispense Status DISCARDED + AUTOCLAVED     Transfusion Status OK TO TRANSFUSE     Unit Number [id number]     Product Code PthRePlt PAS     Unit Divison 00     Dispense Status TRANSFUSED     Transfusion Status OK TO TRANSFUSE    BLOOD BANK SPECIMEN   Result Value Ref Range    Blood Bank Specimen NOT REPORTED          IMAGING DATA:    CT HEAD WO CONTRAST    Result Date: 7/13/2021  EXAMINATION: CT OF THE HEAD WITHOUT CONTRAST  7/13/2021 1:10 pm TECHNIQUE: CT of the head was performed without the administration of intravenous contrast. Dose modulation, iterative reconstruction, and/or weight based adjustment of the mA/kV was utilized to reduce the radiation dose to as low as reasonably achievable. COMPARISON: 05/02/2021 HISTORY: ORDERING SYSTEM PROVIDED HISTORY: Last known well 2 days right-sided facial droop right-sided weakness TECHNOLOGIST PROVIDED HISTORY: Last known well 2 days right-sided facial droop right-sided weakness Decision Support Exception - unselect if not a suspected or confirmed emergency medical condition->Emergency Medical Condition (MA) Reason for Exam: Last known well 2 days right-sided facial droop right-sided weakness FINDINGS: BRAIN/VENTRICLES: Ovoid area of low attenuation in the left frontal corona radiata measures 2 x 1.4 cm, new from prior study (series 2, image 39). No acute intracranial hemorrhage. No mass effect or midline shift. No hydrocephalus. Diffuse parenchymal volume loss with enlargement of the ventricles and cerebral sulci. Old infarction in the right basal ganglia.  There are nonspecific areas of hypoattenuation within the periventricular and radiation dose to as low as reasonably achievable. 3D surface reformatted and curved MIP images were submitted for review. COMPARISON: None. HISTORY: ORDERING SYSTEM PROVIDED HISTORY: stroke TECHNOLOGIST PROVIDED HISTORY: stroke Decision Support Exception - unselect if not a suspected or confirmed emergency medical condition->Emergency Medical Condition (MA) Reason for Exam: stroke, Cerebrovascular Accident; Fall FINDINGS: CTA NECK: AORTIC ARCH/ARCH VESSELS: No dissection or arterial injury. No significant stenosis of the brachiocephalic or subclavian arteries. CAROTID ARTERIES: No dissection, arterial injury, or hemodynamically significant stenosis by NASCET criteria. VERTEBRAL ARTERIES: No dissection, arterial injury, or significant stenosis in the right vertebral artery. Severe stenosis in the V1 segment of the left vertebral artery. SOFT TISSUES: The lung apices are clear. No cervical or superior mediastinal lymphadenopathy. The larynx and pharynx are unremarkable. No acute abnormality of the salivary glands. Thyroid gland is diffusely enlarged and heterogeneous and contains left thyroid lobe nodule measuring 2.6 cm. BONES: Multilevel degenerative spondylosis throughout the cervical spine with fusion at C5-C6. CTA HEAD: ANTERIOR CIRCULATION: Calcified atherosclerotic plaque in the cavernous segments of the internal carotid arteries bilaterally results in mild-to-moderate cavernous ICA stenosis bilaterally. Mild stenosis in the proximal A2 segment of the right anterior cerebral artery. Severe stenosis in the proximal A3 segments of the anterior cerebral arteries bilaterally. Moderate-to-severe stenosis within M2 segment of the right middle cerebral artery. Moderate stenosis in the M1 and proximal M2 segments of the left middle cerebral artery. POSTERIOR CIRCULATION: No significant stenosis in the right intradural vertebral artery. Severe stenosis in the intracranial left vertebral artery.  Mild stenosis in the basilar artery. Moderate stenosis in the P1/P2 junction of the left PCA. Severe stenosis and near complete occlusion of the right PCA. OTHER: No dural venous sinus thrombosis on this non-dedicated study. 1. Severe stenosis in the V1 segment of the left vertebral artery. 2. Extensive intracranial atherosclerotic plaque with near complete occlusion of the right PCA. 3. Severe stenoses in the proximal A3 segments of the ACAs bilaterally. 4. Moderate-to-severe proximal M2 segment stenosis in the right MCA. Moderate stenosis in the M1 and M2 segments of the left MCA. 5. Moderate stenosis in the P1/P2 junction of the left PCA. 6. Enlarged heterogeneous thyroid gland with 2.6 cm left thyroid lobe nodule. Nonemergent/outpatient thyroid ultrasound recommended. Findings were discussed with Dr. Angy Bush at 1:43 pm on 7/13/2021. RECOMMENDATIONS: 2.6 cm incidental left thyroid nodule with heterogeneous and enlarged thyroid. Recommend thyroid US. Reference: J Am Bruno Radiol.  2015 Feb;12(2): 143-50         IMPRESSION:   Primary Problem  Acute cerebrovascular accident (CVA) [facility])    Active Hospital Problems    Diagnosis Date Noted    Acute idiopathic thrombocytopenic purpura ([facility] [address] [D69.3] 07/16/2021    Noncompliance with medications [Z91.14]     [facility] [I63.9] 07/13/2021    COPD (chronic obstructive pulmonary disease) ([facility] [J44.9] 07/13/2021    HTN (hypertension) [I10] 07/13/2021    Diabetes 1.5, managed as type 2 ([facility] [E13.9] 07/13/2021    Hyperlipidemia [E78.5] 07/13/2021    CAD S/P percutaneous coronary angioplasty [I25.10, Z98.61] 07/13/2021    Rhabdomyolysis [M62.82] 07/13/2021    Intracranial atherosclerosis [I67.2]     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 I personally reviewed results of lab work-up imaging studies and other relevant clinical data. 2. Platelet count continues to improve , thrombocytopenia is likely immune mediated,. 3. Off steroids now. [address] for antiplatelets as plt are better   Patient follows up with the [place] however we will be happy to follow-up if patient wants    Discussed with patient and Nurse. Thank you for asking us to see this patient. Devin Olivares MD  Hematologist/Medical [address] [place] [address] hematology oncology physicians            This note is created with the assistance of a speech recognition program.  While intending to generate a document that actually reflects the content of the visit, the document can still have some errors including those of syntax and sound a like substitutions which may escape proof reading. It such instances, actual meaning can be extrapolated by contextual diversion.

## 2021-07-17 NOTE — PLAN OF CARE
Problem: HEMODYNAMIC STATUS  Goal: Patient has stable vital signs and fluid balance  Outcome: Ongoing  Note: Neuro assessment completed, fall precautions in place, aspirations precautions in place, assess for barriers in communication and mobility, interventions to assist in communication and mobility in place, encouraged to call for assistance, adaptive devices used as needed, assess emotional state and support offered, encouraged patient to communicate by available means, and support systems included in patient care.

## 2021-07-18 NOTE — PROGRESS NOTES
Today's Date: 7/18/2021  Patient Name: Leesa Garduno  Date of admission: 7/13/2021 11:18 AM  Patient's age: 77 y.o., 1945  Admission Dx: Acute cerebrovascular accident (CVA) ([facility] [I63.9]  Acute cerebrovascular accident (CVA) ([facility] [I63.9]    Reason for Consult: management recommendations  Requesting Physician: Ian Bryant MD    CHIEF COMPLAINT: Acute stroke. Thrombocytopenia. History Obtained From:  patient, electronic medical record      Interval history:    Patient seen and examined  Laboratory reviewed  No new complaint or interval history per nurse. plt are dropping again. No bleeding       HISTORY OF PRESENT ILLNESS:      The patient is a 77 y.o.  male who is admitted to the hospital for acute stroke    Brought to the hospital after being found down on the floor. Patient was last seen well about 2 days ago. Patient has a history of peripheral vascular disease coronary artery disease hypertension COPD and previous strokes. Patient has significant contracture on the right side also right-sided facial droop with severe dysarthria limiting history of presenting illness. Patient's CBC at presentation shows hemoglobin 11.7 with MCV 77 WBC count 10 and platelet count of 4 with immature platelet fraction of 44%. Patient previous CBC noted in the system from June 2020 shows blood count of 160. Patient was also noted to have elevated myoglobin and CK. Creatinine is 1.74 lactic acid is 2.4. Patient has dysarthria not able to give much history. Past Medical History:   has a past medical history of Centrilobular emphysema ([facility] COPD (chronic obstructive pulmonary disease) ([facility] Depression, Diabetes mellitus ([facility] Former smoker, Hyperlipidemia, Hypertension, Mixed restrictive and obstructive lung disease ([address] Need for pneumococcal vaccine, and Personal history of tobacco use. Past Surgical History:   has a past surgical history that includes Neck surgery;  Toe amputation (Right, greater); and Cardiac surgery (07/2015). Medications:    Prior to Admission medications    Medication Sig Start Date End Date Taking?  Authorizing Provider   atorvastatin (LIPITOR) 80 MG tablet Take 0.5 tablets by mouth daily (Pt takes one-half of an 80mg tab = 40mg) 7/16/21  Yes Talib Valdes MD   dexamethasone 20 MG TABS Take 40 mg by mouth daily for 3 days 7/17/21 7/20/21 Yes Talib Valdes MD   tiZANidine (ZANAFLEX) 2 MG tablet Take 1 tablet by mouth 4 times daily as needed (muscle spasms) 5/7/21   Nanda Soria, APRN - CNP   naproxen (NAPROSYN) 500 MG tablet Take 1 tablet by mouth 2 times daily (with meals) 1/4/21   Jeniffer Schumacher PA-C   ibuprofen (ADVIL;MOTRIN) 800 MG tablet Take 1 tablet by mouth every 8 hours as needed for Pain 6/2/20   Luis Johnson MD   lidocaine (LIDODERM) 5 % Place 1 patch onto the skin daily 12 hours on, 12 hours off. 6/2/20   Luis Johnson MD   metoprolol succinate (TOPROL XL) 50 MG extended release tablet Take 50 mg by mouth daily    Historical Provider, MD   tiotropium (SPIRIVA RESPIMAT) 2.5 MCG/ACT AERS inhaler Inhale 2 puffs into the lungs daily    Historical Provider, MD   carboxymethylcellulose 1 % ophthalmic solution Place 1 drop into both eyes 3 times daily as needed for Dry Eyes     Historical Provider, MD   ketotifen (ZADITOR) 0.025 % ophthalmic solution Place 1 drop into both eyes 2 times daily as needed (itchy eyes)     Historical Provider, MD   isosorbide mononitrate (IMDUR) 60 MG extended release tablet Take 30 mg by mouth daily Indications: 1/2 tablet (30mg)     Historical Provider, MD   finasteride (PROSCAR) 5 MG tablet Take 5 mg by mouth daily    Historical Provider, MD   tamsulosin (FLOMAX) 0.4 MG capsule Take 0.4 mg by mouth daily    Historical Provider, MD   fluticasone (FLONASE) 50 MCG/ACT nasal spray 1 spray by Nasal route 2 times daily  Patient taking differently: 1 spray by Nasal route 2 times daily as needed for Rhinitis  5/31/16   Francisco Connor, DO   albuterol sulfate  (90 BASE) MCG/ACT inhaler Inhale 2 puffs into the lungs every 6 hours as needed for Wheezing    Historical Provider, MD   albuterol (PROVENTIL) (2.5 MG/3ML) 0.083% nebulizer solution Take 2.5 mg by nebulization every 6 hours as needed for Wheezing    Historical Provider, MD   aspirin 81 MG EC tablet Take 81 mg by mouth daily    Historical Provider, MD   losartan (COZAAR) 100 MG tablet Take 100 mg by mouth daily     Historical Provider, MD   nitroGLYCERIN (NITROSTAT) 0.4 MG SL tablet Place 0.4 mg under the tongue every 5 minutes as needed for Chest pain    Historical Provider, MD   FLUoxetine (PROZAC) 20 MG capsule Take 40 mg by mouth daily     Historical Provider, MD   furosemide (LASIX) 20 MG tablet Take 20 mg by mouth daily as needed (swelling)     Historical Provider, MD   clopidogrel (PLAVIX) 75 MG tablet Take 75 mg by mouth daily    Historical Provider, MD   rOPINIRole (REQUIP) 0.25 MG tablet Take 0.25 mg by mouth nightly as needed     Historical Provider, MD   budesonide-formoterol (SYMBICORT) 160-4.5 MCG/ACT AERO Inhale 2 puffs into the lungs 2 times daily.     Historical Provider, MD     Current Facility-Administered Medications   Medication Dose Route Frequency Provider Last Rate Last Admin    dextrose 5 % solution   Intravenous Continuous Ilana Mcdonald MD        sodium chloride 0.45 % bolus  1,000 mL Intravenous Once Ilana Mcdonald MD        atropine injection 0.4 mg  0.4 mg Intravenous Once Gian Paz MD        naloxone [facility]) injection 0.4 mg  0.4 mg Intravenous PRN Gian Paz MD   0.4 mg at 07/17/21 1002    melatonin tablet 3 mg  3 mg Oral Nightly PRN Maurice Bateman MD   3 mg at 07/16/21 2227    rosuvastatin (CRESTOR) tablet 40 mg  40 mg Oral Nightly Pauline Navarro MD   40 mg at 07/17/21 2153    [Held by provider] clopidogrel (PLAVIX) tablet 75 mg  75 mg Oral Daily Zachary Valencia MD   75 mg at 07/17/21 1439    [Held by provider] aspirin chewable tablet 81 mg  81 mg Oral Daily Candi Parmar MD   81 mg at 07/17/21 1439    [Held by provider] heparin (porcine) injection 5,000 Units  5,000 Units Subcutaneous 3 times per day aCndi Parmar MD   5,000 Units at 07/18/21 0607    famotidine (PEPCID) tablet 20 mg  20 mg Oral BID Mateusz Vasquez MD   20 mg at 07/18/21 0858    insulin lispro (HUMALOG) injection vial 0-12 Units  0-12 Units Subcutaneous TID WC Darlin Whitehead MD   2 Units at 07/16/21 1606    insulin lispro (HUMALOG) injection vial 0-6 Units  0-6 Units Subcutaneous Nightly Darlin Whitehead MD   1 Units at 07/16/21 2118    glucose (GLUTOSE) 40 % oral gel 15 g  15 g Oral PRN Darlin Whitehead MD        dextrose 50 % IV solution  12.5 g Intravenous PRN Darlin Whitehead MD        glucagon (rDNA) injection 1 mg  1 mg Intramuscular PRN Darlin Whitehead MD        dextrose 5 % solution  100 mL/hr Intravenous PRN Darlin Whitehead MD        insulin glargine (LANTUS) injection vial 8 Units  8 Units Subcutaneous Nightly [facility] Jori Abbott MD   8 Units at 07/17/21 2153    0.9 % sodium chloride bolus  1,000 mL Intravenous Once Darlin Whitehead MD        albuterol sulfate  (90 Base) MCG/ACT inhaler 2 puff  2 puff Inhalation Q6H PRN Darlin Whitehead MD   2 puff at 07/15/21 1622    budesonide-formoterol (SYMBICORT) 160-4.5 MCG/ACT inhaler 2 puff  2 puff Inhalation BID Darlin Whitehead MD   2 puff at 07/18/21 1113    tiotropium (SPIRIVA RESPIMAT) 2.5 MCG/ACT inhaler 2 puff  2 puff Inhalation Daily Darlin Whitehead MD   2 puff at 07/18/21 1113    sodium chloride flush 0.9 % injection 5-40 mL  5-40 mL Intravenous 2 times per day Darlin Whitehead MD   20 mL at 07/18/21 0858    sodium chloride flush 0.9 % injection 5-40 mL  5-40 mL Intravenous PRN Darlin Whitehead MD        0.9 % sodium chloride infusion  25 mL Intravenous PRN Darlin Whitehead MD        ondansetron (ZOFRAN-ODT) disintegrating tablet 4 mg  4 mg Oral Q8H PRN Wei Osborne MD        Or    ondansetron [facility]) injection 4 mg  4 mg Intravenous Q6H PRN Wei Osborne MD        polyethylene glycol (GLYCOLAX) packet 17 g  17 g Oral Daily PRN Wei Osborne MD        acetaminophen (TYLENOL) tablet 650 mg  650 mg Oral Q6H PRN Wei Osborne MD        Or    acetaminophen (TYLENOL) suppository 650 mg  650 mg Rectal Q6H PRN Wei Osborne MD        potassium chloride (KLOR-CON M) extended release tablet 40 mEq  40 mEq Oral PRN Wei Osborne MD        Or    potassium bicarb-citric acid (EFFER-K) effervescent tablet 40 mEq  40 mEq Oral PRN Wei Osborne MD        Or    potassium chloride 10 mEq/100 mL IVPB (Peripheral Line)  10 mEq Intravenous PRN Wei Osborne MD        0.9 % sodium chloride infusion   Intravenous PRN Steva Spatz, MD       Abhay Self [facility] by provider] methylPREDNISolone sodium (SOLU-MEDROL) injection 80 mg  80 mg Intravenous Q8H Mitchell Luong MD   80 mg at 21 [id number]       Allergies:  Patient has no known allergies. Social History:   reports that he quit smoking about 8 years ago. He started smoking about 64 years ago. He has a 42.00 pack-year smoking history. He has never used smokeless tobacco. He reports that he does not drink alcohol and does not use drugs.      Family History: Hypertension    REVIEW OF SYSTEMS:      Patient's review of system is limited due to dysarthria, states he is feeling fine, no pain     PHYSICAL EXAM:        BP (!) 153/50   Pulse 56   Temp 98.3 °F (36.8 °C) (Oral)   Resp 24   Ht 5' 10\" (1.778 m)   Wt 210 lb (95.3 kg)   SpO2 99%   BMI 30.13 kg/m²    Temp (24hrs), Av.3 °F (36.8 °C), Min:98.2 °F (36.8 °C), Max:98.4 °F (36.9 °C)      General appearance -ill appearing, no in pain or distress   Mental status -alert and awake  Eyes - pupils equal and reactive, extraocular eye movements intact   Ears - bilateral TM's and external decadron. 3. Will see if we can start a maintenance dose of solumedrol as his plt are still above 40 K.   4. No indication for second line agent yet, but will consider if plt drop <20   or symptomatic bleeding    5. Holding antiplatelets today, plan to resume if plt are better in AM   Patient follows up with the [facility] however we will be happy to follow-up if patient wants    Discussed with patient and Nurse. Thank you for asking us to see this patient. Jerilyn Olivares MD  Hematologist/Medical [address] [place] [address] hematology oncology physicians            This note is created with the assistance of a speech recognition program.  While intending to generate a document that actually reflects the content of the visit, the document can still have some errors including those of syntax and sound a like substitutions which may escape proof reading. It such instances, actual meaning can be extrapolated by contextual diversion.

## 2021-07-18 NOTE — PROGRESS NOTES
[facility]  Internal Medicine Teaching Residency Program  Inpatient Daily Progress Note  ______________________________________________________________________________    Patient: Madhu Willson  YOB: 1945   XJB:[id number]    Acct: [de-identified]     Room: [facility]-  Admit date: 7/13/2021  Today's date: 07/18/21  Number of days in the hospital: 5    SUBJECTIVE   Admitting Diagnosis: Acute ischemic stroke ([address]  CC: found down    Pt examined at bedside. Chart & results reviewed. Patient slight improvement in strength of L sided leg and Lsided arm. Patient platelet count dropped from 134-->42, held ASA, plavix, heparin. Patient hypernatremia improved, Na 144. Patient BP running high 153/50, AR 56 (HR drops when patient is sleeping, and normalizes on waking up), RR 20, afebrile. ROS:  Constitutional:  negative for chills, fevers, sweats  Respiratory:  negative for cough, dyspnea on exertion, hemoptysis, shortness of breath, wheezing  Cardiovascular:  negative for chest pain, chest pressure/discomfort, lower extremity edema, palpitations  Gastrointestinal:  negative for abdominal pain, constipation, diarrhea, nausea, vomiting  Neurological:  negative for dizziness, headache  BRIEF HISTORY     The patient is a pleasant 75 y. o. male presents with a chief complaint of being found down by the sister. Patient's sister was informed that he had not been seen, she went to check on him. Sister found him down, called 911 for help. In the ED, he is found to have R facial weakness, R sided arm and leg weakness, and dysarthria. Patient has multiple ecchymosis on R side of arm and chest. Patients lab in ED showed, creatinine 1.47 (baseline 1.40/1.19), BUN 29, GFR 57, Lactic acid 2.4. CXR showed clear lungs, cardiomegaly, no acute cardiopulmonary abnormality.     Patient also found to have elevated Total CK 8454, myoglobin 3030, troponin 52 (baseline 42).  Patient given 1L NS bolus.     Patient CT head showed New ovoid low-attenuation area in the left frontal corona radiata compatible with acute/subacute infarction.  This measures 2 x 1.4 cm.  No associated hemorrhage. 2. Diffuse parenchymal volume loss and sequela of severe chronic microvascular ischemic changes.   Neuro on board. Neuro recommended MRI brain WO, resume ASA, plavix 75, folic acid, lipitor, lipid panel, hba1c, speech eval, SBP < 180, blood glucose < 180, avoid dextrose containing solutions.     CT head/neck showed severe stenosis in V1 segment of left vertebral artery, extensive intracranial atherosclerotic plaque with near complete occusion of the right PCA. Severe stenosis in proximal A3 segments of RAHEEM, moderate to severe proximal M2 segment stenosis of left MCA. Moderate stenosis in P1/P2 junction of left PCA. Enlarged, heterogenous thyroid gland with 2.6 cm left thyroid lobe nodule.      Patient fluorescence platelet, came 4, hem/onc consult placed for dvt prophylaxis, plavix recommendation. MRI showed acute infarct in L and R basal ganglia, Left more than right. OBJECTIVE     Vital Signs:  BP (!) 153/50   Pulse 56   Temp 98.3 °F (36.8 °C) (Oral)   Resp 20   Ht 5' 10\" (1.778 m)   Wt 210 lb (95.3 kg)   SpO2 100%   BMI 30.13 kg/m²     Temp (24hrs), Av.1 °F (36.7 °C), Min:97.6 °F (36.4 °C), Max:98.4 °F (36.9 °C)    In: 20   Out: -     Physical Exam:  Constitutional: This is a well developed, well nourished, 30-34.9 - Obesity Grade I 77y.o. year old male who is alert, oriented, cooperative and in no apparent distress. Head:normocephalic and atraumatic. EENT:  PERRLA. No conjunctival injections. Septum was midline, mucosa was without erythema, exudates or cobblestoning. No thrush was noted. Neck: Supple without thyromegaly. No elevated JVP. Trachea was midline. Respiratory: Chest was symmetrical without dullness to percussion. Breath sounds bilaterally were clear to auscultation.  There were no wheezes, rhonchi or rales. There is no intercostal retraction or use of accessory muscles. No egophony noted. Cardiovascular: Regular without murmur, clicks, gallops or rubs. Abdomen: Slightly rounded and soft without organomegaly. No rebound, rigidity or guarding was appreciated. Lymphatic: No lymphadenopathy. Musculoskeletal: Normal curvature of the spine. No gross muscle weakness. Extremities:  No lower extremity edema, ulcerations, tenderness, varicosities or erythema. Muscle size, tone and strength are normal.  No involuntary movements are noted. Skin:  Warm and dry. Good color, turgor and pigmentation. No lesions or scars.   No cyanosis or clubbing  Neurological/Psychiatric: The patient's general behavior, level of consciousness, thought content and emotional status is normal.        Medications:  Scheduled Medications:    sodium chloride  1,000 mL Intravenous Once    atropine  0.4 mg Intravenous Once    rosuvastatin  40 mg Oral Nightly    [Held by provider] clopidogrel  75 mg Oral Daily    [Held by provider] aspirin  81 mg Oral Daily    [Held by provider] heparin (porcine)  5,000 Units Subcutaneous 3 times per day    famotidine  20 mg Oral BID    insulin lispro  0-12 Units Subcutaneous TID WC    insulin lispro  0-6 Units Subcutaneous Nightly    insulin glargine  8 Units Subcutaneous Nightly    sodium chloride  1,000 mL Intravenous Once    budesonide-formoterol  2 puff Inhalation BID    tiotropium  2 puff Inhalation Daily    sodium chloride flush  5-40 mL Intravenous 2 times per day    [Held by provider] methylPREDNISolone  80 mg Intravenous Q8H     Continuous Infusions:    dextrose      dextrose      sodium chloride      sodium chloride       PRN Medicationsnaloxone, 0.4 mg, PRN  melatonin, 3 mg, Nightly PRN  glucose, 15 g, PRN  dextrose, 12.5 g, PRN  glucagon (rDNA), 1 mg, PRN  dextrose, 100 mL/hr, PRN  albuterol sulfate HFA, 2 puff, Q6H PRN  sodium chloride flush, 5-40 mL, PRN  sodium chloride, 25 mL, PRN  ondansetron, 4 mg, Q8H PRN   Or  ondansetron, 4 mg, Q6H PRN  polyethylene glycol, 17 g, Daily PRN  acetaminophen, 650 mg, Q6H PRN   Or  acetaminophen, 650 mg, Q6H PRN  potassium chloride, 40 mEq, PRN   Or  potassium alternative oral replacement, 40 mEq, PRN   Or  potassium chloride, 10 mEq, PRN  sodium chloride, , PRN        Diagnostic Labs:  CBC:   Recent Labs     07/16/21  0510 07/17/21  0342 07/17/21  2328   WBC 15.9* 17.7* 13.9*   RBC 4.19* 3.80* 4.11*   HGB 10.0* 9.1* 10.0*   HCT 33.8* 31.9* 34.6*   MCV 80.7* 83.9 84.2   RDW 17.2* 17.8* 17.7*   PLT See Reflexed IPF Result See Reflexed IPF Result See Reflexed IPF Result     BMP:   Recent Labs     07/17/21  1349 07/17/21  2130 07/17/21  2328   *  151* 146* 144   K 4.2 3.9 4.1   * 116* 116*   CO2 17* 20 17*   BUN 33* 31* 31*   CREATININE 1.43* 1.49* 1.46*     BNP: No results for input(s): BNP in the last 72 hours. PT/INR: No results for input(s): PROTIME, INR in the last 72 hours. APTT: No results for input(s): APTT in the last 72 hours. CARDIAC ENZYMES:   Recent Labs     07/16/21  0510   CKMB 14.6*     FASTING LIPID PANEL:  Lab Results   Component Value Date    CHOL 186 07/14/2021    HDL 42 07/14/2021    TRIG 77 07/14/2021     LIVER PROFILE: No results for input(s): AST, ALT, ALB, BILIDIR, BILITOT, ALKPHOS in the last 72 hours. MICROBIOLOGY:   Lab Results   Component Value Date/Time    CULTURE NO SIGNIFICANT GROWTH 04/04/2016 12:30 PM    CULTURE  04/04/2016 12:30 PM     Performed at [facility] [address], [address] [place] [address] [phone]       Imaging:    CT HEAD WO CONTRAST    Result Date: 7/17/2021  No acute intracranial abnormality. No significant interval change. CT HEAD WO CONTRAST    Result Date: 7/13/2021  1. New ovoid low-attenuation area in the left frontal corona radiata compatible with acute/subacute infarction. This measures 2 x 1.4 cm. No associated hemorrhage.  2. Diffuse parenchymal volume loss and sequela of severe chronic microvascular ischemic changes. Findings were discussed with Dr. Cindy Wesley at 1:31 pm on 7/13/2021. XR CHEST PORTABLE    Result Date: 7/13/2021  Clear lungs. Cardiomegaly. No acute cardiopulmonary abnormality. US SPLEEN    Result Date: 7/14/2021  Suboptimal study. Mild splenomegaly. CTA HEAD NECK W CONTRAST    Result Date: 7/13/2021  1. Severe stenosis in the V1 segment of the left vertebral artery. 2. Extensive intracranial atherosclerotic plaque with near complete occlusion of the right PCA. 3. Severe stenoses in the proximal A3 segments of the ACAs bilaterally. 4. Moderate-to-severe proximal M2 segment stenosis in the right MCA. Moderate stenosis in the M1 and M2 segments of the left MCA. 5. Moderate stenosis in the P1/P2 junction of the left PCA. 6. Enlarged heterogeneous thyroid gland with 2.6 cm left thyroid lobe nodule. Nonemergent/outpatient thyroid ultrasound recommended. Findings were discussed with Dr. Cindy Wesley at 1:43 pm on 7/13/2021. RECOMMENDATIONS: 2.6 cm incidental left thyroid nodule with heterogeneous and enlarged thyroid. Recommend thyroid US. Reference: J Am Bruno Radiol. 2015 Feb;12(2): 143-50     MRI BRAIN WO CONTRAST    Result Date: 7/14/2021  Acute infarct in the left and right basal ganglia greater on the left. Diffuse volume loss with extensive chronic white matter changes. ASSESSMENT & PLAN     ASSESSMENT / PLAN:        Active Problems:  Principal Problem:    Acute cerebrovascular accident (CVA) ([address]  Active Problems:    COPD (chronic obstructive pulmonary disease) (HCC)    HTN (hypertension)    Diabetes 1.5, managed as type 2 ([address]    Hyperlipidemia  Resolved Problems:    * No resolved hospital problems.  *        Acute cerebrovascular accident (CVA)  - Patient CT head showed New ovoid low-attenuation area in the left frontal corona radiata compatible with acute/subacute infarction.  This measures 2 x 1.4 cm.  No associated hemorrhage. 2. Diffuse parenchymal volume loss and sequela of severe chronic microvascular ischemic changes.   Neuro on board. - Neuro recommended MRI brain WO, resume ASA, plavix 75, folic acid, lipitor, lipid panel, hba1c, speech eval, SBP < 180, blood glucose < 180, avoid dextrose containing solutions. -CT head/neck showed severe stenosis in V1 segment of left vertebral artery, extensive intracranial atherosclerotic plaque with near complete occusion of the right PCA. Severe stenosis in proximal A3 segments of RAHEEM, moderate to severe proximal M2 segment stenosis of left MCA. Moderate stenosis in P1/P2 junction of left PCA. Enlarged, heterogenous thyroid gland with 2.6 cm left thyroid lobe nodule.   - Neuro on board. MRI showed acute infarct in L and R basal ganglia, Left more than right. 7/13, neuro recommended no intervention for now, to continue medical management  - Echo with bubble study-negative for PFO  - patient started on ASA, plavix (held due to low platelets)     Rhabdomyolysis  -  elevated Total CK 8454, myoglobin 3030, troponin 52 (baseline 42).  Patient given 1L NS bolus.     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     Hypernatremia secondary to dehydration- fluid deficit of 5 liter  - continue to hydrate with normal saline.  Repeat BMP  - on 5% dextrose on 200ml/hr and 0.45% bolus 1000ml     DVT ppx : held due to low platelet count  GI ppx: famotidine    PT/OT: pt/ot consult  Discharge Planning / SW:  on board, [facility] precert    Lovely Denver, MD  Internal Medicine Resident, PGY-1  [address], [place]  7/18/2021, 10:33 AM    Attestation and add on       I have discussed the care of Madhu Willson , including pertinent history and exam findings,      7/18/21    with the resident. I have seen and examined the patient and the key elements of all parts of the encounter have been performed by me . I agree with the assessment, plan and orders as documented by the resident.     ---- ;     MD LISSET White[address].    Phone [phone]   Fax: [phone]  Answering Service: [phone]

## 2021-07-18 NOTE — PLAN OF CARE
Problem: COMMUNICATION IMPAIRMENT  Goal: Ability to express needs and understand communication  Outcome: Ongoing     Problem: Skin Integrity:  Goal: Will show no infection signs and symptoms  Description: Will show no infection signs and symptoms  Outcome: Ongoing  Goal: Absence of new skin breakdown  Description: Absence of new skin breakdown  Outcome: Ongoing     Problem: HEMODYNAMIC STATUS  Goal: Patient has stable vital signs and fluid balance  Outcome: Ongoing

## 2021-07-18 NOTE — PROGRESS NOTES
[facility] Neurology   IN-PATIENT SERVICE      NEUROLOGY PROGRESS  NOTE            Date:   7/18/2021  Patient name:  Claudia Reyes  Date of admission:  7/13/2021  YOB: 1945      Interval History:   Claudia Reyes is a  77 y.o. male admitted on 7/13/2021 with Acute cerebrovascular accident (CVA) [facility]) [I63.9]  Acute cerebrovascular accident (CVA) ([facility] [address] [I63.9]. This is a follow-up neurology progress note. The patient was seen and examined and the chart was reviewed. Patient seems to confused but awake in the bed, follow simple commands           wbc 10.9--->13.9, hemoglobin 10-->9.1-->10.0  Na 152,--> 144  Platelet 587, --->04     Doppler study no evidence of superficial or deep venous thrombosis bilaterally. History of Present Illness:       69-year-old male with Hx of hypertension, hyperlipidemia, PVD, CAD and previous stroke was admitted on 7/13/2021 with chief complaint of found down on the floor by his sister, with last well-known 2 days ago before the admission. On admission patient's examination was significant for right-sided facial droop, right-sided weakness and severe dysarthria. Patient has significant history with recurrent stroke, remote left temporal lobe ischemic infarct and remote bilateral occipital lobe infarct and patient was supposed to be on dual antiplatelet therapy with aspirin Plavix however patient was not sure when he took the last medication. CT head: New left cortical ischemic stroke  CTA head and neck: Diffuse intracranial atherosclerosis and near complete occlusion of right PCA. Endovascular consulted, no intervention recommended to the optimize medical management with aspirin Plavix statin with target LDL goal less than 70.       Past Medical History:     Past Medical History:   Diagnosis Date    Centrilobular emphysema ([facility] [address]     COPD (chronic obstructive pulmonary disease) ([facility] [address]     Depression     Diabetes mellitus ([facility] [address]     pt refuses to take previously prescribed metformin (states PCP aware)    Former smoker     Hyperlipidemia     on 18 pt states \"I stopped taking that about a year ago\"    Hypertension     Mixed restrictive and obstructive lung disease ([facility] [address]     Need for pneumococcal vaccine     Personal history of tobacco use         Past Surgical History:     Past Surgical History:   Procedure Laterality Date    CARDIAC SURGERY  2015    1 stent    NECK SURGERY      TOE AMPUTATION Right greater        Medications during admission:      sodium chloride  1,000 mL Intravenous Once    atropine  0.4 mg Intravenous Once    rosuvastatin  40 mg Oral Nightly    [Held by provider] clopidogrel  75 mg Oral Daily    [Held by provider] aspirin  81 mg Oral Daily    [Held by provider] heparin (porcine)  5,000 Units Subcutaneous 3 times per day    famotidine  20 mg Oral BID    insulin lispro  0-12 Units Subcutaneous TID WC    insulin lispro  0-6 Units Subcutaneous Nightly    insulin glargine  8 Units Subcutaneous Nightly    sodium chloride  1,000 mL Intravenous Once    budesonide-formoterol  2 puff Inhalation BID    tiotropium  2 puff Inhalation Daily    sodium chloride flush  5-40 mL Intravenous 2 times per day    [Held by provider] methylPREDNISolone  80 mg Intravenous Q8H         Physical Exam:   BP (!) 153/50   Pulse 61   Temp 98.3 °F (36.8 °C) (Oral)   Resp 22   Ht 5' 10\" (1.778 m)   Wt 210 lb (95.3 kg)   SpO2 97%   BMI 30.13 kg/m²   Temp (24hrs), Av.1 °F (36.7 °C), Min:97.6 °F (36.4 °C), Max:98.4 °F (36.9 °C)        General examination:      General Appearance:  alert, well appearing, and in no acute distress  HEENT: Normocephalic, atraumatic, moist mucus membranes  Neck: supple, no carotid bruits, (-) nuchal rigidity  Lungs:  Respirations unlabored, chest wall no deformity, BS normal  Cardiovascular: normal rate, regular rhythm  Abdomen: Soft, nontender, nondistended, normal bowel sounds  Skin: No gross lesions, rashes, bruising or bleeding on exposed skin area  Extremities:  peripheral pulses palpable, clubbing or edema  Psych: normal affect      Neurological examination:      Mental status   Alert and oriented x 1; patient follows simple commands, speech is significantly dysarthric       Cranial nerves   II - left hemianopsia,   III, IV, VI  extraocular muscles intact; no MAGDALENO; no nystagmus; no ptosis   V - normal facial sensation                                                               VII - abnormal facial symmetry                                                          VIII - intact hearing                                                                             IX, X - symmetrical palate elevation                                               XI - symmetrical shoulder shrug                                                       XII - midline tongue without atrophy or fasciculation     Motor function  Strength:   2/5 RUE, 2/5 RLE ; right contracture   4+/5 LUE, 4+/5  LLE  Normal bulk and tone. Sensory function Intact to touch, throughout     Cerebellar Intact finger-nose-finger testing on left side. No tremors                        Reflex function 1/4 symmetric throughout .  Downgoing plantar response  Left, extensor on right (-)Grayson's sign bilaterally      Gait                   not assessed              Diagnostics:      Laboratory Testing:  CBC:   Recent Labs     07/16/21  0510 07/17/21  0342 07/17/21  2328   WBC 15.9* 17.7* 13.9*   HGB 10.0* 9.1* 10.0*   PLT See Reflexed IPF Result See Reflexed IPF Result See Reflexed IPF Result       BMP:    Recent Labs     07/17/21  1349 07/17/21  2130 07/17/21  2328   *  151* 146* 144   K 4.2 3.9 4.1   * 116* 116*   CO2 17* 20 17*   BUN 33* 31* 31*   CREATININE 1.43* 1.49* 1.46*   GLUCOSE 88 133* 117*         Lab Results   Component Value Date    CHOL 186 07/14/2021    LDLCHOLESTEROL 129 07/14/2021    HDL 42 07/14/2021    TRIG 77 07/14/2021 precautions   Hypernatremia - Resolved    Disposition as per primary                Electronically signed by Vonzella Brunner, MD on 7/18/2021 at 7:36 AM      Carlos Carrillo MD  PGY 3 Neurology Resident  Neurology [facility]

## 2021-07-18 NOTE — PROGRESS NOTES
Physical Therapy  Facility/Department: [facility] NEURO  Daily Treatment Note  NAME: Jak Driver  : 1945  MRN: [id number]    Date of Service: 2021    Discharge Recommendations: Pt would benefit from further PT/OT post discharge. PT Equipment Recommendations  Equipment Needed: No    Assessment   Body structures, Functions, Activity limitations: Decreased functional mobility ; Decreased ROM; Decreased strength;Decreased balance;Decreased safe awareness;Decreased endurance;Decreased posture  Assessment: Pt required modA completing sup-sit, mxA sit-sup, modA sitting EOB to maintain midline, mxA ambulating 20 ft x1 bed<>doorway w/karena walker. Pt would benefit from therapy post d/c to reach mx functional potential.    Prognosis: Fair  Decision Making: High Complexity  REQUIRES PT FOLLOW UP: Yes  Activity Tolerance  Activity Tolerance: Patient Tolerated treatment well;Patient limited by endurance     Patient Diagnosis(es): The primary encounter diagnosis was Cerebrovascular accident (CVA), unspecified mechanism ([facility] [address] A diagnosis of Traumatic rhabdomyolysis, initial encounter [facility]) was also pertinent to this visit. has a past medical history of Centrilobular emphysema ([facility] [address] COPD (chronic obstructive pulmonary disease) ([facility] [address] Depression, Diabetes mellitus ([facility] [address] Former smoker, Hyperlipidemia, Hypertension, Mixed restrictive and obstructive lung disease ([facility] [address] Need for pneumococcal vaccine, and Personal history of tobacco use.   has a past surgical history that includes Neck surgery; Toe amputation (Right, greater); and Cardiac surgery (2015).     Restrictions  Restrictions/Precautions  Restrictions/Precautions: Up as Tolerated, General Precautions, Fall Risk  Required Braces or Orthoses?: No  Position Activity Restriction  Other position/activity restrictions: Up w/ assistance; check platelet count  Subjective   General  Response To Previous Treatment: Patient with no complaints from previous session. Family / Caregiver Present: No  Subjective  Subjective: Pt resting in bed upon arrival, eager to particiapte in PT, somewhat aphasic with mumbled speech, difficult to understand  Pain Screening  Patient Currently in Pain: Denies  Vital Signs  Patient Currently in Pain: Denies       Orientation  Overall Orientation Status: Impaired  Orientation Level: Unable to assess     Objective   Bed mobility  Scooting: Moderate assistance  Transfers  Sit to Stand: Moderate Assistance  Stand to sit: Moderate Assistance  Comment: Pt stood approx 5 min's w/therapist on his R side providing feedback to shift wt to his left to remain centered and facilitating upright posture w/fully extended knees to reduce fall risk. Ambulation 1  Surface: level tile  Device: Hemiwalker  Assistance: Maximum assistance  Quality of Gait: magnetic gait, short step length, needed assist wt shifting to his L to advance RLE, mx vc's for sequencing step pattern w/AD  Distance: 20 ft x1 from EOB<>doorway. Stairs/Curb  Stairs?: No     Balance  Posture: Fair  Sitting - Static: Fair;+  Sitting - Dynamic: +  Standing - Static: Fair;-  Standing - Dynamic: Poor;+  Exercises  Comments: manually resisted BLE LAQ's and hs curls x15 seated EOB      Other Activities: Dangling protocol  Comment: Pt dangled approx 7 min's w/therapist sitting on his R side providing feedback to shift wt towards his left to maintain midline sitting.       Goals  Short term goals  Time Frame for Short term goals: 15  Short term goal 1: Perform bed mob IND  Short term goal 2: Perform transfers Marisol+1  Short term goal 3: Amb 22' w/ modA+2 w/ appropriate AD  Short term goal 4: Prevent contractures  Patient Goals   Patient goals : unable to state    Plan  Times per week: 5-6 visits  Times per day: Daily  Current Treatment Recommendations: Strengthening, ROM, Balance Training, Functional Mobility Training, Gait Training, Endurance Training, Transfer Training, Safety Education & Training  Safety Devices  Type of devices: Gait belt, Patient at risk for falls, Left in chair, Call light within reach, Chair alarm in place  Restraints  Initially in place: No     Therapy Time   Individual Concurrent Group Co-treatment   Time In  1030         Time Out  1115         Minutes  [address], PTA

## 2021-07-19 NOTE — PROGRESS NOTES
Today's Date: 7/19/2021  Patient Name: Peggy Cobb  Date of admission: 7/13/2021 11:18 AM  Patient's age: 77 y.o., 1945  Admission Dx: Acute cerebrovascular accident (CVA) ([facility] [address] [I63.9]  Acute cerebrovascular accident (CVA) ([facility] [address] [I63.9]    Reason for Consult: management recommendations  Requesting Physician: Mateusz Vasquez MD    CHIEF COMPLAINT: Acute stroke. Thrombocytopenia. History Obtained From:  patient, electronic medical record      Interval history:    Patient seen and examined  Laboratory reviewed  Continues to be agitated and confused. No evidence of bleeding or bruising      HISTORY OF PRESENT ILLNESS:      The patient is a 77 y.o.  male who is admitted to the hospital for acute stroke    Brought to the hospital after being found down on the floor. Patient was last seen well about 2 days ago. Patient has a history of peripheral vascular disease coronary artery disease hypertension COPD and previous strokes. Patient has significant contracture on the right side also right-sided facial droop with severe dysarthria limiting history of presenting illness. Patient's CBC at presentation shows hemoglobin 11.7 with MCV 77 WBC count 10 and platelet count of 4 with immature platelet fraction of 44%. Patient previous CBC noted in the system from June 2020 shows blood count of 160. Patient was also noted to have elevated myoglobin and CK. Creatinine is 1.74 lactic acid is 2.4. Patient has dysarthria not able to give much history. Past Medical History:   has a past medical history of Centrilobular emphysema ([facility] [address] COPD (chronic obstructive pulmonary disease) ([facility] [address] Depression, Diabetes mellitus ([facility] [address] Former smoker, Hyperlipidemia, Hypertension, Mixed restrictive and obstructive lung disease ([facility] [address] Need for pneumococcal vaccine, and Personal history of tobacco use. Past Surgical History:   has a past surgical history that includes Neck surgery;  Toe amputation (Right, greater); and Cardiac surgery (07/2015). Medications:    Prior to Admission medications    Medication Sig Start Date End Date Taking?  Authorizing Provider   atorvastatin (LIPITOR) 80 MG tablet Take 0.5 tablets by mouth daily (Pt takes one-half of an 80mg tab = 40mg) 7/16/21  Yes Ilana Mcdonald MD   dexamethasone 20 MG TABS Take 40 mg by mouth daily for 3 days 7/17/21 7/20/21 Yes Ilana Mcdonald MD   tiZANidine (ZANAFLEX) 2 MG tablet Take 1 tablet by mouth 4 times daily as needed (muscle spasms) 5/7/21   JOLIE Flores CNP   naproxen (NAPROSYN) 500 MG tablet Take 1 tablet by mouth 2 times daily (with meals) 1/4/21   Lito Narayan PA-C   ibuprofen (ADVIL;MOTRIN) 800 MG tablet Take 1 tablet by mouth every 8 hours as needed for Pain 6/2/20   Anali Rae MD   lidocaine (LIDODERM) 5 % Place 1 patch onto the skin daily 12 hours on, 12 hours off. 6/2/20   Anali Rae MD   metoprolol succinate (TOPROL XL) 50 MG extended release tablet Take 50 mg by mouth daily    Historical Provider, MD   tiotropium (SPIRIVA RESPIMAT) 2.5 MCG/ACT AERS inhaler Inhale 2 puffs into the lungs daily    Historical Provider, MD   carboxymethylcellulose 1 % ophthalmic solution Place 1 drop into both eyes 3 times daily as needed for Dry Eyes     Historical Provider, MD   ketotifen (ZADITOR) 0.025 % ophthalmic solution Place 1 drop into both eyes 2 times daily as needed (itchy eyes)     Historical Provider, MD   isosorbide mononitrate (IMDUR) 60 MG extended release tablet Take 30 mg by mouth daily Indications: 1/2 tablet (30mg)     Historical Provider, MD   finasteride (PROSCAR) 5 MG tablet Take 5 mg by mouth daily    Historical Provider, MD   tamsulosin (FLOMAX) 0.4 MG capsule Take 0.4 mg by mouth daily    Historical Provider, MD   fluticasone (FLONASE) 50 MCG/ACT nasal spray 1 spray by Nasal route 2 times daily  Patient taking differently: 1 spray by Nasal route 2 times daily as needed for Rhinitis  5/31/16   Francisco Connor, DO   albuterol sulfate  (90 BASE) MCG/ACT inhaler Inhale 2 puffs into the lungs every 6 hours as needed for Wheezing    Historical Provider, MD   albuterol (PROVENTIL) (2.5 MG/3ML) 0.083% nebulizer solution Take 2.5 mg by nebulization every 6 hours as needed for Wheezing    Historical Provider, MD   aspirin 81 MG EC tablet Take 81 mg by mouth daily    Historical Provider, MD   losartan (COZAAR) 100 MG tablet Take 100 mg by mouth daily     Historical Provider, MD   nitroGLYCERIN (NITROSTAT) 0.4 MG SL tablet Place 0.4 mg under the tongue every 5 minutes as needed for Chest pain    Historical Provider, MD   FLUoxetine (PROZAC) 20 MG capsule Take 40 mg by mouth daily     Historical Provider, MD   furosemide (LASIX) 20 MG tablet Take 20 mg by mouth daily as needed (swelling)     Historical Provider, MD   clopidogrel (PLAVIX) 75 MG tablet Take 75 mg by mouth daily    Historical Provider, MD   rOPINIRole (REQUIP) 0.25 MG tablet Take 0.25 mg by mouth nightly as needed     Historical Provider, MD   budesonide-formoterol (SYMBICORT) 160-4.5 MCG/ACT AERO Inhale 2 puffs into the lungs 2 times daily.     Historical Provider, MD     Current Facility-Administered Medications   Medication Dose Route Frequency Provider Last Rate Last Admin    hydrALAZINE (APRESOLINE) tablet 10 mg  10 mg Oral 3 times per day Abran Lee MD   10 mg at 07/19/21 1440    QUEtiapine (SEROQUEL) tablet 25 mg  25 mg Oral Nightly PRN Abran Lee MD        methylPREDNISolone sodium (SOLU-MEDROL) injection 40 mg  40 mg Intravenous Q12H Lisy Olivares MD   40 mg at 07/19/21 1211    dextrose 5 % solution   Intravenous Continuous Aby Madera MD        sodium chloride 0.45 % bolus  1,000 mL Intravenous Once Aby Madera MD        atropine injection 0.4 mg  0.4 mg Intravenous Once Rita Haynes MD        naloxone [facility]) injection 0.4 mg  0.4 mg Intravenous PRN Rita Haynes MD   0.4 mg at 07/17/21 1002    melatonin tablet 3 mg  3 mg Oral Nightly PRN Maurice Bateman MD   3 mg at 07/18/21 2301    rosuvastatin (CRESTOR) tablet 40 mg  40 mg Oral Nightly Marion Orosco MD   40 mg at 07/18/21 2127    clopidogrel (PLAVIX) tablet 75 mg  75 mg Oral Daily Joaquim Ford MD   75 mg at 07/17/21 1439    aspirin chewable tablet 81 mg  81 mg Oral Daily Joaquim Ford MD   81 mg at 07/17/21 1439    heparin (porcine) injection 5,000 Units  5,000 Units Subcutaneous 3 times per day Joaquim Ford MD   5,000 Units at 07/19/21 1356    famotidine (PEPCID) tablet 20 mg  20 mg Oral BID Marcin Marin MD   20 mg at 07/19/21 0922    insulin lispro (HUMALOG) injection vial 0-12 Units  0-12 Units Subcutaneous TID WC Eric Ocampo MD   2 Units at 07/19/21 1211    insulin lispro (HUMALOG) injection vial 0-6 Units  0-6 Units Subcutaneous Nightly Eric Ocampo MD   1 Units at 07/18/21 2128    glucose (GLUTOSE) 40 % oral gel 15 g  15 g Oral PRN Eric Ocampo MD        dextrose 50 % IV solution  12.5 g Intravenous PRN Eric Ocampo MD        glucagon (rDNA) injection 1 mg  1 mg Intramuscular PRN Eric Ocampo MD        dextrose 5 % solution  100 mL/hr Intravenous PRN Eric Ocampo MD        insulin glargine (LANTUS) injection vial 8 Units  8 Units Subcutaneous Nightly Jaylen Tammie Daniel MD   8 Units at 07/18/21 2128    0.9 % sodium chloride bolus  1,000 mL Intravenous Once Eric Ocampo MD        albuterol sulfate  (90 Base) MCG/ACT inhaler 2 puff  2 puff Inhalation Q6H PRN Eric Ocampo MD   2 puff at 07/15/21 1622    budesonide-formoterol (SYMBICORT) 160-4.5 MCG/ACT inhaler 2 puff  2 puff Inhalation BID Eric Ocampo MD   2 puff at 07/19/21 0727    tiotropium (SPIRIVA RESPIMAT) 2.5 MCG/ACT inhaler 2 puff  2 puff Inhalation Daily Eric Ocampo MD   2 puff at 07/19/21 0727    sodium chloride flush 0.9 % injection 5-40 mL  5-40 mL Intravenous 2 times per day Mat Amin MD   20 mL at 21 0922    sodium chloride flush 0.9 % injection 5-40 mL  5-40 mL Intravenous PRN Mat Amin MD        0.9 % sodium chloride infusion  25 mL Intravenous PRN Mat Amin, MD        ondansetron (ZOFRAN-ODT) disintegrating tablet 4 mg  4 mg Oral Q8H PRN Mat Brennan, MD        Or    ondansetron [facility]) injection 4 mg  4 mg Intravenous Q6H PRN Mat Night, MD        polyethylene glycol (GLYCOLAX) packet 17 g  17 g Oral Daily PRN Mat Brennan, MD        acetaminophen (TYLENOL) tablet 650 mg  650 mg Oral Q6H PRN Mat Night, MD        Or    acetaminophen (TYLENOL) suppository 650 mg  650 mg Rectal Q6H PRN Mat Night, MD        potassium chloride (KLOR-CON M) extended release tablet 40 mEq  40 mEq Oral PRN Mat Night, MD        Or    potassium bicarb-citric acid (EFFER-K) effervescent tablet 40 mEq  40 mEq Oral PRN Mat Night, MD        Or    potassium chloride 10 mEq/100 mL IVPB (Peripheral Line)  10 mEq Intravenous PRN Mat Amin, MD        0.9 % sodium chloride infusion   Intravenous PRN Viri Navarro MD           Allergies:  Patient has no known allergies. Social History:   reports that he quit smoking about 8 years ago. He started smoking about 64 years ago. He has a 42.00 pack-year smoking history. He has never used smokeless tobacco. He reports that he does not drink alcohol and does not use drugs.      Family History: Hypertension    REVIEW OF SYSTEMS:      Patient's review of system is limited due to dysarthria, states he is feeling fine, no pain     PHYSICAL EXAM:        BP (!) 153/72   Pulse 55   Temp 97.8 °F (36.6 °C) (Oral)   Resp 24   Ht 5' 10\" (1.778 m)   Wt 210 lb (95.3 kg)   SpO2 99%   BMI 30.13 kg/m²    Temp (24hrs), Av.7 °F (36.5 °C), Min:97.5 °F (36.4 °C), Max:97.8 °F (36.6 °C)      General appearance -ill appearing, no in pain or distress   Mental personally reviewed results of lab work-up imaging studies and other relevant clinical data. 2. Platelets are 645 today. 3. Continue steroids for now  4. Resume antiplatelet agents  Patient follows up with the [place] however we will be happy to follow-up if patient wants    Discussed with patient and Nurse. Thank you for asking us to see this patient. Jeremy Olivares MD  Hematologist/Medical [id number] [facility] hematology oncology physicians            This note is created with the assistance of a speech recognition program.  While intending to generate a document that actually reflects the content of the visit, the document can still have some errors including those of syntax and sound a like substitutions which may escape proof reading. It such instances, actual meaning can be extrapolated by contextual diversion.

## 2021-07-19 NOTE — PROGRESS NOTES
[facility] Neurology   IN-PATIENT SERVICE      NEUROLOGY PROGRESS  NOTE            Date:   7/19/2021  Patient name:  Graham Aldana  Date of admission:  7/13/2021  YOB: 1945    Interval History:   Graham Aldana is a  77 y.o. male admitted on 7/13/2021 with Acute cerebrovascular accident (CVA) [facility]) [I63.9]  Acute cerebrovascular accident (CVA) ([facility] [I63.9]. This is a follow-up neurology progress note. The patient was seen and examined and the chart was reviewed. Patient was agitated and confused last night. This morning patient is awake in the bed, denying any headache, pain, nausea or vomiting. He is restless at the moment. wbc 15.3, hemoglobin 9.1--->12.1  Na 145,   Platelet 675, immature 14.0      History of Present Illness:   71-year-old male with Hx of hypertension, hyperlipidemia, PVD, CAD and previous stroke was admitted on 7/13/2021 with chief complaint of found down on the floor by his sister, with last well-known 2 days ago before the admission. On admission patient's examination was significant for right-sided facial droop, right-sided weakness and severe dysarthria. Patient has significant history with recurrent stroke, remote left temporal lobe ischemic infarct and remote bilateral occipital lobe infarct and patient was supposed to be on dual antiplatelet therapy with aspirin Plavix however patient was not sure when he took the last medication. CT head: New left cortical ischemic stroke  CTA head and neck: Diffuse intracranial atherosclerosis and near complete occlusion of right PCA. Endovascular consulted, no intervention recommended to the optimize medical management with aspirin Plavix statin with target LDL goal less than 70.       Past Medical History:     Past Medical History:   Diagnosis Date    Centrilobular emphysema ([facility] [address]     COPD (chronic obstructive pulmonary disease) ([facility] [address]     Depression     Diabetes mellitus ([facility] [address]     pt refuses to take previously prescribed metformin (states PCP aware)    Former smoker     Hyperlipidemia     on 18 pt states \"I stopped taking that about a year ago\"    Hypertension     Mixed restrictive and obstructive lung disease ([facility]     Need for pneumococcal vaccine     Personal history of tobacco use         Past Surgical History:     Past Surgical History:   Procedure Laterality Date    CARDIAC SURGERY  2015    1 stent    NECK SURGERY      TOE AMPUTATION Right greater        Medications during admission:      amLODIPine  5 mg Oral Daily    methylPREDNISolone  40 mg Intravenous Q12H    sodium chloride  1,000 mL Intravenous Once    atropine  0.4 mg Intravenous Once    rosuvastatin  40 mg Oral Nightly    clopidogrel  75 mg Oral Daily    aspirin  81 mg Oral Daily    heparin (porcine)  5,000 Units Subcutaneous 3 times per day    famotidine  20 mg Oral BID    insulin lispro  0-12 Units Subcutaneous TID WC    insulin lispro  0-6 Units Subcutaneous Nightly    insulin glargine  8 Units Subcutaneous Nightly    sodium chloride  1,000 mL Intravenous Once    budesonide-formoterol  2 puff Inhalation BID    tiotropium  2 puff Inhalation Daily    sodium chloride flush  5-40 mL Intravenous 2 times per day         Physical Exam:   BP (!) 153/80   Pulse 55   Temp 97.5 °F (36.4 °C) (Oral)   Resp 24   Ht 5' 10\" (1.778 m)   Wt 210 lb (95.3 kg)   SpO2 99%   BMI 30.13 kg/m²   Temp (24hrs), Av.7 °F (36.5 °C), Min:97.5 °F (36.4 °C), Max:97.8 °F (36.6 °C)    General examination:      General Appearance:  alert, well appearing, and in no acute distress  HEENT: Normocephalic, atraumatic, moist mucus membranes  Neck: supple, no carotid bruits, (-) nuchal rigidity  Lungs:  Respirations unlabored, chest wall no deformity, BS normal  Cardiovascular: normal rate, regular rhythm  Abdomen: Soft, nontender, nondistended, normal bowel sounds  Skin: No gross lesions, rashes, bruising or bleeding on exposed skin area  Extremities:  peripheral pulses palpable, clubbing or edema  Psych: normal affect      Neurological examination:    Mental status   Alert and oriented x 1; patient follows simple commands, speech is significantly dysarthric       Cranial nerves   II - left hemianopsia,   III, IV, VI  extraocular muscles intact; no MAGDALENO; no nystagmus; no ptosis   V - normal facial sensation                                                               VII - abnormal facial symmetry                                                          VIII - intact hearing                                                                             IX, X - symmetrical palate elevation                                               XI - symmetrical shoulder shrug                                                       XII - midline tongue without atrophy or fasciculation     Motor function  Strength:   2/5 RUE, 2/5 RLE ; right contracture   4+/5 LUE, 4+/5  LLE  Normal bulk and tone. Sensory function Intact to touch, throughout     Cerebellar Intact finger-nose-finger testing on left side. No tremors                        Reflex function 1/4 symmetric throughout .  Downgoing plantar response  Left, extensor on right (-)Grayson's sign bilaterally      Gait                   not assessed              Diagnostics:      Laboratory Testing:  CBC:   Recent Labs     07/17/21  0342 07/17/21  2328 07/19/21  0612   WBC 17.7* 13.9* 15.3*   HGB 9.1* 10.0* 12.1*   PLT See Reflexed IPF Result See Reflexed IPF Result See Reflexed IPF Result       BMP:    Recent Labs     07/18/21  1226 07/18/21  1600 07/19/21  0612   * 144 145*   K 3.8 4.0 3.8   * 111* 112*   CO2 20 19* 22   BUN 27* 26* 24*   CREATININE 1.15 1.21* 1.05   GLUCOSE 105* 180* 104*         Lab Results   Component Value Date    CHOL 186 07/14/2021    LDLCHOLESTEROL 129 07/14/2021    HDL 42 07/14/2021    TRIG 77 07/14/2021    ALT 54 (H) 07/14/2021    AST 81 (H) 07/14/2021    TSH 0.28 (L) 07/14/2021    INR 1.1 07/13/2021    LABA1C 7.7 (H) 07/14/2021    DDIQNYHR27 667 07/13/2021         No results found for: PHENYTOIN, PHENYTOIN, VALPROATE, CBMZ        Imaging/Diagnostics:    CT head without Contrast  7/17/2021       Impression   No acute intracranial abnormality.       No significant interval change. CTA head and neck  7/13/2021  Impression   1. Severe stenosis in the V1 segment of the left vertebral artery. 2. Extensive intracranial atherosclerotic plaque with near complete occlusion   of the right PCA. 3. Severe stenoses in the proximal A3 segments of the ACAs bilaterally. 4. Moderate-to-severe proximal M2 segment stenosis in the right MCA. Moderate stenosis in the M1 and M2 segments of the left MCA. 5. Moderate stenosis in the P1/P2 junction of the left PCA. CT head  7/13/2021  Impression   1. New ovoid low-attenuation area in the left frontal corona radiata   compatible with acute/subacute infarction.  This measures 2 x 1.4 cm.  No   associated hemorrhage. 2. Diffuse parenchymal volume loss and sequela of severe chronic   microvascular ischemic changes. MRI Brain 7/14/2021  Impression   Acute infarct in the left and right basal ganglia greater on the left.    Diffuse volume loss with extensive chronic white matter changes.           Assessment and plan      -Right hemiparesis likely chronic from previous ischemic stroke  -Severe thrombocytopenia possibly due to ITP  -Rhabdomyolysis  -Intracranial atherosclerotic disease with diffuse right PCA occlusion  -MRI brain acute infarct in left and right basal ganglia    Hba1c:7.7    Vitamin B12 667,  Folate 5.2      Crestor 40, with LDL goal <70   ASA 81   Plavix 75    ITP management as per primary    Echo with bubble study   Keep SBP greater than 120   PT/OT/speech therapy   Endovascular, no intervention for now, continue medical management    Fall precautions   Hypernatremia and bun/cr management as per primary    Disposition as per primary         Electronically signed by Satnam Acharya DPM on 7/19/2021 at 11:26 AM

## 2021-07-19 NOTE — PROGRESS NOTES
Physical Therapy  Facility/Department: [facility] NEURO  Daily Treatment Note  NAME: Lucero Gallardo  : 1945  MRN: [id number]    Date of Service: 2021    Discharge Recommendations:  Patient would benefit from continued therapy after discharge   PT Equipment Recommendations  Equipment Needed: No    Assessment   Body structures, Functions, Activity limitations: Decreased functional mobility ; Decreased ROM; Decreased strength;Decreased balance;Decreased safe awareness;Decreased endurance;Decreased posture  Assessment: Pt required modA completing sup-sit, mxA sit-sup, modA sitting EOB to maintain midline, mxA ambulating 6ft  w/karena walker. Pt would benefit from therapy post d/c to reach mx functional potential.  Prognosis: Fair  REQUIRES PT FOLLOW UP: Yes  Activity Tolerance  Activity Tolerance: Patient Tolerated treatment well;Patient limited by endurance     Patient Diagnosis(es): The primary encounter diagnosis was Cerebrovascular accident (CVA), unspecified mechanism ([facility] [address] A diagnosis of Traumatic rhabdomyolysis, initial encounter [facility]) was also pertinent to this visit. has a past medical history of Centrilobular emphysema ([address] COPD (chronic obstructive pulmonary disease) ([address] Depression, Diabetes mellitus ([address] Former smoker, Hyperlipidemia, Hypertension, Mixed restrictive and obstructive lung disease ([address] Need for pneumococcal vaccine, and Personal history of tobacco use.   has a past surgical history that includes Neck surgery; Toe amputation (Right, greater); and Cardiac surgery (2015). Restrictions  Restrictions/Precautions  Restrictions/Precautions: Up as Tolerated, General Precautions, Fall Risk  Required Braces or Orthoses?: No  Position Activity Restriction  Other position/activity restrictions: Up w/ assistance; check platelet count  Subjective   General  Chart Reviewed: Yes  Response To Previous Treatment: Patient with no complaints from previous session.   Family / Caregiver Present: No  Subjective  Subjective: Pt resting in bed upon arrival, eager to particiapte in PT, somewhat aphasic with mumbled speech, difficult to understand  General Comment  Comments: Pt retired in [address] after session. Pain Screening  Patient Currently in Pain: Denies  Vital Signs  Patient Currently in Pain: Denies       Orientation  Orientation  Overall Orientation Status: Impaired  Cognition      Objective   Bed mobility  Supine to Sit: Moderate assistance  Scooting: Moderate assistance  Transfers  Sit to Stand: Maximum Assistance  Stand to sit: Moderate Assistance  Comment: karena walker used to stand LUE  Ambulation  Ambulation?: Yes  More Ambulation?: No  Ambulation 1  Device: Hemiwalker  Assistance: Maximum assistance  Quality of Gait: magnetic gait, short step length, needed assist wt shifting to his L to advance RLE, mx vc's for sequencing step pattern w/AD  Gait Deviations: Shuffles  Distance: 6 ft in room to recliner  Stairs/Curb  Stairs?: No     Balance  Posture: Fair  Sitting - Static: Fair;+  Sitting - Dynamic: Fair;+  Standing - Static: Fair;-  Standing - Dynamic: Poor;+  Comments: standing balance assessed with karena walker requiring modA/ maxA for balance due to R side lean. Exercises  Seated LE exercise program: Long Arc Quads, hip abduction/adduction, heel/toe raises, and marches.  Reps: LLE x10 AAROM  PROM of LLE all planes x10  Goals  Short term goals  Time Frame for Short term goals: 15  Short term goal 1: Perform bed mob IND  Short term goal 2: Perform transfers Marisol+1  Short term goal 3: Amb 22' w/ modA+2 w/ appropriate AD  Short term goal 4: Prevent contractures  Patient Goals   Patient goals : unable to state    Plan    Plan  Times per week: 5-6 visits  Times per day: Daily  Current Treatment Recommendations: Strengthening, ROM, Balance Training, Functional Mobility Training, Gait Training, Endurance Training, Transfer Training, Safety Education & Training  Safety Devices  Type of devices: Gait belt, Patient at risk for falls, Left in chair, Call light within reach, Chair alarm in place  Restraints  Initially in place: No     Therapy Time   Individual Concurrent Group Co-treatment   Time In 1120         Time Out 1158         Minutes 38         Timed Code Treatment Minutes: [facility]

## 2021-07-19 NOTE — PROGRESS NOTES
Speech Language Pathology  Speech Language Pathology  [facility]    Speech Language Treatment Note    Date: 7/19/2021  Patients Name: Radha Baird  MRN: [id number]  Diagnosis:   Patient Active Problem List   Diagnosis Code    Centrilobular emphysema (HCC) J43.2    Mixed restrictive and obstructive lung disease ([facility] J43.9, J98.4    Acute ischemic stroke ([facility] I63.9    COPD (chronic obstructive pulmonary disease) ([facility] J44.9    HTN (hypertension) I10    Diabetes 1.5, managed as type 2 ([facility] E13.9    Hyperlipidemia E78.5    CAD S/P percutaneous coronary angioplasty I25.10, Z98.61    Rhabdomyolysis M62.82    Intracranial atherosclerosis I67.2    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 Answering simple questions: 8/8. Yes/no questins: 8/11. Verbal Expression: word generation concrete: 4/10 increased to 9/10 with moderate verbal cues. Other: O/M exercise program for dysphagia attempted. Pt. Did not attempt to follow commands to complete exercises at this time. Pt states \"I cannot do that\". ST to continue to follow. Plan:  [x] Continue ST services    [] Discharge from ST:      Discharge recommendations: [] Inpatient Rehab   [] [place]   [] Outpatient Therapy  [] Follow up at trauma clinic   [x] Other: Further therapy recommended at discharge. Completed by:  Enrique Ayala  Clinician    Cosigned By: Hannah Givens S.CCC/SLP

## 2021-07-19 NOTE — PROGRESS NOTES
[facility]  Internal Medicine Teaching Residency Program  Inpatient Daily Progress Note  ______________________________________________________________________________    Patient: Crispin Driver  YOB: 1945   CGV:[id number]    Acct: [de-identified]     Room: [facility]  Admit date: 7/13/2021  Today's date: 07/19/21  Number of days in the hospital: 6    SUBJECTIVE   Admitting Diagnosis: Acute ischemic stroke ([facility] [address]  CC: found down    Pt examined at bedside. Chart & results reviewed. Patient hemodynamically stable, afebrile. Platelet up from 43 to 125, resumed plavix, asa, heparin. Creatinine 1.21 --> 1.05 trending down. Patient was agitated at night as per RN note. Patient not agitated in the morning. Waiting precert, d/c sitter. ROS:  Constitutional:  negative for chills, fevers, sweats  Respiratory:  negative for cough, dyspnea on exertion, hemoptysis, shortness of breath, wheezing  Cardiovascular:  negative for chest pain, chest pressure/discomfort, lower extremity edema, palpitations  Gastrointestinal:  negative for abdominal pain, constipation, diarrhea, nausea, vomiting  Neurological:  negative for dizziness, headache  BRIEF HISTORY     The patient is a pleasant 75 y. o. male presents with a chief complaint of being found down by the sister. Patient's sister was informed that he had not been seen, she went to check on him. Sister found him down, called 911 for help. In the ED, he is found to have R facial weakness, R sided arm and leg weakness, and dysarthria. Patient has multiple ecchymosis on R side of arm and chest. Patients lab in ED showed, creatinine 1.47 (baseline 1.40/1.19), BUN 29, GFR 57, Lactic acid 2.4. CXR showed clear lungs, cardiomegaly, no acute cardiopulmonary abnormality.     Patient also found to have elevated Total CK 8454, myoglobin 3030, troponin 52 (baseline 42).  Patient given 1L NS bolus.     Patient CT head showed New ovoid low-attenuation area in the left frontal corona radiata compatible with acute/subacute infarction.  This measures 2 x 1.4 cm.  No associated hemorrhage. 2. Diffuse parenchymal volume loss and sequela of severe chronic microvascular ischemic changes.   Neuro on board. Neuro recommended MRI brain WO, resume ASA, plavix 75, folic acid, lipitor, lipid panel, hba1c, speech eval, SBP < 180, blood glucose < 180, avoid dextrose containing solutions.     CT head/neck showed severe stenosis in V1 segment of left vertebral artery, extensive intracranial atherosclerotic plaque with near complete occusion of the right PCA. Severe stenosis in proximal A3 segments of RAHEEM, moderate to severe proximal M2 segment stenosis of left MCA. Moderate stenosis in P1/P2 junction of left PCA. Enlarged, heterogenous thyroid gland with 2.6 cm left thyroid lobe nodule.      Patient fluorescence platelet, came 4, hem/onc consult placed for dvt prophylaxis, plavix recommendation. MRI showed acute infarct in L and R basal ganglia, Left more than right.     OBJECTIVE     Vital Signs:  BP (!) 141/43   Pulse 55   Temp 97.8 °F (36.6 °C) (Oral)   Resp 24   Ht 5' 10\" (1.778 m)   Wt 210 lb (95.3 kg)   SpO2 99%   BMI 30.13 kg/m²     Temp (24hrs), Av.7 °F (36.5 °C), Min:97.5 °F (36.4 °C), Max:97.8 °F (36.6 °C)    In: 20   Out: -     Physical Exam:  Constitutional: This is a well developed, well nourished, 30-34.9 - Obesity Grade I 77y.o. year old male who is alert, oriented, cooperative and in no apparent distress. Head:normocephalic and atraumatic. EENT:  PERRLA. No conjunctival injections. Septum was midline, mucosa was without erythema, exudates or cobblestoning. No thrush was noted. Neck: Supple without thyromegaly. No elevated JVP. Trachea was midline. Respiratory: Chest was symmetrical without dullness to percussion. Breath sounds bilaterally were clear to auscultation. There were no wheezes, rhonchi or rales.  There is no mg, Q6H PRN  polyethylene glycol, 17 g, Daily PRN  acetaminophen, 650 mg, Q6H PRN   Or  acetaminophen, 650 mg, Q6H PRN  potassium chloride, 40 mEq, PRN   Or  potassium alternative oral replacement, 40 mEq, PRN   Or  potassium chloride, 10 mEq, PRN  sodium chloride, , PRN        Diagnostic Labs:  CBC:   Recent Labs     07/17/21  0342 07/17/21  2328 07/19/21  0612   WBC 17.7* 13.9* 15.3*   RBC 3.80* 4.11* 5.06   HGB 9.1* 10.0* 12.1*   HCT 31.9* 34.6* 41.3   MCV 83.9 84.2 81.6*   RDW 17.8* 17.7* 18.6*   PLT See Reflexed IPF Result See Reflexed IPF Result See Reflexed IPF Result     BMP:   Recent Labs     07/18/21  1226 07/18/21  1600 07/19/21  0612   * 144 145*   K 3.8 4.0 3.8   * 111* 112*   CO2 20 19* 22   BUN 27* 26* 24*   CREATININE 1.15 1.21* 1.05     BNP: No results for input(s): BNP in the last 72 hours. PT/INR: No results for input(s): PROTIME, INR in the last 72 hours. APTT: No results for input(s): APTT in the last 72 hours. CARDIAC ENZYMES: No results for input(s): CKMB, CKMBINDEX, TROPONINI in the last 72 hours. Invalid input(s): CKTOTAL;3  FASTING LIPID PANEL:  Lab Results   Component Value Date    CHOL 186 07/14/2021    HDL 42 07/14/2021    TRIG 77 07/14/2021     LIVER PROFILE: No results for input(s): AST, ALT, ALB, BILIDIR, BILITOT, ALKPHOS in the last 72 hours. MICROBIOLOGY:   Lab Results   Component Value Date/Time    CULTURE NO SIGNIFICANT GROWTH 04/04/2016 12:30 PM    CULTURE  04/04/2016 12:30 PM     Performed at [address], [address] [place] [address] [phone]       Imaging:    CT HEAD WO CONTRAST    Result Date: 7/17/2021  No acute intracranial abnormality. No significant interval change. CT HEAD WO CONTRAST    Result Date: 7/13/2021  1. New ovoid low-attenuation area in the left frontal corona radiata compatible with acute/subacute infarction. This measures 2 x 1.4 cm. No associated hemorrhage.  2. Diffuse parenchymal volume loss and sequela of severe chronic microvascular ischemic changes. Findings were discussed with Dr. Bridget Gutiérrez at 1:31 pm on 7/13/2021. XR CHEST PORTABLE    Result Date: 7/13/2021  Clear lungs. Cardiomegaly. No acute cardiopulmonary abnormality. US SPLEEN    Result Date: 7/14/2021  Suboptimal study. Mild splenomegaly. CTA HEAD NECK W CONTRAST    Result Date: 7/13/2021  1. Severe stenosis in the V1 segment of the left vertebral artery. 2. Extensive intracranial atherosclerotic plaque with near complete occlusion of the right PCA. 3. Severe stenoses in the proximal A3 segments of the ACAs bilaterally. 4. Moderate-to-severe proximal M2 segment stenosis in the right MCA. Moderate stenosis in the M1 and M2 segments of the left MCA. 5. Moderate stenosis in the P1/P2 junction of the left PCA. 6. Enlarged heterogeneous thyroid gland with 2.6 cm left thyroid lobe nodule. Nonemergent/outpatient thyroid ultrasound recommended. Findings were discussed with Dr. Bridget Gutiérrez at 1:43 pm on 7/13/2021. RECOMMENDATIONS: 2.6 cm incidental left thyroid nodule with heterogeneous and enlarged thyroid. Recommend thyroid US. Reference: J Am Bruno Radiol. 2015 Feb;12(2): 143-50     MRI BRAIN WO CONTRAST    Result Date: 7/14/2021  Acute infarct in the left and right basal ganglia greater on the left. Diffuse volume loss with extensive chronic white matter changes. ASSESSMENT & PLAN     ASSESSMENT / PLAN:     Active Problems:  Principal Problem:    Acute cerebrovascular accident (CVA) ([address]  Active Problems:    COPD (chronic obstructive pulmonary disease) (HCC)    HTN (hypertension)    Diabetes 1.5, managed as type 2 ([address]    Hyperlipidemia  Resolved Problems:    * No resolved hospital problems. *        Acute cerebrovascular accident (CVA)  - Patient CT head showed New ovoid low-attenuation area in the left frontal corona radiata compatible with acute/subacute infarction.  This measures 2 x 1.4 cm.  No associated hemorrhage.  2. Diffuse parenchymal volume loss and sequela of severe chronic microvascular ischemic changes.   Neuro on board. - Neuro recommended MRI brain WO, resume ASA, plavix 75, folic acid, lipitor, lipid panel, hba1c, speech eval, SBP < 180, blood glucose < 180, avoid dextrose containing solutions. -CT head/neck showed severe stenosis in V1 segment of left vertebral artery, extensive intracranial atherosclerotic plaque with near complete occusion of the right PCA. Severe stenosis in proximal A3 segments of RAHEEM, moderate to severe proximal M2 segment stenosis of left MCA. Moderate stenosis in P1/P2 junction of left PCA. Enlarged, heterogenous thyroid gland with 2.6 cm left thyroid lobe nodule.   - Neuro on board. MRI showed acute infarct in L and R basal ganglia, Left more than right. 7/13, neuro recommended no intervention for now, to continue medical management  - Echo with bubble study-negative for PFO  - patient started on ASA, plavix    Rhabdomyolysis  -  elevated Total CK 8454, myoglobin 3030, troponin 52 (baseline 42).  Patient given 1L NS bolus, resolved     Coronary artery disease, s/p NSTEMI 06/15, bare metal stent  - ASA, atorvastatin 80, plavix 75, isosorbide mononitrate 60, home meds     Hypertension  - On toprol XL 50, losartan 100, home med     Diabetes mellitis, type 2  -On glipizide 5, home med  - On lantus 8 U nightly, humalog     Thrombocytopenia secondary to immune thrombocytopenic purpura-  - platelet count 1-->01 --> 73 --> 134--> 42 (s/p 1 platelet)>118, at risk of spontaneous bleed as per hem/onc  - immature platelet fraction is syncopal elevated suggesting thrombocytopenia secondary to peripheral destruction as per hem/onc consult  - ITP suspected as per hem/onc, given1 U platelet, IVIG, steroid- decadron given for 4 days.     Hypernatremia secondary to dehydration- fluid deficit of 5 liter  - continue to hydrate with normal saline.  Repeat BMP  - on 5% dextrose on 200ml/hr and 0.45% bolus 1000ml    DVT ppx : heparin  GI ppx: famotidine    PT/OT: pt/t on board  Discharge Planning / SW:  on board, waiting precert and bed availability    Momo Mejia MD  Internal Medicine Resident, PGY-1  [facility]; [place], [place]  7/19/2021, 12:22 PM    Attending Physician Statement  I have discussed the care of Milla Rowell with the resident team. I have examined the patient myself and taken ros and hpi , including pertinent history and exam findings,  with the resident. I have reviewed the key elements of all parts of the encounter with the resident. I agree with the assessment, plan and orders as documented by the resident. 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 *    Patient has been delirious overnight requiring sitter. Sitter has been discontinued today.   Will monitor and consider Seroquel as needed for delirium  Awaiting pre-CERT for ARU    Electronically signed by Juan Martinez MD

## 2021-07-19 NOTE — PROGRESS NOTES
Patient became extremely agitated, pulling at lines, trying to get out of bed, and combative with the guard. 26 - writer contacted internal med regarding patients behavior with no response  2300 - writer contacted internal med again regarding increased agitation requesting to come to bedside to evaluate.     - resident at bedside    0416 - Patient continues to refuse telemetry, pulling at lines, and impulsive

## 2021-07-20 PROBLEM — I63.9 ACUTE CVA (CEREBROVASCULAR ACCIDENT) (HCC): Status: ACTIVE | Noted: 2021-01-01

## 2021-07-20 NOTE — PROGRESS NOTES
[facility] Neurology   IN-PATIENT SERVICE      NEUROLOGY PROGRESS  NOTE            Date:   7/20/2021  Patient name:  Leesa Garduno  Date of admission:  7/13/2021  YOB: 1945    Interval History:   Leesa Garduno is a  77 y.o. male admitted on 7/13/2021 with Acute cerebrovascular accident (CVA) [facility]) [I63.9]  Acute cerebrovascular accident (CVA) ([facility] [address] [I63.9]. This is a follow-up neurology progress note. The patient was seen and examined and the chart was reviewed. Patient is less agitated today and is compliant to nurses orders and direction     This morning patient is awake in the bed, denying any headache, pain, nausea or vomiting. He is restless at the moment. wbc 16.3, hemoglobin 12.1 ----> 11.7  Na 144,   Platelet 022, immature 14.0      History of Present Illness:   67-year-old male with Hx of hypertension, hyperlipidemia, PVD, CAD and previous stroke was admitted on 7/13/2021 with chief complaint of found down on the floor by his sister, with last well-known 2 days ago before the admission. On admission patient's examination was significant for right-sided facial droop, right-sided weakness and severe dysarthria. Patient has significant history with recurrent stroke, remote left temporal lobe ischemic infarct and remote bilateral occipital lobe infarct and patient was supposed to be on dual antiplatelet therapy with aspirin Plavix however patient was not sure when he took the last medication. CT head: New left cortical ischemic stroke  CTA head and neck: Diffuse intracranial atherosclerosis and near complete occlusion of right PCA. Endovascular consulted, no intervention recommended to the optimize medical management with aspirin Plavix statin with target LDL goal less than 70.       Past Medical History:     Past Medical History:   Diagnosis Date    Centrilobular emphysema ([facility] [address]     COPD (chronic obstructive pulmonary disease) ([facility] [address]     Depression     Diabetes mellitus ([facility] [address]     pt refuses to take previously prescribed metformin (states PCP aware)    Former smoker     Hyperlipidemia     on 18 pt states \"I stopped taking that about a year ago\"    Hypertension     Mixed restrictive and obstructive lung disease ([facility] [address]     Need for pneumococcal vaccine     Personal history of tobacco use         Past Surgical History:     Past Surgical History:   Procedure Laterality Date    CARDIAC SURGERY  2015    1 stent    NECK SURGERY      TOE AMPUTATION Right greater        Medications during admission:      hydrALAZINE  10 mg Oral 3 times per day    methylPREDNISolone  40 mg Intravenous Q12H    sodium chloride  1,000 mL Intravenous Once    atropine  0.4 mg Intravenous Once    rosuvastatin  40 mg Oral Nightly    [Held by provider] clopidogrel  75 mg Oral Daily    [Held by provider] aspirin  81 mg Oral Daily    heparin (porcine)  5,000 Units Subcutaneous 3 times per day    famotidine  20 mg Oral BID    insulin lispro  0-12 Units Subcutaneous TID WC    insulin lispro  0-6 Units Subcutaneous Nightly    insulin glargine  8 Units Subcutaneous Nightly    sodium chloride  1,000 mL Intravenous Once    budesonide-formoterol  2 puff Inhalation BID    tiotropium  2 puff Inhalation Daily    sodium chloride flush  5-40 mL Intravenous 2 times per day         Physical Exam:   BP (!) 165/67   Pulse 61   Temp 97.4 °F (36.3 °C) (Oral)   Resp 21   Ht 5' 10\" (1.778 m)   Wt 210 lb (95.3 kg)   SpO2 97%   BMI 30.13 kg/m²   Temp (24hrs), Av.5 °F (36.4 °C), Min:97.1 °F (36.2 °C), Max:97.8 °F (36.6 °C)    General examination:      General Appearance:  alert, well appearing, and in no acute distress  HEENT: Normocephalic, atraumatic, moist mucus membranes  Neck: supple, no carotid bruits, (-) nuchal rigidity  Lungs:  Respirations unlabored, chest wall no deformity, BS normal  Cardiovascular: normal rate, regular rhythm  Abdomen: Soft, nontender, nondistended, normal bowel sounds  Skin: No gross lesions, rashes, bruising or bleeding on exposed skin area  Extremities:  peripheral pulses palpable, clubbing or edema  Psych: normal affect      Neurological examination:    Mental status   Alert and oriented x 1; patient follows simple commands, speech is significantly dysarthric       Cranial nerves   II - left hemianopsia,   III, IV, VI  extraocular muscles intact; no MAGDALENO; no nystagmus; no ptosis   V - normal facial sensation                                                               VII - abnormal facial symmetry                                                          VIII - intact hearing                                                                             IX, X - symmetrical palate elevation                                               XI - symmetrical shoulder shrug                                                       XII - midline tongue without atrophy or fasciculation     Motor function  Strength:   2/5 RUE, 2/5 RLE ; right contracture   4+/5 LUE, 4+/5  LLE  Normal bulk and tone. Sensory function Intact to touch, throughout     Cerebellar Intact finger-nose-finger testing on left side. No tremors                        Reflex function 1/4 symmetric throughout .  Downgoing plantar response  Left, extensor on right (-)Grayson's sign bilaterally      Gait                   not assessed              Diagnostics:      Laboratory Testing:  CBC:   Recent Labs     07/17/21  2328 07/19/21  0612 07/20/21  0509   WBC 13.9* 15.3* 16.3*   HGB 10.0* 12.1* 11.7*   PLT See Reflexed IPF Result See Reflexed IPF Result See Reflexed IPF Result       BMP:    Recent Labs     07/18/21  1600 07/19/21  0612 07/20/21  0509    145* 144   K 4.0 3.8 4.4   * 112* 110*   CO2 19* 22 21   BUN 26* 24* 24*   CREATININE 1.21* 1.05 1.00   GLUCOSE 180* 104* 148*         Lab Results   Component Value Date    CHOL 186 07/14/2021    LDLCHOLESTEROL 129 07/14/2021    HDL 42 07/14/2021 study   Keep SBP greater than 120   PT/OT/speech therapy   Endovascular, no intervention for now, continue medical management    Fall precautions   Hypernatremia and bun/cr management as per primary    Disposition as per primary         [place] LIVIA Camargo PGY1  Neurology Service  7/20/2021 at 2:37 PM

## 2021-07-20 NOTE — PROGRESS NOTES
[facility]  Internal Medicine Teaching Residency Program  Inpatient Daily Progress Note  ______________________________________________________________________________    Patient: Rm Clifton  YOB: 1945   LOX:[id number]    Acct: [de-identified]     Room: [address] [place], [place] [address]  Admit date: 7/13/2021  Today's date: 07/20/21  Number of days in the hospital: 7    SUBJECTIVE   Admitting Diagnosis: Cerebrovascular accident (CVA) ([address]  CC: found down    Pt examined at bedside. Chart & results reviewed. Patient hemodynamically stable, afebrile. Platelets 751--> 93. Monitoring platelets. Labs show no abnormalities. Patient on solumedrol. Added dulcolax. Patient received pre cert today, planned for discharge today. ROS:  Constitutional:  negative for chills, fevers, sweats  Respiratory:  negative for cough, dyspnea on exertion, hemoptysis, shortness of breath, wheezing  Cardiovascular:  negative for chest pain, chest pressure/discomfort, lower extremity edema, palpitations  Gastrointestinal:  negative for abdominal pain, constipation, diarrhea, nausea, vomiting  Neurological:  negative for dizziness, headache  BRIEF HISTORY   The patient is a pleasant 75 y. o. male presents with a chief complaint of being found down by the sister. Patient's sister was informed that he had not been seen, she went to check on him. Sister found him down, called 911 for help. In the ED, he is found to have R facial weakness, R sided arm and leg weakness, and dysarthria. Patient has multiple ecchymosis on R side of arm and chest. Patients lab in ED showed, creatinine 1.47 (baseline 1.40/1.19), BUN 29, GFR 57, Lactic acid 2.4. CXR showed clear lungs, cardiomegaly, no acute cardiopulmonary abnormality.     Patient also found to have elevated Total CK 8454, myoglobin 3030, troponin 52 (baseline 42).  Patient given 1L NS bolus.     Patient CT head showed New ovoid low-attenuation area in the left frontal corona radiata compatible with acute/subacute infarction.  This measures 2 x 1.4 cm.  No associated hemorrhage. 2. Diffuse parenchymal volume loss and sequela of severe chronic microvascular ischemic changes.   Neuro on board. Neuro recommended MRI brain WO, resume ASA, plavix 75, folic acid, lipitor, lipid panel, hba1c, speech eval, SBP < 180, blood glucose < 180, avoid dextrose containing solutions.     CT head/neck showed severe stenosis in V1 segment of left vertebral artery, extensive intracranial atherosclerotic plaque with near complete occusion of the right PCA. Severe stenosis in proximal A3 segments of RAHEEM, moderate to severe proximal M2 segment stenosis of left MCA. Moderate stenosis in P1/P2 junction of left PCA. Enlarged, heterogenous thyroid gland with 2.6 cm left thyroid lobe nodule.      Patient fluorescence platelet, came 4, hem/onc consult placed for dvt prophylaxis, plavix recommendation. Improved platelet count to 142 --> 93 after 1 Platelet transfusion, solumedrol, IVIG. MRI showed acute infarct in L and R basal ganglia, Left more than right.     OBJECTIVE     Vital Signs:  BP (!) 165/67   Pulse 61   Temp 97.4 °F (36.3 °C) (Oral)   Resp 21   Ht 5' 10\" (1.778 m)   Wt 210 lb (95.3 kg)   SpO2 97%   BMI 30.13 kg/m²     Temp (24hrs), Av.5 °F (36.4 °C), Min:97.1 °F (36.2 °C), Max:97.8 °F (36.6 °C)    No intake/output data recorded. Physical Exam:  Constitutional: This is a well developed, well nourished, 30-34.9 - Obesity Grade I 77y.o. year old male who is alert, oriented, cooperative and in no apparent distress. Head:normocephalic and atraumatic. EENT:  PERRLA. No conjunctival injections. Septum was midline, mucosa was without erythema, exudates or cobblestoning. No thrush was noted. Neck: Supple without thyromegaly. No elevated JVP. Trachea was midline. Respiratory: Chest was symmetrical without dullness to percussion.   Breath sounds bilaterally were clear to auscultation. There were no wheezes, rhonchi or rales. There is no intercostal retraction or use of accessory muscles. No egophony noted. Cardiovascular: Regular without murmur, clicks, gallops or rubs. Abdomen: Slightly rounded and soft without organomegaly. No rebound, rigidity or guarding was appreciated. Lymphatic: No lymphadenopathy. Musculoskeletal: Normal curvature of the spine. No gross muscle weakness. Extremities:  No lower extremity edema, ulcerations, tenderness, varicosities or erythema. Muscle size, tone and strength are normal.  No involuntary movements are noted. Skin:  Warm and dry. Good color, turgor and pigmentation. No lesions or scars.   No cyanosis or clubbing  Neurological/Psychiatric: The patient's general behavior, level of consciousness, thought content and emotional status is normal.        Medications:  Scheduled Medications:    hydrALAZINE  10 mg Oral 3 times per day    methylPREDNISolone  40 mg Intravenous Q12H    sodium chloride  1,000 mL Intravenous Once    atropine  0.4 mg Intravenous Once    rosuvastatin  40 mg Oral Nightly    clopidogrel  75 mg Oral Daily    aspirin  81 mg Oral Daily    heparin (porcine)  5,000 Units Subcutaneous 3 times per day    famotidine  20 mg Oral BID    insulin lispro  0-12 Units Subcutaneous TID WC    insulin lispro  0-6 Units Subcutaneous Nightly    insulin glargine  8 Units Subcutaneous Nightly    sodium chloride  1,000 mL Intravenous Once    budesonide-formoterol  2 puff Inhalation BID    tiotropium  2 puff Inhalation Daily    sodium chloride flush  5-40 mL Intravenous 2 times per day     Continuous Infusions:    sodium chloride      dextrose      sodium chloride      sodium chloride       PRN MedicationsQUEtiapine, 25 mg, Nightly PRN  naloxone, 0.4 mg, PRN  melatonin, 3 mg, Nightly PRN  glucose, 15 g, PRN  dextrose, 12.5 g, PRN  glucagon (rDNA), 1 mg, PRN  dextrose, 100 mL/hr, PRN  albuterol sulfate HFA, 2 puff, Q6H PRN  sodium chloride flush, 5-40 mL, PRN  sodium chloride, 25 mL, PRN  ondansetron, 4 mg, Q8H PRN   Or  ondansetron, 4 mg, Q6H PRN  polyethylene glycol, 17 g, Daily PRN  acetaminophen, 650 mg, Q6H PRN   Or  acetaminophen, 650 mg, Q6H PRN  potassium chloride, 40 mEq, PRN   Or  potassium alternative oral replacement, 40 mEq, PRN   Or  potassium chloride, 10 mEq, PRN  sodium chloride, , PRN        Diagnostic Labs:  CBC:   Recent Labs     07/17/21  2328 07/19/21  0612 07/20/21  0509   WBC 13.9* 15.3* 16.3*   RBC 4.11* 5.06 4.85   HGB 10.0* 12.1* 11.7*   HCT 34.6* 41.3 39.4*   MCV 84.2 81.6* 81.2*   RDW 17.7* 18.6* 18.7*   PLT See Reflexed IPF Result See Reflexed IPF Result See Reflexed IPF Result     BMP:   Recent Labs     07/18/21  1600 07/19/21  0612 07/20/21  0509    145* 144   K 4.0 3.8 4.4   * 112* 110*   CO2 19* 22 21   BUN 26* 24* 24*   CREATININE 1.21* 1.05 1.00     BNP: No results for input(s): BNP in the last 72 hours. PT/INR: No results for input(s): PROTIME, INR in the last 72 hours. APTT: No results for input(s): APTT in the last 72 hours. CARDIAC ENZYMES: No results for input(s): CKMB, CKMBINDEX, TROPONINI in the last 72 hours. Invalid input(s): CKTOTAL;3  FASTING LIPID PANEL:  Lab Results   Component Value Date    CHOL 186 07/14/2021    HDL 42 07/14/2021    TRIG 77 07/14/2021     LIVER PROFILE: No results for input(s): AST, ALT, ALB, BILIDIR, BILITOT, ALKPHOS in the last 72 hours. MICROBIOLOGY:   Lab Results   Component Value Date/Time    CULTURE NO SIGNIFICANT GROWTH 04/04/2016 12:30 PM    CULTURE  04/04/2016 12:30 PM     Performed at [address] [place] [address] [phone]       Imaging:    CT HEAD WO CONTRAST    Result Date: 7/17/2021  No acute intracranial abnormality. No significant interval change. CT HEAD WO CONTRAST    Result Date: 7/13/2021  1.  New ovoid low-attenuation area in the left frontal corona radiata compatible with acute/subacute infarction. This measures 2 x 1.4 cm. No associated hemorrhage. 2. Diffuse parenchymal volume loss and sequela of severe chronic microvascular ischemic changes. Findings were discussed with Dr. Zeke Daniel at 1:31 pm on 7/13/2021. XR CHEST PORTABLE    Result Date: 7/13/2021  Clear lungs. Cardiomegaly. No acute cardiopulmonary abnormality. US SPLEEN    Result Date: 7/14/2021  Suboptimal study. Mild splenomegaly. CTA HEAD NECK W CONTRAST    Result Date: 7/13/2021  1. Severe stenosis in the V1 segment of the left vertebral artery. 2. Extensive intracranial atherosclerotic plaque with near complete occlusion of the right PCA. 3. Severe stenoses in the proximal A3 segments of the ACAs bilaterally. 4. Moderate-to-severe proximal M2 segment stenosis in the right MCA. Moderate stenosis in the M1 and M2 segments of the left MCA. 5. Moderate stenosis in the P1/P2 junction of the left PCA. 6. Enlarged heterogeneous thyroid gland with 2.6 cm left thyroid lobe nodule. Nonemergent/outpatient thyroid ultrasound recommended. Findings were discussed with Dr. Zeke Daniel at 1:43 pm on 7/13/2021. RECOMMENDATIONS: 2.6 cm incidental left thyroid nodule with heterogeneous and enlarged thyroid. Recommend thyroid US. Reference: J Am Bruno Radiol. 2015 Feb;12(2): 143-50     MRI BRAIN WO CONTRAST    Result Date: 7/14/2021  Acute infarct in the left and right basal ganglia greater on the left. Diffuse volume loss with extensive chronic white matter changes. ASSESSMENT & PLAN     ASSESSMENT / PLAN:     Active Problems:  Principal Problem:    Acute cerebrovascular accident (CVA) ([address]  Active Problems:    COPD (chronic obstructive pulmonary disease) (HCC)    HTN (hypertension)    Diabetes 1.5, managed as type 2 ([address]    Hyperlipidemia  Resolved Problems:    * No resolved hospital problems.  *        Acute cerebrovascular accident (CVA)  - Patient CT head showed New ovoid low-attenuation area in the left frontal corona radiata hydrate with normal saline. Repeat BMP  - tx with 5% dextrose on 200ml/hr and 0.45% bolus 1000 ml  - resolved    DVT ppx : heparin  GI ppx: famotidine    PT/OT: pt/ot on board  Discharge Planning / SW:  on board, received precert, going to [facility]. [facility] 6. Josep Upton MD  Internal Medicine Resident, PGY-1  [address] [place], [place]  7/20/2021, 7:57 AM    Attending Physician Statement  I have discussed the care of Sandra Braga with the resident team. I have examined the patient myself and taken ros and hpi , including pertinent history and exam findings,  with the resident. I have reviewed the key elements of all parts of the encounter with the resident. I agree with the assessment, plan and orders as documented by the resident. 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  *      Electronically signed by Glen Cordero MD

## 2021-07-20 NOTE — PROGRESS NOTES
Occupational Therapy  Facility/Department: [facility] NEURO  Daily Treatment Note  NAME: Fahad Storm  : 1945  MRN: [id number]    Date of Service: 2021    Discharge Recommendations: Further therapy recommended at discharge. The patient should be able to tolerate at least three hours of therapy per day over 5 days or 15 hours over 7 days. Patient would benefit from continued therapy after discharge d/t overall weakness. Assessment   Performance deficits / Impairments: Decreased functional mobility ; Decreased endurance;Decreased ADL status; Decreased balance;Decreased high-level IADLs;Decreased safe awareness;Decreased cognition;Decreased strength;Decreased ROM; Decreased sensation;Decreased coordination;Decreased fine motor control  Assessment: Pt would benefit from continued acute care and post acute care OT to increase strenght, functional transfers/mobility, ADL's and IADL's prior to returning to previous living arrangements. Treatment Diagnosis: CVA  Prognosis: Fair  REQUIRES OT FOLLOW UP: Yes  Activity Tolerance  Activity Tolerance: Patient Tolerated treatment well;Treatment limited secondary to decreased cognition  Safety Devices  Safety Devices in place: Yes  Type of devices: All fall risk precautions in place;Call light within reach; Chair alarm in place;Gait belt;Patient at risk for falls; Left in chair;Nurse notified (Telesitter present)  Equipment Recommendations  Equipment Needed: Yes  Alexander Garcia: Usman-walker  Transfer Tub Bench: w/back         Patient Diagnosis(es): The primary encounter diagnosis was Cerebrovascular accident (CVA), unspecified mechanism ([facility] A diagnosis of Traumatic rhabdomyolysis, initial encounter [facility]) was also pertinent to this visit.       has a past medical history of Centrilobular emphysema ([address] COPD (chronic obstructive pulmonary disease) ([address] Depression, Diabetes mellitus ([address] Former smoker, Hyperlipidemia, Hypertension, Mixed restrictive and obstructive lung disease ([facility] Need for pneumococcal vaccine, and Personal history of tobacco use.   has a past surgical history that includes Neck surgery; Toe amputation (Right, greater); and Cardiac surgery (07/2015). Restrictions  Restrictions/Precautions  Restrictions/Precautions: Up as Tolerated, General Precautions, Fall Risk  Required Braces or Orthoses?: No  Position Activity Restriction  Other position/activity restrictions: Up w/assistance, RU/LE Weakness  Subjective   General  Chart Reviewed: Yes  Patient assessed for rehabilitation services?: Yes  Family / Caregiver Present: No  Diagnosis: L/R basal ganglia infarcts, found down, R weakness, Rhabdomyelosis  Vital Signs  Patient Currently in Pain: Denies   Orientation  Orientation  Overall Orientation Status: Impaired  Orientation Level:  A&O (oriented to name and hospital, not to date or situation)    Objective    ADL  Feeding: Minimal assistance;Setup;Verbal cueing; Increased time to complete; Adaptive utensils (comment); Scoop assist;Pureed diet; Thickened liquids (Nectar-thick drinks, Red foam build up handle to A with self feeding)  Grooming: Minimal assistance;Setup;Verbal cueing; Increased time to complete (A-wring washcloth, able to wash face, A-open mouthwash, able to take mouthwash to mouth to rinse and spit into emesis basin)  UE Bathing: Moderate assistance;Setup;Verbal cueing; Increased time to complete (A-to wash LUE, bilat underarms and back)  LE Bathing: Moderate assistance;Setup;Verbal cueing; Increased time to complete (A-to wash lower legs and feet)  UE Dressing: Maximum assistance;Setup;Verbal cueing; Increased time to complete (A-to snap, thread and pull up arms and tie gown)  LE Dressing: Maximum assistance;Setup;Verbal cueing; Increased time to complete (A-needed to don footies on bilat feet )  Toileting: Maximum assistance;Setup; Increased time to complete (wearing a brief, using urinal 50% time, unable to complete willem care on backside)      Pt in bed upon arrival. Pt not speaking to writer or RN for ~ first 15 min of tx. Pt was setup for self care seated in bed, HOB elevated (see above for LOF). Pt began to answer questions and engage in OT and self care with continued encouragement. Pt is A&OX2 (name and in hospital). Pt requires assist d/t RUE hypertonicity and LLE weakness. RUE shoulder and elbow tight with difficulty completing PROM. Shoulder flexion to ~ 90 degrees, elbow extension to ~ 20 degrees. Pt transferred to EOB w/mod assist with pt assisting LLE off EOB using LUE. PT in at end of OT tx. Chair alarm setup in recliner, call light in reach and RN notified. Chair follow for PT w/pt using platform walker and assist needed to move right foot d/t weakness. Balance  Sitting Balance: Contact guard assistance (for approx 3-4 min w/no LOB)  Standing Balance: Unable to assess(left pt sitting at EOB w/PT)  Bed mobility  Rolling to Left: Moderate assistance  Supine to Sit: Moderate assistance  Sit to Supine: Unable to assess (left up in chair)  Scooting:  Moderate assistance  Comment: Pt assisted lifting LLE using LUE       Plan   Plan  Times per week: 4-5x    AM-PAC Score  AM-[facility] Inpatient Daily Activity Raw Score: 15 (07/20/21 1105)  AM-PAC Inpatient ADL T-Scale Score : 34.69 (07/20/21 1105)  ADL Inpatient CMS 0-100% Score: 56.46 (07/20/21 1105)  ADL Inpatient CMS G-Code Modifier : CK (07/20/21 1105)    Goals  Short term goals  Time Frame for Short term goals: Pt will by discharge  Short term goal 1: demo ADL UB bathing/dressing activity with adaptive tech's, setup, and SBA  Short term goal 2: demo ADL LB bathing/dressing activity with adaptive tech's, setup, and min A  Short term goal 3: demo 4-step func activity with 1 vc only to address cognitive concerns  Short term goal 4: demo good safety awareness during func mob around room using LRD PRN and CGA  Short term goal 5: demo AAROM/PROM to WFL's at all joints x10 for prolonged stretch with assist PRN Therapy Time   Individual Concurrent Group Co-treatment   Time In  0905         Time Out  1010         Minutes  65 total tx time                 [address]/L

## 2021-07-20 NOTE — PROGRESS NOTES
Physical Therapy  Facility/Department: [facility] NEURO  Daily Treatment Note  NAME: Lesia Patterson  : 1945  MRN: [id number]    Date of Service: 2021    Discharge Recommendations: In my professional opinion, this patient could tolerate a total of 3 hours of combined therapies post-acute care. PT Equipment Recommendations  Equipment Needed: No (pt will likely need equipment; will defer equipment eval to rehab facility)    Assessment    Pt cooperative, attempting to help move his RU/LEs by assisting with LUE. Able to stand without device with mod to max A+1; needs assistance to wt shift and advance his RLE during ambulation. He would benefit from aggressive therapies prior to returning home. Body structures, Functions, Activity limitations: Decreased functional mobility ; Decreased ROM; Decreased strength;Decreased balance;Decreased safe awareness;Decreased endurance;Decreased posture  Prognosis: Good  Decision Making: High Complexity  PT Education: Goals;PT Role;Plan of Care;General Safety;Home Exercise Program;Transfer Training;Orientation; Family Education;Equipment;Weight-bearing Education; Adaptive Device Training;Gait Training;Disease Specific Education; Functional Mobility Training; Injury Prevention  Barriers to Learning: aphasia  REQUIRES PT FOLLOW UP: Yes  Activity Tolerance  Activity Tolerance: Patient Tolerated treatment well     Patient Diagnosis(es): The primary encounter diagnosis was Cerebrovascular accident (CVA), unspecified mechanism ([facility] A diagnosis of Traumatic rhabdomyolysis, initial encounter [facility]) was also pertinent to this visit.      has a past medical history of Centrilobular emphysema ([address] COPD (chronic obstructive pulmonary disease) ([facility] Depression, Diabetes mellitus ([address] Former smoker, Hyperlipidemia, Hypertension, Mixed restrictive and obstructive lung disease ([address] Need for pneumococcal vaccine, and Personal history of tobacco use.   has a past surgical history that includes Neck surgery; Toe amputation (Right, greater); and Cardiac surgery (07/2015). Restrictions  Restrictions/Precautions  Restrictions/Precautions: Up as Tolerated, General Precautions, Fall Risk  Required Braces or Orthoses?: No  Position Activity Restriction  Other position/activity restrictions: Up w/assistance, RU/LE Weakness  Subjective   General  Response To Previous Treatment: Patient with no complaints from previous session. Family / Caregiver Present: No  Pain Screening  Patient Currently in Pain: Yes  Pain Assessment  Pain Assessment: 0-10  Pain Level: 4  Patient's Stated Pain Goal: No pain  Pain Type: Acute pain  Pain Location: Arm; Shoulder  Pain Orientation: Right  Pain Descriptors: Aching  Pain Frequency: Intermittent  Pain Onset: On-going  Clinical Progression: Not changed  Functional Pain Assessment: Prevents or interferes some active activities and ADLs  Vital Signs  Patient Currently in Pain: Yes       Orientation  Orientation  Overall Orientation Status: Impaired  Orientation Level: Oriented to person;Oriented to place; Disoriented to time;Disoriented to situation    Objective   Bed mobility  Rolling to Left: Moderate assistance  Supine to Sit: Moderate assistance  Scooting: Moderate assistance  Comment: pt needed assistance for RLE and RUE/trunk  Transfers  Sit to Stand: Moderate Assistance;Maximum Assistance (mod A from the bed; max A from the chair)  Stand to sit: Moderate Assistance  Bed to Chair: Maximum assistance (assist to move RLE)  Stand Pivot Transfers: Maximum Assistance  Comment: stand pivot bed to chair without device, max A  Ambulation  Ambulation?: Yes  Ambulation 1  Surface: level tile  Device:  (lift walker, B forearm support)  Assistance: Maximum assistance (WC follow for safety)  Gait Deviations: Slow Anais;Decreased step length;Decreased step height;Decreased arm swing;Decreased head and trunk rotation;Deviated path;Staggers; Shuffles  Distance: 50'x1  Comments: max A to wt shift L/R, max A to advance RLE--pt inconsistently able to initiate swing on the R  Stairs/Curb  Stairs?: No     Balance  Posture: Fair  Sitting - Static: Good  Sitting - Dynamic: Fair  Standing - Static: Fair;-  Standing - Dynamic: Poor  Comments: standing balance without device holding on to PT, max A, R lean  Other exercises  Other exercises 1: seated LE ex: AROM LLE with frequent verbal cues; AAROM RLE all x 10: LAQs, KTC, hip ab/adduction  Other exercises 2: standing balance/head/trunk control with max A+1: wt shift R/L; partial squats, max A to control RLE, 10 reps all  Other exercises 3: PROM RUE dodie elbow extension and shoulder elevation/ER (slow gentle stretch--painful) elbow lacks ~15 degrees from full extension; shoulder elevation to ~100 degrees)   AM-PAC Score  AM-PAC Inpatient Mobility Raw Score : 11 (07/20/21 1106)  AM-PAC Inpatient T-Scale Score : 33.86 (07/20/21 1106)  Mobility Inpatient CMS 0-100% Score: 72.57 (07/20/21 1106)  Mobility Inpatient CMS G-Code Modifier : CL (07/20/21 1106)          Goals  Short term goals  Time Frame for Short term goals: 12  Short term goal 1: bed mobility with min A+1  Short term goal 2: transfers with min A+1  Short term goal 3: Amb 22' w/ modA+1 w/ appropriate AD  Short term goal 4: WC mobility x 25' with min A+1  Patient Goals   Patient goals : be able to drive    Plan    Plan  Times per week: 5-6 visits  Times per day: Daily  Current Treatment Recommendations: Strengthening, ROM, Balance Training, Functional Mobility Training, Gait Training, Endurance Training, Transfer Training, Safety Education & Training, Wheelchair Mobility Training, Neuromuscular Re-education, Cognitive Reorientation, Home Exercise Program, Patient/Caregiver Education & Training, Equipment Evaluation, Education, & procurement, Positioning  Safety Devices  Type of devices: Gait belt, Patient at risk for falls, Left in chair, Call light within reach, Chair alarm in place  Restraints  Initially in place: No     Therapy Time   Individual Concurrent Group Co-treatment   Time In 1000         Time Out 1040         Minutes 40                 Wilfred Mccloud, [address]

## 2021-07-20 NOTE — CARE COORDINATION
Discharge [address] [facility]  Clinical Case Management Department  Written by:  Rosen RN    Patient Name: Rm Clifton  Attending Provider: Kim Butler MD  Admit Date: 2021 11:18 AM  MRN: [id number]  Account: [de-identified]                     : 1945  Discharge Date: 2021      Disposition: [address] was notified of  at 4 pm    Ashwin Oocnnell, RN

## 2021-07-20 NOTE — PROGRESS NOTES
Speech Language Pathology  Speech Language Pathology  [address] [facility]    Speech Language Treatment Note    Date: 2021  Patients Name: Irish Schultz  MRN: [id number]  Diagnosis:   Patient Active Problem List   Diagnosis Code    Centrilobular emphysema (HCC) J43.2    Mixed restrictive and obstructive lung disease ([facility] J43.9, J98.4    Cerebrovascular accident (CVA) ([facility] I63.9    COPD (chronic obstructive pulmonary disease) ([facility] J44.9    HTN (hypertension) I10    Diabetes 1.5, managed as type 2 (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 D69.6    Right sided weakness R53.1    Noncompliance with medications Z91.14    Acute idiopathic thrombocytopenic purpura ([facility] D69.3       Pain: 0/10    Speech and Language Treatment  Treatment time: 2858-6672    Subjective: [] Alert [x] Cooperative     [] Confused     [] Agitated    [x] Lethargic      Objective/Assessment:  Auditory Comprehension: following 1 step directions: 5/5. Following 2 step directions 2/5 increased to 3/5 with moderate verbal and tactile cues. Yes/no questions: 6/9. Verbal Expression: Childrens rhymes and songs: 8/10 did not increase despite moderate verbal cues. Biographical information: 3/6, oriented to name, , and place. Pt not oriented to age, or time. Answering questions: 6/9 increased to 8/9 with minimal verbal cues. Other: Pt falling asleep throughout therapy session requiring multiple repetitions, redirections, and tactile stimulation. Prior to therapy session pt working with PT and OT.      Plan:  [x] Continue ST services    [] Discharge from ST:      Discharge recommendations: [] Inpatient Rehab   [] [place]   [] Outpatient Therapy  [] Follow up at trauma clinic   [x] Other: Further therapy recommended at discharge. Completed by:  Enrique Ayala  Clinician    Cosigned By: Hannah Givens S.CCC/SLP

## 2021-07-20 NOTE — PROGRESS NOTES
[facility]   Acute Inpatient Rehab Preadmission Assessment    Patient Name: Madhu Willson        MRN: [id number]    : 1945  (72 y.o.)  Gender: male     Admitted from:   [facility]  [x][facility]   [][facility]   [][facility]    []Outside Admission - Location:                                 [x]Initial         []Updated    Date of Onset / Admission to the acute hospital:  21    Inpatient Rehabilitation Admitting Diagnosis:  CVA    Did patient have surgery? [x]No  []Yes:      Physicians: Darlene Santiago    Risk for clinical complications:  High    Co-morbidities:      1. Rhabdomyolysis  2. CAD: history NSTEMI with bare metal stent  3. HTN  4. DM II  5.  Thrombocytopenia    Financial Information  Primary insurance:  []Medicare [x] Medicare HMO  []Commercial insurance    []Medicaid   [] Medicaid HMO []Workers Compensation   []Personal Pay    Secondary Insurance:  []Medicare [] Medicare HMO  []Commercial insurance    []Medicaid  []Medicaid HMO  []Workers Compensation [x]None    Precautions:   []Cardiac Precautions:    []Total hip precautions:    []Weight Bearing status:  [x]Safety Precautions/Concerns:  Fall Risk, General Precautions  [x]Visually impaired:  Wears glasses at all times   []Hard of Hearing:     Isolation Precautions:         []Yes  [x]No  If Yes:  [] Droplet  []Contact []Airborne     []VRE     []MRSA     []C-diff         [] TB       [] ESBL [] MDRO          [] Other:        Physiatrist:  []   Dr. Ivan Petit     []   Dr. Annette Sosa  [x]   Dr. Sanford Moore  []   Dr. Luz Smith    Patients Occupation: Retired    Reviewed Lab and Diagnostic reports from Current Admission: Yes    Patients Prior Functional  Level: Prior Function  Ambulation Assistance: Independent  Transfer Assistance: Independent  Additional Comments: Information obtained from case management note; Pt and cousin unable to provide much information, cousin states pt had no UB weakness prior to this admission    History of current illness, per PM&R Consult: Joann Hernandez is a 77 y.o. RHD male admitted to [facility] on 7/13/2021.       Patient admitted after being found down by his family with R facial weakness and R arm/leg weakness as well as dysarthria. Diagnostic studies showed multiple areas of stenosis in vertebral, PCA, RAHEEM and MCA. Stroke being medically managed.     Hematology consulted for thrombocytopenia - likely secondary to peripheral destruction of platelets - suspecting ITP. Treating with steroids and blood transfusion. Platelet count improved to 11 after IVIG.    Patient with significant dysarthria and expressive aphasia. Dominant RUE weakness is worse than RLE weakness. Prognosis: Fair    Current functional status for upper extremity ADLs:  UE Bathing: Moderate assistance, Setup, Verbal cueing, Increased time to complete (A-to wash LUE, bilat underarms and back)  UE Dressing: Maximum assistance, Setup, Verbal cueing, Increased time to complete (A-to snap, thread and pull up arms and tie gown)    Current functional status for lower extremity ADLs:  LE Bathing: Moderate assistance, Setup, Verbal cueing, Increased time to complete (A-to wash lower legs and feet)  LE Dressing: Maximum assistance, Setup, Verbal cueing, Increased time to complete (A-needed to don footies on bilat feet )    Current functional status for bed, chair, wheelchair transfers:  Transfers  Sit to Stand: Moderate Assistance, Maximum Assistance (mod A from the bed; max A from the chair)  Stand to sit: Moderate Assistance  Bed to Chair: Maximum assistance (assist to move RLE)  Stand Pivot Transfers: Maximum Assistance  Comment: stand pivot bed to chair without device, max A    Current functional status for toilet transfers:   Moderate Assistance, Maximum Assistance       Current functional status for locomotion:  Ambulation  Ambulation?: Yes  More Ambulation?: No  Ambulation 1  Surface: level tile  Device:  (lift walker, B forearm support)  Assistance: Maximum assistance (WC follow for safety)  Quality of Gait: magnetic gait, short step length, needed assist wt shifting to his L to advance RLE, mx vc's for sequencing step pattern w/AD  Gait Deviations: Slow Anais, Decreased step length, Decreased step height, Decreased arm swing, Decreased head and trunk rotation, Deviated path, Staggers, Shuffles  Distance: 50'x1  Comments: max A to wt shift L/R, max A to advance RLE--pt inconsistently able to initiate swing on the R    Current functional status for comprehension: Moderate Assistance    Current functional status for expression: Maximal Assistance    Current functional status for social interaction: Moderate assistance    Current functional status for problem solving: Maximal Assistance    Current functional status for memory: Maximal Assistance    Current Deficits R/T Impairment: Impaired Functional Mobility and Decreased ADLs    Required Therapy:   [x] Physical Therapy  [x] Occupational Therapy   [x] Speech Therapy, as appropriate    Additional Services:    [x]     [] Recreational Therapy, as appropriate    [x] Nutrition    [] Dialysis  [] Cultural Needs Identified  [] Equipment  [] Other    Rehab Justification:  Needs 3 hrs therapy per day or 15 hours per week:  Yes  Identified Rehab Nursing needs: Yes  Intense Interdisciplinary need:  Yes  Need for 24 hr physician supervision:  Yes  Measurable improved quality of life:  Yes  Willingness to participate:  Yes  Medical Necessity:  Yes  Patient able to tolerate care proposed:  Yes    Expected Discharge Destination/Functional Level:  Home with assist  Expected length of time to achieve that level of improvement: 1-2 weeks  Expected Post Discharge Treatments: Home with possible Home Care    Other information relevant to patient's care needs:  N/A    Acute Inpatient Rehabilitation Disclosure Statement will be provided to patient upon admission to ARU with patient's verbalization of understanding. I have reviewed and concur with the findings and results of the pre-admission screening assessment completed by the Inpatient Rehabilitation Admissions Coordinator.

## 2021-07-20 NOTE — PROGRESS NOTES
Shanthi'd fax from [facility] with approval for ARU. Notified ROOSEVELT Huang CM, and writer requested d/c readmit be completed with continuation of DVT prophylaxis and report be called to [phone]. Admission Assessment completed and Dr Merari Fernandez notified. Spoke with [employer] @ [facility] who verified [facility] ARU is in network as well as pt's benefits for ARU which are as follows:  $295/day for days 1-7, then 100% coverage.

## 2021-07-21 NOTE — PROGRESS NOTES
Speech Language Pathology  [facility] Unit at [facility]  Speech Language Pathology    Date: 7/21/2021  Patient Name: Joann Hernandez  YOB: 1945   AGE: 77 y.o. MRN: [id number]        Patient Not Available for Speech Therapy     Due to:  [] Testing  [] Hemodialysis  [] Cancelled by RN  [] Surgery   [] Intubation/Sedation/Pain Medication  [] Medical instability  [x] Other:  Pt. Extremely lethargic and was unable to be awaken. Pt. Lidia García to participate in assessment at this time. Next scheduled treatment:  07/22    Completed by: Anam Foy. KEKE/SLP

## 2021-07-21 NOTE — CARE COORDINATION
Nico Brock, RN   Registered Nurse   Case Management   Progress Notes      Signed   Date of Service:  2021 12:51 PM         Related encounter: ED to Hosp-Admission (Discharged) [facility]  Acute Inpatient Rehab Preadmission Assessment     Patient Name: Gayla Mojica        MRN:   [id number]    : 1945  (72 y.o.)  Gender: male      Admitted from:   []?[facility]  [x]? [address]   []? [address]   []? [facility]   []? Outside Admission - Location:                                 [x]? Initial         []? Updated     Date of Onset / Admission to the acute hospital:  21     Inpatient Rehabilitation Admitting Diagnosis:  CVA     Did patient have surgery? [x]? No  []? Yes:       Physicians: Joshua Dudley     Risk for clinical complications:  High     Co-morbidities:       1. Rhabdomyolysis  2. CAD: history NSTEMI with bare metal stent  3. HTN  4. DM II  5. Thrombocytopenia     Financial Information  Primary insurance:  []? Medicare     [x]? Medicare HMO      []? [employer]    []? Medicaid      []? Medicaid HMO       []? Workers Compensation        []? Personal Pay     Secondary Insurance:  []? Medicare     []? Medicare HMO      []? [employer]    []? Medicaid      []? Medicaid HMO        []? Workers Compensation      [x]? None     Precautions:   []? Cardiac Precautions:            []? Total hip precautions:           []? Weight Bearing status:  [x]? Safety Precautions/Concerns:  Fall Risk, General Precautions  [x]? Visually impaired:   Wears glasses at all times        []? Hard of Hearing:      Isolation Precautions:         []? Yes              [x]? No  If Yes:   []? Droplet  []? Contact           []? Airborne     []? VRE     []? MRSA        []? C-diff         []? TB             []? ESBL         []? MDRO          []? Other:                        Physiatrist:  []? Dr. Faby Michael     []? Dr. Octavia Banegas  [x]? Dr. Josep Gannon  []?     [facility]     Patients Occupation: Retired     Reviewed Lab and Diagnostic reports from Current Admission: Yes     Patients Prior Functional  Level: Prior Function  Ambulation Assistance: Independent  Transfer Assistance: Independent  Additional Comments: Information obtained from case management note; Pt and cousin unable to provide much information, cousin states pt had no UB weakness prior to this admission     History of current illness, per PM&R Consult: Kendra Springer is a 77 y.o. RHD male admitted to [email or web address] 7/13/2021.       Patient admitted after being found down by his family with R facial weakness and R arm/leg weakness as well as dysarthria. Diagnostic studies showed multiple areas of stenosis in vertebral, PCA, RAHEEM and MCA. Stroke being medically managed.     Hematology consulted for thrombocytopenia - likely secondary to peripheral destruction of platelets - suspecting ITP. Treating with steroids and blood transfusion. Platelet count improved to 11 after IVIG.       Patient with significant dysarthria and expressive aphasia. Dominant RUE weakness is worse than RLE weakness.      Prognosis: Fair     Current functional status for upper extremity ADLs:  UE Bathing: Moderate assistance, Setup, Verbal cueing, Increased time to complete (A-to wash LUE, bilat underarms and back)  UE Dressing: Maximum assistance, Setup, Verbal cueing, Increased time to complete (A-to snap, thread and pull up arms and tie gown)     Current functional status for lower extremity ADLs:  LE Bathing: Moderate assistance, Setup, Verbal cueing, Increased time to complete (A-to wash lower legs and feet)  LE Dressing: Maximum assistance, Setup, Verbal cueing, Increased time to complete (A-needed to don footies on bilat feet )     Current functional status for bed, chair, wheelchair transfers:  Transfers  Sit to Stand:  Moderate Assistance, Maximum Assistance (mod A from the bed; max A from the chair)  Stand to sit: Moderate Assistance  Bed to Chair: Maximum assistance (assist to move RLE)  Stand Pivot Transfers: Maximum Assistance  Comment: stand pivot bed to chair without device, max A     Current functional status for toilet transfers: Moderate Assistance, Maximum Assistance     Current functional status for locomotion:  Ambulation  Ambulation?: Yes  More Ambulation?: No  Ambulation 1  Surface: level tile  Device:  (lift walker, B forearm support)  Assistance: Maximum assistance (WC follow for safety)  Quality of Gait: magnetic gait, short step length, needed assist wt shifting to his L to advance RLE, mx vc's for sequencing step pattern w/AD  Gait Deviations: Slow Anais, Decreased step length, Decreased step height, Decreased arm swing, Decreased head and trunk rotation, Deviated path, Staggers, Shuffles  Distance: 50'x1  Comments: max A to wt shift L/R, max A to advance RLE--pt inconsistently able to initiate swing on the R     Current functional status for comprehension: Moderate Assistance     Current functional status for expression: Maximal Assistance     Current functional status for social interaction: Moderate assistance     Current functional status for problem solving: Maximal Assistance     Current functional status for memory: Maximal Assistance     Current Deficits R/T Impairment: Impaired Functional Mobility and Decreased ADLs     Required Therapy:   [x]? Physical Therapy  [x]? Occupational Therapy   [x]? Speech Therapy, as appropriate     Additional Services:    [x]?     []? Recreational Therapy, as appropriate    [x]? Nutrition    []? Dialysis  []? Cultural Needs Identified  []? Equipment  []?  Other     Rehab Justification:  Needs 3 hrs therapy per day or 15 hours per week:  Yes  Identified Rehab Nursing needs: Yes  Intense Interdisciplinary need:  Yes  Need for 24 hr physician supervision:  Yes  Measurable improved quality of life:  Yes  Willingness to participate:  Yes  Medical Necessity: Yes  Patient able to tolerate care proposed:   Yes     Expected Discharge Destination/Functional Level:  Home with assist  Expected length of time to achieve that level of improvement: 1-2 weeks  Expected Post Discharge Treatments: Home with possible Home Care     Other information relevant to patient's care needs:  N/A     Acute Inpatient Rehabilitation Disclosure Statement will be provided to patient upon admission to ARU with patient's verbalization of understanding.       I have reviewed and concur with the findings and results of the pre-admission screening assessment completed by the Inpatient Rehabilitation Admissions Coordinator.                  Cosigned by: Cruz Serna MD at 7/20/2021  1:35 PM

## 2021-07-21 NOTE — PROGRESS NOTES
Pt remains a 1:1, bed is low/locked, side rails are up x2, call light is within reach, no s/s or c/o distress at this time.

## 2021-07-21 NOTE — PROGRESS NOTES
Physical Therapy        Physical Therapy Cancel Note      DATE: 2021    NAME: Cesar Snyder  MRN: [id number]   : 1945    Attempted to arouse pt, pt does open his eyes with verbal/tactile stimulation, PT/OT assisted pt dangle at EOB (dependnet of 2 person), pt still not opening eyes, nor any other response/participation. Pt briefly lifted his left LE with max verbal/tactile cues. Pt total assist for sit to supine (2 person). Pt has 1:1 sitter, B wrist restraints applied as before.   Time 2:42 to 2:56 P:M  Electronically signed by Perez Collins PT on 2021 at 3:42 PM

## 2021-07-21 NOTE — PROGRESS NOTES
[address]   OCCUPATIONAL THERAPY MISSED TREATMENT NOTE   ACUTE REHAB  Date: 21  Patient Name: More Llanes       Room: 9764/0061-88  MRN: [id number]   Account #: [de-identified]    : 1945  (72 y.o.)  Gender: male                 REASON FOR MISSED TREATMENT:  Patient unable to participate   -   14:24 - Pt lethargic and unable to participate in therapy at this time. Wet wash cloth applied to pts face to attempt to arouse pt with pt turning head keeping eyes clothes stating \"What do you want? \" then falling back asleep. OT and PT attempted to engage patient by sitting pt EOB with patient maintaining eyes closed. Pt able to slightly kick LUE with verbal cues from PT, however, pt unable to arouse further to participate in OT/PT. Pt returned to supine position with restains donned, telesitter, and 1:1 sitter in room.           Doyle Middleton, OT

## 2021-07-21 NOTE — CARE COORDINATION
CASE MANAGEMENT NOTE:    Admission Date:  7/20/2021 Mignon Garcia is a 77 y.o.  male    Admitted for : Acute CVA (cerebrovascular accident) ([facility] [I63.9]    Met with:  Patient and sister Hillary Wilson    Support system: Family    PCP:  Pt was recently [facility] a [facility] physician but Hillary Wilson was not sure of the name. Insurance:  [employer] medicare & 's administration      Current Residence/ Living Arrangements:  independently at home             Current Services PTA:  No    Is patient agreeable to VNS: Yes    Freedom of choice provided:  Yes    List of [address] provided: Yes    Home Health/ Out pt therapy chosen:  No    DME:  none    Home Oxygen: No    Nebulizer: Yes    CPAP/BIPAP: No    Supplier: N/A    Potential Assistance Needed: Yes    SNF needed: Yes    Freedom of choice and list provided: Yes    Pharmacy:  [facility]       Does Patient want to use MEDS to BEDS? Yes    Is patient currently receiving oral anticoagulation therapy? No    Is the Patient an [facility] with Readmission Risk Score greater than 14%? Yes- 20%  If yes, pt needs a follow up appointment made within 7 days. Family Members/Caregivers that pt would like involved in their care:    Yes    If yes, list name here:  Sister Hillary Wilson    Transportation Provider:  Unknown at this time        Handicap placard:  Pt has handicap placard          Is patient in Isolation/One on One/Altered Mental Status? Yes  If yes, skip next question. If no, would they like an I-Pad to  use? NA  If yes, call [phone]. Mental Health History: hx of depression and PTSD ()    PHQ-9: unable to complete at this time    Substance use: no current use. Discharge Plan:  Pt was driving and living independently prior to hospital admission. He lived alone and had limited support system. Pt was not able to answer most questions and deferred to his sister Hillary Wilson.  She stated the family would be able to provide some support at discharge but is would be limited. Pt has 2 daughters; 1 is in a \"facility\" due to physical and cognitive disabilities and the other is in [place] and estranged from him. 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 List of both home health and SNF provided.                   Electronically signed by: UZMA Anderson on 7/21/2021 at 5:16 PM

## 2021-07-21 NOTE — PROGRESS NOTES
Today's Date: 7/21/2021  Patient Name: Harvey Quezada  Date of admission: 7/20/2021  6:49 PM  Patient's age: 77 y.o., 1945  Admission Dx: Acute CVA (cerebrovascular accident) [facility]) [I63.9]    Reason for Consult: management recommendations  Requesting Physician: Francisco Gottlieb MD    CHIEF COMPLAINT: Acute stroke. Thrombocytopenia. History Obtained From:  patient, electronic medical record      Interval history:    Patient seen and examined  Transferred to ARU   Lethargic and unable to answer questions  1:1 sitter       HISTORY OF PRESENT ILLNESS:      The patient is a 77 y.o.  male who is admitted to the hospital for acute stroke    Brought to the hospital after being found down on the floor. Patient was last seen well about 2 days ago. Patient has a history of peripheral vascular disease coronary artery disease hypertension COPD and previous strokes. Patient has significant contracture on the right side also right-sided facial droop with severe dysarthria limiting history of presenting illness. Patient's CBC at presentation shows hemoglobin 11.7 with MCV 77 WBC count 10 and platelet count of 4 with immature platelet fraction of 44%. Patient previous CBC noted in the system from June 2020 shows blood count of 160. Patient was also noted to have elevated myoglobin and CK. Creatinine is 1.74 lactic acid is 2.4. Patient has dysarthria not able to give much history. Past Medical History:   has a past medical history of Centrilobular emphysema ([address] COPD (chronic obstructive pulmonary disease) ([address] Depression, Diabetes mellitus ([address] Former smoker, Hyperlipidemia, Hypertension, Mixed restrictive and obstructive lung disease ([address] Need for pneumococcal vaccine, and Personal history of tobacco use. Past Surgical History:   has a past surgical history that includes Neck surgery; Toe amputation (Right, greater); and Cardiac surgery (07/2015).      Medications:    Prior to Admission medications    Medication Sig Start Date End Date Taking?  Authorizing Provider   Heparin Sodium, Porcine, (HEPARIN, PORCINE,) 5000 UNIT/ML injection Inject 1 mL into the skin every 8 hours 7/20/21   Keara Eugene MD   QUEtiapine (SEROQUEL) 25 MG tablet Take 1 tablet by mouth nightly as needed for Agitation 7/20/21 7/25/21  Keara Eugene MD   methylPREDNISolone sodium (SOLU-MEDROL) 40 MG injection Infuse 1 mL intravenously every 12 hours 7/20/21   Keara Eugene MD   melatonin 3 MG TABS tablet Take 1 tablet by mouth nightly as needed (insomnia) 7/20/21   Keara Eugene MD   atorvastatin (LIPITOR) 80 MG tablet Take 0.5 tablets by mouth daily (Pt takes one-half of an 80mg tab = 40mg) 7/16/21   Keara Eugene MD   tiZANidine (ZANAFLEX) 2 MG tablet Take 1 tablet by mouth 4 times daily as needed (muscle spasms) 5/7/21   JOLIE Sheikh - CNP   naproxen (NAPROSYN) 500 MG tablet Take 1 tablet by mouth 2 times daily (with meals) 1/4/21   Jesika Gonzalez PA-C   ibuprofen (ADVIL;MOTRIN) 800 MG tablet Take 1 tablet by mouth every 8 hours as needed for Pain 6/2/20   Wolfgang King MD   lidocaine (LIDODERM) 5 % Place 1 patch onto the skin daily 12 hours on, 12 hours off. 6/2/20   Wolfgang King MD   metoprolol succinate (TOPROL XL) 50 MG extended release tablet Take 50 mg by mouth daily    Historical Provider, MD   tiotropium (SPIRIVA RESPIMAT) 2.5 MCG/ACT AERS inhaler Inhale 2 puffs into the lungs daily    Historical Provider, MD   carboxymethylcellulose 1 % ophthalmic solution Place 1 drop into both eyes 3 times daily as needed for Dry Eyes     Historical Provider, MD   ketotifen (ZADITOR) 0.025 % ophthalmic solution Place 1 drop into both eyes 2 times daily as needed (itchy eyes)     Historical Provider, MD   isosorbide mononitrate (IMDUR) 60 MG extended release tablet Take 30 mg by mouth daily Indications: 1/2 tablet (30mg)     Historical Provider, MD finasteride (PROSCAR) 5 MG tablet Take 5 mg by mouth daily    Historical Provider, MD   tamsulosin (FLOMAX) 0.4 MG capsule Take 0.4 mg by mouth daily    Historical Provider, MD   fluticasone (FLONASE) 50 MCG/ACT nasal spray 1 spray by Nasal route 2 times daily  Patient taking differently: 1 spray by Nasal route 2 times daily as needed for Rhinitis  5/31/16   Francisco Connor DO   albuterol sulfate  (90 BASE) MCG/ACT inhaler Inhale 2 puffs into the lungs every 6 hours as needed for Wheezing    Historical Provider, MD   albuterol (PROVENTIL) (2.5 MG/3ML) 0.083% nebulizer solution Take 2.5 mg by nebulization every 6 hours as needed for Wheezing    Historical Provider, MD   aspirin 81 MG EC tablet Take 81 mg by mouth daily    Historical Provider, MD   losartan (COZAAR) 100 MG tablet Take 100 mg by mouth daily     Historical Provider, MD   nitroGLYCERIN (NITROSTAT) 0.4 MG SL tablet Place 0.4 mg under the tongue every 5 minutes as needed for Chest pain    Historical Provider, MD   FLUoxetine (PROZAC) 20 MG capsule Take 40 mg by mouth daily     Historical Provider, MD   furosemide (LASIX) 20 MG tablet Take 20 mg by mouth daily as needed (swelling)     Historical Provider, MD   clopidogrel (PLAVIX) 75 MG tablet Take 75 mg by mouth daily    Historical Provider, MD   rOPINIRole (REQUIP) 0.25 MG tablet Take 0.25 mg by mouth nightly as needed     Historical Provider, MD   budesonide-formoterol (SYMBICORT) 160-4.5 MCG/ACT AERO Inhale 2 puffs into the lungs 2 times daily.     Historical Provider, MD     Current Facility-Administered Medications   Medication Dose Route Frequency Provider Last Rate Last Admin    albuterol sulfate  (90 Base) MCG/ACT inhaler 2 puff  2 puff Inhalation Q6H PRN Sam De Leon MD        aspirin chewable tablet 81 mg  81 mg Oral Daily Sam De Leon MD        clopidogrel (PLAVIX) tablet 75 mg  75 mg Oral Daily Sam De Leon MD        finasteride (PROSCAR) tablet 5 mg  5 mg Oral Daily Oscar Headley MD        FLUoxetine (PROZAC) capsule 40 mg  40 mg Oral Daily Oscar Headley MD        heparin (porcine) injection 5,000 Units  5,000 Units Subcutaneous 3 times per day Oscar Headley MD   5,000 Units at 07/21/21 1302    hydrALAZINE (APRESOLINE) tablet 10 mg  10 mg Oral 3 times per day Oscar Headley MD   10 mg at 07/20/21 2141    isosorbide mononitrate (IMDUR) extended release tablet 30 mg  30 mg Oral Daily Oscar Headley MD        methylPREDNISolone sodium (SOLU-MEDROL) injection 40 mg  40 mg Intravenous Q12H Oscar Headley MD   40 mg at 07/21/21 1305    QUEtiapine (SEROQUEL) tablet 25 mg  25 mg Oral Nightly PRN Oscar Headley MD   25 mg at 07/20/21 2142    rOPINIRole (REQUIP) tablet 0.25 mg  0.25 mg Oral Nightly Oscar Headley MD   0.25 mg at 07/20/21 2140    tamsulosin (FLOMAX) capsule 0.4 mg  0.4 mg Oral Daily Oscar Headley MD        tiotropium (SPIRIVA RESPIMAT) 2.5 MCG/ACT inhaler 2 puff  2 puff Inhalation Daily Oscar Headley MD        acetaminophen (TYLENOL) tablet 650 mg  650 mg Oral Q4H PRN Opal Frausto MD        polyethylene glycol [facility]) packet 17 g  17 g Oral Daily Opal Frausto MD   17 g at 07/20/21 2142    senna (SENOKOT) tablet 17.2 mg  2 tablet Oral Daily PRN Opal Frausto MD        bisacodyl (DULCOLAX) suppository 10 mg  10 mg Rectal Daily PRN Opal Frausto MD        rosuvastatin (CRESTOR) tablet 40 mg  40 mg Oral Nightly Oscar Headley MD   40 mg at 07/20/21 2142    insulin lispro (HUMALOG) injection vial 0-6 Units  0-6 Units Subcutaneous TID WC Opal Frausto MD        insulin lispro (HUMALOG) injection vial 0-3 Units  0-3 Units Subcutaneous Nightly Opal Frausto MD   1 Units at 07/20/21 2130    glucose (GLUTOSE) 40 % oral gel 15 g  15 g Oral PRN Opal Frausto MD        dextrose 50 % IV solution  12.5 g Intravenous PRN Opal Frausto MD        glucagon (rDNA) injection 1 mg  1 mg Intramuscular FRANCK Hanson MD        dextrose 5 % solution  100 mL/hr Intravenous PRMERARY Hanson MD           Allergies:  Patient has no known allergies. Social History:   reports that he quit smoking about 8 years ago. He started smoking about 64 years ago. He has a 42.00 pack-year smoking history. He has never used smokeless tobacco. He reports that he does not drink alcohol and does not use drugs. Family History: Hypertension    REVIEW OF SYSTEMS:      Patient's review of system is limited due to mental status     PHYSICAL EXAM:        BP (!) 168/66   Pulse (!) 44   Temp 97 °F (36.1 °C) (Axillary)   Resp 18   Ht 5' 10\" (1.778 m)   Wt 212 lb (96.2 kg)   SpO2 100%   BMI 30.42 kg/m²    Temp (24hrs), Av.7 °F (36.5 °C), Min:97 °F (36.1 °C), Max:98.2 °F (36.8 °C)      General appearance -ill appearing, no in pain or distress , he is sleepy and hard to get any information from. Neck - supple, no significant adenopathy   Lymphatics - no palpable lymphadenopathy, no hepatosplenomegaly   Chest -decreased breathing sounds  Heart - normal rate, regular rhythm, normal S1, S2, no murmurs  Abdomen - soft, nontender, nondistended, no masses or organomegaly   Neurological -speech altered. Patient is lethargic   Musculoskeletal - no joint tenderness, deformity or swelling   Extremities -right upper and lower extremity contracture  Skin - normal coloration and turgor, no rashes, no suspicious skin lesions noted ,      DATA:      Labs:     Lab Results   Component Value Date    WBC 16.5 (H) 2021    HGB 11.3 (L) 2021    HCT 35.8 (L) 2021    MCV 76.6 (L) 2021    PLT  2021     UNABLE TO REPORT PLATELET COUNT DUE TO THE RESENCE OF PLATELET CLUMPS. MICROSCOPICALLY, THE PLATELET COUNT APPEARS NORMAL.      plt 42  IMPRESSION:   Primary Problem  <principal problem not specified>    Active Hospital Problems    Diagnosis Date Noted    [facility] [I63.9] 2021 Severe thrombocytopenia with immature platelet fraction of 44%  Mild microcytic anemia  Acute stroke  History of CVA    RECOMMENDATIONS:  1. I personally reviewed results of lab work-up imaging studies and other relevant clinical data. 2. 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 3. Continue steroids for now , change to PO   4. Not sure why his mental status worsened   5. Resume antiplatelet agents  Patient follows up with the [address] however we will be happy to follow-up if patient wants    Discussed with patient and Nurse. Thank you for asking us to see this patient. Celina Olivares MD  Hematologist/Medical [id number] [facility] hematology oncology physicians            This note is created with the assistance of a speech recognition program.  While intending to generate a document that actually reflects the content of the visit, the document can still have some errors including those of syntax and sound a like substitutions which may escape proof reading. It such instances, actual meaning can be extrapolated by contextual diversion.

## 2021-07-21 NOTE — PLAN OF CARE
Nutrition Problem #1: Predicted inadequate energy intake  Intervention: Food and/or Nutrient Delivery: Continue Current Diet, Start Oral Nutrition Supplement  Nutritional Goals: po intake greater than 50%

## 2021-07-21 NOTE — PLAN OF CARE
Patient very uncooperative, becoming combative gripping side rails of bed, swinging at nursing staff when attempting to pry hand  loose to put brief back on, Lillie Ponce CNP, messaged for orders to help calm patient. Order for ativan 2mg IV received. Security was notified as well as patient was very uncooperative/combative. Telesitter had been calling approximately every ten to 15 minutes to report non-compliant behavior. Ativan so far has had very little effect. Bilat wrist restraints to be applied for patient safety.

## 2021-07-21 NOTE — H&P
Physical Medicine & Rehabilitation History and Physical  [facility] Unit     Primary care provider: No primary care provider on file. Chief Complaint and Reason for Rehabilitation Admission:   Ambulatory and ADL dysfunction secondary CVA    History of Present Illness: Gayla Mojica  is a 77 y.o.  male admitted to the [address] [place] [address] unit on 7/20/2021.         81-year-old male found down by sister admitted to [facility] V's 7/13. Found to have right facial weakness, right side hemiparesis, dysarthria and multiple ecchymoses of the right side. .  Found to have elevated CK and myoglobin and troponin      Hematology/oncology-thrombocytopenia severe, microcytic anemia-likely immune mediated, per note 10/19 continue steroids and resume antiplatelet agents    Internal medicine-CT showed low attenuation in the left frontal corona radiata compatible with acute/subacute infarct no hemorrhage, aspirin and Plavix and folic acid, CT head/neck showed severe stenosis of left vertebral artery extensive intracranial atherosclerosis with near complete occlusion right PCA, severe stenosis in proximal A3 of RAHEEM, moderate to severe proximal M2 segment, has enlarged heterogeneous thyroid gland with 2.6 cm thyroid nodule, MRI brain showed acute infarct left and right basal ganglia no further intervention per neuro, echo with bubble study negative continue aspirin Plavix, thrombocytopenia secondary immune thrombocytopenic purpura risk of spontaneous bleed as per heme-onc immature platelet fraction and peripheral destruction as per heme-onc steroid/Decadron, treated hyponatremia    Neurology-continue steroids, Crestor, aspirin/Plavix-Bilateral basal ganglia infarct, right hemiparesis    CT head 7/13/2021  1. New ovoid low-attenuation area in the left frontal corona radiata   compatible with acute/subacute infarction.  This measures 2 x 1.4 cm.  No   associated hemorrhage.    2. Diffuse parenchymal volume loss and sequela of severe chronic   microvascular ischemic changes. CT had 7/17/2021  No acute intracranial abnormality.       No significant interval change     CTA head and neck  1. Severe stenosis in the V1 segment of the left vertebral artery. 2. Extensive intracranial atherosclerotic plaque with near complete occlusion   of the right PCA. 3. Severe stenoses in the proximal A3 segments of the ACAs bilaterally. 4. Moderate-to-severe proximal M2 segment stenosis in the right MCA. Moderate stenosis in the M1 and M2 segments of the left MCA. 5. Moderate stenosis in the P1/P2 junction of the left PCA. 6. Enlarged heterogeneous thyroid gland with 2.6 cm left thyroid lobe nodule. Nonemergent/outpatient thyroid ultrasound recommended. MRI brain 7/14/2021  Acute infarct in the left and right basal ganglia greater on the left. Diffuse volume loss with extensive chronic white matter changes. Echo 7/15/2021  Summary  Technically difficult study. Overall global left ventricular systolic function is normal, calculated  ejection fraction is 50-55%  Grade I (mild) left ventricular diastolic dysfunction. Technically difficult visualization of the right ventricle, but it does  appear to be normal in size. Negative bubble study, no obvious intracardiac shunt noted within technical  limitations of this study. No significant valvular regurgitation or stenosis seen. No significant pericardial effusion is seen. Doppler 7/15/2021 lower extremities  Right:    No evidence of deep or superficial venous thrombosis.        Left:    No evidence of deep or superficial venous thrombosis.             is currently requiring assistance for self-care activities and mobility prompting this admission.     Premorbid function:  Independent    Current Function:  PT:    7/20/21  Objective   Bed mobility  Rolling to Left: Moderate assistance  Supine to Sit: Moderate assistance  Scooting: []? Confused     []? Agitated    [x]? Lethargic        Objective/Assessment:  Auditory Comprehension: following 1 step directions: 5/5. Following 2 step directions 2/5 increased to 3/5 with moderate verbal and tactile cues. Yes/no questions: .      Verbal Expression: Childrens rhymes and songs: /10 did not increase despite moderate verbal cues. Biographical information: 3/6, oriented to name, , and place. Pt not oriented to age, or time. Answering questions:  increased to  with minimal verbal cues.      Other: Pt falling asleep throughout therapy session requiring multiple repetitions, redirections, and tactile stimulation. Prior to therapy session pt working with PT and OT.            Past Medical History:      Diagnosis Date    Centrilobular emphysema ([address]     COPD [facility]chronic obstructive [facility]) [facility] [address]     Depression     Diabetes mellitus ([facility] [address]     pt refuses to take previously prescribed metformin (states PCP aware)    Former smoker     Hyperlipidemia     on 18 pt states \"I stopped taking that about a year ago\"    Hypertension     Mixed restrictive and obstructive lung disease ([facility] [address]     Need for pneumococcal vaccine     Personal history of tobacco use        Past Surgical History:      Procedure Laterality Date    CARDIAC SURGERY  2015    1 stent    NECK SURGERY      TOE AMPUTATION Right greater       Allergies:    Patient has no known allergies.     Medications   Scheduled Meds:   aspirin  81 mg Oral Daily    clopidogrel  75 mg Oral Daily    finasteride  5 mg Oral Daily    FLUoxetine  40 mg Oral Daily    heparin (porcine)  5,000 Units Subcutaneous 3 times per day    hydrALAZINE  10 mg Oral 3 times per day    isosorbide mononitrate  30 mg Oral Daily    methylPREDNISolone  40 mg Intravenous Q12H    rOPINIRole  0.25 mg Oral Nightly    tamsulosin  0.4 mg Oral Daily    tiotropium  2 puff Inhalation Daily    polyethylene glycol  17 g Oral Daily    rosuvastatin  40 mg Oral Nightly    insulin lispro  0-6 Units Subcutaneous TID WC    insulin lispro  0-3 Units Subcutaneous Nightly     Continuous Infusions:   dextrose       PRN Meds:.albuterol sulfate HFA, QUEtiapine, acetaminophen, senna, bisacodyl, glucose, dextrose, glucagon (rDNA), dextrose       Diagnostics:       CBC:   Recent Labs     07/19/21  0612 07/20/21  0509 07/21/21  0900   WBC 15.3* 16.3* 16.5*   RBC 5.06 4.85 4.67   HGB 12.1* 11.7* 11.3*   HCT 41.3 39.4* 35.8*   MCV 81.6* 81.2* 76.6*   RDW 18.6* 18.7* 16.2*   PLT See Reflexed IPF Result See Reflexed IPF Result UNABLE TO REPORT PLATELET COUNT DUE TO THE RESENCE OF PLATELET CLUMPS. MICROSCOPICALLY, THE PLATELET COUNT APPEARS NORMAL. BMP:   Recent Labs     07/19/21  0612 07/20/21  0509 07/21/21  0900   * 144 141   K 3.8 4.4 3.5*   * 110* 107   CO2 22 21 29   BUN 24* 24* 25*   CREATININE 1.05 1.00 1.35*     BNP: No results for input(s): BNP in the last 72 hours. PT/INR: No results for input(s): PROTIME, INR in the last 72 hours. APTT: No results for input(s): APTT in the last 72 hours. CARDIAC ENZYMES: No results for input(s): CKMB, CKMBINDEX, TROPONINT in the last 72 hours. Invalid input(s): CKTOTAL;3  FASTING LIPID PANEL:  Lab Results   Component Value Date    CHOL 186 07/14/2021    HDL 42 07/14/2021    TRIG 77 07/14/2021     LIVER PROFILE: No results for input(s): AST, ALT, ALB, BILIDIR, BILITOT, ALKPHOS in the last 72 hours. I/O (24Hr): No intake or output data in the 24 hours ending 07/21/21 1125    Glu last 24 hour  Recent Labs     07/20/21  0718 07/20/21  1117 07/20/21  1543 07/21/21  0620   POCGLU 163* 144* 154* 124*       No results for input(s): CLARITYU, COLORU, PHUR, SPECGRAV, PROTEINU, RBCUA, BLOODU, BACTERIA, NITRU, WBCUA, LEUKOCYTESUR, YEAST, GLUCOSEU, BILIRUBINUR in the last 72 hours.        Social History:  Lives With: Alone  Type of Home: House  Home Layout: One level  Home Access: Stairs to enter with rails  Entrance Stairs - Number of Steps: 4  Home Equipment: Cane  Ambulation Assistance: Independent  Transfer Assistance: Independent  Active : Yes  Occupation: Retired  Additional Comments: Information obtained from case management note; Pt unable to verbalize much    Family History:   No family history on file. Review of Systems: Lethargic status post Ativan-difficult obtain  CONSTITUTIONAL:  Denies fevers, chills, sweats or fatigue. EYES:  Denies diplopia, blind spots, blurring. HEENT:  Denies hearing loss, trouble chewing or swallowing. RESPIRATORY:  No wheezing, coughing, shortness of breath. CARDIOVASCULAR:  Denies chest pain, palpitations, lightheadedness. GASTROINTESTINAL:  Denies heartburn, nausea, constipation, diarrhea, abdominal pain. GENITOURINARY:  No urgency, frequency, incontinence, dysuria. ENDOCRINE:  Denies hot or cold intolerance. MUSCULOSKELETAL:  Denies focal joint pain, back pain, neck pain. NEUROLOGICAL:  Denies focal weakness, numbness, tingling, balance loss, headache. BEHAVIOR/PSYCH:  Denies depression, anxiety, memory loss, insomnia. SKIN:  No ulcers, rash, bruises. Physical Exam:  BP (!) 168/66   Pulse (!) 44   Temp 97 °F (36.1 °C) (Axillary)   Resp 18   Ht 5' 10\" (1.778 m)   Wt 212 lb (96.2 kg)   SpO2 100%   BMI 30.42 kg/m²   HEENT:  Symmetrical facial features. EOMI. Visual fields intact. Hearing intact. Speech limited, said good morning to nursing comprehension impaired. Object naming impaired. Repetition impaired. Basic cognition impaired. NECK:  ROM functional.  Carotid bruit negative. THORAX:  Symmetrical.    LUNGS:  Clear to ausculation. HEART:  Regular. No murmurs of gallops. ABDOMEN:  Non-distended. Normal bowel sounds. No guarding, tenderness, mass. BACK:  No ulcers or deformity. EXTREMITIES:  PROM within functional limits. No calf tenderness, edema. Feet warm. NEUROMUSCULAR:  Sensation  questionable, no extinction. Coordination smooth. Motor testing Informed moving all extremities unable to examine.-However appears to be weaker on the right balance impaired. SKIN:  intact. Principal Diagnosis/plan:  Amatory and ADL dysfunction secondary to bilateral basal ganglia CVA    He will benefit from intensive interdisciplinary therapies and rehab nursing care and is appropriate for inpatient rehabilitation. The post admission physician evaluation (LYNDA) is consistent with the pre-admission assessment. See above findings to reflect the elements required in the LYNDA. Patient's admitting condition is consistent with the findings of the preadmission assessment by the rehabilitation admissions coordinator. Other Diagnoses/plan:    1. Ambulatory and ADL dysfunction due to bilateral basal ganglia infarct, severe occlusion right PCA, severe stenosis RAHEEM-continue PT/OT/speech/nursing work on transfer communities, ADLs, steps, family training, cognition, etc.  Home goal in approximately 3 weeks at min assist to contact-guard level. Prognosis fair plus  2. Bilateral basal ganglia CVA-aspirin, Plavix, Crestor  3. Rhabdomyolysis  4. Agitation on arrival-noted continues in restraints from last night, questionable change in location, as needed Seroquel at bedtime, QTC calculation [phone]-[id number], per records had questional episodes of agitation-appears increased possibly due to change in location, however due to concern of possible spontaneous bleed risk-will check CT head, continue with telesitter, will need one-on-one currently, currently with lethargic this morning, better this evening-suspect secondary to lack of sleep last night and due to Ativan/Seroquel. Will monitor  5. Hypertension hyperlipidemia coronary artery disease -hydralazine, Imdur, Crestor aspirin, Plavix-blood pressure elevated this morning, mild bradycardia-internal medicine follow, recheck-discussed with nursing  6.  Immune thrombocytopenic purpura-IV prednisone 40 mg twice daily, hematology consult for clarification on oral versus tapering, platelet count-clumping today, however per note appears normal  7. Leukocytosis-possibly secondary to steroids  8. Anemia-hemoglobin 11.3  9. Depression-Prozac  10. Restless leg-Requip  11. GERD-was on Pepcid 20 twice daily, questionable discontinued due to platelets-defer to internal medicine  12. Thyroid nodule  13. BPH-Proscar, Flomax  14. Mild elevated BUN/creatinine-possible dehydration  15. Diabetes-was on glipizide 5 and 8 units Lantus home med, currently not on glucose -monitor  16. DVT prophylaxis subcu heparin  17. Internal medicine for medical management, oncology follow-up    DVT Prophylaxis:  SQ heparin    Estimated Length of Stay:  3 weeks. Prognosis  fair    Goals    Home at Minimal Assist   24 hour      Shyam Reyes MD       This note is created with the assistance of a speech recognition program.  While intending to generate a document that actually reflects the content of the visit, the document can still have some errors including those of syntax and sound a like substitutions which may escape proof reading.   In such instances, actual meaning can be extrapolated by contextual diversion

## 2021-07-21 NOTE — PROGRESS NOTES
Comprehensive Nutrition Assessment    Type and Reason for Visit:  Initial, Consult (Rehab)    Nutrition Recommendations/Plan: Will provide Pureed diet with Nectar thick liquids and add Magic Cup supplements twice daily    Nutrition Assessment:  PPt admitted to [facility] due to CVA. He is now admitted to ARU. Speech Therapy suggests Pureed diet with Nectar Thick Liquids. Observed pt sleeping through lunch. Malnutrition Assessment:  Malnutrition Status: At risk for malnutrition (Comment)    Context:  Acute Illness     Findings of the 6 clinical characteristics of malnutrition:  Energy Intake:  Mild decrease in energy intake (Comment)  Weight Loss:  Unable to assess     Body Fat Loss:  No significant body fat loss     Muscle Mass Loss:  No significant muscle mass loss    Fluid Accumulation:  No significant fluid accumulation     Strength:  Not Performed    Estimated Daily Nutrient Needs:  Energy (kcal): Sawyer x 1.2= 2100 kcal; Weight Used for Energy Requirements:  Admission     Protein (g):  1.3-1.4 g/kg= 100-105 g; Weight Used for Protein Requirements:  Ideal          Nutrition Related Findings:  no edema, labs: reviewed, Meds: Prednisone, PMH: COPD, DM, BM 7/20      Wounds:  None       Current Nutrition Therapies:    ADULT DIET; Dysphagia - Pureed; Mildly Thick (Nectar)    Anthropometric Measures:  · Height: 5' 10\" (177.8 cm)  · Current Body Weight: 212 lb (96.2 kg)   · Admission Body Weight: 212 lb (96.2 kg)    · Usual Body Weight: 230 lb (104.3 kg) (stated)     · Ideal Body Weight: 166 lbs; BMI: 30.4  · BMI Categories: Obese Class 1 (BMI 30.0-34. 9)       Nutrition Diagnosis:   · Predicted inadequate energy intake related to  (current medical condition) as evidenced by poor intake prior to admission    Nutrition Interventions:   Food and/or Nutrient Delivery:  Continue Current Diet, Start Oral Nutrition Supplement  Nutrition Education/Counseling:  No recommendation at this time   Coordination of Nutrition Care:  Continue to monitor while inpatient    Goals:  po intake greater than 50%       Nutrition Monitoring and Evaluation:   Food/Nutrient Intake Outcomes:  Food and Nutrient Intake, Supplement Intake  Physical Signs/Symptoms Outcomes:  Biochemical Data, GI Status, Skin, Weight, Fluid Status or Edema     Discharge Planning:     Too soon to determine     Electronically signed by Amauri Edward RD, LD on 7/21/21 at 11:48 AM EDT    Contact: [phone]

## 2021-07-21 NOTE — PLAN OF CARE
Safety maintained, call light is within reach, no s/s or c/o distress, bed is low/locked, side rails are up x2, pt remains a 1:1 and in jazmine soft wrist restraints and jazmine mitts  Problem: Non-Violent Restraints  Goal: Removal from restraints as soon as assessed to be safe  Outcome: Ongoing  Goal: No harm/injury to patient while restraints in use  Outcome: Ongoing  Goal: Patient's dignity will be maintained  Outcome: Ongoing

## 2021-07-21 NOTE — PROGRESS NOTES
Physical Therapy  [address]     Date: 21  Patient Name: Gayla Mojica       Room: 5769/5407-52  MRN: [id number]  Account: [de-identified]   : 1945  (72 y.o.) Gender: male       Patient was agitated, combative, and swinging at staff members. Pt was given Ativan, and has B wrist restraints. Pt not najma to be aroused at this time. Not appropriate  For therapy evaluation at this time.    Electronically signed by Gayathri Lindsay PT on 2021 at 8:41 AM

## 2021-07-22 NOTE — PATIENT CARE CONFERENCE
[address]    ACUTE REHABILITATION  TEAM CONFERENCE NOTE  Date: 21  Patient Name: Mireya Byrd       Room: 9656/3274-95  MRN: [id number]       : 1945  (72 y.o.)     Gender: male   Referring Practitioner: Dr Chandler Yu CVA (cerebrovascular accident) [facility]) [I63.9]        NURSING  Bladder  Always Incontinent  Bowel   Frequently Incontinent  Date of Last BM: 2021  Intervention    Both Bowel & Bladder Program     Wounds/Incisions/Ulcers: No skin issues identified  Medication Education Program: Patient and family unable to manage medication needs currently  Pain: no pain concerns to address    Fall Risk:  Falling star program initiated    PHYSICAL THERAPY  Bed mobility  Rolling to Left: Maximum assistance  Rolling to Right: Moderate assistance  Supine to Sit: Maximum assistance  Sit to Supine:  (Left in chair)  Scooting: Moderate assistance;2 Person assistance  Comment: Pt needs assist for Leg as as well as trunk progression for supine>sit      Transfers:  Sit to Stand: Moderate Assistance;Maximum Assistance;2 Person Assistance (Assist fluctuates 2* cognitive deficts and participation.)  Stand to sit: Moderate Assistance;2 Person Assistance  Bed to Chair: Maximum assistance;2 Person Assistance (assist to move RLE)    Ambulation 1  Surface: level tile  Device: Hemiwalker (lift walker, B forearm support)  Other Apparatus:  (Chair follow)  Assistance: Maximum assistance;2 Person assistance (WC follow for safety)  Quality of Gait: Short steps, therapist assisted for R LE stepping forward, L LE very small step, difficulty comprrehanding weight shifting side to side yet. heavy mar to right side. Gait Deviations: Slow Anais;Decreased step length;Decreased step height;Decreased arm swing;Decreased head and trunk rotation;Deviated path;Staggers; Shuffles  Distance: 6 steps  Comments: max A to wt shift L/R, max A to advance RLE--pt inconsistently able to initiate swing on the R Pt present with R weakness, UE> LE, dysarthria, and increased tone R UE/LE. Pt also have cognitive deficts. Pt requires 2 person assist for transfers and ambulation. Will conitnue POC to address deficts. Goals  Time Frame for Short term goals: 10 days  Short term goal 1: Pt able to roll felice eto side at min A   Short term goal 2: Pt able to perform supine>sit at min A   Short term goal 3:  Pt able to perform sit to supine at mod A  Short term goal 4: Pt able to ptransfer at [employer] A   Short term goal 5: Pt able to propel w/c with L UE/L LE , distance fo 100 ft min A   Short term goal 6: Pt able to ambulate with appropriate device distance of  50 to 100 ft, julissa x 2    OCCUPATIONAL THERAPY  SELF CARE      Eating   2  Substantial/maximal assistance  TA per Nursing for meals; Pt able to bring drink to mouth with increased time and A  Maximum assistance; Thickened liquids (TA self feeding per Nursing; Pt able to bring drink to mouth)   Oral Hygiene   7        None (Pt refuses)   Shower/Bathe Self   1  Assistance Needed: Dependent  use of jeet stedy lower body bathing   UE Bathing: Moderate assistance  LE Bathing: Dependent/Total Korey Clore stedy for bottom and willem hygiene)   Upper Body Dressing   2  Assistance Needed: Substantial/maximal assistance     Maximum assistance (Thread RUE and overhead)   Lower Body Dressing   1  Assistance Needed: Dependent     Putting On/Taking Off Footwear   7         Dependent/Total Korey Clore stedy; TA for all steps)   Toilet Transfer   1  Assistance Needed: Dependent  2 person A   Toilet - Technique: Stand pivot  Equipment Used: Raised toilet seat with rails  Toilet Transfer: 2 Person assistance;Maximum assistance  Toilet Transfers Comments: Pt completed toilet transfer with use of 2 person max assistance. Pt required assistance to manage RLE during transfer with verbal cues for safety and balance.     Toileting Hygiene   1 Assistance Needed: Dependent      Dependent/Total (2 person A)    Bed mobility  Sit to Supine: Maximum assistance (A at trunk and BLE)      Balance  Sitting Balance: Moderate assistance (SBA - Mod A with fatigue and activity)  Standing Balance: Maximum assistance (Min- Max A while standing with fatigue and R side lean)  Standing Balance  Time: 1-2 minutes x 5  Activity: functional transfers, lower body bathing/dressing, toileting  Comment: with use of jeet stedy for lower body bathing/dressing for safety. Pt completed toileting with 2 person A for standing. Equipment Recommendations  Equipment Needed:  (TBD)       Short term goals  Time Frame for Short term goals: By 10 days  Short term goal 1: Pt will complete upper body dressing/bathing with Min A and good attention to task  Short term goal 2: Pt will complete lower body dressing/bathing with max A and good safety with use of AE as needed  Short term goal 3: Pt will complete functional transfers during self care tasks with mod A x 1 and good safety  Short term goal 4: Pt will tolerate standing 4+ minutes during functional activity of choice with min A and good safety  Short term goal 5: Pt will participate in 30+ minutes of therapeutic exercises/functional activities to increase safety and independence with self care tasks  Short term goal 6: Pt will complete self feeding with min A and good attention to task    SPEECH THERAPY  To be determined  Short Term Goal: TBD    NUTRITION  Weight: 212 lb (96.2 kg) / Body mass index is 30.42 kg/m². Please see nutrition note for details. SOCIAL WORK ASSESSMENT  Assessment:  Attempted to meet with pt. He was not able to answer questions at this time and approved contact with his sister/only sibling French Taylor. Met with sister French Taylor; prior to hospital admission pt was living alone and independent. He is linked with the [place] where he receives most of his medical care. Pt does not have 24 hr support and family involvement is limited.  He has 2 daughters; one lives in a :\"facility\" and the other lives in [address] [place] [address] and is estranged from pt. If pt is not able to be independent sister anticipates pt discharge to a SNF. Pre-Admission Status:  Lives With: Alone  Type of Home: House  Home Layout: One level  Home Access: Stairs to enter with rails  Entrance Stairs - Number of Steps: 4  Entrance Stairs - Rails: Left  Bathroom Shower/Tub: Tub/Shower unit, Curtain  Bathroom Toilet: Standard  Bathroom Equipment: Hand-held shower  Bathroom Accessibility: Accessible  Home Equipment: [employer]  ADL Assistance: Independent  Homemaking Assistance: Independent  Homemaking Responsibilities: Yes  Ambulation Assistance: Independent (Cane if needed with back pain)  Transfer Assistance: Independent  Active : Yes  Mode of Transportation: Car  Occupation: Retired  Type of occupation: Marine   IADL Comments: Pt sleeps in an adjustable bed  Additional Comments: Pt has a sister who is retired and can assist some. Family Education: Need to make contact with family to initiate education    Percentage Risk for Readmission: Moderate 19% - 27%   Readmission Risk              Risk of Unplanned Readmission:  19       %    Critical Items: None       Problem / Barrier Intervention / Plan  Results   Impaired function related to deficits from CVA ROM, strengthening, functional mobility training, assess appropriate assistive device needed. Steps to enter/exit home Strengthening, balance ex's progress to steps, will need further family training. impaired ability to care for self realted to CVA  Training in modified care techniques and use of devices tor self care techniques                           Total Self Care Score    Total Mobility Score  Admission Score:  9      Admission Score:  17  Goal:  27/42         Goal:  47/90   `  Discharge Plan   Estimated Discharge Date: 8/12/21  Home evaluation needed?  Home Evaluation Indication (NO, Requires ReEval, YES/Date): No home evaluation need indicated for patient at this time  Overnight or Day Pass: No  Factors facilitating achievement of predicted outcomes: Cooperative  Barriers to the achievement of predicted outcomes: Confusion, Cognitive deficit, Depression, Anxiety, Decreased endurance, Upper extremity weakness, Lower extremity weakness, Medical complications, Skin Care and Medication managment    Functional Goals at discharge:  Predicted Outcome: Home with familyPATIENT'S LEVEL OF ASSISTANCE: Minimal Assistance  Discharge therapy goals:  PT: Long term goals  Time Frame for Long term goals : By DC  Long term goal 1: Pt able perform bed mobility mod-I  Long term goal 2:  Pt able to perform transfers at CGA/min A   Long term goal 3:  Pt able to ambulate with appropriate device distance  100 to 150 ft , min A  Long term goal 4: Pt able to go up and down 4 to 5 steps with 1 UE support at mod/max A   Long term goal 5: Pt able to propel w/c level surfaces distance fo 100 to 150 ft, SBA  Long term goal 6: Improve PASS score to atleast 198/36 to improve overall fucntion. Long term goal 7: Improve standing dynamic balance with assistive deivice to atleast fair + to reduce fall risk. Time Frame for Long term goals : By DC  Long term goal 1: Pt able perform bed mobility mod-I  Long term goal 2:  Pt able to perform transfers at CGA/min A   Long term goal 3:  Pt able to ambulate with appropriate device distance  100 to 150 ft , min A  Long term goal 4: Pt able to go up and down 4 to 5 steps with 1 UE support at mod/max A   Long term goal 5: Pt able to propel w/c level surfaces distance fo 100 to 150 ft, SBA  Long term goal 6: Improve PASS score to atleast 198/36 to improve overall fucntion. Long term goal 7: Improve standing dynamic balance with assistive deivice to atleast fair + to reduce fall risk.    OT:   ST: LUCILLE    Team Members Present at Conference:  :  [facility]  Occupational Therapist: Bryon Mendenhall [address] PT  Speech Therapist: Graeme BARCCC/SLP  Nurse: logan   Dietary/Nutrition: Janna Benson RD, LD  Pastoral Care: Ceferino Hernandez  CMG: Sandie Gregg RN    I approve the established interdisciplinary plan of care as documented within the medical record of Gayla Barkley.  Faby Michael MD

## 2021-07-22 NOTE — CONSULTS
[facility]    CONSULTATION / HISTORY AND PHYSICAL EXAMINATION            Date:   7/21/2021  Patient name:  Diandra Brito  Date of admission:  7/20/2021  6:49 PM  MRN:   [id number]  Account:  [de-identified]  YOB: 1945  PCP:    No primary care provider on file.   Room:   [address] [place] [address]  Code Status:    Full Code    Physician Requesting Consult: Opal Frausto MD    Reason for Consult:  medical management    Chief Complaint:         History Obtained From:     Patient medical record nursing staff    History of Present Illness:   History is extremely limited, patient was extremely agitated overnight, was given Ativan around 2 AM in the night, patient very sleepy, not answering questions  Most of history is retrieved from E [facility]  Patient was recently admitted to [place] [address] with right-sided weakness, right-sided facial weakness, speech difficulties  Patient underwent MRI brain, concerning for acute infarct in left and right basal ganglia  CT head and neck, was concerning for severe stenosis in the proximal A3 segment of RAHEEM bilaterally  During hospital stay, patient had thrombocytopenia, was evaluated by hematologist, getting treated with IV steroids, and lines of immune thrombocytopenia      Past Medical History:     Past Medical History:   Diagnosis Date    Centrilobular emphysema ([address]     COPD (chronic obstructive pulmonary disease) ([address]     Depression     Diabetes mellitus ([address]     pt refuses to take previously prescribed metformin (states PCP aware)    Former smoker     Hyperlipidemia     on 4/27/18 pt states \"I stopped taking that about a year ago\"    Hypertension     Mixed restrictive and obstructive lung disease ([address]     Need for pneumococcal vaccine     Personal history of tobacco use         Past Surgical History:     Past Surgical History:   Procedure Laterality Date    CARDIAC SURGERY  07/2015    1 stent    NECK SURGERY      TOE AMPUTATION Right greater        Medications Prior to Admission:     Prior to Admission medications    Medication Sig Start Date End Date Taking?  Authorizing Provider   Heparin Sodium, Porcine, (HEPARIN, PORCINE,) 5000 UNIT/ML injection Inject 1 mL into the skin every 8 hours 7/20/21   Armando Zapien MD   QUEtiapine (SEROQUEL) 25 MG tablet Take 1 tablet by mouth nightly as needed for Agitation 7/20/21 7/25/21  Armando Zapine MD   methylPREDNISolone sodium (SOLU-MEDROL) 40 MG injection Infuse 1 mL intravenously every 12 hours 7/20/21   Armando Zapien MD   melatonin 3 MG TABS tablet Take 1 tablet by mouth nightly as needed (insomnia) 7/20/21   Armando Zapien MD   atorvastatin (LIPITOR) 80 MG tablet Take 0.5 tablets by mouth daily (Pt takes one-half of an 80mg tab = 40mg) 7/16/21   Armando Zapien MD   tiZANidine (ZANAFLEX) 2 MG tablet Take 1 tablet by mouth 4 times daily as needed (muscle spasms) 5/7/21   Sherryle Lawyer, APRN - CNP   naproxen (NAPROSYN) 500 MG tablet Take 1 tablet by mouth 2 times daily (with meals) 1/4/21   Candice Mejia PA-C   ibuprofen (ADVIL;MOTRIN) 800 MG tablet Take 1 tablet by mouth every 8 hours as needed for Pain 6/2/20   Radha Reynolds MD   lidocaine (LIDODERM) 5 % Place 1 patch onto the skin daily 12 hours on, 12 hours off. 6/2/20   Radha Reynolds MD   metoprolol succinate (TOPROL XL) 50 MG extended release tablet Take 50 mg by mouth daily    Historical Provider, MD   tiotropium (SPIRIVA RESPIMAT) 2.5 MCG/ACT AERS inhaler Inhale 2 puffs into the lungs daily    Historical Provider, MD   carboxymethylcellulose 1 % ophthalmic solution Place 1 drop into both eyes 3 times daily as needed for Dry Eyes     Historical Provider, MD   ketotifen (ZADITOR) 0.025 % ophthalmic solution Place 1 drop into both eyes 2 times daily as needed (itchy eyes)     Historical Provider, MD   isosorbide mononitrate (IMDUR) 60 MG extended release tablet Take 30 mg by mouth daily Indications: 1/2 tablet (30mg)     Historical Provider, MD   finasteride (PROSCAR) 5 MG tablet Take 5 mg by mouth daily    Historical Provider, MD   tamsulosin (FLOMAX) 0.4 MG capsule Take 0.4 mg by mouth daily    Historical Provider, MD   fluticasone (FLONASE) 50 MCG/ACT nasal spray 1 spray by Nasal route 2 times daily  Patient taking differently: 1 spray by Nasal route 2 times daily as needed for Rhinitis  5/31/16   Francisco Connor DO   albuterol sulfate  (90 BASE) MCG/ACT inhaler Inhale 2 puffs into the lungs every 6 hours as needed for Wheezing    Historical Provider, MD   albuterol (PROVENTIL) (2.5 MG/3ML) 0.083% nebulizer solution Take 2.5 mg by nebulization every 6 hours as needed for Wheezing    Historical Provider, MD   aspirin 81 MG EC tablet Take 81 mg by mouth daily    Historical Provider, MD   losartan (COZAAR) 100 MG tablet Take 100 mg by mouth daily     Historical Provider, MD   nitroGLYCERIN (NITROSTAT) 0.4 MG SL tablet Place 0.4 mg under the tongue every 5 minutes as needed for Chest pain    Historical Provider, MD   FLUoxetine (PROZAC) 20 MG capsule Take 40 mg by mouth daily     Historical Provider, MD   furosemide (LASIX) 20 MG tablet Take 20 mg by mouth daily as needed (swelling)     Historical Provider, MD   clopidogrel (PLAVIX) 75 MG tablet Take 75 mg by mouth daily    Historical Provider, MD   rOPINIRole (REQUIP) 0.25 MG tablet Take 0.25 mg by mouth nightly as needed     Historical Provider, MD   budesonide-formoterol (SYMBICORT) 160-4.5 MCG/ACT AERO Inhale 2 puffs into the lungs 2 times daily. Historical Provider, MD        Allergies:     Patient has no known allergies. Social History:     Tobacco:    reports that he quit smoking about 8 years ago. He started smoking about 64 years ago. He has a 42.00 pack-year smoking history. He has never used smokeless tobacco.  Alcohol:      reports no history of alcohol use. Drug Use:  reports no history of drug use.     Family History:     No family history on file. Review of Systems:   Cannot be done because of patient condition    Physical Exam:     BP (!) 168/66   Pulse (!) 44   Temp 97 °F (36.1 °C) (Axillary)   Resp 18   Ht 5' 10\" (1.778 m)   Wt 212 lb (96.2 kg)   SpO2 100%   BMI 30.42 kg/m²   Temp (24hrs), Av °F (36.1 °C), Min:97 °F (36.1 °C), Max:97 °F (36.1 °C)    Recent Labs     21  0620 21  1113 21  1557 21  2019   POCGLU 124* 95 185* 233*     No intake or output data in the 24 hours ending 21 5719    General Appearance: Very sleepy, not answering questions  Mental status: Not oriented to person, place, and time with normal affect  Head:  normocephalic, atraumatic. Eye: no icterus, redness, pupils equal and reactive, extraocular eye movements intact, conjunctiva clear  Ear: normal external ear, no discharge, hearing intact  Nose:  no drainage noted  Mouth: mucous membranes moist  Neck: supple, no carotid bruits, thyroid not palpable  Lungs: Bilateral equal air entry, clear to ausculation, no wheezing, rales or rhonchi, normal effort  Cardiovascular: normal rate, regular rhythm, no murmur, gallop, rub. Abdomen: Soft, nontender, nondistended, normal bowel sounds, no hepatomegaly or splenomegaly  Neurologic: Weakness in right upper and lower extremity, speech difficulties, right sided facial weakness  Skin: No gross lesions, rashes, bruising or bleeding on exposed skin area  Extremities:  peripheral pulses palpable, no pedal edema or calf pain with palpation  Psych:      Investigations:      Laboratory Testing:  Recent Results (from the past 24 hour(s))   POC Glucose Fingerstick    Collection Time: 21  6:20 AM   Result Value Ref Range    POC Glucose 124 (H) 75 - 110 mg/dL   Basic Metabolic Panel w/ Reflex to MG    Collection Time: 21  9:00 AM   Result Value Ref Range    Glucose 111 (H) 70 - 99 mg/dL    BUN 25 (H) 8 - 23 mg/dL    CREATININE 1.35 (H) 0.70 - 1.20 mg/dL    Bun/Cre Ratio NOT REPORTED 9 - 20    Calcium 8.5 (L) 8.6 - 10.4 mg/dL    Sodium 141 135 - 144 mmol/L    Potassium 3.5 (L) 3.7 - 5.3 mmol/L    Chloride 107 98 - 107 mmol/L    CO2 29 20 - 31 mmol/L    Anion Gap 5 (L) 9 - 17 mmol/L    GFR Non-African American 52 (L) >60 mL/min    GFR African American >60 >60 mL/min    GFR Comment          GFR Staging NOT REPORTED    CBC    Collection Time: 07/21/21  9:00 AM   Result Value Ref Range    WBC 16.5 (H) 3.5 - 11.0 k/uL    RBC 4.67 4.5 - 5.9 m/uL    Hemoglobin 11.3 (L) 13.5 - 17.5 g/dL    Hematocrit 35.8 (L) 41 - 53 %    MCV 76.6 (L) 80 - 100 fL    MCH 24.1 (L) 26 - 34 pg    MCHC 31.5 31 - 37 g/dL    RDW 16.2 (H) 11.5 - 14.9 %    Platelets  073 - 276 k/uL     UNABLE TO REPORT PLATELET COUNT DUE TO THE RESENCE OF PLATELET CLUMPS. MICROSCOPICALLY, THE PLATELET COUNT APPEARS NORMAL.     MPV 12.3 (H) 6.0 - 12.0 fL    NRBC Automated NOT REPORTED per 100 WBC   Path Review, Smear    Collection Time: 07/21/21  9:00 AM   Result Value Ref Range    Pathologist Review NOT REPORTED    Magnesium    Collection Time: 07/21/21  9:00 AM   Result Value Ref Range    Magnesium 2.5 1.6 - 2.6 mg/dL   POC Glucose Fingerstick    Collection Time: 07/21/21 11:13 AM   Result Value Ref Range    POC Glucose 95 75 - 110 mg/dL   POC Glucose Fingerstick    Collection Time: 07/21/21  3:57 PM   Result Value Ref Range    POC Glucose 185 (H) 75 - 110 mg/dL   POC Glucose Fingerstick    Collection Time: 07/21/21  8:19 PM   Result Value Ref Range    POC Glucose 233 (H) 75 - 110 mg/dL           Consultations:   IP CONSULT TO DIETITIAN  IP CONSULT TO SOCIAL WORK  IP CONSULT TO INTERNAL MEDICINE  IP CONSULT TO ONCOLOGY  Assessment :      Primary Problem  <principal problem not specified>    Active Hospital Problems    Diagnosis Date Noted    [facility]cerebro[facility]) [facility] [I63.9] 07/20/2021    Acute idiopathic thrombocytopenic purpura ([facility] [D69.3] 07/16/2021    CAD S/P percutaneous coronary angioplasty [I25.10, W11.11] 07/13/2021    [facility]brovascular [facility] [I63.9] 07/13/2021    HTN (hypertension) [I10] 07/13/2021    Hyperlipidemia [E78.5] 07/13/2021    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 Acute ischemic stroke, patient is on aspirin, Plavix, Crestor  2. 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 On DVT prophylaxis with heparin  4. Hypertension, controlled  5. Diabetes, controlled  1 episode of bradycardia, will follow, may be due to benzodiazepine . 6. Incidental finding of thyroid nodule on CTA head and neck, will need outpatient ultrasound thyroid and biopsy  Patient is kept n.p.o. as of now, will monitor closely if patient follow bedside swallow, will start patient on medication diet  Patient CT head done in the morning concerning for sinusitis, starting patient on Augmentin            Concepcion Hernandez MD  7/21/2021  10:55 PM    Copy sent to Dr. [facility] primary care provider on file. Please note that this chart was generated using voice recognition Dragon dictation software. Although every effort was made to ensure the accuracy of this automated transcription, some errors in transcription may have occurred.

## 2021-07-22 NOTE — PROGRESS NOTES
[facility] Internal Medicine    CONSULTATION / HISTORY AND PHYSICAL EXAMINATION            Date:   7/22/2021  Patient name:  Crispin Driver  Date of admission:  7/20/2021  6:49 PM  MRN:   [id number]  Account:  [de-identified]  YOB: 1945  PCP:    No primary care provider on file.   Room:   [address] [place] [address]  Code Status:    Full Code    Physician Requesting Consult: Rock Davis MD    Reason for Consult:  medical management    Chief Complaint:         History Obtained From:     Patient medical record nursing staff    History of Present Illness:   History is extremely limited, patient was extremely agitated overnight, was given Ativan around 2 AM in the night, patient very sleepy, not answering questions  Most of history is retrieved from E HR  Patient was recently admitted to [facility] with right-sided weakness, right-sided facial weakness, speech difficulties  Patient underwent MRI brain, concerning for acute infarct in left and right basal ganglia  CT head and neck, was concerning for severe stenosis in the proximal A3 segment of RAHEEM bilaterally  During hospital stay, patient had thrombocytopenia, was evaluated by hematologist, getting treated with IV steroids, and lines of immune thrombocytopenia  7/22  Patient is less sleepy, try to communicate, has speech difficulties    Past Medical History:     Past Medical History:   Diagnosis Date    Centrilobular emphysema ([address]     COPD (chronic obstructive pulmonary disease) ([address]     Depression     Diabetes mellitus ([address]     pt refuses to take previously prescribed metformin (states PCP aware)    Former smoker     Hyperlipidemia     on 4/27/18 pt states \"I stopped taking that about a year ago\"    Hypertension     Mixed restrictive and obstructive lung disease ([address]     Need for pneumococcal vaccine     Personal history of tobacco use         Past Surgical History:     Past Surgical History:   Procedure Laterality Date    CARDIAC SURGERY  07/2015    1 stent    NECK SURGERY      TOE AMPUTATION Right greater        Medications Prior to Admission:     Prior to Admission medications    Medication Sig Start Date End Date Taking?  Authorizing Provider   Heparin Sodium, Porcine, (HEPARIN, PORCINE,) 5000 UNIT/ML injection Inject 1 mL into the skin every 8 hours 7/20/21   Lazaro Tavarez MD   QUEtiapine (SEROQUEL) 25 MG tablet Take 1 tablet by mouth nightly as needed for Agitation 7/20/21 7/25/21  Lazaro Tavarez MD   methylPREDNISolone sodium (SOLU-MEDROL) 40 MG injection Infuse 1 mL intravenously every 12 hours 7/20/21   Lazaro Tavarez MD   melatonin 3 MG TABS tablet Take 1 tablet by mouth nightly as needed (insomnia) 7/20/21   Lazaro Tavarez MD   atorvastatin (LIPITOR) 80 MG tablet Take 0.5 tablets by mouth daily (Pt takes one-half of an 80mg tab = 40mg) 7/16/21   Lazaro Tavarez MD   tiZANidine (ZANAFLEX) 2 MG tablet Take 1 tablet by mouth 4 times daily as needed (muscle spasms) 5/7/21   JOLIE Felix CNP   naproxen (NAPROSYN) 500 MG tablet Take 1 tablet by mouth 2 times daily (with meals) 1/4/21   Martha Chamberlain PA-C   ibuprofen (ADVIL;MOTRIN) 800 MG tablet Take 1 tablet by mouth every 8 hours as needed for Pain 6/2/20   Bruce Steven MD   lidocaine (LIDODERM) 5 % Place 1 patch onto the skin daily 12 hours on, 12 hours off. 6/2/20   Bruce Steven MD   metoprolol succinate (TOPROL XL) 50 MG extended release tablet Take 50 mg by mouth daily    Historical Provider, MD   tiotropium (SPIRIVA RESPIMAT) 2.5 MCG/ACT AERS inhaler Inhale 2 puffs into the lungs daily    Historical Provider, MD   carboxymethylcellulose 1 % ophthalmic solution Place 1 drop into both eyes 3 times daily as needed for Dry Eyes     Historical Provider, MD   ketotifen (ZADITOR) 0.025 % ophthalmic solution Place 1 drop into both eyes 2 times daily as needed (itchy eyes)     Historical Provider, MD isosorbide mononitrate (IMDUR) 60 MG extended release tablet Take 30 mg by mouth daily Indications: 1/2 tablet (30mg)     Historical Provider, MD   finasteride (PROSCAR) 5 MG tablet Take 5 mg by mouth daily    Historical Provider, MD   tamsulosin (FLOMAX) 0.4 MG capsule Take 0.4 mg by mouth daily    Historical Provider, MD   fluticasone (FLONASE) 50 MCG/ACT nasal spray 1 spray by Nasal route 2 times daily  Patient taking differently: 1 spray by Nasal route 2 times daily as needed for Rhinitis  5/31/16   Francisco Connor,    albuterol sulfate  (90 BASE) MCG/ACT inhaler Inhale 2 puffs into the lungs every 6 hours as needed for Wheezing    Historical Provider, MD   albuterol (PROVENTIL) (2.5 MG/3ML) 0.083% nebulizer solution Take 2.5 mg by nebulization every 6 hours as needed for Wheezing    Historical Provider, MD   aspirin 81 MG EC tablet Take 81 mg by mouth daily    Historical Provider, MD   losartan (COZAAR) 100 MG tablet Take 100 mg by mouth daily     Historical Provider, MD   nitroGLYCERIN (NITROSTAT) 0.4 MG SL tablet Place 0.4 mg under the tongue every 5 minutes as needed for Chest pain    Historical Provider, MD   FLUoxetine (PROZAC) 20 MG capsule Take 40 mg by mouth daily     Historical Provider, MD   furosemide (LASIX) 20 MG tablet Take 20 mg by mouth daily as needed (swelling)     Historical Provider, MD   clopidogrel (PLAVIX) 75 MG tablet Take 75 mg by mouth daily    Historical Provider, MD   rOPINIRole (REQUIP) 0.25 MG tablet Take 0.25 mg by mouth nightly as needed     Historical Provider, MD   budesonide-formoterol (SYMBICORT) 160-4.5 MCG/ACT AERO Inhale 2 puffs into the lungs 2 times daily. Historical Provider, MD        Allergies:     Patient has no known allergies. Social History:     Tobacco:    reports that he quit smoking about 8 years ago. He started smoking about 64 years ago. He has a 42.00 pack-year smoking history.  He has never used smokeless tobacco.  Alcohol:      reports no history of alcohol use. Drug Use:  reports no history of drug use. Family History:     No family history on file. Review of Systems:   Cannot be done because of patient condition    Physical Exam:     BP (!) 142/83   Pulse 101   Temp 97.5 °F (36.4 °C) (Axillary)   Resp 18   Ht 5' 10\" (1.778 m)   Wt 212 lb (96.2 kg)   SpO2 96%   BMI 30.42 kg/m²   Temp (24hrs), Av.5 °F (36.4 °C), Min:97.5 °F (36.4 °C), Max:97.5 °F (36.4 °C)    Recent Labs     21  2019 21  0617 21  1120 21  1612   POCGLU 233* 96 134* 164*     No intake or output data in the 24 hours ending 21 1654    General Appearance: Very sleepy, not answering questions  Mental status: Not oriented to person, place, and time with normal affect  Head:  normocephalic, atraumatic. Eye: no icterus, redness, pupils equal and reactive, extraocular eye movements intact, conjunctiva clear  Ear: normal external ear, no discharge, hearing intact  Nose:  no drainage noted  Mouth: mucous membranes moist  Neck: supple, no carotid bruits, thyroid not palpable  Lungs: Bilateral equal air entry, clear to ausculation, no wheezing, rales or rhonchi, normal effort  Cardiovascular: normal rate, regular rhythm, no murmur, gallop, rub. Abdomen: Soft, nontender, nondistended, normal bowel sounds, no hepatomegaly or splenomegaly  Neurologic: Weakness in right upper and lower extremity, speech difficulties, right sided facial weakness  Skin: No gross lesions, rashes, bruising or bleeding on exposed skin area  Extremities:  peripheral pulses palpable, no pedal edema or calf pain with palpation  Psych:      Investigations:      Laboratory Testing:  Recent Results (from the past 24 hour(s))   POC Glucose Fingerstick    Collection Time: 21  8:19 PM   Result Value Ref Range    POC Glucose 233 (H) 75 - 110 mg/dL   POC Glucose Fingerstick    Collection Time: 21  6:17 AM   Result Value Ref Range    POC Glucose 96 75 - 110 mg/dL   CBC Collection Time: 07/22/21  7:47 AM   Result Value Ref Range    WBC 15.8 (H) 3.5 - 11.0 k/uL    RBC 4.70 4.5 - 5.9 m/uL    Hemoglobin 11.4 (L) 13.5 - 17.5 g/dL    Hematocrit 36.0 (L) 41 - 53 %    MCV 76.6 (L) 80 - 100 fL    MCH 24.3 (L) 26 - 34 pg    MCHC 31.7 31 - 37 g/dL    RDW 16.6 (H) 11.5 - 14.9 %    Platelets 76 (L) 496 - 450 k/uL    MPV 12.4 (H) 6.0 - 12.0 fL    NRBC Automated NOT REPORTED per 100 WBC   Basic Metabolic Panel    Collection Time: 07/22/21  7:47 AM   Result Value Ref Range    Glucose 123 (H) 70 - 99 mg/dL    BUN 24 (H) 8 - 23 mg/dL    CREATININE 1.15 0.70 - 1.20 mg/dL    Bun/Cre Ratio NOT REPORTED 9 - 20    Calcium 8.7 8.6 - 10.4 mg/dL    Sodium 141 135 - 144 mmol/L    Potassium 3.8 3.7 - 5.3 mmol/L    Chloride 107 98 - 107 mmol/L    CO2 25 20 - 31 mmol/L    Anion Gap 9 9 - 17 mmol/L    GFR Non-African American >60 >60 mL/min    GFR African American >60 >60 mL/min    GFR Comment          GFR Staging NOT REPORTED    POC Glucose Fingerstick    Collection Time: 07/22/21 11:20 AM   Result Value Ref Range    POC Glucose 134 (H) 75 - 110 mg/dL   POC Glucose Fingerstick    Collection Time: 07/22/21  4:12 PM   Result Value Ref Range    POC Glucose 164 (H) 75 - 110 mg/dL           Consultations:   IP CONSULT TO DIETITIAN  IP CONSULT TO SOCIAL WORK  IP CONSULT TO INTERNAL MEDICINE  IP CONSULT TO ONCOLOGY  Assessment :      Primary Problem  <principal problem not specified>    Active Hospital Problems    Diagnosis Date Noted    [facility] [I63.9] 07/20/2021    [facility] [D69.3] 07/16/2021    CAD S/P percutaneous coronary angioplasty [I25.10, Z98.61] 07/13/2021    Cerebrovascular accident (CVA) ([address] [I63.9] 07/13/2021    HTN (hypertension) [I10] 07/13/2021    Hyperlipidemia [E78.5] 07/13/2021    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  Acute ischemic stroke, patient is on aspirin,

## 2021-07-22 NOTE — PROGRESS NOTES
Today's Date: 7/22/2021  Patient Name: Samantha Griffiths  Date of admission: 7/20/2021  6:49 PM  Patient's age: 77 y.o., 1945  Admission Dx: Acute CVA (cerebrovascular accident) [facility]) [I63.9]    Reason for Consult: management recommendations  Requesting Physician: Rita Solano MD    CHIEF COMPLAINT: Acute stroke. Thrombocytopenia. History Obtained From:  patient, electronic medical record      Interval history:    Patient seen and examined  Doing much better than yesterday  The patient is able to answer questions today and he is definitely less lethargic than yesterday. HISTORY OF PRESENT ILLNESS:      The patient is a 77 y.o.  male who is admitted to the hospital for acute stroke    Brought to the hospital after being found down on the floor. Patient was last seen well about 2 days ago. Patient has a history of peripheral vascular disease coronary artery disease hypertension COPD and previous strokes. Patient has significant contracture on the right side also right-sided facial droop with severe dysarthria limiting history of presenting illness. Patient's CBC at presentation shows hemoglobin 11.7 with MCV 77 WBC count 10 and platelet count of 4 with immature platelet fraction of 44%. Patient previous CBC noted in the system from June 2020 shows blood count of 160. Patient was also noted to have elevated myoglobin and CK. Creatinine is 1.74 lactic acid is 2.4. Patient has dysarthria not able to give much history. Past Medical History:   has a past medical history of Centrilobular emphysema ([address] COPD (chronic obstructive pulmonary disease) ([address] Depression, Diabetes mellitus ([address] Former smoker, Hyperlipidemia, Hypertension, Mixed restrictive and obstructive lung disease ([address] Need for pneumococcal vaccine, and Personal history of tobacco use. Past Surgical History:   has a past surgical history that includes Neck surgery;  Toe amputation (Right, greater); and Cardiac surgery (07/2015). Medications:    Prior to Admission medications    Medication Sig Start Date End Date Taking?  Authorizing Provider   Heparin Sodium, Porcine, (HEPARIN, PORCINE,) 5000 UNIT/ML injection Inject 1 mL into the skin every 8 hours 7/20/21   Shoshana Castillo MD   QUEtiapine (SEROQUEL) 25 MG tablet Take 1 tablet by mouth nightly as needed for Agitation 7/20/21 7/25/21  Shoshana Castillo MD   methylPREDNISolone sodium (SOLU-MEDROL) 40 MG injection Infuse 1 mL intravenously every 12 hours 7/20/21   Shoshana Castillo MD   melatonin 3 MG TABS tablet Take 1 tablet by mouth nightly as needed (insomnia) 7/20/21   Shoshana Castillo MD   atorvastatin (LIPITOR) 80 MG tablet Take 0.5 tablets by mouth daily (Pt takes one-half of an 80mg tab = 40mg) 7/16/21   Shoshana Castillo MD   tiZANidine (ZANAFLEX) 2 MG tablet Take 1 tablet by mouth 4 times daily as needed (muscle spasms) 5/7/21   JOLIE Vasquez CNP   naproxen (NAPROSYN) 500 MG tablet Take 1 tablet by mouth 2 times daily (with meals) 1/4/21   Lauri He PA-C   ibuprofen (ADVIL;MOTRIN) 800 MG tablet Take 1 tablet by mouth every 8 hours as needed for Pain 6/2/20   Kelby Wong MD   lidocaine (LIDODERM) 5 % Place 1 patch onto the skin daily 12 hours on, 12 hours off. 6/2/20   Kelby Wong MD   metoprolol succinate (TOPROL XL) 50 MG extended release tablet Take 50 mg by mouth daily    Historical Provider, MD   tiotropium (SPIRIVA RESPIMAT) 2.5 MCG/ACT AERS inhaler Inhale 2 puffs into the lungs daily    Historical Provider, MD   carboxymethylcellulose 1 % ophthalmic solution Place 1 drop into both eyes 3 times daily as needed for Dry Eyes     Historical Provider, MD   ketotifen (ZADITOR) 0.025 % ophthalmic solution Place 1 drop into both eyes 2 times daily as needed (itchy eyes)     Historical Provider, MD   isosorbide mononitrate (IMDUR) 60 MG extended release tablet Take 30 mg by mouth daily Indications: 1/2 tablet (30mg)     Historical Provider, MD   finasteride (PROSCAR) 5 MG tablet Take 5 mg by mouth daily    Historical Provider, MD   tamsulosin (FLOMAX) 0.4 MG capsule Take 0.4 mg by mouth daily    Historical Provider, MD   fluticasone (FLONASE) 50 MCG/ACT nasal spray 1 spray by Nasal route 2 times daily  Patient taking differently: 1 spray by Nasal route 2 times daily as needed for Rhinitis  5/31/16   Francisco Connor DO   albuterol sulfate  (90 BASE) MCG/ACT inhaler Inhale 2 puffs into the lungs every 6 hours as needed for Wheezing    Historical Provider, MD   albuterol (PROVENTIL) (2.5 MG/3ML) 0.083% nebulizer solution Take 2.5 mg by nebulization every 6 hours as needed for Wheezing    Historical Provider, MD   aspirin 81 MG EC tablet Take 81 mg by mouth daily    Historical Provider, MD   losartan (COZAAR) 100 MG tablet Take 100 mg by mouth daily     Historical Provider, MD   nitroGLYCERIN (NITROSTAT) 0.4 MG SL tablet Place 0.4 mg under the tongue every 5 minutes as needed for Chest pain    Historical Provider, MD   FLUoxetine (PROZAC) 20 MG capsule Take 40 mg by mouth daily     Historical Provider, MD   furosemide (LASIX) 20 MG tablet Take 20 mg by mouth daily as needed (swelling)     Historical Provider, MD   clopidogrel (PLAVIX) 75 MG tablet Take 75 mg by mouth daily    Historical Provider, MD   rOPINIRole (REQUIP) 0.25 MG tablet Take 0.25 mg by mouth nightly as needed     Historical Provider, MD   budesonide-formoterol (SYMBICORT) 160-4.5 MCG/ACT AERO Inhale 2 puffs into the lungs 2 times daily.     Historical Provider, MD     Current Facility-Administered Medications   Medication Dose Route Frequency Provider Last Rate Last Admin    rosuvastatin (CRESTOR) tablet 40 mg  40 mg Oral Nightly Dex Stevenson MD        predniSONE (DELTASONE) tablet 40 mg  40 mg Oral Daily Lisy Olivares MD   40 mg at 07/22/21 0953    amoxicillin-clavulanate (AUGMENTIN) 875-125 MG per tablet 1 tablet  1 tablet Oral 2 times per day Kyrie Gordon MD   1 tablet at 07/22/21 0953    albuterol sulfate  (90 Base) MCG/ACT inhaler 2 puff  2 puff Inhalation Q6H PRN Armando Zapien MD        aspirin chewable tablet 81 mg  81 mg Oral Daily Armando Zapien MD   81 mg at 07/22/21 0953    clopidogrel (PLAVIX) tablet 75 mg  75 mg Oral Daily Armando Zapien MD   75 mg at 07/22/21 4589    finasteride (PROSCAR) tablet 5 mg  5 mg Oral Daily Armando Zapien MD   5 mg at 07/22/21 0953    FLUoxetine (PROZAC) capsule 40 mg  40 mg Oral Daily Armando Zapien MD   40 mg at 07/22/21 8541    heparin (porcine) injection 5,000 Units  5,000 Units Subcutaneous 3 times per day Armando Zapien MD   5,000 Units at 07/22/21 0505    hydrALAZINE (APRESOLINE) tablet 10 mg  10 mg Oral 3 times per day Armando Zapien MD   10 mg at 07/22/21 0505    isosorbide mononitrate (IMDUR) extended release tablet 30 mg  30 mg Oral Daily Armando Zapien MD   30 mg at 07/22/21 0951    QUEtiapine (SEROQUEL) tablet 25 mg  25 mg Oral Nightly PRN Armando Zapien MD   25 mg at 07/22/21 0005    rOPINIRole (REQUIP) tablet 0.25 mg  0.25 mg Oral Nightly Armando Zapien MD   0.25 mg at 07/21/21 2300    tamsulosin (FLOMAX) capsule 0.4 mg  0.4 mg Oral Daily Armando Zapien MD   0.4 mg at 07/22/21 0951    tiotropium (SPIRIVA RESPIMAT) 2.5 MCG/ACT inhaler 2 puff  2 puff Inhalation Daily Armando Zapien MD   2 puff at 07/22/21 0953    acetaminophen (TYLENOL) tablet 650 mg  650 mg Oral Q4H PRN Juliette Contreras MD        polyethylene glycol [facility]) packet 17 g  17 g Oral Daily Juliette Contreras MD   17 g at 07/22/21 1089    senna (SENOKOT) tablet 17.2 mg  2 tablet Oral Daily PRN Juliette Fearing, MD        bisacodyl (DULCOLAX) suppository 10 mg  10 mg Rectal Daily PRN Juliette Galdamezing, MD        insulin lispro (HUMALOG) injection vial 0-6 Units  0-6 Units Subcutaneous TID KARL Contreras MD   1 Units at 07/21/21 3722    insulin lispro (HUMALOG) injection vial 0-3 Units  0-3 Units Subcutaneous Nightly Benny Elias MD   1 Units at 21 2300    glucose (GLUTOSE) 40 % oral gel 15 g  15 g Oral PRN Benny Elias MD        dextrose 50 % IV solution  12.5 g Intravenous PRN Benny Elias MD        glucagon (rDNA) injection 1 mg  1 mg Intramuscular PRN Benny Elias MD        dextrose 5 % solution  100 mL/hr Intravenous PRN Benny Elias MD           Allergies:  Patient has no known allergies. Social History:   reports that he quit smoking about 8 years ago. He started smoking about 64 years ago. He has a 42.00 pack-year smoking history. He has never used smokeless tobacco. He reports that he does not drink alcohol and does not use drugs. Family History: Hypertension    REVIEW OF SYSTEMS:      Patient's review of system is limited due to mental status     PHYSICAL EXAM:        BP (!) 154/86   Pulse 101   Temp 97.5 °F (36.4 °C) (Axillary)   Resp 18   Ht 5' 10\" (1.778 m)   Wt 212 lb (96.2 kg)   SpO2 96%   BMI 30.42 kg/m²    Temp (24hrs), Av.5 °F (36.4 °C), Min:97.5 °F (36.4 °C), Max:97.5 °F (36.4 °C)      General appearance -ill appearing, no in pain or distress , he is sleepy and hard to get any information from. Neck - supple, no significant adenopathy   Lymphatics - no palpable lymphadenopathy, no hepatosplenomegaly   Chest -decreased breathing sounds  Heart - normal rate, regular rhythm, normal S1, S2, no murmurs  Abdomen - soft, nontender, nondistended, no masses or organomegaly   Neurological -speech altered.   Patient is lethargic   Musculoskeletal - no joint tenderness, deformity or swelling   Extremities -right upper and lower extremity contracture  Skin - normal coloration and turgor, no rashes, no suspicious skin lesions noted ,      DATA:      Labs:     Lab Results   Component Value Date    WBC 15.8 (H) 2021    HGB 11.4 (L) 2021    HCT 36.0 (L) 2021    MCV 76.6 (L) 2021    PLT 76 (L) 07/22/2021     plt 42  IMPRESSION:   Primary Problem  <principal problem not specified>    Active Hospital Problems    Diagnosis Date Noted    Acute CVA (cerebrovascular accident) [facility] [I63.9] 07/20/2021    [facility] [D69.3] 07/16/2021    CAD S/P percutaneous coronary angioplasty [I25.10, Z98.61] 07/13/2021    Cerebrovascular accident (CVA) ([facility] [I63.9] 07/13/2021    HTN (hypertension) [I10] 07/13/2021    Hyperlipidemia [E78.5] 07/13/2021    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 [I66.21]      Severe thrombocytopenia with immature platelet fraction of 44%, likely immune thrombocytopenia, responding to steroids  Mild microcytic anemia  Acute stroke  History of CVA    RECOMMENDATIONS:  1. I personally reviewed results of lab work-up imaging studies and other relevant clinical data. 2. 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 3. Steroids changed to p.o. and being tapered. Platelets are dropping slowly. 4. Appreciate work-up and management at rehab. Overall condition seems to be stable  5. Continue with antiplatelets, no contraindication  We will follow with you    Discussed with patient and Nurse. Thank you for asking us to see this patient. Aguilar Olivares MD  Hematologist/Medical [id number] [facility] hematology oncology physicians            This note is created with the assistance of a speech recognition program.  While intending to generate a document that actually reflects the content of the visit, the document can still have some errors including those of syntax and sound a like substitutions which may escape proof reading. It such instances, actual meaning can be extrapolated by contextual diversion.

## 2021-07-22 NOTE — PROGRESS NOTES
[place]  Speech Language Pathology    Date: 7/22/2021  Patient Name: Diandra Brito  YOB: 1945   AGE: 77 y.o. MRN: [id number]        Patient Not Available for Speech Therapy     Due to:  [] Testing  [] Hemodialysis  [] Cancelled by RN  [] Surgery   [] Intubation/Sedation/Pain Medication  [] Medical instability  [x] Other: Attempted to see Pt. For ST evaluation. Upon arrival, Pt. stating, \"I want to go to my car\". Speech noted to be dysarthric with reduced vocal intensity and imprecise consonant production. No other verbalizations noted. Pt. not responding to any writer questions or commands. Pt. not making eye contact with writer, keeping eyes closed or looking away. Pt. Refused any PO trials. Unable to complete evaluation at this time d/t Pt. noncompliance. Will reattempt evaluation this PM.     Next scheduled treatment:  PM  Completed by:  Joannie Libman, M.A., CCC-SLP

## 2021-07-22 NOTE — PLAN OF CARE
Problem: Non-Violent Restraints  Goal: Removal from restraints as soon as assessed to be safe  7/21/2021 2315 by Edgardo Cooper RN  Outcome: Ongoing     Problem: Non-Violent Restraints  Goal: No harm/injury to patient while restraints in use  7/21/2021 2315 by Edgardo Cooper RN  Outcome: Ongoing     Problem: Non-Violent Restraints  Goal: Patient's dignity will be maintained  7/21/2021 2315 by Edgardo Cooper RN  Outcome: Ongoing     Problem: Infection:  Goal: Will remain free from infection  Description: Will remain free from infection  7/21/2021 2315 by Edgardo Cooper RN  Outcome: Ongoing     Problem: Safety:  Goal: Free from accidental physical injury  Description: Free from accidental physical injury  7/21/2021 2315 by Edgardo Cooper RN  Outcome: Ongoing     Problem: Daily Care:  Goal: Daily care needs are met  Description: Daily care needs are met  7/21/2021 2315 by Edgardo Cooper RN  Outcome: Ongoing

## 2021-07-22 NOTE — PLAN OF CARE
Individualized Plan of Care  [facility] Inpatient Rehabilitation Unit    Rehabilitation physician: Dr. Dorothy Reese Date: 7/20/2021     Rehabilitation Diagnosis: Acute CVA (cerebrovascular accident) [facility]) [I63.9]     Rehabilitation impairments: self care, mobility, motor dysfunction, bowel/bladder management, safety, cognitive function and communication    Factors facilitating achievement of predicted outcomes: Cooperative  Barriers to the achievement of predicted outcomes: Confusion, Cognitive deficit, Depression, Anxiety, Unrealistic expectations, Communication deficit, Upper extremity weakness, Lower extremity weakness, Long standing deficits, Incontinence of bowel, Incontinence of bladder, Medical complications, Skin Care and Medication managment    Patient Goals: Improve independence with mobility, Improvement of mobility at a wheelchair level, Increase overall strength and endurance, Increase balance, Increase endurance, Increase independence with activities of daily living, Improve cognition, Increase self-awareness, Increase safety awareness, Increase community integration, Increase socialization, Functional communication with caregivers, Integrate appropriate pain management plan, Assure adequate nutritional option for discharge, Continence of bowel and bladder and Provide appropriate patient and family education      NURSING:  Nursing goals for Ceferino Peralta while on the rehabilitation unit will include:  Continence of bowel and bladder, Adequate number of bowel movements, Urinate with no urinary retention >300ml in bladder, Complete bladder protocol with lucero removal, Maintain O2 SATs at an acceptable level during stay, Effective pain management while on the rehabilitation unit, Establish adequate pain control plan for discharge, Absence of skin breakdown while on the rehabilitation unit, Improved skin integrity via assessments including wound measurements, Avoidance of any hospital acquired infections, No signs/symptoms of infection at the wound site, Freedom from injury during hospitalization and Complete education with patient/family with understanding demonstrated regarding disease process and resultant impairment     In order to achieve these goals, nursing interventions may include bowel/bladder training, education for medical assistive devices, medication education, O2 saturation management, energy conservation, stress management techniques, fall prevention, alarms protocol, seating and positioning, skin/wound care, pressure relief instruction, dressing changes, infection protection, DVT prophylaxis, assistance with safe transfers , and/or assistance with bathroom activities and hygiene. PHYSICAL THERAPY:  Goals:        Short term goals  Time Frame for Short term goals: 10 days  Short term goal 1: Pt able to roll felice eto side at min A   Short term goal 2: Pt able to perform supine>sit at min A   Short term goal 3:  Pt able to perform sit to supine at mod A  Short term goal 4: Pt able to ptransfer at [place] A   Short term goal 5: Pt able to propel w/c with L UE/L LE , distance fo 100 ft min A   Short term goal 6: Pt able to ambulate with appropriate device distance of  50 to 100 ft, julissa x 2  Long term goals  Time Frame for Long term goals : By DC  Long term goal 1: Pt able perform bed mobility mod-I  Long term goal 2:  Pt able to perform transfers at CGA/min A   Long term goal 3:  Pt able to ambulate with appropriate device distance  100 to 150 ft , min A  Long term goal 4: Pt able to go up and down 4 to 5 steps with 1 UE support at mod/max A   Long term goal 5: Pt able to propel w/c level surfaces distance fo 100 to 150 ft, SBA  Long term goal 6: Improve PASS score to atleast 198/36 to improve overall fucntion. Long term goal 7: Improve standing dynamic balance with assistive deivice to atleast fair + to reduce fall risk.      Plan Of Care:  Due to impaired functional endurance and/or medical issues, the patient is to be seen for a combined total of at least a  900 minutes over 7 days of Physical, Occupational and/or Speech Therapy. Anticipated interventions may include therapeutic exercises, gait training, neuromuscular re-ed, transfer training, community reintegration, bed mobility, w/c mobility and training.       OCCUPATIONAL THERAPY:  Goals:             Short term goals  Time Frame for Short term goals: By 10 days  Short term goal 1: Pt will complete upper body dressing/bathing with Min A and good attention to task  Short term goal 2: Pt will complete lower body dressing/bathing with max A and good safety with use of AE as needed  Short term goal 3: Pt will complete functional transfers during self care tasks with mod A x 1 and good safety  Short term goal 4: Pt will tolerate standing 4+ minutes during functional activity of choice with min A and good safety  Short term goal 5: Pt will participate in 30+ minutes of therapeutic exercises/functional activities to increase safety and independence with self care tasks  Short term goal 6: Pt will complete self feeding with min A and good attention to task  Long term goals  Time Frame for Long term goals : By discharge  Long term goal 1: Pt will complete upper body dressing with set-up assistance and good attention to task  Long term goal 2: Pt will complete lower body dressing/bathing with min A and good safety with use of AE as needed  Long term goal 3: Pt will complete functional transfers/mobility during self care tasks with min A and good safety  Long term goal 4: Pt will tolerate standing 8+ minutes during functional activity of choice with CGA and good safety  Long term goal 5: Pt will verbalize/demonstrate good understanding of adaptive equipment/adaptive strategies/durable medical equipment to increase safety and independence with self care and mobility  Long term goals 6: Pt will complete self feeding with set-up assistance and good attention to task  Long term goal 7: Vision to be further assessed    Plan Of Care:  Due to impaired functional endurance and/or medical issues, the patient is to be seen for a combined total of at least a  900 minutes over 7 days of Physical, Occupational and/or Speech Therapy. Anticipated interventions may include ADL and IADL retraining, strengthening, safety education and training, patient/caregiver education and training, equipment evaluation/ training/procurement, neuromuscular reeducation, wheelchair mobility training. SPEECH THERAPY:   Goals:             Short-term Goals  Goal 1: Increase following 3 step directions to 80%  Goal 2: Increase responsive naming to 90%  Goal 3: Increase divergent naming to 14 items in 60 seconds  Goal 4: Further assessment of cognition as pt. progress allows. Goal 5: Increase speech intelligibility via compensatory dysarthria strategies (emphasis on increasing breath support)  Goal 6: Increase orientation to 90%        Plan Of Care:  Due to impaired functional endurance and/or medical issues, the patient is to be seen for a combined total of at least a  900 minutes over 7 days of Physical, Occupational and/or Speech Therapy. Anticipated interventions may include speech/language/communication therapy, cognitive training, group therapy, education, and/or dysphagia therapy based on the above goals. CASE MANAGEMENT:  Goals:   Assist patient/family with discharge planning, patient/family counseling,  and coordination with insurance during the inpatient rehabilitation stay. Other members of the multidisciplinary rehabilitation team that will be involved in the patient's plan of care include recreational therapy, dietary, respiratory therapy, and neuropsychology.     Medical issues being managed closely and that require 24 hour availability of a physician:  Swallowing precautions, Bowel/Bladder function, Wound care, Pain management, Infection protection, DVT prophylaxis, Fall precautions, Fluid/Electrolyte balance, Nutritional status, Respiratory needs, Anemia and History of heart disease                                           Physician anticipated functional outcomes: Improved independence with functional measures   Estimated length of stay for this admission 3 weeks  Medical Prognosis: Fair  Anticipated disposition: Home. The potential to achieve the above medical and rehabilitative goals is fair. This plan of care has been developed with the assistance and input of the multidisciplinary rehabilitation team.  The plan was reviewed with the patient on 7/22/2021. The patient has had the opportunity to provide input to the therapy team.    I have reviewed this Individualized Plan of Care and agree with its contents. Above documentation has been expanded, modified, adjusted to reflect the findings of my evaluations and goals for the patient. Physician:  Electronically signed by Izaiah Merino MD on 7/22/21 at 6:00 PM EDT

## 2021-07-22 NOTE — PROGRESS NOTES
Physical Therapy    Facility/Department: [facility]  Initial Assessment    NAME: Ximena Morrow  : 1945  MRN: [id number]    Date of Service: 2021    Discharge Recommendations:  Patient would benefit from continued therapy after discharge, 24 hour supervision or assist   PT Equipment Recommendations  Other: TBD    Assessment   Body structures, Functions, Activity limitations: Decreased functional mobility ; Decreased ROM; Decreased strength;Decreased balance;Decreased safe awareness;Decreased endurance;Decreased posture;Decreased cognition;Decreased fine motor control;Decreased coordination  Assessment: Pt present with R weakness, UE> LE, dysarthria, and increased tone R UE/LE. Pt also have cognitive deficts. Pt requires 2 person assist for transfers and ambulation. Will conitnue POC to address deficts. Treatment Diagnosis: Weakenss , difficulty walking  Prognosis: Fair  Decision Making: High Complexity  Barriers to Learning: aphasia, cognitive deficits  REQUIRES PT FOLLOW UP: Yes  Activity Tolerance  Activity Tolerance: Patient limited by cognitive status; Patient limited by endurance; Patient limited by fatigue       Patient Diagnosis(es): There were no encounter diagnoses. has a past medical history of Centrilobular emphysema ([address] COPD (chronic obstructive pulmonary disease) ([facility] [address] Depression, Diabetes mellitus ([address] Former smoker, Hyperlipidemia, Hypertension, Mixed restrictive and obstructive lung disease ([address] Need for pneumococcal vaccine, and Personal history of tobacco use.   has a past surgical history that includes Neck surgery; Toe amputation (Right, greater); and Cardiac surgery (2015).     Restrictions  Restrictions/Precautions  Restrictions/Precautions: Up as Tolerated, General Precautions, Fall Risk, Swallowing - Thickened Liquids (1:1 sitter, Mildy thick (Nector thick))  Required Braces or Orthoses?: No  Position Activity Restriction  Other position/activity restrictions: Up w/assistance, RU/LE Weakness. MRI BRAIN- Acute infarct in the left and right basal ganglia greater on the left. Vision/Hearing  Vision: Impaired  Vision Exceptions: Wears glasses at all times  Hearing: Within functional limits     Subjective  General  Chart Reviewed: Yes  Patient assessed for rehabilitation services?: Yes  Additional Pertinent Hx: Fahad Storm is a 77 y.o. RHD male admitted to [facility] on 7/13/2021. On admit, exam significant for right-sided facial droop, right-sided weakness and severe dysarthria. Significant history with recurrent strokes, remote left temporal lobe ischemic infarct and remote bilateral occipital lobe infarcts . MRI shows Bilateral basal ganglia ischemic infarcts. Pt admitted to rehab unit on 7/20/21  Family / Caregiver Present: No  Referring Practitioner: Dr Mamie Bullard  Referral Date : 07/20/21  Diagnosis: CVA  Follows Commands: Impaired  General Comment  Comments: Pt retired in [facility] [employer] after session.   Subjective  Subjective: Pt resting in bed upon arrival, eager to particiapte in PT, somewhat aphasic with mumbled speech, difficult to understand  Pain Screening  Patient Currently in Pain: Denies  Pain Assessment  Clinical Progression: Not changed  Vital Signs  BP Location: Left upper arm  Level of Consciousness: Alert (0)  Patient Currently in Pain: Denies  Oxygen Therapy  O2 Device: None (Room air)       Orientation  Orientation  Overall Orientation Status: Impaired  Orientation Level: Oriented to person;Disoriented to time;Disoriented to situation;Disoriented to place  Social/Functional History  Social/Functional History  Lives With: Alone  Type of Home: House  Home Layout: One level  Home Access: Stairs to enter with rails  Entrance Stairs - Number of Steps: 4  Entrance Stairs - Rails: Left  Bathroom Shower/Tub: Tub/Shower unit, Curtain  Bathroom Toilet: Standard  Bathroom Equipment: Hand-held shower  Bathroom Accessibility: Accessible  Home Equipment: Cane  ADL Assistance: Independent  Homemaking Assistance: Independent  Homemaking Responsibilities: Yes  Ambulation Assistance: Independent (Cane if needed with back pain)  Transfer Assistance: Independent  Active : Yes  Mode of Transportation: Car  Occupation: Retired  Type of occupation: Marine   IADL Comments: Pt sleeps in an adjustable bed  Additional Comments: Pt has a sister who is retired and can assist some. Cognition        Objective          PROM RLE (degrees)  RLE General PROM: PROM TO AAROM- Hip/knee WFL, DF to neutral.  PROM LLE (degrees)  LLE PROM: WFL  PROM RUE (degrees)  RUE General PROM: See OT eval  PROM LUE (degrees)  LUE PROM: WFL  Strength RLE  Strength RLE: Exception  Comment: Hip 1+ to 2-/5, Knee extension 2-/5, DF 0/5, wiggled his toes on commands at times. Strength LLE  Comment: Atleast 3/5, at times difficutly following commands for MMT. Strength RUE  Comment: See OT zabrina.  Strength LUE  Comment: See OT zabrina- observed dpt move his L UE on his own. Strength Other  Other: MMT limited d/t decreased cognition to follow commands  Tone RLE  RLE Tone: Hypertonic  Tone Description: high tone throughout ROM  Tone LLE  LLE Tone: Normotonic  Motor Control  Gross Motor?: Exceptions (unable to move RUE/LE much)  Sensation  Overall Sensation Status: Impaired  Light Touch: Partial deficits in the RUE;Partial deficits in the RLE  Bed mobility  Rolling to Left: Maximum assistance  Rolling to Right: Moderate assistance  Supine to Sit: Maximum assistance  Sit to Supine:  (Left in chair)  Scooting: Moderate assistance;2 Person assistance  Comment: Pt needs assist for [place] as well as trunk progression for supine>sit  Transfers  Sit to Stand:  Moderate Assistance;Maximum Assistance;2 Person Assistance (Assist fluctuates 2* cognitive deficts and participation.)  Stand to sit: Moderate Assistance;2 Person Assistance  Bed to Chair: Maximum assistance;2 Person Assistance (assist to move RLE)  Stand Pivot Transfers: Maximum Assistance;2 Person Assistance  Comment: stand pivot bed to chair with Hemiwalker at mod/max A x 2  Ambulation  Ambulation?: Yes  More Ambulation?: No  Ambulation 1  Surface: level tile  Device: Hemiwalker (lift walker, B forearm support)  Other Apparatus:  (Chair follow)  Assistance: Maximum assistance;2 Person assistance (WC follow for safety)  Quality of Gait: Short steps, therapist assisted for R LE stepping forward, L LE very small step, difficulty comprrehanding weight shifting side to side yet. heavy mar to right side. Gait Deviations: Slow Anais;Decreased step length;Decreased step height;Decreased arm swing;Decreased head and trunk rotation;Deviated path;Staggers; Shuffles  Distance: 6 steps  Comments: max A to wt shift L/R, max A to advance RLE--pt inconsistently able to initiate swing on the R  Stairs/Curb  Stairs?: No     Balance  Posture: Fair  Sitting - Static: Good;-  Sitting - Dynamic: Fair  Standing - Static: Fair;-  Standing - Dynamic: Poor  Comments: standing balance withHemiwalker, max A, R lean  Exercises  Comments: . Other exercises  Other exercises 1: seated LE ex: AROM LLE with frequent verbal cues; AAROM RLE all x 10: LAQs, KTC, hip ab/adduction  Other exercises 2: Bridging , therapist assisting with holding R LE in flexed position, 5 reps, 2 sets, min A  Other exercises 3: Supine R LE passive to active assit ex's - 10 reps. Plan   Plan  Times per week: 900 minutes/week for combine dtherapy of PT/OT/ST due to decreased tolerance to activity.   Times per day: Daily  Current Treatment Recommendations: Strengthening, ROM, Balance Training, Functional Mobility Training, Gait Training, Endurance Training, Transfer Training, Safety Education & Training, Wheelchair Mobility Training, Neuromuscular Re-education, Cognitive Reorientation, Home Exercise Program, Patient/Caregiver Education & Training, Equipment Evaluation, Education, & procurement, Positioning, Stair training, Modalities (Will consider using modalities as needed for neuro re-edu.)  Safety Devices  Type of devices: Gait belt, Patient at risk for falls, Left in chair, Call light within reach, Sitter present  Restraints  Initially in place: No    G-Code       OutComes Score   Postural Assessment Scale for Stroke Patients   (PASS) Scoring Form     Give the subject instructions for each item as written below. When scoring the item, record the lowest response category that applies for each item. Maintaining a Posture  1. Sitting Without Support  Examiner: Have the subject sit on a bench/mat without support and with feet flat on the floor. 1 Can sit with slight support (for example, by 1 hand)  2. Standing with Support Examiner: Have the subject stand, providing support as needed. Evaluate only the ability to stand with or without support. Do not consider the quality of the stance. 1     Can stand with strong support of 2 people   3. Standing Without Support  Examiner:  Have the subject stand without support. Evaluate only the ability to stand with or without support. Do not consider the quality of the stance. 0  Cannot stand without support  4. Standing on Non-paretic Leg  Examiner: Have the subject stand on the non-paretic leg. Evaluate only the ability to bear weight entirely on the non-paretic leg. Do not consider how the subject accomplishes the task. 0  Cannot stand on non-paretic leg    5. Standing on Paretic Leg  Examiner: Have the subject stand on the paretic leg. Evaluate only the ability to bear weight entirely on the paretic leg. Do not consider how the subject accomplishes the task. 0  Cannot stand of paretic leg     Maintaining Posture SUBTOTAL     2/15    Changing a Posture  6. Supine to Paretic Side Lateral  Examiner:  Begin with the subject in supine on a treatment mat. Instruct the subject to roll to the paretic side (lateral movement). Assist as necessary.  Evaluated the subject's performance on the amount of help required. Do not consider the quality of performance. 1  Can perform with much help   7. Supine to Non-paretic Side Lateral  Examiner:  Begin with the subject in supine on a treatment mat. Instruct the subject to roll to the non-paretic side (lateral movement). Assist as necessary. Evaluate the subject's Performance on the amount of help required. Do not consider the quality of performance. 1  Can perform with much help   8. Supine to Sitting up on the Edge of the Mat   Examiner:  Begin with the subject in supine on a treatment mat. Instruct the subject to come to sitting on the edge of the mat. Assist as necessary. Evaluate the subject's performance on the amount of help required. Do not considered the quality performance. 1  Can perform with much help    9. Sitting on the Edge of the Mat to Supine  Examiner: Begin with the subject sitting on the edge of a treatment mat. Instruct the subject to return to supine. Assist as necessary. Evaluate the subjects performance on the amount of help required. Co not consider the quality of performance. 1  Can perform with much help   10. Sitting to Standing Up  Examiner:  Begin with the subject sitting on the edge of a treatment mat. Instruct the subject to stand up without support. Assist if necessary. Evaluated the subject's performance on the amount ot help required. Do not consider the quality of the performance. 1  Can perform with much help   11. Standing Up to Sitting Down  Examiner:  Begin with the subject standing by the edge of a treatment mat. Instruct the subject to sit on the edge of mat without support. Assist if necessary. Evaluated the subject's performance on the amount of help required. Do not consider the quality of the performance. 1  Can perform with much help   12 . Standing, Picking up a Pencil from the Floor  Examiner:  Begin with the subject standing.   Instruct the subject to  a pencil from the floor without support. Assist if necessary. Evaluate the subject's performance on the amount of help required. Do Not consider the quality of the performance. 0  Cannot perform    Changing Posture SUBTOTAL   6/21    PASS TOTAL   8/36                                                     AM-PAC Score             Goals  Short term goals  Time Frame for Short term goals: 10 days  Short term goal 1: Pt able to roll felice eto side at min A   Short term goal 2: Pt able to perform supine>sit at min A   Short term goal 3:  Pt able to perform sit to supine at mod A  Short term goal 4: Pt able to ptransfer at [place] A   Short term goal 5: Pt able to propel w/c with L UE/L LE , distance fo 100 ft min A   Short term goal 6: Pt able to ambulate with appropriate device distance of  50 to 100 ft, julissa x 2  Long term goals  Time Frame for Long term goals : By DC  Long term goal 1: Pt able perform bed mobility mod-I  Long term goal 2:  Pt able to perform transfers at CGA/min A   Long term goal 3:  Pt able to ambulate with appropriate device distance  100 to 150 ft , min A  Long term goal 4: Pt able to go up and down 4 to 5 steps with 1 UE support at mod/max A   Long term goal 5: Pt able to propel w/c level surfaces distance fo 100 to 150 ft, SBA  Long term goal 6: Improve PASS score to atleast 198/36 to improve overall fucntion. Long term goal 7: Improve standing dynamic balance with assistive deivice to atleast fair + to reduce fall risk.    Patient Goals   Patient goals : GO home       Therapy Time   Individual Concurrent Group Co-treatment   Time In 4714         Time Out 1303         Minutes 43         Timed Code Treatment Minutes: Preston Zuniga [id number]       Paul Munguia, PT

## 2021-07-22 NOTE — PROGRESS NOTES
Physical Medicine & Rehabilitation  Progress Note    7/22/2021 5:50 PM     CC: Ambulatory and ADL dysfunction due to CVA    Subjective:   A bit more awake and alert today. Participating with therapies. Still decreased p.o. intake    ROS:  Denies fevers, chills, sweats. No chest pain, palpitations, lightheadedness. Denies coughing, wheezing or shortness of breath. Denies abdominal pain, nausea, diarrhea or constipation. No new areas of joint pain. Denies new areas of numbness or weakness. Denies new anxiety or depression issues. No new skin problems. Rehabilitation:   PT:  Restrictions/Precautions: Up as Tolerated, General Precautions, Fall Risk, Swallowing - Thickened Liquids (1:1 sitter, Mildy thick ( Nector thick))  Other position/activity restrictions: Up w/assistance, RU/LE Weakness. MRI BRAIN- Acute infarct in the left and right basal ganglia greater on the left. Transfers  Sit to Stand: Moderate Assistance, 2 Person Assistance (Assist fluctuates 2* cognitive deficts and participation.)  Stand to sit: Moderate Assistance, 2 Person Assistance  Bed to Chair: Maximum assistance, 2 Person Assistance (assist to move RLE)  Stand Pivot Transfers: Maximum Assistance, 2 Person Assistance  Comment: stand pivot bed to chair with Hemiwalker at mod/max A x 2  Ambulation 1  Surface: level tile  Device: Hemiwalker (lift walker, B forearm support)  Other Apparatus: Wheelchair follow  Assistance: Maximum assistance, 2 Person assistance (WC follow for safety)  Quality of Gait: Short steps, therapist assisted for R LE stepping forward, L LE very small step, difficulty comprehend weight shifting side to side yet, heavy lean to right side.    Gait Deviations: Slow Anais, Decreased step length, Decreased step height, Decreased arm swing, Decreased head and trunk rotation, Deviated path, Staggers, Shuffles  Distance: 3ft  Comments: max A to wt shift L/R, max A to advance RLE--pt inconsistently able to initiate swing on the R          OT:  ADL  Feeding: Maximum assistance, Thickened liquids (TA self feeding per Nursing; Pt able to bring drink to mouth)  Grooming: None (Pt refuses)  UE Bathing: Moderate assistance  LE Bathing: Dependent/Total Candi Alpine stedy for bottom and willem hygiene)  UE Dressing: Maximum assistance (Thread RUE and overhead)  LE Dressing: Dependent/Total Candi Alpine stedy; TA for all steps)  Toileting: Dependent/Total (2 person A)  Additional Comments: Pt agitatated at the start of session yelling \"I want to go home\" OT was able to redirect patient to engage in self care tasks of bathing and dressing. Pt resistant to pt education on karena dressing technique at this time. Pt able to thread LUE requiring assistance to thread RUE and to pull down over head. Pt required 2 person A for safety during lower body self care and toileting. Per Nursing, pt required total assistance to perform self feeding task. Pt able to demonstrate bringing drink to mouth with increased time and assistance during session. Pt currently limited due to cognitive barriers, decreased ROM, strength, balance, activity tolerance, and [address] impacting safety and independence with self care tasks. Balance  Sitting Balance: Moderate assistance (SBA - Mod A with fatigue and activity)  Standing Balance: Maximum assistance (Min- Max A while standing with fatigue and R side lean)   Standing Balance  Time: 1-2 minutes x 5  Activity: functional transfers, lower body bathing/dressing, toileting  Comment: with use of jeet stedy for lower body bathing/dressing for safety. Pt completed toileting with 2 person A for standing. Bed mobility  Rolling to Left: Maximum assistance  Rolling to Right: Moderate assistance  Supine to Sit: Maximum assistance  Sit to Supine: Maximum assistance (A at trunk and BLE)  Scooting:  Moderate assistance, 2 Person assistance  Comment: Pt needs assist for Legas as well as trunk progression for supine>sit  Transfers  Stand Pivot Transfers: 2 Person assistance, Maximum assistance (A to manage RLE during transfer)  Sit to stand: 2 Person assistance, Maximum assistance (2 person Max-Min A due to fatigue and cognition)  Stand to sit: 2 Person assistance, Moderate assistance  Transfer Comments: Verbal cues for hand placement, safety, and balance   Toilet Transfers  Toilet - Technique: Stand pivot  Equipment Used: Raised toilet seat with rails  Toilet Transfer: 2 Person assistance, Maximum assistance  Toilet Transfers Comments: Pt completed toilet transfer with use of 2 person max assistance. Pt required assistance to manage RLE during transfer with verbal cues for safety and balance. ST:    Diagnosis: Pt. demonstrated mild to moderately impaired comprehension for complex information. Pt. demonstrated moderate to severe dysarthria, characterized by reduced vocal intensity and imprecise articulation,  unable to fully assess cognition d/t reduced speech intelligibility. Pt. demonstrated moderately impaired verbal expression. Treatment is warranted to bring communication to a functional level.      Pt. Is on pureed diet with nectar thick liquids. Will attempt to advance diet as appropriate pending pt. Progress. Objective:  BP (!) 142/83   Pulse 101   Temp 97.5 °F (36.4 °C) (Axillary)   Resp 18   Ht 5' 10\" (1.778 m)   Wt 212 lb (96.2 kg)   SpO2 96%   BMI 30.42 kg/m²  I Body mass index is 30.42 kg/m². I   Wt Readings from Last 1 Encounters:   21 212 lb (96.2 kg)      Temp (24hrs), Av.5 °F (36.4 °C), Min:97.5 °F (36.4 °C), Max:97.5 °F (36.4 °C)         GEN: well developed, well nourished, no acute distress  HEENT: Normocephalic atraumatic, EOMI, mucous membranes pink and moist  CV: RRR, no murmurs, rubs or gallops  PULM: CTAB, no rales or rhonchi. Respirations WNL and unlabored  ABD: soft, NT, ND, +BS and equal  NEURO: A&O x 1, knew he was in hospital, not sure of year, president, did follow 1 step commands. Sensation grossly intact to light touch. MSK: Strength appears left upper lower extremity 2/5, right upper and lower extremity 4/5  EXTREMITIES: No calf tenderness to palpation bilaterally. No edema BLEs  SKIN: warm dry and intact with good turgor  PSYCH:         Medications   Scheduled Meds:   rosuvastatin  40 mg Oral Nightly    predniSONE  40 mg Oral Daily    amoxicillin-clavulanate  1 tablet Oral 2 times per day    aspirin  81 mg Oral Daily    clopidogrel  75 mg Oral Daily    finasteride  5 mg Oral Daily    FLUoxetine  40 mg Oral Daily    heparin (porcine)  5,000 Units Subcutaneous 3 times per day    hydrALAZINE  10 mg Oral 3 times per day    isosorbide mononitrate  30 mg Oral Daily    rOPINIRole  0.25 mg Oral Nightly    tamsulosin  0.4 mg Oral Daily    tiotropium  2 puff Inhalation Daily    polyethylene glycol  17 g Oral Daily    insulin lispro  0-6 Units Subcutaneous TID WC    insulin lispro  0-3 Units Subcutaneous Nightly     Continuous Infusions:   dextrose       PRN Meds:.albuterol sulfate HFA, QUEtiapine, acetaminophen, senna, bisacodyl, glucose, dextrose, glucagon (rDNA), dextrose     Diagnostics:     CBC:   Recent Labs     07/20/21  0509 07/21/21  0900 07/22/21  0747   WBC 16.3* 16.5* 15.8*   RBC 4.85 4.67 4.70   HGB 11.7* 11.3* 11.4*   HCT 39.4* 35.8* 36.0*   MCV 81.2* 76.6* 76.6*   RDW 18.7* 16.2* 16.6*   PLT See Reflexed IPF Result UNABLE TO REPORT PLATELET COUNT DUE TO THE RESENCE OF PLATELET CLUMPS. MICROSCOPICALLY, THE PLATELET COUNT APPEARS NORMAL. 76*     BMP:   Recent Labs     07/20/21  0509 07/21/21  0900 07/22/21  0747    141 141   K 4.4 3.5* 3.8   * 107 107   CO2 21 29 25   BUN 24* 25* 24*   CREATININE 1.00 1.35* 1.15     BNP: No results for input(s): BNP in the last 72 hours. PT/INR: No results for input(s): PROTIME, INR in the last 72 hours. APTT: No results for input(s): APTT in the last 72 hours.   CARDIAC ENZYMES: No results for input(s): CKMB, CKMBINDEX, TROPONINT in the last 72 hours. Invalid input(s): CKTOTAL;3  FASTING LIPID PANEL:  Lab Results   Component Value Date    CHOL 186 07/14/2021    HDL 42 07/14/2021    TRIG 77 07/14/2021     LIVER PROFILE: No results for input(s): AST, ALT, ALB, BILIDIR, BILITOT, ALKPHOS in the last 72 hours. I/O (24Hr): No intake or output data in the 24 hours ending 07/22/21 1750    Glu last 24 hour  Recent Labs     07/21/21  2019 07/22/21  0617 07/22/21  1120 07/22/21  1612   POCGLU 233* 96 134* 164*       No results for input(s): CLARITYU, COLORU, PHUR, SPECGRAV, PROTEINU, RBCUA, BLOODU, BACTERIA, NITRU, WBCUA, LEUKOCYTESUR, YEAST, GLUCOSEU, BILIRUBINUR in the last 72 hours. MRI brain 7/14  Acute infarct in the left and right basal ganglia greater on the left. Diffuse volume loss with extensive chronic white matter changes      CT head 7/21  Subacute basal ganglia infarcts redemonstrated.  No gross hemorrhagic   conversion or significant mass effect.       New air-fluid level within the left sphenoid sinus suggesting acute sinusitis.          Impression/Plan:    1. Ambulatory and ADL dysfunction due to bilateral basal ganglia infarct, severe occlusion right PCA, severe stenosis RAHEEM-continue rehab efforts, clarify support  2. Bilateral basal ganglia CVA-aspirin, Plavix, Crestor  3. Rhabdomyolysis  4. Agitation on arrival-use restraints, Seroquel, Ativan, as needed Seroquel at bedtime, QTC calculation [phone], per records ,possibly due to change in location, Ativan/Seroquel, CT,  continue one-on-one for now, appears to be participating bit better. 5. Sinusitis-noted start of Augmentin by internal medicine  6. Hypertension hyperlipidemia coronary artery disease -hydralazine, Imdur, Crestor aspirin, Plavix-  7. Immune thrombocytopenic purpura-IV prednisone 40 mg twice daily, change to oral, discussed with hematology, platelets appear to be slowly decreasing  8.  Leukocytosis-possibly secondary to steroids  9. Anemia-hemoglobin 11.4  10. Depression-Prozac  11. Restless leg-Requip  12. GERD-was on Pepcid 20 twice daily, discussed with oncology, okay to resume -will resume and monitor  13. Thyroid nodule  14. BPH-Proscar, Flomax  15. Mild elevated BUN/creatinine-possible dehydration-improved  16. Diabetes-was on glipizide 5 and 8 units Lantus home med, currently not on glucose -monitor  17. DVT prophylaxis subcu heparin  18. Internal medicine for medical management, oncology follow-up      Jv Suarez. Jabari Crain MD       This note is created with the assistance of a speech recognition program.  While intending to generate a document that actually reflects the content of the visit, the document can still have some errors including those of syntax and sound a like substitutions which may escape proof reading.   In such instances, actual meaning can be extrapolated by contextual diversion

## 2021-07-22 NOTE — PROGRESS NOTES
[address]  Acute Rehabilitation Physical Therapy Progress Note    Date: 21  Patient Name: Madhu Willson       Room: 0500/7515-09  MRN: [id number]   Account: [de-identified]   : 1945  (72 y.o.) Gender: male           Past Medical History:  has a past medical history of Centrilobular emphysema ([facility] COPD (chronic obstructive pulmonary disease) ([facility] [address] Depression, Diabetes mellitus ([facility] Former smoker, Hyperlipidemia, Hypertension, Mixed restrictive and obstructive lung disease ([facility] [facility] Need for pneumococcal vaccine, and Personal history of tobacco use. Past Surgical History:   has a past surgical history that includes Neck surgery; Toe amputation (Right, greater); and Cardiac surgery (2015). Additional Pertinent Hx: Madhu Willson is a 77 y.o. RHD male admitted to [facility] on 2021. On admit, exam significant for right-sided facial droop, right-sided weakness and severe dysarthria. Significant history with recurrent strokes, remote left temporal lobe ischemic infarct and remote bilateral occipital lobe infarcts . MRI shows Bilateral basal ganglia ischemic infarcts. Pt admitted to rehab unit on 21    Overall Orientation Status: Impaired  Orientation Level: Oriented to person, Disoriented to time, Disoriented to situation, Disoriented to place  Restrictions/Precautions  Restrictions/Precautions: Up as Tolerated;General Precautions; Fall Risk;Swallowing - Thickened Liquids (1:1 sitter, Mildy thick ( Nector thick))  Required Braces or Orthoses?: No  Position Activity Restriction  Other position/activity restrictions: Up w/assistance, RU/LE Weakness. MRI BRAIN- Acute infarct in the left and right basal ganglia greater on the left. Subjective: Pt restless upon arrival; pt reports wanting to go to his car. HERLINDA Clements in room while 1:1 was taking a lunch break. Discussed Plan of Care and pt agreed to come to therapy gym. Comments: Pt was pleasant to work with.  Pt voice is very soft and difficult to understand at times. Vital Signs  Patient Currently in Pain: Denies                     Bed Mobility  Comment: Pt sitting in bedside recliner upon arrival.    Transfers:  Sit to Stand: Moderate Assistance;2 Person Assistance (Assist fluctuates 2* cognitive deficts and participation.)  Stand to sit: Moderate Assistance;2 Person Assistance  Bed to Chair: Maximum assistance;2 Person Assistance (assist to move RLE)                  Ambulation 1  Surface: level tile  Device: Hemiwalker (lift walker, B forearm support)  Other Apparatus: Wheelchair follow  Assistance: Maximum assistance;2 Person assistance (WC follow for safety)  Quality of Gait: Short steps, therapist assisted for R LE stepping forward, L LE very small step, difficulty comprehend weight shifting side to side yet, heavy lean to right side. Gait Deviations: Slow Anais;Decreased step length;Decreased step height;Decreased arm swing;Decreased head and trunk rotation;Deviated path;Staggers; Shuffles  Distance: 3ft  Comments: max A to wt shift L/R, max A to advance RLE--pt inconsistently able to initiate swing on the R              Stairs/Curb  Stairs?: No                                                                           BALANCE Posture: Fair  Sitting - Static: Good;-  Sitting - Dynamic: Fair  Standing - Static: Fair;-  Standing - Dynamic: Poor  Comments: standing balance with Hemiwalker and Maxi Lift, max A, R lean    EXERCISES   Other exercises?: Yes  Other exercises 1: seated LE ex: AROM LLE with frequent verbal cues; AAROM RLE all x 15: LAQs, KTC, hip ab/adduction  Other exercises 2: Mirror therapy working on upright posture and improved movements with activities  Other exercises 3: Orange t-band [id number] (ABd, hamstring curls)               Activity Tolerance: Patient limited by cognitive status, Patient limited by endurance, Patient limited by fatigue  PT Equipment Recommendations  Other: TBD       Patient Education  New Education Provided:  Plan of Care  Learner:patient  Method: demonstration and explanation       Outcome: needs reinforcement     Current Treatment Recommendations: Strengthening, ROM, Balance Training, Functional Mobility Training, Gait Training, Endurance Training, Transfer Training, Safety Education & Training, Wheelchair Mobility Training, Neuromuscular Re-education, Cognitive Reorientation, Home Exercise Program, Patient/Caregiver Education & Training, Equipment Evaluation, Education, & procurement, Positioning, Stair training, Modalities (Will consider using modalities as needed for neuro re-edu.)    Conditions Requiring Skilled Therapeutic Intervention  Body structures, Functions, Activity limitations: Decreased functional mobility ; Decreased ROM; Decreased strength;Decreased balance;Decreased safe awareness;Decreased endurance;Decreased posture;Decreased cognition;Decreased fine motor control;Decreased coordination  Treatment Diagnosis: Weakenss , difficulty walking  Prognosis: Fair  Barriers to Learning: aphasia, cognitive deficits  REQUIRES PT FOLLOW UP: Yes  Discharge Recommendations: Patient would benefit from continued therapy after discharge;24 hour supervision or assist    Goals  Short term goals  Time Frame for Short term goals: 10 days  Short term goal 1: Pt able to roll felice eto side at min A   Short term goal 2: Pt able to perform supine>sit at min A   Short term goal 3:  Pt able to perform sit to supine at mod A  Short term goal 4: Pt able to ptransfer at [employer] A   Short term goal 5: Pt able to propel w/c with L UE/L LE , distance fo 100 ft min A   Short term goal 6: Pt able to ambulate with appropriate device distance of  50 to 100 ft, julissa x 2  Long term goals  Time Frame for Long term goals : By DC  Long term goal 1: Pt able perform bed mobility mod-I  Long term goal 2:  Pt able to perform transfers at CGA/min A   Long term goal 3:  Pt able to ambulate with appropriate device distance  100 to 150 ft , min A  Long term goal 4: Pt able to go up and down 4 to 5 steps with 1 UE support at mod/max A   Long term goal 5: Pt able to propel w/c level surfaces distance fo 100 to 150 ft, SBA  Long term goal 6: Improve PASS score to atleast 198/36 to improve overall fucntion. Long term goal 7: Improve standing dynamic balance with assistive deivice to atleast fair + to reduce fall risk.         07/22/21 1220   PT Individual Minutes   Time In 1220   Time Out 1303   Minutes 43       Electronically signed by Jona Zabala PTA on 7/22/21 at 3:52 PM EDT

## 2021-07-22 NOTE — PROGRESS NOTES
Speech Language Pathology  Facility/Department: [facility] ACUTE REHAB  Initial Speech/Language/Cognitive Assessment    NAME: Jak Driver  : 1945   MRN: [id number]  ADMISSION DATE: 2021  ADMITTING DIAGNOSIS: has Centrilobular emphysema ([address] Mixed restrictive and obstructive lung disease ([address] Cerebrovascular accident (CVA) ([facility] COPD (chronic obstructive pulmonary disease) ([facility] [address] [facility] HTN (hypertension); Diabetes 1.5, managed as type 2 ([facility] [address] [facility] Hyperlipidemia; CAD S/P percutaneous coronary angioplasty; Rhabdomyolysis; Intracranial atherosclerosis; 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    Date of Eval: 2021   Evaluating Therapist: NOAH Storm    RECENT RESULTS  CT OF HEAD/MRI:   - CT brain-   Subacute basal ganglia infarcts redemonstrated.  No gross hemorrhagic   conversion or significant mass effect.       New air-fluid level within the left sphenoid sinus suggesting acute sinusitis. Primary Complaint:   Per PM&R H&P: Jak Driver  is a 77 y.o.  male admitted to the [address] [place] [address] on 2021.          73-year-old male found down by sister admitted to [facility] . Found to have right facial weakness, right side hemiparesis, dysarthria and multiple ecchymoses of the right side. .  Found to have elevated CK and myoglobin and troponin    Pain:  Pt. denies    Assessment:      Diagnosis: Pt. demonstrated mild to moderately impaired comprehension for complex information. Pt. demonstrated moderate to severe dysarthria, characterized by reduced vocal intensity and imprecise articulation,  unable to fully assess cognition d/t reduced speech intelligibility. Pt. demonstrated moderately impaired verbal expression.  Treatment is warranted to bring communication to a functional level. Pt. Is on pureed diet with nectar thick liquids. Will attempt to advance diet as appropriate pending pt. Progress. Pt. Was lethargic at time of this assessment. Pt. Janene Allen. Flat affect. Recommendations:  Requires SLP Intervention: Yes             Plan:   Goals:  Short-term Goals  Goal 1: Increase following 3 step directions to 80%  Goal 2: Increase responsive naming to 90%  Goal 3: Increase divergent naming to 14 items in 60 seconds  Goal 4: Further assessment of cognition as pt. progress allows. Goal 5: Increase speech intelligibility via compensatory dysarthria strategies (emphasis on increasing breath support)  Goal 6: Increase orientation to 90%   Patient/family involved in developing goals and treatment plan: yes    Subjective:   Previous level of function and limitations: independent prior to CVA        Vision  Vision: Impaired (though does not have them here)  Vision Exceptions: Wears glasses at all times  Hearing  Hearing: Within functional limits           Objective:     Oral/Motor  Oral Motor: Exceptions to [facility]  Labial ROM: Reduced left; Reduced right  Labial Strength: Reduced  Lingual ROM: Reduced left; Reduced right  Lingual Strength: Reduced    Auditory Comprehension  Comprehension: Exceptions  Yes/No Questions:  (88%)  One Step Basic Commands:  (100%)  Two Step Basic Commands:  (100%)  Multistep Basic Commands:  (50% 3 step commands)  Conversation: Moderate         Expression  Primary Mode of Expression: Verbal    Verbal Expression  Verbal Expression: Exceptions to functional limits  Automatic Speech:  (100% with initiation cues only)  Confrontation:  (100%)  Divergent: Severe (5 items in 60 seconds)  Responsive:  Moderate (60%)         Motor Speech  Motor Speech: Exceptions to [facility]  Intelligibility: Word  Dysarthria : Moderate (inconsistently reduced vocal intensity, articulatory imprecision)         Cognition:      Orientation  Overall Orientation Status: Impaired  Orientation Level:  (oriented to type of place but not name of hospital, reason, and time of day. Not oriented to year or month.)  Memory  Memory: Unable to assess  Problem Solving  Problem Solving: Unable to assess  Abstract Reasoning  Abstract Reasoning: Unable to assess  Safety/Judgement  Safety/Judgement: Unable to assess      Education:  Patient Education Response: Demonstrated understanding          Therapy Time:   Individual Concurrent Group Co-treatment   Time In [address] [place] [address]         Time Out 1330         Minutes 305 [facility] M. A.CCC/SLP    7/22/2021 2:26 PM

## 2021-07-22 NOTE — PROGRESS NOTES
[address] [facility]    Acute Rehabilitation OT Evaluation  Date: 21  Patient Name: Milla Rowell       Room: 1625/4736-56  MRN: [id number]  Account: [de-identified]   : 1945  (77 y.o.) Gender: male     Referring Practitioner: Dr. Lorena Lau  Diagnosis: CVA  Additional Pertinent Hx: Milla Rowell is a 77 y.o. RHD male admitted to [facility] on 2021. On admit, exam significant for right-sided facial droop, right-sided weakness and severe dysarthria. Significant history with recurrent strokes, remote left temporal lobe ischemic infarct and remote bilateral occipital lobe infarcts . MRI shows Bilateral basal ganglia ischemic infarcts. Pt admitted to rehab unit on 21    Treatment Diagnosis: Impaired self care status   Past Medical History:  has a past medical history of Centrilobular emphysema ([address] COPD (chronic obstructive pulmonary disease) ([facility] [address] Depression, Diabetes mellitus ([facility] [address] Former smoker, Hyperlipidemia, Hypertension, Mixed restrictive and obstructive lung disease ([address] Need for pneumococcal vaccine, and Personal history of tobacco use. Past Surgical History:   has a past surgical history that includes Neck surgery; Toe amputation (Right, greater); and Cardiac surgery (2015). Restrictions  Restrictions/Precautions: Up as Tolerated, General Precautions, Fall Risk, Swallowing - Thickened Liquids (1:1 sitter, Mildy thick ( Nector thick))  Other position/activity restrictions: Up w/assistance, RU/LE Weakness. MRI BRAIN- Acute infarct in the left and right basal ganglia greater on the left. Required Braces or Orthoses?: No    Vitals  Temp: 97.5 °F (36.4 °C)  Pulse: 101  Resp: 18  BP: (!) 142/83  Height: 5' 10\" (177.8 cm)  Weight: 212 lb (96.2 kg)  BMI (Calculated): 30.5  Oxygen Therapy  SpO2: 96 %  O2 Device: None (Room air)  Level of Consciousness: Alert (0)    Subjective  Subjective: \"I want to go home\" pt yelled at therapist multiple time. RN notified.  Pt agreeable to bathing and dressing at this time. Comments: Pt demonstrated increased agitation at the start of session. Pt then became silent not responding to therapist during self care tasks. Orientation  Orientation Level:  (KYA due to increased agitation )  Vision  Vision: Impaired  Vision Exceptions: Wears glasses at all times  Hearing  Hearing: Within functional limits  Vision - Basic Assessment  Vision Comments: Pt reports noted visual changes. Pt demonstrated decreased ability to track finger at this time. Further visual assessment needed to identify visual deficits. Social/Functional History  Lives With: Alone  Type of Home: House  Home Layout: One level  Home Access: Stairs to enter with rails  Entrance Stairs - Number of Steps: 4  Entrance Stairs - Rails: Left  Bathroom Shower/Tub: Tub/Shower unit, Curtain  Bathroom Toilet: Standard  Bathroom Equipment: Hand-held shower  Bathroom Accessibility: Accessible  Home Equipment: [employer]  ADL Assistance: Independent  Homemaking Assistance: Independent  Homemaking Responsibilities: Yes  Ambulation Assistance: Independent (Cane if needed with back pain)  Transfer Assistance: Independent  Active : Yes  Mode of Transportation: Car  Occupation: Retired  Type of occupation: Marine   IADL Comments: Pt sleeps in an adjustable bed  Additional Comments: Pt has a sister who is retired and can assist some. Objective  Vision - Basic Assessment  Vision Comments: Pt reports noted visual changes. Pt demonstrated decreased ability to track finger at this time. Further visual assessment needed to identify visual deficits.         Sensation  Overall Sensation Status: Impaired  Light Touch: Partial deficits in the RUE, Partial deficits in the RLE     UE Function           LUE Strength  Gross LUE Strength: WFL     LUE Tone: Normotonic     LUE AROM (degrees)  LUE AROM : WFL     Left Hand AROM (degrees)  Left Hand AROM: WFL  RUE Strength  Gross RUE Strength: Exceptions to [facility] Shoulder Flex: 0/5  R Elbow Flex: 1+/5  R Elbow Ext: 1/5  R Hand General: 1+/5  RUE Strength Comment: Pt demonstrated flexed tone with minimal flexion noted at wrist and elbow with increased time      RUE Tone: Hypertonic  Tone Description: Flexed tone  RUE PROM (degrees)  RUE PROM: WFL  RUE General PROM: Flexed tone noted with increased time to achieve full ROM. RUE AROM (degrees)  RUE AROM : Exceptions  RUE General AROM: Pt demonstrated trace flexion in wrist and elbow with increased time   Right Hand PROM (degrees)  Right Hand PROM: [facility]                           Fine Motor Skills  Coordination  Movements Are Fluid And Coordinated: No  Coordination and Movement description: Fine motor impairments, Gross motor impairments, Right UE                           Mobility  Sit to Supine: Maximum assistance (A at trunk and BLE)       Balance  Sitting Balance: Moderate assistance (SBA - Mod A with fatigue and activity)  Standing Balance: Maximum assistance (Min- Max A while standing with fatigue and R side lean)  Standing Balance  Time: 1-2 minutes x 5  Activity: functional transfers, lower body bathing/dressing, toileting  Comment: with use of jeet stedy for lower body bathing/dressing for safety. Pt completed toileting with 2 person A for standing. Bed mobility  Sit to Supine: Maximum assistance (A at trunk and BLE)     Transfers  Stand Pivot Transfers: 2 Person assistance, Maximum assistance (A to manage RLE during transfer)  Sit to stand: 2 Person assistance, Maximum assistance (2 person Max-Min A due to fatigue and cognition)  Stand to sit: 2 Person assistance, Moderate assistance  Transfer Comments: Verbal cues for hand placement, safety, and balance  Toilet Transfers  Toilet - Technique: Stand pivot  Equipment Used: Raised toilet seat with rails  Toilet Transfer: 2 Person assistance, Maximum assistance  Toilet Transfers Comments: Pt completed toilet transfer with use of 2 person max assistance.  Pt required assistance to manage RLE during transfer with verbal cues for safety and balance. Activity Tolerance: Treatment limited secondary to agitation, Treatment limited secondary to decreased cognition, Patient limited by fatigue         ADL     ADL  Feeding: Maximum assistance; Thickened liquids (TA self feeding per Nursing; Pt able to bring drink to mouth)  Grooming: None (Pt refuses)  UE Bathing: Moderate assistance  LE Bathing: Dependent/Total Darra Peppers stedy for bottom and willem hygiene)  UE Dressing: Maximum assistance (Thread RUE and overhead)  LE Dressing: Dependent/Total Darra Peppers stedy; TA for all steps)  Toileting: Dependent/Total (2 person A)  Additional Comments: Pt agitatated at the start of session yelling \"I want to go home\" OT was able to redirect patient to engage in self care tasks of bathing and dressing. Pt resistant to pt education on karena dressing technique at this time. Pt able to thread LUE requiring assistance to thread RUE and to pull down over head. Pt required 2 person A for safety during lower body self care and toileting. Per Nursing, pt required total assistance to perform self feeding task. Pt able to demonstrate bringing drink to mouth with increased time and assistance during session. Pt currently limited due to cognitive barriers, decreased ROM, strength, balance, activity tolerance, and [facility] impacting safety and independence with self care tasks. OT scores     Eating  Assistance Needed: Substantial/maximal assistance  Comment: TA per Nursing for meals;  Pt able to bring drink to mouth with increased time and A  CARE Score: 2  Discharge Goal: Set-up or clean-up assistance  Oral Hygiene  Reason if not Attempted: Patient refused  CARE Score: 7  Discharge Goal: Set-up or clean-up assistance  Toileting Hygiene  Assistance Needed: Dependent  CARE Score: 1  Discharge Goal: Partial/moderate assistance  Shower/Bathe Self  Assistance Needed: Dependent  Comment: use of jeet stedy lower body bathing  CARE Score: 1  Discharge Goal: Partial/moderate assistance  Upper Body Dressing  Assistance Needed: Substantial/maximal assistance  CARE Score: 2  Discharge Goal: Set-up or clean-up assistance  Lower Body Dressing  Assistance Needed: Dependent  CARE Score: 1  Discharge Goal: Partial/moderate assistance  Putting On/Taking Off Footwear  Reason if not Attempted: Patient refused  CARE Score: 7  Discharge Goal: Partial/moderate assistance  Toilet Transfer  Assistance Needed: Dependent  Comment: 2 person A  CARE Score: 1  Discharge Goal: Partial/moderate assistance    Goals  Patient Goals   Patient goals : \"I want to go home\"  Short term goals  Time Frame for Short term goals: By 10 days  Short term goal 1: Pt will complete upper body dressing/bathing with Min A and good attention to task  Short term goal 2: Pt will complete lower body dressing/bathing with max A and good safety with use of AE as needed  Short term goal 3: Pt will complete functional transfers during self care tasks with mod A x 1 and good safety  Short term goal 4: Pt will tolerate standing 4+ minutes during functional activity of choice with min A and good safety  Short term goal 5: Pt will participate in 30+ minutes of therapeutic exercises/functional activities to increase safety and independence with self care tasks  Short term goal 6: Pt will complete self feeding with min A and good attention to task  Long term goals  Time Frame for Long term goals : By discharge  Long term goal 1: Pt will complete upper body dressing with set-up assistance and good attention to task  Long term goal 2: Pt will complete lower body dressing/bathing with min A and good safety with use of AE as needed  Long term goal 3: Pt will complete functional transfers/mobility during self care tasks with min A and good safety  Long term goal 4: Pt will tolerate standing 8+ minutes during functional activity of choice with CGA and good safety  Long term goal 5: Pt will verbalize/demonstrate good understanding of adaptive equipment/adaptive strategies/durable medical equipment to increase safety and independence with self care and mobility  Long term goals 6: Pt will complete self feeding with set-up assistance and good attention to task  Long term goal 7: Vision to be further assessed    Assessment  Performance deficits / Impairments: Decreased ADL status, Decreased functional mobility , Decreased ROM, Decreased strength, Decreased safe awareness, Decreased cognition, Decreased endurance, Decreased sensation, Decreased balance, Decreased high-level IADLs, Decreased fine motor control, Decreased coordination  Treatment Diagnosis: Impaired self care status  Prognosis: Fair  Decision Making: High Complexity  REQUIRES OT FOLLOW UP: Yes  Discharge Recommendations: Patient would benefit from continued therapy after discharge, 24 hour supervision or assist  Plan  Times per week:  900 minutes/week for combine dtherapy of PT/OT/ST due to decreased tolerance to activity.   Times per day: Twice a day  Current Treatment Recommendations: Self-Care / ADL, Strengthening, ROM, Balance Training, Functional Mobility Training, Endurance Training, Neuromuscular Re-education, Cognitive Reorientation, Pain Management, Safety Education & Training, Patient/Caregiver Education & Training, Equipment Evaluation, Education, & procurement, Home Management Training, Cognitive/Perceptual Training       Equipment Recommendations  Equipment Needed:  (TBD)     07/22/21 1034 07/22/21 1347   OT Individual Minutes   Time In [id number]   Time Out [id number]   SUQCQAH [id number]   Time Code Minutes    Timed Code Treatment Minutes 15 Minutes 37 Minutes     Electronically signed by Charles Olivo OT on 7/22/21 at 4:57 PM EDT

## 2021-07-23 NOTE — PLAN OF CARE
Problem: Safety:  Goal: Free from accidental physical injury  Description: Free from accidental physical injury  7/23/2021 0507 by Ness Nails RN  Outcome: Met This Shift  Note: Continues with  for confusion and fall risk.    7/22/2021 1641 by Krish Sutton RN  Outcome: Ongoing  Goal: Free from intentional harm  Description: Free from intentional harm  7/23/2021 0507 by Ness Nails RN  Outcome: Met This Shift  7/22/2021 1641 by Krish Sutton RN  Outcome: Ongoing     Problem: Daily Care:  Goal: Daily care needs are met  Description: Daily care needs are met  7/23/2021 0507 by Ness Nails RN  Outcome: Met This Shift  7/22/2021 1641 by Krish Sutton RN  Outcome: Ongoing     Problem: Pain:  Goal: Patient's pain/discomfort is manageable  Description: Patient's pain/discomfort is manageable  7/23/2021 0507 by Ness Nails RN  Outcome: Met This Shift  7/22/2021 1641 by Krish Sutton RN  Outcome: Ongoing     Problem: Skin Integrity:  Goal: Skin integrity will stabilize  Description: Skin integrity will stabilize  7/23/2021 0507 by Ness Nails RN  Outcome: Met This Shift  7/22/2021 1641 by Krish Sutton RN  Outcome: Ongoing     Problem: Discharge Planning:  Goal: Patients continuum of care needs are met  Description: Patients continuum of care needs are met  7/23/2021 0507 by Ness Nails RN  Outcome: Met This Shift  7/22/2021 1641 by Krish Sutton RN  Outcome: Ongoing     Problem: Skin Integrity:  Goal: Will show no infection signs and symptoms  Description: Will show no infection signs and symptoms  7/23/2021 0507 by Ness Nails RN  Outcome: Met This Shift  7/22/2021 1641 by Krish Sutton RN  Outcome: Ongoing  Goal: Absence of new skin breakdown  Description: Absence of new skin breakdown  7/23/2021 0507 by Ness Nails RN  Outcome: Met This Shift  7/22/2021 1641 by Krish Sutton RN  Outcome: Ongoing     Problem: Falls - Risk of:  Goal: Will remain free from falls  Description: Will remain free from falls  7/23/2021 0507 by Ami Abrams RN  Outcome: Met This Shift  7/22/2021 1641 by Kali Fulton RN  Outcome: Ongoing  Goal: Absence of physical injury  Description: Absence of physical injury  7/23/2021 0507 by Ami Abrams RN  Outcome: Met This Shift  7/22/2021 1641 by Kali Fulton RN  Outcome: Ongoing     Problem: Nutrition  Goal: Optimal nutrition therapy  7/23/2021 0507 by Ami Abrams RN  Outcome: Met This Shift  7/22/2021 1641 by Kali Fulton RN  Outcome: Ongoing     Problem: Musculor/Skeletal Functional Status  Goal: Absence of falls  7/23/2021 0507 by Ami Abrams RN  Outcome: Met This Shift  7/22/2021 1641 by Kali Fulton RN  Outcome: Ongoing     Problem: Infection:  Goal: Will remain free from infection  Description: Will remain free from infection  7/23/2021 0507 by Ami Abrams RN  Outcome: Ongoing  Note: Patient is being treated for sinusitis with Augmentin.   7/22/2021 1641 by Kali Fulton RN  Outcome: Ongoing     Problem: Musculor/Skeletal Functional Status  Goal: Highest potential functional level  7/23/2021 0507 by Ami Abrams RN  Outcome: Ongoing  7/22/2021 1641 by Kali Fulton RN  Outcome: Ongoing     Problem: Non-Violent Restraints  Goal: Removal from restraints as soon as assessed to be safe  7/23/2021 0507 by Ami Abrams RN  Outcome: Completed  7/22/2021 1641 by Kali Fulton RN  Outcome: Ongoing  Goal: No harm/injury to patient while restraints in use  7/23/2021 0507 by Ami Abrams RN  Outcome: Completed  7/22/2021 1641 by Kali Fulton RN  Outcome: Ongoing  Goal: Patient's dignity will be maintained  7/23/2021 0507 by Ami Abrams RN  Outcome: Completed  7/22/2021 1641 by Kali Fulton RN  Outcome: Ongoing

## 2021-07-23 NOTE — PROGRESS NOTES
Physical Therapy  [facility]  Acute Rehabilitation Physical Therapy Progress Note    Date: 21  Patient Name: Lucero Gallardo       Room: 1559/2626-44  MRN: [id number]   Account: [de-identified]   : 1945  (72 y.o.) Gender: male   Referring Practitioner: Dr. Evan Lee  Diagnosis: CVA  Past Medical History:  has a past medical history of Centrilobular emphysema ([facility] [address] COPD (chronic obstructive pulmonary disease) ([facility] [address] Depression, Diabetes mellitus ([facility] [address] Former smoker, Hyperlipidemia, Hypertension, Mixed restrictive and obstructive lung disease ([facility] [address] Need for pneumococcal vaccine, and Personal history of tobacco use. Past Surgical History:   has a past surgical history that includes Neck surgery; Toe amputation (Right, greater); and Cardiac surgery (2015). Additional Pertinent Hx: Lucero Gallardo is a 77 y.o. RHD male admitted to [facility] on 2021. On admit, exam significant for right-sided facial droop, right-sided weakness and severe dysarthria. Significant history with recurrent strokes, remote left temporal lobe ischemic infarct and remote bilateral occipital lobe infarcts . MRI shows Bilateral basal ganglia ischemic infarcts. Pt admitted to rehab unit on 21    Restrictions/Precautions  Restrictions/Precautions: Up as Tolerated;General Precautions; Fall Risk;Swallowing - Thickened Liquids (1:1 sitter, Mildly thick ( Nector thick))  Required Braces or Orthoses?: No  Position Activity Restriction  Other position/activity restrictions: Up w/assistance, RU/LE Weakness. MRI BRAIN- Acute infarct in the left and right basal ganglia greater on the left. Subjective: 1:1 reports Pt has been eager to go to PT. Pt denies pain today. Comments: Some delayed or absent responses, soft-spoken as well & sometimes difficult to hear.      Vital Signs  Patient Currently in Pain: Denies    Bed Mobility   Sit to Supine: Minimal assistance (x2)  Scooting: Minimal assistance (shoulders laterally on bed)  Comment: Bed flat, 2 pillows. Transfers  Sit to Stand: 2 Person Assistance; Moderate Assistance (No recall for hand placement cues. Hemiwalker/walker lift)  Stand to sit: 2 Person Assistance;Minimal Assistance (No recall of cues for hand placement. Hemiwalker/walker lift)  Bed to Chair: 2 Person Assistance; Moderate assistance (assist to move RLE, hemiwalker)  Stand pivot transfer: 2 Person Assistance; Moderate assistance (assist to move RLE, hemiwalker)    Ambulation 1  Surface: level tile  Device: Hemiwalker  Other Apparatus: Wheelchair follow  Assistance: 2 Person assistance; Moderate assistance  Quality of Gait: Short steps, therapist/aide advancing R LE, small step on L initially/improved with cues and assist to weight shift. No recall on second attempt. Gait Deviations: Slow Anais;Decreased step length;Decreased step height;Decreased arm swing;Shuffles;Decreased head and trunk rotation  Distance: 3' & 8'  Comments: No loss of balance. Pt attempting to rest R UE on hemiwalker, hindering movement during 8' amb. Ambulation 2  Surface - 2: level tile  Device 2:  (Green Walker Lift)  Other Apparatus 2: Wheelchair follow  Assistance 2: Maximum assistance;2 Person assistance  Quality of Gait 2: Pt demonstrates ability to initiate R swing, but lacks full ROM/requires assist. Poor weight shifting to L/strong R lean. Gait Deviations: Deviated path;Shuffles;Decreased head and trunk rotation;Decreased step height;Decreased step length; Slow Anais  Distance: 54'  Comments: Max cues for weight shift/R LE advancement with poor return/recall without assist.     Wheelchair Activities  Propulsion: Yes  Propulsion 1  Propulsion: Manual  Level: Level Tile  Method: LLE  Level of Assistance: Moderate assistance  Description/ Details: Pt demonstrates fair straight path & ability to maintain momentum. Mod A for 90º turn.  Fatigues quickly  Distance: 50'    Balance   Posture: Fair  Sitting - Static: Good;- (Sitting EOB, no UE or back support)  Sitting - Dynamic: Fair (Sitting EOB, no back support, 1 UE support)  Standing - Static: Fair;- (hemiwalker)  Standing - Dynamic: Poor (hemiwalker)     Exercises  Other exercises?: Yes  Other exercises 1: Seated LE exercises, 15x each: 1.5# ROM L LE with frequent verbal cues; active/assisted ROM R LE, orange (minimal) resistance band  Other exercises 2: Stand pivot transfers x2 with hemiwalker, Mod A x2    Activity Tolerance: Patient limited by cognitive status, Patient limited by endurance, Patient limited by fatigue     PT Equipment Recommendations  Other: TBD    Current Treatment Recommendations: Strengthening, ROM, Balance Training, Functional Mobility Training, Gait Training, Endurance Training, Transfer Training, Safety Education & Training, Wheelchair Mobility Training, Neuromuscular Re-education, Cognitive Reorientation, Home Exercise Program, Patient/Caregiver Education & Training, Equipment Evaluation, Education, & procurement, Positioning, Stair training, Modalities (Will consider using modalities as needed for neuro re-edu.)    Conditions Requiring Skilled Therapeutic Intervention  Body structures, Functions, Activity limitations: Decreased functional mobility ; Decreased ROM; Decreased strength;Decreased balance;Decreased safe awareness;Decreased endurance;Decreased posture;Decreased cognition;Decreased fine motor control;Decreased coordination  Barriers to Learning: aphasia, cognitive deficits  REQUIRES PT FOLLOW UP: Yes  Discharge Recommendations: Patient would benefit from continued therapy after discharge;24 hour supervision or assist    Goals  Short term goals  Time Frame for Short term goals: 10 days  Short term goal 1: Pt able to roll felice eto side at min A   Short term goal 2: Pt able to perform supine>sit at min A   Short term goal 3:  Pt able to perform sit to supine at mod A  Short term goal 4: Pt able to ptransfer at [employer] A   Short term goal 5: Pt able to propel w/c with L UE/L LE , distance fo 100 ft min A   Short term goal 6: Pt able to ambulate with appropriate device distance of  50 to 100 ft, julissa x 2  Long term goals  Time Frame for Long term goals : By DC  Long term goal 1: Pt able perform bed mobility mod-I  Long term goal 2:  Pt able to perform transfers at CGA/min A   Long term goal 3:  Pt able to ambulate with appropriate device distance  100 to 150 ft , min A  Long term goal 4: Pt able to go up and down 4 to 5 steps with 1 UE support at mod/max A   Long term goal 5: Pt able to propel w/c level surfaces distance fo 100 to 150 ft, SBA  Long term goal 6: Improve PASS score to atleast 198/36 to improve overall fucntion. Long term goal 7: Improve standing dynamic balance with assistive deivice to atleast fair + to reduce fall risk.       07/23/21 0901 07/23/21 1425   PT Individual Minutes   Time In 0840 1355   Time Out 0940 1433   Minutes 60 38     Electronically signed by Tee Avina PTA on 7/23/21 at 5:57 PM EDT

## 2021-07-23 NOTE — PROGRESS NOTES
[address] [facility]    ACUTE REHABILITATION OCCUPATIONAL THERAPY  DAILY NOTE    Date: 21  Patient Name: Claudia Reyes      Room: 7201/0154-97    MRN: [id number]   : 1945  (72 y.o.)  Gender: male   Referring Practitioner: Dr. Leatha Epley  Diagnosis: CVA  Additional Pertinent Hx: Claudia Reyes is a 77 y.o. RHD male admitted to [facility]  on 2021. On admit, exam significant for right-sided facial droop, right-sided weakness and severe dysarthria. Significant history with recurrent strokes, remote left temporal lobe ischemic infarct and remote bilateral occipital lobe infarcts . MRI shows Bilateral basal ganglia ischemic infarcts. Pt admitted to rehab unit on 21    Restrictions  Restrictions/Precautions: Up as Tolerated, General Precautions, Fall Risk, Swallowing - Thickened Liquids (1:1 sitter, Mildy thick ( Nector thick))  Other position/activity restrictions: Up w/assistance, RU/LE Weakness. MRI BRAIN- Acute infarct in the left and right basal ganglia greater on the left. Required Braces or Orthoses?: No    Subjective  Subjective: \"Yes\" Pt stated in regards to participating in OT thie PM.   Comments: Pt demonstrated limited communication during treatment. Patient Currently in Pain: Denies  Restrictions/Precautions: Up as Tolerated;General Precautions; Fall Risk;Swallowing - Thickened Liquids (1:1 sitter, Mildy thick ( Nector thick))             Objective     Balance  Sitting Balance: Moderate assistance (SBA - Mod A with fatigue and activity; 1 LOB)  Standing Balance: Maximum assistance (Min- Max A while standing with fatigue and R side lean)  Bed mobility  Supine to Sit: Maximum assistance  Scooting: Maximal assistance  Comment: Assistance with RLE and trunk  Transfers  Stand Pivot Transfers: 2 Person assistance; Moderate assistance  Sit to stand: 2 Person assistance; Moderate assistance  Stand to sit: 2 Person assistance; Moderate assistance  Transfer Comments: Verbal cues for hand placement, safety, and balance  Standing Balance  Time: 1-2 minutes x 5  Activity: functional transfers, lower body dressing, toileting  Comment: 2 person A for standing and transfers with verbal cues for safety. Pt demonstrated poor carryover with safety with impulsive behavior attempting to sit without notice  Toilet Transfers  Toilet - Technique: Stand pivot  Equipment Used: Raised toilet seat with rails  Toilet Transfer: 2 Person assistance; Moderate assistance  Toilet Transfers Comments: Verbal cues for safety and hand placement. Vision  Vision Comment: Pt completed look to the left activities on this date. During draw the clock activity pt initiated the clock on the left side of the paper. Pt placed markers for numbers inside the [place] on the right side of the clock and on the left side of the clock pt placed markers outside of the [place] drawing partically on the table. Numbers are illegible at this time due to use of non-dominate hand to complete. During the draw a person activity, pt initiated drawing once again on the left side of the paper drawing off of the paper and onto the table on the left side. During [place] the H activity, pt was seen visually scanning the letter however pt did not [place] anything on the paper. When asked if pt found any H's pt responded no. Therapist asked pt to grab yellow cone sitting on the right side of the table to pt able to cross midline with LUE and grab the cone. OT faciliated pt in visual tracking with use of yellow cone. Pt demonstrate limited tracking at this time. With verticle tracking, slight vertical nystagmus noted. Continue to assess if noted behaviors are due to visual deficits, cognitive deficits, or behavioral performances. OT to address further when pt has glasses.    ADL  Grooming: None (Pt denies)  UE Bathing: None (Pt denies)  LE Bathing: None (Pt denies)  UE Dressing: None (Donned gown)  LE Dressing: Dependent/Total (2 person A Cognitive/Perceptual Training  Patient Goals   Patient goals : \"I want to go home\"  Short term goals  Time Frame for Short term goals: By 10 days  Short term goal 1: Pt will complete upper body dressing/bathing with Min A and good attention to task  Short term goal 2: Pt will complete lower body dressing/bathing with max A and good safety with use of AE as needed  Short term goal 3: Pt will complete functional transfers during self care tasks with mod A x 1 and good safety  Short term goal 4: Pt will tolerate standing 4+ minutes during functional activity of choice with min A and good safety  Short term goal 5: Pt will participate in 30+ minutes of therapeutic exercises/functional activities to increase safety and independence with self care tasks  Short term goal 6: Pt will complete self feeding with min A and good attention to task  Long term goals  Time Frame for Long term goals : By discharge  Long term goal 1: Pt will complete upper body dressing with set-up assistance and good attention to task  Long term goal 2: Pt will complete lower body dressing/bathing with min A and good safety with use of AE as needed  Long term goal 3: Pt will complete functional transfers/mobility during self care tasks with min A and good safety  Long term goal 4: Pt will tolerate standing 8+ minutes during functional activity of choice with CGA and good safety  Long term goal 5: Pt will verbalize/demonstrate good understanding of adaptive equipment/adaptive strategies/durable medical equipment to increase safety and independence with self care and mobility  Long term goals 6: Pt will complete self feeding with set-up assistance and good attention to task  Long term goal 7: Vision to be further assessed  Timed Code Treatment Minutes: 38 Minutes    Electronically signed by Baldo Middleton OT on 7/23/21 at 4:16 PM EDT

## 2021-07-23 NOTE — PROGRESS NOTES
Physical Medicine & Rehabilitation  Progress Note    7/23/2021 2:16 PM     CC: Ambulatory and ADL dysfunction due to CVA    Subjective:   More awake for me today. ROS:  Denies fevers, chills, sweats. No chest pain, palpitations, lightheadedness. Denies coughing, wheezing or shortness of breath. Denies abdominal pain, nausea, diarrhea or constipation. No new areas of joint pain. Denies new areas of numbness or weakness. Denies new anxiety or depression issues. No new skin problems. Rehabilitation:   PT:  Restrictions/Precautions: Up as Tolerated, General Precautions, Fall Risk, Swallowing - Thickened Liquids (1:1 sitter, Mildy thick ( Nector thick))  Other position/activity restrictions: Up w/assistance, RU/LE Weakness. MRI BRAIN- Acute infarct in the left and right basal ganglia greater on the left. Transfers  Sit to Stand: Moderate Assistance, 2 Person Assistance (Assist fluctuates 2* cognitive deficts and participation.)  Stand to sit: Moderate Assistance, 2 Person Assistance  Bed to Chair: Maximum assistance, 2 Person Assistance (assist to move RLE)  Stand Pivot Transfers: Maximum Assistance, 2 Person Assistance  Comment: stand pivot bed to chair with Hemiwalker at mod/max A x 2  Ambulation 1  Surface: level tile  Device: Hemiwalker (lift walker, B forearm support)  Other Apparatus: Wheelchair follow  Assistance: Maximum assistance, 2 Person assistance (WC follow for safety)  Quality of Gait: Short steps, therapist assisted for R LE stepping forward, L LE very small step, difficulty comprehend weight shifting side to side yet, heavy lean to right side.    Gait Deviations: Slow Anais, Decreased step length, Decreased step height, Decreased arm swing, Decreased head and trunk rotation, Deviated path, Staggers, Shuffles  Distance: 3ft  Comments: max A to wt shift L/R, max A to advance RLE--pt inconsistently able to initiate swing on the R          OT:  ADL  Feeding: Maximum assistance, Thickened liquids (TA self feeding per Nursing; Pt able to bring drink to mouth)  Grooming: None (Pt refuses)  UE Bathing: Moderate assistance  LE Bathing: Dependent/Total Korey Clore stedy for bottom and willem hygiene)  UE Dressing: Maximum assistance (Thread RUE and overhead)  LE Dressing: Dependent/Total Korey Clore stedy; TA for all steps)  Toileting: Dependent/Total (2 person A)  Additional Comments: Pt agitatated at the start of session yelling \"I want to go home\" OT was able to redirect patient to engage in self care tasks of bathing and dressing. Pt resistant to pt education on karena dressing technique at this time. Pt able to thread LUE requiring assistance to thread RUE and to pull down over head. Pt required 2 person A for safety during lower body self care and toileting. Per Nursing, pt required total assistance to perform self feeding task. Pt able to demonstrate bringing drink to mouth with increased time and assistance during session. Pt currently limited due to cognitive barriers, decreased ROM, strength, balance, activity tolerance, and [address] impacting safety and independence with self care tasks. Balance  Sitting Balance: Moderate assistance (SBA - Mod A with fatigue and activity)  Standing Balance: Maximum assistance (Min- Max A while standing with fatigue and R side lean)   Standing Balance  Time: 1-2 minutes x 5  Activity: functional transfers, lower body bathing/dressing, toileting  Comment: with use of jeet stedy for lower body bathing/dressing for safety. Pt completed toileting with 2 person A for standing. Bed mobility  Rolling to Left: Maximum assistance  Rolling to Right: Moderate assistance  Supine to Sit: Maximum assistance  Sit to Supine: Maximum assistance (A at trunk and BLE)  Scooting:  Moderate assistance, 2 Person assistance  Comment: Pt needs assist for [place] as well as trunk progression for supine>sit  Transfers  Stand Pivot Transfers: 2 Person assistance, Maximum assistance (A to manage 76.6* 76.8*   RDW 16.2* 16.6* 17.1*   PLT UNABLE TO REPORT PLATELET COUNT DUE TO THE RESENCE OF PLATELET CLUMPS. MICROSCOPICALLY, THE PLATELET COUNT APPEARS NORMAL. 76* 71*     BMP:   Recent Labs     07/21/21  0900 07/22/21  0747    141   K 3.5* 3.8    107   CO2 29 25   BUN 25* 24*   CREATININE 1.35* 1.15     BNP: No results for input(s): BNP in the last 72 hours. PT/INR: No results for input(s): PROTIME, INR in the last 72 hours. APTT: No results for input(s): APTT in the last 72 hours. CARDIAC ENZYMES: No results for input(s): CKMB, CKMBINDEX, TROPONINT in the last 72 hours. Invalid input(s): CKTOTAL;3  FASTING LIPID PANEL:  Lab Results   Component Value Date    CHOL 186 07/14/2021    HDL 42 07/14/2021    TRIG 77 07/14/2021     LIVER PROFILE: No results for input(s): AST, ALT, ALB, BILIDIR, BILITOT, ALKPHOS in the last 72 hours. I/O (24Hr): Intake/Output Summary (Last 24 hours) at 7/23/2021 1416  Last data filed at 7/23/2021 0114  Gross per 24 hour   Intake 120 ml   Output --   Net 120 ml       Glu last 24 hour  Recent Labs     07/22/21  1612 07/22/21 2010 07/23/21  0617 07/23/21  1058   POCGLU 164* 195* 127* 210*       No results for input(s): CLARITYU, COLORU, PHUR, SPECGRAV, PROTEINU, RBCUA, BLOODU, BACTERIA, NITRU, WBCUA, LEUKOCYTESUR, YEAST, GLUCOSEU, BILIRUBINUR in the last 72 hours. MRI brain 7/14  Acute infarct in the left and right basal ganglia greater on the left. Diffuse volume loss with extensive chronic white matter changes      CT head 7/21  Subacute basal ganglia infarcts redemonstrated.  No gross hemorrhagic   conversion or significant mass effect.       New air-fluid level within the left sphenoid sinus suggesting acute sinusitis.          Impression/Plan:    1. Ambulatory and ADL dysfunction due to bilateral basal ganglia infarct, severe occlusion right PCA, severe stenosis RAHEEM-continue rehab efforts, clarify support  2.  Bilateral basal ganglia CVA-aspirin, Plavix, Crestor  3. Rhabdomyolysis  4. Agitation on arrival-use restraints, Seroquel, Ativan, as needed Seroquel at bedtime, QTC calculation 463-borderline, per records ,possibly due to change in location, Ativan/Seroquel, CT as above-no acute,  continue one-on-one for now, appears to be participating bit better.,  No agitation last 2 days  5. Sinusitis-noted start of Augmentin by internal medicine  6. Hypertension hyperlipidemia coronary artery disease -hydralazine, Imdur, Crestor aspirin, Plavix-  7. Immune thrombocytopenic purpura-IV prednisone 40 mg twice daily, change to oral, discussed with hematology, platelets appear to be slowly decreasing-71  8. Leukocytosis-possibly secondary to steroids, appears stable  9. Anemia-hemoglobin 11.7  10. Depression-Prozac  11. Restless leg-Requip  12. GERD-was on Pepcid 20 twice daily, discussed with oncology, okay to resume -will resume and monitor  13. Thyroid nodule  14. BPH-Proscar, Flomax  15. Mild elevated BUN/creatinine-possible dehydration-improved  16. Diabetes-was on glipizide 5 and 8 units Lantus home med, glucose as above  17. DVT prophylaxis subcu heparin  18. Internal medicine for medical management, oncology follow-up      Luisa Ganser. Jonathan Adkins MD       This note is created with the assistance of a speech recognition program.  While intending to generate a document that actually reflects the content of the visit, the document can still have some errors including those of syntax and sound a like substitutions which may escape proof reading.   In such instances, actual meaning can be extrapolated by contextual diversion

## 2021-07-23 NOTE — PROGRESS NOTES
Perfect serve sent to Dr. Marco Trivedi to see if 1:1 can be d/c'ed and try a telesitter. Waiting response.      Dr. Marco Trivedi would like 1:1 to remain in place until she can reassess in AM.

## 2021-07-23 NOTE — PROGRESS NOTES
Medications:    Prior to Admission medications    Medication Sig Start Date End Date Taking?  Authorizing Provider   Heparin Sodium, Porcine, (HEPARIN, PORCINE,) 5000 UNIT/ML injection Inject 1 mL into the skin every 8 hours 7/20/21   Edwin Hanna MD   QUEtiapine (SEROQUEL) 25 MG tablet Take 1 tablet by mouth nightly as needed for Agitation 7/20/21 7/25/21  Edwin Hanna MD   methylPREDNISolone sodium (SOLU-MEDROL) 40 MG injection Infuse 1 mL intravenously every 12 hours 7/20/21   Edwin Hanna MD   melatonin 3 MG TABS tablet Take 1 tablet by mouth nightly as needed (insomnia) 7/20/21   Edwin Hanna MD   atorvastatin (LIPITOR) 80 MG tablet Take 0.5 tablets by mouth daily (Pt takes one-half of an 80mg tab = 40mg) 7/16/21   Edwin Hanna MD   tiZANidine (ZANAFLEX) 2 MG tablet Take 1 tablet by mouth 4 times daily as needed (muscle spasms) 5/7/21   JOLIE Garcia CNP   naproxen (NAPROSYN) 500 MG tablet Take 1 tablet by mouth 2 times daily (with meals) 1/4/21   Douglas Puga PA-C   ibuprofen (ADVIL;MOTRIN) 800 MG tablet Take 1 tablet by mouth every 8 hours as needed for Pain 6/2/20   Michelle Hoffman MD   lidocaine (LIDODERM) 5 % Place 1 patch onto the skin daily 12 hours on, 12 hours off. 6/2/20   Michelle Hoffman MD   metoprolol succinate (TOPROL XL) 50 MG extended release tablet Take 50 mg by mouth daily    Historical Provider, MD   tiotropium (SPIRIVA RESPIMAT) 2.5 MCG/ACT AERS inhaler Inhale 2 puffs into the lungs daily    Historical Provider, MD   carboxymethylcellulose 1 % ophthalmic solution Place 1 drop into both eyes 3 times daily as needed for Dry Eyes     Historical Provider, MD   ketotifen (ZADITOR) 0.025 % ophthalmic solution Place 1 drop into both eyes 2 times daily as needed (itchy eyes)     Historical Provider, MD   isosorbide mononitrate (IMDUR) 60 MG extended release tablet Take 30 mg by mouth daily Indications: 1/2 tablet (30mg) Historical Provider, MD   finasteride (PROSCAR) 5 MG tablet Take 5 mg by mouth daily    Historical Provider, MD   tamsulosin (FLOMAX) 0.4 MG capsule Take 0.4 mg by mouth daily    Historical Provider, MD   fluticasone (FLONASE) 50 MCG/ACT nasal spray 1 spray by Nasal route 2 times daily  Patient taking differently: 1 spray by Nasal route 2 times daily as needed for Rhinitis  5/31/16   Francisco Connor DO   albuterol sulfate  (90 BASE) MCG/ACT inhaler Inhale 2 puffs into the lungs every 6 hours as needed for Wheezing    Historical Provider, MD   albuterol (PROVENTIL) (2.5 MG/3ML) 0.083% nebulizer solution Take 2.5 mg by nebulization every 6 hours as needed for Wheezing    Historical Provider, MD   aspirin 81 MG EC tablet Take 81 mg by mouth daily    Historical Provider, MD   losartan (COZAAR) 100 MG tablet Take 100 mg by mouth daily     Historical Provider, MD   nitroGLYCERIN (NITROSTAT) 0.4 MG SL tablet Place 0.4 mg under the tongue every 5 minutes as needed for Chest pain    Historical Provider, MD   FLUoxetine (PROZAC) 20 MG capsule Take 40 mg by mouth daily     Historical Provider, MD   furosemide (LASIX) 20 MG tablet Take 20 mg by mouth daily as needed (swelling)     Historical Provider, MD   clopidogrel (PLAVIX) 75 MG tablet Take 75 mg by mouth daily    Historical Provider, MD   rOPINIRole (REQUIP) 0.25 MG tablet Take 0.25 mg by mouth nightly as needed     Historical Provider, MD   budesonide-formoterol (SYMBICORT) 160-4.5 MCG/ACT AERO Inhale 2 puffs into the lungs 2 times daily.     Historical Provider, MD     Current Facility-Administered Medications   Medication Dose Route Frequency Provider Last Rate Last Admin    losartan (COZAAR) tablet 100 mg  100 mg Oral Daily Mildred Parekh MD        rosuvastatin (CRESTOR) tablet 40 mg  40 mg Oral Nightly Mary Mccall MD   40 mg at 07/22/21 2216    famotidine (PEPCID) tablet 20 mg  20 mg Oral BID Benny Elias MD   20 mg at 07/23/21 0732    predniSONE (DELTASONE) tablet 40 mg  40 mg Oral Daily Lisy Olivares MD   40 mg at 07/23/21 0732    amoxicillin-clavulanate (AUGMENTIN) 875-125 MG per tablet 1 tablet  1 tablet Oral 2 times per day Kyrie Gordon MD   1 tablet at 07/23/21 0732    albuterol sulfate  (90 Base) MCG/ACT inhaler 2 puff  2 puff Inhalation Q6H PRN Armando Zapien MD        aspirin chewable tablet 81 mg  81 mg Oral Daily Armando Zapien MD   81 mg at 07/23/21 0732    clopidogrel (PLAVIX) tablet 75 mg  75 mg Oral Daily Armando Zapien MD   75 mg at 07/23/21 0770    finasteride (PROSCAR) tablet 5 mg  5 mg Oral Daily Armando Zapien MD   5 mg at 07/23/21 0732    FLUoxetine (PROZAC) capsule 40 mg  40 mg Oral Daily Armando Zapien MD   40 mg at 07/23/21 0732    heparin (porcine) injection 5,000 Units  5,000 Units Subcutaneous 3 times per day Armando Zapien MD   5,000 Units at 07/23/21 1537    hydrALAZINE (APRESOLINE) tablet 10 mg  10 mg Oral 3 times per day Armando Zapien MD   10 mg at 07/23/21 1530    isosorbide mononitrate (IMDUR) extended release tablet 30 mg  30 mg Oral Daily Armando Zapien MD   30 mg at 07/23/21 0732    QUEtiapine (SEROQUEL) tablet 25 mg  25 mg Oral Nightly PRN Armando Zapien MD   25 mg at 07/22/21 2206    rOPINIRole (REQUIP) tablet 0.25 mg  0.25 mg Oral Nightly Armando Zapien MD   0.25 mg at 07/22/21 2205    tamsulosin (FLOMAX) capsule 0.4 mg  0.4 mg Oral Daily Armando Zapien MD   0.4 mg at 07/23/21 0733    tiotropium (SPIRIVA RESPIMAT) 2.5 MCG/ACT inhaler 2 puff  2 puff Inhalation Daily Armando Zapien MD   2 puff at 07/23/21 0733    acetaminophen (TYLENOL) tablet 650 mg  650 mg Oral Q4H PRN Jamse Fearing, MD        polyethylene glycol [facility]) packet 17 g  17 g Oral Daily Juliette Contreras MD   17 g at 07/23/21 7365    senna (SENOKOT) tablet 17.2 mg  2 tablet Oral Daily PRN Juliette Contreras MD        bisacodyl (DULCOLAX) suppository 10 mg  10 mg Rectal Daily PRN Ge Salamanca MD        insulin lispro (HUMALOG) injection vial 0-6 Units  0-6 Units Subcutaneous TID WC Ge Salamanca MD   2 Units at 21 1127    insulin lispro (HUMALOG) injection vial 0-3 Units  0-3 Units Subcutaneous Nightly Ge Salamanca MD   1 Units at 21 2206    glucose (GLUTOSE) 40 % oral gel 15 g  15 g Oral PRN Ge Salamanca MD        dextrose 50 % IV solution  12.5 g Intravenous PRN Ge Salamanca MD        glucagon (rDNA) injection 1 mg  1 mg Intramuscular PRN Ge Salamanca MD        dextrose 5 % solution  100 mL/hr Intravenous PRN Ge Salamanca MD           Allergies:  Patient has no known allergies. Social History:   reports that he quit smoking about 8 years ago. He started smoking about 64 years ago. He has a 42.00 pack-year smoking history. He has never used smokeless tobacco. He reports that he does not drink alcohol and does not use drugs. Family History: Hypertension    REVIEW OF SYSTEMS:      Patient's review of system is limited due to mental status     PHYSICAL EXAM:        BP (!) 151/74   Pulse 58   Temp 97.7 °F (36.5 °C) (Axillary)   Resp 18   Ht 5' 10\" (1.778 m)   Wt 212 lb (96.2 kg)   SpO2 100%   BMI 30.42 kg/m²    Temp (24hrs), Av.7 °F (36.5 °C), Min:97.7 °F (36.5 °C), Max:97.7 °F (36.5 °C)      General appearance -ill appearing, no in pain or distress , he is sleepy and hard to get any information from. Neck - supple, no significant adenopathy   Lymphatics - no palpable lymphadenopathy, no hepatosplenomegaly   Chest -decreased breathing sounds  Heart - normal rate, regular rhythm, normal S1, S2, no murmurs  Abdomen - soft, nontender, nondistended, no masses or organomegaly   Neurological -speech altered.   Patient is lethargic   Musculoskeletal - no joint tenderness, deformity or swelling   Extremities -right upper and lower extremity contracture  Skin - normal coloration and turgor, no rashes, no suspicious skin lesions noted ,      DATA: Labs:     Lab Results   Component Value Date    WBC 16.6 (H) 07/23/2021    HGB 11.7 (L) 07/23/2021    HCT 36.3 (L) 07/23/2021    MCV 76.8 (L) 07/23/2021    PLT 71 (L) 07/23/2021     plt 42  IMPRESSION:   Primary Problem  <principal problem not specified>    Active Hospital Problems    Diagnosis Date Noted    Acute CVA (cerebrovascular accident) ([facility] [I63.9] 07/20/2021    Acute idiopathic thrombocytopenic purpura ([facility] [facility] [D69.3] 07/16/2021    CAD S/P percutaneous coronary angioplasty [I25.10, Z98.61] 07/13/2021    Cerebrovascular accident (CVA) ([facility] [address] [I63.9] 07/13/2021    HTN (hypertension) [I10] 07/13/2021    Hyperlipidemia [E78.5] 07/13/2021    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 No contraindication to anticoagulation. Continue current treatment without changes. Watch platelets closely. Discussed with patient and Nurse. Thank you for asking us to see this patient. Manju Olivares MD  Hematologist/Medical [id number] [facility] hematology oncology physicians            This note is created with the assistance of a speech recognition program.  While intending to generate a document that actually reflects the content of the visit, the document can still have some errors including those of syntax and sound a like substitutions which may escape proof reading. It such instances, actual meaning can be extrapolated by contextual diversion.

## 2021-07-23 NOTE — PROGRESS NOTES
[facility] Internal Medicine    CONSULTATION / HISTORY AND PHYSICAL EXAMINATION            Date:   7/23/2021  Patient name:  Lucero Gallardo  Date of admission:  7/20/2021  6:49 PM  MRN:   [id number]  Account:  [de-identified]  YOB: 1945  PCP:    No primary care provider on file.   Room:   [address] [place] [address]  Code Status:    Full Code    Physician Requesting Consult: Azzie Epley, MD    Reason for Consult:  medical management    Chief Complaint:         History Obtained From:     Patient medical record nursing staff    History of Present Illness:   History is extremely limited, patient was extremely agitated overnight, was given Ativan around 2 AM in the night, patient very sleepy, not answering questions  Most of history is retrieved from E HR  Patient was recently admitted to [facility] with right-sided weakness, right-sided facial weakness, speech difficulties  Patient underwent MRI brain, concerning for acute infarct in left and right basal ganglia  CT head and neck, was concerning for severe stenosis in the proximal A3 segment of RAHEEM bilaterally  During hospital stay, patient had thrombocytopenia, was evaluated by hematologist, getting treated with IV steroids, and lines of immune thrombocytopenia  7/22  Patient is less sleepy, try to communicate, has speech difficulties    Past Medical History:     Past Medical History:   Diagnosis Date    Centrilobular emphysema ([address]     COPD (chronic obstructive pulmonary disease) ([address]     Depression     Diabetes mellitus ([address]     pt refuses to take previously prescribed metformin (states PCP aware)    Former smoker     Hyperlipidemia     on 4/27/18 pt states \"I stopped taking that about a year ago\"    Hypertension     Mixed restrictive and obstructive lung disease ([address]     Need for pneumococcal vaccine     Personal history of tobacco use         Past Surgical History:     Past Surgical History:   Procedure Laterality Date    CARDIAC SURGERY  07/2015    1 stent    NECK SURGERY      TOE AMPUTATION Right greater        Medications Prior to Admission:     Prior to Admission medications    Medication Sig Start Date End Date Taking?  Authorizing Provider   Heparin Sodium, Porcine, (HEPARIN, PORCINE,) 5000 UNIT/ML injection Inject 1 mL into the skin every 8 hours 7/20/21   Romana Courtney MD   QUEtiapine (SEROQUEL) 25 MG tablet Take 1 tablet by mouth nightly as needed for Agitation 7/20/21 7/25/21  Romana Courtney MD   methylPREDNISolone sodium (SOLU-MEDROL) 40 MG injection Infuse 1 mL intravenously every 12 hours 7/20/21   Romana Courtney MD   melatonin 3 MG TABS tablet Take 1 tablet by mouth nightly as needed (insomnia) 7/20/21   Romana Courtney MD   atorvastatin (LIPITOR) 80 MG tablet Take 0.5 tablets by mouth daily (Pt takes one-half of an 80mg tab = 40mg) 7/16/21   Romana Courtney MD   tiZANidine (ZANAFLEX) 2 MG tablet Take 1 tablet by mouth 4 times daily as needed (muscle spasms) 5/7/21   JOLIE Garcia CNP   naproxen (NAPROSYN) 500 MG tablet Take 1 tablet by mouth 2 times daily (with meals) 1/4/21   Irene García PA-C   ibuprofen (ADVIL;MOTRIN) 800 MG tablet Take 1 tablet by mouth every 8 hours as needed for Pain 6/2/20   Yoana Solomon MD   lidocaine (LIDODERM) 5 % Place 1 patch onto the skin daily 12 hours on, 12 hours off. 6/2/20   Yoana Solomon MD   metoprolol succinate (TOPROL XL) 50 MG extended release tablet Take 50 mg by mouth daily    Historical Provider, MD   tiotropium (SPIRIVA RESPIMAT) 2.5 MCG/ACT AERS inhaler Inhale 2 puffs into the lungs daily    Historical Provider, MD   carboxymethylcellulose 1 % ophthalmic solution Place 1 drop into both eyes 3 times daily as needed for Dry Eyes     Historical Provider, MD   ketotifen (ZADITOR) 0.025 % ophthalmic solution Place 1 drop into both eyes 2 times daily as needed (itchy eyes)     Historical Provider, MD isosorbide mononitrate (IMDUR) 60 MG extended release tablet Take 30 mg by mouth daily Indications: 1/2 tablet (30mg)     Historical Provider, MD   finasteride (PROSCAR) 5 MG tablet Take 5 mg by mouth daily    Historical Provider, MD   tamsulosin (FLOMAX) 0.4 MG capsule Take 0.4 mg by mouth daily    Historical Provider, MD   fluticasone (FLONASE) 50 MCG/ACT nasal spray 1 spray by Nasal route 2 times daily  Patient taking differently: 1 spray by Nasal route 2 times daily as needed for Rhinitis  5/31/16   Francisco Connor,    albuterol sulfate  (90 BASE) MCG/ACT inhaler Inhale 2 puffs into the lungs every 6 hours as needed for Wheezing    Historical Provider, MD   albuterol (PROVENTIL) (2.5 MG/3ML) 0.083% nebulizer solution Take 2.5 mg by nebulization every 6 hours as needed for Wheezing    Historical Provider, MD   aspirin 81 MG EC tablet Take 81 mg by mouth daily    Historical Provider, MD   losartan (COZAAR) 100 MG tablet Take 100 mg by mouth daily     Historical Provider, MD   nitroGLYCERIN (NITROSTAT) 0.4 MG SL tablet Place 0.4 mg under the tongue every 5 minutes as needed for Chest pain    Historical Provider, MD   FLUoxetine (PROZAC) 20 MG capsule Take 40 mg by mouth daily     Historical Provider, MD   furosemide (LASIX) 20 MG tablet Take 20 mg by mouth daily as needed (swelling)     Historical Provider, MD   clopidogrel (PLAVIX) 75 MG tablet Take 75 mg by mouth daily    Historical Provider, MD   rOPINIRole (REQUIP) 0.25 MG tablet Take 0.25 mg by mouth nightly as needed     Historical Provider, MD   budesonide-formoterol (SYMBICORT) 160-4.5 MCG/ACT AERO Inhale 2 puffs into the lungs 2 times daily. Historical Provider, MD        Allergies:     Patient has no known allergies. Social History:     Tobacco:    reports that he quit smoking about 8 years ago. He started smoking about 64 years ago. He has a 42.00 pack-year smoking history.  He has never used smokeless tobacco.  Alcohol:      reports no Collection Time: 07/23/21  6:17 AM   Result Value Ref Range    POC Glucose 127 (H) 75 - 110 mg/dL   CBC    Collection Time: 07/23/21  6:18 AM   Result Value Ref Range    WBC 16.6 (H) 3.5 - 11.0 k/uL    RBC 4.73 4.5 - 5.9 m/uL    Hemoglobin 11.7 (L) 13.5 - 17.5 g/dL    Hematocrit 36.3 (L) 41 - 53 %    MCV 76.8 (L) 80 - 100 fL    MCH 24.7 (L) 26 - 34 pg    MCHC 32.1 31 - 37 g/dL    RDW 17.1 (H) 11.5 - 14.9 %    Platelets 71 (L) 222 - 450 k/uL    MPV 11.6 6.0 - 12.0 fL    NRBC Automated NOT REPORTED per 100 WBC   POC Glucose Fingerstick    Collection Time: 07/23/21 10:58 AM   Result Value Ref Range    POC Glucose 210 (H) 75 - 110 mg/dL           Consultations:   IP CONSULT TO DIETITIAN  IP CONSULT TO SOCIAL WORK  IP CONSULT TO INTERNAL MEDICINE  IP CONSULT TO ONCOLOGY  IP CONSULT TO INTERNAL MEDICINE  Assessment :      Primary Problem  <principal problem not specified>    Active Hospital Problems    Diagnosis Date Noted    [facility] [I63.9] 07/20/2021    Acute idiopathic thrombocytopenic purpura ([facility] [D69.3] 07/16/2021    CAD S/P percutaneous coronary angioplasty [I25.10, Z98.61] 07/13/2021    Cerebrovascular accident (CVA) ([facility] [I63.9] 07/13/2021    HTN (hypertension) [I10] 07/13/2021    Hyperlipidemia [E78.5] 07/13/2021    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 Acute ischemic stroke, patient is on aspirin, Plavix, Crestor  2. 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 On DVT prophylaxis with heparin  4. Hypertension, controlled  5. Diabetes, controlled  1 episode of bradycardia, will follow, may be due to benzodiazepine . 6.   Incidental finding of thyroid nodule on CTA head and neck, will need outpatient ultrasound thyroid and biopsy  Patient is kept n.p.o. as of now, will monitor closely if patient follow bedside swallow, will start patient on medication diet  Patient CT head done in the morning concerning for sinusitis, starting patient on Augmentin    7/22  Patient evaluated by hematologist, steroids changed to p.o. Blood pressure, blood sugars controlled  Still have very poor oral intake    7/23   Blood pressure running high  Patient was on Lopressor and high-dose of losartan earlier, restarting home dose of losartan 100 mg daily  We will monitor blood pressure    Aleja Carrillo MD  7/23/2021  4:23 PM    Copy sent to Dr. Luisa Pagan primary care provider on file. Please note that this chart was generated using voice recognition Dragon dictation software. Although every effort was made to ensure the accuracy of this automated transcription, some errors in transcription may have occurred.

## 2021-07-23 NOTE — PROGRESS NOTES
[address] [facility]    OCCUPATIONAL THERAPY MISSED TREATMENT NOTE   ACUTE REHAB  Date: 21  Patient Name: Valentine Abdul       Room: 3311/0473-27  MRN: [id number]   Account #: [de-identified]    : 1945  (77 y.o.)  Gender: male   Referring Practitioner: Dr. Luis Miguel Pemberton  Diagnosis: CVA             REASON FOR MISSED TREATMENT:  Patient refusal   -   10:21 AM - Pt supine in bed upon arrival. Pt opened eyes with response to name. Pt quickly shut eyes. OT attempted to arouse pt multiple time with pt turning away from therapist and not responding to verbal or tactile stimulus.  Occupational therapy to attempt again in PM.          Laura Vera OT

## 2021-07-23 NOTE — PROGRESS NOTES
Speech Language Pathology  Speech Language Pathology  [address] [place] [address]    Speech Language Treatment Note    Date: 7/23/2021  Patients Name: Srini Pyle  MRN: [id number]  Diagnosis:   Patient Active Problem List   Diagnosis Code    Centrilobular emphysema (HCC) J43.2    Mixed restrictive and obstructive lung disease ([facility] J43.9, J98.4    Cerebrovascular accident (CVA) ([facility] I63.9    COPD (chronic obstructive pulmonary disease) ([facility] J44.9    HTN (hypertension) I10    Diabetes 1.5, managed as type 2 ([facility] E13.9    Hyperlipidemia E78.5    CAD S/P percutaneous coronary angioplasty I25.10, Z98.61    Rhabdomyolysis M62.82    Intracranial atherosclerosis I67.2    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 I66.21    Thrombocytopenia ([facility] D69.6    Right sided weakness R53.1    Noncompliance with medications Z91.14    Acute idiopathic thrombocytopenic purpura (HCC) D69.3    Acute CVA (cerebrovascular accident) ([facility] I63.9       Pain: 0/10    Speech and Language Treatment  Treatment time: 1939-6315    Subjective: [x] Alert [x] Cooperative     [] Confused     [] Agitated    [x] Lethargic      Objective/Assessment:  Auditory Comprehension: Pt. Sometimes not responding to questions. Pt. Able to follow directions for tasks throughout. Pt. Demonstrating long response latency with tasks. Verbal Expression: Pt. Verbalizing more this PM.  Category members, given first letter and category (concrete)- 50% accuracy christiano, 100% cued. Reading Comprehension: n/a    Written Language: n/a    Speech: Pt. Able to complete OMEs x5 sets in reps of 10 with mod cues. Pt. Demonstrating little to no movement on R side of face with protrusion and retraction. Pt. Attempting to verbalize throughout, often times using voice, but minimally moving lips. Frequent cues provided to move lips and overarticulate when speaking. Pt. Verbalized understanding, but needs reinforcement. Encouraged Pt.  To Writer returned call to patient's wife Sarah.  Discussed plan to proceed with Temodar on 06/15/20 if no more than 3 episodes of diarrhea on Sunday 6/14/20.  Thus far today patient had 2 episodes of loose stools secondary to Mvasi on 06/08/20.  Will send him home with Imodium.  All questions/concerns addressed. Check CMP in am.      LOIS Goldstein  Neuro Ocology   complete OMEs throughout the day. Voice: Reduced vocal intensity noted throughout. Pt. Reporting sometimes feels SOB when talking. Education provided re: diaphragmatic breathing to increase breath support for speech and assist in increase vocal intensity. Pt. Verbalized understanding. Other: No family present. Pt. initially with eyes closed. Extensive education provided re: importance of participation in therapy to improve speech/language/cognition and rationale for all tasks. Following education, Pt. Participating in all tasks and stating, \"Let's do this\". Orientation log administered, O-Log score- 13/30 (disoriented to month, date, year, DAVID, time, and pathology deficits). Plan:  [x] Continue ST services    [] Discharge from ST:      Discharge recommendations: [] Inpatient Rehab   [] [place]   [] Outpatient Therapy  [] Follow up at trauma clinic   [] Other:       Treatment completed by:  Silvio Treadwell M.A., CCC-SLP

## 2021-07-23 NOTE — PROGRESS NOTES
Speech Language Pathology  Speech Language Pathology  [facility]    Speech Language Treatment Note    Date: 7/23/2021  Patients Name: Evelina Gottron  MRN: [id number]  Diagnosis:   Patient Active Problem List   Diagnosis Code    Centrilobular emphysema (HCC) J43.2    Mixed restrictive and obstructive lung disease ([facility] J43.9, J98.4    Cerebrovascular accident (CVA) ([facility] I63.9    COPD (chronic obstructive pulmonary disease) ([facility] J44.9    HTN (hypertension) I10    Diabetes 1.5, managed as type 2 ([facility] E13.9    Hyperlipidemia E78.5    CAD S/P percutaneous coronary angioplasty I25.10, Z98.61    Rhabdomyolysis M62.82    Intracranial atherosclerosis I67.2    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 I66.21    Thrombocytopenia ([facility] D69.6    Right sided weakness R53.1    Noncompliance with medications Z91.14    Acute idiopathic thrombocytopenic purpura (HCC) D69.3    Acute CVA (cerebrovascular accident) ([facility] I63.9       Pain: 0/10    Speech and Language Treatment  Treatment time: [id number]    Subjective: [] Alert [] Cooperative     [] Confused     [] Agitated    [x] Lethargic      Objective/Assessment:  Auditory Comprehension: Not responding to questions or commands. Verbal Expression: Pt's only attempt at communication was pointing upwards to indicate wanting head of bed moved up. Encouraged Pt. to verbalize this request.  Pt. continued to only point then stopped attempting to communicate altogether. Reading Comprehension: n/a    Written Language: n/a    Speech: No attempts at verbalization during session, despite encouragement from [address] [place] [address]. Voice: Did not vocalize during session. Other: Pt. Sleeping in bed with lights turned off upon arrival.  Writer attempted to wake Pt. With verbal cues. Pt. briefly opening eyes, looking at writer then closing eyes and turning head away. Writer sitting head of bed upright, but this was ineffective in getting Pt.  to participate or remain awake. Pt. remains with 1:1 sitter. Per sitter, Pt. ate ~15% of breakfast tray and has been drinking thickened liquids. Pt. Sitter denies noticing s/s of aspiration with solids/liquids. Plan:  [x] Continue ST services    [] Discharge from ST:      Discharge recommendations: [] Inpatient Rehab   [] [place]   [] Outpatient Therapy  [] Follow up at trauma clinic   [] Other:       Treatment completed by:  Ibeth Farrell M.A., CCC-SLP

## 2021-07-24 NOTE — PROGRESS NOTES
Physical Therapy  [facility]   Acute Rehabilitation Physical Therapy Progress Note    Date: 21  Patient Name: Mireya Byrd       Room: /3631-65  MRN: [id number]   Account: [de-identified]   : 1945  (72 y.o.) Gender: male   Referring Practitioner: Dr. Mario Shetty  Diagnosis: CVA  Past Medical History:  has a past medical history of Centrilobular emphysema ([facility]ronic obstructive [facility] disease) [facility] Depression, Diabetes mellitus ([facility] Former smoker, Hyperlipidemia, Hypertension, Mixed restrictive and obstructive lung disease ([facility] Need for pneumococcal vaccine, and Personal history of tobacco use. Past Surgical History:   has a past surgical history that includes Neck surgery; Toe amputation (Right, greater); and Cardiac surgery (2015). Additional Pertinent Hx: Mireya Byrd is a 77 y.o. RHD male [facility]. On admit, exam significant for right-sided facial droop, right-sided weakness and severe dysarthria. Significant history with recurrent strokes, remote left temporal lobe ischemic infarct and remote bilateral occipital lobe infarcts . MRI shows Bilateral basal ganglia ischemic infarcts. Pt admitted to rehab unit on 21    Restrictions/Precautions  Restrictions/Precautions: Up as Tolerated;General Precautions; Fall Risk;Swallowing - Thickened Liquids (1:1 sitter, Mildly thick (Nectar thick))  Required Braces or Orthoses?: No  Position Activity Restriction  Other position/activity restrictions: Up w/assistance, RU/LE Weakness. MRI BRAIN- Acute infarct in the left and right basal ganglia greater on the left. Subjective: More alert in PM, continuing with delayed or absent response to queries. Comments: Pt lethargic and difficult to rouse in AM, pulling away from stimulus into fetal position, at one time attempting to slowly bat writer away. Nursing supervisor and HERLINDA Dhaliwal also unable to waken patient beyond brief eye opening. Passive ROM performed on R LE & UE. Vital Signs  Patient Currently in Pain: Other (comment) (No response to queries)    Patient Observation  Observations: Soft-spoken & sometimes difficult to hear. Bed Mobility   Rolling: Moderate assistance;Rolling Left;Rolling Right (Assisting L LE to hooklying to roll R)  Scootin Person assistance;Maximal assistance (scooting to [facility]; Pt followed cue to cross UEs)  Comment: bed flat, 2 pillows    Transfers  Sit to Stand: 2 Person Assistance; Moderate Assistance (No recall for hand placement cues. walker lift)  Stand to sit: 2 Person Assistance;Minimal Assistance (No recall of cues for hand placement. walker lift)    Ambulation 2  Surface - 2: level tile  Device 2:  (Green Walker Lift)  Other Apparatus 2:  (wheelchair brought up on completion of walk)  Assistance 2: Maximum assistance;2 Person assistance  Quality of Gait 2: Pt demonstrates ability to initiate R swing, but lacks full ROM/requires assist. Initially poor weight shifting to L/strong R lean with big improvement after 2nd standing rest break, requiring little weight-shifting assist and improved L LE swing through without cue. Gait Deviations: Shuffles;Decreased head and trunk rotation;Decreased step height;Decreased step length; Slow Anais;Deviated path  Distance: 79'  Comments: Standing rest breaks at 21' and 50'. Wheelchair Activities  Propulsion: Yes  Propulsion 1  Propulsion: Manual  Level: Level Tile  Method: LLE  Level of Assistance: Moderate assistance  Description/ Details: Pt demonstrates fair straight path & ability to maintain momentum. Mod A for 90º turn.  Fatigues quickly  Distance: 50'    Balance   Posture: Fair  Standing - Static: Fair;- (hemiwalker)  Standing - Dynamic: Poor (hemiwalker)     Exercises  Other exercises?: Yes  Other exercises 1: Seated LE exercises, 15x each: 1# L LE with frequent verbal cues; active/assisted ROM R LE, orange (minimal) resistance band  Other exercises 3: Supine passive ROM R UE & LE, 20x each (Guarded/resisting some initial movement)  Other exercises 4: Supine R achilles stretch, 3x30\"  Other exercises 5: Rolling for brief change (demo's Fair understanding of cues)    Activity Tolerance: Patient limited by cognitive status, Patient limited by endurance, Patient limited by fatigue (Slow moving c all activities; req's extra time to complete)     PT Equipment Recommendations  Other: TBD    Current Treatment Recommendations: Strengthening, ROM, Balance Training, Functional Mobility Training, Gait Training, Endurance Training, Transfer Training, Safety Education & Training, Wheelchair Mobility Training, Neuromuscular Re-education, Cognitive Reorientation, Home Exercise Program, Patient/Caregiver Education & Training, Equipment Evaluation, Education, & procurement, Positioning, Stair training, Modalities (Will consider using modalities as needed for neuro re-edu.)    Conditions Requiring Skilled Therapeutic Intervention  Body structures, Functions, Activity limitations: Decreased functional mobility ; Decreased ROM; Decreased strength;Decreased balance;Decreased safe awareness;Decreased endurance;Decreased posture;Decreased cognition;Decreased fine motor control;Decreased coordination  Barriers to Learning: aphasia, cognitive deficits  REQUIRES PT FOLLOW UP: Yes  Discharge Recommendations: Patient would benefit from continued therapy after discharge;24 hour supervision or assist    Goals  Short term goals  Time Frame for Short term goals: 10 days  Short term goal 1: Pt able to roll felice eto side at min A   Short term goal 2: Pt able to perform supine>sit at min A   Short term goal 3:  Pt able to perform sit to supine at mod A  Short term goal 4: Pt able to ptransfer at InhibOx A   Short term goal 5: Pt able to propel w/c with L UE/L LE , distance fo 100 ft min A   Short term goal 6: Pt able to ambulate with appropriate device distance of  50 to 100 ft, julissa x 2  Long term

## 2021-07-24 NOTE — PROGRESS NOTES
Physical Medicine & Rehabilitation  Progress Note      Subjective:      77year-old male s/p ischemic CVA L PCA, RAHEEM and MCA with R dominant hemiplegia. Patient is not waking for evaluation today. Staff reports he has been refusing some medications today. He did participate with therapy this morning. Sitter reports no issues with agitation for past few days. ROS:  Denies fevers, chills, sweats. No chest pain, palpitations, lightheadedness. Denies coughing, wheezing or shortness of breath. Denies abdominal pain, nausea, diarrhea or constipation. No new areas of joint pain. Denies new areas of numbness or weakness. Denies new anxiety or depression issues. No new skin problems. Rehabilitation:   Progressing in therapies. PT:  Restrictions/Precautions: Up as Tolerated, General Precautions, Fall Risk, Swallowing - Thickened Liquids (1:1 sitter, Mildly thick ( Nector thick))  Other position/activity restrictions: Up w/assistance, RU/LE Weakness. MRI BRAIN- Acute infarct in the left and right basal ganglia greater on the left. Transfers  Sit to Stand: 2 Person Assistance, Moderate Assistance (No recall for hand placement cues. Hemiwalker/walker lift)  Stand to sit: 2 Person Assistance, Minimal Assistance (No recall of cues for hand placement. Hemiwalker/walker lift)  Bed to Chair: 2 Person Assistance, Moderate assistance (assist to move RLE, hemiwalker)  Stand Pivot Transfers: 2 Person Assistance, Moderate Assistance (hemiwalker, assist to move R LE)  Comment: stand pivot bed to chair with Hemiwalker at mod/max A x 2  Ambulation 1  Surface: level tile  Device: Hemiwalker  Other Apparatus: Wheelchair follow  Assistance: 2 Person assistance, Moderate assistance  Quality of Gait: Short steps, therapist/aide advancing R LE, small step on L initially/improved with cues and assist to weight shift. No recall on second attempt.    Gait Deviations: Slow Anais, Decreased step length, Decreased step height, Decreased arm swing, Shuffles, Decreased head and trunk rotation  Distance: 3' & 8'  Comments: No loss of balance. Pt attempting to rest R UE on hemiwalker, hindering movement during 8' amb. Transfers  Sit to Stand: 2 Person Assistance, Moderate Assistance (No recall for hand placement cues. Hemiwalker/walker lift)  Stand to sit: 2 Person Assistance, Minimal Assistance (No recall of cues for hand placement. Hemiwalker/walker lift)  Bed to Chair: 2 Person Assistance, Moderate assistance (assist to move RLE, hemiwalker)  Stand Pivot Transfers: 2 Person Assistance, Moderate Assistance (hemiwalker, assist to move R LE)  Comment: stand pivot bed to chair with Hemiwalker at mod/max A x 2  Ambulation  Ambulation?: Yes  More Ambulation?: Yes  Ambulation 1  Surface: level tile  Device: Hemiwalker  Other Apparatus: Wheelchair follow  Assistance: 2 Person assistance, Moderate assistance  Quality of Gait: Short steps, therapist/aide advancing R LE, small step on L initially/improved with cues and assist to weight shift. No recall on second attempt. Gait Deviations: Slow Anais, Decreased step length, Decreased step height, Decreased arm swing, Shuffles, Decreased head and trunk rotation  Distance: 3' & 8'  Comments: No loss of balance. Pt attempting to rest R UE on hemiwalker, hindering movement during 8' amb. Surface: level tile  Ambulation 1  Surface: level tile  Device: Hemiwalker  Other Apparatus: Wheelchair follow  Assistance: 2 Person assistance, Moderate assistance  Quality of Gait: Short steps, therapist/aide advancing R LE, small step on L initially/improved with cues and assist to weight shift. No recall on second attempt. Gait Deviations: Slow Anais, Decreased step length, Decreased step height, Decreased arm swing, Shuffles, Decreased head and trunk rotation  Distance: 3' & 8'  Comments: No loss of balance. Pt attempting to rest R UE on hemiwalker, hindering movement during 8' amb. OT:  ADL  Feeding: Maximum assistance, Thickened liquids (TA self feeding per Nursing; Pt able to bring drink to mouth)  Grooming: None (Pt denies)  UE Bathing: None (Pt denies)  LE Bathing: None (Pt denies)  UE Dressing: None (Donned gown)  LE Dressing: Dependent/Total (2 person A while standing)  Toileting: Dependent/Total (2 person A )  Additional Comments: Pt refused AM treatment. In PM: pt was agreeable to toileting and lower body dressing. Pt required 2 person A while standing with pt demonstrating impusilve behavior sitting without notice. Pt standing balance during self care flucuates from [address] A to Max A with fatigue with lean to R side noted. Balance  Sitting Balance: Moderate assistance (SBA - Mod A with fatigue and activity; 1 LOB)  Standing Balance: Maximum assistance (Min- Max A while standing with fatigue and R side lean)   Standing Balance  Time: 1-2 minutes x 5  Activity: functional transfers, lower body dressing, toileting  Comment: 2 person A for standing and transfers with verbal cues for safety. Pt demonstrated poor carryover with safety with impulsive behavior attempting to sit without notice        Bed mobility  Rolling to Left: Maximum assistance  Rolling to Right: Moderate assistance  Supine to Sit: Maximum assistance  Sit to Supine: Maximum assistance (A at trunk and BLE)  Scooting: Minimal assistance (shoulders laterally on bed)  Comment: Assistance with RLE and trunk  Transfers  Stand Pivot Transfers: 2 Person assistance, Moderate assistance  Sit to stand: 2 Person assistance, Moderate assistance  Stand to sit: 2 Person assistance, Moderate assistance  Transfer Comments: Verbal cues for hand placement, safety, and balance   Toilet Transfers  Toilet - Technique: Stand pivot  Equipment Used: Raised toilet seat with rails  Toilet Transfer: 2 Person assistance, Moderate assistance  Toilet Transfers Comments: Verbal cues for safety and hand placement. SPEECH:  Subjective: [x]? Alert     [x]? Cooperative     []? Confused     []? Agitated    [x]? Lethargic        Objective/Assessment:  Auditory Comprehension: Pt. not responding to any writer questions. Pt. following x1 simple 1-step command for OMEs.    Verbal Expression: No attempts at verbal communication made, despite max encouragement and education re: rationale provided. Reading Comprehension: n/a     Written Language: n/a     Speech: Pt. Able to complete OME x1 set in rep of 1 with max cues (lingual lateralization). Pt. Demonstrating very minimal lingual movement with attempt. Pt. refused to complete any other OMEs despite MAX encouragement and education provided for rationale of task.       Voice: Pt. did not vocalize during session.      Other: No family present. Pt. Very briefly making eye contact with writer when walking into room. Pt. with eyes closed and not making eye contact with writer during remainder of session. Extensive education provided re: importance of participation in therapy to improve speech/language/cognition and rationale for all tasks. No evidence of learning as Pt. Continued to refuse participation in tasks.      Pt. Lunch tray present upon arrival, appeared untouched. Pt. refused any PO trials from lunch tray, despite encouragement provided. ST to continue to follow. Objective:  BP (!) 140/89   Pulse 63   Temp 98.1 °F (36.7 °C) (Axillary)   Resp 18   Ht 5' 10\" (1.778 m)   Wt 212 lb (96.2 kg)   SpO2 100%   BMI 30.42 kg/m²       GEN: well developed, well nourished, NAD  HEENT: NCAT, PERRL, EOMI, mucous membranes pink and moist  CV: RRR, no murmurs, rubs or gallops  PULM: CTAB, no rales or rhonchi. Respirations WNL and unlabored  ABD: soft, NT, ND, BS+ and equal  NEURO: Drowsy and will not arouse for exam.   MSK: KYA ROM or strength  EXTREMITIES: No apparent calf tenderness to palpation bilaterally.  No edema BLEs  SKIN: warm dry and intact with good turgor  PSYCH: KYA. Diagnostics:     CBC:   Recent Labs     07/22/21  0747 07/23/21  0618   WBC 15.8* 16.6*   RBC 4.70 4.73   HGB 11.4* 11.7*   HCT 36.0* 36.3*   MCV 76.6* 76.8*   RDW 16.6* 17.1*   PLT 76* 71*     BMP:   Recent Labs     07/22/21  0747      K 3.8      CO2 25   BUN 24*   CREATININE 1.15   GLUCOSE 123*     BNP: No results for input(s): BNP in the last 72 hours. PT/INR: No results for input(s): PROTIME, INR in the last 72 hours. APTT: No results for input(s): APTT in the last 72 hours. CARDIAC ENZYMES: No results for input(s): CKMB, CKMBINDEX, TROPONINT in the last 72 hours. Invalid input(s): CKTOTAL;3  FASTING LIPID PANEL:  Lab Results   Component Value Date    CHOL 186 07/14/2021    HDL 42 07/14/2021    TRIG 77 07/14/2021     LIVER PROFILE: No results for input(s): AST, ALT, ALB, BILIDIR, BILITOT, ALKPHOS in the last 72 hours.      Current Medications:   Current Facility-Administered Medications: losartan (COZAAR) tablet 100 mg, 100 mg, Oral, Daily  rosuvastatin (CRESTOR) tablet 40 mg, 40 mg, Oral, Nightly  famotidine (PEPCID) tablet 20 mg, 20 mg, Oral, BID  predniSONE (DELTASONE) tablet 40 mg, 40 mg, Oral, Daily  amoxicillin-clavulanate (AUGMENTIN) 875-125 MG per tablet 1 tablet, 1 tablet, Oral, 2 times per day  albuterol sulfate  (90 Base) MCG/ACT inhaler 2 puff, 2 puff, Inhalation, Q6H PRN  aspirin chewable tablet 81 mg, 81 mg, Oral, Daily  clopidogrel (PLAVIX) tablet 75 mg, 75 mg, Oral, Daily  finasteride (PROSCAR) tablet 5 mg, 5 mg, Oral, Daily  FLUoxetine (PROZAC) capsule 40 mg, 40 mg, Oral, Daily  heparin (porcine) injection 5,000 Units, 5,000 Units, Subcutaneous, 3 times per day  hydrALAZINE (APRESOLINE) tablet 10 mg, 10 mg, Oral, 3 times per day  isosorbide mononitrate (IMDUR) extended release tablet 30 mg, 30 mg, Oral, Daily  QUEtiapine (SEROQUEL) tablet 25 mg, 25 mg, Oral, Nightly PRN  rOPINIRole (REQUIP) tablet 0.25 mg, 0.25 mg, Oral, Nightly  tamsulosin (FLOMAX) capsule 0.4 mg, 0.4 mg, Oral, Daily  tiotropium (SPIRIVA RESPIMAT) 2.5 MCG/ACT inhaler 2 puff, 2 puff, Inhalation, Daily  acetaminophen (TYLENOL) tablet 650 mg, 650 mg, Oral, Q4H PRN  polyethylene glycol (GLYCOLAX) packet 17 g, 17 g, Oral, Daily  senna (SENOKOT) tablet 17.2 mg, 2 tablet, Oral, Daily PRN  bisacodyl (DULCOLAX) suppository 10 mg, 10 mg, Rectal, Daily PRN  insulin lispro (HUMALOG) injection vial 0-6 Units, 0-6 Units, Subcutaneous, TID WC  insulin lispro (HUMALOG) injection vial 0-3 Units, 0-3 Units, Subcutaneous, Nightly  glucose (GLUTOSE) 40 % oral gel 15 g, 15 g, Oral, PRN  dextrose 50 % IV solution, 12.5 g, Intravenous, PRN  glucagon (rDNA) injection 1 mg, 1 mg, Intramuscular, PRN  dextrose 5 % solution, 100 mL/hr, Intravenous, PRN      Impression/Plan:   Impaired ADLs, gait, and mobility due to:      1. Ischemic CVA with R dominant hemiplegia: PT/OT  for gait, mobility, strengthening, endurance, ADLs, and self care. On ASA, plavix, Crestor. Can consider sleep/wake medication but patient is also refusing therapy/medications at times so will monitor to determine if this is mood vs physiologic. 2. Agitation: has 1:1 sitter. On Seroquel prn - last dose at 8 pm last night. Has no sedating medicines on board to be causing drowsiness. Borderline QT. Can transition to telesitter - recommend moving to room closer to nurses' station. 3. Depression: on fluoxetine. 4. HTN/CAD: on hydralazine, Imdur, losartan  5. DM II: on glipizide, Lantus, sliding scale  6. Thrombocytopenia: hematology following. On prednisone. Platelets improved, OK for DVT prophylaxis  7. Leukocytosis: stable. Being attributed to prednisone. Will monitor. 8. GERD: on famotidine BID  9. Thyroid nodule: for outpatient monitoring  10. Sinusitis: on Augmentin until 7/28 per IM. 11. COPD:   12. BPH: on finasteride, tamsulosin  13. Restless Leg Syndrome: on ropinirole  14.  Bowel Management: Miralax daily, Senokot prn, Dulcolax prn  15. Internal medicine for medical management  16. DVT prophylaxis:  On heparin        Electronically signed by Chelsea Nickerson MD on 7/24/2021 at 11:14 AM      This note is created with the assistance of a speech recognition program.  While intending to generate a document that actually reflects the content of the visit, the document can still have some errors including those of syntax and sound a like substitutions which may escape proof reading. In such instances, actual meaning can be extrapolated by contextual diversion.

## 2021-07-24 NOTE — PROGRESS NOTES
[facility] Internal Medicine    CONSULTATION / HISTORY AND PHYSICAL EXAMINATION            Date:   7/24/2021  Patient name:  Peggy Cobb  Date of admission:  7/20/2021  6:49 PM  MRN:   [id number]  Account:  [de-identified]  YOB: 1945  PCP:    No primary care provider on file.   Room:   [address] [place] [address]  Code Status:    Full Code    Physician Requesting Consult: Vipul Mason MD    Reason for Consult:  medical management    Chief Complaint:       Right hemipareisi  History Obtained From:     Patient medical record nursing staff    History of Present Illness:   History is extremely limited, patient was extremely agitated overnight, was given Ativan around 2 AM in the night, patient very sleepy, not answering questions  Most of history is retrieved from E HR  Patient was recently admitted to [facility] with right-sided weakness, right-sided facial weakness, speech difficulties  Patient underwent MRI brain, concerning for acute infarct in left and right basal ganglia  CT head and neck, was concerning for severe stenosis in the proximal A3 segment of RAHEEM bilaterally  During hospital stay, patient had thrombocytopenia, was evaluated by hematologist, getting treated with IV steroids, and lines of immune thrombocytopenia      Past Medical History:     Past Medical History:   Diagnosis Date    Centrilobular emphysema ([address]     COPD (chronic obstructive pulmonary disease) ([address]     Depression     Diabetes mellitus ([address]     pt refuses to take previously prescribed metformin (states PCP aware)    Former smoker     Hyperlipidemia     on 4/27/18 pt states \"I stopped taking that about a year ago\"    Hypertension     Mixed restrictive and obstructive lung disease ([address]     Need for pneumococcal vaccine     Personal history of tobacco use         Past Surgical History:     Past Surgical History:   Procedure Laterality Date    CARDIAC SURGERY  07/2015    1 stent    NECK SURGERY      TOE AMPUTATION Right greater        Medications Prior to Admission:     Prior to Admission medications    Medication Sig Start Date End Date Taking?  Authorizing Provider   Heparin Sodium, Porcine, (HEPARIN, PORCINE,) 5000 UNIT/ML injection Inject 1 mL into the skin every 8 hours 7/20/21   Marian Rollins MD   QUEtiapine (SEROQUEL) 25 MG tablet Take 1 tablet by mouth nightly as needed for Agitation 7/20/21 7/25/21  Marian Rollins MD   methylPREDNISolone sodium (SOLU-MEDROL) 40 MG injection Infuse 1 mL intravenously every 12 hours 7/20/21   Marian Rollins MD   melatonin 3 MG TABS tablet Take 1 tablet by mouth nightly as needed (insomnia) 7/20/21   Marian Rollins MD   atorvastatin (LIPITOR) 80 MG tablet Take 0.5 tablets by mouth daily (Pt takes one-half of an 80mg tab = 40mg) 7/16/21   Marian Rollins MD   tiZANidine (ZANAFLEX) 2 MG tablet Take 1 tablet by mouth 4 times daily as needed (muscle spasms) 5/7/21   JOLIE Moran CNP   naproxen (NAPROSYN) 500 MG tablet Take 1 tablet by mouth 2 times daily (with meals) 1/4/21   Melvin Valle PA-C   ibuprofen (ADVIL;MOTRIN) 800 MG tablet Take 1 tablet by mouth every 8 hours as needed for Pain 6/2/20   Pineda Rivera MD   lidocaine (LIDODERM) 5 % Place 1 patch onto the skin daily 12 hours on, 12 hours off. 6/2/20   Pineda Rivera MD   metoprolol succinate (TOPROL XL) 50 MG extended release tablet Take 50 mg by mouth daily    Historical Provider, MD   tiotropium (SPIRIVA RESPIMAT) 2.5 MCG/ACT AERS inhaler Inhale 2 puffs into the lungs daily    Historical Provider, MD   carboxymethylcellulose 1 % ophthalmic solution Place 1 drop into both eyes 3 times daily as needed for Dry Eyes     Historical Provider, MD   ketotifen (ZADITOR) 0.025 % ophthalmic solution Place 1 drop into both eyes 2 times daily as needed (itchy eyes)     Historical Provider, MD   isosorbide mononitrate (IMDUR) 60 MG extended release tablet Take 30 mg by mouth daily Indications: 1/2 tablet (30mg)     Historical Provider, MD   finasteride (PROSCAR) 5 MG tablet Take 5 mg by mouth daily    Historical Provider, MD   tamsulosin (FLOMAX) 0.4 MG capsule Take 0.4 mg by mouth daily    Historical Provider, MD   fluticasone (FLONASE) 50 MCG/ACT nasal spray 1 spray by Nasal route 2 times daily  Patient taking differently: 1 spray by Nasal route 2 times daily as needed for Rhinitis  5/31/16   Francisco Connor,    albuterol sulfate  (90 BASE) MCG/ACT inhaler Inhale 2 puffs into the lungs every 6 hours as needed for Wheezing    Historical Provider, MD   albuterol (PROVENTIL) (2.5 MG/3ML) 0.083% nebulizer solution Take 2.5 mg by nebulization every 6 hours as needed for Wheezing    Historical Provider, MD   aspirin 81 MG EC tablet Take 81 mg by mouth daily    Historical Provider, MD   losartan (COZAAR) 100 MG tablet Take 100 mg by mouth daily     Historical Provider, MD   nitroGLYCERIN (NITROSTAT) 0.4 MG SL tablet Place 0.4 mg under the tongue every 5 minutes as needed for Chest pain    Historical Provider, MD   FLUoxetine (PROZAC) 20 MG capsule Take 40 mg by mouth daily     Historical Provider, MD   furosemide (LASIX) 20 MG tablet Take 20 mg by mouth daily as needed (swelling)     Historical Provider, MD   clopidogrel (PLAVIX) 75 MG tablet Take 75 mg by mouth daily    Historical Provider, MD   rOPINIRole (REQUIP) 0.25 MG tablet Take 0.25 mg by mouth nightly as needed     Historical Provider, MD   budesonide-formoterol (SYMBICORT) 160-4.5 MCG/ACT AERO Inhale 2 puffs into the lungs 2 times daily. Historical Provider, MD        Allergies:     Patient has no known allergies. Social History:     Tobacco:    reports that he quit smoking about 8 years ago. He started smoking about 64 years ago. He has a 42.00 pack-year smoking history. He has never used smokeless tobacco.  Alcohol:      reports no history of alcohol use.   Drug Use:  reports no history of drug use.    Family History:     No family history on file. Review of Systems:   Cannot be done because of patient condition    Physical Exam:     BP (!) 140/89   Pulse 63   Temp 98.1 °F (36.7 °C) (Axillary)   Resp 18   Ht 5' 10\" (1.778 m)   Wt 212 lb (96.2 kg)   SpO2 100%   BMI 30.42 kg/m²   Temp (24hrs), Av.9 °F (36.6 °C), Min:97.7 °F (36.5 °C), Max:98.1 °F (36.7 °C)    Recent Labs     21  1628 21  2043 21  0554 21  1106   POCGLU 288* 153* 112* 170*     No intake or output data in the 24 hours ending 21 1118    General Appearance: Very sleepy, not answering questions  Mental status: Not oriented to person, place, and time with normal affect  Head:  normocephalic, atraumatic. Eye: no icterus, redness, pupils equal and reactive, extraocular eye movements intact, conjunctiva clear  Ear: normal external ear, no discharge, hearing intact  Nose:  no drainage noted  Mouth: mucous membranes moist  Neck: supple, no carotid bruits, thyroid not palpable  Lungs: Bilateral equal air entry, clear to ausculation, no wheezing, rales or rhonchi, normal effort  Cardiovascular: normal rate, regular rhythm, no murmur, gallop, rub.   Abdomen: Soft, nontender, nondistended, normal bowel sounds, no hepatomegaly or splenomegaly  Neurologic: Weakness in right upper and lower extremity, speech difficulties, right sided facial weakness  Skin: No gross lesions, rashes, bruising or bleeding on exposed skin area  Extremities:  peripheral pulses palpable, no pedal edema or calf pain with palpation    Investigations:      Laboratory Testing:  Recent Results (from the past 24 hour(s))   POC Glucose Fingerstick    Collection Time: 21  4:28 PM   Result Value Ref Range    POC Glucose 288 (H) 75 - 110 mg/dL   POC Glucose Fingerstick    Collection Time: 21  8:43 PM   Result Value Ref Range    POC Glucose 153 (H) 75 - 110 mg/dL   POC Glucose Fingerstick    Collection Time: 21  5:54 AM   Result Value Ref Range    POC Glucose 112 (H) 75 - 110 mg/dL   POC Glucose Fingerstick    Collection Time: 07/24/21 11:06 AM   Result Value Ref Range    POC Glucose 170 (H) 75 - 110 mg/dL           Consultations:   IP CONSULT TO DIETITIAN  IP CONSULT TO SOCIAL WORK  IP CONSULT TO INTERNAL MEDICINE  IP CONSULT TO ONCOLOGY  IP CONSULT TO INTERNAL MEDICINE  Assessment :      Primary Problem  <principal problem not specified>    Active Hospital Problems    Diagnosis Date Noted    Acute CVA (cerebrovascular accident) ([facility] [I63.9] 07/20/2021    Acute idiopathic thrombocytopenic purpura ([facility] [D69.3] 07/16/2021    CAD S/P percutaneous coronary angioplasty [I25.10, Z98.61] 07/13/2021    Cerebrovascular accident (CVA) ([facility] [I63.9] 07/13/2021    HTN (hypertension) Rita Bowerfranklin 07/13/2021    Hyperlipidemia [E78.5] 07/13/2021    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 Acute ischemic stroke, patient is on aspirin, Plavix, Crestor  2. 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 On DVT prophylaxis with heparin  4. Hypertension, controlled  5. Diabetes, controlled  1 episode of bradycardia, will follow, may be due to benzodiazepine . 6. Incidental finding of thyroid nodule on CTA head and neck, will need outpatient ultrasound thyroid and biopsy  Dysphagia diet  Right hemipareis with speech dif  htn improved    augmentin for sinusitis  Prednisone for ? Vivian Parham MD  7/24/2021  11:18 AM    Copy sent to Dr. Gao primary care provider on file. Please note that this chart was generated using voice recognition Dragon dictation software. Although every effort was made to ensure the accuracy of this automated transcription, some errors in transcription may have occurred.

## 2021-07-24 NOTE — PLAN OF CARE
Problem: Infection:  Goal: Will remain free from infection  Description: Will remain free from infection  7/24/2021 1638 by Anastasiya Barboza RN  Outcome: Ongoing  7/24/2021 0528 by Dasah Lopez RN  Outcome: Ongoing     Problem: Safety:  Goal: Free from accidental physical injury  Description: Free from accidental physical injury  7/24/2021 1638 by Anastasiya Barboza RN  Outcome: Ongoing  7/24/2021 0528 by Dasha Lopez RN  Outcome: Ongoing  Goal: Free from intentional harm  Description: Free from intentional harm  7/24/2021 1638 by Anastasiya Barboza RN  Outcome: Ongoing  7/24/2021 0528 by Dasha Lopez RN  Outcome: Ongoing     Problem: Daily Care:  Goal: Daily care needs are met  Description: Daily care needs are met  7/24/2021 1638 by Anastasiya Barboza RN  Outcome: Ongoing  7/24/2021 0528 by Dasha Lopez RN  Outcome: Ongoing     Problem: Pain:  Goal: Patient's pain/discomfort is manageable  Description: Patient's pain/discomfort is manageable  7/24/2021 1638 by Anastasiya Barboza RN  Outcome: Ongoing  7/24/2021 0528 by Dasha Lopez RN  Outcome: Ongoing     Problem: Skin Integrity:  Goal: Skin integrity will stabilize  Description: Skin integrity will stabilize  7/24/2021 1638 by Anastasiya Barboza RN  Outcome: Ongoing  7/24/2021 0528 by Dasha Lopez RN  Outcome: Ongoing     Problem: Discharge Planning:  Goal: Patients continuum of care needs are met  Description: Patients continuum of care needs are met  7/24/2021 1638 by Anastasiya Barboza RN  Outcome: Ongoing  7/24/2021 0528 by Dasha Lopez RN  Outcome: Ongoing     Problem: Skin Integrity:  Goal: Will show no infection signs and symptoms  Description: Will show no infection signs and symptoms  7/24/2021 1638 by Anastasiya Barboza RN  Outcome: Ongoing  7/24/2021 0528 by Dasha Lopez RN  Outcome: Ongoing  Goal: Absence of new skin breakdown  Description: Absence of new skin breakdown  7/24/2021 1638 by Anastasiya Barboza RN  Outcome: Ongoing  7/24/2021 0528 by Dasha Lopez RN  Outcome: Ongoing     Problem: Falls - Risk of:  Goal: Will remain free from falls  Description: Will remain free from falls  7/24/2021 1638 by Carroll Ugarte RN  Outcome: Ongoing  7/24/2021 0528 by Ravinder Joe RN  Outcome: Ongoing  Goal: Absence of physical injury  Description: Absence of physical injury  7/24/2021 1638 by Carroll Ugarte RN  Outcome: Ongoing  7/24/2021 0528 by Ravinder Joe RN  Outcome: Ongoing     Problem: Nutrition  Goal: Optimal nutrition therapy  7/24/2021 1638 by Carroll Ugarte RN  Outcome: Ongoing  7/24/2021 0528 by Ravinder Joe RN  Outcome: Ongoing     Problem: Musculor/Skeletal Functional Status  Goal: Highest potential functional level  7/24/2021 1638 by Carroll Ugarte RN  Outcome: Ongoing  7/24/2021 0528 by Ravinder Joe RN  Outcome: Ongoing  Goal: Absence of falls  7/24/2021 1638 by Carroll Ugarte RN  Outcome: Ongoing  7/24/2021 0528 by Ravinder Joe RN  Outcome: Ongoing

## 2021-07-24 NOTE — PROGRESS NOTES
Speech Language Pathology  Speech Language Pathology  [facility]    Speech Language Treatment Note    Date: 7/24/2021  Patients Name: Oscar Garcia  MRN: [id number]  Diagnosis:   Patient Active Problem List   Diagnosis Code    Centrilobular emphysema (HCC) J43.2    Mixed restrictive and obstructive lung disease ([facility] J43.9, J98.4    Cerebrovascular accident (CVA) ([facility] I63.9    COPD (chronic obstructive pulmonary disease) ([facility] J44.9    HTN (hypertension) I10    Diabetes 1.5, managed as type 2 ([facility] 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.9    Hyperlipidemia E78.5    CAD S/P percutaneous coronary angioplasty I25.10, Z98.61    Rhabdomyolysis M62.82    Intracranial atherosclerosis I67.2    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 not responding to any writer questions. Pt. following x1 simple 1-step command for OMEs. Verbal Expression: No attempts at verbal communication made, despite max encouragement and education re: rationale provided. Reading Comprehension: n/a    Written Language: n/a    Speech: Pt. Able to complete OME x1 set in rep of 1 with max cues (lingual lateralization). Pt. Demonstrating very minimal lingual movement with attempt. Pt. refused to complete any other OMEs despite MAX encouragement and education provided for rationale of task. Voice: Pt. did not vocalize during session. Other: No family present. Pt. Very briefly making eye contact with writer when walking into room.   Pt. with eyes closed and not making eye contact with

## 2021-07-24 NOTE — PROGRESS NOTES
Person assistance (mod x 2, then when more fatigued max x 2)  Stand to sit: Maximum assistance  Transfer Comments: Verbal cues for hand placement, safety, and balance  Standing Balance  Time: 3-4 min x 2  Activity: adls, static stand with karena walker on L  Comment: 1 person to assist with stand balance- significant R lean, 1 person to complete hygiene/ dressing task in standing. ADL  Feeding: Maximum assistance; Thickened liquids (pt ate breakfast late sitting EOB during OT, alert and interested in eating- max assist to grasp spoon, fill spoon with LUE, able to bring to mouth, spillage on R , pocketing on R.  able to drink with min assist.  inconsistent,sometimes need less assist.)  Grooming: Maximum assistance (max A to brush gums, set up and inititate to wash face)  UE Bathing: Maximum assistance (washes RUE with mod A, TA LUE, wash chest, stomach min assist.)  LE Bathing: Dependent/Total (max x 2 to stand and bathe willem, bottom, set up and cues to wash thighs to mid calf, max with distally. able to cross LLE with min A and RLE with max A.)  UE Dressing: Maximum assistance (t shirt- fits too tight)  LE Dressing: Dependent/Total (pants over feet with max A, A x 2 stand and complete, TA teds and socks.)  Toileting: Dependent/Total  Additional Comments: pt sat EOB to eat, transferred bed to w/c, was incontinent urine in briefs, was assisted to clean and don fresh briefs and liner. w/c  into bathroom for groom, bathe and dress. pt sleepy at times but cooperative. pt even participated in wash B feet once assisted to cross BLE.           Assessment     Activity Tolerance: Patient Tolerated treatment well  Activity Tolerance: extended adl session completed due to pt with good participation- has history of poor participation            07/24/21 1341   OT Individual Minutes   Time In 7216   Time Out 1157   Minutes 103          Patient Education:  Patient Goals   Patient goals : \"I want to go home\"  Learner:patient  Method: demonstration and explanation       Outcome: demonstrated understanding and needs reinforcement   OT Education  Patient Education: activity promotion    Plan  Plan  Times per week:  900 minutes/week for combine dtherapy of PT/OT/ST due to decreased tolerance to activity.   Times per day: Twice a day  Current Treatment Recommendations: Self-Care / ADL, Strengthening, ROM, Balance Training, Functional Mobility Training, Endurance Training, Neuromuscular Re-education, Cognitive Reorientation, Pain Management, Safety Education & Training, Patient/Caregiver Education & Training, Equipment Evaluation, Education, & procurement, Home Management Training, Cognitive/Perceptual Training  Patient Goals   Patient goals : \"I want to go home\"  Short term goals  Time Frame for Short term goals: By 10 days  Short term goal 1: Pt will complete upper body dressing/bathing with Min A and good attention to task  Short term goal 2: Pt will complete lower body dressing/bathing with max A and good safety with use of AE as needed  Short term goal 3: Pt will complete functional transfers during self care tasks with mod A x 1 and good safety  Short term goal 4: Pt will tolerate standing 4+ minutes during functional activity of choice with min A and good safety  Short term goal 5: Pt will participate in 30+ minutes of therapeutic exercises/functional activities to increase safety and independence with self care tasks  Short term goal 6: Pt will complete self feeding with min A and good attention to task  Long term goals  Time Frame for Long term goals : By discharge  Long term goal 1: Pt will complete upper body dressing with set-up assistance and good attention to task  Long term goal 2: Pt will complete lower body dressing/bathing with min A and good safety with use of AE as needed  Long term goal 3: Pt will complete functional transfers/mobility during self care tasks with min A and good safety  Long term goal 4: Pt will tolerate standing 8+ minutes during functional activity of choice with CGA and good safety  Long term goal 5: Pt will verbalize/demonstrate good understanding of adaptive equipment/adaptive strategies/durable medical equipment to increase safety and independence with self care and mobility  Long term goals 6: Pt will complete self feeding with set-up assistance and good attention to task  Long term goal 7: Vision to be further assessed       Electronically signed by Kellie Narvaez OT on 7/24/21 at 2:01 PM EDT

## 2021-07-24 NOTE — PROGRESS NOTES
Multiple attempts to give patient his morning medications. Tried pudding and applesauce and patient continues to refuse.

## 2021-07-25 NOTE — PROGRESS NOTES
Pt. Cont.  To try to climb out of bed, off and on, tried to re orient pt., unsuccessful at this time

## 2021-07-25 NOTE — PROGRESS NOTES
Today's Date: 7/25/2021  Patient Name: Peggy Cobb  Date of admission: 7/20/2021  6:49 PM  Patient's age: 77 y.o., 1945  Admission Dx: Acute CVA (cerebrovascular accident) [facility]) [I63.9]    Reason for Consult: management recommendations  Requesting Physician: Vipul Mason MD    CHIEF COMPLAINT: Acute stroke. Thrombocytopenia. History Obtained From:  patient, electronic medical record   SUBJECTIVE:  Patient was seen and examined. Clinically stable. No significant changes. No active bleeding. No bruising. No fever. HISTORY OF PRESENT ILLNESS:      The patient is a 77 y.o.  male who is admitted to the hospital for acute stroke    Brought to the hospital after being found down on the floor. Patient was last seen well about 2 days ago. Patient has a history of peripheral vascular disease coronary artery disease hypertension COPD and previous strokes. Patient has significant contracture on the right side also right-sided facial droop with severe dysarthria limiting history of presenting illness. Patient's CBC at presentation shows hemoglobin 11.7 with MCV 77 WBC count 10 and platelet count of 4 with immature platelet fraction of 44%. Patient previous CBC noted in the system from June 2020 shows blood count of 160. Patient was also noted to have elevated myoglobin and CK. Creatinine is 1.74 lactic acid is 2.4. Patient has dysarthria not able to give much history. Past Medical History:   has a past medical history of Centrilobular emphysema ([address] COPD (chronic obstructive pulmonary disease) ([address] Depression, Diabetes mellitus ([address] Former smoker, Hyperlipidemia, Hypertension, Mixed restrictive and obstructive lung disease ([address] Need for pneumococcal vaccine, and Personal history of tobacco use. Past Surgical History:   has a past surgical history that includes Neck surgery; Toe amputation (Right, greater); and Cardiac surgery (07/2015).      Medications:    Prior to Admission medications    Medication Sig Start Date End Date Taking?  Authorizing Provider   Heparin Sodium, Porcine, (HEPARIN, PORCINE,) 5000 UNIT/ML injection Inject 1 mL into the skin every 8 hours 7/20/21   Sam De Leon MD   QUEtiapine (SEROQUEL) 25 MG tablet Take 1 tablet by mouth nightly as needed for Agitation 7/20/21 7/25/21  Sam De Leon MD   methylPREDNISolone sodium (SOLU-MEDROL) 40 MG injection Infuse 1 mL intravenously every 12 hours 7/20/21   Sam De Leon MD   melatonin 3 MG TABS tablet Take 1 tablet by mouth nightly as needed (insomnia) 7/20/21   Sam De Leon MD   atorvastatin (LIPITOR) 80 MG tablet Take 0.5 tablets by mouth daily (Pt takes one-half of an 80mg tab = 40mg) 7/16/21   Sam De Leon MD   tiZANidine (ZANAFLEX) 2 MG tablet Take 1 tablet by mouth 4 times daily as needed (muscle spasms) 5/7/21   JOLIE Guallpa - CNP   naproxen (NAPROSYN) 500 MG tablet Take 1 tablet by mouth 2 times daily (with meals) 1/4/21   Emani Blue PA-C   ibuprofen (ADVIL;MOTRIN) 800 MG tablet Take 1 tablet by mouth every 8 hours as needed for Pain 6/2/20   Toi Cabrera MD   lidocaine (LIDODERM) 5 % Place 1 patch onto the skin daily 12 hours on, 12 hours off. 6/2/20   Toi Cabrera MD   metoprolol succinate (TOPROL XL) 50 MG extended release tablet Take 50 mg by mouth daily    Historical Provider, MD   tiotropium (SPIRIVA RESPIMAT) 2.5 MCG/ACT AERS inhaler Inhale 2 puffs into the lungs daily    Historical Provider, MD   carboxymethylcellulose 1 % ophthalmic solution Place 1 drop into both eyes 3 times daily as needed for Dry Eyes     Historical Provider, MD   ketotifen (ZADITOR) 0.025 % ophthalmic solution Place 1 drop into both eyes 2 times daily as needed (itchy eyes)     Historical Provider, MD   isosorbide mononitrate (IMDUR) 60 MG extended release tablet Take 30 mg by mouth daily Indications: 1/2 tablet (30mg)     Historical Provider, MD finasteride (PROSCAR) 5 MG tablet Take 5 mg by mouth daily    Historical Provider, MD   tamsulosin (FLOMAX) 0.4 MG capsule Take 0.4 mg by mouth daily    Historical Provider, MD   fluticasone (FLONASE) 50 MCG/ACT nasal spray 1 spray by Nasal route 2 times daily  Patient taking differently: 1 spray by Nasal route 2 times daily as needed for Rhinitis  5/31/16   Francisco Connor DO   albuterol sulfate  (90 BASE) MCG/ACT inhaler Inhale 2 puffs into the lungs every 6 hours as needed for Wheezing    Historical Provider, MD   albuterol (PROVENTIL) (2.5 MG/3ML) 0.083% nebulizer solution Take 2.5 mg by nebulization every 6 hours as needed for Wheezing    Historical Provider, MD   aspirin 81 MG EC tablet Take 81 mg by mouth daily    Historical Provider, MD   losartan (COZAAR) 100 MG tablet Take 100 mg by mouth daily     Historical Provider, MD   nitroGLYCERIN (NITROSTAT) 0.4 MG SL tablet Place 0.4 mg under the tongue every 5 minutes as needed for Chest pain    Historical Provider, MD   FLUoxetine (PROZAC) 20 MG capsule Take 40 mg by mouth daily     Historical Provider, MD   furosemide (LASIX) 20 MG tablet Take 20 mg by mouth daily as needed (swelling)     Historical Provider, MD   clopidogrel (PLAVIX) 75 MG tablet Take 75 mg by mouth daily    Historical Provider, MD   rOPINIRole (REQUIP) 0.25 MG tablet Take 0.25 mg by mouth nightly as needed     Historical Provider, MD   budesonide-formoterol (SYMBICORT) 160-4.5 MCG/ACT AERO Inhale 2 puffs into the lungs 2 times daily.     Historical Provider, MD     Current Facility-Administered Medications   Medication Dose Route Frequency Provider Last Rate Last Admin    melatonin tablet 6 mg  6 mg Oral Nightly Joey Main MD        losartan (COZAAR) tablet 100 mg  100 mg Oral Daily Maru Ortez MD   100 mg at 07/25/21 0824    rosuvastatin (CRESTOR) tablet 40 mg  40 mg Oral Nightly Oscar Headley MD   40 mg at 07/24/21 2331    famotidine (PEPCID) tablet 20 mg  20 mg Oral BID Josh Rodriguez MD   20 mg at 07/25/21 0824    predniSONE (DELTASONE) tablet 40 mg  40 mg Oral Daily Lisy Olivares MD   40 mg at 07/25/21 0824    amoxicillin-clavulanate (AUGMENTIN) 875-125 MG per tablet 1 tablet  1 tablet Oral 2 times per day Facundo Don MD   1 tablet at 07/25/21 0825    albuterol sulfate  (90 Base) MCG/ACT inhaler 2 puff  2 puff Inhalation Q6H PRN Maurice Connolly MD        aspirin chewable tablet 81 mg  81 mg Oral Daily Maurice Connolly MD   81 mg at 07/25/21 1643    clopidogrel (PLAVIX) tablet 75 mg  75 mg Oral Daily Maurice Connolly MD   75 mg at 07/25/21 1565    finasteride (PROSCAR) tablet 5 mg  5 mg Oral Daily Maurice Connolly MD   5 mg at 07/25/21 0823    FLUoxetine (PROZAC) capsule 40 mg  40 mg Oral Daily Maurice Connolly MD   40 mg at 07/25/21 6729    heparin (porcine) injection 5,000 Units  5,000 Units Subcutaneous 3 times per day Maurice Connolly MD   5,000 Units at 07/24/21 1541    hydrALAZINE (APRESOLINE) tablet 10 mg  10 mg Oral 3 times per day Maurice Connolly MD   10 mg at 07/25/21 1423    isosorbide mononitrate (IMDUR) extended release tablet 30 mg  30 mg Oral Daily Maurice Connolly MD   30 mg at 07/25/21 0824    QUEtiapine (SEROQUEL) tablet 25 mg  25 mg Oral Nightly PRN Maurice Connolly MD   25 mg at 07/23/21 2032    rOPINIRole (REQUIP) tablet 0.25 mg  0.25 mg Oral Nightly Maurice Connolly MD   0.25 mg at 07/24/21 2331    tamsulosin (FLOMAX) capsule 0.4 mg  0.4 mg Oral Daily Maurice Connolly MD   0.4 mg at 07/25/21 0824    tiotropium (SPIRIVA RESPIMAT) 2.5 MCG/ACT inhaler 2 puff  2 puff Inhalation Daily Maurice Connolly MD   2 puff at 07/25/21 0826    acetaminophen (TYLENOL) tablet 650 mg  650 mg Oral Q4H PRN Josh Rodriguez MD   650 mg at 07/25/21 0823    polyethylene glycol (GLYCOLAX) packet 17 g  17 g Oral Daily Josh Rodriguez MD   17 g at 07/23/21 0733    senna (SENOKOT) tablet 17.2 mg  2 tablet Oral Daily PRN Gerber Romero MD        bisacodyl (DULCOLAX) suppository 10 mg  10 mg Rectal Daily PRN Gerber Romero MD        insulin lispro (HUMALOG) injection vial 0-6 Units  0-6 Units Subcutaneous TID WC Gerber Romero MD   3 Units at 21 173    insulin lispro (HUMALOG) injection vial 0-3 Units  0-3 Units Subcutaneous Nightly Gerber Romero MD   1 Units at 21    glucose (GLUTOSE) 40 % oral gel 15 g  15 g Oral PRN Gerber Romero MD        dextrose 50 % IV solution  12.5 g Intravenous PRN Gerber Romero MD        glucagon (rDNA) injection 1 mg  1 mg Intramuscular PRN Gerber Romero MD        dextrose 5 % solution  100 mL/hr Intravenous PRN Gerber Romero MD           Allergies:  Patient has no known allergies. Social History:   reports that he quit smoking about 8 years ago. He started smoking about 64 years ago. He has a 42.00 pack-year smoking history. He has never used smokeless tobacco. He reports that he does not drink alcohol and does not use drugs. Family History: Hypertension    REVIEW OF SYSTEMS:      Patient's review of system is limited due to mental status     PHYSICAL EXAM:        /64   Pulse 90   Temp 97.4 °F (36.3 °C) (Oral)   Resp 18   Ht 5' 10\" (1.778 m)   Wt 214 lb 4.6 oz (97.2 kg)   SpO2 95%   BMI 30.75 kg/m²    Temp (24hrs), Av.5 °F (36.4 °C), Min:97.4 °F (36.3 °C), Max:97.6 °F (36.4 °C)      General appearance -ill appearing, no in pain or distress , he is sleepy and hard to get any information from. Neck - supple, no significant adenopathy   Lymphatics - no palpable lymphadenopathy, no hepatosplenomegaly   Chest -decreased breathing sounds  Heart - normal rate, regular rhythm, normal S1, S2, no murmurs  Abdomen - soft, nontender, nondistended, no masses or organomegaly   Neurological -speech altered.   Patient is lethargic   Musculoskeletal - no joint tenderness, deformity or swelling   Extremities -right upper and lower extremity contracture  Skin - normal coloration and turgor, no rashes, no suspicious skin lesions noted ,      DATA:      Labs:     Lab Results   Component Value Date    WBC 16.6 (H) 07/23/2021    HGB 11.7 (L) 07/23/2021    HCT 36.3 (L) 07/23/2021    MCV 76.8 (L) 07/23/2021    PLT 71 (L) 07/23/2021     plt 42  IMPRESSION:   Primary Problem  <principal problem not specified>    Active Hospital Problems    Diagnosis Date Noted    Acute CVA (cerebrovascular accident) ([facility] [I63.9] 07/20/2021    Acute idiopathic thrombocytopenic purpura ([facility] [D69.3] 07/16/2021    CAD S/P percutaneous coronary angioplasty [I25.10, Z98.61] 07/13/2021    Cerebrovascular accident (CVA) ([facility] [I63.9] 07/13/2021    HTN (hypertension) [I10] 07/13/2021    Hyperlipidemia [E78.5] 07/13/2021    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 Continues rehab. Continue steroids, platelets above 76,742. No contraindication to anticoagulation. Continue current treatment without changes. Labs today AM.                            [facility]                            This note is created with the assistance of a speech recognition program.  While intending to generate a document that actually reflects the content of the visit, the document can still have some errors including those of syntax and sound a like substitutions which may escape proof reading. It such instances, actual meaning can be extrapolated by contextual diversion.

## 2021-07-25 NOTE — PROGRESS NOTES
[place] [address] Internal Medicine    CONSULTATION / HISTORY AND PHYSICAL EXAMINATION            Date:   7/25/2021  Patient name:  Harvey Quezada  Date of admission:  7/20/2021  6:49 PM  MRN:   [id number]  Account:  [de-identified]  YOB: 1945  PCP:    No primary care provider on file.   Room:   [address] [place] [address]  Code Status:    Full Code    Physician Requesting Consult: Francisco Gottlieb MD    Reason for Consult:  medical management    Chief Complaint:       Right hemipareisi  History Obtained From:     Patient medical record nursing staff    History of Present Illness:   History is extremely limited, patient was extremely agitated overnight, was given Ativan around 2 AM in the night, patient very sleepy, not answering questions  Most of history is retrieved from E HR  Patient was recently admitted to [facility] with right-sided weakness, right-sided facial weakness, speech difficulties  Patient underwent MRI brain, concerning for acute infarct in left and right basal ganglia  CT head and neck, was concerning for severe stenosis in the proximal A3 segment of RAHEEM bilaterally  During hospital stay, patient had thrombocytopenia, was evaluated by hematologist, getting treated with IV steroids, and lines of immune thrombocytopenia      Past Medical History:     Past Medical History:   Diagnosis Date    Centrilobular emphysema ([address]     COPD (chronic obstructive pulmonary disease) ([address]     Depression     Diabetes mellitus ([address]     pt refuses to take previously prescribed metformin (states PCP aware)    Former smoker     Hyperlipidemia     on 4/27/18 pt states \"I stopped taking that about a year ago\"    Hypertension     Mixed restrictive and obstructive lung disease ([address]     Need for pneumococcal vaccine     Personal history of tobacco use         Past Surgical History:     Past Surgical History:   Procedure Laterality Date    CARDIAC SURGERY  07/2015    1 stent    NECK SURGERY      TOE AMPUTATION Right greater        Medications Prior to Admission:     Prior to Admission medications    Medication Sig Start Date End Date Taking?  Authorizing Provider   Heparin Sodium, Porcine, (HEPARIN, PORCINE,) 5000 UNIT/ML injection Inject 1 mL into the skin every 8 hours 7/20/21   Marian Rollins MD   QUEtiapine (SEROQUEL) 25 MG tablet Take 1 tablet by mouth nightly as needed for Agitation 7/20/21 7/25/21  Marian Rollins MD   methylPREDNISolone sodium (SOLU-MEDROL) 40 MG injection Infuse 1 mL intravenously every 12 hours 7/20/21   Marian Rollins MD   melatonin 3 MG TABS tablet Take 1 tablet by mouth nightly as needed (insomnia) 7/20/21   Marian Rollins MD   atorvastatin (LIPITOR) 80 MG tablet Take 0.5 tablets by mouth daily (Pt takes one-half of an 80mg tab = 40mg) 7/16/21   Marian Rollins MD   tiZANidine (ZANAFLEX) 2 MG tablet Take 1 tablet by mouth 4 times daily as needed (muscle spasms) 5/7/21   JOLIE Moran CNP   naproxen (NAPROSYN) 500 MG tablet Take 1 tablet by mouth 2 times daily (with meals) 1/4/21   Melvin Valle PA-C   ibuprofen (ADVIL;MOTRIN) 800 MG tablet Take 1 tablet by mouth every 8 hours as needed for Pain 6/2/20   Pineda Rivera MD   lidocaine (LIDODERM) 5 % Place 1 patch onto the skin daily 12 hours on, 12 hours off. 6/2/20   Pineda Rivera MD   metoprolol succinate (TOPROL XL) 50 MG extended release tablet Take 50 mg by mouth daily    Historical Provider, MD   tiotropium (SPIRIVA RESPIMAT) 2.5 MCG/ACT AERS inhaler Inhale 2 puffs into the lungs daily    Historical Provider, MD   carboxymethylcellulose 1 % ophthalmic solution Place 1 drop into both eyes 3 times daily as needed for Dry Eyes     Historical Provider, MD   ketotifen (ZADITOR) 0.025 % ophthalmic solution Place 1 drop into both eyes 2 times daily as needed (itchy eyes)     Historical Provider, MD   isosorbide mononitrate (IMDUR) 60 MG extended release tablet Take 30 mg by mouth daily Indications: 1/2 tablet (30mg)     Historical Provider, MD   finasteride (PROSCAR) 5 MG tablet Take 5 mg by mouth daily    Historical Provider, MD   tamsulosin (FLOMAX) 0.4 MG capsule Take 0.4 mg by mouth daily    Historical Provider, MD   fluticasone (FLONASE) 50 MCG/ACT nasal spray 1 spray by Nasal route 2 times daily  Patient taking differently: 1 spray by Nasal route 2 times daily as needed for Rhinitis  5/31/16   Francisco Connor,    albuterol sulfate  (90 BASE) MCG/ACT inhaler Inhale 2 puffs into the lungs every 6 hours as needed for Wheezing    Historical Provider, MD   albuterol (PROVENTIL) (2.5 MG/3ML) 0.083% nebulizer solution Take 2.5 mg by nebulization every 6 hours as needed for Wheezing    Historical Provider, MD   aspirin 81 MG EC tablet Take 81 mg by mouth daily    Historical Provider, MD   losartan (COZAAR) 100 MG tablet Take 100 mg by mouth daily     Historical Provider, MD   nitroGLYCERIN (NITROSTAT) 0.4 MG SL tablet Place 0.4 mg under the tongue every 5 minutes as needed for Chest pain    Historical Provider, MD   FLUoxetine (PROZAC) 20 MG capsule Take 40 mg by mouth daily     Historical Provider, MD   furosemide (LASIX) 20 MG tablet Take 20 mg by mouth daily as needed (swelling)     Historical Provider, MD   clopidogrel (PLAVIX) 75 MG tablet Take 75 mg by mouth daily    Historical Provider, MD   rOPINIRole (REQUIP) 0.25 MG tablet Take 0.25 mg by mouth nightly as needed     Historical Provider, MD   budesonide-formoterol (SYMBICORT) 160-4.5 MCG/ACT AERO Inhale 2 puffs into the lungs 2 times daily. Historical Provider, MD        Allergies:     Patient has no known allergies. Social History:     Tobacco:    reports that he quit smoking about 8 years ago. He started smoking about 64 years ago. He has a 42.00 pack-year smoking history. He has never used smokeless tobacco.  Alcohol:      reports no history of alcohol use.   Drug Use:  reports no history of drug use.    Family History:     No family history on file. Review of Systems:   Cannot be done because of patient condition    Physical Exam:     /64   Pulse 98   Temp 97.4 °F (36.3 °C) (Oral)   Resp 18   Ht 5' 10\" (1.778 m)   Wt 214 lb 4.6 oz (97.2 kg)   SpO2 95%   BMI 30.75 kg/m²   Temp (24hrs), Av.5 °F (36.4 °C), Min:97.4 °F (36.3 °C), Max:97.6 °F (36.4 °C)    Recent Labs     21  0554 21  1106 21  2329 21  0622   POCGLU 112* 170* 114* 105     No intake or output data in the 24 hours ending 21 0954    General Appearance: Very sleepy, not answering questions  Mental status: Not oriented to person, place, and time with normal affect  Head:  normocephalic, atraumatic. Eye: no icterus, redness, pupils equal and reactive, extraocular eye movements intact, conjunctiva clear  Ear: normal external ear, no discharge, hearing intact  Nose:  no drainage noted  Mouth: mucous membranes moist  Neck: supple, no carotid bruits, thyroid not palpable  Lungs: Bilateral equal air entry, clear to ausculation, no wheezing, rales or rhonchi, normal effort  Cardiovascular: normal rate, regular rhythm, no murmur, gallop, rub.   Abdomen: Soft, nontender, nondistended, normal bowel sounds, no hepatomegaly or splenomegaly  Neurologic: Weakness in right upper and lower extremity, speech difficulties, right sided facial weakness  Skin: No gross lesions, rashes, bruising or bleeding on exposed skin area  Extremities:  peripheral pulses palpable, no pedal edema or calf pain with palpation    Investigations:      Laboratory Testing:  Recent Results (from the past 24 hour(s))   POC Glucose Fingerstick    Collection Time: 21 11:06 AM   Result Value Ref Range    POC Glucose 170 (H) 75 - 110 mg/dL   POC Glucose Fingerstick    Collection Time: 21 11:29 PM   Result Value Ref Range    POC Glucose 114 (H) 75 - 110 mg/dL   POC Glucose Fingerstick    Collection Time: 21  6:22 AM   Result Value Ref Range    POC Glucose 105 75 - 110 mg/dL           Consultations:   IP CONSULT TO DIETITIAN  IP CONSULT TO SOCIAL WORK  IP CONSULT TO INTERNAL MEDICINE  IP CONSULT TO ONCOLOGY  IP CONSULT TO INTERNAL MEDICINE  Assessment :      Primary Problem  <principal problem not specified>    Active Hospital Problems    Diagnosis Date Noted    Acute CVA (cerebrovascular accident) ([facility] [I63.9] 07/20/2021    Acute idiopathic thrombocytopenic purpura ([address] [D69.3] 07/16/2021    CAD S/P percutaneous coronary angioplasty [I25.10, Z98.61] 07/13/2021    Cerebrovascular accident (CVA) ([facility] [I63.9] 07/13/2021    HTN (hypertension) Derl Basil 07/13/2021    Hyperlipidemia [E78.5] 07/13/2021    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 Acute ischemic stroke, patient is on aspirin, Plavix, Crestor  2. 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 On DVT prophylaxis with heparin  4. Hypertension, controlled  5. Diabetes, controlled  1 episode of bradycardia, will follow, may be due to benzodiazepine . 6. Incidental finding of thyroid nodule on CTA head and neck, will need outpatient ultrasound thyroid and biopsy  Dysphagia diet  Right hemipareis with speech dif  htn improved    augmentin for sinusitis  Prednisone for ? ITP  Flat affect depression on prozac        Maggy Alfaro MD  7/25/2021  9:54 AM    Copy sent to Dr. Gao primary care provider on file. Please note that this chart was generated using voice recognition Dragon dictation software. Although every effort was made to ensure the accuracy of this automated transcription, some errors in transcription may have occurred.

## 2021-07-25 NOTE — PROGRESS NOTES
Physical Medicine & Rehabilitation  Progress Note      Subjective:      77year-old male s/p ischemic CVA L PCA, RAHEEM and MCA with R dominant hemiplegia. Patient is more awake and interactive today. He is not verbally responding to questions but is following commands and giving thumbs up responses. He is observed in therapy using MARKUS walker with 2 assist for ambulation. He does communicate poor sleep at night. ROS:  Denies fevers, chills, sweats. No chest pain, palpitations, lightheadedness. Denies coughing, wheezing or shortness of breath. Denies abdominal pain, nausea, diarrhea or constipation. No new areas of joint pain. Denies new areas of numbness or weakness. Denies new anxiety or depression issues. No new skin problems. Rehabilitation:   Progressing in therapies. PT:  Restrictions/Precautions: Up as Tolerated, General Precautions, Fall Risk, Swallowing - Thickened Liquids (1:1 sitter, Mildly thick (Nectar thick))  Other position/activity restrictions: Up w/assistance, RU/LE Weakness. MRI BRAIN- Acute infarct in the left and right basal ganglia greater on the left. Transfers  Sit to Stand: 2 Person Assistance, Moderate Assistance (No recall for hand placement cues. walker lift)  Stand to sit: 2 Person Assistance, Minimal Assistance (No recall of cues for hand placement. walker lift)  Bed to Chair: 2 Person Assistance, Moderate assistance (assist to move RLE, hemiwalker)  Stand Pivot Transfers: 2 Person Assistance, Moderate Assistance (hemiwalker, assist to move R LE)  Comment: stand pivot bed to chair with Hemiwalker at mod/max A x 2  Ambulation 1  Surface: level tile  Device: Hemiwalker  Other Apparatus: Wheelchair follow  Assistance: 2 Person assistance, Moderate assistance  Quality of Gait: Short steps, therapist/aide advancing R LE, small step on L initially/improved with cues and assist to weight shift. No recall on second attempt.    Gait Deviations: Slow Anais, Decreased step length, Decreased step height, Decreased arm swing, Shuffles, Decreased head and trunk rotation  Distance: 3' & 8'  Comments: No loss of balance. Pt attempting to rest R UE on hemiwalker, hindering movement during 8' amb. Transfers  Sit to Stand: 2 Person Assistance, Moderate Assistance (No recall for hand placement cues. walker lift)  Stand to sit: 2 Person Assistance, Minimal Assistance (No recall of cues for hand placement. walker lift)  Bed to Chair: 2 Person Assistance, Moderate assistance (assist to move RLE, hemiwalker)  Stand Pivot Transfers: 2 Person Assistance, Moderate Assistance (hemiwalker, assist to move R LE)  Comment: stand pivot bed to chair with Hemiwalker at mod/max A x 2  Ambulation  Ambulation?: Yes  More Ambulation?: Yes  Ambulation 1  Surface: level tile  Device: Hemiwalker  Other Apparatus: Wheelchair follow  Assistance: 2 Person assistance, Moderate assistance  Quality of Gait: Short steps, therapist/aide advancing R LE, small step on L initially/improved with cues and assist to weight shift. No recall on second attempt. Gait Deviations: Slow Anais, Decreased step length, Decreased step height, Decreased arm swing, Shuffles, Decreased head and trunk rotation  Distance: 3' & 8'  Comments: No loss of balance. Pt attempting to rest R UE on hemiwalker, hindering movement during 8' amb. Surface: level tile  Ambulation 1  Surface: level tile  Device: Hemiwalker  Other Apparatus: Wheelchair follow  Assistance: 2 Person assistance, Moderate assistance  Quality of Gait: Short steps, therapist/aide advancing R LE, small step on L initially/improved with cues and assist to weight shift. No recall on second attempt. Gait Deviations: Slow Anais, Decreased step length, Decreased step height, Decreased arm swing, Shuffles, Decreased head and trunk rotation  Distance: 3' & 8'  Comments: No loss of balance. Pt attempting to rest R UE on hemiwalker, hindering movement during 8' amb. OT:  ADL  Feeding: Maximum assistance, Thickened liquids (pt ate breakfast late sitting EOB during OT, alert and interested in eating- max assist to grasp spoon, fill spoon with LUE, able to bring to mouth, spillage on R , pocketing on R.  able to drink with min assist.  inconsistent,sometimes need less assist.)  Grooming: Maximum assistance (max A to brush gums, set up and inititate to wash face)  UE Bathing: Maximum assistance (washes RUE with mod A, TA LUE, wash chest, stomach min assist.)  LE Bathing: Dependent/Total (max x 2 to stand and bathe willem, bottom, set up and cues to wash thighs to mid calf, max with distally. able to cross LLE with min A and RLE with max A.)  UE Dressing: Maximum assistance (t shirt- fits too tight)  LE Dressing: Dependent/Total (pants over feet with max A, A x 2 stand and complete, TA teds and socks.)  Toileting: Dependent/Total  Additional Comments: pt sat EOB to eat, transferred bed to w/c, was incontinent urine in briefs, was assisted to clean and don fresh briefs and liner. w/c  into bathroom for groom, bathe and dress. pt sleepy at times but cooperative. pt even participated in wash B feet once assisted to cross BLE. Balance  Sitting Balance: Contact guard assistance (sanjuana 30 min with good upright posture during  feeding)  Standing Balance: Maximum assistance (occasionally min A)   Standing Balance  Time: 3-4 min x 2  Activity: adls, static stand with karena walker on L  Comment: 1 person to assist with stand balance- significant R lean, 1 person to complete hygiene/ dressing task in standing. Bed mobility  Rolling to Left: Maximum assistance  Rolling to Right: Moderate assistance  Supine to Sit: Moderate assistance  Sit to Supine: Maximum assistance (A at trunk and BLE)  Scootin Person assistance, Maximal assistance (scooting to [facility];  Pt followed cue to cross UEs)  Comment: Assistance with RLE and trunk  Transfers  Stand Pivot Transfers: 2 Person assistance, Moderate assistance  Sit to stand: Dependent/Total, 2 Person assistance (mod x 2, then when more fatigued max x 2)  Stand to sit: Maximum assistance  Transfer Comments: Verbal cues for hand placement, safety, and balance   Toilet Transfers  Toilet - Technique: Stand pivot  Equipment Used: Raised toilet seat with rails  Toilet Transfer: 2 Person assistance, Moderate assistance  Toilet Transfers Comments: Verbal cues for safety and hand placement. SPEECH:    Objective:  /64   Pulse 98   Temp 97.4 °F (36.3 °C) (Oral)   Resp 18   Ht 5' 10\" (1.778 m)   Wt 214 lb 4.6 oz (97.2 kg)   SpO2 95%   BMI 30.75 kg/m²       GEN: well developed, well nourished, NAD  HEENT: NCAT, PERRL, EOMI, mucous membranes pink and moist  CV: RRR, no murmurs, rubs or gallops  PULM: CTAB, no rales or rhonchi. Respirations WNL and unlabored  ABD: soft, NT, ND, BS+ and equal  NEURO: Alert and responding to commands today. MSK: Has functional ROM LUE and LLE. Impaired AROM RUE and RLE. Strength 5/5 key muscles LUE and LLE. R shoulder shrug and R bicep 1/5. R hip flexors/quadriceps 1/5. EXTREMITIES: No calf tenderness to palpation bilaterally. No edema BLEs  SKIN: warm dry and intact with good turgor  PSYCH: Mood depressed. Affect flat. Diagnostics:     CBC:   Recent Labs     07/23/21  0618   WBC 16.6*   RBC 4.73   HGB 11.7*   HCT 36.3*   MCV 76.8*   RDW 17.1*   PLT 71*     BMP:   No results for input(s): NA, K, CL, CO2, PHOS, BUN, CREATININE, GLUCOSE in the last 72 hours. Invalid input(s): CA  BNP: No results for input(s): BNP in the last 72 hours. PT/INR: No results for input(s): PROTIME, INR in the last 72 hours. APTT: No results for input(s): APTT in the last 72 hours. CARDIAC ENZYMES: No results for input(s): CKMB, CKMBINDEX, TROPONINT in the last 72 hours.     Invalid input(s): CKTOTAL;3  FASTING LIPID PANEL:  Lab Results   Component Value Date    CHOL 186 07/14/2021    HDL 42 07/14/2021 TRIG 77 07/14/2021     LIVER PROFILE: No results for input(s): AST, ALT, ALB, BILIDIR, BILITOT, ALKPHOS in the last 72 hours.      Current Medications:   Current Facility-Administered Medications: losartan (COZAAR) tablet 100 mg, 100 mg, Oral, Daily  rosuvastatin (CRESTOR) tablet 40 mg, 40 mg, Oral, Nightly  famotidine (PEPCID) tablet 20 mg, 20 mg, Oral, BID  predniSONE (DELTASONE) tablet 40 mg, 40 mg, Oral, Daily  amoxicillin-clavulanate (AUGMENTIN) 875-125 MG per tablet 1 tablet, 1 tablet, Oral, 2 times per day  albuterol sulfate  (90 Base) MCG/ACT inhaler 2 puff, 2 puff, Inhalation, Q6H PRN  aspirin chewable tablet 81 mg, 81 mg, Oral, Daily  clopidogrel (PLAVIX) tablet 75 mg, 75 mg, Oral, Daily  finasteride (PROSCAR) tablet 5 mg, 5 mg, Oral, Daily  FLUoxetine (PROZAC) capsule 40 mg, 40 mg, Oral, Daily  heparin (porcine) injection 5,000 Units, 5,000 Units, Subcutaneous, 3 times per day  hydrALAZINE (APRESOLINE) tablet 10 mg, 10 mg, Oral, 3 times per day  isosorbide mononitrate (IMDUR) extended release tablet 30 mg, 30 mg, Oral, Daily  QUEtiapine (SEROQUEL) tablet 25 mg, 25 mg, Oral, Nightly PRN  rOPINIRole (REQUIP) tablet 0.25 mg, 0.25 mg, Oral, Nightly  tamsulosin (FLOMAX) capsule 0.4 mg, 0.4 mg, Oral, Daily  tiotropium (SPIRIVA RESPIMAT) 2.5 MCG/ACT inhaler 2 puff, 2 puff, Inhalation, Daily  acetaminophen (TYLENOL) tablet 650 mg, 650 mg, Oral, Q4H PRN  polyethylene glycol (GLYCOLAX) packet 17 g, 17 g, Oral, Daily  senna (SENOKOT) tablet 17.2 mg, 2 tablet, Oral, Daily PRN  bisacodyl (DULCOLAX) suppository 10 mg, 10 mg, Rectal, Daily PRN  insulin lispro (HUMALOG) injection vial 0-6 Units, 0-6 Units, Subcutaneous, TID WC  insulin lispro (HUMALOG) injection vial 0-3 Units, 0-3 Units, Subcutaneous, Nightly  glucose (GLUTOSE) 40 % oral gel 15 g, 15 g, Oral, PRN  dextrose 50 % IV solution, 12.5 g, Intravenous, PRN  glucagon (rDNA) injection 1 mg, 1 mg, Intramuscular, PRN  dextrose 5 % solution, 100 mL/hr, Intravenous, PRN      Impression/Plan:   Impaired ADLs, gait, and mobility due to:      1. Ischemic CVA with R dominant hemiplegia: PT/OT  for gait, mobility, strengthening, endurance, ADLs, and self care. On ASA, plavix, Crestor. Can consider sleep/wake medication but patient is also refusing therapy/medications at times so will monitor to determine if this is mood vs physiologic. 2. Agitation: Patient failed telesitter - resumed 1:1 7/25. On Seroquel prn - last dose at 8 pm last night. Has no sedating medicines on board to be causing drowsiness. Borderline QT. 3. Depression: on fluoxetine. 4. Insomnia: Will start trial of melatonin for sleep. 5. HTN/CAD: on hydralazine, Imdur, losartan  6. DM II: on glipizide, Lantus, sliding scale  7. Thrombocytopenia: hematology following. On prednisone. Platelets improved, OK for DVT prophylaxis  8. Leukocytosis: stable. Being attributed to prednisone. Will monitor. 9. GERD: on famotidine BID  10. Thyroid nodule: for outpatient monitoring  11. Sinusitis: on Augmentin until 7/28 per IM.   12. COPD:   15. BPH: on finasteride, tamsulosin  14. Restless Leg Syndrome: on ropinirole  15. Bowel Management: Miralax daily, Senokot prn, Dulcolax prn  16. Internal medicine for medical management  17. DVT prophylaxis:  On heparin        Electronically signed by Yamilka Cannon MD on 7/25/2021 at 12:03 PM      This note is created with the assistance of a speech recognition program.  While intending to generate a document that actually reflects the content of the visit, the document can still have some errors including those of syntax and sound a like substitutions which may escape proof reading. In such instances, actual meaning can be extrapolated by contextual diversion.

## 2021-07-25 NOTE — DISCHARGE SUMMARY
low-attenuation area in the left frontal corona radiata compatible with acute/subacute infarction. This measures 2 x 1.4 cm. No associated hemorrhage. 2. Diffuse parenchymal volume loss and sequela of severe chronic microvascular ischemic changes. Neuro on board. Neuro recommended MRI brain WO, resume ASA, plavix 75, folic acid, lipitor, lipid panel, hba1c, speech eval, SBP < 180, blood glucose < 180, avoid dextrose containing solutions. CT head/neck showed severe stenosis in V1 segment of left vertebral artery, extensive intracranial atherosclerotic plaque with near complete occusion of the right PCA. Severe stenosis in proximal A3 segments of RAHEEM, moderate to severe proximal M2 segment stenosis of left MCA. Moderate stenosis in P1/P2 junction of left PCA. Enlarged, heterogenous thyroid gland with 2.6 cm left thyroid lobe nodule. Patient fluorescence platelet, came 4, hem/onc consult placed for dvt prophylaxis, plavix recommendation. Improved platelet count to 012 --> 93 after 1 Platelet transfusion, solumedrol, IVIG. MRI showed acute infarct in L and R basal ganglia, Left more than right. Rhabdomyolysis resolved  Stroke management as per neuro. Echo with bubble study negative for PFO.    Hypernatremia secondary to dehydration resolved    Procedures/ Significant Interventions:        Consults:     Consults:     Final Specialist Recommendations/Findings:   IP CONSULT TO STROKE TEAM  IP CONSULT TO INTERNAL MEDICINE  IP CONSULT TO CASE MANAGEMENT  IP CONSULT TO ONCOLOGY  IP CONSULT TO PHYSICAL MEDICINE REHAB      Any Hospital Acquired Infections: none    Discharge Functional Status:  stable    DISCHARGE PLAN     Disposition: inpatient rehab facility    Patient Instructions:   Discharge Medication List as of 7/20/2021  2:16 PM        START taking these medications    Details   Heparin Sodium, Porcine, (HEPARIN, PORCINE,) 5000 UNIT/ML injection Inject 1 mL into the skin every 8 hours, Disp-5 vial, R-0Normal      QUEtiapine (SEROQUEL) 25 MG tablet Take 1 tablet by mouth nightly as needed for Agitation, Disp-5 tablet, R-0Normal      methylPREDNISolone sodium (SOLU-MEDROL) 40 MG injection Infuse 1 mL intravenously every 12 hours, Disp-5 each, R-0Normal      melatonin 3 MG TABS tablet Take 1 tablet by mouth nightly as needed (insomnia), Disp-30 tablet, R-0Normal           CONTINUE these medications which have CHANGED    Details   atorvastatin (LIPITOR) 80 MG tablet Take 0.5 tablets by mouth daily (Pt takes one-half of an 80mg tab = 40mg), Disp-30 tablet, R-3Normal           CONTINUE these medications which have NOT CHANGED    Details   tiZANidine (ZANAFLEX) 2 MG tablet Take 1 tablet by mouth 4 times daily as needed (muscle spasms), Disp-40 tablet, R-0Normal      naproxen (NAPROSYN) 500 MG tablet Take 1 tablet by mouth 2 times daily (with meals), Disp-20 tablet, R-0Print      ibuprofen (ADVIL;MOTRIN) 800 MG tablet Take 1 tablet by mouth every 8 hours as needed for Pain, Disp-30 tablet, R-0Print      lidocaine (LIDODERM) 5 % Place 1 patch onto the skin daily 12 hours on, 12 hours off., Disp-30 patch, R-0Print      metoprolol succinate (TOPROL XL) 50 MG extended release tablet Take 50 mg by mouth dailyHistorical Med      tiotropium (SPIRIVA RESPIMAT) 2.5 MCG/ACT AERS inhaler Inhale 2 puffs into the lungs dailyHistorical Med      carboxymethylcellulose 1 % ophthalmic solution Place 1 drop into both eyes 3 times daily as needed for Dry Eyes Historical Med      ketotifen (ZADITOR) 0.025 % ophthalmic solution Place 1 drop into both eyes 2 times daily as needed (itchy eyes) Historical Med      isosorbide mononitrate (IMDUR) 60 MG extended release tablet Take 30 mg by mouth daily Indications: 1/2 tablet (30mg) Historical Med      finasteride (PROSCAR) 5 MG tablet Take 5 mg by mouth dailyHistorical Med      tamsulosin (FLOMAX) 0.4 MG capsule Take 0.4 mg by mouth daily      fluticasone (FLONASE) 50 MCG/ACT nasal spray 1 spray by Nasal route 2 times daily, Disp-1 Bottle, R-0      albuterol sulfate  (90 BASE) MCG/ACT inhaler Inhale 2 puffs into the lungs every 6 hours as needed for Wheezing      albuterol (PROVENTIL) (2.5 MG/3ML) 0.083% nebulizer solution Take 2.5 mg by nebulization every 6 hours as needed for Wheezing      aspirin 81 MG EC tablet Take 81 mg by mouth daily      losartan (COZAAR) 100 MG tablet Take 100 mg by mouth daily Historical Med      nitroGLYCERIN (NITROSTAT) 0.4 MG SL tablet Place 0.4 mg under the tongue every 5 minutes as needed for Chest pain      FLUoxetine (PROZAC) 20 MG capsule Take 40 mg by mouth daily Historical Med      furosemide (LASIX) 20 MG tablet Take 20 mg by mouth daily as needed (swelling) Historical Med      clopidogrel (PLAVIX) 75 MG tablet Take 75 mg by mouth daily      rOPINIRole (REQUIP) 0.25 MG tablet Take 0.25 mg by mouth nightly as needed Historical Med      budesonide-formoterol (SYMBICORT) 160-4.5 MCG/ACT AERO Inhale 2 puffs into the lungs 2 times daily. STOP taking these medications       dexamethasone 20 MG TABS Comments:   Reason for Stopping:         metoprolol tartrate (LOPRESSOR) 50 MG tablet Comments:   Reason for Stopping:               Activity: activity as tolerated    Diet: cardiac diet and diabetic diet    Follow-up:    Ana Armstrong MD  [address] [address] [phone]    In 1 week  post hospital admission    [facility] [address]  [place] [address]  [phone]    In 1 week  Immune thrombocytpenic purpura during hospital admission. Jimmie Henderson MD  [address] [place] [address],  [address]  [facility] [address] [facility]  [phone]    In 2 weeks        Patient Instructions: Follow with TSH, T4 and CBC as outpatient. Continue Asprin, plavix and statin. Continue Decadron 40 mg daily for 4 days. Follow with neurology,PCP and heme/onc. Follow up labs: Follow with TSH, T4 and CBC as outpatient.   Follow up

## 2021-07-25 NOTE — PROGRESS NOTES
[address] [facility]    ACUTE REHABILITATION OCCUPATIONAL THERAPY  DAILY NOTE    Date: 21  Patient Name: Valentine Abdul      Room: 4900/4905-12    MRN: [id number]   : 1945  (77 y.o.)  Gender: male   Referring Practitioner: Dr. Luis Miguel Pemberton  Diagnosis: CVA       Restrictions  Restrictions/Precautions: Up as Tolerated, General Precautions, Fall Risk, Swallowing - Thickened Liquids (1:1 sitter, Mildly thick (Nectar thick))  Other position/activity restrictions: Up w/assistance, RU/LE Weakness. MRI BRAIN- Acute infarct in the left and right basal ganglia greater on the left. Required Braces or Orthoses?: No    Subjective  Subjective: \"I lost my ability to walk. \"  pt was able to state quietly but clearly when OT asking orientation ? and asked why he is in rehab program.  when asked what caused this pt did not know and was ed re CVA. Orientation Level: Oriented to time;Oriented to person (pt was able to state it is July and was accurate with time of day using clock, able to state last name, unable to state place but picks hospital from choices vs [employer] or school. did not know change in mobility due to CVA.)          Objective  Cognition  Arousal/Alertness: Delayed responses to stimuli (eyes close when sleepy, becomes more difficult to attend)  Following Commands: Follows one step commands with increased time; Follows one step commands with repetition (75 %)  Attention Span: Attends with cues to redirect  Memory: Decreased recall of recent events;Decreased short term memory  Safety Judgement: Decreased awareness of need for assistance;Decreased awareness of need for safety  Problem Solving: Assistance required to correct errors made;Assistance required to identify errors made;Assistance required to generate solutions;Assistance required to implement solutions  Insights: Decreased awareness of deficits  Initiation: Requires cues for all  Sequencing: Requires cues for all  Cognition Comment: pt lacy some awareness of next steps, sitting with pants on over feet and asked what else he should do to get dressed- indicates pull over hips, attempted to spell his last name, inserted letter K then J for N in [facility] [address] without awareness. Perception  Overall Perceptual Status: Impaired (fair to poor rotation of garments during dressing tasks, poor spatial relations for moving BLE during transfers.)  Balance  Standing Balance: Minimal assistance  Transfers  Stand Pivot Transfers: 2 Person assistance (mod x 2 with hemiwalker)  Sit to stand: Dependent/Total;2 Person assistance (max x 2 from recliner, mod x 2 from w/c)  Stand to sit: Maximum assistance  Transfer Comments: poor spatial relations for moving BLE during transfers requiring much extra time and verbal and tactile cues (once to R and once to L- equally hard) fair balance standing (min of 1)  Standing Balance  Time: 4-6 min x 2  Activity: adls, static stand with karena walker on L  Comment: fair balance no R lean        Type of ROM/Therapeutic Exercise  Type of ROM/Therapeutic Exercise: PROM  Comment: RUE  Exercises  Shoulder Flexion: x  Elbow Flexion: x  Elbow Extension: x  Finger Extension: x                       ADL  Feeding: Maximum assistance; Thickened liquids (pt declined to eat during both am OT sessions. 1:1 was asked to let pt rest for 1 hour then attempt lunch again as pt ate no breakfast today.   at 11:30 OT attempted to place food in pt's mouth,kept mouth closed, also did not sip when straw placed)  Grooming: Maximum assistance (max A brush gums, when asked if he has dentures- states Yes, they are not in his room today)  UE Bathing: None  LE Bathing: None  UE Dressing: Maximum assistance (requested 1:1 ask family for more clothes and looser shirts)  LE Dressing: Dependent/Total (mod- max pants over feet, A x 2 stand and complete, TA teds, max L shoe, TA R shoe.)  Toileting: Dependent/Total (incontinent urine and assisted to clean and don fresh briefs prior to OT.)  Additional Comments: motor plan and decreased problem solving re new deficits and likely visual deficits impact adl performance but good participation with step by step directions. enlarged handle for silverware provided to 1:1 to attempt with encourage pt self feed. ed re methods to encourage this were explained. Assessment  Assessment: motor plan and decreased problem solving re new deficits and likely visual deficits impact adl performance but good participation with step by step directions. Activity Tolerance: Patient Tolerated treatment well;Treatment limited secondary to decreased cognition;Patient limited by fatigue  Activity Tolerance: motor plan and decreased problem solving re new deficits and likely visual deficits impact adl performance but good participation with step by step directions. with exertion becomes max fatigued and less able to remain alert. was placed in recliner at end of 2nd am treatment to allow upright for eating and recline to nap.  reviewed with pt's 1:1 and RN recommend jeet BEE x 2 with nsg transfer. also ed 1:1, RN and PTA that some of pt's perceived decreased cooperation and even agitation may be at baseline cognition deficits. Type of devices:  (1:1 present)         07/25/21 1602 07/25/21 1604   OT Individual Minutes   Time In [id number]   Time Out 7165 9700   Minutes 52 39          Patient Education:  Patient Goals   Patient goals : \"I want to go home\"  Learner:patient  Method: demonstration and explanation       Outcome: acknowledged understanding  and needs reinforcement   OT Education  OT Education: Orientation;OT Role;ADL Adaptive Strategies;Transfer Training  Patient Education: \"I lost my ability to walk. \"  pt was able to state quietly but clearly when OT asking orientation ? and asked why he is in rehab program.  when asked what caused this pt did not know and was ed re CVA.     Plan  Plan  Times per week: 900/7  Times per day: Twice a day  Current Treatment Recommendations: Self-Care / ADL, Strengthening, ROM, Balance Training, Functional Mobility Training, Endurance Training, Neuromuscular Re-education, Cognitive Reorientation, Pain Management, Safety Education & Training, Patient/Caregiver Education & Training, Equipment Evaluation, Education, & procurement, Home Management Training, Cognitive/Perceptual Training  Plan Comment: 900 minutes/week for combined therapy of PT/OT/ST due to decreased tolerance to activity.   Patient Goals   Patient goals : \"I want to go home\"  Short term goals  Time Frame for Short term goals: By 10 days  Short term goal 1: Pt will complete upper body dressing/bathing with Min A and good attention to task  Short term goal 2: Pt will complete lower body dressing/bathing with max A and good safety with use of AE as needed  Short term goal 3: Pt will complete functional transfers during self care tasks with mod A x 1 and good safety  Short term goal 4: Pt will tolerate standing 4+ minutes during functional activity of choice with min A and good safety  Short term goal 5: Pt will participate in 30+ minutes of therapeutic exercises/functional activities to increase safety and independence with self care tasks  Short term goal 6: Pt will complete self feeding with min A and good attention to task  Long term goals  Time Frame for Long term goals : By discharge  Long term goal 1: Pt will complete upper body dressing with set-up assistance and good attention to task  Long term goal 2: Pt will complete lower body dressing/bathing with min A and good safety with use of AE as needed  Long term goal 3: Pt will complete functional transfers/mobility during self care tasks with min A and good safety  Long term goal 4: Pt will tolerate standing 8+ minutes during functional activity of choice with CGA and good safety  Long term goal 5: Pt will verbalize/demonstrate good understanding of adaptive equipment/adaptive strategies/durable medical equipment to increase safety and independence with self care and mobility  Long term goals 6: Pt will complete self feeding with set-up assistance and good attention to task  Long term goal 7: Vision to be further assessed       Electronically signed by Arianne Mason OT on 7/25/21 at 4:32 PM EDT

## 2021-07-25 NOTE — PLAN OF CARE
Problem: Infection:  Goal: Will remain free from infection  Description: Will remain free from infection  7/25/2021 0327 by Omar Washington RN  Outcome: Ongoing  Note: Patient displays no new signs of infection during this shift. Problem: Safety:  Goal: Free from accidental physical injury  Description: Free from accidental physical injury  7/25/2021 0327 by Omar Washington RN  Outcome: Ongoing  Note: Patient remains free of incidence/ injury. Bed remains in low position. 1:1 maintained this shift. Problem: Pain:  Goal: Patient's pain/discomfort is manageable  Description: Patient's pain/discomfort is manageable  7/25/2021 0327 by Omar Washington RN  Outcome: Ongoing  Note: Pt denies need for prn pain medication this shift. Problem: Skin Integrity:  Goal: Skin integrity will stabilize  Description: Skin integrity will stabilize  7/25/2021 0327 by Omar Washington RN  Outcome: Ongoing  Note: No new occurrence of skin breakdown noted during this shift.

## 2021-07-25 NOTE — PROGRESS NOTES
Physical Therapy  [address]  Acute Rehabilitation Physical Therapy Progress Note    Date: 21  Patient Name: Ceferino Peralta       Room: 1617/5042-27  MRN: [id number]   Account: [de-identified]   : 1945  (72 y.o.) Gender: male   Referring Practitioner: Dr. Joshua Bustos  Diagnosis: CVA  Past Medical History:  has a past medical history of Centrilobular emphysema ([facility] [address] COPD (chronic obstructive pulmonary disease) ([facility] [address] Depression, Diabetes mellitus ([facility] [address] Former smoker, Hyperlipidemia, Hypertension, Mixed restrictive and obstructive lung disease ([facility] [address] Need for pneumococcal vaccine, and Personal history of tobacco use. Past Surgical History:   has a past surgical history that includes Neck surgery; Toe amputation (Right, greater); and Cardiac surgery (2015). Additional Pertinent Hx: Ceferino Peralta is a 77 y.o. RHD male admitted to [facility] on 2021. On admit, exam significant for right-sided facial droop, right-sided weakness and severe dysarthria. Significant history with recurrent strokes, remote left temporal lobe ischemic infarct and remote bilateral occipital lobe infarcts . MRI shows Bilateral basal ganglia ischemic infarcts. Pt admitted to rehab unit on 21    Restrictions/Precautions  Restrictions/Precautions: Up as Tolerated;General Precautions; Fall Risk;Swallowing - Thickened Liquids (1:1 sitter, Mildly thick (Nectar thick))  Required Braces or Orthoses?: No  Position Activity Restriction  Other position/activity restrictions: Up w/assistance, RU/LE Weakness. MRI BRAIN- Acute infarct in the left and right basal ganglia greater on the left. Subjective: Pt asleep on entry; 1:1 reports Pt was offered breakfast but returned to sleep. Pt appears to waken slightly when name is employed, continues to sleep. Pt alert but drowsy PM, eyes drifting closed several times, 50% response to name/touch. Comments: Blinds opened, lights & TV turned on to waken Pt.  Further attempted passive ROM with Pt using elbow to nudge writer away, eyes subsequently open. Assisted Pt to side of bed, attempted to help Pt eat, Pt pushing fork away. Returned Pt to supine for clean-up/brief change, then Pt assisted to recliner. Vital Signs  Patient Currently in Pain: Denies (Shakes head no in PM)    Patient Observation  Observations: Soft-spoken & sometimes difficult to hear; fair/good response to cues for exercise & ambulation    Bed Mobility   Rolling: Rolling Left;Rolling Right;Minimal assistance (Assisting L LE to hooklying to roll R)  Supine to Sit: Moderate assistance (x2)  Sit to Supine: Moderate assistance (x2)  Scootin Person assistance;Maximal assistance (scooting to [facility]; Pt followed cue to cross UEs)  Comment: Bed flat, rails, 1 pillow. Transfers  Sit to Stand: 2 Person Assistance; Moderate Assistance (Hemiwalker & walker lift)  Stand to sit: 2 Person Assistance;Minimal Assistance (Hemiwalker & walker lift; able to hold himself up, reach tom)  Bed to Chair: Minimal assistance;2 Person Assistance (Hemiwalker for stand pivot)  Stand pivot transfers: Minimal assistance;2 Person Assistance (Hemiwalker)    Ambulation 1  Surface: level tile  Device: Hemiwalker  Assistance: Moderate assistance;2 Person assistance  Quality of Gait: Forward flexed posture. Short steps, improved with cues. Writer cueing for sequencing during stand pivot & some assist to advance R LE/encourage weight shift to L. Gait Deviations: Slow Anais  Distance: 3' AM & 5' PM  Comments: Slight loss of balance in PM, corrected with Min A from Dian Alcantara.       Ambulation 2  Surface - 2: level tile  Device 2:  (Green Walker Lift)  Other Apparatus 2: Right;Slider (wheelchair brought up on completion of walk)  Assistance 2: Maximum assistance;2 Person assistance  Quality of Gait 2: Pt demonstrates ability to initiate R swing, but lacks full ROM/requires assist. Initially poor weight shifting to L/strong R lean with big improvement after 2nd standing rest break, requiring little weight-shifting assist and improved L LE swing through without cue. Gait Deviations: Shuffles;Decreased head and trunk rotation;Decreased step height;Decreased step length; Slow Anais  Distance: 30'     Wheelchair Activities  Propulsion: Yes  Propulsion 1  Propulsion: Manual  Level: Level Tile  Method: LLE  Level of Assistance: Maximum assistance  Description/ Details: Requires assist to position L UE/hand appropriately. Pt demonstrates fair straight path & ability to maintain momentum. Max A for 2 90º turns. Fatigues quickly  Distance: 60'    Balance   Posture: Fair  Sitting - Static: Fair;+ (Edge of bed, posterior lean with fatigue, self-corrects)  Sitting - Dynamic: Fair (Edge of bed, no back or UE support, )  Standing - Static: Fair;- (hemiwalker)  Standing - Dynamic: Poor (hemiwalker)     Exercises  Other exercises?: Yes  Other exercises 1: Seated LE exercises, 15x each: 1# L LE with frequent verbal cues; active/assisted ROM R LE, orange (minimal) resistance band  Other exercises 2: Stand pivot transfers x2 with hemiwalker, Mod A x2  Other exercises 3: Supine passive ROM R UE to Pt tolerance (Guarded; eventually pushes writer away)  Other exercises 5: Rolling for pericare (demo's Fair understanding of cues)  Other exercises 6: Seated EOB, 10 min. (SBA-Min A; delayed but eventual response to cue to avoid striking head on opposite side bed rail 2º posterior lean (self-corrected with CGA). )    Activity Tolerance: Patient limited by cognitive status, Patient limited by endurance, Patient limited by fatigue (Slow moving c all activities; req's extra time to complete/comprehend)     PT Equipment Recommendations  Other: TBD    Current Treatment Recommendations: Strengthening, ROM, Balance Training, Functional Mobility Training, Gait Training, Endurance Training, Transfer Training, Safety Education & Training, Wheelchair Mobility Training, Neuromuscular Re-education, Cognitive Reorientation, Home Exercise Program, Patient/Caregiver Education & Training, Equipment Evaluation, Education, & procurement, Positioning, Stair training, Modalities (Will consider using modalities as needed for neuro re-edu.)    Conditions Requiring Skilled Therapeutic Intervention  Body structures, Functions, Activity limitations: Decreased functional mobility ; Decreased ROM; Decreased strength;Decreased balance;Decreased safe awareness;Decreased endurance;Decreased posture;Decreased cognition;Decreased fine motor control;Decreased coordination  Barriers to Learning: aphasia, cognitive deficits  REQUIRES PT FOLLOW UP: Yes  Discharge Recommendations: Patient would benefit from continued therapy after discharge;24 hour supervision or assist    Goals  Short term goals  Time Frame for Short term goals: 10 days  Short term goal 1: Pt able to roll side to side at min A   Short term goal 2: Pt able to perform supine>sit at min A   Short term goal 3: Pt able to perform sit to supine at mod A  Short term goal 4: Pt able to transfer at mod A   Short term goal 5: Pt able to propel w/c with L UE/L LE, distance fo 100 ft min A   Short term goal 6: Pt able to ambulate with appropriate device distance of 50 to 100 ft, min A x2  Long term goals  Time Frame for Long term goals : By DC  Long term goal 1: Pt able perform bed mobility mod-I  Long term goal 2:  Pt able to perform transfers at CGA/min A   Long term goal 3: Pt able to ambulate with appropriate device distance of 100 to 150 ft, min A  Long term goal 4: Pt able to go up and down 4 to 5 steps with 1 UE support at mod/max A   Long term goal 5: Pt able to propel w/c level surfaces distance fo 100 to 150 ft, SBA  Long term goal 6: Improve PASS score to at least 19/36 to improve overall function. Long term goal 7: Improve standing dynamic balance with assistive device to at least fair+ to reduce fall risk.         07/25/21 1006 07/25/21 1335   PT Individual Minutes   Time In 0355 0957   Time Out [id number]   [id number]     Electronically signed by Shari Chinchilla PTA on 7/25/21 at 4:07 PM EDT

## 2021-07-26 NOTE — PLAN OF CARE
Problem: Infection:  Goal: Will remain free from infection  Outcome: Ongoing     Problem: Safety:  Goal: Free from accidental physical injury  Outcome: Ongoing  Goal: Free from intentional harm  Outcome: Ongoing     Problem: Daily Care:  Goal: Daily care needs are met  Outcome: Ongoing     Problem: Pain:  Goal: Patient's pain/discomfort is manageable  Outcome: Ongoing     Problem: Falls - Risk of:  Goal: Will remain free from falls  Outcome: Ongoing  Goal: Absence of physical injury  Outcome: Ongoing     Problem: Musculor/Skeletal Functional Status  Goal: Highest potential functional level  Outcome: Ongoing  Goal: Absence of falls  Outcome: Ongoing

## 2021-07-26 NOTE — PROGRESS NOTES
[address]    ACUTE REHABILITATION OCCUPATIONAL THERAPY  DAILY NOTE    Date: 21  Patient Name: Milla Rowell      Room: 6490/8834-33    MRN: [id number]   : 1945  (72 y.o.)  Gender: male   Referring Practitioner: Dr. Lorena Lau  Diagnosis: Ischemic CVA with R dominant hemiplegia  Additional Pertinent Hx: Milla Rowell is a 77 y.o. RHD male admitted to [facility] on 2021. On admit, exam significant for right-sided facial droop, right-sided weakness and severe dysarthria. Significant history with recurrent strokes, remote left temporal lobe ischemic infarct and remote bilateral occipital lobe infarcts . MRI shows Bilateral basal ganglia ischemic infarcts. Pt admitted to rehab unit on 21    Restrictions  Restrictions/Precautions: Up as Tolerated, General Precautions, Fall Risk, Swallowing - Thickened Liquids (1:1 sitter, Mildly thick (Nectar thick))  Other position/activity restrictions: Up w/assistance, RU/LE Weakness. MRI BRAIN- Acute infarct in the left and right basal ganglia greater on the left. Required Braces or Orthoses?: No    Subjective  Subjective: \"No. Cause I had enough of you today\" Pt states when asked if he can sit at the edge of the bed. Comments: Pt demonsonstrated with agitation this AM stating \"get out of my face\" when offering assistance to sit EOB. Then stating \"why don't you shut up\" . Patient Currently in Pain: Other (comment) (pt does not respond)  Restrictions/Precautions: Up as Tolerated;General Precautions; Fall Risk;Swallowing - Thickened Liquids (1:1 sitter, Mildly thick (Nectar thick))  Overall Orientation Status: Impaired  Orientation Level: Unable to assess (pt does not answer )  Patient Observation  Observations: AM tx limited by pt agitation with TIPTON.         Objective  Perception  Overall Perceptual Status: Impaired  Unilateral Attention: Cues to attend to right side of body;Cues to maintain midline in sitting  Initiation: Cues to initiate tasks  Balance  Sitting Balance: Contact guard assistance (sitting EOB)  Standing Balance: Minimal assistance  Bed mobility  Supine to Sit: Moderate assistance  Transfers  Stand Pivot Transfers: Minimal assistance;2 Person assistance  Sit to stand: Minimal assistance;2 Person assistance (min X2 from EOB using foot bedrail to push off)  Stand to sit: Minimal assistance  Transfer Comments: VC for sequencing with RLE dragging noted. Pt initiates reaching back with L hand to sit  Standing Balance  Time: AM: ~1 min; PM; 30 sec X3  Activity: AM: stand pivot transfer to w/c w karena walker; PM: toileting  Comment: CGA X2 standing at karena walker; PM: max A + mod A standing for toilet transfer  Toilet Transfers  Toilet - Technique: Stand pivot  Equipment Used: Raised toilet seat with rails  Toilet Transfer: Maximum assistance;2 Person assistance (max A + mod A)  Toilet Transfers Comments: Verbal cues for safety and hand placement. Type of ROM/Therapeutic Exercise  Type of ROM/Therapeutic Exercise: PROM  Comment: TIPTON provided PROM for RUE X10 reps each for increased ROM and decreased risk for contracture. Pt tolerates with no reports of pain but closes his eyes throughout. decreased ROM noted for elbow extension  Exercises  Shoulder Flexion: x  Shoulder Extension: x  Elbow Flexion: x  Elbow Extension: x  Wrist Flexion: x  Wrist Extension: x  Finger Flexion: x  Finger Extension: x                       ADL  UE Bathing: None  LE Bathing: None  UE Dressing: Maximum assistance  LE Dressing: Maximum assistance  Toileting: Dependent/Total  Additional Comments: Pt lying in bed upon writer arrival and declines meds from nurse. Pt requires max encouragement and education for participation with agitation noted towards therapist. Miles Downey with increased following of commands from 1:1 in room.  TIPTON  facilitated pt in sitting EOB taking increaed time and cueing to allow for assistance from therapist. Next pt requries max A for donning socks, threading pants, donning [place] shirt. Min A x2 for standing at EOB with usman walker. Pt able to assist with pulling L side pants. After up in w/c pt initiates self feeding to drink liquids. Does not attempt other foods on plate. 1:1 in room assisting to encourage pt in self feeding at end of session. ; PM: toileting completed. Pt unable to go. Requires max A for LB clothing mgmt. Assessment  Performance deficits / Impairments: Decreased ADL status; Decreased functional mobility ; Decreased ROM; Decreased strength;Decreased safe awareness;Decreased cognition;Decreased endurance;Decreased sensation;Decreased balance;Decreased high-level IADLs;Decreased fine motor control;Decreased coordination  Assessment: motor plan and decreased problem solving re new deficits and likely visual deficits impact adl performance but fair participation with step by step directions. Prognosis: Fair  Discharge Recommendations: Patient would benefit from continued therapy after discharge;24 hour supervision or assist  Activity Tolerance: Treatment limited secondary to agitation  Safety Devices in place: Yes  Type of devices: All fall risk precautions in place; Left in chair;Call light within reach;Gait belt (1:1 in room with pt at end of session)  Restraints  Initially in place: No  Equipment Recommendations  Equipment Needed: Yes  Isi Monique: Usman-walker  Transfer Tub Bench: w/back       Patient Education: OT POC, safety with functional transfers, need for foot wear prior to standing, safety with toileting, need for daily ROM for decreased risk of contracture.    Patient Goals   Patient goals : \"I want to go home\"  Learner:patient  Method: explanation       Outcome: needs reinforcement        Plan  Plan  Times per week: 900/7  Times per day: Twice a day  Current Treatment Recommendations: Self-Care / ADL, Strengthening, ROM, Balance Training, Functional Mobility Training, Endurance Training, Neuromuscular Re-education, Cognitive Reorientation, Pain Management, Safety Education & Training, Patient/Caregiver Education & Training, Equipment Evaluation, Education, & procurement, Home Management Training, Cognitive/Perceptual Training  Plan Comment: 900 minutes/week for combined therapy of PT/OT/ST due to decreased tolerance to activity.   Patient Goals   Patient goals : \"I want to go home\"  Short term goals  Time Frame for Short term goals: By 10 days  Short term goal 1: Pt will complete upper body dressing/bathing with Min A and good attention to task  Short term goal 2: Pt will complete lower body dressing/bathing with max A and good safety with use of AE as needed  Short term goal 3: Pt will complete functional transfers during self care tasks with mod A x 1 and good safety  Short term goal 4: Pt will tolerate standing 4+ minutes during functional activity of choice with min A and good safety  Short term goal 5: Pt will participate in 30+ minutes of therapeutic exercises/functional activities to increase safety and independence with self care tasks  Short term goal 6: Pt will complete self feeding with min A and good attention to task  Long term goals  Time Frame for Long term goals : By discharge  Long term goal 1: Pt will complete upper body dressing with set-up assistance and good attention to task  Long term goal 2: Pt will complete lower body dressing/bathing with min A and good safety with use of AE as needed  Long term goal 3: Pt will complete functional transfers/mobility during self care tasks with min A and good safety  Long term goal 4: Pt will tolerate standing 8+ minutes during functional activity of choice with CGA and good safety  Long term goal 5: Pt will verbalize/demonstrate good understanding of adaptive equipment/adaptive strategies/durable medical equipment to increase safety and independence with self care and mobility  Long term goals 6: Pt will complete self feeding with set-up assistance and good attention to task  Long term goal 7: Vision to be further assessed        07/26/21 1340 07/26/21 1341   OT Individual Minutes   Time In 0845 1302   Time Out 0929 1335   Minutes 44 33     Electronically signed by FLORIAN Hawk on 7/26/21 at 3:47 PM EDT

## 2021-07-26 NOTE — PATIENT CARE CONFERENCE
[address]   ACUTE REHABILITATION  TEAM CONFERENCE NOTE  Date: 21  Patient Name: Harvey Quezada       Room: 1300/1531-46  MRN: [id number]       : 1945  (72 y.o.)     Gender: male   Referring Practitioner: Dr Jt Farah   Acute CVA (cerebrovascular accident) [facility]) [I63.9]  Diagnosis: Ischemic CVA with R dominant hemiplegia     NURSING  Bladder  Incontinent Daily  Bowel   Frequently Incontinent  Date of Last BM: 2021  Intervention    Both Bowel & Bladder Program     Wounds/Incisions/Ulcers: No skin issues identified  Medication Education Program: Patient currently unable to manage medications and family being educated  Pain: no pain concerns to address    Fall Risk:  Falling star program initiated    PHYSICAL THERAPY  Bed mobility  Sit to Supine: Maximum assistance;2 Person assistance (Assist and trunk and B LE)  Scootin Person assistance;Maximal assistance (scooting to [facility]; Pt followed cue to cross UEs)    Transfers:  Sit to Stand: 2 Person Assistance; Moderate Assistance (Hemiwalker and walker lift)  Stand to sit: 2 Person Assistance;Minimal Assistance (walker lift and hemiwalker;able to hold himself up, reach back)  Bed to Chair: Minimal assistance;2 Person Assistance    Ambulation 1  Surface: level tile  Device: Hemiwalker  Assistance: Moderate assistance;2 Person assistance  Quality of Gait: Forward flexed posture. Short steps, improved with cues. Writer cueing for sequencing during stand pivot & some assist to advance R LE/encourage weight shift to L. Gait Deviations: Slow Anais;Decreased step height;Decreased step length;Shuffles; Deviated path;Decreased head and trunk rotation  Distance: 2' from toilet to wheelchair  Comments: Slight loss of balance in PM, corrected with Min A from [address] [place] [address]. Ambulation 2  Surface - 2: level tile  Device 2:  (Green Walker Lift)  Other Apparatus 2: Right;Slider; Wheelchair follow  Assistance 2: Maximum assistance;2 Person assistance  Quality of Gait 2: Pt demonstrates ability to initiate R swing, but lacks full ROM/requires assist. Initially poor weight shifting to L/strong R lean with big improvement after 2nd standing rest break, requiring little weight-shifting assist and improved L LE swing through without cue. Gait Deviations: Shuffles;Decreased head and trunk rotation;Decreased step height;Decreased step length; Slow Anais  Distance: 15' AM, 45' PM  Comments: Standing rest break in PM at 20'. Wheelchair Activities  Propulsion: Yes  Propulsion 1  Propulsion: Manual  Level: Level Tile  Method: LLE;LUE  Level of Assistance: Maximum assistance  Description/ Details: Requires assist to position L UE/hand appropriately. Pt demonstrates fair straight path & ability to maintain momentum. Max A for 2 90º turns. Fatigues quickly  Distance: 61'         Other: TBD    Pt present with R weakness, UE> LE, dysarthria, and increased tone R UE/LE. Pt also have cognitive deficts. Pt requires 2 person assist for transfers and ambulation. Will conitnue POC to address deficts. Goals  Time Frame for Short term goals: 10 days  Short term goal 1: Pt able to roll side to side at min A   Short term goal 2: Pt able to perform supine>sit at min A   Short term goal 3: Pt able to perform sit to supine at mod A  Short term goal 4: Pt able to transfer at mod A   Short term goal 5: Pt able to propel w/c with L UE/L LE, distance fo 100 ft min A   Short term goal 6: Pt able to ambulate with appropriate device distance of 50 to 100 ft, min A x2    OCCUPATIONAL THERAPY  SELF CARE      Eating            Maximum assistance; Thickened liquids (pt declined to eat during both am OT sessions. 1:1 was asked to let pt rest for 1 hour then attempt lunch again as pt ate no breakfast today.   at 11:30 OT attempted to place food in pt's mouth,kept mouth closed, also did not sip when straw placed)   Oral Hygiene            Maximum assistance (max A brush gums, when asked if he has dentures- states Yes, they are not in his room today)   Shower/Bathe Self             UE Bathing: None  LE Bathing: None   Upper Body Dressing            Maximum assistance   Lower Body Dressing            Putting On/Taking Off Footwear             Maximum assistance   Toilet Transfer             Toilet - Technique: Stand pivot  Equipment Used: Raised toilet seat with rails  Toilet Transfer: Maximum assistance;2 Person assistance (max A + mod A)  Toilet Transfers Comments: Verbal cues for safety and hand placement. Toileting Hygiene            Dependent/Total    Bed mobility  Supine to Sit: Moderate assistance      Balance  Sitting Balance: Contact guard assistance (sitting EOB)  Standing Balance: Minimal assistance  Standing Balance  Time: AM: ~1 min; PM; 30 sec X3  Activity: AM: stand pivot transfer to w/c w usman walker; PM: toileting  Comment: CGA X2 standing at usman walker; PM: max A + mod A standing for toilet transfer    Equipment Recommendations  Equipment Needed: Yes  Rita Bile: Usman-walker  Transfer Tub Bench: w/back  Assessment: motor plan and decreased problem solving re new deficits and likely visual deficits impact adl performance but fair participation with step by step directions.     Short term goals  Time Frame for Short term goals: By 10 days  Short term goal 1: Pt will complete upper body dressing/bathing with Min A and good attention to task  Short term goal 2: Pt will complete lower body dressing/bathing with max A and good safety with use of AE as needed  Short term goal 3: Pt will complete functional transfers during self care tasks with mod A x 1 and good safety  Short term goal 4: Pt will tolerate standing 4+ minutes during functional activity of choice with min A and good safety  Short term goal 5: Pt will participate in 30+ minutes of therapeutic exercises/functional activities to increase safety and independence with self care tasks  Short term goal 6: Pt will complete self feeding with min A and good attention to task      [address] A for comprehension, Max A for expression (dysarthria), Max A for problem solving and memory  Orientation Log (O-Log) Score: 13/30  Diet: Puree diet with mildly thick liquids   Short Term Goal: Supervision for comprehension, Mod A for expression, problem solving, and memory, tolerate LRD      NUTRITION  Weight: 214 lb 4.6 oz (97.2 kg) / Body mass index is 30.75 kg/m². Diet Rx: Dysphagia Pureed, Mildly Thick Liquids. Magic Cup twice daily, mildly thick Glucerna x 1 daily. PO intake is poor with refusal of meals at times, average appears to be 1-25% of meals. Will continue to monitor. Ref. Range 7/25/2021 06:22 7/25/2021 10:37 7/25/2021 16:09 7/25/2021 22:05 7/26/2021 06:37 7/26/2021 11:09   POC Glucose Latest Ref Range: 75 - 110 mg/dL 105 117 (H) 138 (H) 143 (H) 120 (H) 173 (H)   Please see nutrition note for details. SOCIAL WORK ASSESSMENT  Assessment:Pt was driving and living independently prior to hospital admission. He lived alone and had limited support system. Pt was not able to answer most questions and deferred to his sister Wild Rolon. She stated the family would be able to provide some support at discharge but is would be limited. Pt has 2 daughters; 1 is in a \"facility\" due to physical and cognitive disabilities and the other is in [place] and estranged from him. 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 List of both home health and SNF provided.               Pre-Admission Status:  Lives With: Alone  Type of Home: House  Home Layout: One level  Home Access: Stairs to enter with rails  Entrance Stairs - Number of Steps: 4  Entrance Stairs - Rails: Left  Bathroom Shower/Tub: Tub/Shower unit, Curtain  Bathroom Toilet: Standard  Bathroom Equipment: Hand-held shower  Bathroom Accessibility: Accessible  Home Equipment: [employer]  ADL Assistance: Independent  Homemaking Assistance: Independent  Homemaking Responsibilities: Yes  Ambulation Assistance: Independent (Cane if needed with back pain)  Transfer Assistance: Independent  Active : Yes  Mode of Transportation: Car  Occupation: Retired  Type of occupation: [employer]  IADL Comments: Pt sleeps in an adjustable bed  Additional Comments: Pt has a sister who is retired and can assist some. Family Education: Need to make contact with family to initiate education    Percentage Risk for Readmission: Low 0 - 18%   Readmission Risk              Risk of Unplanned Readmission:  16       %    Critical Items: None     Problem / Barrier Intervention / Plan  Results   Impaired function related to deficits from CVA ROM, strengthening, functional mobility training, assess appropriate assistive device needed.     Steps to enter/exit home Strengthening, balance ex's progress to steps, will need further family training.     impaired ability to care for self realted to CVA  Training in modified care techniques and use of devices tor self care techniques       Cognitive impairment   Cognitive retraining exercises       Dysphagia   Oral motor exercises/laryngeal strengthening exercises       Dysarthria   Oral motor exercises, compensatory strategies, articulation drills                     Total Self Care Score    Total Mobility Score  Admission Score:  9      Admission Score:  17  Goal:  27/42         Goal:  47/90   `  Discharge Plan   Estimated Discharge Date: 8/6/21  Home evaluation needed?  Home Evaluation Indication (NO, Requires ReEval, YES/Date): No home evaluation need indicated for patient at this time  Overnight or Day Pass: No  Factors facilitating achievement of predicted outcomes: Cooperative  Barriers to the achievement of predicted outcomes: Cognitive deficit, Limited participation, Communication deficit, Upper extremity weakness, Lower extremity weakness, Skin Care and Medication managment    Functional Goals at discharge:  Predicted Outcome: [address] ASSISTANCE: Minimal Assistance and Moderate Assistance  Discharge therapy goals:  PT: Long term goals  Time Frame for Long term goals : By DC  Long term goal 1: Pt able perform bed mobility mod-I  Long term goal 2:  Pt able to perform transfers at CGA/min A   Long term goal 3: Pt able to ambulate with appropriate device distance of 100 to 150 ft, min A  Long term goal 4: Pt able to go up and down 4 to 5 steps with 1 UE support at mod/max A   Long term goal 5: Pt able to propel w/c level surfaces distance fo 100 to 150 ft, SBA  Long term goal 6: Improve PASS score to at least 19/36 to improve overall function. Long term goal 7: Improve standing dynamic balance with assistive device to at least fair+ to reduce fall risk. OT:Long term goals  Time Frame for Long term goals : By discharge  Long term goal 1: Pt will complete upper body dressing with set-up assistance and good attention to task  Long term goal 2: Pt will complete lower body dressing/bathing with min A and good safety with use of AE as needed  Long term goal 3: Pt will complete functional transfers/mobility during self care tasks with min A and good safety  Long term goal 4: Pt will tolerate standing 8+ minutes during functional activity of choice with CGA and good safety  Long term goal 5: Pt will verbalize/demonstrate good understanding of adaptive equipment/adaptive strategies/durable medical equipment to increase safety and independence with self care and mobility  Long term goals 6: Pt will complete self feeding with set-up assistance and good attention to task  Long term goal 7: Vision to be further assessed  ST: Long term goals  Long term goal 1: Mod I for comprehension  Long term goal 2: Min A for expression  Long term goal 3: Min A for problem solving  Long term goal 4: Min A for memory  Long term goal 5:  Tolerate least restrictive diet     Team Members Present at Conference:  :  Willie Gallardo [place]  Occupational Therapist: Phuc Casey Migdalia Eid OT   Physical Therapist:Ashley Canela Self PT  Speech Therapist: Silvio Treadwell M.A., [address] [place]  Nurse: Sergei Guillen RN   Dietary/Nutrition: Dorota Barbour RD, LD  Pastoral Care: Jayshree Christina  CMG: Rita Silver RN    I approve the established interdisciplinary plan of care as documented within the medical record of Ceferino Peralta.     Krista Cote MD

## 2021-07-26 NOTE — PLAN OF CARE
Nutrition Problem #1: Predicted inadequate energy intake  Intervention: Food and/or Nutrient Delivery: Continue Current Diet, Modify Oral Nutrition Supplement  Nutritional Goals: po intake greater than 50%

## 2021-07-26 NOTE — PROGRESS NOTES
[facility] hospital Internal Medicine    CONSULTATION / HISTORY AND PHYSICAL EXAMINATION            Date:   7/26/2021  Patient name:  Gayla Mojica  Date of admission:  7/20/2021  6:49 PM  MRN:   [id number]  Account:  [de-identified]  YOB: 1945  PCP:    No primary care provider on file.   Room:   [address] [place] [address]  Code Status:    Full Code    Physician Requesting Consult: Flor Tatum MD    Reason for Consult:  medical management    Chief Complaint:       Right hemipareisi  History Obtained From:     Patient medical record nursing staff    History of Present Illness:   History is extremely limited, patient was extremely agitated overnight, was given Ativan around 2 AM in the night, patient very sleepy, not answering questions  Most of history is retrieved from E HR  Patient was recently admitted to [facility] with right-sided weakness, right-sided facial weakness, speech difficulties  Patient underwent MRI brain, concerning for acute infarct in left and right basal ganglia  CT head and neck, was concerning for severe stenosis in the proximal A3 segment of RAHEEM bilaterally  During hospital stay, patient had thrombocytopenia, was evaluated by hematologist, getting treated with IV steroids, and lines of immune thrombocytopenia      Past Medical History:     Past Medical History:   Diagnosis Date    Centrilobular emphysema ([address]     COPD (chronic obstructive pulmonary disease) ([address]     Depression     Diabetes mellitus ([address]     pt refuses to take previously prescribed metformin (states PCP aware)    Former smoker     Hyperlipidemia     on 4/27/18 pt states \"I stopped taking that about a year ago\"    Hypertension     Mixed restrictive and obstructive lung disease ([address]     Need for pneumococcal vaccine     Personal history of tobacco use         Past Surgical History:     Past Surgical History:   Procedure Laterality Date    CARDIAC SURGERY  07/2015    1 stent    NECK SURGERY      TOE AMPUTATION Right greater        Medications Prior to Admission:     Prior to Admission medications    Medication Sig Start Date End Date Taking?  Authorizing Provider   Heparin Sodium, Porcine, (HEPARIN, PORCINE,) 5000 UNIT/ML injection Inject 1 mL into the skin every 8 hours 7/20/21   Elsie Walter MD   QUEtiapine (SEROQUEL) 25 MG tablet Take 1 tablet by mouth nightly as needed for Agitation 7/20/21 7/25/21  Elsie Walter MD   methylPREDNISolone sodium (SOLU-MEDROL) 40 MG injection Infuse 1 mL intravenously every 12 hours 7/20/21   Elsie Walter MD   melatonin 3 MG TABS tablet Take 1 tablet by mouth nightly as needed (insomnia) 7/20/21   Elsie Walter MD   atorvastatin (LIPITOR) 80 MG tablet Take 0.5 tablets by mouth daily (Pt takes one-half of an 80mg tab = 40mg) 7/16/21   Elsie Walter MD   tiZANidine (ZANAFLEX) 2 MG tablet Take 1 tablet by mouth 4 times daily as needed (muscle spasms) 5/7/21   JOLIE Gregg CNP   naproxen (NAPROSYN) 500 MG tablet Take 1 tablet by mouth 2 times daily (with meals) 1/4/21   Sylvester Steen PA-C   ibuprofen (ADVIL;MOTRIN) 800 MG tablet Take 1 tablet by mouth every 8 hours as needed for Pain 6/2/20   Kimberly Wray MD   lidocaine (LIDODERM) 5 % Place 1 patch onto the skin daily 12 hours on, 12 hours off. 6/2/20   Kimberly Wray MD   metoprolol succinate (TOPROL XL) 50 MG extended release tablet Take 50 mg by mouth daily    Historical Provider, MD   tiotropium (SPIRIVA RESPIMAT) 2.5 MCG/ACT AERS inhaler Inhale 2 puffs into the lungs daily    Historical Provider, MD   carboxymethylcellulose 1 % ophthalmic solution Place 1 drop into both eyes 3 times daily as needed for Dry Eyes     Historical Provider, MD   ketotifen (ZADITOR) 0.025 % ophthalmic solution Place 1 drop into both eyes 2 times daily as needed (itchy eyes)     Historical Provider, MD   isosorbide mononitrate (IMDUR) 60 MG extended release tablet use.    Family History:     No family history on file. Review of Systems:   Cannot be done because of patient condition    Physical Exam:     /86   Pulse 58   Temp 97.9 °F (36.6 °C) (Oral)   Resp 16   Ht 5' 10\" (1.778 m)   Wt 214 lb 4.6 oz (97.2 kg)   SpO2 99%   BMI 30.75 kg/m²   Temp (24hrs), Av.7 °F (36.5 °C), Min:97.5 °F (36.4 °C), Max:97.9 °F (36.6 °C)    Recent Labs     21  2205 21  0637 21  1109 21  1606   POCGLU 143* 120* 173* 132*       Intake/Output Summary (Last 24 hours) at 2021 1741  Last data filed at 2021 0930  Gross per 24 hour   Intake 400 ml   Output --   Net 400 ml       General Appearance: Very sleepy, not answering questions  Mental status: Not oriented to person, place, and time with normal affect  Head:  normocephalic, atraumatic. Eye: no icterus, redness, pupils equal and reactive, extraocular eye movements intact, conjunctiva clear  Ear: normal external ear, no discharge, hearing intact  Nose:  no drainage noted  Mouth: mucous membranes moist  Neck: supple, no carotid bruits, thyroid not palpable  Lungs: Bilateral equal air entry, clear to ausculation, no wheezing, rales or rhonchi, normal effort  Cardiovascular: normal rate, regular rhythm, no murmur, gallop, rub.   Abdomen: Soft, nontender, nondistended, normal bowel sounds, no hepatomegaly or splenomegaly  Neurologic: Weakness in right upper and lower extremity, speech difficulties, right sided facial weakness  Skin: No gross lesions, rashes, bruising or bleeding on exposed skin area  Extremities:  peripheral pulses palpable, no pedal edema or calf pain with palpation    Investigations:      Laboratory Testing:  Recent Results (from the past 24 hour(s))   POC Glucose Fingerstick    Collection Time: 21 10:05 PM   Result Value Ref Range    POC Glucose 143 (H) 75 - 110 mg/dL   POC Glucose Fingerstick    Collection Time: 21  6:37 AM   Result Value Ref Range    POC Glucose 120 (H) 75 - 110 mg/dL   CBC Auto Differential    Collection Time: 07/26/21  7:15 AM   Result Value Ref Range    WBC 14.0 (H) 3.5 - 11.0 k/uL    RBC 5.01 4.5 - 5.9 m/uL    Hemoglobin 12.0 (L) 13.5 - 17.5 g/dL    Hematocrit 38.9 (L) 41 - 53 %    MCV 77.7 (L) 80 - 100 fL    MCH 24.0 (L) 26 - 34 pg    MCHC 30.9 (L) 31 - 37 g/dL    RDW 17.8 (H) 11.5 - 14.9 %    Platelets 040 (L) 535 - 450 k/uL    MPV 11.9 6.0 - 12.0 fL    NRBC Automated NOT REPORTED per 100 WBC    Differential Type NOT REPORTED     Immature Granulocytes NOT REPORTED 0 %    Absolute Immature Granulocyte NOT REPORTED 0.00 - 0.30 k/uL    WBC Morphology NOT REPORTED     RBC Morphology NOT REPORTED     Platelet Estimate NOT REPORTED     Seg Neutrophils 80 (H) 36 - 66 %    Lymphocytes 12 (L) 24 - 44 %    Monocytes 7 1 - 7 %    Eosinophils % 0 0 - 4 %    Basophils 1 0 - 2 %    Segs Absolute 11.20 (H) 1.3 - 9.1 k/uL    Absolute Lymph # 1.68 1.0 - 4.8 k/uL    Absolute Mono # 0.98 0.1 - 1.3 k/uL    Absolute Eos # 0.00 0.0 - 0.4 k/uL    Basophils Absolute 0.14 0.0 - 0.2 k/uL    Morphology ANISOCYTOSIS PRESENT     Morphology FEW ECHINOCYTES     Morphology FEW GIANT PLATELETS     Morphology FEW ELLIPTOCYTES    POC Glucose Fingerstick    Collection Time: 07/26/21 11:09 AM   Result Value Ref Range    POC Glucose 173 (H) 75 - 110 mg/dL   POC Glucose Fingerstick    Collection Time: 07/26/21  4:06 PM   Result Value Ref Range    POC Glucose 132 (H) 75 - 110 mg/dL           Consultations:   IP CONSULT TO DIETITIAN  IP CONSULT TO SOCIAL WORK  IP CONSULT TO INTERNAL MEDICINE  IP CONSULT TO ONCOLOGY  IP CONSULT TO INTERNAL MEDICINE  Assessment :      Primary Problem  <principal problem not specified>    Active Hospital Problems    Diagnosis Date Noted    Acute CVA (cerebrovascular accident) ([facility] [I63.9] 07/20/2021    Acute idiopathic thrombocytopenic purpura (HCC) [D69.3] 07/16/2021    CAD S/P percutaneous coronary angioplasty [I25.10, Z98.61] 07/13/2021    Cerebrovascular accident (CVA) ([facility] [I63.9] 07/13/2021    HTN (hypertension) Tg Butler 07/13/2021    Hyperlipidemia [E78.5] 07/13/2021    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 Acute ischemic stroke, patient is on aspirin, Plavix, Crestor  2. 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 On DVT prophylaxis with heparin  4. Hypertension, controlled  5. Diabetes, controlled  1 episode of bradycardia, will follow, may be due to benzodiazepine . 6. Incidental finding of thyroid nodule on CTA head and neck, will need outpatient ultrasound thyroid and biopsy  Dysphagia diet  Right hemipareis with speech dif  htn improved    augmentin for sinusitis  Prednisone for ? ITP  Flat affect depression on prozac        Qi Robbins MD  7/26/2021  5:41 PM    Copy sent to Dr. Gao primary care provider on file. Please note that this chart was generated using voice recognition Dragon dictation software. Although every effort was made to ensure the accuracy of this automated transcription, some errors in transcription may have occurred.

## 2021-07-26 NOTE — PROGRESS NOTES
[place]  Speech Language Pathology    Date: 7/26/2021  Patient Name: Cecy Crawford  YOB: 1945   AGE: 77 y.o. MRN: [id number]        Patient Not Available for Speech Therapy     Due to:  [] Testing  [] Hemodialysis  [] Cancelled by RN  [] Surgery   [] Intubation/Sedation/Pain Medication  [] Medical instability  [x] Other: Unable to awaken pt despite max verbal/tactile cues. Aide in room reports that pt \"was restless all night\". Next scheduled treatment: 7/27/21    Completed by:  Rafat West, NOAH, M.A., [id number] [facility]

## 2021-07-26 NOTE — PROGRESS NOTES
Physical Medicine & Rehabilitation  Progress Note      Subjective: Fahad Storm is a 77 y.o. male with ischemic CVA left PCA, RAHEEM, and MCA with right dominant hemiparesis. He reports not sleeping very well overnight. He does not answer any further questions. The 1:1 confirms that he was restless overnight and did not sleep much. He did participate in some therapy today. ROS:  Unable to assess    Rehabilitation:   Progressing in therapies. PT:  Restrictions/Precautions: Up as Tolerated, General Precautions, Fall Risk, Swallowing - Thickened Liquids (1:1 sitter, Mildly thick (Nectar thick))  Other position/activity restrictions: Up w/assistance, RU/LE Weakness. MRI BRAIN- Acute infarct in the left and right basal ganglia greater on the left. Transfers  Sit to Stand: 2 Person Assistance, Moderate Assistance (Hemiwalker and walker lift)  Stand to sit: 2 Person Assistance, Minimal Assistance (walker lift and hemiwalker;able to hold himself up, reach back)  Bed to Chair: Minimal assistance, 2 Person Assistance  Stand Pivot Transfers: Minimal Assistance (Hemiwalker)  Comment: stand pivot bed to chair with Hemiwalker at mod/max A x 2  Ambulation 1  Surface: level tile  Device: Hemiwalker  Other Apparatus: Wheelchair follow  Assistance: Moderate assistance, 2 Person assistance  Quality of Gait: Forward flexed posture. Short steps, improved with cues. Writer cueing for sequencing during stand pivot & some assist to advance R LE/encourage weight shift to L. Gait Deviations: Slow Anais, Decreased step height, Decreased step length, Shuffles, Deviated path, Decreased head and trunk rotation  Distance: 2' from toilet to wheelchair  Comments: Slight loss of balance in PM, corrected with Min A from Dian Alcantara.      Transfers  Sit to Stand: 2 Person Assistance, Moderate Assistance (Hemiwalker and walker lift)  Stand to sit: 2 Person Assistance, Minimal Assistance (walker lift and hemiwalker;able to hold himself up, reach back)  Bed to Chair: Minimal assistance, 2 Person Assistance  Stand Pivot Transfers: Minimal Assistance (Hemiwalker)  Comment: stand pivot bed to chair with Hemiwalker at mod/max A x 2  Ambulation  Ambulation?: Yes  More Ambulation?: Yes  Ambulation 1  Surface: level tile  Device: Hemiwalker  Other Apparatus: Wheelchair follow  Assistance: Moderate assistance, 2 Person assistance  Quality of Gait: Forward flexed posture. Short steps, improved with cues. Writer cueing for sequencing during stand pivot & some assist to advance R LE/encourage weight shift to L. Gait Deviations: Slow Anais, Decreased step height, Decreased step length, Shuffles, Deviated path, Decreased head and trunk rotation  Distance: 2' from toilet to wheelchair  Comments: Slight loss of balance in PM, corrected with Min A from Paolo Sarah. Surface: level tile  Ambulation 1  Surface: level tile  Device: Hemiwalker  Other Apparatus: Wheelchair follow  Assistance: Moderate assistance, 2 Person assistance  Quality of Gait: Forward flexed posture. Short steps, improved with cues. Writer cueing for sequencing during stand pivot & some assist to advance R LE/encourage weight shift to L. Gait Deviations: Slow Anais, Decreased step height, Decreased step length, Shuffles, Deviated path, Decreased head and trunk rotation  Distance: 2' from toilet to wheelchair  Comments: Slight loss of balance in PM, corrected with Min A from Paolo Tyrel. OT:  ADL  Feeding: Maximum assistance, Thickened liquids (pt declined to eat during both am OT sessions. 1:1 was asked to let pt rest for 1 hour then attempt lunch again as pt ate no breakfast today.   at 11:30 OT attempted to place food in pt's mouth,kept mouth closed, also did not sip when straw placed)  Grooming: Maximum assistance (max A brush gums, when asked if he has dentures- states Yes, they are not in his room today)  UE Bathing: None  LE Bathing: None  UE Dressing: Maximum assistance  LE Dressing: Maximum assistance  Toileting: Dependent/Total  Additional Comments: Pt lying in bed upon writer arrival and declines meds from nurse. Pt requires max encouragement and education for participation with agitation noted towards therapist. Mohsen Ibarra with increased following of commands from 1:1 in room. TIPTON  facilitated pt in sitting EOB takingincreaed time and cueing to allow for assistance from therapist. Next pt requries max A for donning socks, threading pants, donning [place] shirt. min A x2 for standing at EOB with karena walker. Pt able to assist with pulling L side pants. After up in w/c pt initiates self feeding to drink liquids. does not attempt other foods on plate. 1:1 in room assisting to encourage pt in self feeding at end of session. ; PM: toileting completed. Pt unable to go. Requires max A for LB clothing mgmt. Balance  Sitting Balance: Contact guard assistance (sitting EOB)  Standing Balance: Minimal assistance   Standing Balance  Time: AM: ~1 min; PM; 30 sec X3  Activity: AM: stand pivot transfer to w/c w karena walker; PM: toileting  Comment: CGA X2 standing at karena walker; PM: max A + mod A standing for toilet transfer        Bed mobility  Rolling to Left: Maximum assistance  Rolling to Right: Moderate assistance  Supine to Sit: Moderate assistance  Sit to Supine: Maximum assistance, 2 Person assistance (Assist and trunk and B LE)  Scootin Person assistance, Maximal assistance (scooting to [facility]; Pt followed cue to cross UEs)  Comment: Assistance with RLE and trunk  Transfers  Stand Pivot Transfers: Minimal assistance, 2 Person assistance  Sit to stand: Minimal assistance, 2 Person assistance (min X2 from EOB using foot bedrail to push off)  Stand to sit: Minimal assistance  Transfer Comments: VC for sequencing with RLE dragging noted.  Pt initiates reaching back with L hand to sit   Toilet Transfers  Toilet - Technique: Stand pivot  Equipment Used: Raised toilet seat with rails  Toilet Transfer: Maximum assistance, 2 Person assistance (max A + mod A)  Toilet Transfers Comments: Verbal cues for safety and hand placement.               SPEECH:      Current Medications:   Current Facility-Administered Medications: melatonin tablet 6 mg, 6 mg, Oral, Nightly  losartan (COZAAR) tablet 100 mg, 100 mg, Oral, Daily  rosuvastatin (CRESTOR) tablet 40 mg, 40 mg, Oral, Nightly  famotidine (PEPCID) tablet 20 mg, 20 mg, Oral, BID  predniSONE (DELTASONE) tablet 40 mg, 40 mg, Oral, Daily  amoxicillin-clavulanate (AUGMENTIN) 875-125 MG per tablet 1 tablet, 1 tablet, Oral, 2 times per day  albuterol sulfate  (90 Base) MCG/ACT inhaler 2 puff, 2 puff, Inhalation, Q6H PRN  aspirin chewable tablet 81 mg, 81 mg, Oral, Daily  clopidogrel (PLAVIX) tablet 75 mg, 75 mg, Oral, Daily  finasteride (PROSCAR) tablet 5 mg, 5 mg, Oral, Daily  FLUoxetine (PROZAC) capsule 40 mg, 40 mg, Oral, Daily  heparin (porcine) injection 5,000 Units, 5,000 Units, Subcutaneous, 3 times per day  hydrALAZINE (APRESOLINE) tablet 10 mg, 10 mg, Oral, 3 times per day  isosorbide mononitrate (IMDUR) extended release tablet 30 mg, 30 mg, Oral, Daily  rOPINIRole (REQUIP) tablet 0.25 mg, 0.25 mg, Oral, Nightly  tamsulosin (FLOMAX) capsule 0.4 mg, 0.4 mg, Oral, Daily  tiotropium (SPIRIVA RESPIMAT) 2.5 MCG/ACT inhaler 2 puff, 2 puff, Inhalation, Daily  acetaminophen (TYLENOL) tablet 650 mg, 650 mg, Oral, Q4H PRN  polyethylene glycol (GLYCOLAX) packet 17 g, 17 g, Oral, Daily  senna (SENOKOT) tablet 17.2 mg, 2 tablet, Oral, Daily PRN  bisacodyl (DULCOLAX) suppository 10 mg, 10 mg, Rectal, Daily PRN  insulin lispro (HUMALOG) injection vial 0-6 Units, 0-6 Units, Subcutaneous, TID WC  insulin lispro (HUMALOG) injection vial 0-3 Units, 0-3 Units, Subcutaneous, Nightly  glucose (GLUTOSE) 40 % oral gel 15 g, 15 g, Oral, PRN  dextrose 50 % IV solution, 12.5 g, Intravenous, PRN  glucagon (rDNA) injection 1 mg, 1 mg, Intramuscular, PRN  dextrose 5 % solution, 100 mL/hr, Intravenous, PRN      Objective:  BP (!) 153/101   Pulse 66   Temp 97.3 °F (36.3 °C) (Oral)   Resp 18   Ht 5' 10\" (1.778 m)   Wt 214 lb 4.6 oz (97.2 kg)   SpO2 100%   BMI 30.75 kg/m²       GEN: Well developed, well nourished, no acute distress  HEENT: NCAT. Keeps eyes closed throughout most of evaluation. Hearing grossly intact. Mucous membranes pink and moist.  RESP: Normal breath sounds with no wheezing, rales, or rhonchi. Respirations WNL and unlabored. CV: Regular rate and rhythm. No murmurs, rubs, or gallops. ABD: Soft, non-distended, BS+ and equal.  NEURO:  Lethargic. Not answering questions. Mild spasticity noted in right upper limb. MSK:  Muscle bulk is normal bilaterally. No movement noted in right upper limb. Minimally moves toes bilaterally. LIMBS: No edema in bilateral lower limbs. SKIN: Warm and dry with good turgor. PSYCH: Flat affect. Diagnostics:     CBC:   Recent Labs     07/26/21  0715   WBC 14.0*   RBC 5.01   HGB 12.0*   HCT 38.9*   MCV 77.7*   RDW 17.8*   *     BMP: No results for input(s): NA, K, CL, CO2, PHOS, BUN, CREATININE, GLUCOSE in the last 72 hours. Invalid input(s): CA  BNP: No results for input(s): BNP in the last 72 hours. PT/INR: No results for input(s): PROTIME, INR in the last 72 hours. APTT: No results for input(s): APTT in the last 72 hours. CARDIAC ENZYMES: No results for input(s): CKMB, CKMBINDEX, TROPONINT in the last 72 hours. Invalid input(s): CKTOTAL;3  FASTING LIPID PANEL:  Lab Results   Component Value Date    CHOL 186 07/14/2021    HDL 42 07/14/2021    TRIG 77 07/14/2021     LIVER PROFILE: No results for input(s): AST, ALT, ALB, BILIDIR, BILITOT, ALKPHOS in the last 72 hours. Impression/Plan:   Impaired ADLs, gait, and mobility due to:    1. Ischemic CVA with Right dominant hemiparesis: PT/OT for gait, mobility, strengthening, endurance, ADLs, and self care. On ASA, plavix, Crestor.  Can consider sleep/wake medication but patient is also refusing therapy/medications at times so will monitor to determine if this is mood vs physiologic. 2. Agitation: Patient failed telesitter - resumed 1:1 on 7/25. On Seroquel prn. Has no sedating medicines on board to be causing drowsiness. Borderline QTc.   3. Sinusitis: On Augmentin until 7/28 per IM. 4. Insomnia: Scheduled melatonin started on 7/25  5. Thrombocytopenia: Hematology following. On prednisone. Platelets improved, OK for DVT prophylaxis  6. Leukocytosis: Stable. Being attributed to prednisone. Will monitor. 7. HTN/CAD: On hydralazine, Imdur, losartan  8. DM II: On humalog sliding scale  9. COPD:  On tiotropium. Has albuterol prn. 10. Restless Leg Syndrome: On ropinirole  11. Depression: On fluoxetine. 12. GERD: On famotidine BID  13. BPH: On finasteride, tamsulosin  14. Thyroid nodule: For outpatient monitoring  15. Bowel Management: Miralax daily, Senokot prn, Dulcolax prn  16. Internal medicine for medical management  17.  DVT prophylaxis:  On heparin      Electronically signed by Shaun Youssef MD on 7/26/2021 at 9:54 PM

## 2021-07-26 NOTE — PROGRESS NOTES
Physical Therapy  Facility/Department: [facility] ACUTE REHAB  Daily Treatment Note  NAME: Lorena Cohen  : 1945  MRN: [id number]    Date of Service: 2021    Discharge Recommendations:  Patient would benefit from continued therapy after discharge, 24 hour supervision or assist   PT Equipment Recommendations  Other: TBD    Assessment   Body structures, Functions, Activity limitations: Decreased functional mobility ; Decreased ROM; Decreased strength;Decreased balance;Decreased safe awareness;Decreased endurance;Decreased posture;Decreased cognition;Decreased fine motor control;Decreased coordination  Barriers to Learning: aphasia, cognitive deficits  REQUIRES PT FOLLOW UP: Yes  Activity Tolerance  Activity Tolerance: Patient limited by cognitive status; Patient limited by endurance; Patient limited by fatigue (Slow moving c all activities; req's extra time to complete/comprehend)     Patient Diagnosis(es): There were no encounter diagnoses. has a past medical history of Centrilobular emphysema ([facility] [address] COPD (chronic obstructive pulmonary disease) ([facility] [address] Depression, Diabetes mellitus ([facility] [address] Former smoker, Hyperlipidemia, Hypertension, Mixed restrictive and obstructive lung disease ([facility] [address] Need for pneumococcal vaccine, and Personal history of tobacco use.   has a past surgical history that includes Neck surgery; Toe amputation (Right, greater); and Cardiac surgery (2015). Restrictions  Restrictions/Precautions  Restrictions/Precautions: Up as Tolerated, General Precautions, Fall Risk, Swallowing - Thickened Liquids (1:1 sitter, Mildly thick (Nectar thick))  Required Braces or Orthoses?: No  Position Activity Restriction  Other position/activity restrictions: Up w/assistance, RU/LE Weakness. MRI BRAIN- Acute infarct in the left and right basal ganglia greater on the left. Subjective   General  Additional Pertinent Hx: Lorena Cohen is a 77 y.o. RHD male admitted to [facility] on 2021.  On admit, exam significant for right-sided facial droop, right-sided weakness and severe dysarthria. Significant history with recurrent strokes, remote left temporal lobe ischemic infarct and remote bilateral occipital lobe infarcts . MRI shows Bilateral basal ganglia ischemic infarcts. Pt admitted to rehab unit on 21  Family / Caregiver Present: No  Referring Practitioner: Dr Tang Elder: 1:1 reports patient was restless last night. Pt closes eyes 25% of AM and PM session. Pain Screening  Patient Currently in Pain: Other (comment) (pt does not respond)  Vital Signs  Patient Currently in Pain: Other (comment) (pt does not respond)     Objective   Bed mobility  Sit to Supine: Maximum assistance;2 Person assistance (Assist and trunk and B LE)  Scootin Person assistance;Maximal assistance (scooting to [facility]; Pt followed cue to cross UEs)     Transfers  Sit to Stand: 2 Person Assistance; Moderate Assistance (Hemiwalker and walker lift)  Stand to sit: 2 Person Assistance;Minimal Assistance (walker lift and hemiwalker;able to hold himself up, reach back)  Bed to Chair: Minimal assistance;2 Person Assistance  Stand Pivot Transfers: Minimal Assistance (Hemiwalker)     Ambulation  Ambulation?: Yes  Ambulation 1  Surface: level tile  Device: Hemiwalker  Assistance: Moderate assistance;2 Person assistance  Quality of Gait: Forward flexed posture. Short steps, improved with cues. Writer cueing for sequencing during stand pivot & some assist to advance R LE/encourage weight shift to L. Gait Deviations: Slow Anais;Decreased step height;Decreased step length;Shuffles; Deviated path;Decreased head and trunk rotation  Distance: 2' from toilet to wheelchair    Ambulation 2  Surface - 2: level tile  Device 2:  (Green Walker Lift)  Other Apparatus 2: Right;Slider; Wheelchair follow  Assistance 2: Maximum assistance;2 Person assistance  Quality of Gait 2: Pt demonstrates ability to initiate R swing, but lacks full ROM/requires assist. Initially poor weight shifting to L/strong R lean with big improvement after 2nd standing rest break, requiring little weight-shifting assist and improved L LE swing through without cue. Gait Deviations: Shuffles;Decreased head and trunk rotation;Decreased step height;Decreased step length; Slow Anais  Distance: 15' AM, 45' PM  Comments: Standing rest break in PM at 20'. Stairs/Curb  Stairs?: No  Wheelchair Activities  Propulsion: Yes  Propulsion 1  Propulsion: Manual  Level: Level Tile  Method: LLE;LUE  Level of Assistance: Maximum assistance  Description/ Details: Requires assist to position L UE/hand appropriately. Pt demonstrates fair straight path & ability to maintain momentum. Max A for 2 90º turns. Fatigues quickly  Distance: 60'    Balance  Posture: Fair  Sitting - Static: Fair;+ (Edge of bed, posterior lean with fatigue, self-corrects)  Sitting - Dynamic: Fair (Edge of bed, no back or UE support,)  Standing - Static: Fair;- (hemiwalker)  Standing - Dynamic: Poor (hemiwalker)     Other exercises  Other exercises?: Yes  Other exercises 1: Seated LE exercises, 15x each: 1# L LE with frequent verbal cues; active/assisted ROM R LE, orange (minimal) resistance band  Other exercises 2: Stand pivot transfers x2 with hemiwalker, Mod A x2  Other exercises 3: Seated in wheelchair UBE (5\" fwd/3\" retro) (Gayatri-ModA to initiate activity (Ace wrap and dicem holding R hand on UBE handle). )  Other exercises 4: Toilet transfer x1      Goals  Short term goals  Time Frame for Short term goals: 10 days  Short term goal 1: Pt able to roll side to side at min A   Short term goal 2: Pt able to perform supine>sit at min A   Short term goal 3: Pt able to perform sit to supine at mod A  Short term goal 4: Pt able to transfer at mod A   Short term goal 5: Pt able to propel w/c with L UE/L LE, distance fo 100 ft min A   Short term goal 6: Pt able to ambulate with appropriate device distance of 50 to 100 ft, min A x2  Long term goals  Time Frame for Long term goals : By DC  Long term goal 1: Pt able perform bed mobility mod-I  Long term goal 2:  Pt able to perform transfers at CGA/min A   Long term goal 3: Pt able to ambulate with appropriate device distance of 100 to 150 ft, min A  Long term goal 4: Pt able to go up and down 4 to 5 steps with 1 UE support at mod/max A   Long term goal 5: Pt able to propel w/c level surfaces distance fo 100 to 150 ft, SBA  Long term goal 6: Improve PASS score to at least 19/36 to improve overall function. Long term goal 7: Improve standing dynamic balance with assistive device to at least fair+ to reduce fall risk. Patient Goals   Patient goals : GO home    Plan    Plan  Times per week: 900 minutes/week for combine dtherapy of PT/OT/ST due to decreased tolerance to activity.   Times per day: Daily  Current Treatment Recommendations: Strengthening, ROM, Balance Training, Functional Mobility Training, Gait Training, Endurance Training, Transfer Training, Safety Education & Training, Wheelchair Mobility Training, Neuromuscular Re-education, Cognitive Reorientation, Home Exercise Program, Patient/Caregiver Education & Training, Equipment Evaluation, Education, & procurement, Positioning, Stair training, Modalities  Safety Devices  Type of devices: Gait belt, Patient at risk for falls, Left in chair, Sitter present, All fall risk precautions in place  Restraints  Initially in place: No     Therapy Time     07/26/21 0935 07/26/21 1336   PT Individual Minutes   Time In 9[facility]   Time Out 1032 [id number]   Minutes 62 [facility]

## 2021-07-26 NOTE — PROGRESS NOTES
Comprehensive Nutrition Assessment    Type and Reason for Visit:  Reassess    Nutrition Recommendations/Plan: Continue current diet and Magic Cups. Add Mildly thick Glucerna x1 daily. Nutrition Assessment:  Staff member states pt only drank thickened soda and 1/2 of supplement at breakfast; refused lunch despite multiple attempts to feed pt. Overall PO intake seems to vary 0-50% of meals. Malnutrition Assessment:  Malnutrition Status: At risk for malnutrition (Comment)    Context:  Acute Illness     Findings of the 6 clinical characteristics of malnutrition:  Energy Intake:  7 - 50% or less of estimated energy requirements for 5 or more days  Weight Loss:  Unable to assess     Body Fat Loss:  Unable to assess     Muscle Mass Loss:  Unable to assess    Fluid Accumulation:  No significant fluid accumulation     Strength:  Not Performed    Estimated Daily Nutrient Needs:  Energy (kcal): [place] x 1.2= 2100 kcal; Weight Used for Energy Requirements:  Admission     Protein (g):  1.3-1.4 g/kg= 100-105 g; Weight Used for Protein Requirements:  Ideal          Nutrition Related Findings:  No edema. POC Glucose: 120, 173. Prednisone, insulin coverage. Other labs and meds reviewed. Wounds:  None       Current Nutrition Therapies:    ADULT DIET; Dysphagia - Pureed; Mildly Thick (Nectar)  Adult Oral Nutrition Supplement; Frozen Oral Supplement    Anthropometric Measures:  · Height: 5' 10\" (177.8 cm)  · Current Body Weight: 214 lb 4.6 oz (97.2 kg)   · Admission Body Weight: 212 lb (96.2 kg)    · Usual Body Weight: 230 lb (104.3 kg) (stated)     · Ideal Body Weight: 166 lbs  · BMI: 30.7  · BMI Categories: Obese Class 1 (BMI 30.0-34. 9)       Nutrition Diagnosis:   · Predicted inadequate energy intake related to  (current medical condition) as evidenced by poor intake prior to admission, intake 0-25%    Nutrition Interventions:   Food and/or Nutrient Delivery:  Continue Current Diet, Modify Oral Nutrition Supplement  Nutrition Education/Counseling:  No recommendation at this time   Coordination of Nutrition Care:  Continue to monitor while inpatient    Goals:  po intake greater than 50%       Nutrition Monitoring and Evaluation:   Behavioral-Environmental Outcomes:      Food/Nutrient Intake Outcomes:  Food and Nutrient Intake, Supplement Intake  Physical Signs/Symptoms Outcomes:  Biochemical Data, GI Status, Skin, Weight, Fluid Status or Edema     Discharge Planning: Too soon to determine     Some areas of assessment may be incomplete due to standard COVID-19 Precautions. Janna Benson R.D., L.D.   Phone: [phone]

## 2021-07-27 NOTE — PLAN OF CARE
Problem: Infection:  Goal: Will remain free from infection  Description: Will remain free from infection  7/27/2021 1544 by Samantha Hollingsworth RN  Outcome: Ongoing   Pt. Remains free of infection, skin assessment completed. Problem: Safety:  Goal: Free from accidental physical injury  Description: Free from accidental physical injury  7/27/2021 1544 by Samantha Hollingsworth RN  Outcome: Ongoing   Pt. Remains free of falls, appropriate fall precautions in place.

## 2021-07-27 NOTE — PROGRESS NOTES
Physical Medicine & Rehabilitation  Progress Note      Subjective: Harvey Quezada is a 77 y.o. male with ischemic CVA left PCA, RAHEEM, and MCA with right dominant hemiparesis. He is minimally interactive with interview and exam.  He denies any pain but does not answer any further questions. The 1:1 states that he slept through the night last night, per report. She states that he did not want to wake up for therapies today and has not been eating much. She notes that he was noted to be more interactive with family yesterday, per report. He is participating some with PT, less with OT, and the least with SLP. Discussed planning for discharge to SNF during team meeting today - tried to discuss with patient but he was not responding.  reached out to family with patient's consent. ROS:  Unable to assess    Rehabilitation:   Progressing in therapies. PT:  Restrictions/Precautions: Up as Tolerated, General Precautions, Fall Risk, Swallowing - Thickened Liquids (1:1 sitter, Mildly thick (Nectar thick))  Other position/activity restrictions: Up w/assistance, RU/LE Weakness. MRI BRAIN- Acute infarct in the left and right basal ganglia greater on the left. Transfers  Sit to Stand: 2 Person Assistance, Moderate Assistance (Hemiwalker & walker lift)  Stand to sit: 2 Person Assistance, Minimal Assistance (Hemiwalker & walker lift; able to hold himself up, reach back during use of walker lift)  Bed to Chair: Minimal assistance, 2 Person Assistance  Stand Pivot Transfers: Minimal Assistance (Hemiwalker)  Comment: stand pivot bed to chair with Hemiwalker at mod/max A x 2  Ambulation 1  Surface: level tile  Device: Hemiwalker  Other Apparatus: Right, AFO, Wheelchair follow  Assistance: 2 Person assistance, Maximum assistance, Moderate assistance (Mod + Max)  Quality of Gait: Forward flexed posture. Initiates advancement of R LE but requires Max A to complete/encourage weight shift to L.  Min A to therapy on this date. Pt required max encouragement and cueing to participate in therapy with pt demonstrating poor participation. PM: OT attemoted to facilitate pts engagement in self feeding task. Pt demonstrated poor participation in task. Pt able to take bite from spoon when provided with pt not swallowing food at this time despite max encouragement. OT provided napkin for pt to spit food out if needed with pt able to bend fowards and spit out food. Pt unwilling to participate further in session. Balance  Sitting Balance: Minimal assistance (Min A due to LOB CGA otherwise)  Standing Balance: Maximum assistance   Standing Balance  Time: 1 minutes x 2  Activity: lower body dressing, standing EOB  Comment: Max A with pt demonstrating poor participation in therapy. Bed mobility  Rolling to Left: Maximum assistance  Rolling to Right: Moderate assistance  Supine to Sit: Maximum assistance  Sit to Supine: Maximum assistance  Scootin Person assistance, Maximal assistance (scooting to [facility]; Pt followed cue to cross UEs)  Comment: A with trunk and BLE. Max verbal and tactile cues with patient demonstrating poor participation. Transfers  Stand Pivot Transfers: Minimal assistance, 2 Person assistance  Sit to stand: Maximum assistance  Stand to sit: Maximum assistance  Transfer Comments: Verbal cues with pt demonstrating poor participation in therapy on this date. Toilet Transfers  Toilet - Technique: Stand pivot  Equipment Used: Raised toilet seat with rails  Toilet Transfer: Maximum assistance, 2 Person assistance (max A + mod A)  Toilet Transfers Comments: Verbal cues for safety and hand placement. SPEECH:  Subjective: [x]? Alert     [x]? Cooperative     []? Confused     []? Agitated    [x]?  Lethargic        Objective/Assessment:  Auditory Comprehension: Pt. not responding to any writer questions despite MAX cues     Reading Comprehension: n/a     Written Language: n/a     Speech: Pt not responding to verbal stimuli, remaining with eyes closed, non verbal throughout entire session.      Voice: Pt. did not vocalize or verbalize during session.      Dysphagia:   Pt repositioned to 90 degrees, eyes closed. Straw positioned on lower lip, pt attempting to take sip of liquid provided but unable to pull liquid through straw. Pt then provided 3 sips of thickened liquids via straw, pt demonstrated no overt s/s of aspiration 3/3 trials. Pt eventually rolled over on his side with eyes closed and refused to participate in more feeding trials. Session discontinued due to pt's lack of participation.      Aide present in room stated that pt was awaking and talking with visitors yesterday evening.  Question if non compliance is behavioral ?    Current Medications:   Current Facility-Administered Medications: melatonin tablet 6 mg, 6 mg, Oral, Nightly  losartan (COZAAR) tablet 100 mg, 100 mg, Oral, Daily  rosuvastatin (CRESTOR) tablet 40 mg, 40 mg, Oral, Nightly  famotidine (PEPCID) tablet 20 mg, 20 mg, Oral, BID  predniSONE (DELTASONE) tablet 40 mg, 40 mg, Oral, Daily  amoxicillin-clavulanate (AUGMENTIN) 875-125 MG per tablet 1 tablet, 1 tablet, Oral, 2 times per day  albuterol sulfate  (90 Base) MCG/ACT inhaler 2 puff, 2 puff, Inhalation, Q6H PRN  aspirin chewable tablet 81 mg, 81 mg, Oral, Daily  clopidogrel (PLAVIX) tablet 75 mg, 75 mg, Oral, Daily  finasteride (PROSCAR) tablet 5 mg, 5 mg, Oral, Daily  FLUoxetine (PROZAC) capsule 40 mg, 40 mg, Oral, Daily  heparin (porcine) injection 5,000 Units, 5,000 Units, Subcutaneous, 3 times per day  hydrALAZINE (APRESOLINE) tablet 10 mg, 10 mg, Oral, 3 times per day  isosorbide mononitrate (IMDUR) extended release tablet 30 mg, 30 mg, Oral, Daily  rOPINIRole (REQUIP) tablet 0.25 mg, 0.25 mg, Oral, Nightly  tamsulosin (FLOMAX) capsule 0.4 mg, 0.4 mg, Oral, Daily  tiotropium (SPIRIVA RESPIMAT) 2.5 MCG/ACT inhaler 2 puff, 2 puff, Inhalation, Daily  acetaminophen (TYLENOL) tablet 650 mg, 650 mg, Oral, Q4H PRN  polyethylene glycol (GLYCOLAX) packet 17 g, 17 g, Oral, Daily  senna (SENOKOT) tablet 17.2 mg, 2 tablet, Oral, Daily PRN  bisacodyl (DULCOLAX) suppository 10 mg, 10 mg, Rectal, Daily PRN  insulin lispro (HUMALOG) injection vial 0-6 Units, 0-6 Units, Subcutaneous, TID WC  insulin lispro (HUMALOG) injection vial 0-3 Units, 0-3 Units, Subcutaneous, Nightly  glucose (GLUTOSE) 40 % oral gel 15 g, 15 g, Oral, PRN  dextrose 50 % IV solution, 12.5 g, Intravenous, PRN  glucagon (rDNA) injection 1 mg, 1 mg, Intramuscular, PRN  dextrose 5 % solution, 100 mL/hr, Intravenous, PRN      Objective:  BP (!) 106/91   Pulse 66   Temp 97.7 °F (36.5 °C) (Oral)   Resp 18   Ht 5' 10\" (1.778 m)   Wt 214 lb 4.6 oz (97.2 kg)   SpO2 100%   BMI 30.75 kg/m²       GEN: Well developed, well nourished, no acute distress  HEENT: NCAT. Keeps eyes closed throughout evaluation. Hearing grossly intact. Mucous membranes pink and moist.  RESP: Normal breath sounds with no wheezing, rales, or rhonchi. Respirations WNL and unlabored. CV: Regular rate and rhythm. No murmurs, rubs, or gallops. ABD: Soft, non-distended, BS+ and equal.  NEURO:  Keeps eyes closed throughout exam.  Not answering questions. Mild spasticity noted in right upper limb. MSK:  Muscle bulk is normal bilaterally. No movement noted in right upper limb. Minimally moves bilateral lower limbs. LIMBS: No edema in bilateral lower limbs. SKIN: Warm and dry with good turgor. PSYCH: Flat affect. Diagnostics:     CBC:   Recent Labs     07/26/21  0715   WBC 14.0*   RBC 5.01   HGB 12.0*   HCT 38.9*   MCV 77.7*   RDW 17.8*   *     BMP: No results for input(s): NA, K, CL, CO2, PHOS, BUN, CREATININE, GLUCOSE in the last 72 hours. Invalid input(s): CA  BNP: No results for input(s): BNP in the last 72 hours. PT/INR: No results for input(s): PROTIME, INR in the last 72 hours. APTT: No results for input(s):  APTT in the last 72 hours. CARDIAC ENZYMES: No results for input(s): CKMB, CKMBINDEX, TROPONINT in the last 72 hours. Invalid input(s): CKTOTAL;3  FASTING LIPID PANEL:  Lab Results   Component Value Date    CHOL 186 07/14/2021    HDL 42 07/14/2021    TRIG 77 07/14/2021     LIVER PROFILE: No results for input(s): AST, ALT, ALB, BILIDIR, BILITOT, ALKPHOS in the last 72 hours. Impression/Plan:   Impaired ADLs, gait, and mobility due to:    1. Ischemic CVA with Right dominant hemiparesis: PT/OT for gait, mobility, strengthening, endurance, ADLs, and self care. On ASA, plavix, Crestor. Can consider sleep/wake medication but patient is also refusing therapy/medications at times so will monitor to determine if this is mood vs physiologic. 2. Agitation: Patient failed telesitter over the weekend - resumed 1:1 on 7/25. On Seroquel prn but has not required it since 7/23. Has no sedating medicines on board to be causing drowsiness. Borderline QTc.   3. Sinusitis: On Augmentin until 7/28 per IM. 4. Insomnia: Scheduled melatonin started on 7/25  5. Thrombocytopenia: Hematology following. On prednisone. Platelets improved, OK for DVT prophylaxis  6. Leukocytosis: Stable. Being attributed to prednisone. Will monitor. 7. HTN/CAD: On hydralazine, Imdur, losartan  8. DM II: On humalog sliding scale  9. COPD:  On tiotropium. Has albuterol prn. 10. Restless Leg Syndrome: On ropinirole  11. Depression: On fluoxetine. 12. GERD: On famotidine BID  13. BPH: On finasteride, tamsulosin  14. Thyroid nodule: For outpatient monitoring  15. Bowel Management: Miralax daily, Senokot prn, Dulcolax prn  16. Internal medicine for medical management  17.  DVT prophylaxis:  On heparin      Electronically signed by Rebeca Kaur MD on 7/27/2021 at 7:06 PM

## 2021-07-27 NOTE — ACP (ADVANCE CARE PLANNING)
Team meeting held today with recommendation of SNF as pt does not have home support. Attempted to meet with pt; did not speak but nodded when asked to defer to sister Emeterio Edwards. Ez Islas and provided update. She will discuss with her daughter and meet with pt. She approved for referral to be sent to [address] [place] [address]; referral sent.

## 2021-07-27 NOTE — PROGRESS NOTES
Physical Therapy  [address]  Acute Rehabilitation Physical Therapy Progress Note    Date: 21  Patient Name: Giacomo Decker       Room: 2597/7585-77  MRN: [id number]   Account: [de-identified]   : 1945  (72 y.o.) Gender: male   Referring Practitioner: Dr. Bunny Cortes  Diagnosis: Ischemic CVA with R dominant hemiplegia  Past Medical History:  has a past medical history of Centrilobular emphysema ([facility] [address] COPD (chronic obstructive pulmonary disease) ([facility] [address] Depression, Diabetes mellitus ([facility] [address] Former smoker, Hyperlipidemia, Hypertension, Mixed restrictive and obstructive lung disease ([facility] [address] Need for pneumococcal vaccine, and Personal history of tobacco use. Past Surgical History:   has a past surgical history that includes Neck surgery; Toe amputation (Right, greater); and Cardiac surgery (2015). Additional Pertinent Hx: Gaicomo Decker is a 77 y.o. RHD male admitted to [facility] on 2021. On admit, exam significant for right-sided facial droop, right-sided weakness and severe dysarthria. Significant history with recurrent strokes, remote left temporal lobe ischemic infarct and remote bilateral occipital lobe infarcts . MRI shows Bilateral basal ganglia ischemic infarcts. Pt admitted to rehab unit on 21    Restrictions/Precautions  Restrictions/Precautions: Up as Tolerated;General Precautions; Fall Risk;Swallowing - Thickened Liquids (1:1 sitter, Mildly thick (Nectar thick))  Required Braces or Orthoses?: No  Position Activity Restriction  Other position/activity restrictions: Up w/assistance, RU/LE Weakness. MRI BRAIN- Acute infarct in the left and right basal ganglia greater on the left. Subjective: Pt appeared drowsy, no verbal communication noted today. Pt did shake his head no when asked about desire to continue walking. Comments: Pt does not appear to be eating breakfast or lunch; 1:1 confirms.      Vital Signs  Patient Currently in Pain: Denies (Shakes head no in PM)    Patient Observation  Observations: Soft-spoken & sometimes difficult to hear; fair/good response to cues for exercise & ambulation    Transfers  Sit to Stand: 2 Person Assistance; Moderate Assistance (Hemiwalker & walker lift)  Stand to sit: 2 Person Assistance;Minimal Assistance (Hemiwalker & walker lift; able to hold himself up, reach back during use of walker lift)    Ambulation 1  Surface: level tile  Device: Hemiwalker  Other Apparatus: Right;AFO;Wheelchair follow  Assistance: 2 Person assistance;Maximum assistance; Moderate assistance (Mod + Max)  Quality of Gait: Forward flexed posture. Initiates advancement of R LE but requires Max A to complete/encourage weight shift to L. Min A to advance walker. Visual cues for step length with Fair L response. Gait Deviations: Slow Anais  Distance: 7' PM  Comments: Slight pitting edema noted after amb with R AFO     Ambulation 2  Surface - 2: level tile  Device 2:  (Green Walker Lift)  Other Apparatus 2: Right;Slider;AFO;Wheelchair follow  Assistance 2: 2 Person assistance; Moderate assistance  Quality of Gait 2: Pt demonstrates ability to initiate R swing, but lacks full ROM/requires assist. Initially poor weight shifting to L/strong R lean with gradual improvement over course with corresponding increased L step length. 1 brief standing rest break. Gait Deviations: Shuffles;Decreased head and trunk rotation;Decreased step height;Decreased step length; Slow Anais  Distance: 100', including 180º turn    Wheelchair Activities  Propulsion: Yes  Propulsion 1  Propulsion: Manual  Level: Level Tile  Method: LLE;LUE  Level of Assistance: Moderate assistance  Description/ Details: Requires assist to position L UE/hand appropriately. Pt demonstrates fair straight path & ability to maintain momentum.  Straight path only today: fatigued quickly  Distance: 30' AM & PM    Balance   Posture: Fair  Sitting - Dynamic: Fair  Standing - Static: Fair;- (hemiwalker)  Standing - Dynamic: Poor (hemiwalker)     Exercises  Other exercises?: Yes  Other exercises 1: Seated LE exercises, 15-20x each: 1# L LE with frequent verbal cues to complete set; active/assisted ROM R LE, orange (minimal) resistance band    Activity Tolerance: Patient limited by cognitive status, Patient limited by endurance, Patient limited by fatigue (Slow moving c all activities; req's extra time to complete/comprehend)     PT Equipment Recommendations  Other: TBD    Current Treatment Recommendations: Strengthening, ROM, Balance Training, Functional Mobility Training, Gait Training, Endurance Training, Transfer Training, Safety Education & Training, Wheelchair Mobility Training, Neuromuscular Re-education, Cognitive Reorientation, Home Exercise Program, Patient/Caregiver Education & Training, Equipment Evaluation, Education, & procurement, Positioning, Stair training, Modalities (Will consider using modalities as needed for neuro re-edu.)    Conditions Requiring Skilled Therapeutic Intervention  Body structures, Functions, Activity limitations: Decreased functional mobility ; Decreased ROM; Decreased strength;Decreased balance;Decreased safe awareness;Decreased endurance;Decreased posture;Decreased cognition;Decreased fine motor control;Decreased coordination  Assessment: Pt does not appear to be engaged, frequently dozing off or appearing to feign sleep; requires maximum encouragement to participate.    Barriers to Learning: aphasia, cognitive deficits  REQUIRES PT FOLLOW UP: Yes  Discharge Recommendations: Patient would benefit from continued therapy after discharge;24 hour supervision or assist    Goals  Short term goals  Time Frame for Short term goals: 10 days  Short term goal 1: Pt able to roll side to side at min A   Short term goal 2: Pt able to perform supine>sit at min A   Short term goal 3: Pt able to perform sit to supine at mod A  Short term goal 4: Pt able to transfer at mod A   Short term goal 5: Pt able to propel w/c with L UE/L LE, distance fo 100 ft min A   Short term goal 6: Pt able to ambulate with appropriate device distance of 50 to 100 ft, min A x2  Long term goals  Time Frame for Long term goals : By DC  Long term goal 1: Pt able perform bed mobility mod-I  Long term goal 2:  Pt able to perform transfers at CGA/min A   Long term goal 3: Pt able to ambulate with appropriate device distance of 100 to 150 ft, min A  Long term goal 4: Pt able to go up and down 4 to 5 steps with 1 UE support at mod/max A   Long term goal 5: Pt able to propel w/c level surfaces distance fo 100 to 150 ft, SBA  Long term goal 6: Improve PASS score to at least 19/36 to improve overall function. Long term goal 7: Improve standing dynamic balance with assistive device to at least fair+ to reduce fall risk.         07/27/21 0951 07/27/21 1358   PT Individual Minutes   Time In 0935 1358   Time Out 1040 1425   Minutes 65 27     Electronically signed by Zion Wasserman PTA on 7/27/21 at 5:00 PM EDT

## 2021-07-27 NOTE — PROGRESS NOTES
Writer attempted to give patient his medication, pt.  Would not open his mouth to take any medication, will continue to try

## 2021-07-27 NOTE — PROGRESS NOTES
[address] [facility]    ACUTE REHABILITATION OCCUPATIONAL THERAPY  DAILY NOTE    Date: 21  Patient Name: Crispin Driver      Room: 7173/7626-96    MRN: [id number]   : 1945  (72 y.o.)  Gender: male   Referring Practitioner: Dr. Kurtis Cooley  Diagnosis: Ischemic CVA with R dominant hemiplegia  Additional Pertinent Hx: Crispin Driver is a 77 y.o. RHD male admitted to [facility] on 2021. On admit, exam significant for right-sided facial droop, right-sided weakness and severe dysarthria. Significant history with recurrent strokes, remote left temporal lobe ischemic infarct and remote bilateral occipital lobe infarcts . MRI shows Bilateral basal ganglia ischemic infarcts. Pt admitted to rehab unit on 21    Restrictions  Restrictions/Precautions: Up as Tolerated, General Precautions, Fall Risk, Swallowing - Thickened Liquids (1:1 sitter, Mildly thick (Nectar thick))  Other position/activity restrictions: Up w/assistance, RU/LE Weakness. MRI BRAIN- Acute infarct in the left and right basal ganglia greater on the left. Required Braces or Orthoses?: No    Subjective  Subjective: Pt remained silent throughout OT Am and PM treatment. Comments: 7:40-7:46 AM Pt refusing to open eyes and engage with OT and RN Gretel Cartagena at this time. Pt noted to be resistant to movement fighting againt therapist. OT returned at 7:57 AM to attempt to engage pt again in therapy. Pt contiues to be resistive to opening eyes and speaking this AM. Brief change completed while sitting EOB and standing with assistance of 1:1 sitter. Pt continues to demonstrate resisting movement and fighting therapist during task. PM: pt alert when OT arrived to pts room. OT attempted to engage pt in therapy with pt closing eyes and remaining silent throughout treatment. Patient Currently in Pain: Other (comment) (Pt does not respond)  Restrictions/Precautions: Up as Tolerated;General Precautions; Fall Risk;Swallowing - Thickened Liquids (1:1 sitter, Mildly thick (Nectar thick))  Overall Orientation Status: Impaired  Orientation Level: Unable to assess (Pt does not answer)          Objective  Perception  Overall Perceptual Status: Impaired  Unilateral Attention: Cues to attend to right side of body;Cues to maintain midline in sitting  Initiation: Cues to initiate tasks  Balance  Sitting Balance: Minimal assistance (Min A due to LOB CGA otherwise)  Standing Balance: Maximum assistance  Bed mobility  Supine to Sit: Maximum assistance  Sit to Supine: Maximum assistance  Comment: A with trunk and BLE. Max verbal and tactile cues with patient demonstrating poor participation. Transfers  Sit to stand: Maximum assistance  Stand to sit: Maximum assistance  Transfer Comments: Verbal cues with pt demonstrating poor participation in therapy on this date. Standing Balance  Time: 1 minutes x 2  Activity: lower body dressing, standing EOB  Comment: Max A with pt demonstrating poor participation in therapy. Type of ROM/Therapeutic Exercise  Type of ROM/Therapeutic Exercise: PROM  Comment: OT provided PROM for RUE through all planes of motion for increased ROM and decreased risk for contracture. Pt tolerates with no reports of pain but closes his eyes throughout. decreased ROM noted for elbow extension                       ADL  Feeding: Dependent/Total  Grooming: None  UE Bathing: None  LE Bathing: None  UE Dressing: None  LE Dressing: Dependent/Total  Toileting: None  Additional Comments: Pt lying in bed upon OT arrival. Pt demonstrated resistance to participation in occupational therapy on this date. Pt required max encouragement and cueing to participate in therapy with pt demonstrating poor participation. PM: OT attemoted to facilitate pts engagement in self feeding task. Pt demonstrated poor participation in task. Pt able to take bite from spoon when provided with pt not swallowing food at this time despite max encouragement.  OT provided napkin for pt to spit food out if needed with pt able to bend fowards and spit out food. Pt unwilling to participate further in session. Assessment  Performance deficits / Impairments: Decreased ADL status; Decreased functional mobility ; Decreased ROM; Decreased strength;Decreased safe awareness;Decreased cognition;Decreased endurance;Decreased sensation;Decreased balance;Decreased high-level IADLs;Decreased fine motor control;Decreased coordination  Prognosis: Fair  Discharge Recommendations: Patient would benefit from continued therapy after discharge;24 hour supervision or assist  Activity Tolerance: Treatment limited secondary to decreased cognition  Safety Devices in place: Yes  Type of devices: All fall risk precautions in place; Left in chair;Call light within reach;Gait belt (1:1 in room with pt at end of session)          Patient Education: Role of OT, Participation in self care tasks, RUE PROM  Patient Goals   Patient goals : \"I want to go home\"  Learner:patient  Method: explanation       Outcome: needs reinforcement        Plan  Plan  Times per week: 900/7  Times per day: Twice a day  Current Treatment Recommendations: Self-Care / ADL, Strengthening, ROM, Balance Training, Functional Mobility Training, Endurance Training, Neuromuscular Re-education, Cognitive Reorientation, Pain Management, Safety Education & Training, Patient/Caregiver Education & Training, Equipment Evaluation, Education, & procurement, Home Management Training, Cognitive/Perceptual Training  Plan Comment: 900 minutes/week for combined therapy of PT/OT/ST due to decreased tolerance to activity.   Patient Goals   Patient goals : \"I want to go home\"  Short term goals  Time Frame for Short term goals: By 10 days  Short term goal 1: Pt will complete upper body dressing/bathing with Min A and good attention to task  Short term goal 2: Pt will complete lower body dressing/bathing with max A and good safety with use of AE as needed  Short term goal 3: Pt will complete functional transfers during self care tasks with mod A x 1 and good safety  Short term goal 4: Pt will tolerate standing 4+ minutes during functional activity of choice with min A and good safety  Short term goal 5: Pt will participate in 30+ minutes of therapeutic exercises/functional activities to increase safety and independence with self care tasks  Short term goal 6: Pt will complete self feeding with min A and good attention to task  Long term goals  Time Frame for Long term goals : By discharge  Long term goal 1: Pt will complete upper body dressing with set-up assistance and good attention to task  Long term goal 2: Pt will complete lower body dressing/bathing with min A and good safety with use of AE as needed  Long term goal 3: Pt will complete functional transfers/mobility during self care tasks with min A and good safety  Long term goal 4: Pt will tolerate standing 8+ minutes during functional activity of choice with CGA and good safety  Long term goal 5: Pt will verbalize/demonstrate good understanding of adaptive equipment/adaptive strategies/durable medical equipment to increase safety and independence with self care and mobility  Long term goals 6: Pt will complete self feeding with set-up assistance and good attention to task  Long term goal 7: Vision to be further assessed     07/27/21 0740 07/27/21 1556 07/27/21 1557   OT Individual Minutes   Time In [address] 1313   Time Out 0746 1513 [id number]   Minutes 6 17 23   Time Code Minutes    Timed Code Treatment Minutes 0 Minutes 17 Minutes 23 Minutes     Electronically signed by Katie Fowler OT on 7/27/21 at 3:58 PM EDT

## 2021-07-27 NOTE — PROGRESS NOTES
Speech Language Pathology  Speech Language Pathology  [facility]    Speech Language/DysphagiaTreatment Note    Date: 7/27/2021  Patients Name: Oscar Garcia  MRN: [id number]  Diagnosis:   Patient Active Problem List   Diagnosis Code    Centrilobular emphysema (HCC) J43.2    Mixed restrictive and obstructive lung disease ([facility] [address] J43.9, J98.4    Cerebrovascular accident (CVA) ([facility] [address] I63.9    COPD (chronic obstructive pulmonary disease) ([facility] [address] J44.9    HTN (hypertension) I10    Diabetes 1.5, [facility] as type 2 ([facility] E13.9    Hyperlipidemia E78.5    CAD S/P percutaneous coronary angioplasty I25.10, Z98.61    Rhabdomyolysis M62.82    Intracranial atherosclerosis I67.2    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 not responding to any writer questions despite MAX cues     Reading Comprehension: n/a    Written Language: n/a    Speech: Pt not responding to verbal stimuli, remaining with eyes closed, non verbal throughout entire session. Voice: Pt. did not vocalize or verbalize during session. Dysphagia:   Pt repositioned to 90 degrees, eyes closed. Straw positioned on lower lip, pt attempting to take sip of liquid provided but unable to pull liquid through straw. Pt then provided 3 sips of thickened liquids via straw, pt demonstrated no overt s/s of aspiration 3/3 trials. Pt eventually rolled over on his side with eyes closed and refused to participate in more feeding trials. Session discontinued due to pt's lack of participation.      Aide present in room stated that pt was awaking and talking with visitors yesterday evening. Question if non compliance is behavioral ?    Plan:  [x] Continue ST services    [] Discharge from ST:      Discharge recommendations: [] Inpatient Rehab   [] [place]   [] Outpatient Therapy  [] Follow up at trauma clinic   [] Other:       Treatment completed by:  Navneet Lagos M.A., [address] [facility]

## 2021-07-27 NOTE — PROGRESS NOTES
[facility]    CONSULTATION / HISTORY AND PHYSICAL EXAMINATION            Date:   7/27/2021  Patient name:  Crispin Driver  Date of admission:  7/20/2021  6:49 PM  MRN:   [id number]  Account:  [de-identified]  YOB: 1945  PCP:    No primary care provider on file.   Room:   [address] [place] [address]  Code Status:    Full Code    Physician Requesting Consult: Rock Davis MD    Reason for Consult:  medical management    Chief Complaint:       Right hemipareisi  History Obtained From:     Patient medical record nursing staff    History of Present Illness:   History is extremely limited, patient was extremely agitated overnight, was given Ativan around 2 AM in the night, patient very sleepy, not answering questions  Most of history is retrieved from [facility]  Patient was recently admitted to [place] [address] with right-sided weakness, right-sided facial weakness, speech difficulties  Patient underwent MRI brain, concerning for acute infarct in left and right basal ganglia  CT head and neck, was concerning for severe stenosis in the proximal A3 segment of RAHEEM bilaterally  During hospital stay, patient had thrombocytopenia, was evaluated by hematologist, getting treated with IV steroids, and lines of immune thrombocytopenia      Past Medical History:     Past Medical History:   Diagnosis Date    Centrilobular emphysema ([address]     COPD (chronic obstructive pulmonary disease) ([address]     Depression     Diabetes mellitus ([address]     pt refuses to take previously prescribed metformin (states PCP aware)    Former smoker     Hyperlipidemia     on 4/27/18 pt states \"I stopped taking that about a year ago\"    Hypertension     Mixed restrictive and obstructive lung disease ([address]     Need for pneumococcal vaccine     Personal history of tobacco use         Past Surgical History:     Past Surgical History:   Procedure Laterality Date    CARDIAC SURGERY  07/2015    1 stent    NECK SURGERY      TOE AMPUTATION Right greater        Medications Prior to Admission:     Prior to Admission medications    Medication Sig Start Date End Date Taking?  Authorizing Provider   Heparin Sodium, Porcine, (HEPARIN, PORCINE,) 5000 UNIT/ML injection Inject 1 mL into the skin every 8 hours 7/20/21   Winston Chamorro MD   QUEtiapine (SEROQUEL) 25 MG tablet Take 1 tablet by mouth nightly as needed for Agitation 7/20/21 7/25/21  Winston Chamorro MD   methylPREDNISolone sodium (SOLU-MEDROL) 40 MG injection Infuse 1 mL intravenously every 12 hours 7/20/21   Winston Chamorro MD   melatonin 3 MG TABS tablet Take 1 tablet by mouth nightly as needed (insomnia) 7/20/21   Winston Chamorro MD   atorvastatin (LIPITOR) 80 MG tablet Take 0.5 tablets by mouth daily (Pt takes one-half of an 80mg tab = 40mg) 7/16/21   Winston Chamorro MD   tiZANidine (ZANAFLEX) 2 MG tablet Take 1 tablet by mouth 4 times daily as needed (muscle spasms) 5/7/21   JOLIE Leo CNP   naproxen (NAPROSYN) 500 MG tablet Take 1 tablet by mouth 2 times daily (with meals) 1/4/21   Fredy Unger PA-C   ibuprofen (ADVIL;MOTRIN) 800 MG tablet Take 1 tablet by mouth every 8 hours as needed for Pain 6/2/20   Ky Toth MD   lidocaine (LIDODERM) 5 % Place 1 patch onto the skin daily 12 hours on, 12 hours off. 6/2/20   Ky Toth MD   metoprolol succinate (TOPROL XL) 50 MG extended release tablet Take 50 mg by mouth daily    Historical Provider, MD   tiotropium (SPIRIVA RESPIMAT) 2.5 MCG/ACT AERS inhaler Inhale 2 puffs into the lungs daily    Historical Provider, MD   carboxymethylcellulose 1 % ophthalmic solution Place 1 drop into both eyes 3 times daily as needed for Dry Eyes     Historical Provider, MD   ketotifen (ZADITOR) 0.025 % ophthalmic solution Place 1 drop into both eyes 2 times daily as needed (itchy eyes)     Historical Provider, MD   isosorbide mononitrate (IMDUR) 60 MG extended release tablet Take 30 mg by mouth daily Indications: 1/2 tablet (30mg)     Historical Provider, MD   finasteride (PROSCAR) 5 MG tablet Take 5 mg by mouth daily    Historical Provider, MD   tamsulosin (FLOMAX) 0.4 MG capsule Take 0.4 mg by mouth daily    Historical Provider, MD   fluticasone (FLONASE) 50 MCG/ACT nasal spray 1 spray by Nasal route 2 times daily  Patient taking differently: 1 spray by Nasal route 2 times daily as needed for Rhinitis  5/31/16   Francisco Connor,    albuterol sulfate  (90 BASE) MCG/ACT inhaler Inhale 2 puffs into the lungs every 6 hours as needed for Wheezing    Historical Provider, MD   albuterol (PROVENTIL) (2.5 MG/3ML) 0.083% nebulizer solution Take 2.5 mg by nebulization every 6 hours as needed for Wheezing    Historical Provider, MD   aspirin 81 MG EC tablet Take 81 mg by mouth daily    Historical Provider, MD   losartan (COZAAR) 100 MG tablet Take 100 mg by mouth daily     Historical Provider, MD   nitroGLYCERIN (NITROSTAT) 0.4 MG SL tablet Place 0.4 mg under the tongue every 5 minutes as needed for Chest pain    Historical Provider, MD   FLUoxetine (PROZAC) 20 MG capsule Take 40 mg by mouth daily     Historical Provider, MD   furosemide (LASIX) 20 MG tablet Take 20 mg by mouth daily as needed (swelling)     Historical Provider, MD   clopidogrel (PLAVIX) 75 MG tablet Take 75 mg by mouth daily    Historical Provider, MD   rOPINIRole (REQUIP) 0.25 MG tablet Take 0.25 mg by mouth nightly as needed     Historical Provider, MD   budesonide-formoterol (SYMBICORT) 160-4.5 MCG/ACT AERO Inhale 2 puffs into the lungs 2 times daily. Historical Provider, MD        Allergies:     Patient has no known allergies. Social History:     Tobacco:    reports that he quit smoking about 8 years ago. He started smoking about 64 years ago. He has a 42.00 pack-year smoking history. He has never used smokeless tobacco.  Alcohol:      reports no history of alcohol use.   Drug Use:  reports no history of drug use.    Family History:     No family history on file. Review of Systems:   Cannot be done because of patient condition    Physical Exam:     BP (!) 149/77   Pulse 58   Temp 97.7 °F (36.5 °C)   Resp 18   Ht 5' 10\" (1.778 m)   Wt 214 lb 4.6 oz (97.2 kg)   SpO2 100%   BMI 30.75 kg/m²   Temp (24hrs), Av.5 °F (36.4 °C), Min:97.3 °F (36.3 °C), Max:97.7 °F (36.5 °C)    Recent Labs     21  1606 21  1947 21  0632 21  1051   POCGLU 132* 167* 115* 135*     No intake or output data in the 24 hours ending 21 1541    General Appearance: Very sleepy, not answering questions  Mental status: Not oriented to person, place, and time with normal affect  Head:  normocephalic, atraumatic. Eye: no icterus, redness, pupils equal and reactive, extraocular eye movements intact, conjunctiva clear  Ear: normal external ear, no discharge, hearing intact  Nose:  no drainage noted  Mouth: mucous membranes moist  Neck: supple, no carotid bruits, thyroid not palpable  Lungs: Bilateral equal air entry, clear to ausculation, no wheezing, rales or rhonchi, normal effort  Cardiovascular: normal rate, regular rhythm, no murmur, gallop, rub.   Abdomen: Soft, nontender, nondistended, normal bowel sounds, no hepatomegaly or splenomegaly  Neurologic: Weakness in right upper and lower extremity, speech difficulties, right sided facial weakness  Skin: No gross lesions, rashes, bruising or bleeding on exposed skin area  Extremities:  peripheral pulses palpable, no pedal edema or calf pain with palpation    Investigations:      Laboratory Testing:  Recent Results (from the past 24 hour(s))   POC Glucose Fingerstick    Collection Time: 21  4:06 PM   Result Value Ref Range    POC Glucose 132 (H) 75 - 110 mg/dL   POC Glucose Fingerstick    Collection Time: 21  7:47 PM   Result Value Ref Range    POC Glucose 167 (H) 75 - 110 mg/dL   POC Glucose Fingerstick    Collection Time: 21  6:32 AM   Result Value

## 2021-07-28 NOTE — PROGRESS NOTES
Physical Therapy  Facility/Department: [facility]  Daily Treatment Note  NAME: Samantha Griffiths  : 1945  MRN: [id number]    Date of Service: 2021    Discharge Recommendations:  Patient would benefit from continued therapy after discharge, 24 hour supervision or assist   PT Equipment Recommendations  Other: TBD    Assessment   Body structures, Functions, Activity limitations: Decreased functional mobility ; Decreased ROM; Decreased strength;Decreased balance;Decreased safe awareness;Decreased endurance;Decreased posture;Decreased cognition;Decreased fine motor control;Decreased coordination  Barriers to Learning: aphasia, cognitive deficits  REQUIRES PT FOLLOW UP: Yes  Activity Tolerance  Activity Tolerance: Patient limited by cognitive status; Patient limited by endurance; Patient limited by fatigue (Slow moving c all activities; req's extra time to complete/comprehend)     Patient Diagnosis(es): There were no encounter diagnoses. has a past medical history of Centrilobular emphysema ([facility] COPD (chronic obstructive pulmonary disease) ([facility] Depression, Diabetes mellitus ([facility] Former smoker, Hyperlipidemia, Hypertension, Mixed restrictive and obstructive lung disease ([facility] Need for pneumococcal vaccine, and Personal history of tobacco use.   has a past surgical history that includes Neck surgery; Toe amputation (Right, greater); and Cardiac surgery (2015). Restrictions  Restrictions/Precautions  Restrictions/Precautions: Up as Tolerated, General Precautions, Fall Risk, Swallowing - Thickened Liquids (1:1 sitter, Mildly thick (Nectar thick))  Required Braces or Orthoses?: No  Position Activity Restriction  Other position/activity restrictions: Up w/assistance, RU/LE Weakness. MRI BRAIN- Acute infarct in the left and right basal ganglia greater on the left. Subjective   General  Additional Pertinent Hx: Samantha Griffiths is a 77 y.o. RHD male admitted to [facility] on 2021.  On admit, exam significant for right-sided facial droop, right-sided weakness and severe dysarthria. Significant history with recurrent strokes, remote left temporal lobe ischemic infarct and remote bilateral occipital lobe infarcts . MRI shows Bilateral basal ganglia ischemic infarcts. Pt admitted to rehab unit on 7/20/21  Family / Caregiver Present: No  Referring Practitioner: Dr Shine Dany: Pt no verbal communication this date. Pt able to make a \"thumbs up\" when PCT asked if he would like coffee later. Appears to close eyes or fall asleep at times during session. Pain Screening  Patient Currently in Pain: Denies (shakes head no)  Vital Signs  Patient Currently in Pain: Denies (shakes head no)     Objective   Bed mobility  Supine to Sit: Maximum assistance (assist with trunk and B LE)  Sit to Supine: Maximum assistance  Scooting: Maximal assistance (to EOB)     Transfers  Sit to Stand: 2 Person Assistance; Moderate Assistance (miwalker & walker lift)  Stand to sit: 2 Person Assistance;Minimal Assistance (Hemiwalker & walker lift; able to hold himself up, reach back during use of walker lift)     Ambulation  Ambulation?: Yes  Ambulation 1  Surface: level tile  Device: Hemiwalker  Other Apparatus: Right;AFO;Wheelchair follow  Assistance: 2 Person assistance;Maximum assistance; Moderate assistance  Quality of Gait: Forward flexed posture. Initiates advancement of R LE but requires Max A to complete/encourage weight shift to L. Min A to advance walker. Visual cues for step length with Fair L response. Gait Deviations: Slow Anais  Distance: 13' PM    Ambulation 2  Surface - 2: level tile  Device 2:  (Green walker lift)  Other Apparatus 2: Right;Slider;AFO;Wheelchair follow  Assistance 2: 2 Person assistance; Moderate assistance  Quality of Gait 2: Pt demonstrates ability to initiate R swing, but lacks full ROM/requires assist. Initially poor weight shifting to L/strong R lean with gradual improvement over course with corresponding increased L step length. 2 standing rest breaks. Gait Deviations: Shuffles;Decreased head and trunk rotation;Decreased step height;Decreased step length; Slow Naais  Distance: 100' x 2, including 180º turn    Stairs/Curb  Stairs?: No  Wheelchair Activities  Propulsion: Yes  Propulsion 1  Propulsion: Manual  Level: Level Tile  Method: LLE;LUE  Level of Assistance: Moderate assistance  Description/ Details: Requires assist to position L UE/hand appropriately. Pt demonstrates fair straight path & ability to maintain momentum. Fatigues quickly. Distance: 48'    Balance  Posture: Fair  Sitting - Static: Fair;+ (Edge of bed, posterior lean with fatigue, self-corrects)  Sitting - Dynamic: Fair  Standing - Static: Fair;- (hemiwalker)  Standing - Dynamic: Poor (hemiwalker)     Other exercises  Other exercises 1: Seated LE exercises, 15-20x each: 1# L LE with frequent verbal cues to complete set; active/assisted ROM R LE, orange (minimal) resistance band  Other exercises 2: Stand pivot transfers x2 with hemiwalker, Mod A x2  Other exercises 3: Seated in wheelchair UBE (3\" fwd/3\" retro) (Gayatri-ModA to initiate activity (Ace wrap and dicem holding R hand on UBE handle). )     Goals  Short term goals  Time Frame for Short term goals: 10 days  Short term goal 1: Pt able to roll side to side at min A   Short term goal 2: Pt able to perform supine>sit at min A   Short term goal 3: Pt able to perform sit to supine at mod A  Short term goal 4: Pt able to transfer at mod A   Short term goal 5: Pt able to propel w/c with L UE/L LE, distance fo 100 ft min A   Short term goal 6: Pt able to ambulate with appropriate device distance of 50 to 100 ft, min A x2  Long term goals  Time Frame for Long term goals : By DC  Long term goal 1: Pt able perform bed mobility mod-I  Long term goal 2:  Pt able to perform transfers at CGA/min A   Long term goal 3: Pt able to ambulate with appropriate device distance of 100 to 150 ft, min A  Long term goal 4: Pt able to go up and down 4 to 5 steps with 1 UE support at mod/max A   Long term goal 5: Pt able to propel w/c level surfaces distance fo 100 to 150 ft, SBA  Long term goal 6: Improve PASS score to at least 19/36 to improve overall function. Long term goal 7: Improve standing dynamic balance with assistive device to at least fair+ to reduce fall risk. Patient Goals   Patient goals : GO home    Plan    Plan  Times per week: 900 minutes/week for combine dtherapy of PT/OT/ST due to decreased tolerance to activity.   Times per day: Daily  Current Treatment Recommendations: Strengthening, ROM, Balance Training, Functional Mobility Training, Gait Training, Endurance Training, Transfer Training, Safety Education & Training, Wheelchair Mobility Training, Neuromuscular Re-education, Cognitive Reorientation, Home Exercise Program, Patient/Caregiver Education & Training, Equipment Evaluation, Education, & procurement, Positioning, Stair training, Modalities  Safety Devices  Type of devices: Gait belt, Patient at risk for falls, Left in chair, Sitter present, All fall risk precautions in place  Restraints  Initially in place: No     Therapy Time     07/28/21 0931 07/28/21 1306   PT Individual Minutes   Time In 0931 1306   Time Out 1025 1336   Minutes [address], PTA

## 2021-07-28 NOTE — PROGRESS NOTES
Today's Date: 7/28/2021  Patient Name: Masha Toledo  Date of admission: 7/20/2021  6:49 PM  Patient's age: 77 y.o., 1945  Admission Dx: Acute CVA (cerebrovascular accident) [facility]) [I63.9]    Reason for Consult: management recommendations  Requesting Physician: Joshua Resendiz MD    CHIEF COMPLAINT: Acute stroke. Thrombocytopenia. History Obtained From:  patient, electronic medical record   SUBJECTIVE:  Patient was seen and examined. Clinically stable. No significant changes. No active bleeding. No bruising. No fever. Patient decided not to take the prednisone. Also he decided not to have labs done. HISTORY OF PRESENT ILLNESS:      The patient is a 77 y.o.  male who is admitted to the hospital for acute stroke    Brought to the hospital after being found down on the floor. Patient was last seen well about 2 days ago. Patient has a history of peripheral vascular disease coronary artery disease hypertension COPD and previous strokes. Patient has significant contracture on the right side also right-sided facial droop with severe dysarthria limiting history of presenting illness. Patient's CBC at presentation shows hemoglobin 11.7 with MCV 77 WBC count 10 and platelet count of 4 with immature platelet fraction of 44%. Patient previous CBC noted in the system from June 2020 shows blood count of 160. Patient was also noted to have elevated myoglobin and CK. Creatinine is 1.74 lactic acid is 2.4. Patient has dysarthria not able to give much history. Past Medical History:   has a past medical history of Centrilobular emphysema ([address] COPD (chronic obstructive pulmonary disease) ([address] Depression, Diabetes mellitus ([address] Former smoker, Hyperlipidemia, Hypertension, Mixed restrictive and obstructive lung disease ([address] Need for pneumococcal vaccine, and Personal history of tobacco use. Past Surgical History:   has a past surgical history that includes Neck surgery;  Toe amputation (Right, greater); and Cardiac surgery (07/2015). Medications:    Prior to Admission medications    Medication Sig Start Date End Date Taking?  Authorizing Provider   Heparin Sodium, Porcine, (HEPARIN, PORCINE,) 5000 UNIT/ML injection Inject 1 mL into the skin every 8 hours 7/20/21   Emory Weinberg MD   QUEtiapine (SEROQUEL) 25 MG tablet Take 1 tablet by mouth nightly as needed for Agitation 7/20/21 7/25/21  Eomry Weinberg MD   methylPREDNISolone sodium (SOLU-MEDROL) 40 MG injection Infuse 1 mL intravenously every 12 hours 7/20/21   Emory Weinberg MD   melatonin 3 MG TABS tablet Take 1 tablet by mouth nightly as needed (insomnia) 7/20/21   Emory Weinberg MD   atorvastatin (LIPITOR) 80 MG tablet Take 0.5 tablets by mouth daily (Pt takes one-half of an 80mg tab = 40mg) 7/16/21   Emory Weinberg MD   tiZANidine (ZANAFLEX) 2 MG tablet Take 1 tablet by mouth 4 times daily as needed (muscle spasms) 5/7/21   JOLIE Fofana - CNP   naproxen (NAPROSYN) 500 MG tablet Take 1 tablet by mouth 2 times daily (with meals) 1/4/21   Mandy Ferrera PA-C   ibuprofen (ADVIL;MOTRIN) 800 MG tablet Take 1 tablet by mouth every 8 hours as needed for Pain 6/2/20   Love Mendoza MD   lidocaine (LIDODERM) 5 % Place 1 patch onto the skin daily 12 hours on, 12 hours off. 6/2/20   Love Mendoza MD   metoprolol succinate (TOPROL XL) 50 MG extended release tablet Take 50 mg by mouth daily    Historical Provider, MD   tiotropium (SPIRIVA RESPIMAT) 2.5 MCG/ACT AERS inhaler Inhale 2 puffs into the lungs daily    Historical Provider, MD   carboxymethylcellulose 1 % ophthalmic solution Place 1 drop into both eyes 3 times daily as needed for Dry Eyes     Historical Provider, MD   ketotifen (ZADITOR) 0.025 % ophthalmic solution Place 1 drop into both eyes 2 times daily as needed (itchy eyes)     Historical Provider, MD   isosorbide mononitrate (IMDUR) 60 MG extended release tablet Take 30 mg by mouth daily Indications: 1/2 tablet (30mg)     Historical Provider, MD   finasteride (PROSCAR) 5 MG tablet Take 5 mg by mouth daily    Historical Provider, MD   tamsulosin (FLOMAX) 0.4 MG capsule Take 0.4 mg by mouth daily    Historical Provider, MD   fluticasone (FLONASE) 50 MCG/ACT nasal spray 1 spray by Nasal route 2 times daily  Patient taking differently: 1 spray by Nasal route 2 times daily as needed for Rhinitis  5/31/16   Francisco Connor DO   albuterol sulfate  (90 BASE) MCG/ACT inhaler Inhale 2 puffs into the lungs every 6 hours as needed for Wheezing    Historical Provider, MD   albuterol (PROVENTIL) (2.5 MG/3ML) 0.083% nebulizer solution Take 2.5 mg by nebulization every 6 hours as needed for Wheezing    Historical Provider, MD   aspirin 81 MG EC tablet Take 81 mg by mouth daily    Historical Provider, MD   losartan (COZAAR) 100 MG tablet Take 100 mg by mouth daily     Historical Provider, MD   nitroGLYCERIN (NITROSTAT) 0.4 MG SL tablet Place 0.4 mg under the tongue every 5 minutes as needed for Chest pain    Historical Provider, MD   FLUoxetine (PROZAC) 20 MG capsule Take 40 mg by mouth daily     Historical Provider, MD   furosemide (LASIX) 20 MG tablet Take 20 mg by mouth daily as needed (swelling)     Historical Provider, MD   clopidogrel (PLAVIX) 75 MG tablet Take 75 mg by mouth daily    Historical Provider, MD   rOPINIRole (REQUIP) 0.25 MG tablet Take 0.25 mg by mouth nightly as needed     Historical Provider, MD   budesonide-formoterol (SYMBICORT) 160-4.5 MCG/ACT AERO Inhale 2 puffs into the lungs 2 times daily.     Historical Provider, MD     Current Facility-Administered Medications   Medication Dose Route Frequency Provider Last Rate Last Admin    [START ON 7/29/2021] aspirin chewable tablet 81 mg  81 mg Oral Lunch Shaun Youssef MD        [START ON 7/29/2021] clopidogrel (PLAVIX) tablet 75 mg  75 mg Oral Lunch Shaun Youssef MD        [START ON 7/29/2021] finasteride (PROSCAR) tablet 5 mg  5 mg Oral Lunch Nel Phelps MD        [START ON 7/29/2021] FLUoxetine (PROZAC) capsule 40 mg  40 mg Oral Lunch Nel Phelps MD        [START ON 7/29/2021] isosorbide mononitrate (IMDUR) extended release tablet 30 mg  30 mg Oral Lunch Nel Phelps MD        [START ON 7/29/2021] polyethylene glycol (GLYCOLAX) packet 17 g  17 g Oral Lunch Nel Phelps MD        [START ON 7/29/2021] tamsulosin (FLOMAX) capsule 0.4 mg  0.4 mg Oral Lunch Nel Phelps MD        [START ON 7/29/2021] tiotropium (SPIRIVA RESPIMAT) 2.5 MCG/ACT inhaler 2 puff  2 puff Inhalation Lunch Nel Phelps MD        melatonin tablet 6 mg  6 mg Oral Nightly Noreen Monroy MD   6 mg at 07/26/21 2202    [Held by provider] losartan (COZAAR) tablet 100 mg  100 mg Oral Daily Isidro Long MD   100 mg at 07/25/21 0824    rosuvastatin (CRESTOR) tablet 40 mg  40 mg Oral Nightly Gabby Gomez MD   40 mg at 07/26/21 2224    famotidine (PEPCID) tablet 20 mg  20 mg Oral BID Deedee Gutierrez MD   20 mg at 07/26/21 2202    [Held by provider] predniSONE (DELTASONE) tablet 40 mg  40 mg Oral Daily Lisy Olivares MD   40 mg at 07/25/21 0824    amoxicillin-clavulanate (AUGMENTIN) 875-125 MG per tablet 1 tablet  1 tablet Oral 2 times per day Isidro Long MD   1 tablet at 07/26/21 2201    albuterol sulfate  (90 Base) MCG/ACT inhaler 2 puff  2 puff Inhalation Q6H PRN Gabby Gomez MD        heparin (porcine) injection 5,000 Units  5,000 Units Subcutaneous 3 times per day Gabby Gomez MD   5,000 Units at 07/28/21 1438    [Held by provider] hydrALAZINE (APRESOLINE) tablet 10 mg  10 mg Oral 3 times per day Gabby Gomez MD   10 mg at 07/26/21 2202    rOPINIRole (REQUIP) tablet 0.25 mg  0.25 mg Oral Nightly Gabby Gomez MD   0.25 mg at 07/26/21 2224    acetaminophen (TYLENOL) tablet 650 mg  650 mg Oral Q4H PRN Deedee Gutierrez MD   650 mg at 07/25/21 0823    senna ([address]) tablet 17.2 mg  2 tablet Oral Daily PRN Anh Cohen MD        bisacodyl (DULCOLAX) suppository 10 mg  10 mg Rectal Daily PRN Anh Cohen MD        insulin lispro (HUMALOG) injection vial 0-6 Units  0-6 Units Subcutaneous TID WC Anh Cohen MD   1 Units at 21 1707    insulin lispro (HUMALOG) injection vial 0-3 Units  0-3 Units Subcutaneous Nightly Anh Cohen MD   1 Units at 21 2201    glucose (GLUTOSE) 40 % oral gel 15 g  15 g Oral PRN Anh Cohen MD        dextrose 50 % IV solution  12.5 g Intravenous PRN Anh Cohen MD        glucagon (rDNA) injection 1 mg  1 mg Intramuscular PRN Anh Cohen MD        dextrose 5 % solution  100 mL/hr Intravenous PRN Anh Cohen MD           Allergies:  Patient has no known allergies. Social History:   reports that he quit smoking about 8 years ago. He started smoking about 64 years ago. He has a 42.00 pack-year smoking history. He has never used smokeless tobacco. He reports that he does not drink alcohol and does not use drugs. Family History: Hypertension    REVIEW OF SYSTEMS:      Patient's review of system is limited due to mental status     PHYSICAL EXAM:        /79   Pulse 64   Temp 97 °F (36.1 °C) (Axillary)   Resp 16   Ht 5' 10\" (1.778 m)   Wt 214 lb 4.6 oz (97.2 kg)   SpO2 100%   BMI 30.75 kg/m²    Temp (24hrs), Av.4 °F (36.3 °C), Min:97 °F (36.1 °C), Max:97.8 °F (36.6 °C)      General appearance -ill appearing, no in pain or distress , he is sleepy and hard to get any information from. Neck - supple, no significant adenopathy   Lymphatics - no palpable lymphadenopathy, no hepatosplenomegaly   Chest -decreased breathing sounds  Heart - normal rate, regular rhythm, normal S1, S2, no murmurs  Abdomen - soft, nontender, nondistended, no masses or organomegaly   Neurological -speech altered.   Patient is lethargic   Musculoskeletal - no joint tenderness, deformity or swelling   Extremities -right upper and lower extremity contracture  Skin - normal coloration and turgor, no rashes, no suspicious skin lesions noted ,      DATA:      Labs:     Lab Results   Component Value Date    WBC 9.2 07/28/2021    HGB 13.7 07/28/2021    HCT 43.9 07/28/2021    MCV 77.9 (L) 07/28/2021     (L) 07/28/2021     plt 42  IMPRESSION:   Primary Problem  <principal problem not specified>    Active Hospital Problems    Diagnosis Date Noted    Acute CVA (cerebrovascular accident) ([facility] [I63.9] 07/20/2021    Acute idiopathic thrombocytopenic purpura ([facility] [D69.3] 07/16/2021    CAD S/P percutaneous coronary angioplasty [I25.10, Z98.61] 07/13/2021    Cerebrovascular accident (CVA) ([facility] [I63.9] 07/13/2021    HTN (hypertension) [I10] 07/13/2021    Hyperlipidemia [E78.5] 07/13/2021    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 Continues rehab. Patient decided not to take any steroids. He is also refusing having labs done. He finally agreed on repeating his labs once. Platelets are 783. No contraindication to anticoagulation. [facility]                            This note is created with the assistance of a speech recognition program.  While intending to generate a document that actually reflects the content of the visit, the document can still have some errors including those of syntax and sound a like substitutions which may escape proof reading. It such instances, actual meaning can be extrapolated by contextual diversion.

## 2021-07-28 NOTE — PLAN OF CARE
Nutrition Problem #1: Predicted inadequate energy intake  Intervention: Food and/or Nutrient Delivery: Continue Current Diet, Continue Oral Nutrition Supplement  Nutritional Goals: po intake greater than 50%

## 2021-07-28 NOTE — PLAN OF CARE
Problem: Skin Integrity:  Goal: Will show no infection signs and symptoms  Description: Will show no infection signs and symptoms  Outcome: Ongoing     Problem: Skin Integrity:  Goal: Absence of new skin breakdown  Description: Absence of new skin breakdown  Outcome: Ongoing     Problem: Falls - Risk of:  Goal: Will remain free from falls  Description: Will remain free from falls  Outcome: Ongoing     Problem: Falls - Risk of:  Goal: Absence of physical injury  Description: Absence of physical injury  Outcome: Ongoing     Problem: Musculor/Skeletal Functional Status  Goal: Highest potential functional level  Outcome: Ongoing     Problem: Musculor/Skeletal Functional Status  Goal: Absence of falls  Outcome: Ongoing

## 2021-07-28 NOTE — PROGRESS NOTES
[place] [id number] Internal Medicine    CONSULTATION / HISTORY AND PHYSICAL EXAMINATION            Date:   7/28/2021  Patient name:  Yrn Teran  Date of admission:  7/20/2021  6:49 PM  MRN:   [id number]  Account:  [de-identified]  YOB: 1945  PCP:    No primary care provider on file.   Room:   [address] [place] [address]  Code Status:    Full Code    Physician Requesting Consult: Gerber Romero MD    Reason for Consult:  medical management    Chief Complaint:       Right hemipareisi  History Obtained From:     Patient medical record nursing staff    History of Present Illness:   History is extremely limited, patient was extremely agitated overnight, was given Ativan around 2 AM in the night, patient very sleepy, not answering questions  Most of history is retrieved from E HR  Patient was recently admitted to [facility] with right-sided weakness, right-sided facial weakness, speech difficulties  Patient underwent MRI brain, concerning for acute infarct in left and right basal ganglia  CT head and neck, was concerning for severe stenosis in the proximal A3 segment of RAHEEM bilaterally  During hospital stay, patient had thrombocytopenia, was evaluated by hematologist, getting treated with IV steroids, and lines of immune thrombocytopenia      Past Medical History:     Past Medical History:   Diagnosis Date    Centrilobular emphysema ([address]     COPD (chronic obstructive pulmonary disease) ([address]     Depression     Diabetes mellitus ([address]     pt refuses to take previously prescribed metformin (states PCP aware)    Former smoker     Hyperlipidemia     on 4/27/18 pt states \"I stopped taking that about a year ago\"    Hypertension     Mixed restrictive and obstructive lung disease ([address]     Need for pneumococcal vaccine     Personal history of tobacco use         Past Surgical History:     Past Surgical History:   Procedure Laterality Date    CARDIAC SURGERY  07/2015    1 stent    NECK SURGERY      TOE AMPUTATION Right greater        Medications Prior to Admission:     Prior to Admission medications    Medication Sig Start Date End Date Taking?  Authorizing Provider   Heparin Sodium, Porcine, (HEPARIN, PORCINE,) 5000 UNIT/ML injection Inject 1 mL into the skin every 8 hours 7/20/21   Lazaro Tavarez MD   QUEtiapine (SEROQUEL) 25 MG tablet Take 1 tablet by mouth nightly as needed for Agitation 7/20/21 7/25/21  Lazaro Tavarez MD   methylPREDNISolone sodium (SOLU-MEDROL) 40 MG injection Infuse 1 mL intravenously every 12 hours 7/20/21   Lazaro Tavarez MD   melatonin 3 MG TABS tablet Take 1 tablet by mouth nightly as needed (insomnia) 7/20/21   Lazaro Tavarez MD   atorvastatin (LIPITOR) 80 MG tablet Take 0.5 tablets by mouth daily (Pt takes one-half of an 80mg tab = 40mg) 7/16/21   Lazaro Tavarez MD   tiZANidine (ZANAFLEX) 2 MG tablet Take 1 tablet by mouth 4 times daily as needed (muscle spasms) 5/7/21   JOLIE Felix CNP   naproxen (NAPROSYN) 500 MG tablet Take 1 tablet by mouth 2 times daily (with meals) 1/4/21   Martha Chamberlain PA-C   ibuprofen (ADVIL;MOTRIN) 800 MG tablet Take 1 tablet by mouth every 8 hours as needed for Pain 6/2/20   Bruce Steven MD   lidocaine (LIDODERM) 5 % Place 1 patch onto the skin daily 12 hours on, 12 hours off. 6/2/20   Bruce Steven MD   metoprolol succinate (TOPROL XL) 50 MG extended release tablet Take 50 mg by mouth daily    Historical Provider, MD   tiotropium (SPIRIVA RESPIMAT) 2.5 MCG/ACT AERS inhaler Inhale 2 puffs into the lungs daily    Historical Provider, MD   carboxymethylcellulose 1 % ophthalmic solution Place 1 drop into both eyes 3 times daily as needed for Dry Eyes     Historical Provider, MD   ketotifen (ZADITOR) 0.025 % ophthalmic solution Place 1 drop into both eyes 2 times daily as needed (itchy eyes)     Historical Provider, MD   isosorbide mononitrate (IMDUR) 60 MG extended release tablet Take 30 mg by mouth daily Indications: 1/2 tablet (30mg)     Historical Provider, MD   finasteride (PROSCAR) 5 MG tablet Take 5 mg by mouth daily    Historical Provider, MD   tamsulosin (FLOMAX) 0.4 MG capsule Take 0.4 mg by mouth daily    Historical Provider, MD   fluticasone (FLONASE) 50 MCG/ACT nasal spray 1 spray by Nasal route 2 times daily  Patient taking differently: 1 spray by Nasal route 2 times daily as needed for Rhinitis  5/31/16   Francisco Connor,    albuterol sulfate  (90 BASE) MCG/ACT inhaler Inhale 2 puffs into the lungs every 6 hours as needed for Wheezing    Historical Provider, MD   albuterol (PROVENTIL) (2.5 MG/3ML) 0.083% nebulizer solution Take 2.5 mg by nebulization every 6 hours as needed for Wheezing    Historical Provider, MD   aspirin 81 MG EC tablet Take 81 mg by mouth daily    Historical Provider, MD   losartan (COZAAR) 100 MG tablet Take 100 mg by mouth daily     Historical Provider, MD   nitroGLYCERIN (NITROSTAT) 0.4 MG SL tablet Place 0.4 mg under the tongue every 5 minutes as needed for Chest pain    Historical Provider, MD   FLUoxetine (PROZAC) 20 MG capsule Take 40 mg by mouth daily     Historical Provider, MD   furosemide (LASIX) 20 MG tablet Take 20 mg by mouth daily as needed (swelling)     Historical Provider, MD   clopidogrel (PLAVIX) 75 MG tablet Take 75 mg by mouth daily    Historical Provider, MD   rOPINIRole (REQUIP) 0.25 MG tablet Take 0.25 mg by mouth nightly as needed     Historical Provider, MD   budesonide-formoterol (SYMBICORT) 160-4.5 MCG/ACT AERO Inhale 2 puffs into the lungs 2 times daily. Historical Provider, MD        Allergies:     Patient has no known allergies. Social History:     Tobacco:    reports that he quit smoking about 8 years ago. He started smoking about 64 years ago. He has a 42.00 pack-year smoking history. He has never used smokeless tobacco.  Alcohol:      reports no history of alcohol use.   Drug Use:  reports no history of drug use.    Family History:     No family history on file. Review of Systems:   Cannot be done because of patient condition    Physical Exam:     /79   Pulse 64   Temp 97 °F (36.1 °C) (Axillary)   Resp 16   Ht 5' 10\" (1.778 m)   Wt 214 lb 4.6 oz (97.2 kg)   SpO2 100%   BMI 30.75 kg/m²   Temp (24hrs), Av.5 °F (36.4 °C), Min:97 °F (36.1 °C), Max:97.8 °F (36.6 °C)    Recent Labs     21  1051 21  1555 21  1033   POCGLU 135* 130* 119* 161*       Intake/Output Summary (Last 24 hours) at 2021 1502  Last data filed at 2021 0900  Gross per 24 hour   Intake 120 ml   Output --   Net 120 ml       General Appearance: Very sleepy, not answering questions  Mental status: Not oriented to person, place, and time with normal affect  Head:  normocephalic, atraumatic. Eye: no icterus, redness, pupils equal and reactive, extraocular eye movements intact, conjunctiva clear  Ear: normal external ear, no discharge, hearing intact  Nose:  no drainage noted  Mouth: mucous membranes moist  Neck: supple, no carotid bruits, thyroid not palpable  Lungs: Bilateral equal air entry, clear to ausculation, no wheezing, rales or rhonchi, normal effort  Cardiovascular: normal rate, regular rhythm, no murmur, gallop, rub.   Abdomen: Soft, nontender, nondistended, normal bowel sounds, no hepatomegaly or splenomegaly  Neurologic: Weakness in right upper and lower extremity, speech difficulties, right sided facial weakness  Skin: No gross lesions, rashes, bruising or bleeding on exposed skin area  Extremities:  peripheral pulses palpable, no pedal edema or calf pain with palpation    Investigations:      Laboratory Testing:  Recent Results (from the past 24 hour(s))   POC Glucose Fingerstick    Collection Time: 21  3:55 PM   Result Value Ref Range    POC Glucose 130 (H) 75 - 110 mg/dL   POC Glucose Fingerstick    Collection Time: 21  8:24 PM   Result Value Ref Range    POC Glucose 119 (H) 75 - 110 mg/dL   POC Glucose Fingerstick    Collection Time: 07/28/21 10:33 AM   Result Value Ref Range    POC Glucose 161 (H) 75 - 110 mg/dL           Consultations:   IP CONSULT TO DIETITIAN  IP CONSULT TO SOCIAL WORK  IP CONSULT TO INTERNAL MEDICINE  IP CONSULT TO ONCOLOGY  IP CONSULT TO INTERNAL MEDICINE  Assessment :      Primary Problem  <principal problem not specified>    Active Hospital Problems    Diagnosis Date Noted    [facility]A [facility]cerebrovascular accident) [facility] [I63.9] 07/20/2021    Acute idiopathic thrombocytopenic purpura ([facility] [D69.3] 07/16/2021    CAD S/P percutaneous coronary angioplasty [I25.10, Z98.61] 07/13/2021    Cerebrovascular accident (CVA) ([facility] [I63.9] 07/13/2021    HTN (hypertension) Luis Eduardo Sinha 07/13/2021    Hyperlipidemia [E78.5] 07/13/2021    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 Acute ischemic stroke, patient is on aspirin, Plavix, Crestor  2. 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 On DVT prophylaxis with heparin  4. Hypertension, controlled  5. Diabetes, controlled  1 episode of bradycardia, will follow, may be due to benzodiazepine . 6. Incidental finding of thyroid nodule on CTA head and neck, will need outpatient ultrasound thyroid and biopsy  Dysphagia diet  Right hemipareis with speech dif  htn improved    augmentin for sinusitis  Prednisone for ? ITP  Flat affect depression on prozac    Needs a lot of encouragement to eat  Asa plavix rectally or ng tube if pt refused  Watch platelet  Check cortisl levels  At risk of addisonian crisis      Brad Harris MD  7/28/2021  3:02 PM    Copy sent to Dr. Gali Linares primary care provider on file. Please note that this chart was generated using voice recognition Dragon dictation software. Although every effort was made to ensure the accuracy of this automated transcription, some errors in transcription may have occurred.

## 2021-07-28 NOTE — PROGRESS NOTES
[facility]: DEREK BERRY   ACUTE REHABILITATION OCCUPATIONAL THERAPY  DAILY NOTE    Date: 21  Patient Name: Lorena Cohen      Room: 8547/9061-25    MRN: [id number]   : 1945  (72 y.o.)  Gender: male   Referring Practitioner: Dr. Saul  Diagnosis: Ischemic CVA with R dominant hemiplegia  Additional Pertinent Hx: Lorena Cohen is a 77 y.o. RHD male admitted to [facility] on 2021. On admit, exam significant for right-sided facial droop, right-sided weakness and severe dysarthria. Significant history with recurrent strokes, remote left temporal lobe ischemic infarct and remote bilateral occipital lobe infarcts . MRI shows Bilateral basal ganglia ischemic infarcts. Pt admitted to rehab unit on 21    Restrictions  Restrictions/Precautions: Up as Tolerated, General Precautions, Fall Risk, Swallowing - Thickened Liquids (1:1 sitter, Mildly thick (Nectar thick))  Other position/activity restrictions: Up w/assistance, RU/LE Weakness. MRI BRAIN- Acute infarct in the left and right basal ganglia greater on the left. Required Braces or Orthoses?: No    Subjective  Subjective: Pt remained silent throughout OT AM and PM treatments. Restrictions/Precautions: Up as Tolerated;General Precautions; Fall Risk;Swallowing - Thickened Liquids (1:1 sitter, Mildly thick (Nectar thick))  Overall Orientation Status: Impaired  Orientation Level: Unable to assess (Pt does not answer)          Objective  Perception  Overall Perceptual Status: Impaired  Unilateral Attention: Cues to attend to right side of body;Cues to maintain midline in sitting  Initiation: Cues to initiate tasks  Motor Planning: Hand over hand to sequence tasks  Balance  Sitting Balance: Contact guard assistance  Standing Balance: Maximum assistance (CGA X2- Max A X2)  Bed mobility  Supine to Sit: Maximum assistance  Scooting: Maximal assistance (to EOB)  Comment: A with trunk and BLE.  Max verbal and tactile cues with patient demonstrating poor participation. Transfers  Stand Pivot Transfers: Minimal assistance;2 Person assistance  Sit to stand: Maximum assistance  Stand to sit: Maximum assistance  Transfer Comments: Verbal cues with pt demonstrating poor participation in therapy on this date. Standing Balance  Time: AM: 2-3 minutes; PM: 2-3 minutes  Activity: AM: lower body dressing, standing EOB; PM: RUE weight bearing  Comment: max X2 progressing to CGA        Type of ROM/Therapeutic Exercise  Type of ROM/Therapeutic Exercise: PROM  Comment: TIPTON provided PROM for RUE through all planes of motion for increased ROM and decreased risk for contracture. Pt tolerates with no reports of pain but closes his eyes throughout. decreased ROM noted for elbow extension  Exercises  Shoulder Flexion: x  Shoulder Extension: x  Horizontal ABduction: x  Horizontal ADduction: x  Elbow Flexion: x  Elbow Extension: x  Wrist Flexion: x  Wrist Extension: x  Finger Flexion: x  Finger Extension: x  Weight Bearing  Weight Bearing Technique: Yes  RUE Weight Bearing: Extended arm standing  Response To Weight Bearing Technique: Pt instruction in RUE weight bearing into xs theraputty for improved neuro feedback and functionality. Pt requires assist for extension of RUE and stabiliztion of elbow and hand in place with good effort noted. Additional Activities: PM: pt instruction in cognitive and [address] task to place and match pegs into foam board retrieving pegs from R side. Pt demos ih lateral pinch for grasping pegs and requries VC for continued attention to R side. Is able to match colors with 100% accuracy. ADL  Feeding: Maximum assistance;Bringing food to mouth assist;Increased time to complete; Adaptive utensils (comment); Scoop assist;Thickened liquids  Grooming: Contact guard assistance (washing face only)  UE Bathing: None  LE Bathing: Dependent/Total (chris area completed supine in bed, buttocks tanding at hemiw)  UE Dressing: Maximum assistance (to lori [place] shirt)  LE Dressing: Dependent/Total  Toileting: Dependent/Total (brief change this AM)  Additional Comments: Pt lying in bed upon OT arrival. Pt demonstrated resistance to participation in occupational therapy on this date. Pt required max encouragement and cueing to participate in therapy with pt demonstrating poor participation. TIPTON initiated movement for pt to sit EOB. Pt reqruies max A for threading pants. Then stans at usman walker requiring assist for pulling over hips with max A X2 progressing to CGA X2 for safe standing balance. Pt akes increased time for stnad pivot transfr into w/c. Then requires setup and mod assit for self feeding. TIPTON places loaded utensil in L hand, CGA guiding to mouth. Pt able to reach for cup of thickened liquid. Pt takes 1 bite of waffle and drinks juice cup, then declines to continue eating. Assessment  Performance deficits / Impairments: Decreased ADL status; Decreased functional mobility ; Decreased ROM; Decreased strength;Decreased safe awareness;Decreased cognition;Decreased endurance;Decreased sensation;Decreased balance;Decreased high-level IADLs;Decreased fine motor control;Decreased coordination  Assessment: motor plan and decreased problem solving re new deficits and likely visual deficits impact adl performance but fair participation with step by step directions. Prognosis: Fair  Discharge Recommendations: Patient would benefit from continued therapy after discharge;24 hour supervision or assist  Activity Tolerance: Treatment limited secondary to decreased cognition  Safety Devices in place: Yes  Type of devices: All fall risk precautions in place; Left in chair;Call light within reach;Gait belt (Pt left with PTA  at end of session)  Restraints  Initially in place: No  Equipment Recommendations  Equipment Needed: Yes  Mallika Dsouza: Usman-walker  Transfer Tub Bench: w/back       Patient Education: OT POC, modified techniques for ADLs, safety with functional transfers and use of karena walker  Patient Goals   Patient goals : \"I want to go home\"  Learner:patient  Method: explanation       Outcome: acknowledged understanding of  and needs reinforcement        Plan  Plan  Times per week: 900/7  Times per day: Twice a day  Current Treatment Recommendations: Self-Care / ADL, Strengthening, ROM, Balance Training, Functional Mobility Training, Endurance Training, Neuromuscular Re-education, Cognitive Reorientation, Pain Management, Safety Education & Training, Patient/Caregiver Education & Training, Equipment Evaluation, Education, & procurement, Home Management Training, Cognitive/Perceptual Training  Plan Comment: 900 minutes/week for combined therapy of PT/OT/ST due to decreased tolerance to activity.   Patient Goals   Patient goals : \"I want to go home\"  Short term goals  Time Frame for Short term goals: By 10 days  Short term goal 1: Pt will complete upper body dressing/bathing with Min A and good attention to task  Short term goal 2: Pt will complete lower body dressing/bathing with max A and good safety with use of AE as needed  Short term goal 3: Pt will complete functional transfers during self care tasks with mod A x 1 and good safety  Short term goal 4: Pt will tolerate standing 4+ minutes during functional activity of choice with min A and good safety  Short term goal 5: Pt will participate in 30+ minutes of therapeutic exercises/functional activities to increase safety and independence with self care tasks  Short term goal 6: Pt will complete self feeding with min A and good attention to task  Long term goals  Time Frame for Long term goals : By discharge  Long term goal 1: Pt will complete upper body dressing with set-up assistance and good attention to task  Long term goal 2: Pt will complete lower body dressing/bathing with min A and good safety with use of AE as needed  Long term goal 3: Pt will complete functional transfers/mobility during self care tasks with min A and good safety  Long term goal 4: Pt will tolerate standing 8+ minutes during functional activity of choice with CGA and good safety  Long term goal 5: Pt will verbalize/demonstrate good understanding of adaptive equipment/adaptive strategies/durable medical equipment to increase safety and independence with self care and mobility  Long term goals 6: Pt will complete self feeding with set-up assistance and good attention to task  Long term goal 7: Vision to be further assessed        07/28/21 1223 07/28/21 1527   OT Individual Minutes   Time In [facility]   Time Out 0930 1305   Minutes 42 33     Electronically signed by FLORIAN Mcdonald on 7/28/21 at 3:28 PM EDT

## 2021-07-28 NOTE — PLAN OF CARE
Problem: Infection:  Goal: Will remain free from infection  Outcome: Ongoing     Problem: Safety:  Goal: Free from accidental physical injury  Outcome: Ongoing     Problem: Safety:  Goal: Free from intentional harm  Outcome: Ongoing     Problem: Daily Care:  Goal: Daily care needs are met  Outcome: Ongoing     Problem: Pain:  Goal: Patient's pain/discomfort is manageable  Outcome: Ongoing     Problem: Falls - Risk of:  Goal: Will remain free from falls  Outcome: Ongoing     Problem: Falls - Risk of:  Goal: Absence of physical injury  Outcome: Ongoing     Problem: Nutrition  Goal: Optimal nutrition therapy  7/28/2021 1846 by Alanna Cisneros RN  Outcome: Ongoing     Problem: Musculor/Skeletal Functional Status  Goal: Highest potential functional level  Outcome: Ongoing

## 2021-07-28 NOTE — PROGRESS NOTES
Speech Language Pathology  Speech Language Pathology  [facility]    Speech Language/DysphagiaTreatment Note    Date: 7/28/2021  Patients Name: Lucero Credit  MRN: [id number]  Diagnosis:   Patient Active Problem List   Diagnosis Code    Centrilobular emphysema (HCC) J43.2    Mixed restrictive and obstructive lung disease ([facility] [address] J43.9, J98.4    Cerebrovascular accident (CVA) ([facility] [address] I63.9    COPD (chronic obstructive pulmonary disease) ([facility] [address] J44.9    HTN (hypertension) I10    Diabetes 1.5, managed as type 2 ([facility] [address] E13.9    Hyperlipidemia E78.5    CAD S/P percutaneous coronary angioplasty I25.10, Z98.61    Rhabdomyolysis M62.82    Intracranial atherosclerosis I67.2    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 not responding to any writer questions despite MAX cues. Attempted yes/no responses with \"thumbs up\" or \"thumbs down\" response (as PCT reporting Pt. was doing this earlier today). Pt. still not responding. Reading Comprehension: n/a    Written Language: n/a    Speech: Pt not responding to verbal stimuli, remaining with eyes closed, non verbal throughout entire session. Per PCT, Pt. has been non verbal all day today. Voice: Pt. did not vocalize or verbalize during session. Dysphagia: PCT reporting Pt. gave him a thumbs up when was asked if wanted coffee earlier. PCT brought coffee to room. Pt. refused to take any sips of coffee with writer. Pt. refused trials of any PO from breakfast tray.   Per PCT, Pt. tolerated few bites/sips from breakfast tray earlier without s/s of aspiration. Extensive education provided re: rationale for ST (to address cognition/speech/dysphagia), goals while in ARU, and importance of participation in therapy. Pt. not responding to writer education and continued to refuse to participate. RN and Dr. Sanford Moore notified. Per RN, Pt. Has been refusing medications and has not spoken to her today. ST to continue to follow. Question if noncompliance is behavioral ?      Plan:  [x] Continue ST services    [] Discharge from ST:      Discharge recommendations: [] Inpatient Rehab   [] [place]   [] Outpatient Therapy  [] Follow up at trauma clinic   [] Other:       Treatment completed by:  Rashida Ceballos M.A., CCC-SLP

## 2021-07-28 NOTE — PROGRESS NOTES
Physical Medicine & Rehabilitation  Progress Note      Subjective: Crispin Driver is a 77 y.o. male with ischemic CVA left PCA, RAHEEM, and MCA with right dominant hemiparesis. Patient seen in therapy gym today. He is minimally interactive with evaluation. Does not answer questions today. Physical therapist states that he walked all the way down the gym hallway. The 1:1 states that he slept good, per report, and ate about half of his breakfast.  Nursing reports that he has not been impulsive over the last few days - discussed trialing telesitter in room with 1:1 as a backup for 24 hours. If everything goes okay - will consider removing 1:1 and keeping telesitter. ROS:  Unable to assess    Rehabilitation:   Progressing in therapies. PT:  Restrictions/Precautions: Up as Tolerated, General Precautions, Fall Risk, Swallowing - Thickened Liquids (1:1 sitter, Mildly thick (Nectar thick))  Other position/activity restrictions: Up w/assistance, RU/LE Weakness. MRI BRAIN- Acute infarct in the left and right basal ganglia greater on the left. Transfers  Sit to Stand: 2 Person Assistance, Moderate Assistance (miwalker & walker lift)  Stand to sit: 2 Person Assistance, Minimal Assistance (Hemiwalker & walker lift; able to hold himself up, reach back during use of walker lift)  Bed to Chair: Minimal assistance, 2 Person Assistance  Stand Pivot Transfers: Minimal Assistance (Hemiwalker)  Comment: stand pivot bed to chair with Hemiwalker at mod/max A x 2  Ambulation 1  Surface: level tile  Device: Hemiwalker  Other Apparatus: Right, AFO, Wheelchair follow  Assistance: 2 Person assistance, Maximum assistance, Moderate assistance  Quality of Gait: Forward flexed posture. Initiates advancement of R LE but requires Max A to complete/encourage weight shift to L. Min A to advance walker. Visual cues for step length with Fair L response.    Gait Deviations: Slow Anais  Distance: 13' PM  Comments: Slight pitting edema noted after amb with R AFO    Transfers  Sit to Stand: 2 Person Assistance, Moderate Assistance (miwalker & walker lift)  Stand to sit: 2 Person Assistance, Minimal Assistance (Hemiwalker & walker lift; able to hold himself up, reach back during use of walker lift)  Bed to Chair: Minimal assistance, 2 Person Assistance  Stand Pivot Transfers: Minimal Assistance (Hemiwalker)  Comment: stand pivot bed to chair with Hemiwalker at mod/max A x 2  Ambulation  Ambulation?: Yes  More Ambulation?: Yes  Ambulation 1  Surface: level tile  Device: Hemiwalker  Other Apparatus: Right, AFO, Wheelchair follow  Assistance: 2 Person assistance, Maximum assistance, Moderate assistance  Quality of Gait: Forward flexed posture. Initiates advancement of R LE but requires Max A to complete/encourage weight shift to L. Min A to advance walker. Visual cues for step length with Fair L response. Gait Deviations: Slow Anais  Distance: 13' PM  Comments: Slight pitting edema noted after amb with R AFO    Surface: level tile  Ambulation 1  Surface: level tile  Device: Hemiwalker  Other Apparatus: Right, AFO, Wheelchair follow  Assistance: 2 Person assistance, Maximum assistance, Moderate assistance  Quality of Gait: Forward flexed posture. Initiates advancement of R LE but requires Max A to complete/encourage weight shift to L. Min A to advance walker. Visual cues for step length with Fair L response.    Gait Deviations: Slow Anais  Distance: 13' PM  Comments: Slight pitting edema noted after amb with R AFO    OT:  ADL  Feeding: Maximum assistance, Bringing food to mouth assist, Increased time to complete, Adaptive utensils (comment), Scoop assist, Thickened liquids  Grooming: Contact guard assistance (washing face only)  UE Bathing: None  LE Bathing: Dependent/Total (chris area completed supine in bed, buttocks tanding at hemiw)  UE Dressing: Maximum assistance (to lori [place] shirt)  LE Dressing: Dependent/Total  Toileting: Dependent/Total (brief change this AM)  Additional Comments: Pt lying in bed upon OT arrival. Pt demonstrated resistance to participation in occupational therapy on this date. Pt required max encouragement and cueing to participate in therapy with pt demonstrating poor participation. TIPTON initiated movement for pt to sit EOB. Pt reqruies max A for threading pants. Then stans at karena walker requiring assist for pulling over hips with max A X2 progressing to CGA X2 for safe standing balance. Pt akes increased time for stnad pivot transfr into w/c. Then requires setup and mod assit for self feeding. TIPTON places loaded utensil in L hand, CGA guiding to mouth. Pt able to reach for cup of thickened liquid. Pt takes 1 bite of waffle and drinks juice cup, then declines to continue eating. Balance  Sitting Balance: Contact guard assistance  Standing Balance: Maximum assistance (CGA X2- Max A X2)   Standing Balance  Time: AM: 2-3 minutes; PM: 2-3 minutes  Activity: AM: lower body dressing, standing EOB; PM: RUE weight bearing  Comment: max X2 progressing to CGA        Bed mobility  Rolling to Left: Maximum assistance  Rolling to Right: Moderate assistance  Supine to Sit: Maximum assistance (assist with trunk and B LE)  Sit to Supine: Maximum assistance  Scooting: Maximal assistance (to EOB)  Comment: A with trunk and BLE. Max verbal and tactile cues with patient demonstrating poor participation. Transfers  Stand Pivot Transfers: Minimal assistance, 2 Person assistance  Sit to stand: Maximum assistance  Stand to sit: Maximum assistance  Transfer Comments: Verbal cues with pt demonstrating poor participation in therapy on this date. Toilet Transfers  Toilet - Technique: Stand pivot  Equipment Used: Raised toilet seat with rails  Toilet Transfer: Maximum assistance, 2 Person assistance (max A + mod A)  Toilet Transfers Comments: Verbal cues for safety and hand placement. SPEECH:  Subjective: [x]? Alert     [x]? Cooperative     []? Confused     []? Agitated    [x]? Lethargic        Objective/Assessment:  Auditory Comprehension: Pt. not responding to any writer questions despite MAX cues. Attempted yes/no responses with \"thumbs up\" or \"thumbs down\" response (as PCT reporting Pt. was doing this earlier today). Pt. still not responding. Reading Comprehension: n/a     Written Language: n/a     Speech: Pt not responding to verbal stimuli, remaining with eyes closed, non verbal throughout entire session. Per PCT, Pt. has been non verbal all day today.       Voice: Pt. did not vocalize or verbalize during session.      Dysphagia: PCT reporting Pt. gave him a thumbs up when was asked if wanted coffee earlier. PCT brought coffee to room. Pt. refused to take any sips of coffee with writer. Pt. refused trials of any PO from breakfast tray. Per PCT, Pt. tolerated few bites/sips from breakfast tray earlier without s/s of aspiration.        Extensive education provided re: rationale for ST (to address cognition/speech/dysphagia), goals while in ARU, and importance of participation in therapy. Pt. not responding to writer education and continued to refuse to participate. RN and Dr. Adalberto Alvarado notified. Per RN, Pt. Has been refusing medications and has not spoken to her today. ST to continue to follow.   Question if noncompliance is behavioral ?      Current Medications:   Current Facility-Administered Medications: aspirin chewable tablet 81 mg, 81 mg, Oral, Lunch  clopidogrel (PLAVIX) tablet 75 mg, 75 mg, Oral, Lunch  finasteride (PROSCAR) tablet 5 mg, 5 mg, Oral, Lunch  FLUoxetine (PROZAC) capsule 40 mg, 40 mg, Oral, Lunch  isosorbide mononitrate (IMDUR) extended release tablet 30 mg, 30 mg, Oral, Lunch  polyethylene glycol (GLYCOLAX) packet 17 g, 17 g, Oral, Lunch  tamsulosin (FLOMAX) capsule 0.4 mg, 0.4 mg, Oral, Lunch  tiotropium (SPIRIVA RESPIMAT) 2.5 MCG/ACT inhaler 2 puff, 2 puff, Inhalation, Lunch  melatonin tablet 6 mg, 6 mg, Oral, Nightly  [Held by provider] losartan (COZAAR) tablet 100 mg, 100 mg, Oral, Daily  rosuvastatin (CRESTOR) tablet 40 mg, 40 mg, Oral, Nightly  famotidine (PEPCID) tablet 20 mg, 20 mg, Oral, BID  [Held by provider] predniSONE (DELTASONE) tablet 40 mg, 40 mg, Oral, Daily  albuterol sulfate  (90 Base) MCG/ACT inhaler 2 puff, 2 puff, Inhalation, Q6H PRN  heparin (porcine) injection 5,000 Units, 5,000 Units, Subcutaneous, 3 times per day  [Held by provider] hydrALAZINE (APRESOLINE) tablet 10 mg, 10 mg, Oral, 3 times per day  rOPINIRole (REQUIP) tablet 0.25 mg, 0.25 mg, Oral, Nightly  acetaminophen (TYLENOL) tablet 650 mg, 650 mg, Oral, Q4H PRN  senna (SENOKOT) tablet 17.2 mg, 2 tablet, Oral, Daily PRN  bisacodyl (DULCOLAX) suppository 10 mg, 10 mg, Rectal, Daily PRN  insulin lispro (HUMALOG) injection vial 0-6 Units, 0-6 Units, Subcutaneous, TID WC  insulin lispro (HUMALOG) injection vial 0-3 Units, 0-3 Units, Subcutaneous, Nightly  glucose (GLUTOSE) 40 % oral gel 15 g, 15 g, Oral, PRN  dextrose 50 % IV solution, 12.5 g, Intravenous, PRN  glucagon (rDNA) injection 1 mg, 1 mg, Intramuscular, PRN  dextrose 5 % solution, 100 mL/hr, Intravenous, PRN      Objective:  /83   Pulse 63   Temp 97.9 °F (36.6 °C) (Oral)   Resp 16   Ht 5' 10\" (1.778 m)   Wt 214 lb 4.6 oz (97.2 kg)   SpO2 99%   BMI 30.75 kg/m²       GEN: Well developed, well nourished, no acute distress  HEENT: NCAT. Eyes mostly closed throughout evaluation. Hearing grossly intact. RESP: Normal breath sounds with no wheezing, rales, or rhonchi. Respirations WNL and unlabored. CV: Regular rate and rhythm. No murmurs, rubs, or gallops. ABD: Soft, non-distended, BS+ and equal.  NEURO:  Eyes mostly closed throughout evaluation. Not answering questions. MSK:  Muscle bulk is normal bilaterally. No movement noted in right upper limb. Strength 4-/5 in left upper limb.   Strength 1/5 with right hip adduction; strength at least 2/5 with left hip adduction. Extends left knee slightly against gravity. LIMBS: No edema in bilateral lower limbs. SKIN: Warm and dry with good turgor. PSYCH: Flat affect. Diagnostics:     CBC:   Recent Labs     07/26/21  0715 07/28/21  1847   WBC 14.0* 9.2   RBC 5.01 5.64   HGB 12.0* 13.7   HCT 38.9* 43.9   MCV 77.7* 77.9*   RDW 17.8* 18.0*   * 122*     BMP:   Recent Labs     07/28/21 2006      K 4.3      CO2 23   BUN 23   CREATININE 1.40*   GLUCOSE 117*     BNP: No results for input(s): BNP in the last 72 hours. PT/INR: No results for input(s): PROTIME, INR in the last 72 hours. APTT: No results for input(s): APTT in the last 72 hours. CARDIAC ENZYMES: No results for input(s): CKMB, CKMBINDEX, TROPONINT in the last 72 hours. Invalid input(s): CKTOTAL;3  FASTING LIPID PANEL:  Lab Results   Component Value Date    CHOL 186 07/14/2021    HDL 42 07/14/2021    TRIG 77 07/14/2021     LIVER PROFILE: No results for input(s): AST, ALT, ALB, BILIDIR, BILITOT, ALKPHOS in the last 72 hours. Impression/Plan:   Impaired ADLs, gait, and mobility due to:    1. Ischemic CVA with Right dominant hemiparesis: PT/OT for gait, mobility, strengthening, endurance, ADLs, and self care. On ASA, plavix, Crestor. Can consider sleep/wake medication but patient is also refusing therapy/medications. 2. Agitation: Patient failed telesitter over the weekend - resumed 1:1 on 7/25. On Seroquel prn but has not required it since 7/23. Has no sedating medicines on board to be causing drowsiness. Borderline QTc. Telesitter added with 1:1 today - consider discontinuing 1:1 tomorrow. 3. Refusing medications:  Switched daily medications from morning to noon for 7/29 to see if patient will take his medications later in the day. Discussed with IM and pharmacy - will attempt to get patient to take aspirin and plavix (pharmacy stated that these medications would give him protection for a few days at a time).   Psych consult ordered also after discussion with IM. 4. Sinusitis: On Augmentin until 7/28 per IM. 5. Insomnia: Scheduled melatonin started on 7/25  6. Thrombocytopenia: Hematology following. On prednisone - notified hematology that patient has been refusing medications - okay for him to remain off of steroids at this time and monitor platelets. Platelets improved (315 today), OK for DVT prophylaxis  7. Leukocytosis: Improved. Attributed to prednisone. Will monitor. 8. HTN/CAD: On hydralazine (on hold, as patient has not been taking this medication and blood pressure has been okay), Imdur, losartan (also on hold at this time)  9. DM II: On humalog sliding scale  10. COPD:  On tiotropium. Has albuterol prn. 11. Restless Leg Syndrome: On ropinirole  12. Depression: On fluoxetine. Psych consult placed after discussed with IM for any other recommendations due to patient's refusal of medications and limited interaction with staff. 13. GERD: On famotidine BID  14. BPH: On finasteride, tamsulosin  15. Thyroid nodule: For outpatient monitoring  16. Bowel Management: Miralax daily, Senokot prn, Dulcolax prn  17. Internal medicine for medical management  18.  DVT prophylaxis:  On heparin      Electronically signed by Arjun Salamanca MD on 7/29/2021 at 12:09 AM

## 2021-07-28 NOTE — PROGRESS NOTES
Comprehensive Nutrition Assessment    Type and Reason for Visit:  Reassess    Nutrition Recommendations/Plan: Will continue to provide Pureed diet with Nectar Thick Liquids Will continue supplements on all trays. Please continue to encourage pt's po intake. Nutrition Assessment:  Pt continues to refuse most of food provided at meal time despite encouragement/feeding by sitter. Pt consumed 1/2 of [place] at breakfast this AM but refused all of lunch. Malnutrition Assessment:  Malnutrition Status: At risk for malnutrition (Comment)    Context:  Acute Illness     Findings of the 6 clinical characteristics of malnutrition:  Energy Intake:  7 - 50% or less of estimated energy requirements for 5 or more days  Weight Loss:  Unable to assess     Body Fat Loss:  Unable to assess     Muscle Mass Loss:  Unable to assess    Fluid Accumulation:  No significant fluid accumulation     Strength:  Not Performed    Estimated Daily Nutrient Needs:  Energy (kcal): [place] x 1.2= 2100 kcal; Weight Used for Energy Requirements:  Admission     Protein (g):  1.3-1.4 g/kg= 100-105 g; Weight Used for Protein Requirements:  Ideal           Nutrition Related Findings:  no edema, Labs: POC Glu 161, Meds: Reviewed, BM 7/25      Wounds:  None       Current Nutrition Therapies:    ADULT DIET; Dysphagia - Pureed; Mildly Thick (Nectar)  Adult Oral Nutrition Supplement; Frozen Oral Supplement  Adult Oral Nutrition Supplement; Diabetic Oral Supplement    Anthropometric Measures:  · Height: 5' 10\" (177.8 cm)  · Current Body Weight: 214 lb 4.6 oz (97.2 kg)   · Admission Body Weight: 212 lb (96.2 kg)    · Usual Body Weight: 230 lb (104.3 kg) (stated)     · Ideal Body Weight: 166 lbs; BMI: 30.7  · BMI Categories: Obese Class 1 (BMI 30.0-34. 9)       Nutrition Diagnosis:   · Predicted inadequate energy intake related to  (current medical condition) as evidenced by poor intake prior to admission, intake 0-25%    Nutrition Interventions: Food and/or Nutrient Delivery:  Continue Current Diet, Continue Oral Nutrition Supplement  Nutrition Education/Counseling:  No recommendation at this time   Coordination of Nutrition Care:  Continue to monitor while inpatient    Goals:  po intake greater than 50%       Nutrition Monitoring and Evaluation:   Food/Nutrient Intake Outcomes:  Food and Nutrient Intake, Supplement Intake  Physical Signs/Symptoms Outcomes:  Biochemical Data, GI Status, Skin, Weight, Fluid Status or Edema     Discharge Planning:     Too soon to determine     Electronically signed by Becky Troy RD, LD on 7/28/21 at 1:56 PM EDT    Contact: [phone]

## 2021-07-29 NOTE — PROGRESS NOTES
[address]   ACUTE REHABILITATION OCCUPATIONAL THERAPY  DAILY NOTE    Date: 21  Patient Name: Fahad Storm      Room: 5485/3122-88    MRN: [id number]   : 1945  (72 y.o.)  Gender: male   Referring Practitioner: Dr. Mamie Bullard  Diagnosis: Ischemic CVA with R dominant hemiplegia  Additional Pertinent Hx: Fahad Storm is a 77 y.o. RHD male admitted to [facility] on 2021. On admit, exam significant for right-sided facial droop, right-sided weakness and severe dysarthria. Significant history with recurrent strokes, remote left temporal lobe ischemic infarct and remote bilateral occipital lobe infarcts . MRI shows Bilateral basal ganglia ischemic infarcts. Pt admitted to rehab unit on 21    Restrictions  Restrictions/Precautions: Up as Tolerated, General Precautions, Fall Risk, Swallowing - Thickened Liquids (1:1 sitter, Mildly thick (Nectar thick))  Other position/activity restrictions: Up w/assistance, RU/LE Weakness. MRI BRAIN- Acute infarct in the left and right basal ganglia greater on the left. Required Braces or Orthoses?: No    Subjective  Subjective: Pt remains silent with limited participation during OT treatment. Patient Currently in Pain: Other (comment) (Refuses to state)  Restrictions/Precautions: Up as Tolerated;General Precautions; Fall Risk;Swallowing - Thickened Liquids (1:1 sitter, Mildly thick ([place] thick))  Orientation Level: Unable to assess (Pt does not answer)          Objective  Perception  Overall Perceptual Status: Impaired  Unilateral Attention: Cues to attend to right side of body;Cues to maintain midline in sitting  Initiation: Cues to initiate tasks  Motor Planning: Hand over hand to sequence tasks  Balance  Sitting Balance: Contact guard assistance  Standing Balance: Maximum assistance  Bed mobility  Supine to Sit: Maximum assistance  Comment: A with trunk and BLE.  Max verbal and tactile cues with patient demonstrating poor participation  Transfers  Sit to stand: Maximum assistance  Stand to sit: Maximum assistance  Transfer Comments: Verbal cues with pt demonstrating poor participation in therapy on this date. Standing Balance  Time: AM: 1-2 minutes x 2  Activity: AM: functional transfers, lower body dressing/bathing  Comment: Pt demonstrated heavy right side lean with poor participation         Type of ROM/Therapeutic Exercise  Type of ROM/Therapeutic Exercise: PROM  Comment: OT provided PROM for RUE through all planes of motion for increased ROM and decreased risk for contracture. Pt tolerates with no reports of pain but closes his eyes throughout. Decreased ROM noted for elbow extension  Exercises  Shoulder Flexion: x  Shoulder Extension: x  Horizontal ABduction: x  Horizontal ADduction: x  Elbow Flexion: x  Elbow Extension: x  Supination: x  Pronation: x  Wrist Flexion: x  Wrist Extension: x  Finger Flexion: x  Finger Extension: x                       ADL  Feeding: Maximum assistance  Grooming: None  UE Bathing: None  LE Bathing: Dependent/Total (Pt refuse to participate; 2 person A with bottom hygiene)  UE Dressing: Maximum assistance (A with threading; pt pulled overhead)  LE Dressing: Dependent/Total (2 person A while standing. Pt refused to participate)  Toileting: None  Additional Comments: Pt lying in bed upon OT arrival. Pt demonstrated resistance to participation in occupational therapy on this date. Pt required max encouragement and cueing to participate in therapy with pt demonstrating poor participation. OT initiated movement for pt to sit EOB and stand pivot transfer to wheelchair. Pt provided resistance to donning gown. OT provided max verbal cues to assist with donning clean pull up with pt grabbing pull-up and handing it back to OT despite max encouragement. OT provided max verbal and tactile cues to participate in lower body bathing with pt unwilling to grab/use washcloth.  OT initiated stand from wheelchair with adriano BEE and nursing student assist with bottom hygiene and pulling pants up over hips. OT attempted to engage pt in self feeding task. OT provided pt with drink with pt grabbing cup and placing it back on the table. When OT attempted to assist with self feeding, pushed the food away. RN attempted to provide medication in pudding with pt demonstrating increased frustration and agitation at this time. AM session ended due to increased agitation and unwillingness to participate in OT. PM: OT attempted to engage pt in self feeding task this PM with max encouragement. Pts sister in room providing encouragement to engage in eating. OT provided spoon to mouth with pt unwilling to open mouth to eat. Pt took drink cup from OTs hand and placed cup back on tray. Pt unwilling to participate in self feeding despite education and encouragement. OT faciliated pts engagement in donning clean shirt. Pt assisted with bring shirt over head. Pt refused to participate in all other self care tasks. Assessment  Performance deficits / Impairments: Decreased ADL status; Decreased functional mobility ; Decreased ROM; Decreased strength;Decreased safe awareness;Decreased cognition;Decreased endurance;Decreased sensation;Decreased balance;Decreased high-level IADLs;Decreased fine motor control;Decreased coordination  Prognosis: Fair  Discharge Recommendations: Patient would benefit from continued therapy after discharge;24 hour supervision or assist  Activity Tolerance: Treatment limited secondary to decreased cognition  Safety Devices in place: Yes  Type of devices: All fall risk precautions in place;Call light within reach; Chair alarm in place;Gait belt;Patient at risk for falls; Left in chair;Nurse notified (Artem Re in room)          Patient Education: Participation in self care tasks, safety with transfers, PROM RUE  Patient Goals   Patient goals : \"I want to go home\"  Learner:patient  Method: explanation       Outcome: needs reinforcement        Plan  Plan  Times per week: 900/7  Times per day: Twice a day  Current Treatment Recommendations: Self-Care / ADL, Strengthening, ROM, Balance Training, Functional Mobility Training, Endurance Training, Neuromuscular Re-education, Cognitive Reorientation, Pain Management, Safety Education & Training, Patient/Caregiver Education & Training, Equipment Evaluation, Education, & procurement, Home Management Training, Cognitive/Perceptual Training  Plan Comment: 900 minutes/week for combined therapy of PT/OT/ST due to decreased tolerance to activity.   Patient Goals   Patient goals : \"I want to go home\"  Short term goals  Time Frame for Short term goals: By 10 days  Short term goal 1: Pt will complete upper body dressing/bathing with Min A and good attention to task  Short term goal 2: Pt will complete lower body dressing/bathing with max A and good safety with use of AE as needed  Short term goal 3: Pt will complete functional transfers during self care tasks with mod A x 1 and good safety  Short term goal 4: Pt will tolerate standing 4+ minutes during functional activity of choice with min A and good safety  Short term goal 5: Pt will participate in 30+ minutes of therapeutic exercises/functional activities to increase safety and independence with self care tasks  Short term goal 6: Pt will complete self feeding with min A and good attention to task  Long term goals  Time Frame for Long term goals : By discharge  Long term goal 1: Pt will complete upper body dressing with set-up assistance and good attention to task  Long term goal 2: Pt will complete lower body dressing/bathing with min A and good safety with use of AE as needed  Long term goal 3: Pt will complete functional transfers/mobility during self care tasks with min A and good safety  Long term goal 4: Pt will tolerate standing 8+ minutes during functional activity of choice with CGA and good safety  Long term goal 5: Pt will verbalize/demonstrate good understanding of adaptive equipment/adaptive strategies/durable medical equipment to increase safety and independence with self care and mobility  Long term goals 6: Pt will complete self feeding with set-up assistance and good attention to task  Long term goal 7: Vision to be further assessed     07/29/21 0740 07/29/21 1232   OT Individual Minutes   Time In [address]   Time Out [id number]   Minutes 37 27   Time Code Minutes    Timed Code Treatment Minutes 37 Minutes 27 Minutes     Electronically signed by Shanon Lilly OT on 7/29/21 at 3:29 PM EDT

## 2021-07-29 NOTE — PROGRESS NOTES
fluids AM & PM when straw was brought to his lips, did not attempt to hold cup either time. Vital Signs  Patient Currently in Pain: Other (comment) (No response to query)    Patient Observation  Observations: Soft-spoken & sometimes difficult to hear; fair/good response to cues for exercise & ambulation    Bed Mobility   Rolling: Rolling Right; Moderate assistance;Rolling Left  Supine to Sit: Dependent/Total  Sit to Supine: Moderate assistance (bilateral LEs)  Scooting: Maximal assistance (hips to edge of mat)  Comment: Mat, wedge, 2 pillows    Transfers  Sit to Stand: 2 Person Assistance;Maximum Assistance (Hemiwalker (1 assist) & walker lift)  Stand to sit: 2 Person Assistance;Minimal Assistance (Hemiwalker & walker lift; able to hold himself up, reach back during use of walker lift)  Bed to Chair: Dependent/Total (hemiwalker)  Stand pivot transfers: Dependent/Total (hemiwalker)  Lateral Transfers: Maximum Assistance (hemiwalker)   Comment: Stand pivot attempt turned into lateral transfer (closer to wheelchair) due to impromptu return to sitting edge of mat. 1-step cues required for all movement. Assist to move R LE (initiates swing, but lacks hip flexion to complete). Ambulation 1  Surface: level tile  Device: Hemiwalker  Assistance: Maximum assistance  Quality of Gait: Forward flexed posture. Initiates advancement of R LE but requires Max A to complete/encourage weight shifting. Min A to advance walker. Visual cues for step length with Fair L response. Gait Deviations: Slow Anais  Distance: 3' x2     Ambulation 2  Surface - 2: level tile  Device 2:  (Green Walker Lift)  Other Apparatus 2: Right;Slider;AFO;Wheelchair follow  Assistance 2: 2 Person assistance; Moderate assistance  Quality of Gait 2: Pt demonstrates ability to initiate R swing, but lacks full ROM/requires assist. Demonstrated improved weight shifting today with less R lateral lean, but flexed over walker. 1 brief standing rest break. (Will consider using modalities as needed for neuro re-edu.)    Conditions Requiring Skilled Therapeutic Intervention  Body structures, Functions, Activity limitations: Decreased functional mobility ; Decreased ROM; Decreased strength;Decreased balance;Decreased safe awareness;Decreased endurance;Decreased posture;Decreased cognition;Decreased fine motor control;Decreased coordination  Assessment: Pt does not appear to be engaged, frequently dozing off or appearing to feign sleep; requires maximum encouragement to participate. Barriers to Learning: aphasia, cognitive deficits  REQUIRES PT FOLLOW UP: Yes  Discharge Recommendations: Patient would benefit from continued therapy after discharge;24 hour supervision or assist    Goals  Short term goals  Time Frame for Short term goals: 10 days  Short term goal 1: Pt able to roll side to side at min A   Short term goal 2: Pt able to perform supine>sit at min A   Short term goal 3: Pt able to perform sit to supine at mod A  Short term goal 4: Pt able to transfer at mod A   Short term goal 5: Pt able to propel w/c with L UE/L LE, distance fo 100 ft min A   Short term goal 6: Pt able to ambulate with appropriate device distance of 50 to 100 ft, min A x2  Long term goals  Time Frame for Long term goals : By DC  Long term goal 1: Pt able perform bed mobility mod-I  Long term goal 2:  Pt able to perform transfers at CGA/min A   Long term goal 3: Pt able to ambulate with appropriate device distance of 100 to 150 ft, min A  Long term goal 4: Pt able to go up and down 4 to 5 steps with 1 UE support at mod/max A   Long term goal 5: Pt able to propel w/c level surfaces distance fo 100 to 150 ft, SBA  Long term goal 6: Improve PASS score to at least 19/36 to improve overall function. Long term goal 7: Improve standing dynamic balance with assistive device to at least fair+ to reduce fall risk.       07/29/21 0938 07/29/21 1311   PT Individual Minutes   Time In 0935 1311   Time Out [id number]   Minutes 79 28     Electronically signed by Dharmesh Honeycutt PTA on 7/29/21 at 4:07 PM EDT

## 2021-07-29 NOTE — PLAN OF CARE
Problem: Infection:  Goal: Will remain free from infection  Description: Will remain free from infection  7/29/2021 1751 by Marion Richardson RN  Outcome: Ongoing  7/29/2021 0405 by Gayla Abdi RN  Outcome: Met This Shift     Problem: Safety:  Goal: Free from accidental physical injury  Description: Free from accidental physical injury  7/29/2021 1751 by Marion Richardson RN  Outcome: Ongoing  7/29/2021 0405 by Gayla Abdi RN  Outcome: Met This Shift  Goal: Free from intentional harm  Description: Free from intentional harm  7/29/2021 1751 by Marion Richardson RN  Outcome: Ongoing  7/29/2021 0405 by Gayla Abdi RN  Outcome: Met This Shift

## 2021-07-29 NOTE — PROGRESS NOTES
Physical Medicine & Rehabilitation  Progress Note      Subjective: More Llanes is a 77 y.o. male with ischemic CVA left PCA, RAHEEM, and MCA with right dominant hemiparesis. He continues to be minimally interactive with evaluation but does complete some tasks. Not answering questions today. The 1:1 sitter has been discontinued and telesitter is in place. Psychiatry saw patient today - will follow up on recommendations. ROS:  Unable to assess    Rehabilitation:   Progressing in therapies. PT:  Restrictions/Precautions: Up as Tolerated, General Precautions, Fall Risk, Swallowing - Thickened Liquids (1:1 sitter, Mildly thick (Nectar thick))  Other position/activity restrictions: Up w/assistance, RU/LE Weakness. MRI BRAIN- Acute infarct in the left and right basal ganglia greater on the left. Transfers  Sit to Stand: 2 Person Assistance, Maximum Assistance (Hemiwalker (1 assist) & walker lift)  Stand to sit: 2 Person Assistance, Minimal Assistance (Hemiwalker & walker lift; able to hold himself up, reach back during use of walker lift)  Bed to Chair: Dependent/Total (hemiwalker)  Stand Pivot Transfers: Dependent/Total (hemiwalker)  Lateral Transfers: Maximum Assistance (hemiwalker)  Comment: Stand pivot attempt turned into lateral transfer (closer to wheelchair) due to impromptu return to sitting edge of mat. 1-step cues required for all movement. Assist to move R LE (initiates swing, but lacks hip flexion to complete). Ambulation 1  Surface: level tile  Device: Hemiwalker  Other Apparatus: Right, AFO, Wheelchair follow  Assistance: Maximum assistance  Quality of Gait: Forward flexed posture. Initiates advancement of R LE but requires Max A to complete/encourage weight shifting. Min A to advance walker. Visual cues for step length with Fair L response.    Gait Deviations: Slow Anais  Distance: 3' x2  Comments: Slight pitting edema noted after amb with R AFO    Transfers  Sit to Stand: 2 Person bed upon OT arrival. Pt demonstrated resistance to participation in occupational therapy on this date. Pt required max encouragement and cueing to participate in therapy with pt demonstrating poor participation. OT initiated movement for pt to sit EOB and stand pivot transfer to wheelchair. Pt provided resistance to donning gown. OT provided max verbal cues to assist with donning clean pull up with pt grabbing pull-up and handing it back to OT despite max encouragement. OT provided max verbal and tactile cues to participate in lower body bathing with pt unwilling to grab/use washcloth. OT initiated stand from wheelchair with max A and nursing student assist with bottom hygiene and pulling pants up over hips. OT attempted to engage pt in self feeding task. OT provided pt with drink with pt grabbing cup and placing it back on the table. When OT attempted to assist with self feeding, pushed the food away. RN attempted to provide medication in pudding with pt demonstrating increased frustration and agitation at this time. AM session ended due to increased agitation and unwillingness to participate in OT. PM: OT attempted to engage pt in self feeding task this PM with max encouragement. Sister in room providing encouragement to engage in eating. OT provided spoon to mouth with pt unwilling to open mouth to eat. Pt took drink cup from OTs hand and placed cup back on tray. Pt unwilling to participate in self feeding despite education and encouragement. OT faciliated pts engagement in donning clean shirt. Pt assisted with bring shirt over head. Pt refused to participate in all other self care tasks.           Balance  Sitting Balance: Contact guard assistance  Standing Balance: Maximum assistance   Standing Balance  Time: AM: 1-2 minutes x 2  Activity: AM: functional transfers, lower body dressing/bathing  Comment: Pt demonstrated heavy right side lean with poor participation         Bed mobility  Rolling to Left: Maximum assistance  Rolling to Right: Moderate assistance  Supine to Sit: Maximum assistance  Sit to Supine: Maximum assistance  Scooting: Maximal assistance (hips to edge of mat)  Comment: A with trunk and BLE. Max verbal and tactile cues with patient demonstrating poor participation  Transfers  Stand Pivot Transfers: Minimal assistance, 2 Person assistance  Sit to stand: Maximum assistance  Stand to sit: Maximum assistance  Transfer Comments: Verbal cues with pt demonstrating poor participation in therapy on this date. Toilet Transfers  Toilet - Technique: Stand pivot  Equipment Used: Raised toilet seat with rails  Toilet Transfer: Maximum assistance, 2 Person assistance (max A + mod A)  Toilet Transfers Comments: Verbal cues for safety and hand placement.               SPEECH:      Current Medications:   Current Facility-Administered Medications: famotidine (PEPCID) tablet 20 mg, 20 mg, Oral, Daily  aspirin chewable tablet 81 mg, 81 mg, Oral, Nightly  clopidogrel (PLAVIX) tablet 75 mg, 75 mg, Oral, Nightly  finasteride (PROSCAR) tablet 5 mg, 5 mg, Oral, Lunch  FLUoxetine (PROZAC) capsule 40 mg, 40 mg, Oral, Lunch  isosorbide mononitrate (IMDUR) extended release tablet 30 mg, 30 mg, Oral, Lunch  polyethylene glycol (GLYCOLAX) packet 17 g, 17 g, Oral, Lunch  tamsulosin (FLOMAX) capsule 0.4 mg, 0.4 mg, Oral, Lunch  tiotropium (SPIRIVA RESPIMAT) 2.5 MCG/ACT inhaler 2 puff, 2 puff, Inhalation, Lunch  melatonin tablet 6 mg, 6 mg, Oral, Nightly  [Held by provider] losartan (COZAAR) tablet 100 mg, 100 mg, Oral, Daily  rosuvastatin (CRESTOR) tablet 40 mg, 40 mg, Oral, Nightly  [Held by provider] predniSONE (DELTASONE) tablet 40 mg, 40 mg, Oral, Daily  albuterol sulfate  (90 Base) MCG/ACT inhaler 2 puff, 2 puff, Inhalation, Q6H PRN  heparin (porcine) injection 5,000 Units, 5,000 Units, Subcutaneous, 3 times per day  [Held by provider] hydrALAZINE (APRESOLINE) tablet 10 mg, 10 mg, Oral, 3 times per day  rOPINIRole (REQUIP) tablet 0.25 mg, 0.25 mg, Oral, Nightly  acetaminophen (TYLENOL) tablet 650 mg, 650 mg, Oral, Q4H PRN  senna (SENOKOT) tablet 17.2 mg, 2 tablet, Oral, Daily PRN  bisacodyl (DULCOLAX) suppository 10 mg, 10 mg, Rectal, Daily PRN  insulin lispro (HUMALOG) injection vial 0-6 Units, 0-6 Units, Subcutaneous, TID WC  insulin lispro (HUMALOG) injection vial 0-3 Units, 0-3 Units, Subcutaneous, Nightly  glucose (GLUTOSE) 40 % oral gel 15 g, 15 g, Oral, PRN  dextrose 50 % IV solution, 12.5 g, Intravenous, PRN  glucagon (rDNA) injection 1 mg, 1 mg, Intramuscular, PRN  dextrose 5 % solution, 100 mL/hr, Intravenous, PRN      Objective:  BP (!) 143/77   Pulse 57   Temp 98.2 °F (36.8 °C)   Resp 16   Ht 5' 10\" (1.778 m)   Wt 214 lb 4.6 oz (97.2 kg)   SpO2 100%   BMI 30.75 kg/m²       GEN: Well developed, well nourished, no acute distress  HEENT: NCAT. Eyes mostly closed throughout evaluation. Hearing grossly intact. RESP: Normal breath sounds with no wheezing, rales, or rhonchi. Respirations WNL and unlabored. CV: Regular rate and rhythm. No murmurs, rubs, or gallops. ABD: Soft, non-distended, BS+ and equal.  NEURO:  Eyes mostly closed throughout evaluation. Not answering questions. Able to shrug bilateral shoulders, although left appears easier than right. MSK:  Muscle bulk is normal bilaterally. Minimal movement noted in right upper limb. Strength 4/5 in left upper limb. Strength 1/5 with bilateral hip adduction. Extends left knee and dorsiflexes left ankle slightly against gravity. LIMBS: No edema in bilateral lower limbs. SKIN: Warm and dry with good turgor. PSYCH: Flat affect. Diagnostics:     CBC:   Recent Labs     07/28/21 1847   WBC 9.2   RBC 5.64   HGB 13.7   HCT 43.9   MCV 77.9*   RDW 18.0*   *     BMP:   Recent Labs     07/28/21 2006      K 4.3      CO2 23   BUN 23   CREATININE 1.40*   GLUCOSE 117*     BNP: No results for input(s): BNP in the last 72 hours.   PT/INR: No results medications - okay for him to remain off of steroids at this time and monitor platelets. Platelets improved (205 on 7/28), OK for DVT prophylaxis  7. Leukocytosis:  Resolved. Attributed to prednisone. Will monitor. 8. HTN/CAD: On hydralazine (on hold, as patient has not been taking this medication and blood pressure has been okay), Imdur, losartan (also on hold at this time)  9. DM II: On humalog sliding scale  10. COPD:  On tiotropium. Has albuterol prn. 11. Restless Leg Syndrome: On ropinirole  12. Depression: On fluoxetine. Psych consult placed after discussed with IM for any other recommendations due to patient's refusal of medications and limited interaction with staff. 13. GERD: On famotidine BID  14. BPH: On finasteride, tamsulosin  15. Thyroid nodule: For outpatient monitoring  16. Bowel Management: Miralax daily, Senokot prn, Dulcolax prn  17. Internal medicine for medical management  18.  DVT prophylaxis:  On heparin      Electronically signed by Syed Romero MD on 7/29/2021 at 5:13 PM

## 2021-07-29 NOTE — PLAN OF CARE
Problem: Infection:  Goal: Will remain free from infection  Description: Will remain free from infection  7/29/2021 1751 by Asa Pena RN  Outcome: Ongoing  7/29/2021 0405 by Nakia Villa RN  Outcome: Met This Shift     Problem: Safety:  Goal: Free from accidental physical injury  Description: Free from accidental physical injury  7/29/2021 1751 by Asa Pean RN  Outcome: Ongoing  7/29/2021 0405 by Nakia Villa RN  Outcome: Met This Shift  Goal: Free from intentional harm  Description: Free from intentional harm  7/29/2021 1751 by Asa Pena RN  Outcome: Ongoing  7/29/2021 0405 by Nakia Villa RN  Outcome: Met This Shift     Problem: Daily Care:  Goal: Daily care needs are met  Description: Daily care needs are met  7/29/2021 1751 by Asa Pnea RN  Outcome: Ongoing  7/29/2021 0405 by Nakia Villa RN  Outcome: Met This Shift     Problem: Pain:  Goal: Patient's pain/discomfort is manageable  Description: Patient's pain/discomfort is manageable  7/29/2021 1751 by Asa Pena RN  Outcome: Ongoing  7/29/2021 0405 by Nakia Villa RN  Outcome: Met This Shift

## 2021-07-29 NOTE — PLAN OF CARE
breakdown  7/29/2021 0405 by Justine Sargent RN  Outcome: Met This Shift  7/28/2021 1846 by Matthew Ortiz RN  Outcome: Ongoing     Problem: Falls - Risk of:  Goal: Will remain free from falls  Description: Will remain free from falls  7/29/2021 0405 by Justine Sargent RN  Outcome: Met This Shift  7/28/2021 1846 by Matthew Ortiz RN  Outcome: Ongoing  Goal: Absence of physical injury  Description: Absence of physical injury  7/29/2021 0405 by Justine Sargent RN  Outcome: Met This Shift  7/28/2021 1846 by Matthew Ortiz RN  Outcome: Ongoing     Problem: Musculor/Skeletal Functional Status  Goal: Absence of falls  7/29/2021 0405 by Justine Sargent RN  Outcome: Met This Shift  7/28/2021 1846 by Matthew Ortiz RN  Outcome: Ongoing     Problem: Nutrition  Goal: Optimal nutrition therapy  7/29/2021 0405 by Justine Sargent RN  Outcome: Ongoing  7/28/2021 1846 by Matthew Ortiz RN  Outcome: Ongoing     Problem: Musculor/Skeletal Functional Status  Goal: Highest potential functional level  7/29/2021 0405 by Justine Sargent RN  Outcome: Ongoing  7/28/2021 1846 by Matthew Ortiz RN  Outcome: Ongoing

## 2021-07-29 NOTE — PROGRESS NOTES
[place]  Speech Language Pathology    Date: 7/29/2021  Patient Name: Mireya Byrd  YOB: 1945   AGE: 77 y.o. MRN: [id number]        Patient Not Available for Speech Therapy     Due to:  [] Testing  [] Hemodialysis  [] Cancelled by RN  [] Surgery   [] Intubation/Sedation/Pain Medication  [] Medical instability  [x] Other: Unable to awaken pt despite max verbal/tactile cues. Pt sitting upright in chair with uneaten breakfast tray, not awakening his eyes despite MAX verbal/tactile cues. Pt declined OT this AM    Next scheduled treatment: as able    Completed by:  NOAH Copeland, M.A., [id number] [facility]

## 2021-07-29 NOTE — CONSULTS
Department of Psychiatry  Behavioral Health Consult    REASON FOR CONSULT: Agitation    History obtained from: Patient and medical record    HISTORY OF PRESENT ILLNESS:    78-year-old male who was admitted previously for management of cerebrovascular accidents, presents to the emergency department with a chief complaint of being found down by the stairs, 911 has been called, found to have right facial weakness, right-sided arm and leg weakness, dysarthria, patient has multiple ecchymosis, on right side of the arm and chest,  CT head: New ovoid low-attenuation area in the left frontal corona radiata compatible with acute/subacute infarction, that measured 2 x 1.4 cm no associated hemorrhage, diffuse parenchymal volume loss and sequela of severe chronic microvascular ischemic changes,  -Neuro is on board MRI brain W0: Acute infarct in left and right basal ganglia, left more than right,  -Resume on aspirin and Plavix 75 mg, Lipitor,  -CTA head and neck: Severe stenosis in V1 segment of left vertebral artery extensive intracranial atherosclerotic plaque with near complete occlusion of right PCA, severe stenosis of proximal A3 segment of RAHEEM, moderate to severe proximal M2 segment stenosis of left MCA,       Patient admitted to rehab afterwards for right hemiparesis, patient was extremely agitated overnight, was given Ativan around 2 AM in the night,  -Patient failed telemetry sitter over the weekend, resumed 1:1, on Seroquel as needed, he did not require since 7/23, no sedating medication on board,  -Patient current medication Prozac 40 mg daily,    The patient is currently receiving care for the above psychiatric illness.   Patient is totally mute, he is not cooperative, he is not following commands, refusing meds, trying to pull lines sometimes,      Psychiatric Review of Systems :      ·    Obsessions and Compulsions: Denies    ·    Angelina or Hypomania: Denies  ·    Hallucinations: Denies  ·    Panic Attacks: Denies  ·    Delusions:  Denies  ·    Phobias:  Denies  ·    Trauma: Denies      Substance Abuse History:  Unknown history    Past Psychiatric History:  Prior Diagnosis:     Hospitalization: yes  Hx of Suicidal Attempts: no  Hx of violence:  no      Past Medical History:        Diagnosis Date    Centrilobular emphysema ([facility]     COPD (chronic obstructive pulmonary disease) ([facility]     Depression     Diabetes mellitus ([facility]     pt refuses to take previously prescribed metformin (states PCP aware)    Former smoker     Hyperlipidemia     on 4/27/18 pt states \"I stopped taking that about a year ago\"    Hypertension     Mixed restrictive and obstructive lung disease ([facility]     Need for pneumococcal vaccine     Personal history of tobacco use        Past Surgical History:        Procedure Laterality Date    CARDIAC SURGERY  07/2015    1 stent    NECK SURGERY      TOE AMPUTATION Right greater         Medications Prior to Admission:   Medications Prior to Admission: Heparin Sodium, Porcine, (HEPARIN, PORCINE,) 5000 UNIT/ML injection, Inject 1 mL into the skin every 8 hours  QUEtiapine (SEROQUEL) 25 MG tablet, Take 1 tablet by mouth nightly as needed for Agitation  methylPREDNISolone sodium (SOLU-MEDROL) 40 MG injection, Infuse 1 mL intravenously every 12 hours  melatonin 3 MG TABS tablet, Take 1 tablet by mouth nightly as needed (insomnia)  atorvastatin (LIPITOR) 80 MG tablet, Take 0.5 tablets by mouth daily (Pt takes one-half of an 80mg tab = 40mg)  tiZANidine (ZANAFLEX) 2 MG tablet, Take 1 tablet by mouth 4 times daily as needed (muscle spasms)  naproxen (NAPROSYN) 500 MG tablet, Take 1 tablet by mouth 2 times daily (with meals)  ibuprofen (ADVIL;MOTRIN) 800 MG tablet, Take 1 tablet by mouth every 8 hours as needed for Pain  lidocaine (LIDODERM) 5 %, Place 1 patch onto the skin daily 12 hours on, 12 hours off.  metoprolol succinate (TOPROL XL) 50 MG extended release tablet, Take 50 mg by mouth daily  tiotropium (Dianne Vazquez) 2.5 MCG/ACT AERS inhaler, Inhale 2 puffs into the lungs daily  carboxymethylcellulose 1 % ophthalmic solution, Place 1 drop into both eyes 3 times daily as needed for Dry Eyes   ketotifen (ZADITOR) 0.025 % ophthalmic solution, Place 1 drop into both eyes 2 times daily as needed (itchy eyes)   isosorbide mononitrate (IMDUR) 60 MG extended release tablet, Take 30 mg by mouth daily Indications: 1/2 tablet (30mg)   finasteride (PROSCAR) 5 MG tablet, Take 5 mg by mouth daily  tamsulosin (FLOMAX) 0.4 MG capsule, Take 0.4 mg by mouth daily  fluticasone (FLONASE) 50 MCG/ACT nasal spray, 1 spray by Nasal route 2 times daily (Patient taking differently: 1 spray by Nasal route 2 times daily as needed for Rhinitis )  albuterol sulfate  (90 BASE) MCG/ACT inhaler, Inhale 2 puffs into the lungs every 6 hours as needed for Wheezing  albuterol (PROVENTIL) (2.5 MG/3ML) 0.083% nebulizer solution, Take 2.5 mg by nebulization every 6 hours as needed for Wheezing  aspirin 81 MG EC tablet, Take 81 mg by mouth daily  losartan (COZAAR) 100 MG tablet, Take 100 mg by mouth daily   nitroGLYCERIN (NITROSTAT) 0.4 MG SL tablet, Place 0.4 mg under the tongue every 5 minutes as needed for Chest pain  FLUoxetine (PROZAC) 20 MG capsule, Take 40 mg by mouth daily   furosemide (LASIX) 20 MG tablet, Take 20 mg by mouth daily as needed (swelling)   clopidogrel (PLAVIX) 75 MG tablet, Take 75 mg by mouth daily  rOPINIRole (REQUIP) 0.25 MG tablet, Take 0.25 mg by mouth nightly as needed   budesonide-formoterol (SYMBICORT) 160-4.5 MCG/ACT AERO, Inhale 2 puffs into the lungs 2 times daily. Allergies:  Patient has no known allergies. FAMILY/SOCIAL HISTORY:  No family history on file.   Social History     Socioeconomic History    Marital status:      Spouse name: Not on file    Number of children: Not on file    Years of education: Not on file    Highest education level: Not on file   Occupational History    Not on file   Tobacco Use    Smoking status: Former Smoker     Packs/day: 0.75     Years: 56.00     Pack years: 42.00     Start date: 1957     Quit date: 2013     Years since quittin.0    Smokeless tobacco: Never Used   Vaping Use    Vaping Use: Never used   Substance and Sexual Activity    Alcohol use: No    Drug use: No    Sexual activity: Not on file   Other Topics Concern    Not on file   Social History Narrative    Not on file     Social Determinants of Health     Financial Resource Strain:     Difficulty of Paying Living Expenses:    Food Insecurity:     Worried About Running Out of Food in the Last Year:     [address] in the Last Year:    Transportation Needs:     Lack of Transportation (Medical):      Lack of Transportation (Non-Medical):    Physical Activity:     Days of Exercise per Week:     Minutes of Exercise per Session:    Stress:     Feeling of Stress :    Social Connections:     Frequency of Communication with Friends and Family:     Frequency of Social Gatherings with Friends and Family:     Attends Buddhist Services:     Active Member of Clubs or Organizations:     Attends Club or Organization Meetings:     Marital Status:    Intimate Partner Violence:     Fear of Current or Ex-Partner:     Emotionally Abused:     Physically Abused:     Sexually Abused:        REVIEW OF SYSTEMS    Constitutional: [] fever  [] chills  [] weight loss  []weakness [] Other:  Eyes:  [] photophobia  [] discharge [] acuity change   [] Diplopia   [] Other:  HENT:  [] sore throat  [] ear pain [] Tinnitus   [] Other  Respiratory:  [] Cough  [] Shortness of breath   [] Sputum   [] Other:   Cardiac: []Chest pain   []Palpitations []Edema  []PND  [] Other:  GI:  []Abdominal pain   []Nausea  []Vomiting  []Diarrhea  [] Other:  :  [] Dysuria   []Frequency  []Hematuria  []Discharge  [] Other:  Possible Pregnancy: []Yes   []No   LMP:   Musculoskeletal:  []Back pain  []Neck pain  []Recent Injury Skin:  []Rash  [] Itching  [] Other:  Neurologic:  [] Headache  [] Focal weakness  [] Sensory changes []Other:  Endocrine:  [] Polyuria  [] Polydipsia  [] Hair Loss  [] Other:  Lymphatic:   [] Swollen glands   Psychiatric:  As per HPI      All other systems negative except as marked or mentioned/indicated in the HPI. Kolton Ng PHYSICAL EXAM:  Vitals:  BP (!) 143/77   Pulse 57   Temp 98.2 °F (36.8 °C)   Resp 16   Ht 5' 10\" (1.778 m)   Wt 214 lb 4.6 oz (97.2 kg)   SpO2 100%   BMI 30.75 kg/m²        Mental Status Examination:    Level of consciousness:  within normal limits   Appearance:  well-appearing  Behavior/Motor:  no abnormalities noted  Attitude toward examiner:   noncooperative  Speech: Mute, noncooperative, not answering any question or following any commands  Mood: within normal limits  Affect: Unable to assess  Thought processes: Unable to assess  Thought content: Unable to assess   Cognition: Unable to assess   Concentration: Unable to assess  Memory unable to assess  Insight unable to assess  Fund of Knowledge marginal        LABS: REVIEWED TODAY:  Recent Labs     07/28/21 1847   WBC 9.2   HGB 13.7   *     Recent Labs     07/28/21 2006      K 4.3      CO2 23   BUN 23   CREATININE 1.40*   GLUCOSE 117*     No results for input(s): BILITOT, ALKPHOS, AST, ALT in the last 72 hours.   Lab Results   Component Value Date    BARBSCNU NEGATIVE 07/13/2021    LABBENZ NEGATIVE 07/13/2021    LABMETH NEGATIVE 07/13/2021    PPXUR NOT REPORTED 07/13/2021     Lab Results   Component Value Date    TSH 0.28 07/14/2021     No results found for: LITHIUM  No results found for: VALPROATE, CBMZ  No results found for: LITHIUM, VALPROATE    FURTHER LABS ORDERED :      Radiology   ECHO Complete 2D W Doppler W Color    Result Date: 7/15/2021  Transthoracic Echocardiography Report (TTE)  Patient Name GOFF      Date of Study               07/15/2021               [id number] [facility]   Date of      1945  Gender Male  Birth   Age          77 year(s)  Race                        Black   Room Number  7854        Height:                     70 inch, 177.8 cm   Corporate ID [id number]    Weight:                     210 pounds, 95.3 kg  #   Patient Acct [de-identified]   BSA:          2.13 m^2      BMI:       30.13  #                                                               kg/m^2   MR #         [id number]     Sonographer                 Jayda Eng   Accession #  [id number]  Interpreting Physician      Yolande Vance   Fellow                   Referring Nurse                           Practitioner   Interpreting             Referring Physician         Marion Marino MD  Fellow  Additional Comments Technically difficult study, patient supine unable to be positioned for better acoustic windows. Type of Study   TTE procedure:2D Echocardiogram, M-Mode, Doppler, Color Doppler. Procedure Date Date: 07/15/2021 Start: 08:29 AM Study Location: [facility] Technical Quality: Adequate visualization Indications:CVA. History / Tech. Comments: Procedure explained to patient. Echo completed in the Echo Lab. RN performed bubble study. PMHx: Former smoker, COPD Hypertension, Diabetes, Hyperlipidemia Coronary artery disease, s/p stents Patient Status: Inpatient Height: 70 inches Weight: 210 pounds BSA: 2.13 m^2 BMI: 30.13 kg/m^2 CONCLUSIONS Summary Technically difficult study. Overall global left ventricular systolic function is normal, calculated ejection fraction is 50-55% Grade I (mild) left ventricular diastolic dysfunction. Technically difficult visualization of the right ventricle, but it does appear to be normal in size. Negative bubble study, no obvious intracardiac shunt noted within technical limitations of this study. No significant valvular regurgitation or stenosis seen. No significant pericardial effusion is seen.  Signature ---------------------------------------------------------------------------- LVOT:2.1 cm                                    RVDd:3 cm   Mitral:                                 Aortic   Valve Area (P1/2-Time): 2.65 cm^2       Peak Velocity: 1.91 m/s  Peak E-Wave: 1.18 m/s                   Mean Velocity: 1.06 m/s  Peak A-Wave: 1.47 m/s                   Peak Gradient: 14.59 mmHg  E/A Ratio: 0.8                          Mean Gradient: 6 mmHg  Peak Gradient: 5.57 mmHg  Mean Gradient: 2 mmHg  Deceleration Time: 280 msec             Area (continuity): 2.83 cm^2  P1/2t: 83 msec                          AV VTI: 41.8 cm   Area (continuity): 2.2 cm^2  Mean Velocity: 0.62 m/s                                           Pulmonic:                                           Peak Velocity: 1.29 m/s                                          Peak Gradient: 6.66 mmHg  Diastology / Tissue Doppler Septal Wall E' velocity:0.06 m/s Septal Wall E/E':20.8 Lateral Wall E' velocity:0.08 m/s Lateral Wall E/E':13.9    CT HEAD WO CONTRAST    Result Date: 7/21/2021  EXAMINATION: CT OF THE HEAD WITHOUT CONTRAST  7/21/2021 11:53 am TECHNIQUE: CT of the head was performed without the administration of intravenous contrast. Dose modulation, iterative reconstruction, and/or weight based adjustment of the mA/kV was utilized to reduce the radiation dose to as low as reasonably achievable. COMPARISON: July 17, 2021, MRI July 14, 2021 HISTORY: ORDERING SYSTEM PROVIDED HISTORY: Altered mental status TECHNOLOGIST PROVIDED HISTORY: eval for change in status Reason for Exam: EVAL FOR CHANGE IN STATUS Type of Exam: Subsequent/Follow-up Additional signs and symptoms: PT BECAME AGTATED & COMBATIVE OVERNIGHT Relevant Medical/Surgical History: HX STROKE FINDINGS: BRAIN/VENTRICLES: No acute intracranial hemorrhage, mass effect or midline shift. Area of hypoattenuation within the left basal ganglia and small foci of hypoattenuation in the right basal ganglia compatible with subacute infarct.   Diffuse cortical volume loss and compensatory ventricular enlargement appears unchanged. Periventricular and subcortical white matter hypoattenuation redemonstrated bilaterally compatible with severe chronic microvascular ischemic changes. Encephalomalacia of the right caudate redemonstrated compatible with remote lacunar infarct. ORBITS: The visualized portion of the orbits demonstrate no acute abnormality. SINUSES: Air-fluid level within the left sphenoid sinus. Paranasal sinuses and mastoid air cells otherwise clear. SOFT TISSUES/SKULL:  No acute abnormality of the visualized skull or soft tissues. Subacute basal ganglia infarcts redemonstrated. No gross hemorrhagic conversion or significant mass effect. New air-fluid level within the left sphenoid sinus suggesting acute sinusitis. CT HEAD WO CONTRAST    Result Date: 7/17/2021  EXAMINATION: CT OF THE HEAD WITHOUT CONTRAST  7/17/2021 10:46 am TECHNIQUE: CT of the head was performed without the administration of intravenous contrast. Dose modulation, iterative reconstruction, and/or weight based adjustment of the mA/kV was utilized to reduce the radiation dose to as low as reasonably achievable. COMPARISON: None. HISTORY: ORDERING SYSTEM PROVIDED HISTORY: change in mentation TECHNOLOGIST PROVIDED HISTORY: change in mentation Reason for Exam: change in mentation FINDINGS: BRAIN/VENTRICLES: Small area of hypodensity in the left corona radiata is similar in appearance to the previous exam.  There is no evidence of hemorrhage. No new parenchymal abnormalities are identified. ORBITS: The visualized portion of the orbits demonstrate no acute abnormality. SINUSES: The visualized paranasal sinuses and mastoid air cells demonstrate no acute abnormality. SOFT TISSUES/SKULL:  No acute abnormality of the visualized skull or soft tissues. No acute intracranial abnormality. No significant interval change.      CT HEAD WO CONTRAST    Result Date: 7/13/2021  EXAMINATION: CT OF THE HEAD WITHOUT CONTRAST  7/13/2021 1:10 pm TECHNIQUE: CT of the head was performed without the administration of intravenous contrast. Dose modulation, iterative reconstruction, and/or weight based adjustment of the mA/kV was utilized to reduce the radiation dose to as low as reasonably achievable. COMPARISON: 05/02/2021 HISTORY: ORDERING SYSTEM PROVIDED HISTORY: Last known well 2 days right-sided facial droop right-sided weakness TECHNOLOGIST PROVIDED HISTORY: Last known well 2 days right-sided facial droop right-sided weakness Decision Support Exception - unselect if not a suspected or confirmed emergency medical condition->Emergency Medical Condition (MA) Reason for Exam: Last known well 2 days right-sided facial droop right-sided weakness FINDINGS: BRAIN/VENTRICLES: Ovoid area of low attenuation in the left frontal corona radiata measures 2 x 1.4 cm, new from prior study (series 2, image 39). No acute intracranial hemorrhage. No mass effect or midline shift. No hydrocephalus. Diffuse parenchymal volume loss with enlargement of the ventricles and cerebral sulci. Old infarction in the right basal ganglia. There are nonspecific areas of hypoattenuation within the periventricular and subcortical white matter, which likely represent chronic microvascular ischemic change. Intracranial atherosclerotic disease. ORBITS: The visualized portion of the orbits demonstrate no acute abnormality. SINUSES: The visualized paranasal sinuses and mastoid air cells demonstrate no acute abnormality. SOFT TISSUES/SKULL: No acute abnormality of the visualized skull or soft tissues. 1. New ovoid low-attenuation area in the left frontal corona radiata compatible with acute/subacute infarction. This measures 2 x 1.4 cm. No associated hemorrhage. 2. Diffuse parenchymal volume loss and sequela of severe chronic microvascular ischemic changes. Findings were discussed with Dr. Palmer Calderón at 1:31 pm on 7/13/2021.      XR CHEST PORTABLE    Result Date: 7/13/2021  EXAMINATION: ONE XRAY VIEW OF THE CHEST 7/13/2021 10:30 am COMPARISON: September 24, 2019. HISTORY: ORDERING SYSTEM PROVIDED HISTORY: Found down TECHNOLOGIST PROVIDED HISTORY: Found down Reason for Exam: supine Acuity: Acute Type of Exam: Initial FINDINGS: A portable upright frontal view chest radiograph was obtained. The heart is enlarged. The mediastinal contour and pleural spaces are otherwise within normal limits. The lungs are grossly clear. There is no focal consolidation or pneumothorax. The pulmonary vascular pattern is within normal limits. No acute thoracic osseous abnormality. Clear lungs. Cardiomegaly. No acute cardiopulmonary abnormality. VL DUP LOWER EXTREMITY VENOUS BILATERAL    Result Date: 7/15/2021    [facility]  Vascular Lower Extremities DVT Study Procedure   Patient Name GOFF      Date of Study           07/15/2021               YAYA DUBOIS   Date of      1945  Gender                  Male  Birth   Age          77 year(s)  Race                    Black   Room Number  0536        Height:                 70 inch, 177.8 cm   Corporate ID [id number]    Weight:                 210 pounds, 95.3 kg  #   Patient Acct [de-identified]   BSA:        2.13 m^2    BMI:        30.13 kg/m^2  #   MR #         [id number]     Sonographer             Teodoro Wilder [facility]   Accession #  [id number]  Interpreting Physician  Nemesio Boyce   Referring                Referring Physician     Phuc Sharp  Nurse  Practitioner  Procedure Type of Study:   Veins: Lower Extremities DVT Study, Venous Scan Lower Bilateral.  Indications for Study:CVA. Patient Status: In Patient. Technical Quality:Limited visualization. Limitation reason:legs rigid, extreme edema and resistance to compression .   Conclusions   Summary   Simultaneous real time imaging utilizing B-Mode, color doppler and  spectral waveform analysis was performed on the bilateral lower  extremities for venous examination of the deep and superficial systems. Findings are:   Right:  No evidence of deep or superficial venous thrombosis. Left:  No evidence of deep or superficial venous thrombosis. Signature   ----------------------------------------------------------------  Electronically signed by Reatha Hatchet, RVT(Sonographer)  on 07/15/2021 04:23 PM  ----------------------------------------------------------------   ----------------------------------------------------------------  Electronically signed by Nevaeh Peña(Interpreting  physician) on 07/15/2021 08:56 PM  ----------------------------------------------------------------  Findings:   Right Impression:                    Left Impression:  The common femoral, femoral,         The common femoral, femoral,  popliteal and tibial veins           popliteal and tibial veins  demonstrate normal compressibility   demonstrate normal compressibility  and augmentation. and augmentation. Normal compressibility of the great  Normal compressibility of the great  saphenous vein. saphenous vein. Normal compressibility of the small  Normal compressibility of the small  saphenous vein. saphenous vein. Risk Factors   - The patient's risk factor(s) include: chronic lung disease, diabetes     mellitus and arterial hypertension. Velocities are measured in cm/s ; Diameters are measured in cm Right Lower Extremities DVT Study Measurements Right 2D Measurements +------------------------------------+----------+---------------+----------+ ! Location                            ! Visualized! Compressibility! Thrombosis! +------------------------------------+----------+---------------+----------+ ! Common Femoral                      !Yes       ! Yes            ! None      ! +------------------------------------+----------+---------------+----------+ ! Prox Femoral                        !Yes       ! Yes            ! None      ! +------------------------------------+----------+---------------+----------+ ! Mid Femoral                         !Yes       ! Yes            ! None      ! +------------------------------------+----------+---------------+----------+ ! Dist Femoral                        !Yes       ! Yes            ! None      ! +------------------------------------+----------+---------------+----------+ ! Deep Femoral                        !No        !               !          ! +------------------------------------+----------+---------------+----------+ ! Popliteal                           !Yes       ! Yes            ! None      ! +------------------------------------+----------+---------------+----------+ ! Sapheno Femoral Junction            ! Yes       ! Yes            ! None      ! +------------------------------------+----------+---------------+----------+ ! PTV                                 ! Yes       ! Yes            ! None      ! +------------------------------------+----------+---------------+----------+ ! Peroneal                            !No        !               !          ! +------------------------------------+----------+---------------+----------+ ! Gastroc                             ! Yes       ! Yes            ! None      ! +------------------------------------+----------+---------------+----------+ ! GSV Thigh                           ! Yes       ! Yes            ! None      ! +------------------------------------+----------+---------------+----------+ ! GSV Knee                            ! Yes       ! Yes            ! None      ! +------------------------------------+----------+---------------+----------+ ! GSV Ankle                           ! Yes       ! Yes            ! None      ! +------------------------------------+----------+---------------+----------+ ! SSV                                 ! Yes       ! Yes            ! None      ! +------------------------------------+----------+---------------+----------+ Right Doppler Measurements +---------------------------+------+------+--------------------------------+ ! Location                   ! Signal!Reflux! Reflux (msec)                   ! +---------------------------+------+------+--------------------------------+ ! Common Femoral             !Phasic!      !                                ! +---------------------------+------+------+--------------------------------+ ! Prox Femoral               !Phasic!      !                                ! +---------------------------+------+------+--------------------------------+ ! Popliteal                  !Phasic!      !                                ! +---------------------------+------+------+--------------------------------+ Left Lower Extremities DVT Study Measurements Left 2D Measurements +------------------------------------+----------+---------------+----------+ ! Location                            ! Visualized! Compressibility! Thrombosis! +------------------------------------+----------+---------------+----------+ ! Common Femoral                      !Yes       ! Yes            ! None      ! +------------------------------------+----------+---------------+----------+ ! Prox Femoral                        !Yes       ! Yes            ! None      ! +------------------------------------+----------+---------------+----------+ ! Mid Femoral                         !Yes       ! Yes            ! None      ! +------------------------------------+----------+---------------+----------+ ! Dist Femoral                        !Yes       ! Yes            ! None      ! +------------------------------------+----------+---------------+----------+ ! Deep Femoral                        !No        !               !          ! +------------------------------------+----------+---------------+----------+ ! Popliteal                           !Yes       ! Yes            ! None      ! +------------------------------------+----------+---------------+----------+ ! Sapheno Femoral Junction            ! Yes       ! Yes            ! None      ! +------------------------------------+----------+---------------+----------+ ! PTV                                 ! Yes       ! Yes            ! None      ! +------------------------------------+----------+---------------+----------+ ! Peroneal                            !Yes       ! Yes            ! None      ! +------------------------------------+----------+---------------+----------+ ! Gastroc                             ! Yes       ! Yes            ! None      ! +------------------------------------+----------+---------------+----------+ ! GSV Thigh                           ! Yes       ! Yes            ! None      ! +------------------------------------+----------+---------------+----------+ ! GSV Knee                            ! Yes       ! Yes            ! None      ! +------------------------------------+----------+---------------+----------+ ! GSV Ankle                           ! Yes       ! Yes            ! None      ! +------------------------------------+----------+---------------+----------+ ! SSV                                 ! Yes       ! Yes            ! None      ! +------------------------------------+----------+---------------+----------+ Left Doppler Measurements +---------------------------+------+------+--------------------------------+ ! Location                   ! Signal!Reflux! Reflux (msec)                   ! +---------------------------+------+------+--------------------------------+ ! Common Femoral             !Phasic!      !                                ! +---------------------------+------+------+--------------------------------+ ! Prox Femoral               !Phasic!      !                                ! +---------------------------+------+------+--------------------------------+ ! Popliteal                  !Phasic!      !                                ! +---------------------------+------+------+--------------------------------+    US SPLEEN    Result Date: 7/14/2021  EXAMINATION: ULTRASOUND OF THE SPLEEN 7/14/2021 8:29 am COMPARISON: None. HISTORY: ORDERING SYSTEM PROVIDED HISTORY: thrombocytopenia TECHNOLOGIST PROVIDED HISTORY: thrombocytopenia FINDINGS: Suboptimal study. The visualized spleen appears enlarged measuring 13.4 cm. No focal lesion is seen. No free fluid. Suboptimal study. Mild splenomegaly. CTA HEAD NECK W CONTRAST    Result Date: 7/13/2021  EXAMINATION: CTA OF THE HEAD AND NECK WITH CONTRAST 7/13/2021 1:11 pm: TECHNIQUE: CTA of the head and neck was performed with the administration of intravenous contrast. Multiplanar reformatted images are provided for review. MIP images are provided for review. Stenosis of the internal carotid arteries measured using NASCET criteria. Dose modulation, iterative reconstruction, and/or weight based adjustment of the mA/kV was utilized to reduce the radiation dose to as low as reasonably achievable. 3D surface reformatted and curved MIP images were submitted for review. COMPARISON: None. HISTORY: ORDERING SYSTEM PROVIDED HISTORY: stroke TECHNOLOGIST PROVIDED HISTORY: stroke Decision Support Exception - unselect if not a suspected or confirmed emergency medical condition->Emergency Medical Condition (MA) Reason for Exam: stroke, Cerebrovascular Accident; Fall FINDINGS: CTA NECK: AORTIC ARCH/ARCH VESSELS: No dissection or arterial injury. No significant stenosis of the brachiocephalic or subclavian arteries. CAROTID ARTERIES: No dissection, arterial injury, or hemodynamically significant stenosis by NASCET criteria. VERTEBRAL ARTERIES: No dissection, arterial injury, or significant stenosis in the right vertebral artery. Severe stenosis in the V1 segment of the left vertebral artery. SOFT TISSUES: The lung apices are clear. No cervical or superior mediastinal lymphadenopathy. The larynx and pharynx are unremarkable. No acute abnormality of the salivary glands.   Thyroid gland is Date: 7/14/2021  EXAMINATION: MRI OF THE BRAIN WITHOUT CONTRAST  7/14/2021 4:08 pm TECHNIQUE: Multiplanar multisequence MRI of the brain was performed without the administration of intravenous contrast. COMPARISON: None. HISTORY: ORDERING SYSTEM PROVIDED HISTORY: stroke, right sided weakness TECHNOLOGIST PROVIDED HISTORY: stroke, right sided weakness Decision Support Exception - unselect if not a suspected or confirmed emergency medical condition->Emergency Medical Condition (MA) Reason for Exam: possible stroke. pt had a fall. FINDINGS: INTRACRANIAL STRUCTURES/VENTRICLES: . acute infarcts in the left basal ganglia. Acute infarct in the right head of caudate and in the right putamen no mass effect or midline shift. No evidence of an acute intracranial hemorrhage. The ventricles and sulci are normal in size and configuration. The sellar/suprasellar regions appear unremarkable. The normal signal voids within the major intracranial vessels appear maintained. ORBITS: The visualized portion of the orbits demonstrate no acute abnormality. SINUSES: The visualized paranasal sinuses and mastoid air cells are well aerated. BONES/SOFT TISSUES: The bone marrow signal intensity appears normal. The soft tissues demonstrate no acute abnormality. Acute infarct in the left and right basal ganglia greater on the left. Diffuse volume loss with extensive chronic white matter changes. DIAGNOSIS:  Depression unspecified  Acute ischemic stroke, hemiplegia right side,      RECOMMENDATIONS  Medications:  See orders  Discussed with the treating physician/ team about the patient and treatment plan  Reviewed the chart    Discussed with the patient risk, benefit, alternative and common side effects for the  proposed medication treatment. Patient is consenting to the treatment. Thanks for the consult. Please call me if needed.     Electronically signed by Jae Miller MD on 7/29/2021 at 11:03 AM    Please note that this chart was generated using voice recognition Dragon dictation software. Although every effort was made to ensure the accuracy of this automated transcription, some errors in transcription may have occurred. I independently saw and evaluated the patient. I reviewed the resident's documentation above. Any additional comments or changes to the    documentation are stated below otherwise agree with assessment.      -The patient was seen at bedside. I have noted that the patient has presented as mute in the past but with me the patient opened his eyes and said he was feeling fine. Following that the patient answered a couple of questions with yes and no and then refused to answer any further questions. The patient is constricted in his affect comes across as depressed. Spoken to the patient's nurse. The patient has been refusing oral medications. I reviewed his MAR. He has taken 3 doses of Prozac in the last 9 days      PLAN  Continue to offer Prozac. Will increase dose to 60 mg at this is a long-acting medication and even intermittent use may help. A liquid formulation of this medication is available which may ease administration  Attempt to develop insight  Psycho-education conducted. Supportive Therapy conducted.     Follow-up daily while on inpatient unit    Electronically signed by Bharat Llamas MD on 7/29/21 at 5:56 PM EDT

## 2021-07-30 NOTE — PROGRESS NOTES
to maintain midline in sitting  Initiation: Cues to initiate tasks  Motor Planning: Hand over hand to sequence tasks  Balance  Sitting Balance: Contact guard assistance  Standing Balance: Maximum assistance  Transfers  Stand Pivot Transfers: Minimal assistance;2 Person assistance  Sit to stand: Maximum assistance  Stand to sit: Maximum assistance  Transfer Comments: Verbal cues with pt demonstrating poor participation in therapy on this date. Standing Balance  Time: AM: ~2 minutes  Activity: AM: functional transfer to w/c, LB dressing  Comment: Pt demonstrated heavy right side lean with poor participation               Additional Activities: PM: Pt instruction [facility] and coginitive task to place red and yellow clothespin. Pt able to place 2 before with minimal prompting, then requiring max assist with poor participation. Next TIPTON attempts to engage pt in 2 other tasks with pt remaining with eyes closed with limited participation. ADL  Feeding: Unable to assess(comment) (Pt refuses to take bite/drink )  Grooming: None  UE Bathing: None (Pt declines to participate)  LE Bathing: None (pt declines to participate)  UE Dressing: Maximum assistance  LE Dressing: Dependent/Total (2 person A while standing. Pt refused to participate)  Toileting: None (does not requires breif change at thie time)  Additional Comments: Pt lying in bed upon TIPTON arrival and resistant to moving BLEs in preparation for sitting EOB. Maximum education provided on POC and importance of daily therapy with pt still resistive. TIPTON leaves and returns ~10 minutes later. TIPTON initiates movement for pt to sit EOB with assist from RN. Next pt requires max A-TA to lori socks, lori [place] shirt and thread BLEs. After standing at karena walker, pt requires assist for LB clothing mgmt. Then increased time and cues to pivot into w/c. Next pt declines self feeding despite TA from [address]  More education provided on POC and importance of nutrition for optimal safety and rehab          Assessment  Performance deficits / Impairments: Decreased ADL status; Decreased functional mobility ; Decreased ROM; Decreased strength;Decreased safe awareness;Decreased cognition;Decreased endurance;Decreased sensation;Decreased balance;Decreased high-level IADLs;Decreased fine motor control;Decreased coordination  Prognosis: Fair  Discharge Recommendations: Patient would benefit from continued therapy after discharge;24 hour supervision or assist  Activity Tolerance: Treatment limited secondary to decreased cognition  Safety Devices in place: Yes  Type of devices: All fall risk precautions in place;Call light within reach; Chair alarm in place;Gait belt;Patient at risk for falls; Left in chair;Nurse notified (Telesitter)  Restraints  Initially in place: No  Equipment Recommendations  Equipment Needed: Yes  Cyn Poser: Usman-walker  Transfer Tub Bench: w/back       Patient Education: OT POC, safety with functional transfers, importance of daily participation in therapy for optimal rehab  Patient Goals   Patient goals : \"I want to go home\"  Learner:patient  Method: explanation       Outcome: needs reinforcement        Plan  Plan  Times per week: 900/7  Times per day: Twice a day  Current Treatment Recommendations: Self-Care / ADL, Strengthening, ROM, Balance Training, Functional Mobility Training, Endurance Training, Neuromuscular Re-education, Cognitive Reorientation, Pain Management, Safety Education & Training, Patient/Caregiver Education & Training, Equipment Evaluation, Education, & procurement, Home Management Training, Cognitive/Perceptual Training  Plan Comment: 900 minutes/week for combined therapy of PT/OT/ST due to decreased tolerance to activity.   Patient Goals   Patient goals : \"I want to go home\"  Short term goals  Time Frame for Short term goals: By 10 days  Short term goal 1: Pt will complete upper body dressing/bathing with Min A and good attention to task  Short term goal 2: Pt will complete lower body dressing/bathing with max A and good safety with use of AE as needed  Short term goal 3: Pt will complete functional transfers during self care tasks with mod A x 1 and good safety  Short term goal 4: Pt will tolerate standing 4+ minutes during functional activity of choice with min A and good safety  Short term goal 5: Pt will participate in 30+ minutes of therapeutic exercises/functional activities to increase safety and independence with self care tasks  Short term goal 6: Pt will complete self feeding with min A and good attention to task  Long term goals  Time Frame for Long term goals : By discharge  Long term goal 1: Pt will complete upper body dressing with set-up assistance and good attention to task  Long term goal 2: Pt will complete lower body dressing/bathing with min A and good safety with use of AE as needed  Long term goal 3: Pt will complete functional transfers/mobility during self care tasks with min A and good safety  Long term goal 4: Pt will tolerate standing 8+ minutes during functional activity of choice with CGA and good safety  Long term goal 5: Pt will verbalize/demonstrate good understanding of adaptive equipment/adaptive strategies/durable medical equipment to increase safety and independence with self care and mobility  Long term goals 6: Pt will complete self feeding with set-up assistance and good attention to task  Long term goal 7: Vision to be further assessed        07/30/21 1551 07/30/21 1552 07/30/21 1553   OT Individual Minutes   Time In [id number] [id number]   Time Out [id number]   Minutes 13 19 24     Electronically signed by FLORIAN Jordan on 7/30/21 at 4:04 PM EDT

## 2021-07-30 NOTE — CARE COORDINATION
Family meeting held with pt and his sister Naeem Pompa and niece Yuniel Herzog Dr. Gurvinder Hatch present. Pt was not verbal or indicated preferences. Family agreed pt is not able to care for self and no family is able to provide care. Pt does not have a POA in place and discussed possible need for guardian; family expressed understanding. Family is agreeable to SNF and will provide list of choices on Monday. Dr. Gurvinder Hatch reviewed care options and medical services. Will continue to work with pt and family for discharge needs.

## 2021-07-30 NOTE — CONSULTS
General Surgery Consult      Pt Name: Fahad Storm  MRN: [id number]  YOB: 1945  Date of evaluation: 7/30/2021  Primary Care Physician: No primary care provider on file. Patient evaluated at the request of  Dr. Blane Cruz  Reason for evaluation: PEG tube placement    SUBJECTIVE:   History of Chief Complaint:    Fahad Storm is a 77 y.o. male who presents with right-sided hemiparesis. Patient was admitted recently at [facility] was transferred to acute rehab. Speech difficulty. Patient with thrombocytopenia was treated with steroids. Patient was quite agitated during the night. Was given IV Ativan. Very lethargic today. Internal medicine note reviewed. I was asked to see the patient regarding PEG tube placement. Past Medical History   has a past medical history of Centrilobular emphysema ([address] COPD (chronic obstructive pulmonary disease) ([address] Depression, Diabetes mellitus ([address] Former smoker, Hyperlipidemia, Hypertension, Mixed restrictive and obstructive lung disease ([address] Need for pneumococcal vaccine, and Personal history of tobacco use. Past Surgical History   has a past surgical history that includes Neck surgery; Toe amputation (Right, greater); and Cardiac surgery (07/2015). Medications  Prior to Admission medications    Medication Sig Start Date End Date Taking?  Authorizing Provider   Heparin Sodium, Porcine, (HEPARIN, PORCINE,) 5000 UNIT/ML injection Inject 1 mL into the skin every 8 hours 7/20/21   Cam Goodman MD   QUEtiapine (SEROQUEL) 25 MG tablet Take 1 tablet by mouth nightly as needed for Agitation 7/20/21 7/25/21  Cam Goodman MD   methylPREDNISolone sodium (SOLU-MEDROL) 40 MG injection Infuse 1 mL intravenously every 12 hours 7/20/21   Cam Goodman MD   melatonin 3 MG TABS tablet Take 1 tablet by mouth nightly as needed (insomnia) 7/20/21   Cam Goodman MD   atorvastatin (LIPITOR) 80 MG tablet Take 0.5 tablets by mouth daily (Pt takes one-half of an 80mg tab = 40mg) 7/16/21   Moe Glass MD   tiZANidine (ZANAFLEX) 2 MG tablet Take 1 tablet by mouth 4 times daily as needed (muscle spasms) 5/7/21   JOLIE Busch CNP   naproxen (NAPROSYN) 500 MG tablet Take 1 tablet by mouth 2 times daily (with meals) 1/4/21   Oscar Hamilton PA-C   ibuprofen (ADVIL;MOTRIN) 800 MG tablet Take 1 tablet by mouth every 8 hours as needed for Pain 6/2/20   Teofilo Sosa MD   lidocaine (LIDODERM) 5 % Place 1 patch onto the skin daily 12 hours on, 12 hours off. 6/2/20   Teofilo Sosa MD   metoprolol succinate (TOPROL XL) 50 MG extended release tablet Take 50 mg by mouth daily    Historical Provider, MD   tiotropium (SPIRIVA RESPIMAT) 2.5 MCG/ACT AERS inhaler Inhale 2 puffs into the lungs daily    Historical Provider, MD   carboxymethylcellulose 1 % ophthalmic solution Place 1 drop into both eyes 3 times daily as needed for Dry Eyes     Historical Provider, MD   ketotifen (ZADITOR) 0.025 % ophthalmic solution Place 1 drop into both eyes 2 times daily as needed (itchy eyes)     Historical Provider, MD   isosorbide mononitrate (IMDUR) 60 MG extended release tablet Take 30 mg by mouth daily Indications: 1/2 tablet (30mg)     Historical Provider, MD   finasteride (PROSCAR) 5 MG tablet Take 5 mg by mouth daily    Historical Provider, MD   tamsulosin (FLOMAX) 0.4 MG capsule Take 0.4 mg by mouth daily    Historical Provider, MD   fluticasone (FLONASE) 50 MCG/ACT nasal spray 1 spray by Nasal route 2 times daily  Patient taking differently: 1 spray by Nasal route 2 times daily as needed for Rhinitis  5/31/16   Francisco Connor DO   albuterol sulfate  (90 BASE) MCG/ACT inhaler Inhale 2 puffs into the lungs every 6 hours as needed for Wheezing    Historical Provider, MD   albuterol (PROVENTIL) (2.5 MG/3ML) 0.083% nebulizer solution Take 2.5 mg by nebulization every 6 hours as needed for Wheezing    Historical Provider, MD aspirin 81 MG EC tablet Take 81 mg by mouth daily    Historical Provider, MD   losartan (COZAAR) 100 MG tablet Take 100 mg by mouth daily     Historical Provider, MD   nitroGLYCERIN (NITROSTAT) 0.4 MG SL tablet Place 0.4 mg under the tongue every 5 minutes as needed for Chest pain    Historical Provider, MD   FLUoxetine (PROZAC) 20 MG capsule Take 40 mg by mouth daily     Historical Provider, MD   furosemide (LASIX) 20 MG tablet Take 20 mg by mouth daily as needed (swelling)     Historical Provider, MD   clopidogrel (PLAVIX) 75 MG tablet Take 75 mg by mouth daily    Historical Provider, MD   rOPINIRole (REQUIP) 0.25 MG tablet Take 0.25 mg by mouth nightly as needed     Historical Provider, MD   budesonide-formoterol (SYMBICORT) 160-4.5 MCG/ACT AERO Inhale 2 puffs into the lungs 2 times daily. Historical Provider, MD     Allergies  has No Known Allergies. Family History  family history is not on file. Social History   reports that he quit smoking about 8 years ago. He started smoking about 64 years ago. He has a 42.00 pack-year smoking history. He has never used smokeless tobacco. He reports that he does not drink alcohol and does not use drugs. Review of Systems:  Patient lethargic. Detailed review of system per chart. OBJECTIVE:   VITALS:  height is 5' 10\" (1.778 m) and weight is 214 lb 4.6 oz (97.2 kg). His oral temperature is 97.3 °F (36.3 °C). His blood pressure is 145/76 (abnormal) and his pulse is 51. His respiration is 16 and oxygen saturation is 98%. CONSTITUTIONAL: Lethargic. SKIN: Skin color, texture, turgor normal. No rashes or lesions. LYMPH: no cervical nodes, no inguinal nodes  HEENT: Head is normocephalic, atraumatic. EOMI, PERRLA  NECK: Supple, symmetrical, trachea midline, no adenopathy, thyroid symmetric, not enlarged and no tenderness, skin normal  CHEST/LUNGS: Decreased air entry at bases   CARDIOVASCULAR: Heart regular rate and rhythm Normal S1 and S2. . Carotid and femoral pulses 2+/4 and equal bilaterally  ABDOMEN: Soft nondistended nontender  RECTAL: deferred, not clinically indicated  NEUROLOGIC: Detailed neurological examination was deferred.   EXTREMITIES: no cyanosis, no clubbing and no edema    LABS:   CBC with Differential:    Lab Results   Component Value Date    WBC 9.2 07/28/2021    RBC 5.64 07/28/2021    RBC 4.88 05/03/2012    HGB 13.7 07/28/2021    HCT 43.9 07/28/2021     07/28/2021     05/03/2012    MCV 77.9 07/28/2021    MCH 24.4 07/28/2021    MCHC 31.3 07/28/2021    RDW 18.0 07/28/2021    NRBC 3 07/19/2021    LYMPHOPCT 12 07/26/2021    MONOPCT 7 07/26/2021    BASOPCT 1 07/26/2021    MONOSABS 0.98 07/26/2021    LYMPHSABS 1.68 07/26/2021    EOSABS 0.00 07/26/2021    BASOSABS 0.14 07/26/2021    DIFFTYPE NOT REPORTED 07/26/2021     BMP:    Lab Results   Component Value Date     07/28/2021    K 4.3 07/28/2021     07/28/2021    CO2 23 07/28/2021    BUN 23 07/28/2021    LABALBU 3.3 07/14/2021    CREATININE 1.40 07/28/2021    CALCIUM 9.6 07/28/2021    GFRAA >60 07/28/2021    LABGLOM 50 07/28/2021    GLUCOSE 117 07/28/2021    GLUCOSE 122 05/03/2012     Hepatic Function Panel:    Lab Results   Component Value Date    ALKPHOS 93 07/14/2021    ALT 54 07/14/2021    AST 81 07/14/2021    PROT 6.3 07/14/2021    BILITOT 0.80 07/14/2021    BILIDIR 0.30 07/14/2021    IBILI 0.50 07/14/2021    LABALBU 3.3 07/14/2021     Calcium:    Lab Results   Component Value Date    CALCIUM 9.6 07/28/2021     Magnesium:    Lab Results   Component Value Date    MG 2.5 07/21/2021     Phosphorus:  No results found for: PHOS  PT/INR:    Lab Results   Component Value Date    PROTIME 11.8 07/13/2021    PROTIME 11.1 05/03/2012    INR 1.1 07/13/2021     ABG:  No results found for: PHART, PH, XFC0VCM, PCO2, PO2ART, PO2, LNZ5ZDA, HCO3, BEART, BE, THGBART, THB, YLX9SAZ, V9BXTUKE, O2SAT  Urine Culture:  No components found for: YARITZAINE  Blood Culture:  No components found for: CBLOOD, CFUNGUSBL  Stool Culture:  No components found for: CSTOOL    RADIOLOGY:   I have personally reviewed the following films:  CT HEAD WO CONTRAST    Result Date: 7/21/2021  EXAMINATION: CT OF THE HEAD WITHOUT CONTRAST  7/21/2021 11:53 am TECHNIQUE: CT of the head was performed without the administration of intravenous contrast. Dose modulation, iterative reconstruction, and/or weight based adjustment of the mA/kV was utilized to reduce the radiation dose to as low as reasonably achievable. COMPARISON: July 17, 2021, MRI July 14, 2021 HISTORY: ORDERING SYSTEM PROVIDED HISTORY: Altered mental status TECHNOLOGIST PROVIDED HISTORY: eval for change in status Reason for Exam: EVAL FOR CHANGE IN STATUS Type of Exam: Subsequent/Follow-up Additional signs and symptoms: PT BECAME AGTATED & COMBATIVE OVERNIGHT Relevant Medical/Surgical History: HX STROKE FINDINGS: BRAIN/VENTRICLES: No acute intracranial hemorrhage, mass effect or midline shift. Area of hypoattenuation within the left basal ganglia and small foci of hypoattenuation in the right basal ganglia compatible with subacute infarct. Diffuse cortical volume loss and compensatory ventricular enlargement appears unchanged. Periventricular and subcortical white matter hypoattenuation redemonstrated bilaterally compatible with severe chronic microvascular ischemic changes. Encephalomalacia of the right caudate redemonstrated compatible with remote lacunar infarct. ORBITS: The visualized portion of the orbits demonstrate no acute abnormality. SINUSES: Air-fluid level within the left sphenoid sinus. Paranasal sinuses and mastoid air cells otherwise clear. SOFT TISSUES/SKULL:  No acute abnormality of the visualized skull or soft tissues. Subacute basal ganglia infarcts redemonstrated. No gross hemorrhagic conversion or significant mass effect. New air-fluid level within the left sphenoid sinus suggesting acute sinusitis. IMPRESSION:   1.  Acute ischemic stroke  2. Thrombocytopenia on IV steroids  3. Hypertension. Diabetes. 4. Malnutrition. PEG tube requested. 5. Blood work from 7/28/2021 reviewed. Platelet count 745. Hemoglobin adequate. WBC count is normal.  Sodium potassium normal.  Creatinine was 1.4.    does not have any pertinent problems on file. PLAN:   1. Check repeat labs. Will discuss with family regarding PEG tube placement. Thank you for this interesting consult and for allowing us to participate in the care of this patient. If you have any questions please don't hesitate to call.           Electronically signed by Jamshid Odonnell MD  on 7/30/2021 at 3:49 PM

## 2021-07-30 NOTE — PROGRESS NOTES
as needed, he did not require since 7/23, no sedating medication on board,  -Patient current medication Prozac 40 mg daily,    The patient is currently receiving care for the above psychiatric illness.   Patient is totally mute, he is not cooperative, he is not following commands, refusing meds, trying to pull lines sometimes,      Psychiatric Review of Systems :      ·    Obsessions and Compulsions: Denies    ·    Angelina or Hypomania: Denies  ·    Hallucinations: Denies  ·    Panic Attacks:  Denies  ·    Delusions:  Denies  ·    Phobias:  Denies  ·    Trauma: Denies      Substance Abuse History:  Unknown history    Past Psychiatric History:  Prior Diagnosis:     Hospitalization: yes  Hx of Suicidal Attempts: no  Hx of violence:  no      Past Medical History:        Diagnosis Date    [facility]     COPD [facility]chronic obstructive pulmonary disease) ([facility]     Depression     Diabetes mellitus ([facility]     pt refuses to take previously prescribed metformin (states PCP aware)    Former smoker     Hyperlipidemia     on 4/27/18 pt states \"I stopped taking that about a year ago\"    Hypertension     Mixed restrictive and obstructive lung disease ([facility]     Need for pneumococcal vaccine     Personal history of tobacco use        Past Surgical History:        Procedure Laterality Date    CARDIAC SURGERY  07/2015    1 stent    NECK SURGERY      TOE AMPUTATION Right greater         Medications Prior to Admission:   Medications Prior to Admission: Heparin Sodium, Porcine, (HEPARIN, PORCINE,) 5000 UNIT/ML injection, Inject 1 mL into the skin every 8 hours  QUEtiapine (SEROQUEL) 25 MG tablet, Take 1 tablet by mouth nightly as needed for Agitation  methylPREDNISolone sodium (SOLU-MEDROL) 40 MG injection, Infuse 1 mL intravenously every 12 hours  melatonin 3 MG TABS tablet, Take 1 tablet by mouth nightly as needed (insomnia)  atorvastatin (LIPITOR) 80 MG tablet, Take 0.5 tablets by mouth daily (Pt takes one-half of an 80mg tab = 40mg)  tiZANidine (ZANAFLEX) 2 MG tablet, Take 1 tablet by mouth 4 times daily as needed (muscle spasms)  naproxen (NAPROSYN) 500 MG tablet, Take 1 tablet by mouth 2 times daily (with meals)  ibuprofen (ADVIL;MOTRIN) 800 MG tablet, Take 1 tablet by mouth every 8 hours as needed for Pain  lidocaine (LIDODERM) 5 %, Place 1 patch onto the skin daily 12 hours on, 12 hours off.  metoprolol succinate (TOPROL XL) 50 MG extended release tablet, Take 50 mg by mouth daily  tiotropium (SPIRIVA RESPIMAT) 2.5 MCG/ACT AERS inhaler, Inhale 2 puffs into the lungs daily  carboxymethylcellulose 1 % ophthalmic solution, Place 1 drop into both eyes 3 times daily as needed for Dry Eyes   ketotifen (ZADITOR) 0.025 % ophthalmic solution, Place 1 drop into both eyes 2 times daily as needed (itchy eyes)   isosorbide mononitrate (IMDUR) 60 MG extended release tablet, Take 30 mg by mouth daily Indications: 1/2 tablet (30mg)   finasteride (PROSCAR) 5 MG tablet, Take 5 mg by mouth daily  tamsulosin (FLOMAX) 0.4 MG capsule, Take 0.4 mg by mouth daily  fluticasone (FLONASE) 50 MCG/ACT nasal spray, 1 spray by Nasal route 2 times daily (Patient taking differently: 1 spray by Nasal route 2 times daily as needed for Rhinitis )  albuterol sulfate  (90 BASE) MCG/ACT inhaler, Inhale 2 puffs into the lungs every 6 hours as needed for Wheezing  albuterol (PROVENTIL) (2.5 MG/3ML) 0.083% nebulizer solution, Take 2.5 mg by nebulization every 6 hours as needed for Wheezing  aspirin 81 MG EC tablet, Take 81 mg by mouth daily  losartan (COZAAR) 100 MG tablet, Take 100 mg by mouth daily   nitroGLYCERIN (NITROSTAT) 0.4 MG SL tablet, Place 0.4 mg under the tongue every 5 minutes as needed for Chest pain  FLUoxetine (PROZAC) 20 MG capsule, Take 40 mg by mouth daily   furosemide (LASIX) 20 MG tablet, Take 20 mg by mouth daily as needed (swelling)   clopidogrel (PLAVIX) 75 MG tablet, Take 75 mg by mouth daily  rOPINIRole (REQUIP) 0.25 MG tablet, Take 0.25 mg by mouth nightly as needed   budesonide-formoterol (SYMBICORT) 160-4.5 MCG/ACT AERO, Inhale 2 puffs into the lungs 2 times daily. Allergies:  Patient has no known allergies. FAMILY/SOCIAL HISTORY:  No family history on file. Social History     Socioeconomic History    Marital status:      Spouse name: Not on file    Number of children: Not on file    Years of education: Not on file    Highest education level: Not on file   Occupational History    Not on file   Tobacco Use    Smoking status: Former Smoker     Packs/day: 0.75     Years: 56.00     Pack years: 42.00     Start date: 1957     Quit date: 2013     Years since quittin.0    Smokeless tobacco: Never Used   Vaping Use    Vaping Use: Never used   Substance and Sexual Activity    Alcohol use: No    Drug use: No    Sexual activity: Not on file   Other Topics Concern    Not on file   Social History Narrative    Not on file     Social Determinants of Health     Financial Resource Strain:     Difficulty of Paying Living Expenses:    Food Insecurity:     Worried About [address] in the Last Year:     [address] [place] in the Last Year:    Transportation Needs:     Lack of Transportation (Medical):      Lack of Transportation (Non-Medical):    Physical Activity:     Days of Exercise per Week:     Minutes of Exercise per Session:    Stress:     Feeling of Stress :    Social Connections:     Frequency of Communication with Friends and Family:     Frequency of Social Gatherings with Friends and Family:     Attends Judaism Services:     Active Member of Clubs or Organizations:     Attends Club or Organization Meetings:     Marital Status:    Intimate Partner Violence:     Fear of Current or Ex-Partner:     Emotionally Abused:     Physically Abused:     Sexually Abused:        REVIEW OF SYSTEMS    Constitutional: [] fever  [] chills  [] weight loss  []weakness [] Other:  Eyes:  [] photophobia  [] discharge [] acuity change   [] Diplopia   [] Other:  HENT:  [] sore throat  [] ear pain [] Tinnitus   [] Other  Respiratory:  [] Cough  [] Shortness of breath   [] Sputum   [] Other:   Cardiac: []Chest pain   []Palpitations []Edema  []PND  [] Other:  GI:  []Abdominal pain   []Nausea  []Vomiting  []Diarrhea  [] Other:  :  [] Dysuria   []Frequency  []Hematuria  []Discharge  [] Other:  Possible Pregnancy: []Yes   []No   LMP:   Musculoskeletal:  []Back pain  []Neck pain  []Recent Injury   Skin:  []Rash  [] Itching  [] Other:  Neurologic:  [] Headache  [] Focal weakness  [] Sensory changes []Other:  Endocrine:  [] Polyuria  [] Polydipsia  [] Hair Loss  [] Other:  Lymphatic:   [] Swollen glands   Psychiatric:  As per HPI      All other systems negative except as marked or mentioned/indicated in the HPI. Evangelista Gold PHYSICAL EXAM:  Vitals:  BP (!) 145/76   Pulse 51   Temp 97.3 °F (36.3 °C) (Oral)   Resp 16   Ht 5' 10\" (1.778 m)   Wt 214 lb 4.6 oz (97.2 kg)   SpO2 98%   BMI 30.75 kg/m²        Mental Status Examination:    Level of consciousness:  within normal limits   Appearance:  well-appearing  Behavior/Motor:  no abnormalities noted  Attitude toward examiner:   noncooperative  Speech: Mute, noncooperative, not answering any question or following any commands  Mood: within normal limits  Affect: Unable to assess  Thought processes: Unable to assess  Thought content: Unable to assess   Cognition: Unable to assess   Concentration: Unable to assess  Memory unable to assess  Insight unable to assess  Fund of Knowledge marginal        LABS: REVIEWED TODAY:  Recent Labs     07/28/21 1847   WBC 9.2   HGB 13.7   *     Recent Labs     07/28/21 2006      K 4.3      CO2 23   BUN 23   CREATININE 1.40*   GLUCOSE 117*     No results for input(s): BILITOT, ALKPHOS, AST, ALT in the last 72 hours.   Lab Results   Component Value Date    BARBSCNU NEGATIVE 07/13/2021    LABBENZ NEGATIVE 07/13/2021 LABMETH NEGATIVE 07/13/2021    PPXUR NOT REPORTED 07/13/2021     Lab Results   Component Value Date    TSH 0.28 07/14/2021     No results found for: LITHIUM  No results found for: VALPROATE, CBMZ  No results found for: LITHIUM, VALPROATE    FURTHER LABS ORDERED :      Radiology   ECHO Complete 2D W Doppler W Color    Result Date: 7/15/2021  Transthoracic Echocardiography Report (TTE)  Patient Name GOFF      Date of Study               07/15/2021               YAYA DUBOIS   Date of      1945  Gender                      Male  Birth   Age          77 year(s)  Race                        Black   Room Number  0536        Height:                     70 inch, 177.8 cm   Corporate ID [id number]    Weight:                     210 pounds, 95.3 kg  #   Patient Acct [de-identified]   BSA:          2.13 m^2      BMI:       30.13  #                                                               kg/m^2   MR #         [id number]     Sonographer                 Timbo Torres   Accession #  [id number]  Interpreting Physician      Yolande Vance   Fellow                   Referring Nurse                           Practitioner   Interpreting             Referring Physician         Yani Cagle MD  Fellow  Additional Comments Technically difficult study, patient supine unable to be positioned for better acoustic windows. Type of Study   TTE procedure:2D Echocardiogram, M-Mode, Doppler, Color Doppler. Procedure Date Date: 07/15/2021 Start: 08:29 AM Study Location: [facility] Technical Quality: Adequate visualization Indications:CVA. History / Tech. Comments: Procedure explained to patient. Echo completed in the Echo Lab. RN performed bubble study. PMHx: Former smoker, COPD Hypertension, Diabetes, Hyperlipidemia Coronary artery disease, s/p stents Patient Status: Inpatient Height: 70 inches Weight: 210 pounds BSA: 2.13 m^2 BMI: 30.13 kg/m^2 CONCLUSIONS Summary Technically difficult study.  Overall global left ventricular significant pericardial effusion is seen. Miscellaneous Normal aortic root dimension. E/E' average = 17.35. IVC not well visualized. M-mode / 2D Measurements & Calculations:   LVIDd:5.6 cm(3.7 - 5.6 cm)        Diastolic QTRVOV:75.68 ml  IVSd:0.9 cm(0.6 - 1.1 cm)         Systolic JZCLUA:55.16 ml  LVPWd:0.8 cm(0.6 - 1.1 cm)        Aortic Root:3 cm(2.0 - 3.7 cm)                                    LA Dimension: 3.2 cm(1.9 - 4.0 cm)  Calculated LVEF (%): 50.46 %      LA volume/Index: 48.86 ml /23m^2                                    LVOT:2.1 cm                                    RVDd:3 cm   Mitral:                                 Aortic   Valve Area (P1/2-Time): 2.65 cm^2       Peak Velocity: 1.91 m/s  Peak E-Wave: 1.18 m/s                   Mean Velocity: 1.06 m/s  Peak A-Wave: 1.47 m/s                   Peak Gradient: 14.59 mmHg  E/A Ratio: 0.8                          Mean Gradient: 6 mmHg  Peak Gradient: 5.57 mmHg  Mean Gradient: 2 mmHg  Deceleration Time: 280 msec             Area (continuity): 2.83 cm^2  P1/2t: 83 msec                          AV VTI: 41.8 cm   Area (continuity): 2.2 cm^2  Mean Velocity: 0.62 m/s                                           Pulmonic:                                           Peak Velocity: 1.29 m/s                                          Peak Gradient: 6.66 mmHg  Diastology / Tissue Doppler Septal Wall E' velocity:0.06 m/s Septal Wall E/E':20.8 Lateral Wall E' velocity:0.08 m/s Lateral Wall E/E':13.9    CT HEAD WO CONTRAST    Result Date: 7/21/2021  EXAMINATION: CT OF THE HEAD WITHOUT CONTRAST  7/21/2021 11:53 am TECHNIQUE: CT of the head was performed without the administration of intravenous contrast. Dose modulation, iterative reconstruction, and/or weight based adjustment of the mA/kV was utilized to reduce the radiation dose to as low as reasonably achievable.  COMPARISON: July 17, 2021, MRI July 14, 2021 HISTORY: ORDERING SYSTEM PROVIDED HISTORY: Altered mental status TECHNOLOGIST PROVIDED HISTORY: eval for change in status Reason for Exam: EVAL FOR CHANGE IN STATUS Type of Exam: Subsequent/Follow-up Additional signs and symptoms: PT BECAME AGTATED & COMBATIVE OVERNIGHT Relevant Medical/Surgical History: HX STROKE FINDINGS: BRAIN/VENTRICLES: No acute intracranial hemorrhage, mass effect or midline shift. Area of hypoattenuation within the left basal ganglia and small foci of hypoattenuation in the right basal ganglia compatible with subacute infarct. Diffuse cortical volume loss and compensatory ventricular enlargement appears unchanged. Periventricular and subcortical white matter hypoattenuation redemonstrated bilaterally compatible with severe chronic microvascular ischemic changes. Encephalomalacia of the right caudate redemonstrated compatible with remote lacunar infarct. ORBITS: The visualized portion of the orbits demonstrate no acute abnormality. SINUSES: Air-fluid level within the left sphenoid sinus. Paranasal sinuses and mastoid air cells otherwise clear. SOFT TISSUES/SKULL:  No acute abnormality of the visualized skull or soft tissues. Subacute basal ganglia infarcts redemonstrated. No gross hemorrhagic conversion or significant mass effect. New air-fluid level within the left sphenoid sinus suggesting acute sinusitis. CT HEAD WO CONTRAST    Result Date: 7/17/2021  EXAMINATION: CT OF THE HEAD WITHOUT CONTRAST  7/17/2021 10:46 am TECHNIQUE: CT of the head was performed without the administration of intravenous contrast. Dose modulation, iterative reconstruction, and/or weight based adjustment of the mA/kV was utilized to reduce the radiation dose to as low as reasonably achievable. COMPARISON: None.  HISTORY: ORDERING SYSTEM PROVIDED HISTORY: change in mentation TECHNOLOGIST PROVIDED HISTORY: change in mentation Reason for Exam: change in mentation FINDINGS: BRAIN/VENTRICLES: Small area of hypodensity in the left corona radiata is similar in appearance to the previous exam.  There is no evidence of hemorrhage. No new parenchymal abnormalities are identified. ORBITS: The visualized portion of the orbits demonstrate no acute abnormality. SINUSES: The visualized paranasal sinuses and mastoid air cells demonstrate no acute abnormality. SOFT TISSUES/SKULL:  No acute abnormality of the visualized skull or soft tissues. No acute intracranial abnormality. No significant interval change. CT HEAD WO CONTRAST    Result Date: 7/13/2021  EXAMINATION: CT OF THE HEAD WITHOUT CONTRAST  7/13/2021 1:10 pm TECHNIQUE: CT of the head was performed without the administration of intravenous contrast. Dose modulation, iterative reconstruction, and/or weight based adjustment of the mA/kV was utilized to reduce the radiation dose to as low as reasonably achievable. COMPARISON: 05/02/2021 HISTORY: ORDERING SYSTEM PROVIDED HISTORY: Last known well 2 days right-sided facial droop right-sided weakness TECHNOLOGIST PROVIDED HISTORY: Last known well 2 days right-sided facial droop right-sided weakness Decision Support Exception - unselect if not a suspected or confirmed emergency medical condition->Emergency Medical Condition (MA) Reason for Exam: Last known well 2 days right-sided facial droop right-sided weakness FINDINGS: BRAIN/VENTRICLES: Ovoid area of low attenuation in the left frontal corona radiata measures 2 x 1.4 cm, new from prior study (series 2, image 39). No acute intracranial hemorrhage. No mass effect or midline shift. No hydrocephalus. Diffuse parenchymal volume loss with enlargement of the ventricles and cerebral sulci. Old infarction in the right basal ganglia. There are nonspecific areas of hypoattenuation within the periventricular and subcortical white matter, which likely represent chronic microvascular ischemic change. Intracranial atherosclerotic disease. ORBITS: The visualized portion of the orbits demonstrate no acute abnormality.  SINUSES: The visualized Referring Physician     Ramses Segura  Nurse  Practitioner  Procedure Type of Study:   Veins: Lower Extremities DVT Study, Venous Scan Lower Bilateral.  Indications for Study:CVA. Patient Status: In Patient. Technical Quality:Limited visualization. Limitation reason:legs rigid, extreme edema and resistance to compression . Conclusions   Summary   Simultaneous real time imaging utilizing B-Mode, color doppler and  spectral waveform analysis was performed on the bilateral lower  extremities for venous examination of the deep and superficial systems. Findings are:   Right:  No evidence of deep or superficial venous thrombosis. Left:  No evidence of deep or superficial venous thrombosis. Signature   ----------------------------------------------------------------  Electronically signed by Hugh Dave RVT(Sonographer)  on 07/15/2021 04:23 PM  ----------------------------------------------------------------   ----------------------------------------------------------------  Electronically signed by Nevaeh Calzada(Interpreting  physician) on 07/15/2021 08:56 PM  ----------------------------------------------------------------  Findings:   Right Impression:                    Left Impression:  The common femoral, femoral,         The common femoral, femoral,  popliteal and tibial veins           popliteal and tibial veins  demonstrate normal compressibility   demonstrate normal compressibility  and augmentation. and augmentation. Normal compressibility of the great  Normal compressibility of the great  saphenous vein. saphenous vein. Normal compressibility of the small  Normal compressibility of the small  saphenous vein. saphenous vein. Risk Factors   - The patient's risk factor(s) include: chronic lung disease, diabetes     mellitus and arterial hypertension.  Velocities are measured in cm/s ; Diameters are measured in cm Right Lower Extremities DVT Study Measurements Right 2D Measurements +------------------------------------+----------+---------------+----------+ ! Location                            ! Visualized! Compressibility! Thrombosis! +------------------------------------+----------+---------------+----------+ ! Common Femoral                      !Yes       ! Yes            ! None      ! +------------------------------------+----------+---------------+----------+ ! Prox Femoral                        !Yes       ! Yes            ! None      ! +------------------------------------+----------+---------------+----------+ ! Mid Femoral                         !Yes       ! Yes            ! None      ! +------------------------------------+----------+---------------+----------+ ! Dist Femoral                        !Yes       ! Yes            ! None      ! +------------------------------------+----------+---------------+----------+ ! Deep Femoral                        !No        !               !          ! +------------------------------------+----------+---------------+----------+ ! Popliteal                           !Yes       ! Yes            ! None      ! +------------------------------------+----------+---------------+----------+ ! Sapheno Femoral Junction            ! Yes       ! Yes            ! None      ! +------------------------------------+----------+---------------+----------+ ! PTV                                 ! Yes       ! Yes            ! None      ! +------------------------------------+----------+---------------+----------+ ! Peroneal                            !No        !               !          ! +------------------------------------+----------+---------------+----------+ ! Gastroc                             ! Yes       ! Yes            ! None      ! +------------------------------------+----------+---------------+----------+ ! GSV Thigh                           ! Yes       ! Yes            ! None      ! +------------------------------------+----------+---------------+----------+ ! GSV Knee                            ! Yes       ! Yes            ! None      ! +------------------------------------+----------+---------------+----------+ ! GSV Ankle                           ! Yes       ! Yes            ! None      ! +------------------------------------+----------+---------------+----------+ ! SSV                                 ! Yes       ! Yes            ! None      ! +------------------------------------+----------+---------------+----------+ Right Doppler Measurements +---------------------------+------+------+--------------------------------+ ! Location                   ! Signal!Reflux! Reflux (msec)                   ! +---------------------------+------+------+--------------------------------+ ! Common Femoral             !Phasic!      !                                ! +---------------------------+------+------+--------------------------------+ ! Prox Femoral               !Phasic!      !                                ! +---------------------------+------+------+--------------------------------+ ! Popliteal                  !Phasic!      !                                ! +---------------------------+------+------+--------------------------------+ Left Lower Extremities DVT Study Measurements Left 2D Measurements +------------------------------------+----------+---------------+----------+ ! Location                            ! Visualized! Compressibility! Thrombosis! +------------------------------------+----------+---------------+----------+ ! Common Femoral                      !Yes       ! Yes            ! None      ! +------------------------------------+----------+---------------+----------+ ! Prox Femoral                        !Yes       ! Yes            ! None      ! +------------------------------------+----------+---------------+----------+ ! Mid Femoral                         !Yes       ! Yes            ! None      ! +------------------------------------+----------+---------------+----------+ ! Dist Femoral                        !Yes       ! Yes            ! None      ! +------------------------------------+----------+---------------+----------+ ! Deep Femoral                        !No        !               !          ! +------------------------------------+----------+---------------+----------+ ! Popliteal                           !Yes       ! Yes            ! None      ! +------------------------------------+----------+---------------+----------+ ! Sapheno Femoral Junction            ! Yes       ! Yes            ! None      ! +------------------------------------+----------+---------------+----------+ ! PTV                                 ! Yes       ! Yes            ! None      ! +------------------------------------+----------+---------------+----------+ ! Peroneal                            !Yes       ! Yes            ! None      ! +------------------------------------+----------+---------------+----------+ ! Gastroc                             ! Yes       ! Yes            ! None      ! +------------------------------------+----------+---------------+----------+ ! GSV Thigh                           ! Yes       ! Yes            ! None      ! +------------------------------------+----------+---------------+----------+ ! GSV Knee                            ! Yes       ! Yes            ! None      ! +------------------------------------+----------+---------------+----------+ ! GSV Ankle                           ! Yes       ! Yes            ! None      ! +------------------------------------+----------+---------------+----------+ ! SSV                                 ! Yes       ! Yes            ! None      ! +------------------------------------+----------+---------------+----------+ Left Doppler Measurements +---------------------------+------+------+--------------------------------+ ! Location                   ! Signal!Reflux! Reflux (msec) ! +---------------------------+------+------+--------------------------------+ ! Common Femoral             !Phasic!      !                                ! +---------------------------+------+------+--------------------------------+ ! Prox Femoral               !Phasic!      !                                ! +---------------------------+------+------+--------------------------------+ ! Popliteal                  !Phasic!      !                                ! +---------------------------+------+------+--------------------------------+    US SPLEEN    Result Date: 7/14/2021  EXAMINATION: ULTRASOUND OF THE SPLEEN 7/14/2021 8:29 am COMPARISON: None. HISTORY: ORDERING SYSTEM PROVIDED HISTORY: thrombocytopenia TECHNOLOGIST PROVIDED HISTORY: thrombocytopenia FINDINGS: Suboptimal study. The visualized spleen appears enlarged measuring 13.4 cm. No focal lesion is seen. No free fluid. Suboptimal study. Mild splenomegaly. CTA HEAD NECK W CONTRAST    Result Date: 7/13/2021  EXAMINATION: CTA OF THE HEAD AND NECK WITH CONTRAST 7/13/2021 1:11 pm: TECHNIQUE: CTA of the head and neck was performed with the administration of intravenous contrast. Multiplanar reformatted images are provided for review. MIP images are provided for review. Stenosis of the internal carotid arteries measured using NASCET criteria. Dose modulation, iterative reconstruction, and/or weight based adjustment of the mA/kV was utilized to reduce the radiation dose to as low as reasonably achievable. 3D surface reformatted and curved MIP images were submitted for review. COMPARISON: None. HISTORY: ORDERING SYSTEM PROVIDED HISTORY: stroke TECHNOLOGIST PROVIDED HISTORY: stroke Decision Support Exception - unselect if not a suspected or confirmed emergency medical condition->Emergency Medical Condition (MA) Reason for Exam: stroke, Cerebrovascular Accident; Fall FINDINGS: CTA NECK: AORTIC ARCH/ARCH VESSELS: No dissection or arterial injury.   No significant stenosis of the brachiocephalic or subclavian arteries. CAROTID ARTERIES: No dissection, arterial injury, or hemodynamically significant stenosis by NASCET criteria. VERTEBRAL ARTERIES: No dissection, arterial injury, or significant stenosis in the right vertebral artery. Severe stenosis in the V1 segment of the left vertebral artery. SOFT TISSUES: The lung apices are clear. No cervical or superior mediastinal lymphadenopathy. The larynx and pharynx are unremarkable. No acute abnormality of the salivary glands. Thyroid gland is diffusely enlarged and heterogeneous and contains left thyroid lobe nodule measuring 2.6 cm. BONES: Multilevel degenerative spondylosis throughout the cervical spine with fusion at C5-C6. CTA HEAD: ANTERIOR CIRCULATION: Calcified atherosclerotic plaque in the cavernous segments of the internal carotid arteries bilaterally results in mild-to-moderate cavernous ICA stenosis bilaterally. Mild stenosis in the proximal A2 segment of the right anterior cerebral artery. Severe stenosis in the proximal A3 segments of the anterior cerebral arteries bilaterally. Moderate-to-severe stenosis within M2 segment of the right middle cerebral artery. Moderate stenosis in the M1 and proximal M2 segments of the left middle cerebral artery. POSTERIOR CIRCULATION: No significant stenosis in the right intradural vertebral artery. Severe stenosis in the intracranial left vertebral artery. Mild stenosis in the basilar artery. Moderate stenosis in the P1/P2 junction of the left PCA. Severe stenosis and near complete occlusion of the right PCA. OTHER: No dural venous sinus thrombosis on this non-dedicated study. 1. Severe stenosis in the V1 segment of the left vertebral artery. 2. Extensive intracranial atherosclerotic plaque with near complete occlusion of the right PCA. 3. Severe stenoses in the proximal A3 segments of the ACAs bilaterally.  4. Moderate-to-severe proximal M2 segment changes. DIAGNOSIS:  Depression unspecified  Acute ischemic stroke, hemiplegia right side,      RECOMMENDATIONS  Medications:  See orders  Discussed with the treating physician/ team about the patient and treatment plan  Reviewed the chart    Discussed with the patient risk, benefit, alternative and common side effects for the  proposed medication treatment. Patient is consenting to the treatment. Thanks for the consult. Please call me if needed. Electronically signed by Juan Manuel Camara MD on 7/30/2021 at 10:05 AM    Please note that this chart was generated using voice recognition Dragon dictation software. Although every effort was made to ensure the accuracy of this automated transcription, some errors in transcription may have occurred. I independently saw and evaluated the patient. I reviewed the resident's documentation above. Any additional comments or changes to the    documentation are stated below otherwise agree with assessment. The patient was seen in his room. He is sitting in a wheelchair. He has a flat affect and has psychomotor retardation. He appears to be depressed. The patient was refusing oral medications. He has refused to have a CT scan and has been pushing staff away. The patient made eye contact but did not make any verbal responses. The patient has refused all medications yesterday. I have spoken to the patient's primary physician Dr. Abdulkadir Ray. She is concerned that the patient is refusing to eat and drink. He is also refusing all his medications. He is not participating in therapy. We discussed that we can change the patient's medication formulations to liquid Prozac and add Zydis which is an orodispersible antipsychotic that could help with mood and also with appetite stimulation    I was asked to comment on the patient's capacity. The patient is unable to communicate his reasoning for refusal of treatment.   The patient has not shared if he can weigh

## 2021-07-30 NOTE — PROGRESS NOTES
[place] [address] Internal Medicine    CONSULTATION / HISTORY AND PHYSICAL EXAMINATION            Date:   7/30/2021  Patient name:  Rm Clifton  Date of admission:  7/20/2021  6:49 PM  MRN:   [id number]  Account:  [de-identified]  YOB: 1945  PCP:    No primary care provider on file.   Room:   [address] [place] [address]  Code Status:    Full Code    Physician Requesting Consult: Benny Elias MD    Reason for Consult:  medical management    Chief Complaint:       Right hemipareisi  History Obtained From:     Patient medical record nursing staff    History of Present Illness:   History is extremely limited, patient was extremely agitated overnight, was given Ativan around 2 AM in the night, patient very sleepy, not answering questions  Most of history is retrieved from E [facility]  Patient was recently admitted to [facility] with right-sided weakness, right-sided facial weakness, speech difficulties  Patient underwent MRI brain, concerning for acute infarct in left and right basal ganglia  CT head and neck, was concerning for severe stenosis in the proximal A3 segment of RAHEEM bilaterally  During hospital stay, patient had thrombocytopenia, was evaluated by hematologist, getting treated with IV steroids, and lines of immune thrombocytopenia      Past Medical History:     Past Medical History:   Diagnosis Date    Centrilobular emphysema ([address]     COPD (chronic obstructive pulmonary disease) ([address]     Depression     Diabetes mellitus ([address]     pt refuses to take previously prescribed metformin (states PCP aware)    Former smoker     Hyperlipidemia     on 4/27/18 pt states \"I stopped taking that about a year ago\"    Hypertension     Mixed restrictive and obstructive lung disease ([address]     Need for pneumococcal vaccine     Personal history of tobacco use         Past Surgical History:     Past Surgical History:   Procedure Laterality Date    CARDIAC SURGERY  07/2015    1 stent    NECK SURGERY      TOE AMPUTATION Right greater        Medications Prior to Admission:     Prior to Admission medications    Medication Sig Start Date End Date Taking?  Authorizing Provider   Heparin Sodium, Porcine, (HEPARIN, PORCINE,) 5000 UNIT/ML injection Inject 1 mL into the skin every 8 hours 7/20/21   Sam De Leon MD   QUEtiapine (SEROQUEL) 25 MG tablet Take 1 tablet by mouth nightly as needed for Agitation 7/20/21 7/25/21  Sam De Leon MD   methylPREDNISolone sodium (SOLU-MEDROL) 40 MG injection Infuse 1 mL intravenously every 12 hours 7/20/21   Sam De Leon MD   melatonin 3 MG TABS tablet Take 1 tablet by mouth nightly as needed (insomnia) 7/20/21   Sam De Leon MD   atorvastatin (LIPITOR) 80 MG tablet Take 0.5 tablets by mouth daily (Pt takes one-half of an 80mg tab = 40mg) 7/16/21   Sam De Leon MD   tiZANidine (ZANAFLEX) 2 MG tablet Take 1 tablet by mouth 4 times daily as needed (muscle spasms) 5/7/21   JOLIE Guallpa - CNP   naproxen (NAPROSYN) 500 MG tablet Take 1 tablet by mouth 2 times daily (with meals) 1/4/21   Emani Blue PA-C   ibuprofen (ADVIL;MOTRIN) 800 MG tablet Take 1 tablet by mouth every 8 hours as needed for Pain 6/2/20   Toi Cabrera MD   lidocaine (LIDODERM) 5 % Place 1 patch onto the skin daily 12 hours on, 12 hours off. 6/2/20   Toi Cabrera MD   metoprolol succinate (TOPROL XL) 50 MG extended release tablet Take 50 mg by mouth daily    Historical Provider, MD   tiotropium (SPIRIVA RESPIMAT) 2.5 MCG/ACT AERS inhaler Inhale 2 puffs into the lungs daily    Historical Provider, MD   carboxymethylcellulose 1 % ophthalmic solution Place 1 drop into both eyes 3 times daily as needed for Dry Eyes     Historical Provider, MD   ketotifen (ZADITOR) 0.025 % ophthalmic solution Place 1 drop into both eyes 2 times daily as needed (itchy eyes)     Historical Provider, MD   isosorbide mononitrate (IMDUR) 60 MG extended release tablet Take 30 mg by mouth daily Indications: 1/2 tablet (30mg)     Historical Provider, MD   finasteride (PROSCAR) 5 MG tablet Take 5 mg by mouth daily    Historical Provider, MD   tamsulosin (FLOMAX) 0.4 MG capsule Take 0.4 mg by mouth daily    Historical Provider, MD   fluticasone (FLONASE) 50 MCG/ACT nasal spray 1 spray by Nasal route 2 times daily  Patient taking differently: 1 spray by Nasal route 2 times daily as needed for Rhinitis  5/31/16   Francisco Connor,    albuterol sulfate  (90 BASE) MCG/ACT inhaler Inhale 2 puffs into the lungs every 6 hours as needed for Wheezing    Historical Provider, MD   albuterol (PROVENTIL) (2.5 MG/3ML) 0.083% nebulizer solution Take 2.5 mg by nebulization every 6 hours as needed for Wheezing    Historical Provider, MD   aspirin 81 MG EC tablet Take 81 mg by mouth daily    Historical Provider, MD   losartan (COZAAR) 100 MG tablet Take 100 mg by mouth daily     Historical Provider, MD   nitroGLYCERIN (NITROSTAT) 0.4 MG SL tablet Place 0.4 mg under the tongue every 5 minutes as needed for Chest pain    Historical Provider, MD   FLUoxetine (PROZAC) 20 MG capsule Take 40 mg by mouth daily     Historical Provider, MD   furosemide (LASIX) 20 MG tablet Take 20 mg by mouth daily as needed (swelling)     Historical Provider, MD   clopidogrel (PLAVIX) 75 MG tablet Take 75 mg by mouth daily    Historical Provider, MD   rOPINIRole (REQUIP) 0.25 MG tablet Take 0.25 mg by mouth nightly as needed     Historical Provider, MD   budesonide-formoterol (SYMBICORT) 160-4.5 MCG/ACT AERO Inhale 2 puffs into the lungs 2 times daily. Historical Provider, MD        Allergies:     Patient has no known allergies. Social History:     Tobacco:    reports that he quit smoking about 8 years ago. He started smoking about 64 years ago. He has a 42.00 pack-year smoking history. He has never used smokeless tobacco.  Alcohol:      reports no history of alcohol use.   Drug Use:  reports no history of drug use.    Family History:     No family history on file. Review of Systems:   Cannot be done because of patient condition    Physical Exam:     BP (!) 145/76   Pulse 51   Temp 97.3 °F (36.3 °C) (Oral)   Resp 16   Ht 5' 10\" (1.778 m)   Wt 214 lb 4.6 oz (97.2 kg)   SpO2 98%   BMI 30.75 kg/m²   Temp (24hrs), Av.8 °F (36.6 °C), Min:97.3 °F (36.3 °C), Max:98.2 °F (36.8 °C)    Recent Labs     21  1600 21  1951 21  0650 21  1039   POCGLU 149* 109 105 135*     No intake or output data in the 24 hours ending 21 1523    General Appearance: Very sleepy, not answering questions  Mental status: Not oriented to person, place, and time with normal affect  Head:  normocephalic, atraumatic. Eye: no icterus, redness, pupils equal and reactive, extraocular eye movements intact, conjunctiva clear  Ear: normal external ear, no discharge, hearing intact  Nose:  no drainage noted  Mouth: mucous membranes moist  Neck: supple, no carotid bruits, thyroid not palpable  Lungs: Bilateral equal air entry, clear to ausculation, no wheezing, rales or rhonchi, normal effort  Cardiovascular: normal rate, regular rhythm, no murmur, gallop, rub.   Abdomen: Soft, nontender, nondistended, normal bowel sounds, no hepatomegaly or splenomegaly  Neurologic: Weakness in right upper and lower extremity, speech difficulties, right sided facial weakness  Skin: No gross lesions, rashes, bruising or bleeding on exposed skin area  Extremities:  peripheral pulses palpable, no pedal edema or calf pain with palpation    Investigations:      Laboratory Testing:  Recent Results (from the past 24 hour(s))   POC Glucose Fingerstick    Collection Time: 21  4:00 PM   Result Value Ref Range    POC Glucose 149 (H) 75 - 110 mg/dL   POC Glucose Fingerstick    Collection Time: 21  7:51 PM   Result Value Ref Range    POC Glucose 109 75 - 110 mg/dL   POC Glucose Fingerstick    Collection Time: 21  6:50 AM   Result Value Ref Range    POC Glucose 105 75 - 110 mg/dL   POC Glucose Fingerstick    Collection Time: 07/30/21 10:39 AM   Result Value Ref Range    POC Glucose 135 (H) 75 - 110 mg/dL           Consultations:   IP CONSULT TO DIETITIAN  IP CONSULT TO SOCIAL WORK  IP CONSULT TO INTERNAL MEDICINE  IP CONSULT TO ONCOLOGY  IP CONSULT TO INTERNAL MEDICINE  IP CONSULT TO PSYCHIATRY  IP CONSULT TO GENERAL SURGERY  Assessment :      Primary Problem  <principal problem not specified>    Active Hospital Problems    Diagnosis Date Noted    Depression [F32.9]     Acute CVA (cerebrovascular accident) ([facility] [I63.9] 07/20/2021    Acute idiopathic thrombocytopenic purpura ([facility] [D69.3] 07/16/2021    CAD S/P percutaneous coronary angioplasty [I25.10, Z98.61] 07/13/2021    Cerebrovascular accident (CVA) ([facility] [I63.9] 07/13/2021    HTN (hypertension) Kayla Ledesma 07/13/2021    Hyperlipidemia [E78.5] 07/13/2021    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 Acute ischemic stroke, patient is on aspirin, Plavix, Crestor  2. 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 On DVT prophylaxis with heparin  4. Hypertension, controlled  5. Diabetes, controlled  1 episode of bradycardia, will follow, may be due to benzodiazepine . 6. Incidental finding of thyroid nodule on CTA head and neck, will need outpatient ultrasound thyroid and biopsy  Dysphagia diet  Right hemipareis with speech dif  htn improved    augmentin for sinusitis  Prednisone for ?  ITP  Flat affect depression on prozac    Needs a lot of encouragement to eat    Watch platelet  cortisl levels 18  At risk of addisonian crisis  Case dw with dr Hector Penaloza  Patient is not taking anything oral with a dysphagia severe malnutrition risk  Patient will benefit with the PEG tube for medication and nutrition  Consult general surgery    Emery Genao MD  7/30/2021  3:23 PM    Copy sent to Dr. Baldo Middleton primary care provider on file. Please note that this chart was generated using voice recognition Dragon dictation software. Although every effort was made to ensure the accuracy of this automated transcription, some errors in transcription may have occurred.

## 2021-07-30 NOTE — PLAN OF CARE
Problem: Infection:  Goal: Will remain free from infection  Description: Will remain free from infection  7/30/2021 0417 by Maricruz Pro RN  Outcome: Ongoing  7/29/2021 1751 by Marty Clarke RN  Outcome: Ongoing     Problem: Safety:  Goal: Free from accidental physical injury  Description: Free from accidental physical injury  7/30/2021 0417 by Maricruz Pro RN  Outcome: Ongoing  7/29/2021 1751 by Marty Clarke RN  Outcome: Ongoing  Goal: Free from intentional harm  Description: Free from intentional harm  7/30/2021 0417 by Maricruz Pro RN  Outcome: Ongoing  7/29/2021 1751 by Marty Clarke RN  Outcome: Ongoing     Problem: Daily Care:  Goal: Daily care needs are met  Description: Daily care needs are met  7/30/2021 0417 by Maricruz Pro RN  Outcome: Ongoing  7/29/2021 1751 by Marty Clarke RN  Outcome: Ongoing     Problem: Pain:  Goal: Patient's pain/discomfort is manageable  Description: Patient's pain/discomfort is manageable  7/30/2021 0417 by Maricruz Pro RN  Outcome: Ongoing  Note: Assess the location, characteristics, onset, duration, frequency, quality, and severity of pain. Encourage immediate report of pain. Use appropriate pain scale to rate pain. Manage pain using nonpharmacologic/pharmacologic interventions.    7/29/2021 1751 by Marty Clarke RN  Outcome: Ongoing     Problem: Skin Integrity:  Goal: Skin integrity will stabilize  Description: Skin integrity will stabilize  7/30/2021 0417 by Maricruz Pro RN  Outcome: Ongoing  7/29/2021 1751 by Marty Clarke RN  Outcome: Ongoing     Problem: Discharge Planning:  Goal: Patients continuum of care needs are met  Description: Patients continuum of care needs are met  7/30/2021 0417 by Maricruz Pro RN  Outcome: Ongoing  7/29/2021 1751 by Marty Clarke RN  Outcome: Ongoing     Problem: Skin Integrity:  Goal: Will show no infection signs and symptoms  Description: Will show no infection signs and symptoms  7/30/2021 0417 by Maricruz Pro RN  Outcome: Ongoing  7/29/2021 1751 by Ryan Oneal RN  Outcome: Ongoing  Goal: Absence of new skin breakdown  Description: Absence of new skin breakdown  7/30/2021 0417 by Padma Alarcon RN  Outcome: Ongoing  7/29/2021 1751 by Ryan Oneal RN  Outcome: Ongoing     Problem: Falls - Risk of:  Goal: Will remain free from falls  Description: Will remain free from falls  7/30/2021 0417 by Padma Alarcon RN  Outcome: Ongoing  Note: Patient remains free of falls and injuries throughout shift.  Bed remains in the lowest position, wheels locked, call light and bedside table are within reach.   7/29/2021 1751 by Ryan Oneal RN  Outcome: Ongoing  Goal: Absence of physical injury  Description: Absence of physical injury  7/30/2021 0417 by Padma Alarcon RN  Outcome: Ongoing  7/29/2021 1751 by Ryan Oneal RN  Outcome: Ongoing     Problem: Nutrition  Goal: Optimal nutrition therapy  7/30/2021 0417 by Padma Alarcon RN  Outcome: Ongoing  7/29/2021 1751 by Ryan Oneal RN  Outcome: Ongoing     Problem: Musculor/Skeletal Functional Status  Goal: Highest potential functional level  7/30/2021 0417 by Padma Alarcon RN  Outcome: Ongoing  7/29/2021 1751 by Ryan Oneal RN  Outcome: Ongoing  Goal: Absence of falls  7/30/2021 0417 by Padma Alarcon RN  Outcome: Ongoing  7/29/2021 1751 by Ryan Oneal RN  Outcome: Ongoing

## 2021-07-30 NOTE — PROGRESS NOTES
[place]  Speech Language Pathology    Date: 7/30/2021  Patient Name: Giacomo Decker  YOB: 1945   AGE: 77 y.o. MRN: [id number]        Patient Not Available for Speech Therapy     Due to:  [] Testing  [] Hemodialysis  [] Cancelled by RN  [] Surgery   [] Intubation/Sedation/Pain Medication  [] Medical instability  [x] Other: Unable to awaken pt despite max verbal/tactile cues, 0830. Pt sitting upright in chair with uneaten breakfast tray, not awakening his eyes despite MAX verbal/tactile cues. Next scheduled treatment: as able    Completed by:  Maru Rich, SLP, M.A., [address] [place] [address]

## 2021-07-30 NOTE — PROGRESS NOTES
Physical Medicine & Rehabilitation  Progress Note      Subjective: Valentine Abdul is a 77 y.o. male with ischemic CVA left PCA, RAHEEM, and MCA with right dominant hemiparesis. He continues to be minimally interactive with evaluation. Attempted to ask patient about mood, appetite, and refusing medications/imaging/meals/therapy with no response. Not answering any questions. Met with patient's family this afternoon along with the . Discussed patient's current status. ROS:  Unable to assess    Rehabilitation:   Progressing in therapies. PT:  Restrictions/Precautions: Up as Tolerated, General Precautions, Fall Risk, Swallowing - Thickened Liquids (1:1 sitter, Mildly thick (Nectar thick))  Other position/activity restrictions: Up w/assistance, RU/LE Weakness. MRI BRAIN- Acute infarct in the left and right basal ganglia greater on the left.    Transfers  Sit to Stand: 2 Person Assistance, Maximum Assistance (walker lift and upright walker)  Stand to sit: 2 Person Assistance, Minimal Assistance (Jearldine Plana and Upright Walker)  Bed to Chair: Dependent/Total (hemiwalker)  Stand Pivot Transfers: Dependent/Total (hemiwalker)  Lateral Transfers: Maximum Assistance (hemiwalker)  Comment: Walker Lift and Upright Walker  Ambulation 1  Surface: level tile  Device:  (Green Walker Lift)  Other Apparatus: Right, AFO, Slider, Wheelchair follow  Assistance: 2 Person assistance, Moderate assistance  Quality of Gait: forward flexed hips, narrow LAZARA initially, attempts advancement of right LE however continues to need Max A to advance right LE  Gait Deviations: Slow Anais, Decreased step length, Decreased step height  Distance: 100ft (AM & PM)  Comments: Max cues for sequencing and weight shifting to advance right LE    Transfers  Sit to Stand: 2 Person Assistance, Maximum Assistance (walker lift and upright walker)  Stand to sit: 2 Person Assistance, Minimal Assistance (Jearldine Plana and Upright Walker)  Bed to Chair: Dependent/Total (hemiwalker)  Stand Pivot Transfers: Dependent/Total (hemiwalker)  Lateral Transfers: Maximum Assistance (hemiwalker)  Comment: Walker Lift and Upright Walker  Ambulation  Ambulation?: Yes  More Ambulation?: Yes  Ambulation 1  Surface: level tile  Device:  (Green Walker Lift)  Other Apparatus: Right, AFO, Slider, Wheelchair follow  Assistance: 2 Person assistance, Moderate assistance  Quality of Gait: forward flexed hips, narrow LAZARA initially, attempts advancement of right LE however continues to need Max A to advance right LE  Gait Deviations: Slow Anais, Decreased step length, Decreased step height  Distance: 100ft (AM & PM)  Comments: Max cues for sequencing and weight shifting to advance right LE    Surface: level tile  Ambulation 1  Surface: level tile  Device:  (Green Walker Lift)  Other Apparatus: Right, AFO, Slider, Wheelchair follow  Assistance: 2 Person assistance, Moderate assistance  Quality of Gait: forward flexed hips, narrow LAZARA initially, attempts advancement of right LE however continues to need Max A to advance right LE  Gait Deviations: Slow Anais, Decreased step length, Decreased step height  Distance: 100ft (AM & PM)  Comments: Max cues for sequencing and weight shifting to advance right LE    OT:  ADL  Feeding: Unable to assess(comment) (Pt refuses to take bite/drink )  Grooming: None  UE Bathing: None (Pt declines to participate)  LE Bathing: None (pt declines to participate)  UE Dressing: Maximum assistance  LE Dressing: Dependent/Total (2 person A while standing. Pt refused to participate)  Toileting: None (does not requires breif change at thie time)  Additional Comments: Pt lying in bed upon TIPTON arrival and resistant to movieng BLEs in preparatio for sitting EOB. Maximum education provided on POC and importance of daily therapy with pt still resistive. TIPTON leaves and returns ~10 minutes later. TIPTON initiates movement for pt to sit EOB with assist from RN. Next pt requires max A-TA to lori socks, lori [place] shirt and thread BLEs. After standing at karena walker, pt requires assist for LB clothing mgmt. Then increased time and cues to pivot into w/c. Next pt declines self feeding despite TA from [address] More education provided on POC and importance of nutrition for optimal safety and rehab         Balance  Sitting Balance: Contact guard assistance  Standing Balance: Maximum assistance   Standing Balance  Time: AM: ~2 minutes  Activity: AM: functional transfer to w/c, LB dressing  Comment: Pt demonstrated heavy right side lean with poor participation         Bed mobility  Rolling to Left: Maximum assistance  Rolling to Right: Moderate assistance  Supine to Sit: Maximum assistance  Sit to Supine: Maximum assistance  Scooting: Maximal assistance (hips to edge of mat)  Comment: A with trunk and BLE. Max verbal and tactile cues with patient demonstrating poor participation  Transfers  Stand Pivot Transfers: Minimal assistance, 2 Person assistance  Sit to stand: Maximum assistance  Stand to sit: Maximum assistance  Transfer Comments: Verbal cues with pt demonstrating poor participation in therapy on this date. Toilet Transfers  Toilet - Technique: Stand pivot  Equipment Used: Raised toilet seat with rails  Toilet Transfer: Maximum assistance, 2 Person assistance (max A + mod A)  Toilet Transfers Comments: Verbal cues for safety and hand placement.               SPEECH:  Not participating    Current Medications:   Current Facility-Administered Medications: FLUoxetine (PROZAC) 20 MG/5ML solution 60 mg, 60 mg, Oral, Daily  OLANZapine zydis (ZYPREXA) disintegrating tablet 5 mg, 5 mg, Oral, Nightly  famotidine (PEPCID) tablet 20 mg, 20 mg, Oral, Daily  aspirin chewable tablet 81 mg, 81 mg, Oral, Nightly  clopidogrel (PLAVIX) tablet 75 mg, 75 mg, Oral, Nightly  finasteride (PROSCAR) tablet 5 mg, 5 mg, Oral, Lunch  isosorbide mononitrate (IMDUR) extended release tablet 30 mg, 30 mg, Oral, Lunch  polyethylene glycol (GLYCOLAX) packet 17 g, 17 g, Oral, Lunch  tamsulosin (FLOMAX) capsule 0.4 mg, 0.4 mg, Oral, Lunch  tiotropium (SPIRIVA RESPIMAT) 2.5 MCG/ACT inhaler 2 puff, 2 puff, Inhalation, Lunch  melatonin tablet 6 mg, 6 mg, Oral, Nightly  [Held by provider] losartan (COZAAR) tablet 100 mg, 100 mg, Oral, Daily  rosuvastatin (CRESTOR) tablet 40 mg, 40 mg, Oral, Nightly  [Held by provider] predniSONE (DELTASONE) tablet 40 mg, 40 mg, Oral, Daily  albuterol sulfate  (90 Base) MCG/ACT inhaler 2 puff, 2 puff, Inhalation, Q6H PRN  heparin (porcine) injection 5,000 Units, 5,000 Units, Subcutaneous, 3 times per day  [Held by provider] hydrALAZINE (APRESOLINE) tablet 10 mg, 10 mg, Oral, 3 times per day  rOPINIRole (REQUIP) tablet 0.25 mg, 0.25 mg, Oral, Nightly  acetaminophen (TYLENOL) tablet 650 mg, 650 mg, Oral, Q4H PRN  senna (SENOKOT) tablet 17.2 mg, 2 tablet, Oral, Daily PRN  bisacodyl (DULCOLAX) suppository 10 mg, 10 mg, Rectal, Daily PRN  insulin lispro (HUMALOG) injection vial 0-6 Units, 0-6 Units, Subcutaneous, TID WC  insulin lispro (HUMALOG) injection vial 0-3 Units, 0-3 Units, Subcutaneous, Nightly  glucose (GLUTOSE) 40 % oral gel 15 g, 15 g, Oral, PRN  dextrose 50 % IV solution, 12.5 g, Intravenous, PRN  glucagon (rDNA) injection 1 mg, 1 mg, Intramuscular, PRN  dextrose 5 % solution, 100 mL/hr, Intravenous, PRN      Objective:  BP (!) 145/76   Pulse 51   Temp 97.3 °F (36.3 °C) (Oral)   Resp 16   Ht 5' 10\" (1.778 m)   Wt 214 lb 4.6 oz (97.2 kg)   SpO2 98%   BMI 30.75 kg/m²       GEN: Well developed, well nourished, no acute distress  HEENT: NCAT. Eyes closed throughout evaluation. Hearing grossly intact. RESP: Normal breath sounds with no wheezing, rales, or rhonchi. Respirations WNL and unlabored. CV: Regular rate and rhythm. No murmurs, rubs, or gallops. ABD: Soft, non-distended, BS+ and equal.  NEURO:  Eyes closed throughout evaluation. Not answering questions.   MSK: see if patient will take his medications later in the day - was not successful. Patient does appear to take some medications at night - switched aspirin and plavix to nightly on 7/29 but he did not take them. Psych consult ordered after discussion with IM - increased prozac 7/28, changed to liquid formulation and added olanzapine on 7/30. Discussed with IM - consult placed for general surgery to evaluate for possible PEG tube placement, as patient is not maintaining nutrition, hydration, and medication regimen. Discussed possible PEG tube with family as well, as patient does not have decision-making capacity at this time. Will attempt to repeat labs tomorrow to monitor for signs of dehydration. Vitals remain stable. 4. Sinusitis: On Augmentin until 7/28 per IM. 5. Insomnia: Scheduled melatonin started on 7/25  6. Thrombocytopenia: Hematology following. On prednisone - notified hematology that patient has been refusing medications - okay for him to remain off of steroids at this time and monitor platelets. Platelets improved (458 on 7/28), OK for DVT prophylaxis. Will attempt to repeat labs tomorrow. 7. Leukocytosis:  Resolved. Attributed to prednisone. Will monitor. 8. HTN/CAD: On hydralazine (on hold, as patient has not been taking this medication and blood pressure has been okay), Imdur, losartan (also on hold at this time)  9. DM II: On humalog sliding scale  10. COPD:  On tiotropium. Has albuterol prn. 11. Restless Leg Syndrome: On ropinirole  12. Depression: On fluoxetine. Psych consult and recommendations as above. 13. GERD: On famotidine BID  14. BPH: On finasteride, tamsulosin  15. Thyroid nodule: For outpatient monitoring  16. Bowel Management: Miralax daily, Senokot prn, Dulcolax prn  17. Internal medicine for medical management  18. DVT prophylaxis:  On heparin  19. Discussed recommendation for discharge to SNF with patient's family along with . Family to review choices.   As

## 2021-07-30 NOTE — PROGRESS NOTES
CT head WO contrast ordered @ 4:40 pm on 7/29. Note from second shift CT tech states patient refused scan when transport was sent to bring the patient down. This writer tried to call for patient @ [phone] on 7/30. Floor informed this writer that patient was still uncooperative and refusing treatment today. Requested floor to call CT dept if patient decides he is willing to have scan done.

## 2021-07-30 NOTE — PROGRESS NOTES
[place] [address] Internal Medicine    CONSULTATION / HISTORY AND PHYSICAL EXAMINATION            Date:   7/30/2021  Patient name:  Elvis Bynum  Date of admission:  7/20/2021  6:49 PM  MRN:   [id number]  Account:  [de-identified]  YOB: 1945  PCP:    No primary care provider on file.   Room:   [address] [place] [address]  Code Status:    Full Code    Physician Requesting Consult: Anh Cohen MD    Reason for Consult:  medical management    Chief Complaint:       Right hemipareisi  History Obtained From:     Patient medical record nursing staff    History of Present Illness:   History is extremely limited, patient was extremely agitated overnight, was given Ativan around 2 AM in the night, patient very sleepy, not answering questions  Most of history is retrieved from E HR  Patient was [facility] with right-sided weakness, right-sided facial weakness, speech difficulties  Patient underwent MRI brain, concerning for acute infarct in left and right basal ganglia  CT head and neck, was concerning for severe stenosis in the proximal A3 segment of RAHEEM bilaterally  During hospital stay, patient had thrombocytopenia, was evaluated by hematologist, getting treated with IV steroids, and lines of immune thrombocytopenia      Past Medical History:     Past Medical History:   Diagnosis Date    Centrilobular emphysema ([address]     COPD (chronic obstructive pulmonary disease) ([address]     Depression     Diabetes mellitus ([address]     pt refuses to take previously prescribed metformin (states PCP aware)    Former smoker     Hyperlipidemia     on 4/27/18 pt states \"I stopped taking that about a year ago\"    Hypertension     Mixed restrictive and obstructive lung disease ([address]     Need for pneumococcal vaccine     Personal history of tobacco use         Past Surgical History:     Past Surgical History:   Procedure Laterality Date    CARDIAC SURGERY  07/2015    1 stent    NECK SURGERY      TOE AMPUTATION Right greater        Medications Prior to Admission:     Prior to Admission medications    Medication Sig Start Date End Date Taking?  Authorizing Provider   Heparin Sodium, Porcine, (HEPARIN, PORCINE,) 5000 UNIT/ML injection Inject 1 mL into the skin every 8 hours 7/20/21   Oscar Headley MD   QUEtiapine (SEROQUEL) 25 MG tablet Take 1 tablet by mouth nightly as needed for Agitation 7/20/21 7/25/21  Oscar Headley MD   methylPREDNISolone sodium (SOLU-MEDROL) 40 MG injection Infuse 1 mL intravenously every 12 hours 7/20/21   Oscar Headley MD   melatonin 3 MG TABS tablet Take 1 tablet by mouth nightly as needed (insomnia) 7/20/21   Oscar Headley MD   atorvastatin (LIPITOR) 80 MG tablet Take 0.5 tablets by mouth daily (Pt takes one-half of an 80mg tab = 40mg) 7/16/21   Oscar Headley MD   tiZANidine (ZANAFLEX) 2 MG tablet Take 1 tablet by mouth 4 times daily as needed (muscle spasms) 5/7/21   JOLIE Blankenship CNP   naproxen (NAPROSYN) 500 MG tablet Take 1 tablet by mouth 2 times daily (with meals) 1/4/21   Heath Young PA-C   ibuprofen (ADVIL;MOTRIN) 800 MG tablet Take 1 tablet by mouth every 8 hours as needed for Pain 6/2/20   Maria Eugenia Carter MD   lidocaine (LIDODERM) 5 % Place 1 patch onto the skin daily 12 hours on, 12 hours off. 6/2/20   Maria Eugenia Carter MD   metoprolol succinate (TOPROL XL) 50 MG extended release tablet Take 50 mg by mouth daily    Historical Provider, MD   tiotropium (SPIRIVA RESPIMAT) 2.5 MCG/ACT AERS inhaler Inhale 2 puffs into the lungs daily    Historical Provider, MD   carboxymethylcellulose 1 % ophthalmic solution Place 1 drop into both eyes 3 times daily as needed for Dry Eyes     Historical Provider, MD   ketotifen (ZADITOR) 0.025 % ophthalmic solution Place 1 drop into both eyes 2 times daily as needed (itchy eyes)     Historical Provider, MD   isosorbide mononitrate (IMDUR) 60 MG extended release tablet Take 30 mg by mouth daily Indications: 1/2 tablet (30mg)     Historical Provider, MD   finasteride (PROSCAR) 5 MG tablet Take 5 mg by mouth daily    Historical Provider, MD   tamsulosin (FLOMAX) 0.4 MG capsule Take 0.4 mg by mouth daily    Historical Provider, MD   fluticasone (FLONASE) 50 MCG/ACT nasal spray 1 spray by Nasal route 2 times daily  Patient taking differently: 1 spray by Nasal route 2 times daily as needed for Rhinitis  5/31/16   Francisco Connor,    albuterol sulfate  (90 BASE) MCG/ACT inhaler Inhale 2 puffs into the lungs every 6 hours as needed for Wheezing    Historical Provider, MD   albuterol (PROVENTIL) (2.5 MG/3ML) 0.083% nebulizer solution Take 2.5 mg by nebulization every 6 hours as needed for Wheezing    Historical Provider, MD   aspirin 81 MG EC tablet Take 81 mg by mouth daily    Historical Provider, MD   losartan (COZAAR) 100 MG tablet Take 100 mg by mouth daily     Historical Provider, MD   nitroGLYCERIN (NITROSTAT) 0.4 MG SL tablet Place 0.4 mg under the tongue every 5 minutes as needed for Chest pain    Historical Provider, MD   FLUoxetine (PROZAC) 20 MG capsule Take 40 mg by mouth daily     Historical Provider, MD   furosemide (LASIX) 20 MG tablet Take 20 mg by mouth daily as needed (swelling)     Historical Provider, MD   clopidogrel (PLAVIX) 75 MG tablet Take 75 mg by mouth daily    Historical Provider, MD   rOPINIRole (REQUIP) 0.25 MG tablet Take 0.25 mg by mouth nightly as needed     Historical Provider, MD   budesonide-formoterol (SYMBICORT) 160-4.5 MCG/ACT AERO Inhale 2 puffs into the lungs 2 times daily. Historical Provider, MD        Allergies:     Patient has no known allergies. Social History:     Tobacco:    reports that he quit smoking about 8 years ago. He started smoking about 64 years ago. He has a 42.00 pack-year smoking history. He has never used smokeless tobacco.  Alcohol:      reports no history of alcohol use.   Drug Use:  reports no history of drug use.    Family History:     No family history on file. Review of Systems:   Cannot be done because of patient condition    Physical Exam:     BP (!) 145/76   Pulse 51   Temp 97.3 °F (36.3 °C) (Oral)   Resp 16   Ht 5' 10\" (1.778 m)   Wt 214 lb 4.6 oz (97.2 kg)   SpO2 98%   BMI 30.75 kg/m²   Temp (24hrs), Av.8 °F (36.6 °C), Min:97.3 °F (36.3 °C), Max:98.2 °F (36.8 °C)    Recent Labs     21  1600 21  1951 21  0650 21  1039   POCGLU 149* 109 105 135*     No intake or output data in the 24 hours ending 21 1523    General Appearance: Very sleepy, not answering questions  Mental status: Not oriented to person, place, and time with normal affect  Head:  normocephalic, atraumatic. Eye: no icterus, redness, pupils equal and reactive, extraocular eye movements intact, conjunctiva clear  Ear: normal external ear, no discharge, hearing intact  Nose:  no drainage noted  Mouth: mucous membranes moist  Neck: supple, no carotid bruits, thyroid not palpable  Lungs: Bilateral equal air entry, clear to ausculation, no wheezing, rales or rhonchi, normal effort  Cardiovascular: normal rate, regular rhythm, no murmur, gallop, rub.   Abdomen: Soft, nontender, nondistended, normal bowel sounds, no hepatomegaly or splenomegaly  Neurologic: Weakness in right upper and lower extremity, speech difficulties, right sided facial weakness  Skin: No gross lesions, rashes, bruising or bleeding on exposed skin area  Extremities:  peripheral pulses palpable, no pedal edema or calf pain with palpation    Investigations:      Laboratory Testing:  Recent Results (from the past 24 hour(s))   POC Glucose Fingerstick    Collection Time: 21  4:00 PM   Result Value Ref Range    POC Glucose 149 (H) 75 - 110 mg/dL   POC Glucose Fingerstick    Collection Time: 21  7:51 PM   Result Value Ref Range    POC Glucose 109 75 - 110 mg/dL   POC Glucose Fingerstick    Collection Time: 21  6:50 AM   Result Value some errors in transcription may have occurred.

## 2021-07-30 NOTE — PROGRESS NOTES
[address] [facility]    Physical Therapy Progress Note    Date: 21  Patient Name: Leesa Garduno       Room: 3820/4767-81  MRN: [id number]   Account: [de-identified]   : 1945  (72 y.o.)   Gender: male     Discharge Recommendations   Patient would benefit from continued therapy after discharge, 24 hour supervision or assist  Other: TBD    Referring Practitioner: Dr. Flex Rudd  Diagnosis: Ischemic CVA with R dominant hemiplegia  Restrictions/Precautions: Up as Tolerated, General Precautions, Fall Risk, Swallowing - Thickened Liquids (1:1 sitter, Mildly thick (Nectar thick))  Other position/activity restrictions: Up w/assistance, RU/LE Weakness. MRI BRAIN- Acute infarct in the left and right basal ganglia greater on the left. Past Medical History:  has a past medical history of Centrilobular emphysema ([facility] [address] COPD (chronic obstructive pulmonary disease) ([facility] [address] Depression, Diabetes mellitus ([facility] [address] Former smoker, Hyperlipidemia, Hypertension, Mixed restrictive and obstructive lung disease ([facility] [address] Need for pneumococcal vaccine, and Personal history of tobacco use. Past Surgical History:   has a past surgical history that includes Neck surgery; Toe amputation (Right, greater); and Cardiac surgery (2015). Additional Pertinent Hx: Leesa Garduno is a 77 y.o. RHD male admitted to [address] [place] [address] on 2021. On admit, exam significant for right-sided facial droop, right-sided weakness and severe dysarthria. Significant history with recurrent strokes, remote left temporal lobe ischemic infarct and remote bilateral occipital lobe infarcts . MRI shows Bilateral basal ganglia ischemic infarcts. Pt admitted to rehab unit on 21    Overall Orientation Status: Impaired  Orientation Level: Unable to assess (pt not responding when asked)  Restrictions/Precautions  Restrictions/Precautions: Up as Tolerated;General Precautions; Fall Risk;Swallowing - Thickened Liquids (1:1 sitter, Mildly thick (Nectar thick))  Required Braces or Orthoses?: No  Position Activity Restriction  Other position/activity restrictions: Up w/assistance, RU/LE Weakness. MRI BRAIN- Acute infarct in the left and right basal ganglia greater on the left. Subjective: Pt not communicating with writer; attempts to see facial expressions  Comments: Pt flat affect, not communicating. At first pt with eyes closed and would not open eyes. Sternal rub used frequently at first to get pt to open eyes and participate. Difficult in AM keeping pt's eyes open. Vital Signs  Patient Currently in Pain: Other (comment) (No response to query)                     Bed Mobility  Comment: Unable to assess this date    Transfers:  Sit to Stand: 2 Person Assistance;Maximum Assistance (walker lift and upright walker)  Stand to sit: 2 Person Assistance;Minimal Assistance (Walker Lift and Upright Walker)                 Ambulation 1  Surface: level tile  Device:  (Green Walker Lift)  Other Apparatus: Right;AFO;Slider; Wheelchair follow  Assistance: 2 Person assistance; Moderate assistance  Quality of Gait: forward flexed hips, narrow LAZARA initially, attempts advancement of right LE however continues to need Max A to advance right LE  Gait Deviations: Slow Anais;Decreased step length;Decreased step height  Distance: 100ft (AM & PM)  Comments: Max cues for sequencing and weight shifting to advance right LE        Stairs/Curb  Stairs?: No              Wheelchair Activities  Propulsion: Yes  Propulsion 1  Propulsion: Manual  Level: Level Tile  Method: LLE;RLE  Level of Assistance: Maximum assistance  Description/ Details: Requires assist to position L UE/hand appropriately and maintain momentum. Pt demonstrates fair straight path & ability. Straight path & 90º.  Fatigues quickly  Distance: 50ft                                   Posture: Fair  Sitting - Static: Fair;+ (edge of mat, no back or UE support)  Sitting - Dynamic: Fair (edge of mat, no back or UE support)  Standing - Static: Fair;- (hemiwalker)  Standing - Dynamic: Poor (hemiwalker)  Exercises  Comments: . Other exercises?: Yes  Other exercises 1: Static Standing with Upright Walker 6 min during AM Tx, 10 min during PM tx  Other exercises 2: Standing with Upright Walker reaching for Yellow and Green Cone, calling out color for pt to grab, pt able to grab correct color x10. Pt needs some tactile assist with initiating long reach Out of base of support  Other exercises 3: Sit to Stand Max Ax1 x3reps, Max Ax2 to x2reps  Other exercises 4: Seated ankle pumps A/AAROM           Activity Tolerance: Patient limited by cognitive status  Activity Tolerance: Delayed responses, extra time to complete tasks, pt requires tactile to initiate task. PT Equipment Recommendations  Other: TBD       Assessment  Activity Tolerance: Patient limited by cognitive status   Body structures, Functions, Activity limitations: Decreased functional mobility ; Decreased ROM; Decreased strength;Decreased balance;Decreased safe awareness;Decreased endurance;Decreased posture;Decreased cognition;Decreased fine motor control;Decreased coordination  Assessment: Pt does not appear to be engaged, frequently dozing off or appearing to feign sleep; requires maximum encouragement to participate. Discharge Recommendations: Patient would benefit from continued therapy after discharge;24 hour supervision or assist     Type of devices: Gait belt;Patient at risk for falls; Left in chair;Sitter present; All fall risk precautions in place  Restraints  Initially in place: No     Plan  Times per week: 900 minutes/week for combine dtherapy of PT/OT/ST due to decreased tolerance to activity.   Times per day: Daily  Current Treatment Recommendations: Strengthening, ROM, Balance Training, Functional Mobility Training, Gait Training, Endurance Training, Transfer Training, Safety Education & Training, Wheelchair Mobility Training, Neuromuscular Re-education, Cognitive Reorientation, Home Exercise Program, Patient/Caregiver Education & Training, Equipment Evaluation, Education, & procurement, Positioning, Stair training, Modalities (Will consider using modalities as needed for neuro re-edu.)    Patient Education  New Education Provided:  Importance of Plan of Care  Learner:caregiver, family and patient  Method: demonstration and explanation       Outcome: needs reinforcement     Goals  Short term goals  Time Frame for Short term goals: 10 days  Short term goal 1: Pt able to roll side to side at min A   Short term goal 2: Pt able to perform supine>sit at min A   Short term goal 3: Pt able to perform sit to supine at mod A  Short term goal 4: Pt able to transfer at mod A   Short term goal 5: Pt able to propel w/c with L UE/L LE, distance fo 100 ft min A   Short term goal 6: Pt able to ambulate with appropriate device distance of 50 to 100 ft, min A x2  Long term goals  Time Frame for Long term goals : By DC  Long term goal 1: Pt able perform bed mobility mod-I  Long term goal 2:  Pt able to perform transfers at CGA/min A   Long term goal 3: Pt able to ambulate with appropriate device distance of 100 to 150 ft, min A  Long term goal 4: Pt able to go up and down 4 to 5 steps with 1 UE support at mod/max A   Long term goal 5: Pt able to propel w/c level surfaces distance fo 100 to 150 ft, SBA  Long term goal 6: Improve PASS score to at least 19/36 to improve overall function. Long term goal 7: Improve standing dynamic balance with assistive device to at least fair+ to reduce fall risk.         07/30/21 0750 07/30/21 1305   PT Individual Minutes   Time In [id number]   Time Out [id number] 2037   Minutes 45 47       Electronically signed by Gali Steel PTA on 7/30/21 at 3:44 PM EDT

## 2021-07-31 NOTE — PROGRESS NOTES
[address]   Physical Therapy Progress Note    Date: 21  Patient Name: Leesa Garduno       Room: 0693/0043-32  MRN: [id number]   Account: [de-identified]   : 1945  (72 y.o.)   Gender: male     Discharge Recommendations   Patient would benefit from continued therapy after discharge, 24 hour supervision or assist  Other: TBD    Referring Practitioner: Dr. Flex Rudd  Diagnosis: Ischemic CVA with R dominant hemiplegia  Restrictions/Precautions: Up as Tolerated, General Precautions, Fall Risk, Swallowing - Thickened Liquids (1:1 sitter, Mildly thick (Nectar thick))  Other position/activity restrictions: Up w/assistance, RU/LE Weakness. MRI BRAIN- Acute infarct in the left and right basal ganglia greater on the left. Past Medical History:  has a past medical history of Centrilobular emphysema ([facility] [address] COPD (chronic obstructive pulmonary disease) ([facility] [address] Depression, Diabetes mellitus ([facility] [address] Former smoker, Hyperlipidemia, Hypertension, Mixed restrictive and obstructive lung disease ([facility] Need for pneumococcal vaccine, and Personal history of tobacco use. Past Surgical History:   has a past surgical history that includes Neck surgery; Toe amputation (Right, greater); and Cardiac surgery (2015). Additional Pertinent Hx: Leesa Garduno is a 77 y.o. RHD male admitted to [facility] on 2021. On admit, exam significant for right-sided facial droop, right-sided weakness and severe dysarthria. Significant history with recurrent strokes, remote left temporal lobe ischemic infarct and remote bilateral occipital lobe infarcts . MRI shows Bilateral basal ganglia ischemic infarcts. Pt admitted to rehab unit on 21    Overall Orientation Status: Impaired  Orientation Level: Unable to assess (pt not responding when asked)  Restrictions/Precautions  Restrictions/Precautions: Up as Tolerated;General Precautions; Fall Risk;Swallowing - Thickened Liquids (1:1 sitter, Mildly thick (Nectar thick))  Required Braces or Orthoses?: No  Position Activity Restriction  Other position/activity restrictions: Up w/assistance, RU/LE Weakness. MRI BRAIN- Acute infarct in the left and right basal ganglia greater on the left. Subjective: Pt not communicating with writer; attempts to see facial expressions  Comments: Pt flat affect, not communicating. At first pt with eyes closed and would not open eyes. Sternal rub used frequently at first to get pt to open eyes and participate. Writer more familiar with pt this date. Same as yesterday with difficulty during morning Session. Pt does appear to be more lethargic during activity. Vital Signs  Patient Currently in Pain: Other (comment) (No response to query)                   Bed Mobility:   Bed Mobility  Supine to Sit: Dependent/Total  Sit to Supine: Unable to assess (pt left sitting in bedside wheelchair)  Scooting: Maximal assistance  Comment: HOB 30 degrees  Bed mobility  Scooting: Maximal assistance    Transfers:  Sit to Stand: 2 Person Assistance;Maximum Assistance (walker lift and upright walker)  Stand to sit: 2 Person Assistance;Minimal Assistance (Walker Lift and Upright Walker)                 Ambulation 1  Surface: level tile  Device:  (Green Walker Lift)  Other Apparatus: Right;AFO;Slider; Wheelchair follow  Assistance: 2 Person assistance; Moderate assistance  Quality of Gait: forward flexed hips, narrow LAZARA initially, attempts advancement of right LE however continues to need Max A to advance right LE  Gait Deviations: Slow Anais;Decreased step length;Decreased step height  Distance: 60ft (AM & PM)  Comments: Max cues for sequencing and weight shifting to advance right LE  Ambulation 2  Surface - 2: level tile  Device 2:  (upright walker)  Other Apparatus 2: Right;Slider;AFO;Wheelchair follow  Assistance 2: 2 Person assistance; Moderate assistance  Quality of Gait 2: Pt initiated well with right LE however unable to advance right LE, max assist to move right LE, pt walks to the right side of walker and is in the way of wheels on right moving, heavily leaning into arm rests with collapsed posture  Gait Deviations: Slow Anais;Decreased step length;Decreased step height;Decreased head and trunk rotation;Deviated path;Shuffles  Distance: 20ft  Comments: not working as well as walker lift, pt not advancing left LE as well and walking to the right side of upright walker  Ambulation 3  Surface - 3: level tile  Device 3:  (walker lift)  Other Apparatus 3: Right;AFO;Slider; Wheelchair follow  Assistance 3: Moderate assistance;2 Person assistance  Quality of Gait 3: pt cooperative however Max A for advancing right LE, pt needs extra time to process cues for sequencing  Gait Deviations: Slow Anais;Decreased step length;Decreased step height;Decreased head and trunk rotation;Decreased arm swing  Distance: 100ft  Comments: cues for step by step sequencing, max assist to advance right LE  Stairs/Curb  Stairs?: No              Wheelchair Activities  Propulsion: Yes  Propulsion 1  Propulsion: Manual  Level: Level Tile  Method: LLE;RLE  Level of Assistance: Maximum assistance  Description/ Details: Requires assist to position bilat LE, Assist with advancing bilat LE  Distance: 15ft                                   Posture: Fair  Sitting - Static: Fair;+ (edge of mat, no back or UE support)  Sitting - Dynamic: Fair (edge of mat, no back or UE support)  Standing - Static: Fair;- (hemiwalker)  Standing - Dynamic: Poor (hemiwalker)  Exercises  Comments: . Other exercises?: Yes  Other exercises 1: Static Standing with Hemiwalker 8 min during AM Tx,   Other exercises 2: Standing with hemiwalker reaching for Yellow and Green Cone, calling out color for pt to grab, pt able to grab correct color x10.  Pt needs some tactile assist with initiating long reach Out of base of support  Other exercises 3: Sit to Stand Max Ax1 x3reps, Max Ax2 to x2reps  Other exercises 4: Seated ankle pumps AAROM, marching PROM  Other Activities  Comment: Slow pace, extra breaks PRN, max encouragement to participate        Activity Tolerance: Patient limited by cognitive status  Activity Tolerance: Delayed responses, extra time to complete tasks, pt requires tactile to initiate task. PT Equipment Recommendations  Other: TBD       Assessment  Activity Tolerance: Patient limited by cognitive status   Body structures, Functions, Activity limitations: Decreased functional mobility ; Decreased ROM; Decreased strength;Decreased balance;Decreased safe awareness;Decreased endurance;Decreased posture;Decreased cognition;Decreased fine motor control;Decreased coordination  Assessment: Pt does not appear to be engaged, frequently dozing off or appearing to feign sleep; requires maximum encouragement to participate. Prognosis: Fair  Discharge Recommendations: Patient would benefit from continued therapy after discharge;24 hour supervision or assist     Type of devices: Gait belt;Patient at risk for falls; Left in chair;Sitter present; All fall risk precautions in place  Restraints  Initially in place: No     Plan  Times per week: 900 minutes/week for combine dtherapy of PT/OT/ST due to decreased tolerance to activity.   Times per day: Daily  Current Treatment Recommendations: Strengthening, ROM, Balance Training, Functional Mobility Training, Gait Training, Endurance Training, Transfer Training, Safety Education & Training, Wheelchair Mobility Training, Neuromuscular Re-education, Cognitive Reorientation, Home Exercise Program, Patient/Caregiver Education & Training, Equipment Evaluation, Education, & procurement, Positioning, Stair training, Modalities (Will consider using modalities as needed for neuro re-edu.)    Patient Education  New Education Provided:  Importance of Cooperation and Participation  Learner:patient  Method: demonstration and explanation       Outcome: needs reinforcement     Goals  Short term goals  Time Frame for Short term goals: 10 days  Short term goal 1: Pt able to roll side to side at min A   Short term goal 2: Pt able to perform supine>sit at min A   Short term goal 3: Pt able to perform sit to supine at mod A  Short term goal 4: Pt able to transfer at mod A   Short term goal 5: Pt able to propel w/c with L UE/L LE, distance fo 100 ft min A   Short term goal 6: Pt able to ambulate with appropriate device distance of 50 to 100 ft, min A x2  Long term goals  Time Frame for Long term goals : By DC  Long term goal 1: Pt able perform bed mobility mod-I  Long term goal 2:  Pt able to perform transfers at CGA/min A   Long term goal 3: Pt able to ambulate with appropriate device distance of 100 to 150 ft, min A  Long term goal 4: Pt able to go up and down 4 to 5 steps with 1 UE support at mod/max A   Long term goal 5: Pt able to propel w/c level surfaces distance fo 100 to 150 ft, SBA  Long term goal 6: Improve PASS score to at least 19/36 to improve overall function. Long term goal 7: Improve standing dynamic balance with assistive device to at least fair+ to reduce fall risk.         07/31/21 0933 07/31/21 1335   PT Individual Minutes   Time In 0933 1335   Time Out 1033 1410   Minutes 60 35       Electronically signed by Jona Zabala PTA on 7/31/21 at 2:16 PM EDT

## 2021-07-31 NOTE — PROGRESS NOTES
Discussed with patient's sister over the phone. Discussed with nursing staff. Psychiatry input noted. From what I can interpret from the note that patient is not capable of making his own decision. Patient sister updated. Pros and cons of feeding tube/PEG tube placement discussed with her at length. She will think about it discussed the issue with Dr. Laurita Hoffmann and the nursing staff and the rest of the family. There is no official POA papers. Her sister is next of kin. She is the only sister he has. Patient is refusing all his meds except liquid Prozac. Not eating. Await decision from the patient's family/sister regarding proceeding with PEG tube placement then I will schedule him accordingly. Patient will have to be in mittens and restraints after PEG tube placement because patient gets very agitated and may pull on the tube. Discussed with the sister at length. She understands and she will let us know soon.

## 2021-07-31 NOTE — PROGRESS NOTES
refused additional PO trials (food or liquid) from breakfast tray. Question if difficulty swallowing liquid medication behavioral (as has been refusing medications for multiple days) or associated with difficulty swallowing ? ST to continue to follow. Extensive education provided re: rationale for ST (to address cognition/speech/dysphagia), goals while in ARU, and importance of participation in therapy. Pt. not responding to writer education and continued to refuse to participate. RN notified. Question if noncompliance is behavioral ?      Plan:  [x] Continue ST services    [] Discharge from ST:      Discharge recommendations: [] Inpatient Rehab   [] [place]   [] Outpatient Therapy  [] Follow up at trauma clinic   [] Other:       Treatment completed by:  Aime Nielsen M.A., CCC-SLP

## 2021-07-31 NOTE — PROGRESS NOTES
Physical Medicine & Rehabilitation  Progress Note      Subjective: Radha Baird is a 77 y.o. male with ischemic CVA left PCA, RAHEEM, and MCA with right dominant hemiparesis. He continues to be minimally interactive with evaluation. Not answering questions today. Family visiting at bedside today. Dr. Malachi Carreon discussed possible PEG tube placement with patient's sister and she is thinking about whether to proceed. She denies any other questions today. ROS:  Unable to assess    Rehabilitation:   Progressing in therapies. PT:  Restrictions/Precautions: Up as Tolerated, General Precautions, Fall Risk, Swallowing - Thickened Liquids (1:1 sitter, Mildly thick (Nectar thick))  Other position/activity restrictions: Up w/assistance, RU/LE Weakness. MRI BRAIN- Acute infarct in the left and right basal ganglia greater on the left.    Transfers  Sit to Stand: 2 Person Assistance, Maximum Assistance (walker lift and upright walker)  Stand to sit: 2 Person Assistance, Minimal Assistance (Carlene Meager and Upright Walker)  Bed to Chair: Dependent/Total (hemiwalker)  Stand Pivot Transfers: Dependent/Total (hemiwalker)  Lateral Transfers: Maximum Assistance (hemiwalker)  Comment: Walker Lift and Upright Walker  Ambulation 1  Surface: level tile  Device:  (Green Walker Lift)  Other Apparatus: Right, AFO, Slider, Wheelchair follow  Assistance: 2 Person assistance, Moderate assistance  Quality of Gait: forward flexed hips, narrow LAZARA initially, attempts advancement of right LE however continues to need Max A to advance right LE  Gait Deviations: Slow Anais, Decreased step length, Decreased step height  Distance: 60ft (AM & PM)  Comments: Max cues for sequencing and weight shifting to advance right LE    Transfers  Sit to Stand: 2 Person Assistance, Maximum Assistance (walker lift and upright walker)  Stand to sit: 2 Person Assistance, Minimal Assistance (Walker Lift and Upright Walker)  Bed to Chair: Dependent/Total (hemiwalker)  Stand Pivot Transfers: Dependent/Total (hemiwalker)  Lateral Transfers: Maximum Assistance (hemiwalker)  Comment: Walker Lift and Upright Walker  Ambulation  Ambulation?: Yes  More Ambulation?: Yes  Ambulation 1  Surface: level tile  Device:  (Green Walker Lift)  Other Apparatus: Right, AFO, Slider, Wheelchair follow  Assistance: 2 Person assistance, Moderate assistance  Quality of Gait: forward flexed hips, narrow LAZARA initially, attempts advancement of right LE however continues to need Max A to advance right LE  Gait Deviations: Slow Anais, Decreased step length, Decreased step height  Distance: 60ft (AM & PM)  Comments: Max cues for sequencing and weight shifting to advance right LE    Surface: level tile  Ambulation 1  Surface: level tile  Device:  (Green Walker Lift)  Other Apparatus: Right, AFO, Slider, Wheelchair follow  Assistance: 2 Person assistance, Moderate assistance  Quality of Gait: forward flexed hips, narrow LAZARA initially, attempts advancement of right LE however continues to need Max A to advance right LE  Gait Deviations: Slow Anais, Decreased step length, Decreased step height  Distance: 60ft (AM & PM)  Comments: Max cues for sequencing and weight shifting to advance right LE    OT:  ADL  Feeding: Unable to assess(comment) (Pt refuses to take bite/drink )  Grooming: Dependent/Total (washing face with wash cloth)  UE Bathing: None  LE Bathing: None  UE Dressing: None  LE Dressing: Dependent/Total (2 person A while standing. Pt refused to participate)  Toileting: None (does not requires brief change at this time)  Additional Comments: Pt sleepn upon writer arrival. Earnstine Real provides max cues for pt to open eyes including sternal rub. Pt opens eyes. TIPTON provides max assist for initation to sit EOB. Once sitting EOB, pt TA for threading BLEs, donning socks, AFO and sandal shoe. Pt requires X2 assist for LB clothing mgmt standing at karena walker due ot R side lean.  Pt able to achieve intermittent CGA with max cues for pivoting into w/c with poor carryover. Once in w/c, pt declines any self feeding despite food brought mouth. Closes eyes for grooming for TIPTON to assist with washing face. Balance  Sitting Balance: Contact guard assistance  Standing Balance: Maximum assistance   Standing Balance  Time: AM: ~2 minutes  Activity: AM: functional transfer to w/c, LB dressing  Comment: Pt demonstrated heavy right side lean with poor participation. Pt able to intermittent achieve CGA with karena walker        Bed mobility  Rolling to Left: Maximum assistance  Rolling to Right: Moderate assistance  Supine to Sit: Maximum assistance  Sit to Supine: Maximum assistance  Scooting: Maximal assistance  Comment: A with trunk and BLE. Max verbal and tactile cues with patient demonstrating poor participation  Transfers  Stand Pivot Transfers: Minimal assistance, 2 Person assistance (with karena walker)  Sit to stand: Maximum assistance  Stand to sit: Maximum assistance  Transfer Comments: Verbal cues with pt demonstrating poor participation in therapy on this date. Toilet Transfers  Toilet - Technique: Stand pivot  Equipment Used: Raised toilet seat with rails  Toilet Transfer: Maximum assistance, 2 Person assistance (max A + mod A)  Toilet Transfers Comments: Verbal cues for safety and hand placement. SPEECH:  Subjective: [x]? Alert     [x]? Cooperative     []? Confused     []? Agitated    [x]? Lethargic        Objective/Assessment:  Auditory Comprehension: Pt. not responding to any writer questions despite MAX cues. Reading Comprehension: n/a     Written Language: n/a     Speech: Pt not responding to verbal stimuli, remaining with eyes closed, non verbal throughout entire session.       Voice: Pt. did not vocalize or verbalize during session.      Dysphagia: Pt. with breakfast tray present upon arrival, appeared to be untouched.   RN present and reporting given oral medication prior to writer entering room, but is now holding liquid medication in mouth and not swallowing. Min amount anterior spillage of liquid noted. Writer providing Pt. with max verbal cues to initiate swallow. Following max cues and ~8 min delay, Pt. able to swallow liquid and clear oral cavity. Pt. refused additional PO trials (food or liquid) from breakfast tray. Question if difficulty swallowing liquid medication behavioral (as has been refusing medications for multiple days) or associated with difficulty swallowing ? ST to continue to follow.        Extensive education provided re: rationale for ST (to address cognition/speech/dysphagia), goals while in ARU, and importance of participation in therapy. Pt. not responding to writer education and continued to refuse to participate. RN notified.   Question if noncompliance is behavioral ?      Current Medications:   Current Facility-Administered Medications: FLUoxetine (PROZAC) 20 MG/5ML solution 60 mg, 60 mg, Oral, Daily  OLANZapine zydis (ZYPREXA) disintegrating tablet 5 mg, 5 mg, Oral, Nightly  famotidine (PEPCID) tablet 20 mg, 20 mg, Oral, Daily  aspirin chewable tablet 81 mg, 81 mg, Oral, Nightly  clopidogrel (PLAVIX) tablet 75 mg, 75 mg, Oral, Nightly  finasteride (PROSCAR) tablet 5 mg, 5 mg, Oral, Lunch  isosorbide mononitrate (IMDUR) extended release tablet 30 mg, 30 mg, Oral, Lunch  polyethylene glycol (GLYCOLAX) packet 17 g, 17 g, Oral, Lunch  tamsulosin (FLOMAX) capsule 0.4 mg, 0.4 mg, Oral, Lunch  tiotropium (SPIRIVA RESPIMAT) 2.5 MCG/ACT inhaler 2 puff, 2 puff, Inhalation, Lunch  melatonin tablet 6 mg, 6 mg, Oral, Nightly  [Held by provider] losartan (COZAAR) tablet 100 mg, 100 mg, Oral, Daily  rosuvastatin (CRESTOR) tablet 40 mg, 40 mg, Oral, Nightly  [Held by provider] predniSONE (DELTASONE) tablet 40 mg, 40 mg, Oral, Daily  albuterol sulfate  (90 Base) MCG/ACT inhaler 2 puff, 2 puff, Inhalation, Q6H PRN  heparin (porcine) injection 5,000 Units, HGB 13.7 13.4*   HCT 43.9 42.3   MCV 77.9* 77.8*   RDW 18.0* 17.7*   * 132*     BMP:   Recent Labs     07/28/21 2006 07/31/21  1105    139   K 4.3 4.8    102   CO2 23 24   BUN 23 29*   CREATININE 1.40* 1.89*   GLUCOSE 117* 180*     BNP: No results for input(s): BNP in the last 72 hours. PT/INR:   Recent Labs     07/31/21  1105   PROTIME 17.1*   INR 1.4     APTT: No results for input(s): APTT in the last 72 hours. CARDIAC ENZYMES: No results for input(s): CKMB, CKMBINDEX, TROPONINT in the last 72 hours. Invalid input(s): CKTOTAL;3  FASTING LIPID PANEL:  Lab Results   Component Value Date    CHOL 186 07/14/2021    HDL 42 07/14/2021    TRIG 77 07/14/2021     LIVER PROFILE: No results for input(s): AST, ALT, ALB, BILIDIR, BILITOT, ALKPHOS in the last 72 hours. Impression/Plan:   Impaired ADLs, gait, and mobility due to:    1. Ischemic CVA with Right dominant hemiparesis: PT/OT for gait, mobility, strengthening, endurance, ADLs, and self care. On ASA, plavix, Crestor. Can consider sleep/wake medication but patient is also refusing therapy/medications. Placed order for repeat CT head to evaluate for any change on 7/29 - patient refused. He continues to be nonverbal but his exam (although limited) has not changed. 2. Agitation: Improved. On Seroquel prn but has not required it since 7/23. Has no sedating medicines on board to be causing drowsiness. Borderline QTc. Telesitter added 7/28, 1:1 discontinued 7/29 - will continue to monitor. 3. Refusing medications, meals:  Switched daily medications from morning to noon on 7/29 to see if patient will take his medications later in the day - was not successful. Patient does appear to take some medications at night - switched aspirin and plavix to nightly on 7/29 but he did not take them. Psych consult ordered after discussion with IM - increased prozac 7/28, changed to liquid formulation and added olanzapine on 7/30.   Discussed with IM - consult placed for general surgery to evaluate for possible PEG tube placement, as patient is not maintaining nutrition, hydration, and medication regimen. Discussed possible PEG tube with family as well, as patient does not have decision-making capacity at this time. 4. CELESTINO:  Secondary to dehydration. Creatinine 1.89 today. Will attempt to place IV and start IV fluids. 5. Sinusitis: On Augmentin until 7/28 per IM. 6. Insomnia: Scheduled melatonin started on 7/25  7. Thrombocytopenia: Hematology following. On prednisone - notified hematology that patient has been refusing medications - okay for him to remain off of steroids at this time and monitor platelets. Platelets improved (748 on 7/31), OK for DVT prophylaxis. 8. Leukocytosis:  Resolved. Attributed to prednisone. Will monitor. 9. HTN/CAD: On hydralazine (on hold, as patient has not been taking this medication and blood pressure has been okay), Imdur, losartan (also on hold at this time)  10. DM II: On humalog sliding scale  11. COPD:  On tiotropium. Has albuterol prn. 12. Restless Leg Syndrome: On ropinirole  13. Depression: On fluoxetine. Psych consult and recommendations as above. 14. GERD: On famotidine BID  15. BPH: On finasteride, tamsulosin  16. Thyroid nodule: For outpatient monitoring  17. Bowel Management: Miralax daily, Senokot prn, Dulcolax prn  18. Internal medicine for medical management  19. DVT prophylaxis:  On heparin  20. Discussed recommendation for discharge to SNF with patient's family along with  on 7/30. Family to review choices. As patient has had limited participation in therapies, will plan to discharge him to SNF as soon as it is safe.       Electronically signed by Anderson Gaffney MD on 7/31/2021 at 2:53 PM

## 2021-07-31 NOTE — PLAN OF CARE
Problem: Infection:  Goal: Will remain free from infection  Description: Will remain free from infection  7/31/2021 1536 by Liz Cartagena RN  Outcome: Ongoing  7/31/2021 0320 by Claudene Slimmer, RN  Outcome: Met This Shift     Problem: Safety:  Goal: Free from accidental physical injury  Description: Free from accidental physical injury  7/31/2021 1536 by Liz Cartagena RN  Outcome: Ongoing  7/31/2021 0320 by Claudene Slimmer, RN  Outcome: Met This Shift  Goal: Free from intentional harm  Description: Free from intentional harm  7/31/2021 1536 by Liz Cartagena RN  Outcome: Ongoing  7/31/2021 0320 by Claudene Slimmer, RN  Outcome: Met This Shift     Problem: Daily Care:  Goal: Daily care needs are met  Description: Daily care needs are met  7/31/2021 1536 by Liz Cartagena RN  Outcome: Ongoing  7/31/2021 0320 by Claudene Slimmer, RN  Outcome: Met This Shift     Problem: Pain:  Goal: Patient's pain/discomfort is manageable  Description: Patient's pain/discomfort is manageable  7/31/2021 1536 by Liz Cartagena RN  Outcome: Ongoing  7/31/2021 0320 by Claudene Slimmer, RN  Outcome: Met This Shift     Problem: Skin Integrity:  Goal: Skin integrity will stabilize  Description: Skin integrity will stabilize  7/31/2021 1536 by Liz Cartagena RN  Outcome: Ongoing  7/31/2021 0320 by Claudene Slimmer, RN  Outcome: Met This Shift     Problem: Discharge Planning:  Goal: Patients continuum of care needs are met  Description: Patients continuum of care needs are met  7/31/2021 1536 by Liz Cartaegna RN  Outcome: Ongoing  7/31/2021 0320 by Claudene Slimmer, RN  Outcome: Met This Shift     Problem: Skin Integrity:  Goal: Will show no infection signs and symptoms  Description: Will show no infection signs and symptoms  7/31/2021 1536 by Liz Cartagena RN  Outcome: Ongoing  7/31/2021 0320 by Claudene Slimmer, RN  Outcome: Met This Shift  Goal: Absence of new skin breakdown  Description: Absence of new skin breakdown  7/31/2021 1536 by Char Beatty RN  Outcome: Ongoing  7/31/2021 0320 by Denny Easton RN  Outcome: Met This Shift  Note: Skin remains intact. Problem: Falls - Risk of:  Goal: Will remain free from falls  Description: Will remain free from falls  7/31/2021 1536 by Char Beatty RN  Outcome: Ongoing  7/31/2021 0320 by Denny Easton RN  Outcome: Met This Shift  Goal: Absence of physical injury  Description: Absence of physical injury  7/31/2021 1536 by Char Beatty RN  Outcome: Ongoing  7/31/2021 0320 by Denny Easton RN  Outcome: Met This Shift     Problem: Nutrition  Goal: Optimal nutrition therapy  7/31/2021 1536 by Char Beatty RN  Outcome: Ongoing  7/31/2021 0320 by Denny Easton RN  Outcome: Not Met This Shift  Note: During shift change report it was relayed to writer that patient has refused all meals. HS snack declined. Unsuccessful attempts of given patient his meds crushed in pudding. Patient refused to open his mouth. Taking minimal po fluids. Incontinent of moderate amount. No stools so far this shift.       Problem: Musculor/Skeletal Functional Status  Goal: Highest potential functional level  7/31/2021 1536 by Char Beatty RN  Outcome: Ongoing  7/31/2021 0320 by Denny Easton RN  Outcome: Met This Shift  Goal: Absence of falls  7/31/2021 1536 by Char Beatty RN  Outcome: Ongoing  7/31/2021 0320 by Denny Easton RN  Outcome: Met This Shift

## 2021-07-31 NOTE — PROGRESS NOTES
[address]   ACUTE REHABILITATION OCCUPATIONAL THERAPY  DAILY NOTE    Date: 21  Patient Name: Lorena Cohen      Room: 2139/5955-29    MRN: [id number]   : 1945  (72 y.o.)  Gender: male   Referring Practitioner: Dr. Saul  Diagnosis: Ischemic CVA with R dominant hemiplegia  Additional Pertinent Hx: Lorena Cohen is a 77 y.o. RHD male admitted to [facility] on 2021. On admit, exam significant for right-sided facial droop, right-sided weakness and severe dysarthria. Significant history with recurrent strokes, remote left temporal lobe ischemic infarct and remote bilateral occipital lobe infarcts . MRI shows Bilateral basal ganglia ischemic infarcts. Pt admitted to rehab unit on 21    Restrictions  Restrictions/Precautions: Up as Tolerated, General Precautions, Fall Risk, Swallowing - Thickened Liquids (1:1 sitter, Mildly thick (Nectar thick))  Other position/activity restrictions: Up w/assistance, RU/LE Weakness. MRI BRAIN- Acute infarct in the left and right basal ganglia greater on the left. Required Braces or Orthoses?: No    Subjective  Subjective: Pt remains silent with limited participation during OT treatment. Comments: Pt is less resistive this AM still requiring max cues with poor participation  Restrictions/Precautions: Up as Tolerated;General Precautions; Fall Risk;Swallowing - Thickened Liquids (1:1 sitter, Mildly thick (Nectar thick))  Overall Orientation Status: Impaired  Orientation Level: Unable to assess (Pt does not answer)          Objective  Perception  Overall Perceptual Status: Impaired  Unilateral Attention: Cues to attend to right side of body;Cues to maintain midline in sitting  Initiation: Cues to initiate tasks  Motor Planning: Hand over hand to sequence tasks  Balance  Sitting Balance: Contact guard assistance  Standing Balance: Maximum assistance  Bed mobility  Supine to Sit: Maximum assistance  Scooting: Maximal assistance (to EOB)  Transfers  Stand Pivot Transfers: Minimal assistance;2 Person assistance (with karena walker)  Sit to stand: Maximum assistance  Stand to sit: Maximum assistance  Transfer Comments: Verbal cues with pt demonstrating poor participation in therapy on this date. Standing Balance  Time: AM: ~2 minutes  Activity: AM: functional transfer to w/c, LB dressing  Comment: Pt demonstrated heavy right side lean with poor participation. Pt able to intermittent achieve CGA with karena walker        Type of ROM/Therapeutic Exercise  Type of ROM/Therapeutic Exercise: PROM  Comment: TIPTON provided PROM for RUE through all planes of motion for increased ROM and decreased risk for contracture. Pt tolerates with no reports of pain but closes his eyes throughout. Decreased ROM noted for elbow extension. Completed X10 reps each  Exercises  Shoulder Flexion: x  Shoulder Extension: x  Horizontal ABduction: x  Horizontal ADduction: x  Elbow Flexion: x  Elbow Extension: x  Supination: x  Pronation: x  Wrist Flexion: x  Wrist Extension: x  Finger Flexion: x  Finger Extension: x     Additional Activities: PM: TIPTON attemtps to engage pt in tabletop task of matching colored shapes on pegs. Pt is resistive and does not let go with R hand to participate. Education provided on POC and importance of participation for optimal rehab. Pt continues to sit with eyes closed. ADL  Feeding: Unable to assess(comment) (Pt refuses to take bite/drink )  Grooming: Dependent/Total (washing face with wash cloth)  UE Bathing: None  LE Bathing: None  UE Dressing: None  LE Dressing: Dependent/Total (2 person A while standing. Pt refused to participate)  Toileting: None (does not requires brief change at this time)  Additional Comments: Pt sleeping upon writer arrival. Baljinder Gruber provides max cues for pt to open eyes including sternal rub. Pt opens eyes. TIPTON provides max assist for initation to sit EOB.  Once sitting EOB, pt TA for threading BLEs, donning socks, AFO and sandal shoe. Pt requires X2 assist for LB clothing mgmt standing at usman walker due ot R side lean. Pt able to achieve intermittent CGA with max cues for pivoting into w/c with poor carryover. Once in w/c, pt declines any self feeding despite food brought mouth. Closes eyes for grooming for TIPTON to assist with washing face. Assessment  Performance deficits / Impairments: Decreased ADL status; Decreased functional mobility ; Decreased ROM; Decreased strength;Decreased safe awareness;Decreased cognition;Decreased endurance;Decreased sensation;Decreased balance;Decreased high-level IADLs;Decreased fine motor control;Decreased coordination  Prognosis: Fair  Discharge Recommendations: Patient would benefit from continued therapy after discharge;24 hour supervision or assist  Activity Tolerance: Treatment limited secondary to decreased cognition  Safety Devices in place: Yes  Type of devices: All fall risk precautions in place;Call light within reach; Chair alarm in place;Gait belt;Patient at risk for falls; Left in chair;Nurse notified (Telesitter)  Restraints  Initially in place: No  Equipment Recommendations  Equipment Needed: Yes  Marlynn Shallow: Usman-walker  Transfer Tub Bench: w/back       Patient Education: OT POC and importance of participation for optimal rehab, imiportance of nutrition intake during self feeding for optimal health and rehab, safety with functional transfers and use of usman walker  Patient Goals   Patient goals : \"I want to go home\"  Learner:patient  Method: explanation       Outcome: needs reinforcement        Plan  Plan  Times per week: 900/7  Times per day: Twice a day  Current Treatment Recommendations: Self-Care / ADL, Strengthening, ROM, Balance Training, Functional Mobility Training, Endurance Training, Neuromuscular Re-education, Cognitive Reorientation, Pain Management, Safety Education & Training, Patient/Caregiver Education & Training, Equipment Evaluation, Education, & [address]      Electronically signed by FLORIAN Cedeno on 7/31/21 at 3:13 PM EDT

## 2021-08-01 NOTE — PROGRESS NOTES
[facility]    CONSULTATION / HISTORY AND PHYSICAL EXAMINATION            Date:   8/1/2021  Patient name:  Lorena Cohen  Date of admission:  7/20/2021  6:49 PM  MRN:   [id number]  Account:  [de-identified]  YOB: 1945  PCP:    No primary care provider on file.   Room:   [address] [place] [address]  Code Status:    Full Code    Physician Requesting Consult: Saumya Tierney MD    Reason for Consult:  medical management    Chief Complaint:       Right hemipareisi  History Obtained From:     Patient medical record nursing staff    History of Present Illness:   History is extremely limited, patient was extremely agitated overnight, was given Ativan around 2 AM in the night, patient very sleepy, not answering questions  Most of history is retrieved from [facility]  Patient was recently admitted to [place] [address] with right-sided weakness, right-sided facial weakness, speech difficulties  Patient underwent MRI brain, concerning for acute infarct in left and right basal ganglia  CT head and neck, was concerning for severe stenosis in the proximal A3 segment of RAHEEM bilaterally  During hospital stay, patient had thrombocytopenia, was evaluated by hematologist, getting treated with IV steroids, and lines of immune thrombocytopenia      Past Medical History:     Past Medical History:   Diagnosis Date    Centrilobular emphysema ([address]     COPD (chronic obstructive pulmonary disease) ([address]     Depression     Diabetes mellitus ([address]     pt refuses to take previously prescribed metformin (states PCP aware)    Former smoker     Hyperlipidemia     on 4/27/18 pt states \"I stopped taking that about a year ago\"    Hypertension     Mixed restrictive and obstructive lung disease ([address]     Need for pneumococcal vaccine     Personal history of tobacco use         Past Surgical History:     Past Surgical History:   Procedure Laterality Date    CARDIAC SURGERY  07/2015    1 stent    NECK SURGERY      TOE AMPUTATION Right greater        Medications Prior to Admission:     Prior to Admission medications    Medication Sig Start Date End Date Taking?  Authorizing Provider   Heparin Sodium, Porcine, (HEPARIN, PORCINE,) 5000 UNIT/ML injection Inject 1 mL into the skin every 8 hours 7/20/21   Josephina Meckel, MD   QUEtiapine (SEROQUEL) 25 MG tablet Take 1 tablet by mouth nightly as needed for Agitation 7/20/21 7/25/21  Josephina Meckel, MD   methylPREDNISolone sodium (SOLU-MEDROL) 40 MG injection Infuse 1 mL intravenously every 12 hours 7/20/21   Josephina Meckel, MD   melatonin 3 MG TABS tablet Take 1 tablet by mouth nightly as needed (insomnia) 7/20/21   Josephina Meckel, MD   atorvastatin (LIPITOR) 80 MG tablet Take 0.5 tablets by mouth daily (Pt takes one-half of an 80mg tab = 40mg) 7/16/21   Josephina Meckel, MD   tiZANidine (ZANAFLEX) 2 MG tablet Take 1 tablet by mouth 4 times daily as needed (muscle spasms) 5/7/21   JOLIE Vences CNP   naproxen (NAPROSYN) 500 MG tablet Take 1 tablet by mouth 2 times daily (with meals) 1/4/21   Gillian Acosta PA-C   ibuprofen (ADVIL;MOTRIN) 800 MG tablet Take 1 tablet by mouth every 8 hours as needed for Pain 6/2/20   Juan Manuel Zhu MD   lidocaine (LIDODERM) 5 % Place 1 patch onto the skin daily 12 hours on, 12 hours off. 6/2/20   Juan Manuel Zhu MD   metoprolol succinate (TOPROL XL) 50 MG extended release tablet Take 50 mg by mouth daily    Historical Provider, MD   tiotropium (SPIRIVA RESPIMAT) 2.5 MCG/ACT AERS inhaler Inhale 2 puffs into the lungs daily    Historical Provider, MD   carboxymethylcellulose 1 % ophthalmic solution Place 1 drop into both eyes 3 times daily as needed for Dry Eyes     Historical Provider, MD   ketotifen (ZADITOR) 0.025 % ophthalmic solution Place 1 drop into both eyes 2 times daily as needed (itchy eyes)     Historical Provider, MD   isosorbide mononitrate (IMDUR) 60 MG extended release tablet Take 30 mg by mouth daily Indications: 1/2 tablet (30mg)     Historical Provider, MD   finasteride (PROSCAR) 5 MG tablet Take 5 mg by mouth daily    Historical Provider, MD   tamsulosin (FLOMAX) 0.4 MG capsule Take 0.4 mg by mouth daily    Historical Provider, MD   fluticasone (FLONASE) 50 MCG/ACT nasal spray 1 spray by Nasal route 2 times daily  Patient taking differently: 1 spray by Nasal route 2 times daily as needed for Rhinitis  5/31/16   Francisco Connor,    albuterol sulfate  (90 BASE) MCG/ACT inhaler Inhale 2 puffs into the lungs every 6 hours as needed for Wheezing    Historical Provider, MD   albuterol (PROVENTIL) (2.5 MG/3ML) 0.083% nebulizer solution Take 2.5 mg by nebulization every 6 hours as needed for Wheezing    Historical Provider, MD   aspirin 81 MG EC tablet Take 81 mg by mouth daily    Historical Provider, MD   losartan (COZAAR) 100 MG tablet Take 100 mg by mouth daily     Historical Provider, MD   nitroGLYCERIN (NITROSTAT) 0.4 MG SL tablet Place 0.4 mg under the tongue every 5 minutes as needed for Chest pain    Historical Provider, MD   FLUoxetine (PROZAC) 20 MG capsule Take 40 mg by mouth daily     Historical Provider, MD   furosemide (LASIX) 20 MG tablet Take 20 mg by mouth daily as needed (swelling)     Historical Provider, MD   clopidogrel (PLAVIX) 75 MG tablet Take 75 mg by mouth daily    Historical Provider, MD   rOPINIRole (REQUIP) 0.25 MG tablet Take 0.25 mg by mouth nightly as needed     Historical Provider, MD   budesonide-formoterol (SYMBICORT) 160-4.5 MCG/ACT AERO Inhale 2 puffs into the lungs 2 times daily. Historical Provider, MD        Allergies:     Patient has no known allergies. Social History:     Tobacco:    reports that he quit smoking about 8 years ago. He started smoking about 64 years ago. He has a 42.00 pack-year smoking history. He has never used smokeless tobacco.  Alcohol:      reports no history of alcohol use.   Drug Use:  reports no history of drug use.    Family History:     No family history on file. Review of Systems:   Cannot be done because of patient condition    Physical Exam:     BP (!) 142/91   Pulse 79   Temp 97.7 °F (36.5 °C) (Axillary)   Resp 18   Ht 5' 10\" (1.778 m)   Wt 214 lb 4.6 oz (97.2 kg)   SpO2 99%   BMI 30.75 kg/m²   Temp (24hrs), Av.5 °F (36.4 °C), Min:97.3 °F (36.3 °C), Max:97.7 °F (36.5 °C)    Recent Labs     21  1604 21  2004 21  0622 21  1122   POCGLU 147* 127* 114* 155*       Intake/Output Summary (Last 24 hours) at 2021 1400  Last data filed at 2021 4783  Gross per 24 hour   Intake 975 ml   Output --   Net 975 ml       General Appearance: Very sleepy, not answering questions  Mental status: Not oriented to person, place, and time with normal affect  Head:  normocephalic, atraumatic. Eye: no icterus, redness, pupils equal and reactive, extraocular eye movements intact, conjunctiva clear  Ear: normal external ear, no discharge, hearing intact  Nose:  no drainage noted  Mouth: mucous membranes moist  Neck: supple, no carotid bruits, thyroid not palpable  Lungs: Bilateral equal air entry, clear to ausculation, no wheezing, rales or rhonchi, normal effort  Cardiovascular: normal rate, regular rhythm, no murmur, gallop, rub.   Abdomen: Soft, nontender, nondistended, normal bowel sounds, no hepatomegaly or splenomegaly  Neurologic: Weakness in right upper and lower extremity, speech difficulties, right sided facial weakness  Skin: No gross lesions, rashes, bruising or bleeding on exposed skin area  Extremities:  peripheral pulses palpable, no pedal edema or calf pain with palpation    Investigations:      Laboratory Testing:  Recent Results (from the past 24 hour(s))   POC Glucose Fingerstick    Collection Time: 21  4:04 PM   Result Value Ref Range    POC Glucose 147 (H) 75 - 110 mg/dL   POC Glucose Fingerstick    Collection Time: 21  8:04 PM   Result Value Ref Range    POC Glucose 127 (H) 75 - 110 mg/dL   POC Glucose Fingerstick    Collection Time: 08/01/21  6:22 AM   Result Value Ref Range    POC Glucose 114 (H) 75 - 110 mg/dL   POC Glucose Fingerstick    Collection Time: 08/01/21 11:22 AM   Result Value Ref Range    POC Glucose 155 (H) 75 - 110 mg/dL           Consultations:   IP CONSULT TO DIETITIAN  IP CONSULT TO SOCIAL WORK  IP CONSULT TO INTERNAL MEDICINE  IP CONSULT TO ONCOLOGY  IP CONSULT TO INTERNAL MEDICINE  IP CONSULT TO PSYCHIATRY  IP CONSULT TO GENERAL SURGERY  Assessment :      Primary Problem  <principal problem not specified>    Active Hospital Problems    Diagnosis Date Noted    Depression [F32.9]     Acute CVA (cerebrovascular [facility]) [facility] [I63.9] 07/20/2021    Acute idiopathic thrombocytopenic purpura ([facility] [D69.3] 07/16/2021    CAD S/P percutaneous coronary angioplasty [I25.10, Z98.61] 07/13/2021    Cerebrovascular accident (CVA) ([facility] [I63.9] 07/13/2021    HTN (hypertension) Dayana Moore 07/13/2021    Hyperlipidemia [E78.5] 07/13/2021    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 Acute ischemic stroke, patient is on aspirin, Plavix, Crestor  2. 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 On DVT prophylaxis with heparin  4. Hypertension, controlled  5. Diabetes, controlled  1 episode of bradycardia, will follow, may be due to benzodiazepine . 6. Incidental finding of thyroid nodule on CTA head and neck, will need outpatient ultrasound thyroid and biopsy  Dysphagia diet  Right hemipareis with speech dif  htn improved    augmentin for sinusitis  Prednisone for ?  ITP  Flat affect depression on prozac    Needs a lot of encouragement to eat    Watch platelet  cortisl levels 18  At risk of addisonian crisis  Case dw with dr Ancelmo Odonnell  Patient is not taking anything oral with a dysphagia severe malnutrition risk  Patient will benefit with the PEG tube for medication and nutrition  Case discussed with daughter  surg input noted  surg decision pending per family  Iv fluids started for CELESTINO      Naomy Duffy MD  8/1/2021  2:00 PM    Copy sent to Dr. David Ghotra primary care provider on file. Please note that this chart was generated using voice recognition Dragon dictation software. Although every effort was made to ensure the accuracy of this automated transcription, some errors in transcription may have occurred.

## 2021-08-01 NOTE — PROGRESS NOTES
Pt's sister, Hillary Wilson, in to visit pt and relays to writer that she would like to proceed with the PEG tube placement. Perfect Serve message sent to Dr. Cami Miner who responds with orders to keep pt NPO after midnight and hold blood thinners. Email sent to Annie White to determine how to obtain consent.

## 2021-08-01 NOTE — PLAN OF CARE
Problem: Infection:  Goal: Will remain free from infection  Description: Will remain free from infection  8/1/2021 1430 by Ignacio Hale RN  Outcome: Ongoing  8/1/2021 0418 by Raymond Chandler RN  Outcome: Ongoing     Problem: Safety:  Goal: Free from accidental physical injury  Description: Free from accidental physical injury  8/1/2021 1430 by Ignacio Hale RN  Outcome: Ongoing  8/1/2021 0418 by Raymond Chandler RN  Outcome: Ongoing  Goal: Free from intentional harm  Description: Free from intentional harm  8/1/2021 1430 by Ignacio Hale RN  Outcome: Ongoing  8/1/2021 0418 by Raymond Chandler RN  Outcome: Ongoing     Problem: Daily Care:  Goal: Daily care needs are met  Description: Daily care needs are met  8/1/2021 1430 by Ignacio Hale RN  Outcome: Ongoing  8/1/2021 0418 by Raymond Chandler RN  Outcome: Ongoing     Problem: Pain:  Goal: Patient's pain/discomfort is manageable  Description: Patient's pain/discomfort is manageable  8/1/2021 1430 by Ignacio Hale RN  Outcome: Ongoing  8/1/2021 0418 by Raymond Chandler RN  Outcome: Ongoing     Problem: Skin Integrity:  Goal: Skin integrity will stabilize  Description: Skin integrity will stabilize  8/1/2021 1430 by Ignacio Hale RN  Outcome: Ongoing  8/1/2021 0418 by Raymond Chandler RN  Outcome: Ongoing     Problem: Discharge Planning:  Goal: Patients continuum of care needs are met  Description: Patients continuum of care needs are met  8/1/2021 1430 by Ignacio Hale RN  Outcome: Ongoing  8/1/2021 0418 by Raymond Chandler RN  Outcome: Ongoing     Problem: Skin Integrity:  Goal: Will show no infection signs and symptoms  Description: Will show no infection signs and symptoms  8/1/2021 1430 by Ignacio Hale RN  Outcome: Ongoing  8/1/2021 0418 by Raymond Chandler RN  Outcome: Ongoing  Goal: Absence of new skin breakdown  Description: Absence of new skin breakdown  8/1/2021 1430 by Ignacio Hale RN  Outcome: Ongoing  8/1/2021 6863 by Delories Sicard, RN  Outcome: Ongoing     Problem: Falls - Risk of:  Goal: Will remain free from falls  Description: Will remain free from falls  8/1/2021 1430 by Shayna Bailey RN  Outcome: Ongoing  8/1/2021 0418 by Delories Sicard, RN  Outcome: Ongoing  Goal: Absence of physical injury  Description: Absence of physical injury  8/1/2021 1430 by Shayna Bailey RN  Outcome: Ongoing  8/1/2021 0418 by Delories Sicard, RN  Outcome: Ongoing     Problem: Nutrition  Goal: Optimal nutrition therapy  8/1/2021 1430 by Shayna Bailey RN  Outcome: Ongoing  8/1/2021 0418 by Delories Sicard, RN  Outcome: Ongoing     Problem: Musculor/Skeletal Functional Status  Goal: Highest potential functional level  8/1/2021 1430 by Shayna Bailey RN  Outcome: Ongoing  8/1/2021 0418 by Delories Sicard, RN  Outcome: Ongoing  Goal: Absence of falls  8/1/2021 1430 by Shayna Bailey RN  Outcome: Ongoing  8/1/2021 0418 by Delories Sicard, RN  Outcome: Ongoing

## 2021-08-01 NOTE — PROGRESS NOTES
[facility] Internal Medicine    CONSULTATION / HISTORY AND PHYSICAL EXAMINATION            Date:   7/31/2021  Patient name:  Gayla Mojica  Date of admission:  7/20/2021  6:49 PM  MRN:   [id number]  Account:  [de-identified]  YOB: 1945  PCP:    No primary care provider on file.   Room:   [address] [place] [address]  Code Status:    Full Code    Physician Requesting Consult: Flor Tatum MD    Reason for Consult:  medical management    Chief Complaint:       Right hemipareisi  History Obtained From:     Patient medical record nursing staff    History of Present Illness:   History is extremely limited, patient was extremely agitated overnight, was given Ativan around 2 AM in the night, patient very sleepy, not answering questions  Most of history is retrieved from E HR  Patient was recently admitted to [facility] with right-sided weakness, right-sided facial weakness, speech difficulties  Patient underwent MRI brain, concerning for acute infarct in left and right basal ganglia  CT head and neck, was concerning for severe stenosis in the proximal A3 segment of RAHEEM bilaterally  During hospital stay, patient had thrombocytopenia, was evaluated by hematologist, getting treated with IV steroids, and lines of immune thrombocytopenia      Past Medical History:     Past Medical History:   Diagnosis Date    Centrilobular emphysema ([address]     COPD (chronic obstructive pulmonary disease) ([address]     Depression     Diabetes mellitus ([address]     pt refuses to take previously prescribed metformin (states PCP aware)    Former smoker     Hyperlipidemia     on 4/27/18 pt states \"I stopped taking that about a year ago\"    Hypertension     Mixed restrictive and obstructive lung disease ([address]     Need for pneumococcal vaccine     Personal history of tobacco use         Past Surgical History:     Past Surgical History:   Procedure Laterality Date    CARDIAC SURGERY  07/2015    1 stent    NECK SURGERY      TOE AMPUTATION Right greater        Medications Prior to Admission:     Prior to Admission medications    Medication Sig Start Date End Date Taking?  Authorizing Provider   Heparin Sodium, Porcine, (HEPARIN, PORCINE,) 5000 UNIT/ML injection Inject 1 mL into the skin every 8 hours 7/20/21   Cherise Gustafson MD   QUEtiapine (SEROQUEL) 25 MG tablet Take 1 tablet by mouth nightly as needed for Agitation 7/20/21 7/25/21  Cherise Gustafson MD   methylPREDNISolone sodium (SOLU-MEDROL) 40 MG injection Infuse 1 mL intravenously every 12 hours 7/20/21   Cherise Gustafson MD   melatonin 3 MG TABS tablet Take 1 tablet by mouth nightly as needed (insomnia) 7/20/21   Cherise Gustafson MD   atorvastatin (LIPITOR) 80 MG tablet Take 0.5 tablets by mouth daily (Pt takes one-half of an 80mg tab = 40mg) 7/16/21   Cherise Gustafson MD   tiZANidine (ZANAFLEX) 2 MG tablet Take 1 tablet by mouth 4 times daily as needed (muscle spasms) 5/7/21   Roland Moreno, APRN - CNP   naproxen (NAPROSYN) 500 MG tablet Take 1 tablet by mouth 2 times daily (with meals) 1/4/21   Aleksandr Laughlin PA-C   ibuprofen (ADVIL;MOTRIN) 800 MG tablet Take 1 tablet by mouth every 8 hours as needed for Pain 6/2/20   Barbi Keyes MD   lidocaine (LIDODERM) 5 % Place 1 patch onto the skin daily 12 hours on, 12 hours off. 6/2/20   Barbi Keyes MD   metoprolol succinate (TOPROL XL) 50 MG extended release tablet Take 50 mg by mouth daily    Historical Provider, MD   tiotropium (SPIRIVA RESPIMAT) 2.5 MCG/ACT AERS inhaler Inhale 2 puffs into the lungs daily    Historical Provider, MD   carboxymethylcellulose 1 % ophthalmic solution Place 1 drop into both eyes 3 times daily as needed for Dry Eyes     Historical Provider, MD   ketotifen (ZADITOR) 0.025 % ophthalmic solution Place 1 drop into both eyes 2 times daily as needed (itchy eyes)     Historical Provider, MD   isosorbide mononitrate (IMDUR) 60 MG extended release tablet Take 30 mg by mouth daily Indications: 1/2 tablet (30mg)     Historical Provider, MD   finasteride (PROSCAR) 5 MG tablet Take 5 mg by mouth daily    Historical Provider, MD   tamsulosin (FLOMAX) 0.4 MG capsule Take 0.4 mg by mouth daily    Historical Provider, MD   fluticasone (FLONASE) 50 MCG/ACT nasal spray 1 spray by Nasal route 2 times daily  Patient taking differently: 1 spray by Nasal route 2 times daily as needed for Rhinitis  5/31/16   Francisco Connor,    albuterol sulfate  (90 BASE) MCG/ACT inhaler Inhale 2 puffs into the lungs every 6 hours as needed for Wheezing    Historical Provider, MD   albuterol (PROVENTIL) (2.5 MG/3ML) 0.083% nebulizer solution Take 2.5 mg by nebulization every 6 hours as needed for Wheezing    Historical Provider, MD   aspirin 81 MG EC tablet Take 81 mg by mouth daily    Historical Provider, MD   losartan (COZAAR) 100 MG tablet Take 100 mg by mouth daily     Historical Provider, MD   nitroGLYCERIN (NITROSTAT) 0.4 MG SL tablet Place 0.4 mg under the tongue every 5 minutes as needed for Chest pain    Historical Provider, MD   FLUoxetine (PROZAC) 20 MG capsule Take 40 mg by mouth daily     Historical Provider, MD   furosemide (LASIX) 20 MG tablet Take 20 mg by mouth daily as needed (swelling)     Historical Provider, MD   clopidogrel (PLAVIX) 75 MG tablet Take 75 mg by mouth daily    Historical Provider, MD   rOPINIRole (REQUIP) 0.25 MG tablet Take 0.25 mg by mouth nightly as needed     Historical Provider, MD   budesonide-formoterol (SYMBICORT) 160-4.5 MCG/ACT AERO Inhale 2 puffs into the lungs 2 times daily. Historical Provider, MD        Allergies:     Patient has no known allergies. Social History:     Tobacco:    reports that he quit smoking about 8 years ago. He started smoking about 64 years ago. He has a 42.00 pack-year smoking history. He has never used smokeless tobacco.  Alcohol:      reports no history of alcohol use.   Drug Use:  reports no history of drug use.    Family History:     No family history on file. Review of Systems:   Cannot be done because of patient condition    Physical Exam:     BP (!) 141/96   Pulse 69   Temp 97.3 °F (36.3 °C) (Axillary)   Resp 18   Ht 5' 10\" (1.778 m)   Wt 214 lb 4.6 oz (97.2 kg)   SpO2 99%   BMI 30.75 kg/m²   Temp (24hrs), Av.7 °F (36.5 °C), Min:97.3 °F (36.3 °C), Max:98 °F (36.7 °C)    Recent Labs     21  0658 21  1042 21  1604 21   POCGLU 117* 182* 147* 127*     No intake or output data in the 24 hours ending 21    General Appearance: Very sleepy, not answering questions  Mental status: Not oriented to person, place, and time with normal affect  Head:  normocephalic, atraumatic. Eye: no icterus, redness, pupils equal and reactive, extraocular eye movements intact, conjunctiva clear  Ear: normal external ear, no discharge, hearing intact  Nose:  no drainage noted  Mouth: mucous membranes moist  Neck: supple, no carotid bruits, thyroid not palpable  Lungs: Bilateral equal air entry, clear to ausculation, no wheezing, rales or rhonchi, normal effort  Cardiovascular: normal rate, regular rhythm, no murmur, gallop, rub.   Abdomen: Soft, nontender, nondistended, normal bowel sounds, no hepatomegaly or splenomegaly  Neurologic: Weakness in right upper and lower extremity, speech difficulties, right sided facial weakness  Skin: No gross lesions, rashes, bruising or bleeding on exposed skin area  Extremities:  peripheral pulses palpable, no pedal edema or calf pain with palpation    Investigations:      Laboratory Testing:  Recent Results (from the past 24 hour(s))   POC Glucose Fingerstick    Collection Time: 21  6:58 AM   Result Value Ref Range    POC Glucose 117 (H) 75 - 110 mg/dL   POC Glucose Fingerstick    Collection Time: 21 10:42 AM   Result Value Ref Range    POC Glucose 182 (H) 75 - 110 mg/dL   CBC Auto Differential    Collection Time: 21 11:05 AM   Result Value Ref Range    WBC 9.5 3.5 - 11.0 k/uL    RBC 5.43 4.5 - 5.9 m/uL    Hemoglobin 13.4 (L) 13.5 - 17.5 g/dL    Hematocrit 42.3 41 - 53 %    MCV 77.8 (L) 80 - 100 fL    MCH 24.7 (L) 26 - 34 pg    MCHC 31.8 31 - 37 g/dL    RDW 17.7 (H) 11.5 - 14.9 %    Platelets 639 (L) 312 - 450 k/uL    MPV 11.9 6.0 - 12.0 fL    NRBC Automated NOT REPORTED per 100 WBC    Differential Type NOT REPORTED     Immature Granulocytes NOT REPORTED 0 %    Absolute Immature Granulocyte NOT REPORTED 0.00 - 0.30 k/uL    WBC Morphology NOT REPORTED     RBC Morphology NOT REPORTED     Platelet Estimate NOT REPORTED     Seg Neutrophils 81 (H) 36 - 66 %    Lymphocytes 12 (L) 24 - 44 %    Monocytes 6 1 - 7 %    Eosinophils % 0 0 - 4 %    Basophils 1 0 - 2 %    Segs Absolute 7.69 1.3 - 9.1 k/uL    Absolute Lymph # 1.14 1.0 - 4.8 k/uL    Absolute Mono # 0.57 0.1 - 1.3 k/uL    Absolute Eos # 0.00 0.0 - 0.4 k/uL    Basophils Absolute 0.10 0.0 - 0.2 k/uL    Morphology ANISOCYTOSIS PRESENT     Morphology HYPOCHROMIA PRESENT     Morphology 1+ ELLIPTOCYTES    Basic Metabolic Panel    Collection Time: 07/31/21 11:05 AM   Result Value Ref Range    Glucose 180 (H) 70 - 99 mg/dL    BUN 29 (H) 8 - 23 mg/dL    CREATININE 1.89 (H) 0.70 - 1.20 mg/dL    Bun/Cre Ratio NOT REPORTED 9 - 20    Calcium 9.8 8.6 - 10.4 mg/dL    Sodium 139 135 - 144 mmol/L    Potassium 4.8 3.7 - 5.3 mmol/L    Chloride 102 98 - 107 mmol/L    CO2 24 20 - 31 mmol/L    Anion Gap 13 9 - 17 mmol/L    GFR Non-African American 35 (L) >60 mL/min    GFR  42 (L) >60 mL/min    GFR Comment          GFR Staging NOT REPORTED    Protime-INR    Collection Time: 07/31/21 11:05 AM   Result Value Ref Range    Protime 17.1 (H) 11.8 - 14.6 sec    INR 1.4    POC Glucose Fingerstick    Collection Time: 07/31/21  4:04 PM   Result Value Ref Range    POC Glucose 147 (H) 75 - 110 mg/dL   POC Glucose Fingerstick    Collection Time: 07/31/21  8:04 PM   Result Value Ref Range    POC Glucose 127 (H) 75 - 110 mg/dL           Consultations:   IP CONSULT TO DIETITIAN  IP CONSULT TO SOCIAL WORK  IP CONSULT TO INTERNAL MEDICINE  IP CONSULT TO ONCOLOGY  IP CONSULT TO INTERNAL MEDICINE  IP CONSULT TO PSYCHIATRY  IP CONSULT TO GENERAL SURGERY  Assessment :      Primary Problem  <principal problem not specified>    Active Hospital Problems    Diagnosis Date Noted    Depression [F32.9]     Acute CVA (cerebrovascular accident) ([facility] [I63.9] 07/20/2021    Acute idiopathic thrombocytopenic purpura ([facility] [D69.3] 07/16/2021    CAD S/P percutaneous coronary angioplasty [I25.10, Z98.61] 07/13/2021    Cerebrovascular accident (CVA) ([facility] [I63.9] 07/13/2021    HTN (hypertension) Debi Rodriguez 07/13/2021    Hyperlipidemia [E78.5] 07/13/2021    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 Acute ischemic stroke, patient is on aspirin, Plavix, Crestor  2. 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 On DVT prophylaxis with heparin  4. Hypertension, controlled  5. Diabetes, controlled  1 episode of bradycardia, will follow, may be due to benzodiazepine . 6. Incidental finding of thyroid nodule on CTA head and neck, will need outpatient ultrasound thyroid and biopsy  Dysphagia diet  Right hemipareis with speech dif  htn improved    augmentin for sinusitis  Prednisone for ? ITP  Flat affect depression on prozac    Needs a lot of encouragement to eat    Watch platelet  cortisl levels 18  At risk of addisonian crisis  Case dw with dr Abdulkadir Ray  Patient is not taking anything oral with a dysphagia severe malnutrition risk  Patient will benefit with the PEG tube for medication and nutrition  Case discussed with daughter  surg input noted  surg decision    Robb Bro MD  7/31/2021  9:52 PM    Copy sent to Dr. Doyle Middleton primary care provider on file. Please note that this chart was generated using voice recognition Dragon dictation software.   Although every effort was made to ensure the accuracy of this automated transcription, some errors in transcription may have occurred.

## 2021-08-01 NOTE — PROGRESS NOTES
[address]   ACUTE REHABILITATION OCCUPATIONAL THERAPY  DAILY NOTE    Date: 21  Patient Name: Evelina Gottron      Room: 3700/1257-61    MRN: [id number]   : 1945  (72 y.o.)  Gender: male   Referring Practitioner: Dr. Edwin Weinberg  Diagnosis: Ischemic CVA with R dominant hemiplegia  Additional Pertinent Hx: Evelina Gottron is a 77 y.o. RHD male admitted to [facility] on 2021. On admit, exam significant for right-sided facial droop, right-sided weakness and severe dysarthria. Significant history with recurrent strokes, remote left temporal lobe ischemic infarct and remote bilateral occipital lobe infarcts . MRI shows Bilateral basal ganglia ischemic infarcts. Pt admitted to rehab unit on 21    Restrictions  Restrictions/Precautions: Up as Tolerated, General Precautions, Fall Risk, Swallowing - Thickened Liquids (Telesitter, Mildly thick (Nectar thick))  Other position/activity restrictions: Up w/assistance, RU/LE Weakness. MRI BRAIN- Acute infarct in the left and right basal ganglia greater on the left. Required Braces or Orthoses?: No    Subjective  Subjective: Pt remains silent during OT session. Pt opens eyes upon sitting edge of bed in AM. Pt somnolent in PM and unable to be roused. HERLINDA Wade notified. Restrictions/Precautions: Up as Tolerated;General Precautions; Fall Risk;Swallowing - Thickened Liquids (Telesitter, Mildly thick (Nectar thick))  Overall Orientation Status: Impaired  Orientation Level: Unable to assess (Pt does not answer)          Objective  Perception  Overall Perceptual Status: Impaired  Unilateral Attention: Cues to attend to right side of body;Cues to maintain midline in sitting  Initiation: Cues to initiate tasks  Motor Planning: Hand over hand to sequence tasks  Balance  Sitting Balance: Contact guard assistance  Standing Balance: Maximum assistance  Bed mobility  Supine to Sit: Maximum assistance;2 Person assistance  Scooting: Maximal assistance (to EOB)  Transfers  Stand Pivot Transfers: Dependent/Total (with Irving Pluck)  Sit to stand: Maximum assistance  Stand to sit: Maximum assistance  Transfer Comments: attempted pivot with hemiwalker, however patient unable to safely advance to L; Irving Pluck utilized  Standing Balance  Comment: Significant posterior lean with Maximum assist x 2 - use of Irving Pluck for safety during transfer this date                                ADL  Feeding: Dependent/Total;Thickened liquids (total assist per nursing for meds/juice)  Grooming: Maximum assistance (Hand over hand to wash R cheek; assist for rest of face)  UE Bathing: Maximum assistance (Please see comment)  LE Bathing: Dependent/Total (attempted hand over hand, no participation this date)  UE Dressing: Dependent/Total (patient leaned forward 2/5 trials to participate in care)  LE Dressing: Dependent/Total  Toileting: Dependent/Total  Additional Comments: OT facilitated Pt engagement in bathing, dressing, and grooming tasks while seated in wheelchair this date. Hand over hand assist provided for washing his face with wash cloth placed in L hand. Pt washed R side of face, however required assist to wash remainder. During upper body bathing, hand over hand assist provided to bathe RUE with LUE. Pt participated in repetitive back and forth washing movement over shoulder, forearm, and armpit with OT providing graded assist and moving hand/washcloth PRN. Assessment  Performance deficits / Impairments: Decreased ADL status; Decreased functional mobility ; Decreased ROM; Decreased strength;Decreased safe awareness;Decreased cognition;Decreased endurance;Decreased sensation;Decreased balance;Decreased high-level IADLs;Decreased fine motor control;Decreased coordination  Prognosis: Fair  Discharge Recommendations: Patient would benefit from continued therapy after discharge;24 hour supervision or assist  Activity Tolerance: Treatment limited secondary to decreased cognition  Safety Devices in place: Yes  Type of devices: All fall risk precautions in place;Call light within reach; Chair alarm in place;Gait belt;Patient at risk for falls; Left in chair;Nurse notified (Telesitter)  Restraints  Initially in place: No  Equipment Recommendations  Equipment Needed: Yes  [place]: Usman-walker  Transfer Tub Bench: w/back       Patient Education:  Patient Goals   Patient goals : \"I want to go home\"  Learner:patient  Method: demonstration and explanation       Outcome: needs reinforcement  OT Education  OT Education: Orientation;OT Role;ADL Adaptive Strategies;Transfer Training  Patient Education: OT POC    Plan  Plan  Times per week: 900/7  Times per day: Twice a day  Current Treatment Recommendations: Self-Care / ADL, Strengthening, ROM, Balance Training, Functional Mobility Training, Endurance Training, Neuromuscular Re-education, Cognitive Reorientation, Pain Management, Safety Education & Training, Patient/Caregiver Education & Training, Equipment Evaluation, Education, & procurement, Home Management Training, Cognitive/Perceptual Training  Plan Comment: 900 minutes/week for combined therapy of PT/OT/ST due to decreased tolerance to activity.   Patient Goals   Patient goals : \"I want to go home\"  Short term goals  Time Frame for Short term goals: By 10 days  Short term goal 1: Pt will complete upper body dressing/bathing with Min A and good attention to task  Short term goal 2: Pt will complete lower body dressing/bathing with max A and good safety with use of AE as needed  Short term goal 3: Pt will complete functional transfers during self care tasks with mod A x 1 and good safety  Short term goal 4: Pt will tolerate standing 4+ minutes during functional activity of choice with min A and good safety  Short term goal 5: Pt will participate in 30+ minutes of therapeutic exercises/functional activities to increase safety and independence with self care tasks  Short term goal 6: Pt will complete self feeding with min A and good attention to task  Long term goals  Time Frame for Long term goals : By discharge  Long term goal 1: Pt will complete upper body dressing with set-up assistance and good attention to task  Long term goal 2: Pt will complete lower body dressing/bathing with min A and good safety with use of AE as needed  Long term goal 3: Pt will complete functional transfers/mobility during self care tasks with min A and good safety  Long term goal 4: Pt will tolerate standing 8+ minutes during functional activity of choice with CGA and good safety  Long term goal 5: Pt will verbalize/demonstrate good understanding of adaptive equipment/adaptive strategies/durable medical equipment to increase safety and independence with self care and mobility  Long term goals 6: Pt will complete self feeding with set-up assistance and good attention to task  Long term goal 7: Vision to be further assessed     08/01/21 0834 08/01/21 1308   OT Individual Minutes   Time In 1676 1308   Time Out 9825 6975   Minutes 49 7   Time Code Minutes    Timed Code Treatment Minutes 49 Minutes 7 Minutes     Electronically signed by Bertrand Munguia OT on 8/1/21 at 3:50 PM EDT

## 2021-08-01 NOTE — PLAN OF CARE
Ongoing  7/31/2021 1536 by Milvia Leonard RN  Outcome: Ongoing     Problem: Falls - Risk of:  Goal: Will remain free from falls  Description: Will remain free from falls  8/1/2021 0418 by Chely Peralta RN  Outcome: Ongoing  7/31/2021 1536 by Milvia Leonard RN  Outcome: Ongoing  Goal: Absence of physical injury  Description: Absence of physical injury  8/1/2021 0418 by Chely Peralta RN  Outcome: Ongoing  7/31/2021 1536 by Milvia Leonard RN  Outcome: Ongoing     Problem: Nutrition  Goal: Optimal nutrition therapy  8/1/2021 0418 by Chely Peralta RN  Outcome: Ongoing  7/31/2021 1536 by Milvia Leonard RN  Outcome: Ongoing     Problem: Musculor/Skeletal Functional Status  Goal: Highest potential functional level  8/1/2021 0418 by Chely Peralta RN  Outcome: Ongoing  7/31/2021 1536 by Milvia Leonard RN  Outcome: Ongoing  Goal: Absence of falls  8/1/2021 0418 by Chely Peralta RN  Outcome: Ongoing  7/31/2021 1536 by Milvia Leonard RN  Outcome: Ongoing

## 2021-08-01 NOTE — PROGRESS NOTES
thick))  Required Braces or Orthoses?: No  Position Activity Restriction  Other position/activity restrictions: Up w/assistance, RU/LE Weakness. MRI BRAIN- Acute infarct in the left and right basal ganglia greater on the left. Subjective: Pt not communicating with writer; attempts to see facial expressions  Comments: Pt flat affect. Pt will keep eyes closed throughout tx. Sternal rub to get pt to open eyes. During PM tx, pt's sister also attempting to get pt to open eyes and cooperative. Pt appears disengaged. Vital Signs  Patient Currently in Pain: Other (comment) (No response to query)                   Bed Mobility  Supine to Sit: Dependent/Total  Sit to Supine: Unable to assess (pt left sitting in bedside wheelchair)  Scooting: Maximal assistance      Transfers:  Sit to Stand: 2 Person Assistance;Maximum Assistance (walker lift)  Stand to sit: 2 Person Assistance;Minimal Assistance (Walker Lift)                 Ambulation 1  Surface: level tile  Device:  (Green Walker Lift)  Other Apparatus: Right;AFO;Slider; Wheelchair follow  Assistance: 2 Person assistance;Maximum assistance  Quality of Gait: forward flexed hips, narrow LAZARA initially, max assist to advance right LE  Gait Deviations: Slow Anais;Decreased step length;Decreased step height  Distance: 25ft (AM & PM)  Comments: pt no longer initiating movement, writer assisting with advancing and maintaining knee extension during stance phase        Stairs/Curb  Stairs?: No              Wheelchair Activities  Propulsion: Yes  Propulsion 1  Propulsion: Manual  Level: Level Tile  Method: LLE;RLE  Level of Assistance: Maximum assistance  Description/ Details: Requires assist to position bilat LE, Assist with advancing bilat LE  Distance: 15ft                                   Posture: Fair  Sitting - Static: Fair;+ (edge of mat, no back or UE support)  Sitting - Dynamic: Fair (edge of mat, no back or UE support)  Standing - Static: Fair;- Patient/Caregiver Education & Training, Equipment Evaluation, Education, & procurement, Positioning, Stair training, Modalities (Will consider using modalities as needed for neuro re-edu.)    Patient Education  New Education Provided:  Lack of Engagement in Physical Therapy  Learner:caregiver, family and patient  Method: explanation       Outcome: acknowledged understanding of Lack of Engagement in Physical Therapy     Goals  Short term goals  Time Frame for Short term goals: 10 days  Short term goal 1: Pt able to roll side to side at min A   Short term goal 2: Pt able to perform supine>sit at min A   Short term goal 3: Pt able to perform sit to supine at mod A  Short term goal 4: Pt able to transfer at mod A   Short term goal 5: Pt able to propel w/c with L UE/L LE, distance fo 100 ft min A   Short term goal 6: Pt able to ambulate with appropriate device distance of 50 to 100 ft, min A x2  Long term goals  Time Frame for Long term goals : By DC  Long term goal 1: Pt able perform bed mobility mod-I  Long term goal 2:  Pt able to perform transfers at CGA/min A   Long term goal 3: Pt able to ambulate with appropriate device distance of 100 to 150 ft, min A  Long term goal 4: Pt able to go up and down 4 to 5 steps with 1 UE support at mod/max A   Long term goal 5: Pt able to propel w/c level surfaces distance fo 100 to 150 ft, SBA  Long term goal 6: Improve PASS score to at least 19/36 to improve overall function. Long term goal 7: Improve standing dynamic balance with assistive device to at least fair+ to reduce fall risk.         08/01/21 0930 08/01/21 1350   PT Individual Minutes   Time In 0930 1350   Time Out 4989 1271   Minutes 57 29       Electronically signed by Gali Steel PTA on 8/1/21 at 4:02 PM EDT

## 2021-08-01 NOTE — PROGRESS NOTES
Physical Medicine & Rehabilitation  Progress Note      Subjective: Graham Aldana is a 77 y.o. male with ischemic CVA left PCA, RAHEEM, and MCA with right dominant hemiparesis. Patient not interacting with exam today - appears to be sleeping in his chair. His nurse reports that he was doing a little bit more with her this morning; he was able to take liquid prozac and a few sips of orange juice. Family not present at bedside at the time of evaluation. ROS:  Unable to assess    Rehabilitation:   Progressing in therapies. PT:  Restrictions/Precautions: Up as Tolerated, General Precautions, Fall Risk, Swallowing - Thickened Liquids (1:1 sitter, Mildly thick (Nectar thick))  Other position/activity restrictions: Up w/assistance, RU/LE Weakness. MRI BRAIN- Acute infarct in the left and right basal ganglia greater on the left.    Transfers  Sit to Stand: 2 Person Assistance, Maximum Assistance (walker lift and upright walker)  Stand to sit: 2 Person Assistance, Minimal Assistance (Wing Lili and Upright Walker)  Bed to Chair: Dependent/Total (hemiwalker)  Stand Pivot Transfers: Dependent/Total (hemiwalker)  Lateral Transfers: Maximum Assistance (hemiwalker)  Comment: Walker Lift and Upright Walker  Ambulation 1  Surface: level tile  Device:  (Green Walker Lift)  Other Apparatus: Right, AFO, Slider, Wheelchair follow  Assistance: 2 Person assistance, Moderate assistance  Quality of Gait: forward flexed hips, narrow LAZARA initially, attempts advancement of right LE however continues to need Max A to advance right LE  Gait Deviations: Slow Anais, Decreased step length, Decreased step height  Distance: 60ft (AM & PM)  Comments: Max cues for sequencing and weight shifting to advance right LE    Transfers  Sit to Stand: 2 Person Assistance, Maximum Assistance (walker lift and upright walker)  Stand to sit: 2 Person Assistance, Minimal Assistance (Walker Lift and Upright Walker)  Bed to Chair: Dependent/Total (hemiwalker)  Stand Pivot Transfers: Dependent/Total (hemiwalker)  Lateral Transfers: Maximum Assistance (hemiwalker)  Comment: Walker Lift and Upright Walker  Ambulation  Ambulation?: Yes  More Ambulation?: Yes  Ambulation 1  Surface: level tile  Device:  (Green Walker Lift)  Other Apparatus: Right, AFO, Slider, Wheelchair follow  Assistance: 2 Person assistance, Moderate assistance  Quality of Gait: forward flexed hips, narrow LAZARA initially, attempts advancement of right LE however continues to need Max A to advance right LE  Gait Deviations: Slow Anais, Decreased step length, Decreased step height  Distance: 60ft (AM & PM)  Comments: Max cues for sequencing and weight shifting to advance right LE    Surface: level tile  Ambulation 1  Surface: level tile  Device:  (Green Walker Lift)  Other Apparatus: Right, AFO, Slider, Wheelchair follow  Assistance: 2 Person assistance, Moderate assistance  Quality of Gait: forward flexed hips, narrow LAZARA initially, attempts advancement of right LE however continues to need Max A to advance right LE  Gait Deviations: Slow Anais, Decreased step length, Decreased step height  Distance: 60ft (AM & PM)  Comments: Max cues for sequencing and weight shifting to advance right LE    OT:  ADL  Feeding: Unable to assess(comment) (Pt refuses to take bite/drink )  Grooming: Dependent/Total (washing face with wash cloth)  UE Bathing: None  LE Bathing: None  UE Dressing: None  LE Dressing: Dependent/Total (2 person A while standing. Pt refused to participate)  Toileting: None (does not requires brief change at this time)  Additional Comments: Pt sleeping upon writer arrival. Amando Jones provides max cues for pt to open eyes including sternal rub. Pt opens eyes. TIPTON provides max assist for initation to sit EOB. Once sitting EOB, pt TA for threading BLEs, donning socks, AFO and sandal shoe. Pt requires X2 assist for LB clothing mgmt standing at karena walker due ot R side lean.  Pt able to achieve intermittent CGA with max cues for pivoting into w/c with poor carryover. Once in w/c, pt declines any self feeding despite food brought mouth. Closes eyes for grooming for TIPTON to assist with washing face. Balance  Sitting Balance: Contact guard assistance  Standing Balance: Maximum assistance   Standing Balance  Time: AM: ~2 minutes  Activity: AM: functional transfer to w/c, LB dressing  Comment: Pt demonstrated heavy right side lean with poor participation. Pt able to intermittent achieve CGA with karena walker        Bed mobility  Rolling to Left: Maximum assistance  Rolling to Right: Moderate assistance  Supine to Sit: Maximum assistance  Sit to Supine: Maximum assistance  Scooting: Maximal assistance  Comment: A with trunk and BLE. Max verbal and tactile cues with patient demonstrating poor participation  Transfers  Stand Pivot Transfers: Minimal assistance, 2 Person assistance (with karena walker)  Sit to stand: Maximum assistance  Stand to sit: Maximum assistance  Transfer Comments: Verbal cues with pt demonstrating poor participation in therapy on this date. Toilet Transfers  Toilet - Technique: Stand pivot  Equipment Used: Raised toilet seat with rails  Toilet Transfer: Maximum assistance, 2 Person assistance (max A + mod A)  Toilet Transfers Comments: Verbal cues for safety and hand placement.               SPEECH:      Current Medications:   Current Facility-Administered Medications: 0.9 % sodium chloride infusion, , Intravenous, Continuous  FLUoxetine (PROZAC) 20 MG/5ML solution 60 mg, 60 mg, Oral, Daily  OLANZapine zydis (ZYPREXA) disintegrating tablet 5 mg, 5 mg, Oral, Nightly  famotidine (PEPCID) tablet 20 mg, 20 mg, Oral, Daily  aspirin chewable tablet 81 mg, 81 mg, Oral, Nightly  clopidogrel (PLAVIX) tablet 75 mg, 75 mg, Oral, Nightly  finasteride (PROSCAR) tablet 5 mg, 5 mg, Oral, Lunch  isosorbide mononitrate (IMDUR) extended release tablet 30 mg, 30 mg, Oral, Lunch  polyethylene glycol of limbs today. LIMBS: No edema in bilateral lower limbs. SKIN: Warm and dry with good turgor. Diagnostics:     CBC:   Recent Labs     07/31/21  1105   WBC 9.5   RBC 5.43   HGB 13.4*   HCT 42.3   MCV 77.8*   RDW 17.7*   *     BMP:   Recent Labs     07/31/21  1105      K 4.8      CO2 24   BUN 29*   CREATININE 1.89*   GLUCOSE 180*     BNP: No results for input(s): BNP in the last 72 hours. PT/INR:   Recent Labs     07/31/21  1105   PROTIME 17.1*   INR 1.4     APTT: No results for input(s): APTT in the last 72 hours. CARDIAC ENZYMES: No results for input(s): CKMB, CKMBINDEX, TROPONINT in the last 72 hours. Invalid input(s): CKTOTAL;3  FASTING LIPID PANEL:  Lab Results   Component Value Date    CHOL 186 07/14/2021    HDL 42 07/14/2021    TRIG 77 07/14/2021     LIVER PROFILE: No results for input(s): AST, ALT, ALB, BILIDIR, BILITOT, ALKPHOS in the last 72 hours. Impression/Plan:   Impaired ADLs, gait, and mobility due to:    1. Ischemic CVA with Right dominant hemiparesis: PT/OT for gait, mobility, strengthening, endurance, ADLs, and self care. On ASA, plavix, Crestor. Can consider sleep/wake medication but patient is also refusing therapy/medications. Placed order for repeat CT head to evaluate for any change on 7/29 - patient refused. 2. Agitation: Improved. On Seroquel prn but has not required it since 7/23. Has no sedating medicines on board to be causing drowsiness. Borderline QTc. Telesitter added 7/28, 1:1 discontinued 7/29 - will continue to monitor. 3. Refusing medications, meals:  Switched daily medications from morning to noon on 7/29 to see if patient will take his medications later in the day - was not successful. Patient was taking some medications at night - switched aspirin and plavix to nightly on 7/29 but he did not take them. Psych consult ordered after discussion with IM - increased prozac 7/28, changed to liquid formulation and added olanzapine on 7/30. Discussed with IM - consult placed for general surgery to evaluate for possible PEG tube placement, as patient is not maintaining nutrition, hydration, and medication regimen. Discussed possible PEG tube with family as well, as patient does not have decision-making capacity at this time. 4. CELESTINO:  Secondary to dehydration. Creatinine 1.89 on 7/31. Continue maintenance IV fluids. Will try to recheck labs tomorrow. 5. Sinusitis: On Augmentin until 7/28 per IM. 6. Insomnia: Scheduled melatonin started on 7/25  7. Thrombocytopenia: Hematology following. On prednisone - notified hematology that patient has been refusing medications - okay for him to remain off of steroids at this time and monitor platelets. Platelets improved (724 on 7/31), OK for DVT prophylaxis. 8. Leukocytosis:  Resolved. Attributed to prednisone. Will monitor. 9. HTN/CAD: On hydralazine (on hold, as patient has not been taking this medication and blood pressure has been okay), Imdur, losartan (also on hold at this time)  10. DM II: On humalog sliding scale  11. COPD:  On tiotropium. Has albuterol prn. 12. Restless Leg Syndrome: On ropinirole  13. Depression: On fluoxetine. Psych consult and recommendations as above. 14. GERD: On famotidine BID  15. BPH: On finasteride, tamsulosin  16. Thyroid nodule: For outpatient monitoring  17. Bowel Management: Miralax daily, Senokot prn, Dulcolax prn  18. Internal medicine for medical management  19. DVT prophylaxis:  On heparin  20. Discussed recommendation for discharge to SNF with patient's family along with  on 7/30. Family to review choices. As patient has had limited participation in therapies, will plan to discharge him to SNF as soon as it is safe.       Electronically signed by Ethan Rosado MD on 8/1/2021 at 1:59 PM

## 2021-08-02 NOTE — PROGRESS NOTES
Comprehensive Nutrition Assessment    Type and Reason for Visit:  Reassess    Nutrition Recommendations/Plan: Offer Dysphagia Pureed diet with Mildly Thick Liquids. Magic Cups x 2 daily. If PEG is placed and tube feeding desired, suggest initiation of Jevity 1.2 at 20 mL per hr with very slow advancement and monitoring for signs of refeeding. Goal of Jevity 1.2 at 70 mL per hr is recommended which will provide: 2016 kcal and 93 gm protein     Nutrition Assessment:  Pt was NPO this morning for possible PEG placement. However, PEG has been postponed. Oral diet being resumed. Will also offer Magic Cups x 2 daily. Previous recent PO intake has been 0 to minimal intake. Continue to offer oral diet. If PEG tube is placed and    Malnutrition Assessment:  Malnutrition Status: At risk for malnutrition (Comment)    Context:  Acute Illness     Findings of the 6 clinical characteristics of malnutrition:  Energy Intake:  7 - 50% or less of estimated energy requirements for 5 or more days  Weight Loss:  Unable to assess     Body Fat Loss:  Unable to assess     Muscle Mass Loss:  Unable to assess    Fluid Accumulation:  No significant fluid accumulation     Strength:  Not Performed    Estimated Daily Nutrient Needs:  Energy (kcal):  5125-4028 based on [place] with 1.1-1.2 factor; Weight Used for Energy Requirements:  Admission     Protein (g):   based on 1.2-1.4 gm per kg; Weight Used for Protein Requirements:  Ideal        Fluid (ml/day):  4480-5573; Method Used for Fluid Requirements:  1 ml/kcal      Nutrition Related Findings:  No edema. POC Glucose: 135, 131. IV: Normal Saline at 75 mL per hr. Wounds:  None       Current Nutrition Therapies:    ADULT DIET;  Dysphagia - Pureed; Mildly Thick (Nectar)  Adult Oral Nutrition Supplement; Frozen Oral Supplement    Anthropometric Measures:  · Height: 5' 10\" (177.8 cm)  · Current Body Weight: 214 lb 4.6 oz (97.2 kg)   · Admission Body Weight: 212 lb (96.2 kg)    · Usual Body Weight: 230 lb (104.3 kg) (stated)     · Ideal Body Weight: 166 lbs    · BMI: 30.7  · BMI Categories: Obese Class 1 (BMI 30.0-34. 9)       Nutrition Diagnosis:   · Predicted inadequate energy intake related to  (current medical condition) as evidenced by poor intake prior to admission, intake 0-25%    Nutrition Interventions:   Food and/or Nutrient Delivery:  Continue Current Diet  Nutrition Education/Counseling:  No recommendation at this time   Coordination of Nutrition Care:  Continue to monitor while inpatient    Goals:  po intake greater than 50%       Nutrition Monitoring and Evaluation:   Behavioral-Environmental Outcomes:  None Identified   Food/Nutrient Intake Outcomes:  Food and Nutrient Intake  Physical Signs/Symptoms Outcomes:  Biochemical Data, GI Status, Skin, Weight, Fluid Status or Edema     Discharge Planning:    Enteral Nutrition ; Offer oral diet as ordered. Some areas of assessment may be incomplete due to standard COVID-19 Precautions. Jarrell Moreno R.D., L.D.   Phone: [phone]

## 2021-08-02 NOTE — PLAN OF CARE
reoriented to surroundings and reminded to use call light with each nurse/patient interaction. Pt. room located close to nurse's station. Bed alarm / Personal alarm remains engaged throughout the shift as a precaution. Tele-sitter also in pts room for added safety.
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
## 2021-08-02 NOTE — PLAN OF CARE
Nutrition Problem #1: Predicted inadequate energy intake  Intervention: Food and/or Nutrient Delivery: Continue Current Diet  Nutritional Goals: po intake greater than 50%

## 2021-08-02 NOTE — PROGRESS NOTES
[facility]   ACUTE REHABILITATION OCCUPATIONAL THERAPY  DAILY NOTE    Date: 21  Patient Name: Lorena Cohen      Room: 9315/9603-62    MRN: [id number]   : 1945  (72 y.o.)  Gender: male   Referring Practitioner: Dr. Saul  Diagnosis: Ischemic CVA with R dominant hemiplegia  Additional Pertinent Hx: Lorena Cohen is a 77 y.o. RHD male admitted to [place] [address] on 2021. On admit, exam significant for right-sided facial droop, right-sided weakness and severe dysarthria. Significant history with recurrent strokes, remote left temporal lobe ischemic infarct and remote bilateral occipital lobe infarcts . MRI shows Bilateral basal ganglia ischemic infarcts. Pt admitted to rehab unit on 21    Restrictions  Restrictions/Precautions: Up as Tolerated, General Precautions, Fall Risk, Swallowing - Thickened Liquids (1:1 sitter, Mildly thick (Nectar thick))  Other position/activity restrictions: Up w/assistance, RU/LE Weakness. MRI BRAIN- Acute infarct in the left and right basal ganglia greater on the left. Required Braces or Orthoses?: No    Subjective  Subjective: Pt remains silent during OT session. Pt briefly opens eyes upon sitting edge of bed and transferring into w/c with SS in AM.   Comments: PM: TIPTON attempted to engage pt in RUE PROM  with pt actively resisting movement. Pt education provided on OT POC and importance of daily participation for optimal rehab. Pt continues to resist active participation in OT session. Restrictions/Precautions: Up as Tolerated;General Precautions; Fall Risk;Swallowing - Thickened Liquids (Telesitter, Mildly thick (Nectar thick))  Overall Orientation Status: Impaired  Orientation Level: Unable to assess (Pt does not answer)          Objective  Perception  Overall Perceptual Status: Impaired  Unilateral Attention: Cues to attend to right side of body;Cues to maintain midline in sitting  Initiation: Cues to initiate tasks  Motor Planning: Hand over hand to sequence tasks  Balance  Sitting Balance: Moderate assistance (R side lean noted this AM sitting EOB)  Standing Balance: Maximum assistance  Bed mobility  Supine to Sit: Maximum assistance;2 Person assistance  Scooting: Maximal assistance  Transfers  Sit to stand: Maximum assistance  Transfer Comments: Pt sits EOB with eyes closed and R side lean. Requires hand over hand for grasping SS with L hand, max A X2 for standing/sitting safety, RUE mgmt, CGA standing in SS. Standing Balance  Time: AM: ~2 minutes  Activity: AM: functional transfer to w/c, LB dressing  Comment: Significant posterior lean with Maximum assist x 2 - use of Carolin Caras for safety during transfer this date                                ADL  Grooming: Dependent/Total (TIPTON attmepted hand over hand for washing face)  UE Bathing: Dependent/Total (TIPTON attempted hand over hand for BUEs)  LE Dressing: Dependent/Total  Additional Comments: Pt lying in bed upon writer arrival and does no open eyes despite max cueing. TIPTON facilitated pt in sitting EOB for LB dressing. Pt requires max A X2 for safe transfer into w/c with use of SS. Once in w/c TIPTON attemtps to engage pt UB bathing to wash face and BUEs. Pt remains with his eyes closed, not able to participate in hand over hand assist.           Assessment  Performance deficits / Impairments: Decreased ADL status; Decreased functional mobility ; Decreased ROM; Decreased strength;Decreased safe awareness;Decreased cognition;Decreased endurance;Decreased sensation;Decreased balance;Decreased high-level IADLs;Decreased fine motor control;Decreased coordination  Prognosis: Fair  Discharge Recommendations: Patient would benefit from continued therapy after discharge;24 hour supervision or assist  Activity Tolerance: Treatment limited secondary to decreased cognition  Safety Devices in place: Yes  Type of devices: All fall risk precautions in place;Call light within reach; Chair alarm in place;Gait belt;Patient at risk for falls; Left in chair (Telesitter)  Restraints  Initially in place: No          Patient Education: OT POC, importance of daily participation with therapy for optimal rehab  Patient Goals   Patient goals : \"I want to go home\"  Learner:patient  Method: explanation       Outcome: needs reinforcement        Plan  Plan  Times per week: 900/7  Times per day: Twice a day  Current Treatment Recommendations: Self-Care / ADL, Strengthening, ROM, Balance Training, Functional Mobility Training, Endurance Training, Neuromuscular Re-education, Cognitive Reorientation, Pain Management, Safety Education & Training, Patient/Caregiver Education & Training, Equipment Evaluation, Education, & procurement, Home Management Training, Cognitive/Perceptual Training  Plan Comment: 900 minutes/week for combined therapy of PT/OT/ST due to decreased tolerance to activity.   Patient Goals   Patient goals : \"I want to go home\"  Short term goals  Time Frame for Short term goals: By 10 days  Short term goal 1: Pt will complete upper body dressing/bathing with Min A and good attention to task  Short term goal 2: Pt will complete lower body dressing/bathing with max A and good safety with use of AE as needed  Short term goal 3: Pt will complete functional transfers during self care tasks with mod A x 1 and good safety  Short term goal 4: Pt will tolerate standing 4+ minutes during functional activity of choice with min A and good safety  Short term goal 5: Pt will participate in 30+ minutes of therapeutic exercises/functional activities to increase safety and independence with self care tasks  Short term goal 6: Pt will complete self feeding with min A and good attention to task  Long term goals  Time Frame for Long term goals : By discharge  Long term goal 1: Pt will complete upper body dressing with set-up assistance and good attention to task  Long term goal 2: Pt will complete lower body dressing/bathing with min A and good safety with use of AE as needed  Long term goal 3: Pt will complete functional transfers/mobility during self care tasks with min A and good safety  Long term goal 4: Pt will tolerate standing 8+ minutes during functional activity of choice with CGA and good safety  Long term goal 5: Pt will verbalize/demonstrate good understanding of adaptive equipment/adaptive strategies/durable medical equipment to increase safety and independence with self care and mobility  Long term goals 6: Pt will complete self feeding with set-up assistance and good attention to task  Long term goal 7: Vision to be further assessed      08/02/21 0944 08/02/21 1439   OT Individual Minutes   Time In 0742 1309   Time Out 0823 1321   Minutes 41 12     Electronically signed by ARASELI Power on 8/2/21 at 2:40 PM EDT

## 2021-08-02 NOTE — CARE COORDINATION
Discussed SNF options. Sister [facility]; referrals sent. Call received from [facility]. They are not able to accept as they do not have any beds. Call received from [facility]; they are not able to accept. Insurance is out of network.

## 2021-08-02 NOTE — PROGRESS NOTES
Speech Language Pathology  Speech Language Pathology  [facility]    Speech Language/DysphagiaTreatment Note    Date: 8/2/2021  Patients Name: Giacomo Decker  MRN: [id number]  Diagnosis:   Patient Active Problem List   Diagnosis Code    Centrilobular emphysema (HCC) J43.2    Mixed restrictive and obstructive lung disease ([facility] J43.9, J98.4    Cerebrovascular accident (CVA) ([facility] I63.9    COPD (chronic obstructive pulmonary disease) ([facility] J44.9    HTN (hypertension) I10    Diabetes 1.5, 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 Not responding, no overt s/s pain demonstrated    Speech and Language Treatment  Treatment time: 7764-3378    Subjective: [] Alert [] Cooperative     [] Confused     [] Agitated    [x] Lethargic    Objective/Assessment:  Auditory Comprehension: Pt. not responding to any writer questions despite MAX cues. Pt. Not attempting to follow simple directions despite tactile, ST attempts at hand over hand cues. Verbal expression: Pt. Not attempting counting 1-10, naming objects as directed by ST. No attempts made at communication. Speech: Pt not responding to verbal stimuli, remaining with eyes closed, non verbal throughout entire session. Voice: Pt. did not vocalize or verbalize during session. Dysphagia:   Pt. Did not attempt oral motor exercises c ST.    Pt. Initially making eye contact c ST, then keeps eyes closed. Pt. Sister, Nura Michelle present in room, pt.  Made no attempt to acknowledge her arrival.     Plan: [x] Continue ST services    [] Discharge from ST:      Discharge recommendations: [] Inpatient Rehab   [] [place]   [] Outpatient Therapy  [] Follow up at trauma clinic   [] Other:       Treatment completed by: Laurence Ma A.CCC/SLP

## 2021-08-02 NOTE — PROGRESS NOTES
Speech Language Pathology  [facility] [address] [facility]  Speech Language Pathology    Date: 8/2/2021  Patient Name: Harvey Quezada  YOB: 1945   AGE: 77 y.o. MRN: [id number]        Patient Not Available for Speech Therapy     Due to:  [] Testing  [] Hemodialysis  [] Cancelled by RN  [] Surgery   [] Intubation/Sedation/Pain Medication  [] Medical instability  [x] Other: Unable to awaken pt despite max verbal/tactile cues and cold wash cloth to face. Pt sitting upright in chair, not opening his eyes despite MAX verbal/tactile cues. Pt. Yawning but not responding to ST.     Time spent in attempt to awaken pt., [id number]    Completed by: Luiz Lorenzana M.A., CCC-SLP

## 2021-08-02 NOTE — PROGRESS NOTES
Issues with consent at this time in terms of POA. Risk management has made some recommendations. Discussed with Dr. Hector Penaloza. Once consent issues sorted out then I will schedule the patient for PEG placement.

## 2021-08-02 NOTE — PROGRESS NOTES
[place] [id number] Internal Medicine    CONSULTATION / HISTORY AND PHYSICAL EXAMINATION            Date:   8/2/2021  Patient name:  Harvey Quezada  Date of admission:  7/20/2021  6:49 PM  MRN:   [id number]  Account:  [de-identified]  YOB: 1945  PCP:    No primary care provider on file.   Room:   [address] [place] [address]  Code Status:    Full Code    Physician Requesting Consult: Francisco Gottlieb MD    Reason for Consult:  medical management    Chief Complaint:       Right hemipareisi  History Obtained From:     Patient medical record nursing staff    History of Present Illness:   History is extremely limited, patient was extremely agitated overnight, was given Ativan around 2 AM in the night, patient very sleepy, not answering questions  Most of history is retrieved from [facility]  Patient was recently admitted to [facility] with right-sided weakness, right-sided facial weakness, speech difficulties  Patient underwent MRI brain, concerning for acute infarct in left and right basal ganglia  CT head and neck, was concerning for severe stenosis in the proximal A3 segment of RAHEEM bilaterally  During hospital stay, patient had thrombocytopenia, was evaluated by hematologist, getting treated with IV steroids, and lines of immune thrombocytopenia      Past Medical History:     Past Medical History:   Diagnosis Date    Centrilobular emphysema ([address]     COPD (chronic obstructive pulmonary disease) ([address]     Depression     Diabetes mellitus ([address]     pt refuses to take previously prescribed metformin (states PCP aware)    Former smoker     Hyperlipidemia     on 4/27/18 pt states \"I stopped taking that about a year ago\"    Hypertension     Mixed restrictive and obstructive lung disease ([address]     Need for pneumococcal vaccine     Personal history of tobacco use         Past Surgical History:     Past Surgical History:   Procedure Laterality Date    CARDIAC SURGERY  07/2015    1 stent    NECK SURGERY      TOE AMPUTATION Right greater        Medications Prior to Admission:     Prior to Admission medications    Medication Sig Start Date End Date Taking?  Authorizing Provider   Heparin Sodium, Porcine, (HEPARIN, PORCINE,) 5000 UNIT/ML injection Inject 1 mL into the skin every 8 hours 7/20/21   Elsie Walter MD   QUEtiapine (SEROQUEL) 25 MG tablet Take 1 tablet by mouth nightly as needed for Agitation 7/20/21 7/25/21  Elsie Walter MD   methylPREDNISolone sodium (SOLU-MEDROL) 40 MG injection Infuse 1 mL intravenously every 12 hours 7/20/21   Elsie Walter MD   melatonin 3 MG TABS tablet Take 1 tablet by mouth nightly as needed (insomnia) 7/20/21   Elsie Walter MD   atorvastatin (LIPITOR) 80 MG tablet Take 0.5 tablets by mouth daily (Pt takes one-half of an 80mg tab = 40mg) 7/16/21   Elsie Walter MD   tiZANidine (ZANAFLEX) 2 MG tablet Take 1 tablet by mouth 4 times daily as needed (muscle spasms) 5/7/21   JOLIE Gregg CNP   naproxen (NAPROSYN) 500 MG tablet Take 1 tablet by mouth 2 times daily (with meals) 1/4/21   Sylvester Steen PA-C   ibuprofen (ADVIL;MOTRIN) 800 MG tablet Take 1 tablet by mouth every 8 hours as needed for Pain 6/2/20   Kimberly Wray MD   lidocaine (LIDODERM) 5 % Place 1 patch onto the skin daily 12 hours on, 12 hours off. 6/2/20   Kimberly Wray MD   metoprolol succinate (TOPROL XL) 50 MG extended release tablet Take 50 mg by mouth daily    Historical Provider, MD   tiotropium (SPIRIVA RESPIMAT) 2.5 MCG/ACT AERS inhaler Inhale 2 puffs into the lungs daily    Historical Provider, MD   carboxymethylcellulose 1 % ophthalmic solution Place 1 drop into both eyes 3 times daily as needed for Dry Eyes     Historical Provider, MD   ketotifen (ZADITOR) 0.025 % ophthalmic solution Place 1 drop into both eyes 2 times daily as needed (itchy eyes)     Historical Provider, MD   isosorbide mononitrate (IMDUR) 60 MG extended release tablet Take 30 mg by mouth daily Indications: 1/2 tablet (30mg)     Historical Provider, MD   finasteride (PROSCAR) 5 MG tablet Take 5 mg by mouth daily    Historical Provider, MD   tamsulosin (FLOMAX) 0.4 MG capsule Take 0.4 mg by mouth daily    Historical Provider, MD   fluticasone (FLONASE) 50 MCG/ACT nasal spray 1 spray by Nasal route 2 times daily  Patient taking differently: 1 spray by Nasal route 2 times daily as needed for Rhinitis  5/31/16   Francisco Connor,    albuterol sulfate  (90 BASE) MCG/ACT inhaler Inhale 2 puffs into the lungs every 6 hours as needed for Wheezing    Historical Provider, MD   albuterol (PROVENTIL) (2.5 MG/3ML) 0.083% nebulizer solution Take 2.5 mg by nebulization every 6 hours as needed for Wheezing    Historical Provider, MD   aspirin 81 MG EC tablet Take 81 mg by mouth daily    Historical Provider, MD   losartan (COZAAR) 100 MG tablet Take 100 mg by mouth daily     Historical Provider, MD   nitroGLYCERIN (NITROSTAT) 0.4 MG SL tablet Place 0.4 mg under the tongue every 5 minutes as needed for Chest pain    Historical Provider, MD   FLUoxetine (PROZAC) 20 MG capsule Take 40 mg by mouth daily     Historical Provider, MD   furosemide (LASIX) 20 MG tablet Take 20 mg by mouth daily as needed (swelling)     Historical Provider, MD   clopidogrel (PLAVIX) 75 MG tablet Take 75 mg by mouth daily    Historical Provider, MD   rOPINIRole (REQUIP) 0.25 MG tablet Take 0.25 mg by mouth nightly as needed     Historical Provider, MD   budesonide-formoterol (SYMBICORT) 160-4.5 MCG/ACT AERO Inhale 2 puffs into the lungs 2 times daily. Historical Provider, MD        Allergies:     Patient has no known allergies. Social History:     Tobacco:    reports that he quit smoking about 8 years ago. He started smoking about 64 years ago. He has a 42.00 pack-year smoking history. He has never used smokeless tobacco.  Alcohol:      reports no history of alcohol use.   Drug Use:  reports no history of drug use.    Family History:     No family history on file. Review of Systems:   Cannot be done because of patient condition    Physical Exam:     BP (!) 145/84   Pulse 81   Temp 97.9 °F (36.6 °C)   Resp 20   Ht 5' 10\" (1.778 m)   Wt 214 lb 4.6 oz (97.2 kg)   SpO2 100%   BMI 30.75 kg/m²   Temp (24hrs), Av.7 °F (36.5 °C), Min:97.5 °F (36.4 °C), Max:97.9 °F (36.6 °C)    Recent Labs     21  1600 21  1929 21  0625 21  1118   POCGLU 106 131* 135* 131*       Intake/Output Summary (Last 24 hours) at 2021 1531  Last data filed at 2021 0558  Gross per 24 hour   Intake 1332 ml   Output --   Net 1332 ml       General Appearance: Very sleepy, not answering questions  Mental status: Not oriented to person, place, and time with normal affect  Head:  normocephalic, atraumatic. Eye: no icterus, redness, pupils equal and reactive, extraocular eye movements intact, conjunctiva clear  Ear: normal external ear, no discharge, hearing intact  Nose:  no drainage noted  Mouth: mucous membranes moist  Neck: supple, no carotid bruits, thyroid not palpable  Lungs: Bilateral equal air entry, clear to ausculation, no wheezing, rales or rhonchi, normal effort  Cardiovascular: normal rate, regular rhythm, no murmur, gallop, rub.   Abdomen: Soft, nontender, nondistended, normal bowel sounds, no hepatomegaly or splenomegaly  Neurologic: Weakness in right upper and lower extremity, speech difficulties, right sided facial weakness  Skin: No gross lesions, rashes, bruising or bleeding on exposed skin area  Extremities:  peripheral pulses palpable, no pedal edema or calf pain with palpation    Investigations:      Laboratory Testing:  Recent Results (from the past 24 hour(s))   POC Glucose Fingerstick    Collection Time: 21  4:00 PM   Result Value Ref Range    POC Glucose 106 75 - 110 mg/dL   POC Glucose Fingerstick    Collection Time: 21  7:29 PM   Result Value Ref Range    POC Glucose 131 (H) 75 - addisonian crisis  Case dw with dr Carolyn Beltran  Patient is not taking anything oral with a dysphagia severe malnutrition risk  Patient will benefit with the PEG tube for medication and nutrition  Case discussed with daughter  surg input noted  Planned peg tube   Consent pending  Iv fluids started for CELESTINO      Diandra Mcdowell MD  8/2/2021  3:31 PM    Copy sent to Dr. Ying Larose primary care provider on file. Please note that this chart was generated using voice recognition Dragon dictation software. Although every effort was made to ensure the accuracy of this automated transcription, some errors in transcription may have occurred.

## 2021-08-02 NOTE — PROGRESS NOTES
Physical Therapy  Facility/Department: [facility]  Daily Treatment Note  NAME: Srini Pyle  : 1945  MRN: [id number]    Date of Service: 2021    Discharge Recommendations:  Patient would benefit from continued therapy after discharge, 24 hour supervision or assist   PT Equipment Recommendations  Other: TBD    Assessment   Body structures, Functions, Activity limitations: Decreased functional mobility ; Decreased ROM; Decreased strength;Decreased balance;Decreased safe awareness;Decreased endurance;Decreased posture;Decreased cognition;Decreased fine motor control;Decreased coordination  Assessment: Pt does not appear to be engaged, frequently dozing off or appearing to feign sleep; requires maximum encouragement to participate. Treatment Diagnosis: Weakenss , difficulty walking  Prognosis: Fair  Barriers to Learning: aphasia, cognitive deficits  REQUIRES PT FOLLOW UP: Yes  Activity Tolerance  Activity Tolerance: Patient limited by cognitive status  Activity Tolerance: Delayed responses, extra time to complete tasks, pt requires tactile to initiate task. Other Comments  Comments: Pt appears to be disengaged with all activities     Patient Diagnosis(es): There were no encounter diagnoses. has a past medical history of Centrilobular emphysema ([address] COPD (chronic obstructive pulmonary disease) ([address] Depression, Diabetes mellitus ([address] Former smoker, Hyperlipidemia, Hypertension, Mixed restrictive and obstructive lung disease ([address] Need for pneumococcal vaccine, and Personal history of tobacco use.   has a past surgical history that includes Neck surgery; Toe amputation (Right, greater); and Cardiac surgery (2015).     Restrictions  Restrictions/Precautions  Restrictions/Precautions: Up as Tolerated, General Precautions, Fall Risk, Swallowing - Thickened Liquids (1:1 sitter, Mildly thick (Nectar thick))  Required Braces or Orthoses?: No  Position Activity Restriction  Other position/activity restrictions: Up w/assistance, RU/LE Weakness. MRI BRAIN- Acute infarct in the left and right basal ganglia greater on the left. Subjective   General  Additional Pertinent Hx: Elvis Bynum is a 77 y.o. RHD male admitted to [facility] on 7/13/2021. On admit, exam significant for right-sided facial droop, right-sided weakness and severe dysarthria. Significant history with recurrent strokes, remote left temporal lobe ischemic infarct and remote bilateral occipital lobe infarcts . MRI shows Bilateral basal ganglia ischemic infarcts. Pt admitted to rehab unit on 7/20/21  Response To Previous Treatment: Patient with no complaints from previous session. Family / Caregiver Present: Yes (sister Kym Francis in [place])  Referring Practitioner: Dr Zeny Schaeffer: Pt not communicating with writer. General Comment  Comments: Pt flat affect. Pain Screening  Patient Currently in Pain: Other (comment) (No response to query)  Pain Assessment  Pain Assessment: Faces  Pain Level: 0  Heredia-Baker Pain Rating: No hurt  Patient's Stated Pain Goal: No pain  Vital Signs  Patient Currently in Pain: Other (comment) (No response to query)       Orientation  Orientation  Overall Orientation Status: Impaired  Orientation Level: Unable to assess (pt not responding when asked)  Cognition      Objective   Bed mobility  Scooting: Unable to assess  Comment: Patient in chair upon arrival and left in w/c with chair alarm in place. Transfers  Sit to Stand: 2 Person Assistance;Maximum Assistance (walker lift)  Stand to sit: 2 Person Assistance;Minimal Assistance  Comment: B UE support on elevated walker  Ambulation  Ambulation?: Yes  More Ambulation?: Yes  Ambulation 1  Surface: level tile  Device:  Jaylyn Border Walker Lift)  Other Apparatus: Right;AFO;Slider; Wheelchair follow  Assistance: 2 Person assistance;Maximum assistance  Quality of Gait: forward flexed hips, narrow LAZARA initially, max assist to advance right and left LE  Gait Deviations: Slow Anais;Decreased step length;Decreased step height  Distance: 5ft (AM)  Comments: pt no longer initiating movement, writer assisting with advancing and advance B LE  Stairs/Curb  Stairs?: No     Balance  Posture: Fair  Sitting - Static: Fair;+ (edge of mat, no back or UE support)  Sitting - Dynamic: Fair (edge of mat, no back or UE support)  Standing - Static: Fair;- (hemiwalker)  Standing - Dynamic: Poor (hemiwalker)  Exercises  Comments: . Other exercises  Other exercises?: Yes  Other exercises 1: Seated bilat LE exercises, P/AAROM x15 (AM & PM)  Other exercises 2: Sit to Stand x1 2 min   Other exercises 3: Mirror therapy for visual cues to propel forward and retro in efforts to improve functional strength. 3 smalll steps fwd and retro. Increased time for processing and 1 step commands           Comment: Slow pace, extra breaks PRN, max encouragement to participate, patient unable to wake up while in gym unable to complete PM PT.     Goals  Short term goals  Time Frame for Short term goals: 10 days  Short term goal 1: Pt able to roll side to side at min A   Short term goal 2: Pt able to perform supine>sit at min A   Short term goal 3: Pt able to perform sit to supine at mod A  Short term goal 4: Pt able to transfer at mod A   Short term goal 5: Pt able to propel w/c with L UE/L LE, distance fo 100 ft min A   Short term goal 6: Pt able to ambulate with appropriate device distance of 50 to 100 ft, min A x2  Long term goals  Time Frame for Long term goals : By DC  Long term goal 1: Pt able perform bed mobility mod-I  Long term goal 2:  Pt able to perform transfers at CGA/min A   Long term goal 3: Pt able to ambulate with appropriate device distance of 100 to 150 ft, min A  Long term goal 4: Pt able to go up and down 4 to 5 steps with 1 UE support at mod/max A   Long term goal 5: Pt able to propel w/c level surfaces distance fo 100 to 150 ft, SBA  Long term goal 6: Improve PASS score to at least 19/36 to improve

## 2021-08-02 NOTE — PATIENT CARE CONFERENCE
[address] [facility]   ACUTE RE[facility]ABILITATION  TEAM CONFERENCE NOTE  Date: 8/3/21  Patient Name: Mireya Byrd       Room: 3018/1666-30  MRN: [id number]       : 1945  (72 y.o.)     Gender: male   Referring Practitioner: Dr Chandler Yu CVA (cerebrovascular accident) [facility]) [I63.9]  Diagnosis: Ischemic CVA with R dominant hemiplegia     NURSING  Bladder  Always Incontinent  Bowel   Frequently Incontinent  Date of Last BM:   Intervention    n/a Patient is currently Nonverbal     Wounds/Incisions/Ulcers: No skin issues identified  Medication Education Program: Patient currently refusing medications   Pain: Non verbal, Faces scale 0     Fall Risk:  Falling star program initiated    PHYSICAL THERAPY  Bed Mobility  Supine to Sit: Dependent/Total  Sit to Supine: Unable to assess (pt left sitting in bedside wheelchair)  Scooting: Unable to assess    Transfers:  Sit to Stand: 2 Person Assistance;Maximum Assistance (walker lift)  Stand to sit: 2 Person Assistance;Minimal Assistance  Comment: Slow pace, extra breaks PRN, max encouragement to participate    Ambulation 1  Surface: level tile  Device:  Shravan Topete Walker Lift)  Other Apparatus: Right;AFO;Slider; Wheelchair follow  Assistance: 2 Person assistance;Maximum assistance  Quality of Gait: forward flexed hips, narrow LAZARA initially, max assist to advance right and left LE  Gait Deviations: Slow Anais;Decreased step length;Decreased step height  Distance: 5ft (AM)  Comments: pt no longer initiating movement, writer assisting with advancing and advance B LE     Pt does not engage during therapy, often falling asleep, not responding to cues, max encouragement needed to participate. Other: TBD    Pt does not appear to be engaged, frequently dozing off or appearing to feign sleep; requires maximum encouragement to participate.     Goals  Time Frame for Short term goals: 10 days  Short term goal 1: Pt able to roll side to side at min A   Short term goal 2: Pt able to perform supine>sit at min A   Short term goal 3: Pt able to perform sit to supine at mod A  Short term goal 4: Pt able to transfer at mod A   Short term goal 5: Pt able to propel w/c with L UE/L LE, distance fo 100 ft min A   Short term goal 6: Pt able to ambulate with appropriate device distance of 50 to 100 ft, min A x2    OCCUPATIONAL THERAPY  SELF CARE      Eating            Dependent/Total;Thickened liquids (total assist per nursing for meds/juice)   Oral Hygiene            Dependent/Total (TIPTON attmepted hand over hand for washing face)   Shower/Bathe Self             UE Bathing: Dependent/Total (TIPTON attempted hand over hand for BUEs)  LE Bathing: Dependent/Total (attempted hand over hand, no participation this date)   Upper Body Dressing            Dependent/Total (patient leaned forward 2/5 trials to participate in care)   Lower Body Dressing            Putting On/Taking Off Footwear             Dependent/Total   Toilet Transfer              Dependent   Toileting Hygiene            Dependent/Total    Bed mobility  Supine to Sit: Maximum assistance;2 Person assistance  Scooting: Maximal assistance      Balance  Sitting Balance: Moderate assistance (R side lean noted this AM sitting EOB)  Standing Balance: Maximum assistance  Standing Balance  Time: AM: ~2 minutes  Activity: AM: functional transfer to w/c, LB dressing  Comment: Significant posterior lean with Maximum assist x 2 - use of Kiera Seats for safety during transfer this date    Equipment Recommendations  Equipment Needed: Yes  HotDesk: Usman-walker  Transfer Tub Bench: w/back  Assessment: motor plan and decreased problem solving re new deficits and likely visual deficits impact adl performance but fair participation with step by step directions.     Short term goals  Time Frame for Short term goals: By 10 days  Short term goal 1: Pt will complete upper body dressing/bathing with Min A and good attention to task  Short term goal 2: Pt will complete lower body dressing/bathing with max A and good safety with use of AE as needed  Short term goal 3: Pt will complete functional transfers during self care tasks with mod A x 1 and good safety  Short term goal 4: Pt will tolerate standing 4+ minutes during functional activity of choice with min A and good safety  Short term goal 5: Pt will participate in 30+ minutes of therapeutic exercises/functional activities to increase safety and independence with self care tasks  Short term goal 6: Pt will complete self feeding with min A and good attention to task    SPEECH THERAPY  Dependent for all areas, no attempts to participate  Short Term Goal: max A. NUTRITION  Weight: 214 lb 4.6 oz (97.2 kg) / Body mass index is 30.75 kg/m². Diet Rx: Dysphagia Pureed, Mildly Thick Liquids. Magic Cups x 2 daily. 0- minimal PO intake. Await decision regarding PEG tube. Please see nutrition note for details. SOCIAL WORK ASSESSMENT  Assessment: Pt does not have a POA in place and his closest [facility] is his daughter Cherrie Sanabria. Attempted to locate adult daughters was unsuccessful. Pt has been denied [place] [facility], [facility] and [facility].    Pre-Admission Status:  Lives With: Alone  Type of Home: House  Home Layout: One level  Home Access: Stairs to enter with rails  Entrance Stairs - Number of Steps: 4  Entrance Stairs - Rails: Left  Bathroom Shower/Tub: Tub/Shower unit, Curtain  Bathroom Toilet: Standard  Bathroom Equipment: Hand-held shower  Bathroom Accessibility: Accessible  Home Equipment: [employer]  ADL Assistance: Independent  Homemaking Assistance: Independent  Homemaking Responsibilities: Yes  Ambulation Assistance: Independent (Cane if needed with back pain)  Transfer Assistance: Independent  Active : Yes  Mode of Transportation: Car  Occupation: Retired  Type of occupation: Marine   IADL Comments: Pt sleeps in an adjustable bed  Additional Comments: Pt has a sister who is retired and can assist some.     Family Education: Family Education initiated and Ongoing    Percentage Risk for Readmission: Moderate 19% - 27%   Risk of Unplanned Readmission:  25 %    Critical Items: None     Problem / Barrier Intervention / Plan  Results   Impaired function related to deficits from CVA ROM, strengthening, functional mobility training, assess appropriate assistive device needed.     Steps to enter/exit home Strengthening, balance ex's progress to steps, will need further family training.     impaired ability to care for self realted to CVA  Training in modified care techniques and use of devices tor self care techniques       Cognitive impairment   Cognitive retraining exercises       Dysphagia   Oral motor exercises/laryngeal strengthening exercises       Dysarthria   Oral motor exercises, compensatory strategies, articulation drills       Pt not engaging in therapy, max encouragement needed. Total Self Care Score    Total Mobility Score  Admission Score:  9      Admission Score:  17  Goal:  27/42         Goal:  47/90   `  Discharge Plan   Estimated Discharge Date: 8/11/21  Home evaluation needed?  Home Evaluation Indication (NO, Requires ReEval, YES/Date): No home evaluation need indicated for patient at this time  Overnight or Day Pass: No  Factors facilitating achievement of predicted outcomes: Family support  Barriers to the achievement of predicted outcomes: Cognitive deficit, Communication deficit, Upper extremity weakness, Lower extremity weakness, Incontinence of bowel, Incontinence of bladder, Skin Care and Medication managment    Functional Goals at discharge:  Predicted Outcome: Skilled Nursing FacilityPATIENT'S LEVEL OF ASSISTANCE: Moderate Assistance and Maximal Assistance  Discharge therapy goals:  PT: Long term goals  Time Frame for Long term goals : By DC  Long term goal 1: Pt able perform bed mobility mod-I  Long term goal 2:  Pt able to perform transfers at CGA/min A   Long term goal 3: Pt able to ambulate with appropriate device distance of 100 to 150 ft, min A  Long term goal 4: Pt able to go up and down 4 to 5 steps with 1 UE support at mod/max A   Long term goal 5: Pt able to propel w/c level surfaces distance fo 100 to 150 ft, SBA  Long term goal 6: Improve PASS score to at least 19/36 to improve overall function. Long term goal 7: Improve standing dynamic balance with assistive device to at least fair+ to reduce fall risk. OT:Long term goals  Time Frame for Long term goals : By discharge  Long term goal 1: Pt will complete upper body dressing with set-up assistance and good attention to task  Long term goal 2: Pt will complete lower body dressing/bathing with min A and good safety with use of AE as needed  Long term goal 3: Pt will complete functional transfers/mobility during self care tasks with min A and good safety  Long term goal 4: Pt will tolerate standing 8+ minutes during functional activity of choice with CGA and good safety  Long term goal 5: Pt will verbalize/demonstrate good understanding of adaptive equipment/adaptive strategies/durable medical equipment to increase safety and independence with self care and mobility  Long term goals 6: Pt will complete self feeding with set-up assistance and good attention to task  Long term goal 7: Vision to be further assessed  ST: mod A. Team Members Present at Conference:  :  Pedro Pablo Diaz  Occupational Therapist: Kenji Cannon OT   [address] PT  Speech Therapist: Queenie BARCCC/SLP  Nurse: Orion Flores RN   Dietary/Nutrition: Caryle Poet RD, LD  Pastoral Care: Kylie Christie  CMG: Prema Gan RN    I approve the established interdisciplinary plan of care as documented within the medical record of Makenzie Soliman.     Lui Garcia MD

## 2021-08-02 NOTE — CONSULTS
Department of Psychiatry  Behavioral Health progress note    REASON FOR CONSULT: Agitation    History obtained from: Patient and medical record    Interim history 8/2/21-  The patient has had episodes of agitation over the weekend. The patient was seen at bedside. He continues to be mute and does not answer any questions. He makes intermittent eye contact but he does not offer a lot his head to answer questions. I have noted that the patient has been taking his medication Prozac regularly. We will reduce the dose as 60 mg taken every day may be too high for the patient. I have been informed that the patient will receive a PEG tube placement tomorrow. That will open up medication options.       Interim history: 7/30/2021  Patient has been seen and examined at the bedside, no major event happened over the night, patient noted to be presented as reviewed in the past, reportedly he said 1 symptoms that he is feeling fine, constricted in his affect, come across as depressed, patient has been refusing oral medications, patient took 3 doses of Prozac in the last 9 days, we will increase the dose of Prozac to 60 mg at this is a long-acting medication and even intermittent use may help,      HISTORY OF PRESENT ILLNESS:    23-year-old male who was admitted previously for management of cerebrovascular accidents, presents to the emergency department with a chief complaint of being found down by the stairs, 911 has been called, found to have right facial weakness, right-sided arm and leg weakness, dysarthria, patient has multiple ecchymosis, on right side of the arm and chest,  CT head: New ovoid low-attenuation area in the left frontal corona radiata compatible with acute/subacute infarction, that measured 2 x 1.4 cm no associated hemorrhage, diffuse parenchymal volume loss and sequela of severe chronic microvascular ischemic changes,  -Neuro is on board MRI brain W0: Acute infarct in left and right basal ganglia, left more than right,  -Resume on aspirin and Plavix 75 mg, Lipitor,  -CTA head and neck: Severe stenosis in V1 segment of left vertebral artery extensive intracranial atherosclerotic plaque with near complete occlusion of right PCA, severe stenosis of proximal A3 segment of RAHEEM, moderate to severe proximal M2 segment stenosis of left MCA,       Patient admitted to rehab afterwards for right hemiparesis, patient was extremely agitated overnight, was given Ativan around 2 AM in the night,  -Patient failed telemetry sitter over the weekend, resumed 1:1, on Seroquel as needed, he did not require since 7/23, no sedating medication on board,  -Patient current medication Prozac 40 mg daily,    The patient is currently receiving care for the above psychiatric illness.   Patient is totally mute, he is not cooperative, he is not following commands, refusing meds, trying to pull lines sometimes,      Psychiatric Review of Systems :      ·    Obsessions and Compulsions: Denies    ·    Angelina or Hypomania: Denies  ·    Hallucinations: Denies  ·    Panic Attacks:  Denies  ·    Delusions:  Denies  ·    Phobias:  Denies  ·    Trauma: Denies      Substance Abuse History:  Unknown history    Past Psychiatric History:  Prior Diagnosis:     Hospitalization: yes  Hx of Suicidal Attempts: no  Hx of violence:  no      Past Medical History:        Diagnosis Date    Centrilobular emphysema ([address]     COPD (chronic obstructive pulmonary disease) ([address]     Depression     Diabetes mellitus ([address]     pt refuses to take previously prescribed metformin (states PCP aware)    Former smoker     Hyperlipidemia     on 4/27/18 pt states \"I stopped taking that about a year ago\"    Hypertension     Mixed restrictive and obstructive lung disease ([address]     Need for pneumococcal vaccine     Personal history of tobacco use        Past Surgical History:        Procedure Laterality Date    CARDIAC SURGERY  07/2015    1 stent    NECK SURGERY      TOE AMPUTATION Right greater         Medications Prior to Admission:   Medications Prior to Admission: Heparin Sodium, Porcine, (HEPARIN, PORCINE,) 5000 UNIT/ML injection, Inject 1 mL into the skin every 8 hours  QUEtiapine (SEROQUEL) 25 MG tablet, Take 1 tablet by mouth nightly as needed for Agitation  methylPREDNISolone sodium (SOLU-MEDROL) 40 MG injection, Infuse 1 mL intravenously every 12 hours  melatonin 3 MG TABS tablet, Take 1 tablet by mouth nightly as needed (insomnia)  atorvastatin (LIPITOR) 80 MG tablet, Take 0.5 tablets by mouth daily (Pt takes one-half of an 80mg tab = 40mg)  tiZANidine (ZANAFLEX) 2 MG tablet, Take 1 tablet by mouth 4 times daily as needed (muscle spasms)  naproxen (NAPROSYN) 500 MG tablet, Take 1 tablet by mouth 2 times daily (with meals)  ibuprofen (ADVIL;MOTRIN) 800 MG tablet, Take 1 tablet by mouth every 8 hours as needed for Pain  lidocaine (LIDODERM) 5 %, Place 1 patch onto the skin daily 12 hours on, 12 hours off.  metoprolol succinate (TOPROL XL) 50 MG extended release tablet, Take 50 mg by mouth daily  tiotropium (SPIRIVA RESPIMAT) 2.5 MCG/ACT AERS inhaler, Inhale 2 puffs into the lungs daily  carboxymethylcellulose 1 % ophthalmic solution, Place 1 drop into both eyes 3 times daily as needed for Dry Eyes   ketotifen (ZADITOR) 0.025 % ophthalmic solution, Place 1 drop into both eyes 2 times daily as needed (itchy eyes)   isosorbide mononitrate (IMDUR) 60 MG extended release tablet, Take 30 mg by mouth daily Indications: 1/2 tablet (30mg)   finasteride (PROSCAR) 5 MG tablet, Take 5 mg by mouth daily  tamsulosin (FLOMAX) 0.4 MG capsule, Take 0.4 mg by mouth daily  fluticasone (FLONASE) 50 MCG/ACT nasal spray, 1 spray by Nasal route 2 times daily (Patient taking differently: 1 spray by Nasal route 2 times daily as needed for Rhinitis )  albuterol sulfate  (90 BASE) MCG/ACT inhaler, Inhale 2 puffs into the lungs every 6 hours as needed for Wheezing  albuterol (PROVENTIL) (2.5 MG/3ML) 0.083% nebulizer solution, Take 2.5 mg by nebulization every 6 hours as needed for Wheezing  aspirin 81 MG EC tablet, Take 81 mg by mouth daily  losartan (COZAAR) 100 MG tablet, Take 100 mg by mouth daily   nitroGLYCERIN (NITROSTAT) 0.4 MG SL tablet, Place 0.4 mg under the tongue every 5 minutes as needed for Chest pain  FLUoxetine (PROZAC) 20 MG capsule, Take 40 mg by mouth daily   furosemide (LASIX) 20 MG tablet, Take 20 mg by mouth daily as needed (swelling)   clopidogrel (PLAVIX) 75 MG tablet, Take 75 mg by mouth daily  rOPINIRole (REQUIP) 0.25 MG tablet, Take 0.25 mg by mouth nightly as needed   budesonide-formoterol (SYMBICORT) 160-4.5 MCG/ACT AERO, Inhale 2 puffs into the lungs 2 times daily. Allergies:  Patient has no known allergies. FAMILY/SOCIAL HISTORY:  No family history on file. Social History     Socioeconomic History    Marital status:      Spouse name: Not on file    Number of children: Not on file    Years of education: Not on file    Highest education level: Not on file   Occupational History    Not on file   Tobacco Use    Smoking status: Former Smoker     Packs/day: 0.75     Years: 56.00     Pack years: 42.00     Start date: 1957     Quit date: 2013     Years since quittin.0    Smokeless tobacco: Never Used   Vaping Use    Vaping Use: Never used   Substance and Sexual Activity    Alcohol use: No    Drug use: No    Sexual activity: Not on file   Other Topics Concern    Not on file   Social History Narrative    Not on file     Social Determinants of Health     Financial Resource Strain:     Difficulty of Paying Living Expenses:    Food Insecurity:     Worried About [address] in the Last Year:     [address] in the Last Year:    Transportation Needs:     Lack of Transportation (Medical):      Lack of Transportation (Non-Medical):    Physical Activity:     Days of Exercise per Week:     Minutes of Exercise per Session:    Stress:  Feeling of Stress :    Social Connections:     Frequency of Communication with Friends and Family:     Frequency of Social Gatherings with Friends and Family:     Attends Taoism Services:     Active Member of Clubs or Organizations:     Attends Club or Organization Meetings:     Marital Status:    Intimate Partner Violence:     Fear of Current or Ex-Partner:     Emotionally Abused:     Physically Abused:     Sexually Abused:        REVIEW OF SYSTEMS    Constitutional: [] fever  [] chills  [] weight loss  []weakness [] Other:  Eyes:  [] photophobia  [] discharge [] acuity change   [] Diplopia   [] Other:  HENT:  [] sore throat  [] ear pain [] Tinnitus   [] Other  Respiratory:  [] Cough  [] Shortness of breath   [] Sputum   [] Other:   Cardiac: []Chest pain   []Palpitations []Edema  []PND  [] Other:  GI:  []Abdominal pain   []Nausea  []Vomiting  []Diarrhea  [] Other:  :  [] Dysuria   []Frequency  []Hematuria  []Discharge  [] Other:  Possible Pregnancy: []Yes   []No   LMP:   Musculoskeletal:  []Back pain  []Neck pain  []Recent Injury   Skin:  []Rash  [] Itching  [] Other:  Neurologic:  [] Headache  [] Focal weakness  [] Sensory changes []Other:  Endocrine:  [] Polyuria  [] Polydipsia  [] Hair Loss  [] Other:  Lymphatic:   [] Swollen glands   Psychiatric:  As per HPI      All other systems negative except as marked or mentioned/indicated in the HPI. Jan Lucas PHYSICAL EXAM:  Vitals:  BP (!) 145/84   Pulse 81   Temp 97.9 °F (36.6 °C)   Resp 20   Ht 5' 10\" (1.778 m)   Wt 214 lb 4.6 oz (97.2 kg)   SpO2 100%   BMI 30.75 kg/m²        Mental Status Examination:    Level of consciousness:  within normal limits . Appearance:  Disheveled.  Siting in wheelchair  Behavior/Motor:  Psychomotor retardation, makes eye contact  Attitude toward examiner:   noncooperative  Speech: Mute, noncooperative, not answering any question or following any commands  Mood: within normal limits  Affect: Unable to assess  Thought processes: Unable to assess  Thought content: Unable to assess   Cognition: Unable to assess   Concentration: Unable to assess  Memory unable to assess  Insight unable to assess  Fund of Knowledge unable to assess      LABS: REVIEWED TODAY:  Recent Labs     07/31/21  1105   WBC 9.5   HGB 13.4*   *     Recent Labs     07/31/21  1105      K 4.8      CO2 24   BUN 29*   CREATININE 1.89*   GLUCOSE 180*     No results for input(s): BILITOT, ALKPHOS, AST, ALT in the last 72 hours. Lab Results   Component Value Date    BARBSCNU NEGATIVE 07/13/2021    LABBENZ NEGATIVE 07/13/2021    LABMETH NEGATIVE 07/13/2021    PPXUR NOT REPORTED 07/13/2021     Lab Results   Component Value Date    TSH 0.28 07/14/2021     No results found for: LITHIUM  No results found for: VALPROATE, CBMZ  No results found for: LITHIUM, VALPROATE    FURTHER LABS ORDERED :      Radiology   ECHO Complete 2D W Doppler W Color    Result Date: 7/15/2021  Transthoracic Echocardiography Report (TTE)  Patient Name GOFF      Date of Study               07/15/2021               YAYA DUBOIS   Date of      1945  Gender                      Male  Birth   Age          77 year(s)  Race                        Black   Room Number  0536        Height:                     70 inch, 177.8 cm   Corporate ID [id number]    Weight:                     210 pounds, 95.3 kg  #   Patient Acct [de-identified]   BSA:          2.13 m^2      BMI:       30.13  #                                                               kg/m^2   MR #         [id number]     [address]   Accession #  [id number]  Interpreting Physician      Yolande Vance   Fellow                   Referring Nurse                           Practitioner   Interpreting             Referring Physician         Claudine Shrestha MD  Fellow  Additional Comments Technically difficult study, patient supine unable to be positioned for better acoustic windows.  Type of Study TTE procedure:2D Echocardiogram, M-Mode, Doppler, Color Doppler. Procedure Date Date: 07/15/2021 Start: 08:29 AM Study Location: [facility] Technical Quality: Adequate visualization Indications:CVA. History / Tech. Comments: Procedure explained to patient. Echo completed in the Echo Lab. RN performed bubble study. PMHx: Former smoker, COPD Hypertension, Diabetes, Hyperlipidemia Coronary artery disease, s/p stents Patient Status: Inpatient Height: 70 inches Weight: 210 pounds BSA: 2.13 m^2 BMI: 30.13 kg/m^2 CONCLUSIONS Summary Technically difficult study. Overall global left ventricular systolic function is normal, calculated ejection fraction is 50-55% Grade I (mild) left ventricular diastolic dysfunction. Technically difficult visualization of the right ventricle, but it does appear to be normal in size. Negative bubble study, no obvious intracardiac shunt noted within technical limitations of this study. No significant valvular regurgitation or stenosis seen. No significant pericardial effusion is seen. Signature ----------------------------------------------------------------------------  Electronically signed by Joanne Farah(Sonographer) on 07/15/2021 11:00 AM ---------------------------------------------------------------------------- ----------------------------------------------------------------------------  Electronically signed by Yolande Vance(Interpreting physician) on  07/15/2021 12:49 PM ---------------------------------------------------------------------------- FINDINGS Left Atrium Left atrium is normal in size. Inter-atrial septum is intact with no evidence for an atrial septal defect. Negative bubble study, no shunt noted. Left Ventricle Left ventricle is normal in size, normal wall thickness, global left ventricular systolic function is low normal, calculated ejection fraction is 50%. Grade I (mild) left ventricular diastolic dysfunction.  Right Atrium Right atrium is normal in size. Right Ventricle Technically difficult visualization of the right ventricle, but it does appear to be normal in size. Mitral Valve Normal mitral valve structure. No mitral stenosis. Trivial mitral regurgitation. Aortic Valve Aortic valve is trileaflet. No evidence of aortic insufficiency or stenosis. Tricuspid Valve Tricuspid valve was not well visualized. Trivial tricuspid regurgitation. Insignificant tricuspid regurgitation, unable to estimate RVSP. Pulmonic Valve Pulmonic valve not well visualized but Doppler velocities are normal. Trivial pulmonic insufficiency. Pericardial Effusion No significant pericardial effusion is seen. Miscellaneous Normal aortic root dimension. E/E' average = 17.35. IVC not well visualized.  M-mode / 2D Measurements & Calculations:   LVIDd:5.6 cm(3.7 - 5.6 cm)        Diastolic BNXCQX:74.31 ml  IVSd:0.9 cm(0.6 - 1.1 cm)         Systolic UMPJWP:29.80 ml  LVPWd:0.8 cm(0.6 - 1.1 cm)        Aortic Root:3 cm(2.0 - 3.7 cm)                                    LA Dimension: 3.2 cm(1.9 - 4.0 cm)  Calculated LVEF (%): 50.46 %      LA volume/Index: 48.86 ml /23m^2                                    LVOT:2.1 cm                                    RVDd:3 cm   Mitral:                                 Aortic   Valve Area (P1/2-Time): 2.65 cm^2       Peak Velocity: 1.91 m/s  Peak E-Wave: 1.18 m/s                   Mean Velocity: 1.06 m/s  Peak A-Wave: 1.47 m/s                   Peak Gradient: 14.59 mmHg  E/A Ratio: 0.8                          Mean Gradient: 6 mmHg  Peak Gradient: 5.57 mmHg  Mean Gradient: 2 mmHg  Deceleration Time: 280 msec             Area (continuity): 2.83 cm^2  P1/2t: 83 msec                          AV VTI: 41.8 cm   Area (continuity): 2.2 cm^2  Mean Velocity: 0.62 m/s                                           Pulmonic:                                           Peak Velocity: 1.29 m/s                                          Peak Gradient: 6.66 mmHg  Diastology / Tissue 7/17/2021  EXAMINATION: CT OF THE HEAD WITHOUT CONTRAST  7/17/2021 10:46 am TECHNIQUE: CT of the head was performed without the administration of intravenous contrast. Dose modulation, iterative reconstruction, and/or weight based adjustment of the mA/kV was utilized to reduce the radiation dose to as low as reasonably achievable. COMPARISON: None. HISTORY: ORDERING SYSTEM PROVIDED HISTORY: change in mentation TECHNOLOGIST PROVIDED HISTORY: change in mentation Reason for Exam: change in mentation FINDINGS: BRAIN/VENTRICLES: Small area of hypodensity in the left corona radiata is similar in appearance to the previous exam.  There is no evidence of hemorrhage. No new parenchymal abnormalities are identified. ORBITS: The visualized portion of the orbits demonstrate no acute abnormality. SINUSES: The visualized paranasal sinuses and mastoid air cells demonstrate no acute abnormality. SOFT TISSUES/SKULL:  No acute abnormality of the visualized skull or soft tissues. No acute intracranial abnormality. No significant interval change. CT HEAD WO CONTRAST    Result Date: 7/13/2021  EXAMINATION: CT OF THE HEAD WITHOUT CONTRAST  7/13/2021 1:10 pm TECHNIQUE: CT of the head was performed without the administration of intravenous contrast. Dose modulation, iterative reconstruction, and/or weight based adjustment of the mA/kV was utilized to reduce the radiation dose to as low as reasonably achievable.  COMPARISON: 05/02/2021 HISTORY: ORDERING SYSTEM PROVIDED HISTORY: Last known well 2 days right-sided facial droop right-sided weakness TECHNOLOGIST PROVIDED HISTORY: Last known well 2 days right-sided facial droop right-sided weakness Decision Support Exception - unselect if not a suspected or confirmed emergency medical condition->Emergency Medical Condition (MA) Reason for Exam: Last known well 2 days right-sided facial droop right-sided weakness FINDINGS: BRAIN/VENTRICLES: Ovoid area of low attenuation in the left frontal corona radiata measures 2 x 1.4 cm, new from prior study (series 2, image 39). No acute intracranial hemorrhage. No mass effect or midline shift. No hydrocephalus. Diffuse parenchymal volume loss with enlargement of the ventricles and cerebral sulci. Old infarction in the right basal ganglia. There are nonspecific areas of hypoattenuation within the periventricular and subcortical white matter, which likely represent chronic microvascular ischemic change. Intracranial atherosclerotic disease. ORBITS: The visualized portion of the orbits demonstrate no acute abnormality. SINUSES: The visualized paranasal sinuses and mastoid air cells demonstrate no acute abnormality. SOFT TISSUES/SKULL: No acute abnormality of the visualized skull or soft tissues. 1. New ovoid low-attenuation area in the left frontal corona radiata compatible with acute/subacute infarction. This measures 2 x 1.4 cm. No associated hemorrhage. 2. Diffuse parenchymal volume loss and sequela of severe chronic microvascular ischemic changes. Findings were discussed with Dr. Landy Lundborg at 1:31 pm on 7/13/2021. XR CHEST PORTABLE    Result Date: 7/13/2021  EXAMINATION: ONE XRAY VIEW OF THE CHEST 7/13/2021 10:30 am COMPARISON: September 24, 2019. HISTORY: ORDERING SYSTEM PROVIDED HISTORY: Found down TECHNOLOGIST PROVIDED HISTORY: Found down Reason for Exam: supine Acuity: Acute Type of Exam: Initial FINDINGS: A portable upright frontal view chest radiograph was obtained. The heart is enlarged. The mediastinal contour and pleural spaces are otherwise within normal limits. The lungs are grossly clear. There is no focal consolidation or pneumothorax. The pulmonary vascular pattern is within normal limits. No acute thoracic osseous abnormality. Clear lungs. Cardiomegaly. No acute cardiopulmonary abnormality.      VL DUP LOWER EXTREMITY VENOUS BILATERAL    Result Date: 7/15/2021    [facility]  Vascular Lower popliteal and tibial veins  demonstrate normal compressibility   demonstrate normal compressibility  and augmentation. and augmentation. Normal compressibility of the great  Normal compressibility of the great  saphenous vein. saphenous vein. Normal compressibility of the small  Normal compressibility of the small  saphenous vein. saphenous vein. Risk Factors   - The patient's risk factor(s) include: chronic lung disease, diabetes     mellitus and arterial hypertension. Velocities are measured in cm/s ; Diameters are measured in cm Right Lower Extremities DVT Study Measurements Right 2D Measurements +------------------------------------+----------+---------------+----------+ ! Location                            ! Visualized! Compressibility! Thrombosis! +------------------------------------+----------+---------------+----------+ ! Common Femoral                      !Yes       ! Yes            ! None      ! +------------------------------------+----------+---------------+----------+ ! Prox Femoral                        !Yes       ! Yes            ! None      ! +------------------------------------+----------+---------------+----------+ ! Mid Femoral                         !Yes       ! Yes            ! None      ! +------------------------------------+----------+---------------+----------+ ! Dist Femoral                        !Yes       ! Yes            ! None      ! +------------------------------------+----------+---------------+----------+ ! Deep Femoral                        !No        !               !          ! +------------------------------------+----------+---------------+----------+ ! Popliteal                           !Yes       ! Yes            ! None      ! +------------------------------------+----------+---------------+----------+ ! Sapheno Femoral Junction            ! Yes       ! Yes            ! None      ! +------------------------------------+----------+---------------+----------+ ! Location                            ! Visualized! Compressibility! Thrombosis! +------------------------------------+----------+---------------+----------+ ! Common Femoral                      !Yes       ! Yes            ! None      ! +------------------------------------+----------+---------------+----------+ ! Prox Femoral                        !Yes       ! Yes            ! None      ! +------------------------------------+----------+---------------+----------+ ! Mid Femoral                         !Yes       ! Yes            ! None      ! +------------------------------------+----------+---------------+----------+ ! Dist Femoral                        !Yes       ! Yes            ! None      ! +------------------------------------+----------+---------------+----------+ ! Deep Femoral                        !No        !               !          ! +------------------------------------+----------+---------------+----------+ ! Popliteal                           !Yes       ! Yes            ! None      ! +------------------------------------+----------+---------------+----------+ ! Sapheno Femoral Junction            ! Yes       ! Yes            ! None      ! +------------------------------------+----------+---------------+----------+ ! PTV                                 ! Yes       ! Yes            ! None      ! +------------------------------------+----------+---------------+----------+ ! Peroneal                            !Yes       ! Yes            ! None      ! +------------------------------------+----------+---------------+----------+ ! Gastroc                             ! Yes       ! Yes            ! None      ! +------------------------------------+----------+---------------+----------+ ! GSV Thigh                           ! Yes       ! Yes            ! None      ! +------------------------------------+----------+---------------+----------+ ! GSV Knee !Yes       !Yes            ! None      ! +------------------------------------+----------+---------------+----------+ ! GSV Ankle                           ! Yes       ! Yes            ! None      ! +------------------------------------+----------+---------------+----------+ ! SSV                                 ! Yes       ! Yes            ! None      ! +------------------------------------+----------+---------------+----------+ Left Doppler Measurements +---------------------------+------+------+--------------------------------+ ! Location                   ! Signal!Reflux! Reflux (msec)                   ! +---------------------------+------+------+--------------------------------+ ! Common Femoral             !Phasic!      !                                ! +---------------------------+------+------+--------------------------------+ ! Prox Femoral               !Phasic!      !                                ! +---------------------------+------+------+--------------------------------+ ! Popliteal                  !Phasic!      !                                ! +---------------------------+------+------+--------------------------------+    US SPLEEN    Result Date: 7/14/2021  EXAMINATION: ULTRASOUND OF THE SPLEEN 7/14/2021 8:29 am COMPARISON: None. HISTORY: ORDERING SYSTEM PROVIDED HISTORY: thrombocytopenia TECHNOLOGIST PROVIDED HISTORY: thrombocytopenia FINDINGS: Suboptimal study. The visualized spleen appears enlarged measuring 13.4 cm. No focal lesion is seen. No free fluid. Suboptimal study. Mild splenomegaly. CTA HEAD NECK W CONTRAST    Result Date: 7/13/2021  EXAMINATION: CTA OF THE HEAD AND NECK WITH CONTRAST 7/13/2021 1:11 pm: TECHNIQUE: CTA of the head and neck was performed with the administration of intravenous contrast. Multiplanar reformatted images are provided for review. MIP images are provided for review. Stenosis of the internal carotid arteries measured using NASCET criteria.  Dose modulation, iterative reconstruction, and/or weight based adjustment of the mA/kV was utilized to reduce the radiation dose to as low as reasonably achievable. 3D surface reformatted and curved MIP images were submitted for review. COMPARISON: None. HISTORY: ORDERING SYSTEM PROVIDED HISTORY: stroke TECHNOLOGIST PROVIDED HISTORY: stroke Decision Support Exception - unselect if not a suspected or confirmed emergency medical condition->Emergency Medical Condition (MA) Reason for Exam: stroke, Cerebrovascular Accident; Fall FINDINGS: CTA NECK: AORTIC ARCH/ARCH VESSELS: No dissection or arterial injury. No significant stenosis of the brachiocephalic or subclavian arteries. CAROTID ARTERIES: No dissection, arterial injury, or hemodynamically significant stenosis by NASCET criteria. VERTEBRAL ARTERIES: No dissection, arterial injury, or significant stenosis in the right vertebral artery. Severe stenosis in the V1 segment of the left vertebral artery. SOFT TISSUES: The lung apices are clear. No cervical or superior mediastinal lymphadenopathy. The larynx and pharynx are unremarkable. No acute abnormality of the salivary glands. Thyroid gland is diffusely enlarged and heterogeneous and contains left thyroid lobe nodule measuring 2.6 cm. BONES: Multilevel degenerative spondylosis throughout the cervical spine with fusion at C5-C6. CTA HEAD: ANTERIOR CIRCULATION: Calcified atherosclerotic plaque in the cavernous segments of the internal carotid arteries bilaterally results in mild-to-moderate cavernous ICA stenosis bilaterally. Mild stenosis in the proximal A2 segment of the right anterior cerebral artery. Severe stenosis in the proximal A3 segments of the anterior cerebral arteries bilaterally. Moderate-to-severe stenosis within M2 segment of the right middle cerebral artery. Moderate stenosis in the M1 and proximal M2 segments of the left middle cerebral artery.  POSTERIOR CIRCULATION: No significant stenosis in the ventricles and sulci are normal in size and configuration. The sellar/suprasellar regions appear unremarkable. The normal signal voids within the major intracranial vessels appear maintained. ORBITS: The visualized portion of the orbits demonstrate no acute abnormality. SINUSES: The visualized paranasal sinuses and mastoid air cells are well aerated. BONES/SOFT TISSUES: The bone marrow signal intensity appears normal. The soft tissues demonstrate no acute abnormality. Acute infarct in the left and right basal ganglia greater on the left. Diffuse volume loss with extensive chronic white matter changes. DIAGNOSIS:  Depression unspecified  Acute ischemic stroke, hemiplegia right side,      RECOMMENDATIONS  Medications:  Prozac was reduced to 40 mg daily   We will also add Zydis 5 mg nightly  Discussed with the treating physician/ team about the patient and treatment plan  Reviewed the chart    Discussed with the patient risk, benefit, alternative and common side effects for the  proposed medication treatment. Patient is consenting to the treatment. Thanks for the consult. Please call me if needed.     Electronically signed by Bassam Hill MD on 8/2/2021 at 4:45 PM

## 2021-08-03 NOTE — PROGRESS NOTES
Physical Medicine & Rehabilitation  Progress Note      Subjective: Michelle Dia is a 77 y.o. male with ischemic CVA left PCA, RAHEEM, and MCA with right dominant hemiparesis. He continues to be minimally interactive with exam.  He does not answer questions but eyes are more open today. Sister present at bedside. Attempt to locate patient's daughters was unsuccessful, and PEG tube placed today after discussion with risk management. Plan to start tube feeds tomorrow per Dr. Don Rey. Will now be able to administer medications as well. ROS:  Unable to assess    Rehabilitation:   Progressing in therapies. PT:  Restrictions/Precautions: Up as Tolerated, General Precautions, Fall Risk, Swallowing - Thickened Liquids (Mildly thick (Nectar thick))  Other position/activity restrictions: Up w/assistance, RU/LE Weakness. MRI BRAIN- Acute infarct in the left and right basal ganglia greater on the left. Transfers  Sit to Stand: 2 Person Assistance, Maximum Assistance (walker lift)  Stand to sit: 2 Person Assistance, Minimal Assistance (B UE assist to chair from walker lift)  Bed to Chair: Dependent/Total (hemiwalker)  Stand Pivot Transfers: Dependent/Total (hemiwalker)  Lateral Transfers: Maximum Assistance (hemiwalker)  Comment: B UE support on elevated walker  Ambulation 1  Surface: level tile  Device:  (Green Walker Lift)  Other Apparatus: Right, AFO, Slider, Wheelchair follow  Assistance: 2 Person assistance, Maximum assistance  Quality of Gait: forward flexed hips, narrow LAZARA initially, max assist to advance R LE, assist 1x to advance L LE upon standing after seated rest.   Gait Deviations: Slow Anais, Decreased step length, Decreased step height  Distance: 36' & 79'   Comments: Cues to shift weight bilaterally with fair return. Seated rest between attempts. longer walk included 180º turn.      Transfers  Sit to Stand: 2 Person Assistance, Maximum Assistance (walker lift)  Stand to sit: 2 Person Assistance, Minimal Assistance (B UE assist to chair from walker lift)  Bed to Chair: Dependent/Total (hemiwalker)  Stand Pivot Transfers: Dependent/Total (hemiwalker)  Lateral Transfers: Maximum Assistance (hemiwalker)  Comment: B UE support on elevated walker  Ambulation  Ambulation?: Yes  More Ambulation?: Yes  Ambulation 1  Surface: level tile  Device:  (Green Walker Lift)  Other Apparatus: Right, AFO, Slider, Wheelchair follow  Assistance: 2 Person assistance, Maximum assistance  Quality of Gait: forward flexed hips, narrow LAZARA initially, max assist to advance R LE, assist 1x to advance L LE upon standing after seated rest.   Gait Deviations: Slow Anais, Decreased step length, Decreased step height  Distance: 36' & 79'   Comments: Cues to shift weight bilaterally with fair return. Seated rest between attempts. longer walk included 180º turn. Surface: level tile  Ambulation 1  Surface: level tile  Device:  (Green Walker Lift)  Other Apparatus: Right, AFO, Slider, Wheelchair follow  Assistance: 2 Person assistance, Maximum assistance  Quality of Gait: forward flexed hips, narrow LAZARA initially, max assist to advance R LE, assist 1x to advance L LE upon standing after seated rest.   Gait Deviations: Slow Anais, Decreased step length, Decreased step height  Distance: 36' & 79'   Comments: Cues to shift weight bilaterally with fair return. Seated rest between attempts. longer walk included 180º turn. OT:  ADL  Feeding: Dependent/Total, Thickened liquids  Grooming: Dependent/Total  UE Bathing: Dependent/Total  LE Bathing: None  UE Dressing: None  LE Dressing: Dependent/Total  Toileting: Dependent/Total  Additional Comments: Pt laying in bed upon writer arrival, no response to simuli provided, upon max verbal and tactile cues. LBD completed while pt supine in bed.            Balance  Sitting Balance: Maximum assistance  Standing Balance: Maximum assistance (max x2 )   Standing Balance  Time: 1-2 min Activity: Transfer in SS   Comment: significant posterior lean         Bed mobility  Rolling to Left: Maximum assistance  Rolling to Right: Moderate assistance  Supine to Sit: Maximum assistance, 2 Person assistance  Sit to Supine: Maximum assistance  Scooting: Unable to assess  Comment: Patient in chair upon arrival and left in w/c with chair alarm in place. Transfers  Stand Pivot Transfers: Dependent/Total (with Tran Barry)  Sit to stand: Maximum assistance  Stand to sit: Maximum assistance  Transfer Comments: Pt sits EOB with eyes closed and R side lean. Requires hand over hand for grasping SS with L hand, max A X2 for standing/sitting safety, RUE mgmt, CGA standing in SS. Toilet Transfers  Toilet - Technique: Stand pivot  Equipment Used: Raised toilet seat with rails  Toilet Transfer: Maximum assistance, 2 Person assistance (max A + mod A)  Toilet Transfers Comments: Verbal cues for safety and hand placement. Wheelchair [employer] Transfers  Equipment Used: [employer], Wheelchair, Other ()  Level of Asssistance: Dependent/Total, 2 Person assistance    SPEECH:  Subjective: []? Alert     []? Cooperative     []? Confused     []? Agitated    [x]? Lethargic     Objective/Assessment:  Auditory Comprehension: Pt. not responding to any writer questions despite MAX cues. Pt. Not attempting to follow simple directions despite tactile cues, pt. Allowed ST to give hand over hand cues 3/4 trials.      Verbal expression:  No attempts made at communication.      Speech: Pt not responding to verbal stimuli, remaining with eyes closed, non verbal throughout entire session.       Voice: Pt. did not vocalize or verbalize during session.      Dysphagia:   Pt. Did not attempt oral motor exercises c ST.     Pt. Initially with eyes open, then closed as ST entered room, pt. Appeared to be asleep throughout session. Pt. Sister Missy Ramirez present, stated he has not attempted to respond to her. RN in to prep pt.  For PEG tube placement as they were just notified pt. Will leave for surgery at 1400, short session.        Current Medications:   Current Facility-Administered Medications: ceFAZolin (ANCEF) 2000 mg in dextrose 5 % 50 mL IVPB, 2,000 mg, Intravenous, Once  0.9 % sodium chloride infusion, , Intravenous, Continuous  [START ON 8/4/2021] aspirin chewable tablet 81 mg, 81 mg, PEG Tube, Daily  [START ON 8/4/2021] clopidogrel (PLAVIX) tablet 75 mg, 75 mg, PEG Tube, Daily  [START ON 8/4/2021] heparin (porcine) injection 5,000 Units, 5,000 Units, Subcutaneous, 3 times per day  polyethylene glycol (GLYCOLAX) packet 17 g, 17 g, Per G Tube, Daily PRN  [START ON 8/4/2021] finasteride (PROSCAR) tablet 5 mg, 5 mg, PEG Tube, Daily  [START ON 8/4/2021] famotidine (PEPCID) tablet 20 mg, 20 mg, PEG Tube, Daily  [START ON 8/4/2021] FLUoxetine (PROZAC) 20 MG/5ML solution 40 mg, 40 mg, PEG Tube, Daily  melatonin tablet 6 mg, 6 mg, PEG Tube, Nightly  [Held by provider] OLANZapine (ZYPREXA) tablet 5 mg, 5 mg, PEG Tube, Nightly  [Held by provider] rOPINIRole (REQUIP) tablet 0.25 mg, 0.25 mg, PEG Tube, Nightly  rosuvastatin (CRESTOR) tablet 40 mg, 40 mg, PEG Tube, Nightly  isosorbide dinitrate (ISORDIL) tablet 10 mg, 10 mg, PEG Tube, TID  tiotropium (SPIRIVA RESPIMAT) 2.5 MCG/ACT inhaler 2 puff, 2 puff, Inhalation, Lunch  [Held by provider] losartan (COZAAR) tablet 100 mg, 100 mg, Oral, Daily  albuterol sulfate  (90 Base) MCG/ACT inhaler 2 puff, 2 puff, Inhalation, Q6H PRN  [Held by provider] hydrALAZINE (APRESOLINE) tablet 10 mg, 10 mg, Oral, 3 times per day  acetaminophen (TYLENOL) tablet 650 mg, 650 mg, Oral, Q4H PRN  senna (SENOKOT) tablet 17.2 mg, 2 tablet, Oral, Daily PRN  bisacodyl (DULCOLAX) suppository 10 mg, 10 mg, Rectal, Daily PRN  insulin lispro (HUMALOG) injection vial 0-6 Units, 0-6 Units, Subcutaneous, TID WC  insulin lispro (HUMALOG) injection vial 0-3 Units, 0-3 Units, Subcutaneous, Nightly  glucose (GLUTOSE) 40 % oral gel 15 g, 15 g, Oral, PRN  dextrose 50 % IV solution, 12.5 g, Intravenous, PRN  glucagon (rDNA) injection 1 mg, 1 mg, Intramuscular, PRN  dextrose 5 % solution, 100 mL/hr, Intravenous, PRN      Objective:  BP (!) 154/77   Pulse 72   Temp 97.5 °F (36.4 °C) (Axillary)   Resp 16   Ht 5' 10\" (1.778 m)   Wt 214 lb 4.6 oz (97.2 kg)   SpO2 100%   BMI 30.75 kg/m²       GEN: Well developed, no acute distress  HEENT: NCAT. Eyes more open today during evaluation but patient does not look at examiner. RESP: Normal breath sounds with no wheezing, rales, or rhonchi. Respirations WNL and unlabored. CV: Regular rate and rhythm. No murmurs, rubs, or gallops. ABD: Soft, non-distended, BS+ and equal.  NEURO:  Appears more alert today. Not answering questions. Appears to have delayed processing. MSK:  Muscle bulk is normal bilaterally. Minimal movement noted in all limbs. LIMBS: No edema in bilateral lower limbs. SKIN: Warm and dry with good turgor. PSYCH:  Flat affect. Diagnostics:     CBC:   No results for input(s): WBC, RBC, HGB, HCT, MCV, RDW, PLT in the last 72 hours. BMP:   No results for input(s): NA, K, CL, CO2, PHOS, BUN, CREATININE, GLUCOSE in the last 72 hours. Invalid input(s): CA  BNP: No results for input(s): BNP in the last 72 hours. PT/INR:   No results for input(s): PROTIME, INR in the last 72 hours. APTT: No results for input(s): APTT in the last 72 hours. CARDIAC ENZYMES: No results for input(s): CKMB, CKMBINDEX, TROPONINT in the last 72 hours. Invalid input(s): CKTOTAL;3  FASTING LIPID PANEL:  Lab Results   Component Value Date    CHOL 186 07/14/2021    HDL 42 07/14/2021    TRIG 77 07/14/2021     LIVER PROFILE: No results for input(s): AST, ALT, ALB, BILIDIR, BILITOT, ALKPHOS in the last 72 hours. Impression/Plan:   Impaired ADLs, gait, and mobility due to:    1. Ischemic CVA with Right dominant hemiparesis: PT/OT for gait, mobility, strengthening, endurance, ADLs, and self care.  On ASA, plavix, Crestor. Can consider neurostimulant medication now that PEG tube is in place. Placed order for repeat CT head to evaluate for any change on 7/29 - patient refused, will try again. 2. Agitation: Improved. Has not required seroquel since 7/23. Has no sedating medicines on board to be causing drowsiness. Borderline QTc. Telesitter added 7/28, 1:1 discontinued 7/29 - will continue to monitor. 3. Refusing medications, meals:  Switched daily medications from morning to noon on 7/29 to see if patient would take his medications later in the day - was not successful. Patient was taking some medications at night - switched aspirin and plavix to nightly on 7/29 but he did not take them. Psych consult ordered after discussion with IM - increased prozac 7/28, changed to liquid formulation and added olanzapine on 7/30. Discussed with IM - consult placed for general surgery to evaluate for possible PEG tube placement, as patient has not been maintaining nutrition, hydration, or medication regimen. Discussed possible PEG tube with family as well, as patient does not have decision-making capacity at this time. PEG tube placed 8/3 with sister's consent. Okay to give medications via PEG per Dr. Kristen Kaplan, plan to start tube feeds tomorrow. 4. CELESTINO:  Secondary to dehydration. Creatinine 1.89 on 7/31. Continue maintenance IV fluids until tomorrow. 5. Sinusitis: On Augmentin until 7/28 per IM. 6. Insomnia: Scheduled melatonin started on 7/25  7. Thrombocytopenia: Hematology following. Was on prednisone - notified hematology that patient was refusing medications - okay for him to remain off of steroids at this time and monitor platelets. Platelets improved (128 on 7/31), OK for DVT prophylaxis. 8. Leukocytosis:  Resolved. Attributed to prednisone. Will monitor.   9. HTN/CAD: On hydralazine (on hold, as patient has not been taking this medication and blood pressure has been okay), Imdur - switched to isordil on 8/3, losartan (also on hold at this time)  10. DM II: On humalog sliding scale  11. COPD:  On tiotropium. Has albuterol prn. 12. Restless Leg Syndrome: On ropinirole  13. Depression: On fluoxetine. Psych consult and recommendations as above. 14. GERD: On famotidine BID  15. BPH: On finasteride, tamsulosin  16. Thyroid nodule: For outpatient monitoring  17. Bowel Management: Miralax daily, Senokot prn, Dulcolax prn  18. Internal medicine for medical management  19. DVT prophylaxis:  On heparin  20. Discussed recommendation for discharge to SNF with patient's family along with  on 7/30. Family provided a few choices. As patient has had limited participation in therapies, will plan to discharge him to SNF as soon as it is safe.       Electronically signed by Filomena Kumar MD on 8/3/2021 at 10:00 PM

## 2021-08-03 NOTE — PROGRESS NOTES
[address]   ACUTE REHABILITATION OCCUPATIONAL THERAPY  DAILY NOTE    Date: 8/3/21  Patient Name: Madhu Willson      Room: 5559/7698-67    MRN: [id number]   : 1945  (72 y.o.)  Gender: male   Referring Practitioner: Dr. Ivan Petit  Diagnosis: Ischemic CVA with R dominant hemiplegia  Additional Pertinent Hx: Madhu Willson is a 77 y.o. RHD male admitted to [facility] on 2021. On admit, exam significant for right-sided facial droop, right-sided weakness and severe dysarthria. Significant history with recurrent strokes, remote left temporal lobe ischemic infarct and remote bilateral occipital lobe infarcts . MRI shows Bilateral basal ganglia ischemic infarcts. Pt admitted to rehab unit on 21    Restrictions  Restrictions/Precautions: Up as Tolerated, General Precautions, Fall Risk, Swallowing - Thickened Liquids (Mildly thick (Nectar thick))  Other position/activity restrictions: Up w/assistance, RU/LE Weakness. MRI BRAIN- Acute infarct in the left and right basal ganglia greater on the left. Required Braces or Orthoses?: No  Equipment Used: Bed, Wheelchair, Other (SS)    Subjective  Subjective: Pt is silent during AM and PM session. Sameer promps pt with question which merit no response. Restrictions/Precautions: Up as Tolerated;General Precautions; Fall Risk;Swallowing - Thickened Liquids  Overall Orientation Status: Impaired  Orientation Level: Unable to assess  Patient Observation  Observations: Pt demo very little participation this AM. PM: Pt refused to rouse this PM, max verbal and tactile cues provided.  Pt remained seated unbresponive with eyes closed        Objective  Perception  Overall Perceptual Status: Impaired  Unilateral Attention: Cues to attend to right side of body;Cues to maintain midline in sitting  Initiation: Cues to initiate tasks  Motor Planning: Hand over hand to sequence tasks  Balance  Sitting Balance: Maximum assistance  Standing Balance: Maximum assistance (max x2 )  Bed mobility  Supine to Sit: Maximum assistance;2 Person assistance   Transfers  Sit to stand: Maximum assistance  Stand to sit: Maximum assistance  Transfer Comments: Pt sits EOB with eyes closed and R side lean. Requires hand over hand for grasping SS with L hand, max A X2 for standing/sitting safety, RUE mgmt, CGA standing in SS. Standing Balance  Time: 1-2 min   Activity: Transfer in SS   Comment: significant posterior lean         Exercises  Other: Writer attempted PROM on PTs RUE, pt  unresponsive to writers attempts. ADL  Feeding: Dependent/Total;Thickened liquids  Grooming: Dependent/Total  UE Bathing: Dependent/Total  LE Bathing: None  UE Dressing: None  LE Dressing: Dependent/Total  Toileting: Dependent/Total  Additional Comments: Pt laying in bed upon writer arrival, no response to simuli provided, upon max verbal and tactile cues. LBD completed while pt supine in bed. UBB completed while seated at sink, attempted [facility] for washing with no avail. Assessment  Performance deficits / Impairments: Decreased ADL status; Decreased functional mobility ; Decreased ROM; Decreased strength;Decreased safe awareness;Decreased cognition;Decreased endurance;Decreased sensation;Decreased balance;Decreased high-level IADLs;Decreased fine motor control;Decreased coordination  Prognosis: Fair  Discharge Recommendations: Patient would benefit from continued therapy after discharge;24 hour supervision or assist  Activity Tolerance: Treatment limited secondary to decreased cognition  Safety Devices in place: Yes  Type of devices: Nurse notified; Left in chair;Call light within reach; Chair alarm in place          Patient Education:  Patient Goals   Patient goals : \"I want to go home\"  Learner:patient  Method: demonstration and explanation       Outcome: acknowledged understanding         Plan  Plan  Times per week: 900/7  Times per day: Twice a day  Current Treatment Recommendations: Self-Care / ADL, Strengthening, ROM, Balance Training, Functional Mobility Training, Endurance Training, Neuromuscular Re-education, Cognitive Reorientation, Pain Management, Safety Education & Training, Patient/Caregiver Education & Training, Equipment Evaluation, Education, & procurement, Home Management Training, Cognitive/Perceptual Training  Plan Comment: 900 minutes/week for combined therapy of PT/OT/ST due to decreased tolerance to activity.   Patient Goals   Patient goals : \"I want to go home\"  Short term goals  Time Frame for Short term goals: By 10 days  Short term goal 1: Pt will complete upper body dressing/bathing with Min A and good attention to task  Short term goal 2: Pt will complete lower body dressing/bathing with max A and good safety with use of AE as needed  Short term goal 3: Pt will complete functional transfers during self care tasks with mod A x 1 and good safety  Short term goal 4: Pt will tolerate standing 4+ minutes during functional activity of choice with min A and good safety  Short term goal 5: Pt will participate in 30+ minutes of therapeutic exercises/functional activities to increase safety and independence with self care tasks  Short term goal 6: Pt will complete self feeding with min A and good attention to task  Long term goals  Time Frame for Long term goals : By discharge  Long term goal 1: Pt will complete upper body dressing with set-up assistance and good attention to task  Long term goal 2: Pt will complete lower body dressing/bathing with min A and good safety with use of AE as needed  Long term goal 3: Pt will complete functional transfers/mobility during self care tasks with min A and good safety  Long term goal 4: Pt will tolerate standing 8+ minutes during functional activity of choice with CGA and good safety  Long term goal 5: Pt will verbalize/demonstrate good understanding of adaptive equipment/adaptive strategies/durable medical equipment to increase safety and independence with self care and mobility  Long term goals 6: Pt will complete self feeding with set-up assistance and good attention to task  Long term goal 7: Vision to be further assessed        08/03/21 0735 08/03/21 1345   OT Individual Minutes   Time In [id number]   Time Out 0810 1314   Minutes 35 9     Electronically signed by ARASELI Petty on 8/3/21 at 1:46 PM EDT

## 2021-08-03 NOTE — ANESTHESIA POSTPROCEDURE EVALUATION
POST- ANESTHESIA EVALUATION       Pt Name: Lucero Gallardo  MRN: [id number]  YOB: 1945  Date of evaluation: 8/3/2021  Time:  4:14 PM      BP (!) 145/79   Pulse 59   Temp 97.1 °F (36.2 °C) (Infrared)   Resp 19   Ht 5' 10\" (1.778 m)   Wt 214 lb 4.6 oz (97.2 kg)   SpO2 98%   BMI 30.75 kg/m²      Consciousness Level  Awake  Cardiopulmonary Status  Stable  Pain Adequately Treated YES  Nausea / Vomiting  NO  Adequate Hydration  YES  Anesthesia Related Complications NONE      Electronically signed by Luiz Navarrete MD on 8/3/2021 at 4:14 PM       Department of Anesthesiology  Postprocedure Note    Patient: Lucero Credit  MRN: [id number]  YOB: 1945  Date of evaluation: 8/3/2021  Time:  4:14 PM     Procedure Summary     Date: 08/03/21 Room / Location: [facility] 04 / [facility]    Anesthesia Start: 1501 Anesthesia Stop: 1219    Procedure: EGD  PEG TUBE INSERTION (N/A Esophagus) Diagnosis: (MALNUTRITION)    Surgeons: Kate Braga MD Responsible Provider: Luiz Navarrete MD    Anesthesia Type: MAC ASA Status: 4          Anesthesia Type: MAC    Radha Phase I: Radha Score: 5    Radha Phase II:      Last vitals: Reviewed and per EMR flowsheets.        Anesthesia Post Evaluation

## 2021-08-03 NOTE — PROGRESS NOTES
Late Entry - Patient evaluated this morning    Physical Medicine & Rehabilitation  Progress Note      Subjective: Crispin Driver is a 77 y.o. male with ischemic CVA left PCA, RAHEEM, and MCA with right dominant hemiparesis. He is minimally interactive with exam today. No family present at the time of evaluation. Working toward likely PEG tube placement for patient, as he is maintaining nutrition, hydration, or medication regiment orally. Patient's sister would like to proceed with PEG tube placement but will confirm that she is able to give consent for this procedure. Discussed with Dr. Kamryn Morrow and risk management. ROS:  Unable to assess    Rehabilitation:   Progressing in therapies. PT:  Restrictions/Precautions: Up as Tolerated, General Precautions, Fall Risk, Swallowing - Thickened Liquids (1:1 sitter, Mildly thick (Nectar thick))  Other position/activity restrictions: Up w/assistance, RU/LE Weakness. MRI BRAIN- Acute infarct in the left and right basal ganglia greater on the left.    Transfers  Sit to Stand: 2 Person Assistance, Maximum Assistance (walker lift)  Stand to sit: 2 Person Assistance, Minimal Assistance  Bed to Chair: Dependent/Total (hemiwalker)  Stand Pivot Transfers: Dependent/Total (hemiwalker)  Lateral Transfers: Maximum Assistance (hemiwalker)  Comment: B UE support on elevated walker  Ambulation 1  Surface: level tile  Device:  (Green Walker Lift)  Other Apparatus: Right, AFO, Slider, Wheelchair follow  Assistance: 2 Person assistance, Maximum assistance  Quality of Gait: forward flexed hips, narrow LAZARA initially, max assist to advance right and left LE  Gait Deviations: Slow Anais, Decreased step length, Decreased step height  Distance: 5ft (AM)  Comments: pt no longer initiating movement, writer assisting with advancing and advance B LE    Transfers  Sit to Stand: 2 Person Assistance, Maximum Assistance (walker lift)  Stand to sit: 2 Person Assistance, Minimal Assistance  Bed to Chair: Dependent/Total (hemiwalker)  Stand Pivot Transfers: Dependent/Total (hemiwalker)  Lateral Transfers: Maximum Assistance (hemiwalker)  Comment: B UE support on elevated walker  Ambulation  Ambulation?: Yes  More Ambulation?: Yes  Ambulation 1  Surface: level tile  Device:  (Green Walker Lift)  Other Apparatus: Right, AFO, Slider, Wheelchair follow  Assistance: 2 Person assistance, Maximum assistance  Quality of Gait: forward flexed hips, narrow LAZARA initially, max assist to advance right and left LE  Gait Deviations: Slow Anais, Decreased step length, Decreased step height  Distance: 5ft (AM)  Comments: pt no longer initiating movement, writer assisting with advancing and advance B LE    Surface: level tile  Ambulation 1  Surface: level tile  Device:  (Green Walker Lift)  Other Apparatus: Right, AFO, Slider, Wheelchair follow  Assistance: 2 Person assistance, Maximum assistance  Quality of Gait: forward flexed hips, narrow LAZARA initially, max assist to advance right and left LE  Gait Deviations: Slow Anais, Decreased step length, Decreased step height  Distance: 5ft (AM)  Comments: pt no longer initiating movement, writer assisting with advancing and advance B LE    OT:  ADL  Feeding: Dependent/Total, Thickened liquids (total assist per nursing for meds/juice)  Grooming: Dependent/Total (TIPTON attmepted hand over hand for washing face)  UE Bathing: Dependent/Total (TIPTON attempted hand over hand for BUEs)  LE Bathing: Dependent/Total (attempted hand over hand, no participation this date)  UE Dressing: Dependent/Total (patient leaned forward 2/5 trials to participate in care)  LE Dressing: Dependent/Total  Toileting: Dependent/Total  Additional Comments: Pt lying in bed upon writer arrival and does no open eyes despite mac cueing. TIPTON facilitated pt in sitting EOB for LB dressing. Pt requires max A X2 for safe transfer into w/c with use of SS.  Once in w/c TIPTON attemtps to engage pt UB bathing to wash face and BUEs. Pt remains with his eyes closed, not able to participate in hand over hand assist.          Balance  Sitting Balance: Moderate assistance (R side lean noted this AM sitting EOB)  Standing Balance: Maximum assistance   Standing Balance  Time: AM: ~2 minutes  Activity: AM: functional transfer to w/c, LB dressing  Comment: Significant posterior lean with Maximum assist x 2 - use of Maximino Stockton for safety during transfer this date        Bed mobility  Rolling to Left: Maximum assistance  Rolling to Right: Moderate assistance  Supine to Sit: Maximum assistance, 2 Person assistance  Sit to Supine: Maximum assistance  Scooting: Unable to assess  Comment: Patient in chair upon arrival and left in w/c with chair alarm in place. Transfers  Stand Pivot Transfers: Dependent/Total (with Maximino Stockton)  Sit to stand: Maximum assistance  Stand to sit: Maximum assistance  Transfer Comments: Pt sits EOB with eyes closed and R side lean. Requires hand over hand for grasping SS with L hand, max A X2 for standing/sitting safety, RUE mgmt, CGA standing in SS. Toilet Transfers  Toilet - Technique: Stand pivot  Equipment Used: Raised toilet seat with rails  Toilet Transfer: Maximum assistance, 2 Person assistance (max A + mod A)  Toilet Transfers Comments: Verbal cues for safety and hand placement. SPEECH:  Subjective: []? Alert     []? Cooperative     []? Confused     []? Agitated    [x]? Lethargic     Objective/Assessment:  Auditory Comprehension: Pt. not responding to any writer questions despite MAX cues. Pt. Not attempting to follow simple directions despite tactile, ST attempts at hand over hand cues.      Verbal expression: Pt. Not attempting counting 1-10, naming objects as directed by ST. No attempts made at communication.      Speech: Pt not responding to verbal stimuli, remaining with eyes closed, non verbal throughout entire session.       Voice: Pt. did not vocalize or verbalize during session.    Dysphagia:   Pt. Did not attempt oral motor exercises c ST.     Pt. Initially making eye contact c ST, then keeps eyes closed. Pt. Sister, Alfred Morrison present in room, pt.  Made no attempt to acknowledge her arrival.     Current Medications:   Current Facility-Administered Medications: FLUoxetine (PROZAC) 20 MG/5ML solution 40 mg, 40 mg, Oral, Daily  0.9 % sodium chloride infusion, , Intravenous, Continuous  OLANZapine zydis (ZYPREXA) disintegrating tablet 5 mg, 5 mg, Oral, Nightly  famotidine (PEPCID) tablet 20 mg, 20 mg, Oral, Daily  [Held by provider] aspirin chewable tablet 81 mg, 81 mg, Oral, Nightly  [Held by provider] clopidogrel (PLAVIX) tablet 75 mg, 75 mg, Oral, Nightly  finasteride (PROSCAR) tablet 5 mg, 5 mg, Oral, Lunch  isosorbide mononitrate (IMDUR) extended release tablet 30 mg, 30 mg, Oral, Lunch  polyethylene glycol (GLYCOLAX) packet 17 g, 17 g, Oral, Lunch  tamsulosin (FLOMAX) capsule 0.4 mg, 0.4 mg, Oral, Lunch  tiotropium (SPIRIVA RESPIMAT) 2.5 MCG/ACT inhaler 2 puff, 2 puff, Inhalation, Lunch  melatonin tablet 6 mg, 6 mg, Oral, Nightly  [Held by provider] losartan (COZAAR) tablet 100 mg, 100 mg, Oral, Daily  rosuvastatin (CRESTOR) tablet 40 mg, 40 mg, Oral, Nightly  [Held by provider] predniSONE (DELTASONE) tablet 40 mg, 40 mg, Oral, Daily  albuterol sulfate  (90 Base) MCG/ACT inhaler 2 puff, 2 puff, Inhalation, Q6H PRN  [Held by provider] heparin (porcine) injection 5,000 Units, 5,000 Units, Subcutaneous, 3 times per day  [Held by provider] hydrALAZINE (APRESOLINE) tablet 10 mg, 10 mg, Oral, 3 times per day  rOPINIRole (REQUIP) tablet 0.25 mg, 0.25 mg, Oral, Nightly  acetaminophen (TYLENOL) tablet 650 mg, 650 mg, Oral, Q4H PRN  senna (SENOKOT) tablet 17.2 mg, 2 tablet, Oral, Daily PRN  bisacodyl (DULCOLAX) suppository 10 mg, 10 mg, Rectal, Daily PRN  insulin lispro (HUMALOG) injection vial 0-6 Units, 0-6 Units, Subcutaneous, TID WC  insulin lispro (HUMALOG) injection vial 0-3 Units, 0-3 Units, Subcutaneous, Nightly  glucose (GLUTOSE) 40 % oral gel 15 g, 15 g, Oral, PRN  dextrose 50 % IV solution, 12.5 g, Intravenous, PRN  glucagon (rDNA) injection 1 mg, 1 mg, Intramuscular, PRN  dextrose 5 % solution, 100 mL/hr, Intravenous, PRN      Objective:  /86   Pulse 68   Temp 97.7 °F (36.5 °C) (Oral)   Resp 18   Ht 5' 10\" (1.778 m)   Wt 214 lb 4.6 oz (97.2 kg)   SpO2 100%   BMI 30.75 kg/m²       GEN: Well developed, no acute distress  HEENT: NCAT. Eyes closed throughout evaluation. RESP: Normal breath sounds with no wheezing, rales, or rhonchi. Respirations WNL and unlabored. CV: Regular rate and rhythm. No murmurs, rubs, or gallops. ABD: Soft, non-distended, BS+ and equal.  NEURO:  Eyes closed throughout evaluation. Not answering questions. Appears to have delayed processing. MSK:  Muscle bulk is normal bilaterally. Minimal movement noted in all limbs, left greater than right. LIMBS: No edema in bilateral lower limbs. SKIN: Warm and dry with good turgor. PSYCH:  Flat affect. Diagnostics:     CBC:   Recent Labs     07/31/21  1105   WBC 9.5   RBC 5.43   HGB 13.4*   HCT 42.3   MCV 77.8*   RDW 17.7*   *     BMP:   Recent Labs     07/31/21  1105      K 4.8      CO2 24   BUN 29*   CREATININE 1.89*   GLUCOSE 180*     BNP: No results for input(s): BNP in the last 72 hours. PT/INR:   Recent Labs     07/31/21  1105   PROTIME 17.1*   INR 1.4     APTT: No results for input(s): APTT in the last 72 hours. CARDIAC ENZYMES: No results for input(s): CKMB, CKMBINDEX, TROPONINT in the last 72 hours. Invalid input(s): CKTOTAL;3  FASTING LIPID PANEL:  Lab Results   Component Value Date    CHOL 186 07/14/2021    HDL 42 07/14/2021    TRIG 77 07/14/2021     LIVER PROFILE: No results for input(s): AST, ALT, ALB, BILIDIR, BILITOT, ALKPHOS in the last 72 hours. Impression/Plan:   Impaired ADLs, gait, and mobility due to:    1.  Ischemic CVA with Right dominant hemiparesis: PT/OT for gait, mobility, strengthening, endurance, ADLs, and self care. On ASA, plavix, Crestor. Can consider sleep/wake medication but patient is also refusing therapy/medications. Placed order for repeat CT head to evaluate for any change on 7/29 - patient refused. 2. Agitation: Improved. On Seroquel prn but has not required it since 7/23. Has no sedating medicines on board to be causing drowsiness. Borderline QTc. Telesitter added 7/28, 1:1 discontinued 7/29 - will continue to monitor. 3. Refusing medications, meals:  Switched daily medications from morning to noon on 7/29 to see if patient will take his medications later in the day - was not successful. Patient was taking some medications at night - switched aspirin and plavix to nightly on 7/29 but he did not take them. Psych consult ordered after discussion with IM - increased prozac 7/28, changed to liquid formulation and added olanzapine on 7/30. Discussed with IM - consult placed for general surgery to evaluate for possible PEG tube placement, as patient is not maintaining nutrition, hydration, and medication regimen. Discussed possible PEG tube with family as well, as patient does not have decision-making capacity at this time. Patient's sister would like to proceed with PEG tube placement but will confirm that she is able to give consent for this procedure. Discussed with Dr. Makayla Almonte and risk management team.  4. CELESTINO:  Secondary to dehydration. Creatinine 1.89 on 7/31. Continue maintenance IV fluids. 5. Sinusitis: On Augmentin until 7/28 per IM. 6. Insomnia: Scheduled melatonin started on 7/25  7. Thrombocytopenia: Hematology following. On prednisone - notified hematology that patient has been refusing medications - okay for him to remain off of steroids at this time and monitor platelets. Platelets improved (752 on 7/31), OK for DVT prophylaxis. 8. Leukocytosis:  Resolved. Attributed to prednisone. Will monitor.   9. HTN/CAD: On hydralazine (on hold, as patient has not been taking this medication and blood pressure has been okay), Imdur, losartan (also on hold at this time)  10. DM II: On humalog sliding scale  11. COPD:  On tiotropium. Has albuterol prn. 12. Restless Leg Syndrome: On ropinirole  13. Depression: On fluoxetine. Psych consult and recommendations as above. 14. GERD: On famotidine BID  15. BPH: On finasteride, tamsulosin  16. Thyroid nodule: For outpatient monitoring  17. Bowel Management: Miralax daily, Senokot prn, Dulcolax prn  18. Internal medicine for medical management  19. DVT prophylaxis:  On heparin  20. Discussed recommendation for discharge to SNF with patient's family along with  on 7/30. Family provided a few choices. As patient has had limited participation in therapies, will plan to discharge him to SNF as soon as it is safe.       Electronically signed by Kenna Hameed MD on 8/2/2021 at 11:33 PM

## 2021-08-03 NOTE — OP NOTE
will need endoscopic ultrasound once he recovers from the current illness. Family aware. Antrum: normal    Duodenum:     Descending: normal    Bulb: normal    Scope was withdrawn back into the stomach. Stomach was distended with air. Light reflex was seen over the intra-abdominal wall. This area was prepped and draped in usual sterile fashion. Local anesthetic was infiltrated. Incision was made using a #11 blade. Through this incision a needle was introduced through the intra-abdominal wall into the stomach. Through the needle guidewire was introduced. Guidewire was grasped with help of the snare. Guidewire was retrieved from the oral aspect. PEG tube was threaded over the guidewire. PEG tube was retrieved from the abdominal aspect. Bolster was applied which was at 3 cm. Scope was reintroduced. Mushroom of the PEG was found to be in satisfactory position. No active bleeding noted. Air was decompressed and scope was removed. While withdrawing the scope the above findings were verified and the scope was removed. The patient tolerated the procedure and conscious sedation without unusual events. In the recovery room patient was examined and remains hemodynamically stable. Discharge home when criteria met. Recommendations/Plan:   1. PEG tube to gravity. PEG tube can be used for medications. 2. Soft wrist restraints/mittens as needed. Patient has a tendency to pull on things. PEG tube was seen with dressings in a binder today. 3. Eventually again as stated above patient once he recovers from the current illness will need GI referral with outpatient endoscopic ultrasound. Family is well aware of this. 4. Discussed with the family  5. I will start tube feeds tomorrow. Dietitian to see the patient.     Electronically signed by Sabina Skinner MD  on 8/3/2021 at 3:33 PM

## 2021-08-03 NOTE — PLAN OF CARE
Problem: Infection:  Goal: Will remain free from infection  Description: Will remain free from infection  8/3/2021 1238 by Génesis Sidhu RN  Outcome: Ongoing, vitals stable      Problem: Safety:  Goal: Free from accidental physical injury  Description: Free from accidental physical injury  8/3/2021 1238 by Génesis Sidhu RN  Outcome: Ongoing, Patient is currently non verbal, Patient will open eyes but will not respond verbally, Patient refusing medications, patient is refusing food, Patient bed low and locked, telesitter at bed side, frequent rounding       Problem: Daily Care:  Goal: Daily care needs are met  Description: Daily care needs are met  8/3/2021 1238 by Génesis Sidhu RN  Outcome: Ongoing, Patient is dependant on care provided by staff      Problem: Pain:  Goal: Patient's pain/discomfort is manageable  Description: Patient's pain/discomfort is manageable  8/3/2021 1238 by Génesis Sidhu RN  Outcome: Ongoing, Faces scales is 0

## 2021-08-03 NOTE — ANESTHESIA PRE PROCEDURE
Department of Anesthesiology  Preprocedure Note       Name:  Samantha Griffiths   Age:  77 y.o.  :  1945                                          MRN:  [id number]         Date:  8/3/2021      Surgeon: Perlita Gomez):  Antonio Bustos MD    Procedure: Procedure(s):  EGD  PEG TUBE INSERTION    Medications prior to admission:   Prior to Admission medications    Medication Sig Start Date End Date Taking?  Authorizing Provider   Heparin Sodium, Porcine, (HEPARIN, PORCINE,) 5000 UNIT/ML injection Inject 1 mL into the skin every 8 hours 21   Emy Amador MD   QUEtiapine (SEROQUEL) 25 MG tablet Take 1 tablet by mouth nightly as needed for Agitation 21  Emy Amador MD   methylPREDNISolone sodium (SOLU-MEDROL) 40 MG injection Infuse 1 mL intravenously every 12 hours 21   Emy Amador MD   melatonin 3 MG TABS tablet Take 1 tablet by mouth nightly as needed (insomnia) 21   Emy Amador MD   atorvastatin (LIPITOR) 80 MG tablet Take 0.5 tablets by mouth daily (Pt takes one-half of an 80mg tab = 40mg) 21   Emy Amador MD   tiZANidine (ZANAFLEX) 2 MG tablet Take 1 tablet by mouth 4 times daily as needed (muscle spasms) 21   JOLIE Canseco CNP   naproxen (NAPROSYN) 500 MG tablet Take 1 tablet by mouth 2 times daily (with meals) 21   Gerardo Roger PA-C   ibuprofen (ADVIL;MOTRIN) 800 MG tablet Take 1 tablet by mouth every 8 hours as needed for Pain 20   Shravan Givens, MD   lidocaine (LIDODERM) 5 % Place 1 patch onto the skin daily 12 hours on, 12 hours off. 20   Stonewall MD Anita   metoprolol succinate (TOPROL XL) 50 MG extended release tablet Take 50 mg by mouth daily    Historical Provider, MD   tiotropium (SPIRIVA RESPIMAT) 2.5 MCG/ACT AERS inhaler Inhale 2 puffs into the lungs daily    Historical Provider, MD   carboxymethylcellulose 1 % ophthalmic solution Place 1 drop into both eyes 3 times daily as needed for Dry Eyes     Historical Provider, MD   ketotifen (ZADITOR) 0.025 % ophthalmic solution Place 1 drop into both eyes 2 times daily as needed (itchy eyes)     Historical Provider, MD   isosorbide mononitrate (IMDUR) 60 MG extended release tablet Take 30 mg by mouth daily Indications: 1/2 tablet (30mg)     Historical Provider, MD   finasteride (PROSCAR) 5 MG tablet Take 5 mg by mouth daily    Historical Provider, MD   tamsulosin (FLOMAX) 0.4 MG capsule Take 0.4 mg by mouth daily    Historical Provider, MD   fluticasone (FLONASE) 50 MCG/ACT nasal spray 1 spray by Nasal route 2 times daily  Patient taking differently: 1 spray by Nasal route 2 times daily as needed for Rhinitis  5/31/16   Francisco Connor DO   albuterol sulfate  (90 BASE) MCG/ACT inhaler Inhale 2 puffs into the lungs every 6 hours as needed for Wheezing    Historical Provider, MD   albuterol (PROVENTIL) (2.5 MG/3ML) 0.083% nebulizer solution Take 2.5 mg by nebulization every 6 hours as needed for Wheezing    Historical Provider, MD   aspirin 81 MG EC tablet Take 81 mg by mouth daily    Historical Provider, MD   losartan (COZAAR) 100 MG tablet Take 100 mg by mouth daily     Historical Provider, MD   nitroGLYCERIN (NITROSTAT) 0.4 MG SL tablet Place 0.4 mg under the tongue every 5 minutes as needed for Chest pain    Historical Provider, MD   FLUoxetine (PROZAC) 20 MG capsule Take 40 mg by mouth daily     Historical Provider, MD   furosemide (LASIX) 20 MG tablet Take 20 mg by mouth daily as needed (swelling)     Historical Provider, MD   clopidogrel (PLAVIX) 75 MG tablet Take 75 mg by mouth daily    Historical Provider, MD   rOPINIRole (REQUIP) 0.25 MG tablet Take 0.25 mg by mouth nightly as needed     Historical Provider, MD   budesonide-formoterol (SYMBICORT) 160-4.5 MCG/ACT AERO Inhale 2 puffs into the lungs 2 times daily.     Historical Provider, MD       Current medications:    Current Facility-Administered Medications   Medication Dose Route Frequency Provider Last Rate Last Admin    FLUoxetine (PROZAC) 20 MG/5ML solution 40 mg  40 mg Oral Daily Jabari Camarena MD        0.9 % sodium chloride infusion   Intravenous Continuous Drucie Sicard, MD 50 mL/hr at 08/02/21 1803 New Bag at 08/02/21 1803    OLANZapine zydis (ZYPREXA) disintegrating tablet 5 mg  5 mg Oral Nightly Jabari Camarena MD        famotidine (PEPCID) tablet 20 mg  20 mg Oral Daily Francisco Gottlieb MD       [facility] [place] by provider] aspirin chewable tablet 81 mg  81 mg Oral Nightly Drucie Sicard, MD        [Held by provider] clopidogrel (PLAVIX) tablet 75 mg  75 mg Oral Nightly Drucie Sicard, MD        finasteride (PROSCAR) tablet 5 mg  5 mg Oral Lunch Drucie Sicard, MD        isosorbide mononitrate (IMDUR) extended release tablet 30 mg  30 mg Oral Lunch Drucie Sicard, MD        polyethylene glycol [facility]) packet 17 g  17 g Oral Lunch Drucie Sicard, MD        [facility]) capsule 0.4 mg  0.4 mg Oral Lunch Drucie Sicard, MD        tiotropium (SPIRIVA RESPIMAT) 2.5 MCG/ACT inhaler 2 puff  2 puff Inhalation Lunch Drucie Sicard, MD   2 puff at 08/03/21 1203    melatonin tablet 6 mg  6 mg Oral Nightly Sotero Henson MD   6 mg at 07/29/21 2248    [Held by provider] losartan (COZAAR) tablet 100 mg  100 mg Oral Daily Tesfaye Mccormick MD   100 mg at 07/25/21 0824    rosuvastatin (CRESTOR) tablet 40 mg  40 mg Oral Nightly Josephina Meckel, MD   40 mg at 07/28/21 2032    [Held by provider] predniSONE (DELTASONE) tablet 40 mg  40 mg Oral Daily Lisy Olivares MD   40 mg at 07/25/21 0824    albuterol sulfate  (90 Base) MCG/ACT inhaler 2 puff  2 puff Inhalation Q6H PRN Josephina Meckel, MD        [facility] [place] by provider] heparin (porcine) injection 5,000 Units  5,000 Units Subcutaneous 3 times per day Josephina Meckel, MD   5,000 Units at 08/01/21 1252    [Held by provider] hydrALAZINE (APRESOLINE) tablet 10 mg  10 mg Oral 3 times per day Josephina Meckel, MD   10 mg at 07/26/21 2202    rOPINIRole (REQUIP) tablet 0.25 mg  0.25 mg Oral Nightly Minnie Huff MD   0.25 mg at 07/28/21 2031    acetaminophen (TYLENOL) tablet 650 mg  650 mg Oral Q4H PRN Mariza Castillo MD   650 mg at 07/25/21 5238    senna (SENOKOT) tablet 17.2 mg  2 tablet Oral Daily PRN Mariza Castillo MD        bisacodyl (DULCOLAX) suppository 10 mg  10 mg Rectal Daily PRN Mariza Castillo MD        insulin lispro (HUMALOG) injection vial 0-6 Units  0-6 Units Subcutaneous TID WC Mariza Castillo MD   1 Units at 08/01/21 1253    insulin lispro (HUMALOG) injection vial 0-3 Units  0-3 Units Subcutaneous Nightly Mariza Castillo MD   1 Units at 08/02/21 2242    glucose (GLUTOSE) 40 % oral gel 15 g  15 g Oral PRN Mariza Castillo MD        dextrose 50 % IV solution  12.5 g Intravenous PRN Mariza Castillo MD        glucagon (rDNA) injection 1 mg  1 mg Intramuscular PRN Mariza Castillo MD        dextrose 5 % solution  100 mL/hr Intravenous PRN Mariza Castillo MD           Allergies:  No Known Allergies    Problem List:    Patient Active Problem List   Diagnosis Code    [facility]rilob[facility] emph[facility]sema [facility] J43.2    Mixed restrictive and obstructive lung disease ([facility] J43.9, J98.4    Cerebrovascular accident (CVA) ([facility] I63.9    COPD (chronic obstructive pulmonary disease) ([facility] J44.9    HTN (hypertension) I10    Diabetes 1.5, managed as type 2 ([facility] 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    Hyperlipidemia E78.5    CAD S/P percutaneous coronary angioplasty I25.10, Z98.61    Rhabdomyolysis M62.82    Intracranial atherosclerosis I67.2    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 I66.21    Thrombocytopenia (HCC) D69.6    Right sided weakness R53.1    Noncompliance with medications Z91.14    Acute idiopathic thrombocytopenic purpura (HCC) D69.3    Acute CVA (cerebrovascular accident) ([facility] I63.9    Depression F32.9       Past Medical History:        Diagnosis Date    [facility]rilobular emphysema ([facility]     COPD (chronic obstructive pulmonary disease) ([facility]     Depression     Diabetes mellitus ([facility]     pt refuses to take previously prescribed metformin (states PCP aware)    Former smoker     Hyperlipidemia     on 18 pt states \"I stopped taking that about a year ago\"    Hypertension     Mixed restrictive and obstructive lung disease ([facility]     Need for pneumococcal vaccine     Personal history of tobacco use        Past Surgical History:        Procedure Laterality Date    CARDIAC SURGERY  2015    1 stent    NECK SURGERY      TOE AMPUTATION Right greater       Social History:    Social History     Tobacco Use    Smoking status: Former Smoker     Packs/day: 0.75     Years: 56.00     Pack years: 42.00     Start date: 1957     Quit date: 2013     Years since quittin.0    Smokeless tobacco: Never Used   Substance Use Topics    Alcohol use: No                                Counseling given: Not Answered      Vital Signs (Current):   Vitals:    21 0628 21 0733 21 1812 21 0616   BP: (!) 147/96 (!) 145/84 137/86 (!) 151/82   Pulse: 78 81 68 73   Resp: 16 20 18 16   Temp: 97.9 °F (36.6 °C) 98.2 °F (36.8 °C) 97.7 °F (36.5 °C) 98.1 °F (36.7 °C)   TempSrc:  Oral Oral    SpO2: 97% 100% 100% 96%   Weight:       Height:                                                  BP Readings from Last 3 Encounters:   21 (!) 151/82   21 (!) 162/81   21 (!) 144/91       NPO Status:                                                                                 BMI:   Wt Readings from Last 3 Encounters:   21 214 lb 4.6 oz (97.2 kg)   21 210 lb (95.3 kg)   21 230 lb (104.3 kg)     Body mass index is 30.75 kg/m².     CBC:   Lab Results   Component Value Date    WBC 9.5 2021    RBC 5.43 2021    RBC 4.88 2012    HGB 13.4 2021    HCT 42.3 2021    MCV 77.8 2021    RDW 17.7 2021     2021     2012       CMP:   Lab Results Component Value Date     07/31/2021    K 4.8 07/31/2021     07/31/2021    CO2 24 07/31/2021    BUN 29 07/31/2021    CREATININE 1.89 07/31/2021    GFRAA 42 07/31/2021    LABGLOM 35 07/31/2021    GLUCOSE 180 07/31/2021    GLUCOSE 122 05/03/2012    PROT 6.3 07/14/2021    CALCIUM 9.8 07/31/2021    BILITOT 0.80 07/14/2021    ALKPHOS 93 07/14/2021    AST 81 07/14/2021    ALT 54 07/14/2021       POC Tests:   Recent Labs     08/03/21  1050   POCGLU 119*       Coags:   Lab Results   Component Value Date    PROTIME 17.1 07/31/2021    PROTIME 11.1 05/03/2012    INR 1.4 07/31/2021    APTT 18.0 07/13/2021       HCG (If Applicable): No results found for: PREGTESTUR, PREGSERUM, HCG, HCGQUANT     ABGs: No results found for: PHART, PO2ART, MYY9UGI, YPP5IFX, BEART, R2LQYCER     Type & Screen (If Applicable):  No results found for: LABABO, LABRH    Drug/Infectious Status (If Applicable):  Lab Results   Component Value Date    HEPCAB NONREACTIVE 07/13/2021       COVID-19 Screening (If Applicable):   Lab Results   Component Value Date    COVID19 Not Detected 08/02/2021           Anesthesia Evaluation  Patient summary reviewed and Nursing notes reviewed  Airway: Mallampati: Unable to assess / NA  TM distance: >3 FB   Neck ROM: full  Mouth opening: > = 3 FB Dental:    (+) upper dentures and lower dentures      Pulmonary: breath sounds clear to auscultation  (+) COPD: no interval change,      (-) rhonchi, wheezes, rales and no decreased breath sounds                           Cardiovascular:    (+) hypertension: no interval change, CAD:,     (-) murmur, weak pulses,  friction rub, systolic click, carotid bruit,  JVD and peripheral edema    ECG reviewed  Rhythm: regular  Rate: normal  Echocardiogram reviewed               ROS comment: Overall global left ventricular systolic function is normal, calculated  ejection fraction is 50-55%  Grade I (mild) left ventricular diastolic dysfunction     Neuro/Psych:   (+) CVA:, neuromuscular disease:, psychiatric history:depression/anxiety              ROS comment: Acute CVA (cerebrovascular accident)  with R dominant hemiplegia    patient confused not responding to commands GI/Hepatic/Renal:             Endo/Other:    (+) DiabetesType II DM, , .                 Abdominal:             Vascular: Other Findings: Right hemiplegia          Anesthesia Plan      MAC     ASA 4       Induction: intravenous. Anesthetic plan and risks discussed with sibling. Plan discussed with CRNA.                 Shari Keenan MD   8/3/2021

## 2021-08-03 NOTE — CARE COORDINATION
Sister Shahnaz Felt request a letter to give to pt's Ketan King stating he was in the hospital. Letter left in pt room and sister notified.

## 2021-08-03 NOTE — PROGRESS NOTES
Physical Therapy  [address]  Acute Rehabilitation Physical Therapy Progress Note    Date: 8/3/21  Patient Name: Elvis Bynum       Room: 1266/3484-35  MRN: [id number]   Account: [de-identified]   : 1945  (72 y.o.) Gender: male   Referring Practitioner: Dr. Denise Reza  Diagnosis: Ischemic CVA with R dominant hemiplegia  Past Medical History:  has a past medical history of Centrilobular emphysema ([facility] COPD (chronic obstructive pulmonary disease) ([facility] Depression, Diabetes mellitus ([facility] Former smoker, Hyperlipidemia, Hypertension, Mixed restrictive and obstructive lung disease ([facility] Need for pneumococcal vaccine, and Personal history of tobacco use. Past Surgical History:   has a past surgical history that includes Neck surgery; Toe amputation (Right, greater); and Cardiac surgery (2015). Additional Pertinent Hx: Elvis Bynum is a 77 y.o. RHD male admitted to [facility] on 2021. On admit, exam significant for right-sided facial droop, right-sided weakness and severe dysarthria. Significant history with recurrent strokes, remote left temporal lobe ischemic infarct and remote bilateral occipital lobe infarcts . MRI shows Bilateral basal ganglia ischemic infarcts. Pt admitted to rehab unit on 21    Orientation Level: Unable to assess (pt nonresponsive)  Restrictions/Precautions  Restrictions/Precautions: Up as Tolerated;General Precautions; Fall Risk;Swallowing - Thickened Liquids (Mildly thick (Nectar thick))  Required Braces or Orthoses?: No  Position Activity Restriction  Other position/activity restrictions: Up w/assistance, RU/LE Weakness. MRI BRAIN- Acute infarct in the left and right basal ganglia greater on the left. Subjective: Pt continues to be nonverbal, closing eyes; in PM, unable to elicit response to verbal queries, tactile stimulation or sternal rub (flutters eyelids); returned to room. Sister reports PEG tube to be placed today. Comments:  Max encouragement with little response or return. Pt unresponsive; Pt returned to room in PM 2º lack of response/participation. Vital Signs  Patient Currently in Pain: Other (comment) (No response to query)    Patient Observation  Observations: Flat affect. Transfers  Sit to Stand: 2 Person Assistance;Maximum Assistance (walker lift)  Stand to sit: 2 Person Assistance;Minimal Assistance (B UE assist to chair from walker lift)    Ambulation 1  Surface: level tile  Device:  (Green Walker Lift)  Other Apparatus: Right;AFO;Slider; Wheelchair follow  Assistance: 2 Person assistance;Maximum assistance  Quality of Gait: forward flexed hips, narrow LAZARA initially, max assist to advance R LE, assist 1x to advance L LE upon standing after seated rest.   Gait Deviations: Slow Anais;Decreased step length;Decreased step height  Distance: 36' & 79'   Comments: Cues to shift weight bilaterally with fair return. Seated rest between attempts. longer walk included 180º turn. Wheelchair Activities  Propulsion: No (No response to cues, set-up, hand-over-hand assist, encouragement)    Balance   Posture: Fair  Standing - Static: Fair; - (walker lift)  Standing - Dynamic: Poor (walker lift)     Exercises  Other exercises?: Yes  Other exercises 1: Seated bilat LE exercises, passive ROM x20 (AM)  Other exercises 2: Sit <> Stand x2  Other exercises 3: R Achilles stretch, 2x30\"     Activity Tolerance: Patient limited by cognitive status     PT Equipment Recommendations  Other: TBD     Current Treatment Recommendations: Strengthening, ROM, Balance Training, Functional Mobility Training, Gait Training, Endurance Training, Transfer Training, Safety Education & Training, Wheelchair Mobility Training, Neuromuscular Re-education, Cognitive Reorientation, Home Exercise Program, Patient/Caregiver Education & Training, Equipment Evaluation, Education, & procurement, Positioning, Stair training, Modalities (Will consider using modalities as 46 10     Electronically signed by Dharmesh Honeycutt PTA on 8/3/21 at 2:07 PM EDT

## 2021-08-03 NOTE — PROGRESS NOTES
Speech Language Pathology  [facility] at [facility]  Speech Language Pathology    Date: 8/3/2021  Patient Name: Diandra Brito  YOB: 1945   AGE: 77 y.o. MRN: [id number]        Patient Not Available for Speech Therapy     Due to:  [] Testing  [] Hemodialysis  [] Cancelled by RN  [] Surgery   [] Intubation/Sedation/Pain Medication  [] Medical instability  [x] Other: Unable to awaken pt despite max verbal/tactile cues, pt. Opened eyes x1, but then closed and unable to remain alert. Unable to participate in tx at this time.          Completed by: Mary Soto M.A., CCC-SLP

## 2021-08-04 NOTE — PROGRESS NOTES
[facility] Internal Medicine    CONSULTATION / HISTORY AND PHYSICAL EXAMINATION            Date:   8/3/2021  Patient name:  Madhu Willson  Date of admission:  7/20/2021  6:49 PM  MRN:   [id number]  Account:  [de-identified]  YOB: 1945  PCP:    No primary care provider on file.   Room:   [address] [place] [address]  Code Status:    Full Code    Physician Requesting Consult: Ulysses Bally, MD    Reason for Consult:  medical management    Chief Complaint:       Right hemipareisi  History Obtained From:     Patient medical record nursing staff    History of Present Illness:   History is extremely limited, patient was extremely agitated overnight, was given Ativan around 2 AM in the night, patient very sleepy, not answering questions  Most of history is retrieved from E HR  Patient was recently admitted to [address] [place] [address] with right-sided weakness, right-sided facial weakness, speech difficulties  Patient underwent MRI brain, concerning for acute infarct in left and right basal ganglia  CT head and neck, was concerning for severe stenosis in the proximal A3 segment of RAHEEM bilaterally  During hospital stay, patient had thrombocytopenia, was evaluated by hematologist, getting treated with IV steroids, and lines of immune thrombocytopenia      Past Medical History:     Past Medical History:   Diagnosis Date    Centrilobular emphysema ([address]     COPD (chronic obstructive pulmonary disease) ([address]     Depression     Diabetes mellitus ([address]     pt refuses to take previously prescribed metformin (states PCP aware)    Former smoker     Hyperlipidemia     on 4/27/18 pt states \"I stopped taking that about a year ago\"    Hypertension     Mixed restrictive and obstructive lung disease ([address]     Need for pneumococcal vaccine     Personal history of tobacco use         Past Surgical History:     Past Surgical History:   Procedure Laterality Date    CARDIAC SURGERY  07/2015    1 stent    NECK SURGERY      TOE AMPUTATION Right greater        Medications Prior to Admission:     Prior to Admission medications    Medication Sig Start Date End Date Taking?  Authorizing Provider   Heparin Sodium, Porcine, (HEPARIN, PORCINE,) 5000 UNIT/ML injection Inject 1 mL into the skin every 8 hours 7/20/21   Maurice Connolly MD   QUEtiapine (SEROQUEL) 25 MG tablet Take 1 tablet by mouth nightly as needed for Agitation 7/20/21 7/25/21  Maurice Connolly MD   methylPREDNISolone sodium (SOLU-MEDROL) 40 MG injection Infuse 1 mL intravenously every 12 hours 7/20/21   Maurice Connolly MD   melatonin 3 MG TABS tablet Take 1 tablet by mouth nightly as needed (insomnia) 7/20/21   Maurice Connolly MD   atorvastatin (LIPITOR) 80 MG tablet Take 0.5 tablets by mouth daily (Pt takes one-half of an 80mg tab = 40mg) 7/16/21   Maurice Connolly MD   tiZANidine (ZANAFLEX) 2 MG tablet Take 1 tablet by mouth 4 times daily as needed (muscle spasms) 5/7/21   JOLIE Lundberg CNP   naproxen (NAPROSYN) 500 MG tablet Take 1 tablet by mouth 2 times daily (with meals) 1/4/21   Ghazala Garg PA-C   ibuprofen (ADVIL;MOTRIN) 800 MG tablet Take 1 tablet by mouth every 8 hours as needed for Pain 6/2/20   Eder Matias MD   lidocaine (LIDODERM) 5 % Place 1 patch onto the skin daily 12 hours on, 12 hours off. 6/2/20   Eder Matias MD   metoprolol succinate (TOPROL XL) 50 MG extended release tablet Take 50 mg by mouth daily    Historical Provider, MD   tiotropium (SPIRIVA RESPIMAT) 2.5 MCG/ACT AERS inhaler Inhale 2 puffs into the lungs daily    Historical Provider, MD   carboxymethylcellulose 1 % ophthalmic solution Place 1 drop into both eyes 3 times daily as needed for Dry Eyes     Historical Provider, MD   ketotifen (ZADITOR) 0.025 % ophthalmic solution Place 1 drop into both eyes 2 times daily as needed (itchy eyes)     Historical Provider, MD   isosorbide mononitrate (IMDUR) 60 MG extended release tablet Take 30 mg by mouth daily Indications: 1/2 tablet (30mg)     Historical Provider, MD   finasteride (PROSCAR) 5 MG tablet Take 5 mg by mouth daily    Historical Provider, MD   tamsulosin (FLOMAX) 0.4 MG capsule Take 0.4 mg by mouth daily    Historical Provider, MD   fluticasone (FLONASE) 50 MCG/ACT nasal spray 1 spray by Nasal route 2 times daily  Patient taking differently: 1 spray by Nasal route 2 times daily as needed for Rhinitis  5/31/16   Francisco Connor,    albuterol sulfate  (90 BASE) MCG/ACT inhaler Inhale 2 puffs into the lungs every 6 hours as needed for Wheezing    Historical Provider, MD   albuterol (PROVENTIL) (2.5 MG/3ML) 0.083% nebulizer solution Take 2.5 mg by nebulization every 6 hours as needed for Wheezing    Historical Provider, MD   aspirin 81 MG EC tablet Take 81 mg by mouth daily    Historical Provider, MD   losartan (COZAAR) 100 MG tablet Take 100 mg by mouth daily     Historical Provider, MD   nitroGLYCERIN (NITROSTAT) 0.4 MG SL tablet Place 0.4 mg under the tongue every 5 minutes as needed for Chest pain    Historical Provider, MD   FLUoxetine (PROZAC) 20 MG capsule Take 40 mg by mouth daily     Historical Provider, MD   furosemide (LASIX) 20 MG tablet Take 20 mg by mouth daily as needed (swelling)     Historical Provider, MD   clopidogrel (PLAVIX) 75 MG tablet Take 75 mg by mouth daily    Historical Provider, MD   rOPINIRole (REQUIP) 0.25 MG tablet Take 0.25 mg by mouth nightly as needed     Historical Provider, MD   budesonide-formoterol (SYMBICORT) 160-4.5 MCG/ACT AERO Inhale 2 puffs into the lungs 2 times daily. Historical Provider, MD        Allergies:     Patient has no known allergies. Social History:     Tobacco:    reports that he quit smoking about 8 years ago. He started smoking about 64 years ago. He has a 42.00 pack-year smoking history. He has never used smokeless tobacco.  Alcohol:      reports no history of alcohol use.   Drug Use:  reports no history of drug use.    Family History:     History reviewed. No pertinent family history. Review of Systems:   Cannot be done because of patient condition    Physical Exam:     BP (!) 154/77   Pulse 72   Temp 97.5 °F (36.4 °C) (Axillary)   Resp 16   Ht 5' 10\" (1.778 m)   Wt 214 lb 4.6 oz (97.2 kg)   SpO2 100%   BMI 30.75 kg/m²   Temp (24hrs), Av.4 °F (36.3 °C), Min:96.9 °F (36.1 °C), Max:98.1 °F (36.7 °C)    Recent Labs     21  1419 21  1548 21  1656 21   POCGLU 140* 114* 124* 114*       Intake/Output Summary (Last 24 hours) at 8/3/2021 2139  Last data filed at 8/3/2021 1630  Gross per 24 hour   Intake 125 ml   Output --   Net 125 ml       General Appearance: Very sleepy, not answering questions  Mental status: Not oriented to person, place, and time with normal affect  Head:  normocephalic, atraumatic. Eye: no icterus, redness, pupils equal and reactive, extraocular eye movements intact, conjunctiva clear  Ear: normal external ear, no discharge, hearing intact  Nose:  no drainage noted  Mouth: mucous membranes moist  Neck: supple, no carotid bruits, thyroid not palpable  Lungs: Bilateral equal air entry, clear to ausculation, no wheezing, rales or rhonchi, normal effort  Cardiovascular: normal rate, regular rhythm, no murmur, gallop, rub.   Abdomen: Soft, nontender, nondistended, normal bowel sounds, no hepatomegaly or splenomegaly  Neurologic: Weakness in right upper and lower extremity, speech difficulties, right sided facial weakness  Skin: No gross lesions, rashes, bruising or bleeding on exposed skin area  Extremities:  peripheral pulses palpable, no pedal edema or calf pain with palpation    Investigations:      Laboratory Testing:  Recent Results (from the past 24 hour(s))   POC Glucose Fingerstick    Collection Time: 21  7:28 AM   Result Value Ref Range    POC Glucose 121 (H) 75 - 110 mg/dL   POC Glucose Fingerstick    Collection Time: 21 10:50 AM   Result Value Ref Range    POC Glucose 119 (H) 75 - 110 mg/dL   POC Glucose Fingerstick    Collection Time: 08/03/21  2:19 PM   Result Value Ref Range    POC Glucose 140 (H) 75 - 110 mg/dL   POC Glucose Fingerstick    Collection Time: 08/03/21  3:48 PM   Result Value Ref Range    POC Glucose 114 (H) 75 - 110 mg/dL   POC Glucose Fingerstick    Collection Time: 08/03/21  4:56 PM   Result Value Ref Range    POC Glucose 124 (H) 75 - 110 mg/dL   POC Glucose Fingerstick    Collection Time: 08/03/21  8:00 PM   Result Value Ref Range    POC Glucose 114 (H) 75 - 110 mg/dL           Consultations:   IP CONSULT TO DIETITIAN  IP CONSULT TO SOCIAL WORK  IP CONSULT TO INTERNAL MEDICINE  IP CONSULT TO ONCOLOGY  IP CONSULT TO INTERNAL MEDICINE  IP CONSULT TO PSYCHIATRY  IP CONSULT TO GENERAL SURGERY  Assessment :      Primary Problem  <principal problem not specified>    Active Hospital Problems    Diagnosis Date Noted    Depression [F32.9]     Acute CVA (cerebrovascular accident) ([facility] [address] [facility] [facility] [I63.9] 07/20/2021    Acute idiopathic thrombocytopenic purpura ([facility] [address] [D69.3] 07/16/2021    CAD S/P percutaneous coronary angioplasty [I25.10, Z98.61] 07/13/2021    Cerebrovascular accident (CVA) ([facility] [address] [I63.9] 07/13/2021    HTN (hypertension) [I10] 07/13/2021    Hyperlipidemia [E78.5] 07/13/2021    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 Acute ischemic stroke, patient is on aspirin, Plavix, Crestor  2. 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 On DVT prophylaxis with heparin  4. Hypertension, controlled  5. Diabetes, controlled  1 episode of bradycardia, will follow, may be due to benzodiazepine . 6. Incidental finding of thyroid nodule on CTA head and neck, will need outpatient ultrasound thyroid and biopsy  Dysphagia diet  Right hemipareis with speech dif  htn improved    augmentin for sinusitis  Prednisone for ?  ITP  Flat affect depression on prozac    Needs a lot of encouragement to eat    Watch platelet  cortisl levels 18  At risk of addisonian crisis  Case dw with dr Merari Fernandez  Patient is not taking anything oral with a dysphagia severe malnutrition risk    Case discussed with daughter  surg input noted  Planned peg tube for today  Iv fluids started for CELESTINO      Romulo Webb MD  8/3/2021  9:39 PM    Copy sent to Dr. Claudia Psoey primary care provider on file. Please note that this chart was generated using voice recognition Dragon dictation software. Although every effort was made to ensure the accuracy of this automated transcription, some errors in transcription may have occurred.

## 2021-08-04 NOTE — PLAN OF CARE
Nutrition Problem #1: Inadequate oral intake  Intervention: Food and/or Nutrient Delivery: Continue Current Diet, Start Tube Feeding  Nutritional Goals: Tube Feeding to meet approximately 100% of estimated needs

## 2021-08-04 NOTE — CONSULTS
Comprehensive Nutrition Assessment    Type and Reason for Visit:  Reassess, Consult (Tube Feeding Ordering and Management)    Nutrition Recommendations/Plan: Continue current diet. Initiate Tube Feeding: Jevity 1.2 at 20 mL per hr which provides 576 kcal and 27 gm protein. Nutrition Assessment:  PEG placed 8/3. Diet resumed and tube feeding to be initiated with Jevity 1.2 at 20 mL per hr. Will monitor tolerance and labs (if pt accepts lab draws); pt at risk for refeeding syndrome. Consider increase in tube feeding 8/5. Malnutrition Assessment:  Malnutrition Status: At risk for malnutrition (Comment)    Context:  Acute Illness     Findings of the 6 clinical characteristics of malnutrition:  Energy Intake:  7 - 50% or less of estimated energy requirements for 5 or more days  Weight Loss:  Unable to assess     Body Fat Loss:  Unable to assess     Muscle Mass Loss:  Unable to assess    Fluid Accumulation:  No significant fluid accumulation     Strength:  Not Performed    Estimated Daily Nutrient Needs:  Energy (kcal):  5346-7169 based on [place]na Kaur with 1.1-1.2 factor; Weight Used for Energy Requirements:  Admission     Protein (g):   based on 1.2-1.4 gm per kg; Weight Used for Protein Requirements:  Ideal        Fluid (ml/day):  0261-9397; Method Used for Fluid Requirements:  1 ml/kcal      Nutrition Related Findings:  Hypoactive bowel sounds. EGD with PEG placement 8/3: Likely Evans's esophagus. In the proximal stomach there could be a possible submucosal lesion. (Refer to surgeon's note). POC Glucose: 123, 139. IV: normal saline at 50 mL per hr. Wounds:  None       Current Nutrition Therapies:    ADULT DIET; Dysphagia - Pureed; Mildly Thick (Nectar)  ADULT TUBE FEEDING; PEG; Other Tube Feeding (specify); Jevity 1.2; Continuous; 20; No; 30; Q 8 hours  Current Tube Feeding (TF) Orders:  · Feeding Route: PEG  · Formula:  Other Tube Feeding (Comment) (Jevity 1.2)  · Schedule: Continuous  · Current TF & Flush Orders Provides: 576 kcal, 27 gm protein  · Goal TF & Flush Orders Provides: Jevity 1.2 at goal of 70 mL per hr would provide: 2016kcal, 93 gm protein and 1356 mL free fluid (without water flushes)      Anthropometric Measures:  · Height: 5' 10\" (177.8 cm)  · Current Body Weight: 214 lb 4.6 oz (97.2 kg)   · Admission Body Weight: 212 lb (96.2 kg)    · Usual Body Weight: 230 lb (104.3 kg) (stated)     · Ideal Body Weight: 166 lbs  · BMI: 30.7  · BMI Categories: Obese Class 1 (BMI 30.0-34. 9)       Nutrition Diagnosis:   · Inadequate oral intake related to  (current medical condition) as evidenced by NPO or clear liquid status due to medical condition, intake 0-25%, poor intake prior to admission    Nutrition Interventions:   Food and/or Nutrient Delivery:  Continue Current Diet, Start Tube Feeding  Nutrition Education/Counseling:  Education not indicated   Coordination of Nutrition Care:  Continue to monitor while inpatient    Goals:  Tube Feeding to meet approximately 100% of estimated needs       Nutrition Monitoring and Evaluation:   Behavioral-Environmental Outcomes:  None Identified   Food/Nutrient Intake Outcomes:  Food and Nutrient Intake  Physical Signs/Symptoms Outcomes:  Biochemical Data, GI Status, Skin, Weight, Fluid Status or Edema     Discharge Planning:    Continue current diet, Enteral Nutrition     Some areas of assessment may be incomplete due to standard COVID-19 Precautions. Rivas Kimbrough R.D., L.D.   Phone: [phone]

## 2021-08-04 NOTE — PROGRESS NOTES
[facility]   ACUTE REHABILITATION OCCUPATIONAL THERAPY  DAILY NOTE    Date: 21  Patient Name: Madhu Willson      Room: 8311/1168-44    MRN: [id number]   : 1945  (72 y.o.)  Gender: male   Referring Practitioner: Dr. Ivan Petit  Diagnosis: Ischemic CVA with R dominant hemiplegia  Additional Pertinent Hx: Madhu Willson is a 77 y.o. RHD male admitted to [facility] on 2021. On admit, exam significant for right-sided facial droop, right-sided weakness and severe dysarthria. Significant history with recurrent strokes, remote left temporal lobe ischemic infarct and remote bilateral occipital lobe infarcts . MRI shows Bilateral basal ganglia ischemic infarcts. Pt admitted to rehab unit on 21    Restrictions  Restrictions/Precautions: Up as Tolerated, General Precautions, Fall Risk, Swallowing - Thickened Liquids (Mildly thick (Nectar thick))  Other position/activity restrictions: Up w/assistance, RU/LE Weakness. MRI BRAIN- Acute infarct in the left and right basal ganglia greater on the left. Required Braces or Orthoses?: No  Equipment Used: Bed, Wheelchair, Other (SS)    Subjective  Subjective: Pt is silent during AM and PM session. [facility] pt with question which merit no response. Comments: PM: tx deferred for procedure  Patient Currently in Pain: Other (comment) (pt does not respond)  Restrictions/Precautions: Up as Tolerated;General Precautions; Fall Risk;Swallowing - Thickened Liquids  Overall Orientation Status: Impaired  Orientation Level: Unable to assess (Pt does not respond)  Patient Observation  Observations: Flat affect.  Slightly improved alertness through AM (eyes open occasionally, some/delayed response to exercise cues; no verbalization)       Objective  Perception  Overall Perceptual Status: Impaired  Unilateral Attention: Cues to attend to right side of body;Cues to maintain midline in sitting  Initiation: Cues to initiate tasks  Motor Planning: Hand over hand to sequence tasks  Balance  Sitting Balance: Maximum assistance  Standing Balance: Maximum assistance (max X2)  Bed mobility  Supine to Sit: Maximum assistance;2 Person assistance  Scooting: Maximal assistance;2 Person assistance  Transfers  Stand Pivot Transfers: Dependent/Total (with SS)  Sit to stand: Maximum assistance  Stand to sit: Maximum assistance  Transfer Comments: Pt sits EOB with eyes closed and R side lean. Requires hand over hand for grasping SS with L hand, max A X2 for standing/sitting safety, RUE mgmt, CGA standing in SS. Standing Balance  Time: 1-2 min   Activity: Transfer in SS   Comment: significant posterior lean                                 ADL  Feeding: NPO  Grooming: Dependent/Total  UE Bathing: Dependent/Total  LE Bathing: None  UE Dressing: Dependent/Total  LE Dressing: Dependent/Total  Toileting: Dependent/Total (brief change in bed)  Additional Comments: Pt laying in bed upon writer arrival, no response to simuli provided, upon max verbal and tactile cues. LBD completed while pt supine in bed. Assessment  Performance deficits / Impairments: Decreased ADL status; Decreased functional mobility ; Decreased ROM; Decreased strength;Decreased safe awareness;Decreased cognition;Decreased endurance;Decreased sensation;Decreased balance;Decreased high-level IADLs;Decreased fine motor control;Decreased coordination  Prognosis: Fair  Discharge Recommendations: Patient would benefit from continued therapy after discharge;24 hour supervision or assist  Activity Tolerance: Treatment limited secondary to decreased cognition  Safety Devices in place: Yes  Type of devices: Nurse notified; Left in chair;Call light within reach; Chair alarm in place (Telesitter, 1:1)  Restraints  Initially in place: No          Patient Education: OT POC, safety with functional transfers  Patient Goals   Patient goals : \"I want to go home\"  Learner:patient  Method: explanation       Outcome: needs reinforcement        Plan  Plan  Times per week: 900/7  Times per day: Twice a day  Current Treatment Recommendations: Self-Care / ADL, Strengthening, ROM, Balance Training, Functional Mobility Training, Endurance Training, Neuromuscular Re-education, Cognitive Reorientation, Pain Management, Safety Education & Training, Patient/Caregiver Education & Training, Equipment Evaluation, Education, & procurement, Home Management Training, Cognitive/Perceptual Training  Plan Comment: 900 minutes/week for combined therapy of PT/OT/ST due to decreased tolerance to activity.   Patient Goals   Patient goals : \"I want to go home\"  Short term goals  Time Frame for Short term goals: By 10 days  Short term goal 1: Pt will complete upper body dressing/bathing with Min A and good attention to task  Short term goal 2: Pt will complete lower body dressing/bathing with max A and good safety with use of AE as needed  Short term goal 3: Pt will complete functional transfers during self care tasks with mod A x 1 and good safety  Short term goal 4: Pt will tolerate standing 4+ minutes during functional activity of choice with min A and good safety  Short term goal 5: Pt will participate in 30+ minutes of therapeutic exercises/functional activities to increase safety and independence with self care tasks  Short term goal 6: Pt will complete self feeding with min A and good attention to task  Long term goals  Time Frame for Long term goals : By discharge  Long term goal 1: Pt will complete upper body dressing with set-up assistance and good attention to task  Long term goal 2: Pt will complete lower body dressing/bathing with min A and good safety with use of AE as needed  Long term goal 3: Pt will complete functional transfers/mobility during self care tasks with min A and good safety  Long term goal 4: Pt will tolerate standing 8+ minutes during functional activity of choice with CGA and good safety  Long term goal 5: Pt will verbalize/demonstrate good understanding of adaptive equipment/adaptive strategies/durable medical equipment to increase safety and independence with self care and mobility  Long term goals 6: Pt will complete self feeding with set-up assistance and good attention to task  Long term goal 7: Vision to be further assessed        08/04/21 1457 08/04/21 1501   OT Individual Minutes   Time In 0739  --    Time Out 0829  --    Minutes 50  --    Minute Variance   Variance  --  40   Reason  --  Procedure     Electronically signed by FLORIAN Kathleen on 8/4/21 at 2:59 PM EDT

## 2021-08-04 NOTE — PROGRESS NOTES
Speech Language Pathology  Speech Language Pathology  [facility]    Speech Language/DysphagiaTreatment Note    Date: 8/4/2021  Patients Name: Irish Schultz  MRN: [id number]  Diagnosis:   Patient Active Problem List   Diagnosis Code    Centrilobular emphysema (HCC) J43.2    Mixed restrictive and obstructive lung disease ([facility] J43.9, J98.4    Cerebrovascular accident (CVA) ([facility] I63.9    COPD (chronic obstructive pulmonary disease) ([facility] J44.9    HTN (hypertension) I10    Diabetes 1.5, managed as type 2 ([facility] E13.9    Hyperlipidemia E78.5    CAD S/P percutaneous coronary angioplasty I25.10, Z98.61    Rhabdomyolysis M62.82    Intracranial atherosclerosis I67.2    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 I66.21    Thrombocytopenia ([facility] D69.6    Right sided weakness R53.1    Noncompliance with medications Z91.14    Acute idiopathic thrombocytopenic purpura (HCC) D69.3    Acute CVA (cerebrovascular accident) ([facility] I63.9    Depression F32.9       Pain: pt. Not responding, no overt s/s pain demonstrated    Speech and Language Treatment  Treatment time: 6106-4682    Subjective: [] Alert [] Cooperative     [] Confused     [] Agitated    [x] Lethargic    Objective/Assessment:  Auditory Comprehension: Pt. not responding to any writer questions despite MAX cues. Pt. Not attempting to follow simple directions despite tactile cues, pt. Allowed ST to give hand over hand assist 3/5 trials. Pt. Mostly resistive to ST providing hand over hand assist.     Verbal expression:  No attempts made at communication. Speech: Pt not responding to verbal stimuli, remaining with eyes closed, non verbal throughout entire session. Voice: Pt. did not vocalize or verbalize during session. Dysphagia:   Unable to follow cues to attempt oral motor exercises. Pt. Sister Wild Samples present, stated he has not attempted to respond to her but did look at her when she entered room.       Plan: [x] Continue ST services    [] Discharge from ST:      Discharge recommendations: [] Inpatient Rehab   [] [place]   [] Outpatient Therapy  [] Follow up at trauma clinic   [] Other:       Treatment completed by: Guanako Ahn A.CCC/SLP

## 2021-08-04 NOTE — PROGRESS NOTES
Discussed with nurse taking care of the patient. Tube feeds have been started. Vital signs are stable. PEG site dressing is dry. Patient is not trying to pull out the tube. Sitter at the bedside. Continue tube feeds only at 20 ml/hr tonight. Will advance tube feeds tomorrow.

## 2021-08-04 NOTE — PROGRESS NOTES
Physical Medicine & Rehabilitation  Progress Note      Subjective: Mireya Byrd is a 77 y.o. male with ischemic CVA left PCA, RAHEEM, and MCA with right dominant hemiparesis. He continues to be minimally interactive with exam.  He does not answer questions. Sister present at bedside. Medications resumed via PEG last night. Spoke with dietician regarding starting tube feeds today. CT head completed with no changes. ROS:  Unable to assess    Rehabilitation:   Progressing in therapies. PT:  Restrictions/Precautions: Up as Tolerated, General Precautions, Fall Risk, Swallowing - Thickened Liquids (Mildly thick (Nectar thick))  Other position/activity restrictions: Up w/assistance, RU/LE Weakness. MRI BRAIN- Acute infarct in the left and right basal ganglia greater on the left. Transfers  Sit to Stand: 2 Person Assistance, Maximum Assistance (walker lift. Very slow moving. )  Stand to sit: 2 Person Assistance, Minimal Assistance (B UE assist to chair from walker lift; able to stand while lift is lowered.)  Bed to Chair: Dependent/Total (hemiwalker)  Stand Pivot Transfers: Dependent/Total (x3)  Lateral Transfers: Maximum Assistance (hemiwalker)  Comment: B UE support on elevated walker  Ambulation 1  Surface: level tile  Device:  (Green Walker Lift)  Other Apparatus: Right, AFO, Slider, Wheelchair follow  Assistance: 2 Person assistance, Maximum assistance  Quality of Gait: forward flexed hips, max assist to advance R LE. Verbal cues to increase L step length with G response, repeated cues necessary. Gait Deviations: Slow Anais, Decreased step length, Decreased step height, Decreased head and trunk rotation  Distance: 76' with standing rest break after 60'  Comments: Cues to shift weight initially with G return, no further assist necessary except occasional cue. Transfers  Sit to Stand: 2 Person Assistance, Maximum Assistance (walker lift.  Very slow moving. )  Stand to sit: 2 Person Assistance, Minimal Assistance (B UE assist to chair from walker lift; able to stand while lift is lowered.)  Bed to Chair: Dependent/Total (hemiwalker)  Stand Pivot Transfers: Dependent/Total (x3)  Lateral Transfers: Maximum Assistance (hemiwalker)  Comment: B UE support on elevated walker  Ambulation  Ambulation?: Yes  More Ambulation?: Yes  Ambulation 1  Surface: level tile  Device:  (Green Walker Lift)  Other Apparatus: Right, AFO, Slider, Wheelchair follow  Assistance: 2 Person assistance, Maximum assistance  Quality of Gait: forward flexed hips, max assist to advance R LE. Verbal cues to increase L step length with G response, repeated cues necessary. Gait Deviations: Slow Anais, Decreased step length, Decreased step height, Decreased head and trunk rotation  Distance: 76' with standing rest break after 60'  Comments: Cues to shift weight initially with G return, no further assist necessary except occasional cue. Surface: level tile  Ambulation 1  Surface: level tile  Device:  (Green Walker Lift)  Other Apparatus: Right, AFO, Slider, Wheelchair follow  Assistance: 2 Person assistance, Maximum assistance  Quality of Gait: forward flexed hips, max assist to advance R LE. Verbal cues to increase L step length with G response, repeated cues necessary. Gait Deviations: Slow Anais, Decreased step length, Decreased step height, Decreased head and trunk rotation  Distance: 76' with standing rest break after 60'  Comments: Cues to shift weight initially with G return, no further assist necessary except occasional cue. OT:  ADL  Feeding: NPO  Grooming: Dependent/Total  UE Bathing: Dependent/Total  LE Bathing: None  UE Dressing: Dependent/Total  LE Dressing: Dependent/Total  Toileting: Dependent/Total (brief change in bed)  Additional Comments: Pt laying in bed upon writer arrival, no response to simuli provided, upon max verbal and tactile cues. LBD completed while pt supine in bed.            Balance  Sitting Balance: Maximum assistance  Standing Balance: Maximum assistance (max X2)   Standing Balance  Time: 1-2 min   Activity: Transfer in SS   Comment: significant posterior lean         Bed mobility  Rolling to Left: Maximum assistance  Rolling to Right: Moderate assistance  Supine to Sit: Maximum assistance, 2 Person assistance  Sit to Supine: Maximum assistance  Scooting: Maximal assistance, 2 Person assistance  Comment: Patient in chair upon arrival and left in w/c with chair alarm in place. Transfers  Stand Pivot Transfers: Dependent/Total (with SS)  Sit to stand: Maximum assistance  Stand to sit: Maximum assistance  Transfer Comments: Pt sits EOB with eyes closed and R side lean. Requires hand over hand for grasping SS with L hand, max A X2 for standing/sitting safety, RUE mgmt, CGA standing in SS. Toilet Transfers  Toilet - Technique: Stand pivot  Equipment Used: Raised toilet seat with rails  Toilet Transfer: Maximum assistance, 2 Person assistance (max A + mod A)  Toilet Transfers Comments: Verbal cues for safety and hand placement. Wheelchair [employer] Transfers  Equipment Used: [employer], Wheelchair, Other ()  Level of Asssistance: Dependent/Total, 2 Person assistance    SPEECH:  Subjective: []? Alert     []? Cooperative     []? Confused     []? Agitated    [x]? Lethargic     Objective/Assessment:  Auditory Comprehension: Pt. not responding to any writer questions despite MAX cues. Pt. Not attempting to follow simple directions despite tactile cues, pt. Allowed ST to give hand over hand assist 3/5 trials. Pt. Mostly resistive to ST providing hand over hand assist.      Verbal expression:  No attempts made at communication.      Speech: Pt not responding to verbal stimuli, remaining with eyes closed, non verbal throughout entire session.       Voice: Pt. did not vocalize or verbalize during session.      Dysphagia:   Unable to follow cues to attempt oral motor exercises.        Pt.  Sister Delaney Spain present, stated he has not attempted to respond to her but did look at her when she entered room. Subjective: []? Alert     []? Cooperative     []? Confused     []? Agitated    [x]? Lethargic     Objective/Assessment:  Auditory Comprehension: Pt. not responding to any writer questions despite MAX cues. Pt. Attempting to open eyes on command ~50% of the time as instructed by ST.      Verbal expression:  No attempts made at communication. Pt. Unable to attempt confrontation naming despite max cues.      Speech: Pt not responding to verbal stimuli, remaining with eyes closed, non verbal throughout entire session.       Voice: Pt. did not vocalize or verbalize during session.      Dysphagia:   Pt. Followed directions for oral motor exercises as initiated by 1:1 sitter, lingual protrusion, pt. Able to complete x4. Significantly decreased ROM demonstrated. Pt. Unable to attempt other exercises presented by ST.      Pt. Initially with eyes open, 1:1 sitter reports pt. Has been very alert this pm.  Pt. Closed eyes when ST sat in front of him and began talking to him.      Rehab aide reports pt. Responded \"good\" when she entered room to get him for PT this pm and proceeded to verbalize what seemed to be the length of 2 sentences, however, she reports this utterance to be unintelligible. Pt. Stopped responding, she reports, when he got to PT gym.      Current Medications:   Current Facility-Administered Medications: losartan (COZAAR) tablet 50 mg, 50 mg, Oral, Daily  ceFAZolin (ANCEF) 2000 mg in dextrose 5 % 50 mL IVPB, 2,000 mg, Intravenous, Once  aspirin chewable tablet 81 mg, 81 mg, PEG Tube, Daily  clopidogrel (PLAVIX) tablet 75 mg, 75 mg, PEG Tube, Daily  heparin (porcine) injection 5,000 Units, 5,000 Units, Subcutaneous, 3 times per day  polyethylene glycol (GLYCOLAX) packet 17 g, 17 g, Per G Tube, Daily PRN  finasteride (PROSCAR) tablet 5 mg, 5 mg, PEG Tube, Daily  famotidine (PEPCID) tablet 20 mg, 20 mg, PEG Tube, Daily  FLUoxetine (PROZAC) 20 MG/5ML solution 40 mg, 40 mg, PEG Tube, Daily  melatonin tablet 6 mg, 6 mg, PEG Tube, Nightly  rosuvastatin (CRESTOR) tablet 40 mg, 40 mg, PEG Tube, Nightly  isosorbide dinitrate (ISORDIL) tablet 10 mg, 10 mg, PEG Tube, TID  tiotropium (SPIRIVA RESPIMAT) 2.5 MCG/ACT inhaler 2 puff, 2 puff, Inhalation, Lunch  albuterol sulfate  (90 Base) MCG/ACT inhaler 2 puff, 2 puff, Inhalation, Q6H PRN  [Held by provider] hydrALAZINE (APRESOLINE) tablet 10 mg, 10 mg, Oral, 3 times per day  acetaminophen (TYLENOL) tablet 650 mg, 650 mg, Oral, Q4H PRN  senna (SENOKOT) tablet 17.2 mg, 2 tablet, Oral, Daily PRN  bisacodyl (DULCOLAX) suppository 10 mg, 10 mg, Rectal, Daily PRN  insulin lispro (HUMALOG) injection vial 0-6 Units, 0-6 Units, Subcutaneous, TID WC  insulin lispro (HUMALOG) injection vial 0-3 Units, 0-3 Units, Subcutaneous, Nightly  glucose (GLUTOSE) 40 % oral gel 15 g, 15 g, Oral, PRN  dextrose 50 % IV solution, 12.5 g, Intravenous, PRN  glucagon (rDNA) injection 1 mg, 1 mg, Intramuscular, PRN  dextrose 5 % solution, 100 mL/hr, Intravenous, PRN      Objective:  BP (!) 146/74   Pulse 61   Temp 98.3 °F (36.8 °C) (Axillary)   Resp 16   Ht 5' 10\" (1.778 m)   Wt 214 lb 4.6 oz (97.2 kg)   SpO2 99%   BMI 30.75 kg/m²       GEN: Well developed, no acute distress  HEENT: NCAT. Eyes closed throughout most of evaluation. RESP: Normal breath sounds with no wheezing, rales, or rhonchi. Respirations WNL and unlabored. CV: Regular rate and rhythm. No murmurs, rubs, or gallops. ABD: Soft, non-distended, BS+ and equal.  NEURO:  Eyes closed throughout most of evaluation. Not answering questions. Appears to have delayed processing. MSK:  Muscle bulk is normal bilaterally. Minimal movement noted in all limbs. LIMBS: No edema in bilateral lower limbs. SKIN: Warm and dry with good turgor. PSYCH:  Flat affect.     Diagnostics:     CBC:   Recent Labs     08/04/21  1629   WBC 10.4   RBC added olanzapine on 7/30 (olanzapine discontinued at this time as this medication can be sedating). Discussed with IM - consult placed on 7/30 for general surgery to evaluate for possible PEG tube placement, as patient has not been maintaining nutrition, hydration, or medication regimen. Discussed PEG tube with family as well, as patient does not have decision-making capacity at this time. PEG tube placed 8/3 with sister's consent. Medications resumed via PEG on 8/3, tube feeds started 8/4 per dietician recommendations. Will monitor labs for any signs of refeeding syndrome. 4. CELESTINO:  Secondary to dehydration. Creatinine 1.89 on 7/31, creatinine 1.48 on 8/4 (?may be around patient's baseline). Maintenance IV fluids discontinued after tube feeds started today. 5. Sinusitis: Resolved. Was on augmentin  6. Insomnia: Scheduled melatonin started on 7/25  7. Thrombocytopenia: Hematology following. Was on prednisone - notified hematology that patient was refusing medications - okay for him to remain off of steroids at this time and monitor platelets. Platelets overall improved (121 on 8/4), OK for DVT prophylaxis. 8. Anemia:  Hemoglobin 11.9 on 8/4, stable. Will monitor. 9. Leukocytosis:  Resolved. Attributed to prednisone. Will monitor. 10. HTN/CAD: On hydralazine (currently on hold), Imdur - switched to isordil on 8/3, losartan - IM resumed this medication at a lower dose on 8/4  11. DM II: On humalog sliding scale  12. COPD:  On tiotropium. Has albuterol prn. 13. Restless Leg Syndrome: On ropinirole - discontinued for now in order to minimize medications. Will monitor. 14. Depression: On fluoxetine. Psych consult and recommendations as above. 15. GERD: On famotidine  16. BPH: On finasteride, tamsulosin - discontinued as this medication cannot be crushed  17. Thyroid nodule: For outpatient monitoring  18. Bowel Management: Miralax daily prn, Senokot prn, Dulcolax prn  19.  Internal medicine for medical management  20. DVT prophylaxis:  On heparin  21. Discussed recommendation for discharge to SNF with patient's family along with  on 7/30. Family provided a few choices. As patient has had limited participation in therapies, will plan to discharge him to SNF as soon as it is safe.       Electronically signed by Eun Guajardo MD on 8/4/2021 at 6:13 PM

## 2021-08-04 NOTE — PROGRESS NOTES
Received a telephone call from Dr. Cristopher Diez with a new order to restart Cozaar 50 mg po daily, with first dose now.

## 2021-08-04 NOTE — CONSULTS
Department of Psychiatry  Behavioral Health progress note    REASON FOR CONSULT: Agitation    History obtained from: Patient and medical record    Interim history on 8/4/2021-    Noted that the patient has received a PEG tube yesterday. The patient was seen at bedside. His sister and his RN were present. The patient has not spoken to either of them since morning. I attempted to engage the patient in conversation but he made no verbal response. He made intermittent eye contact. He continues to have significant psychomotor retardation. I have noted that the patient has not received Prozac for the last 2 days. He has received it today. We will continue Prozac at 40 mg at this time. Interim history 8/2/21-  The patient has had episodes of agitation over the weekend. The patient was seen at bedside. He continues to be mute and does not answer any questions. He makes intermittent eye contact but he does not offer a lot his head to answer questions. I have noted that the patient has been taking his medication Prozac regularly. We will reduce the dose as 60 mg taken every day may be too high for the patient. I have been informed that the patient will receive a PEG tube placement tomorrow. That will open up medication options.       Interim history: 7/30/2021  Patient has been seen and examined at the bedside, no major event happened over the night, patient noted to be presented as reviewed in the past, reportedly he said 1 symptoms that he is feeling fine, constricted in his affect, come across as depressed, patient has been refusing oral medications, patient took 3 doses of Prozac in the last 9 days, we will increase the dose of Prozac to 60 mg at this is a long-acting medication and even intermittent use may help,      HISTORY OF PRESENT ILLNESS:    67-year-old male who was admitted previously for management of cerebrovascular accidents, presents to the emergency department with a chief complaint of being found down by the stairs, 911 has been called, found to have right facial weakness, right-sided arm and leg weakness, dysarthria, patient has multiple ecchymosis, on right side of the arm and chest,  CT head: New ovoid low-attenuation area in the left frontal corona radiata compatible with acute/subacute infarction, that measured 2 x 1.4 cm no associated hemorrhage, diffuse parenchymal volume loss and sequela of severe chronic microvascular ischemic changes,  -Neuro is on board MRI brain W0: Acute infarct in left and right basal ganglia, left more than right,  -Resume on aspirin and Plavix 75 mg, Lipitor,  -CTA head and neck: Severe stenosis in V1 segment of left vertebral artery extensive intracranial atherosclerotic plaque with near complete occlusion of right PCA, severe stenosis of proximal A3 segment of RAHEEM, moderate to severe proximal M2 segment stenosis of left MCA,       Patient admitted to rehab afterwards for right hemiparesis, patient was extremely agitated overnight, was given Ativan around 2 AM in the night,  -Patient failed telemetry sitter over the weekend, resumed 1:1, on Seroquel as needed, he did not require since 7/23, no sedating medication on board,  -Patient current medication Prozac 40 mg daily,    The patient is currently receiving care for the above psychiatric illness.   Patient is totally mute, he is not cooperative, he is not following commands, refusing meds, trying to pull lines sometimes,      Psychiatric Review of Systems :      ·    Obsessions and Compulsions: Denies    ·    Angelina or Hypomania: Denies  ·    Hallucinations: Denies  ·    Panic Attacks:  Denies  ·    Delusions:  Denies  ·    Phobias:  Denies  ·    Trauma: Denies      Substance Abuse History:  Unknown history    Past Psychiatric History:  Prior Diagnosis:     Hospitalization: yes  Hx of Suicidal Attempts: no  Hx of violence:  no      Past Medical History:        Diagnosis Date    [facility]     COPD [facility] obstructive [facility] disease) [facility]     Depression     Diabetes mellitus ([facility]     pt refuses to take previously prescribed metformin (states PCP aware)    Former smoker     Hyperlipidemia     on 4/27/18 pt states \"I stopped taking that about a year ago\"    Hypertension     Mixed restrictive and obstructive lung disease ([facility] [address] [facility]     Need for pneumococcal vaccine     Personal history of tobacco use        Past Surgical History:        Procedure Laterality Date    CARDIAC SURGERY  07/2015    1 stent    NECK SURGERY      TOE AMPUTATION Right greater    UPPER GASTROINTESTINAL ENDOSCOPY N/A 8/3/2021    EGD  PEG TUBE INSERTION performed by Brea Garay MD at [address] [place] [address]         Medications Prior to Admission:   Medications Prior to Admission: Heparin Sodium, Porcine, (HEPARIN, PORCINE,) 5000 UNIT/ML injection, Inject 1 mL into the skin every 8 hours  QUEtiapine (SEROQUEL) 25 MG tablet, Take 1 tablet by mouth nightly as needed for Agitation  methylPREDNISolone sodium (SOLU-MEDROL) 40 MG injection, Infuse 1 mL intravenously every 12 hours  melatonin 3 MG TABS tablet, Take 1 tablet by mouth nightly as needed (insomnia)  atorvastatin (LIPITOR) 80 MG tablet, Take 0.5 tablets by mouth daily (Pt takes one-half of an 80mg tab = 40mg)  tiZANidine (ZANAFLEX) 2 MG tablet, Take 1 tablet by mouth 4 times daily as needed (muscle spasms)  naproxen (NAPROSYN) 500 MG tablet, Take 1 tablet by mouth 2 times daily (with meals)  ibuprofen (ADVIL;MOTRIN) 800 MG tablet, Take 1 tablet by mouth every 8 hours as needed for Pain  lidocaine (LIDODERM) 5 %, Place 1 patch onto the skin daily 12 hours on, 12 hours off.  metoprolol succinate (TOPROL XL) 50 MG extended release tablet, Take 50 mg by mouth daily  tiotropium (SPIRIVA RESPIMAT) 2.5 MCG/ACT AERS inhaler, Inhale 2 puffs into the lungs daily  carboxymethylcellulose 1 % ophthalmic solution, Place 1 drop into both eyes 3 times daily as needed for Dry Eyes   ketotifen (ZADITOR) 0.025 % ophthalmic solution, Place 1 drop into both eyes 2 times daily as needed (itchy eyes)   isosorbide mononitrate (IMDUR) 60 MG extended release tablet, Take 30 mg by mouth daily Indications: 1/2 tablet (30mg)   finasteride (PROSCAR) 5 MG tablet, Take 5 mg by mouth daily  tamsulosin (FLOMAX) 0.4 MG capsule, Take 0.4 mg by mouth daily  fluticasone (FLONASE) 50 MCG/ACT nasal spray, 1 spray by Nasal route 2 times daily (Patient taking differently: 1 spray by Nasal route 2 times daily as needed for Rhinitis )  albuterol sulfate  (90 BASE) MCG/ACT inhaler, Inhale 2 puffs into the lungs every 6 hours as needed for Wheezing  albuterol (PROVENTIL) (2.5 MG/3ML) 0.083% nebulizer solution, Take 2.5 mg by nebulization every 6 hours as needed for Wheezing  aspirin 81 MG EC tablet, Take 81 mg by mouth daily  losartan (COZAAR) 100 MG tablet, Take 100 mg by mouth daily   nitroGLYCERIN (NITROSTAT) 0.4 MG SL tablet, Place 0.4 mg under the tongue every 5 minutes as needed for Chest pain  FLUoxetine (PROZAC) 20 MG capsule, Take 40 mg by mouth daily   furosemide (LASIX) 20 MG tablet, Take 20 mg by mouth daily as needed (swelling)   clopidogrel (PLAVIX) 75 MG tablet, Take 75 mg by mouth daily  rOPINIRole (REQUIP) 0.25 MG tablet, Take 0.25 mg by mouth nightly as needed   budesonide-formoterol (SYMBICORT) 160-4.5 MCG/ACT AERO, Inhale 2 puffs into the lungs 2 times daily. Allergies:  Patient has no known allergies. FAMILY/SOCIAL HISTORY:  History reviewed. No pertinent family history.   Social History     Socioeconomic History    Marital status:      Spouse name: Not on file    Number of children: Not on file    Years of education: Not on file    Highest education level: Not on file   Occupational History    Not on file   Tobacco Use    Smoking status: Former Smoker     Packs/day: 0.75     Years: 56.00     Pack years: 42.00     Start date: 1/1/1957     Quit date: 7/9/2013     Years since quitting: 8.0    Smokeless tobacco: Never Used   Vaping Use    Vaping Use: Never used   Substance and Sexual Activity    Alcohol use: No    Drug use: No    Sexual activity: Not on file   Other Topics Concern    Not on file   Social History Narrative    Not on file     Social Determinants of Health     Financial Resource Strain:     Difficulty of Paying Living Expenses:    Food Insecurity:     Worried About Running Out of Food in the Last Year:     [address] in the Last Year:    Transportation Needs:     Lack of Transportation (Medical):      Lack of Transportation (Non-Medical):    Physical Activity:     Days of Exercise per Week:     Minutes of Exercise per Session:    Stress:     Feeling of Stress :    Social Connections:     Frequency of Communication with Friends and Family:     Frequency of Social Gatherings with Friends and Family:     Attends Holiness Services:     Active Member of Clubs or Organizations:     Attends Club or Organization Meetings:     Marital Status:    Intimate Partner Violence:     Fear of Current or Ex-Partner:     Emotionally Abused:     Physically Abused:     Sexually Abused:        REVIEW OF SYSTEMS    Constitutional: [] fever  [] chills  [] weight loss  []weakness [] Other:  Eyes:  [] photophobia  [] discharge [] acuity change   [] Diplopia   [] Other:  HENT:  [] sore throat  [] ear pain [] Tinnitus   [] Other  Respiratory:  [] Cough  [] Shortness of breath   [] Sputum   [] Other:   Cardiac: []Chest pain   []Palpitations []Edema  []PND  [] Other:  GI:  []Abdominal pain   []Nausea  []Vomiting  []Diarrhea  [] Other:  :  [] Dysuria   []Frequency  []Hematuria  []Discharge  [] Other:  Possible Pregnancy: []Yes   []No   LMP:   Musculoskeletal:  []Back pain  []Neck pain  []Recent Injury   Skin:  []Rash  [] Itching  [] Other:  Neurologic:  [] Headache  [] Focal weakness  [] Sensory changes []Other:  Endocrine:  [] Polyuria  [] Polydipsia  [] Hair Loss  [] Other:  Lymphatic:   [] Swollen glands   Psychiatric:  As per HPI      All other systems negative except as marked or mentioned/indicated in the HPI. Americo Kruger PHYSICAL EXAM:  Vitals:  BP (!) 146/74   Pulse 61   Temp 98.3 °F (36.8 °C) (Axillary)   Resp 16   Ht 5' 10\" (1.778 m)   Wt 214 lb 4.6 oz (97.2 kg)   SpO2 99%   BMI 30.75 kg/m²        Mental Status Examination:    Level of consciousness: Awake appearance: Sitting in chair behavior/Motor:  Psychomotor retardation, makes eye contact  Attitude toward examiner:   noncooperative  Speech: Mute,  Mood: Constricted   affect: Blunted  Thought processes: Unable to assess  Thought content: Unable to assess   Cognition: Unable to assess   Concentration: Unable to assess  Memory unable to assess  Insight unable to assess  Fund of Knowledge unable to assess      LABS: REVIEWED TODAY:  No results for input(s): WBC, HGB, PLT in the last 72 hours. No results for input(s): NA, K, CL, CO2, BUN, CREATININE, GLUCOSE in the last 72 hours. No results for input(s): BILITOT, ALKPHOS, AST, ALT in the last 72 hours.   Lab Results   Component Value Date    BARBSCNU NEGATIVE 07/13/2021    LABBENZ NEGATIVE 07/13/2021    LABMETH NEGATIVE 07/13/2021    PPXUR NOT REPORTED 07/13/2021     Lab Results   Component Value Date    TSH 0.28 07/14/2021     No results found for: LITHIUM  No results found for: VALPROATE, CBMZ  No results found for: LITHIUM, VALPROATE    FURTHER LABS ORDERED :      Radiology   ECHO Complete 2D W Doppler W Color    Result Date: 7/15/2021  Transthoracic Echocardiography Report (TTE)  Patient Name GOFF      Date of Study               07/15/2021               YAYA DUBOIS   Date of      1945  Gender                      Male  Birth   Age          77 year(s)  Race                        Black   Room Number  0536        Height:                     70 inch, 177.8 cm   Corporate ID [id number]    Weight:                     210 pounds, 95.3 kg  #   Patient [place] [de-identified] BSA:          2.13 m^2      BMI:       30.13  #                                                               kg/m^2   MR #         [id number]     Sonographer                 Antony Gotti   Accession #  [id number]  Interpreting Physician      Yolande Vance   Fellow                   Referring Nurse                           Practitioner   Interpreting             Referring Physician         Theresa Short MD  Fellow  Additional Comments Technically difficult study, patient supine unable to be positioned for better acoustic windows. Type of Study   TTE procedure:2D Echocardiogram, M-Mode, Doppler, Color Doppler. Procedure Date Date: 07/15/2021 Start: 08:29 AM Study Location: [facility] Technical Quality: Adequate visualization Indications:CVA. History / Tech. Comments: Procedure explained to patient. Echo completed in the Echo Lab. RN performed bubble study. PMHx: Former smoker, COPD Hypertension, Diabetes, Hyperlipidemia Coronary artery disease, s/p stents Patient Status: Inpatient Height: 70 inches Weight: 210 pounds BSA: 2.13 m^2 BMI: 30.13 kg/m^2 CONCLUSIONS Summary Technically difficult study. Overall global left ventricular systolic function is normal, calculated ejection fraction is 50-55% Grade I (mild) left ventricular diastolic dysfunction. Technically difficult visualization of the right ventricle, but it does appear to be normal in size. Negative bubble study, no obvious intracardiac shunt noted within technical limitations of this study. No significant valvular regurgitation or stenosis seen. No significant pericardial effusion is seen.  Signature ----------------------------------------------------------------------------  Electronically signed by Joanne Maxwell(Sonographer) on 07/15/2021 11:00 AM ---------------------------------------------------------------------------- ----------------------------------------------------------------------------  Electronically signed by Yolande Vance(Interpreting physician) on  07/15/2021 12:49 PM ---------------------------------------------------------------------------- FINDINGS Left Atrium Left atrium is normal in size. Inter-atrial septum is intact with no evidence for an atrial septal defect. Negative bubble study, no shunt noted. Left Ventricle Left ventricle is normal in size, normal wall thickness, global left ventricular systolic function is low normal, calculated ejection fraction is 50%. Grade I (mild) left ventricular diastolic dysfunction. Right Atrium Right atrium is normal in size. Right Ventricle Technically difficult visualization of the right ventricle, but it does appear to be normal in size. Mitral Valve Normal mitral valve structure. No mitral stenosis. Trivial mitral regurgitation. Aortic Valve Aortic valve is trileaflet. No evidence of aortic insufficiency or stenosis. Tricuspid Valve Tricuspid valve was not well visualized. Trivial tricuspid regurgitation. Insignificant tricuspid regurgitation, unable to estimate RVSP. Pulmonic Valve Pulmonic valve not well visualized but Doppler velocities are normal. Trivial pulmonic insufficiency. Pericardial Effusion No significant pericardial effusion is seen. Miscellaneous Normal aortic root dimension. E/E' average = 17.35. IVC not well visualized.  M-mode / 2D Measurements & Calculations:   LVIDd:5.6 cm(3.7 - 5.6 cm)        Diastolic DYJUXD:14.30 ml  IVSd:0.9 cm(0.6 - 1.1 cm)         Systolic WZIINO:40.47 ml  LVPWd:0.8 cm(0.6 - 1.1 cm)        Aortic Root:3 cm(2.0 - 3.7 cm)                                    LA Dimension: 3.2 cm(1.9 - 4.0 cm)  Calculated LVEF (%): 50.46 %      LA volume/Index: 48.86 ml /23m^2                                    LVOT:2.1 cm                                    RVDd:3 cm   Mitral:                                 Aortic   Valve Area (P1/2-Time): 2.65 cm^2       Peak Velocity: 1.91 m/s  Peak E-Wave: 1.18 m/s                   Mean Velocity: 1.06 m/s Peak A-Wave: 1.47 m/s                   Peak Gradient: 14.59 mmHg  E/A Ratio: 0.8                          Mean Gradient: 6 mmHg  Peak Gradient: 5.57 mmHg  Mean Gradient: 2 mmHg  Deceleration Time: 280 msec             Area (continuity): 2.83 cm^2  P1/2t: 83 msec                          AV VTI: 41.8 cm   Area (continuity): 2.2 cm^2  Mean Velocity: 0.62 m/s                                           Pulmonic:                                           Peak Velocity: 1.29 m/s                                          Peak Gradient: 6.66 mmHg  Diastology / Tissue Doppler Septal Wall E' velocity:0.06 m/s Septal Wall E/E':20.8 Lateral Wall E' velocity:0.08 m/s Lateral Wall E/E':13.9    CT HEAD WO CONTRAST    Result Date: 7/21/2021  EXAMINATION: CT OF THE HEAD WITHOUT CONTRAST  7/21/2021 11:53 am TECHNIQUE: CT of the head was performed without the administration of intravenous contrast. Dose modulation, iterative reconstruction, and/or weight based adjustment of the mA/kV was utilized to reduce the radiation dose to as low as reasonably achievable. COMPARISON: July 17, 2021, MRI July 14, 2021 HISTORY: ORDERING SYSTEM PROVIDED HISTORY: Altered mental status TECHNOLOGIST PROVIDED HISTORY: eval for change in status Reason for Exam: EVAL FOR CHANGE IN STATUS Type of Exam: Subsequent/Follow-up Additional signs and symptoms: PT BECAME AGTATED & COMBATIVE OVERNIGHT Relevant Medical/Surgical History: HX STROKE FINDINGS: BRAIN/VENTRICLES: No acute intracranial hemorrhage, mass effect or midline shift. Area of hypoattenuation within the left basal ganglia and small foci of hypoattenuation in the right basal ganglia compatible with subacute infarct. Diffuse cortical volume loss and compensatory ventricular enlargement appears unchanged. Periventricular and subcortical white matter hypoattenuation redemonstrated bilaterally compatible with severe chronic microvascular ischemic changes.   Encephalomalacia of the right caudate redemonstrated compatible with remote lacunar infarct. ORBITS: The visualized portion of the orbits demonstrate no acute abnormality. SINUSES: Air-fluid level within the left sphenoid sinus. Paranasal sinuses and mastoid air cells otherwise clear. SOFT TISSUES/SKULL:  No acute abnormality of the visualized skull or soft tissues. Subacute basal ganglia infarcts redemonstrated. No gross hemorrhagic conversion or significant mass effect. New air-fluid level within the left sphenoid sinus suggesting acute sinusitis. CT HEAD WO CONTRAST    Result Date: 7/17/2021  EXAMINATION: CT OF THE HEAD WITHOUT CONTRAST  7/17/2021 10:46 am TECHNIQUE: CT of the head was performed without the administration of intravenous contrast. Dose modulation, iterative reconstruction, and/or weight based adjustment of the mA/kV was utilized to reduce the radiation dose to as low as reasonably achievable. COMPARISON: None. HISTORY: ORDERING SYSTEM PROVIDED HISTORY: change in mentation TECHNOLOGIST PROVIDED HISTORY: change in mentation Reason for Exam: change in mentation FINDINGS: BRAIN/VENTRICLES: Small area of hypodensity in the left corona radiata is similar in appearance to the previous exam.  There is no evidence of hemorrhage. No new parenchymal abnormalities are identified. ORBITS: The visualized portion of the orbits demonstrate no acute abnormality. SINUSES: The visualized paranasal sinuses and mastoid air cells demonstrate no acute abnormality. SOFT TISSUES/SKULL:  No acute abnormality of the visualized skull or soft tissues. No acute intracranial abnormality. No significant interval change.      CT HEAD WO CONTRAST    Result Date: 7/13/2021  EXAMINATION: CT OF THE HEAD WITHOUT CONTRAST  7/13/2021 1:10 pm TECHNIQUE: CT of the head was performed without the administration of intravenous contrast. Dose modulation, iterative reconstruction, and/or weight based adjustment of the mA/kV was utilized to reduce the radiation dose to as low as reasonably achievable. COMPARISON: 05/02/2021 HISTORY: ORDERING SYSTEM PROVIDED HISTORY: Last known well 2 days right-sided facial droop right-sided weakness TECHNOLOGIST PROVIDED HISTORY: Last known well 2 days right-sided facial droop right-sided weakness Decision Support Exception - unselect if not a suspected or confirmed emergency medical condition->Emergency Medical Condition (MA) Reason for Exam: Last known well 2 days right-sided facial droop right-sided weakness FINDINGS: BRAIN/VENTRICLES: Ovoid area of low attenuation in the left frontal corona radiata measures 2 x 1.4 cm, new from prior study (series 2, image 39). No acute intracranial hemorrhage. No mass effect or midline shift. No hydrocephalus. Diffuse parenchymal volume loss with enlargement of the ventricles and cerebral sulci. Old infarction in the right basal ganglia. There are nonspecific areas of hypoattenuation within the periventricular and subcortical white matter, which likely represent chronic microvascular ischemic change. Intracranial atherosclerotic disease. ORBITS: The visualized portion of the orbits demonstrate no acute abnormality. SINUSES: The visualized paranasal sinuses and mastoid air cells demonstrate no acute abnormality. SOFT TISSUES/SKULL: No acute abnormality of the visualized skull or soft tissues. 1. New ovoid low-attenuation area in the left frontal corona radiata compatible with acute/subacute infarction. This measures 2 x 1.4 cm. No associated hemorrhage. 2. Diffuse parenchymal volume loss and sequela of severe chronic microvascular ischemic changes. Findings were discussed with Dr. Niru Ahn at 1:31 pm on 7/13/2021. XR CHEST PORTABLE    Result Date: 7/13/2021  EXAMINATION: ONE XRAY VIEW OF THE CHEST 7/13/2021 10:30 am COMPARISON: September 24, 2019.  HISTORY: ORDERING SYSTEM PROVIDED HISTORY: Found down TECHNOLOGIST PROVIDED HISTORY: Found down Reason for Exam: supine Acuity: Acute Type of Exam: Initial FINDINGS: A portable upright frontal view chest radiograph was obtained. The heart is enlarged. The mediastinal contour and pleural spaces are otherwise within normal limits. The lungs are grossly clear. There is no focal consolidation or pneumothorax. The pulmonary vascular pattern is within normal limits. No acute thoracic osseous abnormality. Clear lungs. Cardiomegaly. No acute cardiopulmonary abnormality. VL DUP LOWER EXTREMITY VENOUS BILATERAL    Result Date: 7/15/2021    [facility]  Vascular Lower Extremities DVT Study Procedure   Patient Name SHELL      Date of Study           07/15/2021               YAYA DUBOIS   Date of      1945  Gender                  Male  Birth   Age          77 year(s)  Race                    Black   Room Number  0536        Height:                 70 inch, 177.8 cm   Corporate ID [id number]    Weight:                 210 pounds, 95.3 kg  #   Patient Acct [de-identified]   BSA:        2.13 m^2    BMI:        30.13 kg/m^2  #   MR #         [id number]     Sonographer             Danielle Alarcon [facility]   Accession #  [id number]  Interpreting Physician  Abdoulaye Ulrich   Referring                Referring Physician     Mary Mccall  Nurse  Practitioner  Procedure Type of Study:   Veins: Lower Extremities DVT Study, Venous Scan Lower Bilateral.  Indications for Study:CVA. Patient Status: In Patient. Technical Quality:Limited visualization. Limitation reason:legs rigid, extreme edema and resistance to compression . Conclusions   Summary   Simultaneous real time imaging utilizing B-Mode, color doppler and  spectral waveform analysis was performed on the bilateral lower  extremities for venous examination of the deep and superficial systems. Findings are:   Right:  No evidence of deep or superficial venous thrombosis. Left:  No evidence of deep or superficial venous thrombosis.    Signature ----------------------------------------------------------------  Electronically signed by Christian Mayer RVT(Sonographer)  on 07/15/2021 04:23 PM  ----------------------------------------------------------------   ----------------------------------------------------------------  Electronically signed by Nevaeh Lopez(Interpreting  physician) on 07/15/2021 08:56 PM  ----------------------------------------------------------------  Findings:   Right Impression:                    Left Impression:  The common femoral, femoral,         The common femoral, femoral,  popliteal and tibial veins           popliteal and tibial veins  demonstrate normal compressibility   demonstrate normal compressibility  and augmentation. and augmentation. Normal compressibility of the great  Normal compressibility of the great  saphenous vein. saphenous vein. Normal compressibility of the small  Normal compressibility of the small  saphenous vein. saphenous vein. Risk Factors   - The patient's risk factor(s) include: chronic lung disease, diabetes     mellitus and arterial hypertension. Velocities are measured in cm/s ; Diameters are measured in cm Right Lower Extremities DVT Study Measurements Right 2D Measurements +------------------------------------+----------+---------------+----------+ ! Location                            ! Visualized! Compressibility! Thrombosis! +------------------------------------+----------+---------------+----------+ ! Common Femoral                      !Yes       ! Yes            ! None      ! +------------------------------------+----------+---------------+----------+ ! Prox Femoral                        !Yes       ! Yes            ! None      ! +------------------------------------+----------+---------------+----------+ ! Mid Femoral                         !Yes       ! Yes            ! None      ! +------------------------------------+----------+---------------+----------+ ! Dist Femoral                        !Yes       ! Yes            ! None      ! +------------------------------------+----------+---------------+----------+ ! Deep Femoral                        !No        !               !          ! +------------------------------------+----------+---------------+----------+ ! Popliteal                           !Yes       ! Yes            ! None      ! +------------------------------------+----------+---------------+----------+ ! Sapheno Femoral Junction            ! Yes       ! Yes            ! None      ! +------------------------------------+----------+---------------+----------+ ! PTV                                 ! Yes       ! Yes            ! None      ! +------------------------------------+----------+---------------+----------+ ! Peroneal                            !No        !               !          ! +------------------------------------+----------+---------------+----------+ ! Gastroc                             ! Yes       ! Yes            ! None      ! +------------------------------------+----------+---------------+----------+ ! GSV Thigh                           ! Yes       ! Yes            ! None      ! +------------------------------------+----------+---------------+----------+ ! GSV Knee                            ! Yes       ! Yes            ! None      ! +------------------------------------+----------+---------------+----------+ ! GSV Ankle                           ! Yes       ! Yes            ! None      ! +------------------------------------+----------+---------------+----------+ ! SSV                                 ! Yes       ! Yes            ! None      ! +------------------------------------+----------+---------------+----------+ Right Doppler Measurements +---------------------------+------+------+--------------------------------+ ! Location                   ! Signal!Reflux! Reflux (msec) ! +---------------------------+------+------+--------------------------------+ ! Common Femoral             !Phasic!      !                                ! +---------------------------+------+------+--------------------------------+ ! Prox Femoral               !Phasic!      !                                ! +---------------------------+------+------+--------------------------------+ ! Popliteal                  !Phasic!      !                                ! +---------------------------+------+------+--------------------------------+ Left Lower Extremities DVT Study Measurements Left 2D Measurements +------------------------------------+----------+---------------+----------+ ! Location                            ! Visualized! Compressibility! Thrombosis! +------------------------------------+----------+---------------+----------+ ! Common Femoral                      !Yes       ! Yes            ! None      ! +------------------------------------+----------+---------------+----------+ ! Prox Femoral                        !Yes       ! Yes            ! None      ! +------------------------------------+----------+---------------+----------+ ! Mid Femoral                         !Yes       ! Yes            ! None      ! +------------------------------------+----------+---------------+----------+ ! Dist Femoral                        !Yes       ! Yes            ! None      ! +------------------------------------+----------+---------------+----------+ ! Deep Femoral                        !No        !               !          ! +------------------------------------+----------+---------------+----------+ ! Popliteal                           !Yes       ! Yes            ! None      ! +------------------------------------+----------+---------------+----------+ ! Sapheno Femoral Junction            ! Yes       ! Yes            ! None      ! +------------------------------------+----------+---------------+----------+ ! PTV !Yes       !Yes            ! None      ! +------------------------------------+----------+---------------+----------+ ! Peroneal                            !Yes       ! Yes            ! None      ! +------------------------------------+----------+---------------+----------+ ! Gastroc                             ! Yes       ! Yes            ! None      ! +------------------------------------+----------+---------------+----------+ ! GSV Thigh                           ! Yes       ! Yes            ! None      ! +------------------------------------+----------+---------------+----------+ ! GSV Knee                            ! Yes       ! Yes            ! None      ! +------------------------------------+----------+---------------+----------+ ! GSV Ankle                           ! Yes       ! Yes            ! None      ! +------------------------------------+----------+---------------+----------+ ! SSV                                 ! Yes       ! Yes            ! None      ! +------------------------------------+----------+---------------+----------+ Left Doppler Measurements +---------------------------+------+------+--------------------------------+ ! Location                   ! Signal!Reflux! Reflux (msec)                   ! +---------------------------+------+------+--------------------------------+ ! Common Femoral             !Phasic!      !                                ! +---------------------------+------+------+--------------------------------+ ! Prox Femoral               !Phasic!      !                                ! +---------------------------+------+------+--------------------------------+ ! Popliteal                  !Phasic!      !                                ! +---------------------------+------+------+--------------------------------+    US SPLEEN    Result Date: 7/14/2021  EXAMINATION: ULTRASOUND OF THE SPLEEN 7/14/2021 8:29 am COMPARISON: None.  HISTORY: ORDERING SYSTEM PROVIDED HISTORY: thrombocytopenia TECHNOLOGIST PROVIDED HISTORY: thrombocytopenia FINDINGS: Suboptimal study. The visualized spleen appears enlarged measuring 13.4 cm. No focal lesion is seen. No free fluid. Suboptimal study. Mild splenomegaly. CTA HEAD NECK W CONTRAST    Result Date: 7/13/2021  EXAMINATION: CTA OF THE HEAD AND NECK WITH CONTRAST 7/13/2021 1:11 pm: TECHNIQUE: CTA of the head and neck was performed with the administration of intravenous contrast. Multiplanar reformatted images are provided for review. MIP images are provided for review. Stenosis of the internal carotid arteries measured using NASCET criteria. Dose modulation, iterative reconstruction, and/or weight based adjustment of the mA/kV was utilized to reduce the radiation dose to as low as reasonably achievable. 3D surface reformatted and curved MIP images were submitted for review. COMPARISON: None. HISTORY: ORDERING SYSTEM PROVIDED HISTORY: stroke TECHNOLOGIST PROVIDED HISTORY: stroke Decision Support Exception - unselect if not a suspected or confirmed emergency medical condition->Emergency Medical Condition (MA) Reason for Exam: stroke, Cerebrovascular Accident; Fall FINDINGS: CTA NECK: AORTIC ARCH/ARCH VESSELS: No dissection or arterial injury. No significant stenosis of the brachiocephalic or subclavian arteries. CAROTID ARTERIES: No dissection, arterial injury, or hemodynamically significant stenosis by NASCET criteria. VERTEBRAL ARTERIES: No dissection, arterial injury, or significant stenosis in the right vertebral artery. Severe stenosis in the V1 segment of the left vertebral artery. SOFT TISSUES: The lung apices are clear. No cervical or superior mediastinal lymphadenopathy. The larynx and pharynx are unremarkable. No acute abnormality of the salivary glands. Thyroid gland is diffusely enlarged and heterogeneous and contains left thyroid lobe nodule measuring 2.6 cm.  BONES: Multilevel degenerative spondylosis throughout the cervical spine with administration of intravenous contrast. COMPARISON: None. HISTORY: ORDERING SYSTEM PROVIDED HISTORY: stroke, right sided weakness TECHNOLOGIST PROVIDED HISTORY: stroke, right sided weakness Decision Support Exception - unselect if not a suspected or confirmed emergency medical condition->Emergency Medical Condition (MA) Reason for Exam: possible stroke. pt had a fall. FINDINGS: INTRACRANIAL STRUCTURES/VENTRICLES: . acute infarcts in the left basal ganglia. Acute infarct in the right head of caudate and in the right putamen no mass effect or midline shift. No evidence of an acute intracranial hemorrhage. The ventricles and sulci are normal in size and configuration. The sellar/suprasellar regions appear unremarkable. The normal signal voids within the major intracranial vessels appear maintained. ORBITS: The visualized portion of the orbits demonstrate no acute abnormality. SINUSES: The visualized paranasal sinuses and mastoid air cells are well aerated. BONES/SOFT TISSUES: The bone marrow signal intensity appears normal. The soft tissues demonstrate no acute abnormality. Acute infarct in the left and right basal ganglia greater on the left. Diffuse volume loss with extensive chronic white matter changes. DIAGNOSIS:  Depression unspecified  Acute ischemic stroke, hemiplegia right side,      RECOMMENDATIONS  Medications: Continue Prozac 40 mg daily    We will continue to follow  Discussed with the treating physician/ team about the patient and treatment plan  Reviewed the chart    Discussed with the patient risk, benefit, alternative and common side effects for the  proposed medication treatment. Patient is consenting to the treatment. Thanks for the consult. Please call me if needed.     Electronically signed by Shabana Mendoza MD on 8/4/2021 at 11:17 AM

## 2021-08-04 NOTE — PROGRESS NOTES
[facility]   OCCUPATIONAL THERAPY MISSED TREATMENT NOTE   ACUTE REHAB  Date: 21  Patient Name: Harvey Quezada       Room: 9887/6675-62  MRN: [id number]   Account #: [de-identified]    : 1945  (72 y.o.)  Gender: male   Referring Practitioner: Dr. Jt Farah  Diagnosis: Ischemic CVA with R dominant hemiplegia             REASON FOR MISSED TREATMENT:    Per nursing, being transported to CT scan soon       [facility]

## 2021-08-04 NOTE — CARE COORDINATION
Met with sister Chip Gutiérrez while visiting pt. List of SNF provided and requested additional choices. Sister is exploring options for guardianship.

## 2021-08-04 NOTE — PROGRESS NOTES
Speech Language Pathology  Speech Language Pathology  [facility]    Speech Language/DysphagiaTreatment Note    Date: 8/4/2021  Patients Name: Madhu Willson  MRN: [id number]  Diagnosis:   Patient Active Problem List   Diagnosis Code    Centrilobular emphysema (HCC) J43.2    Mixed restrictive and obstructive lung disease ([facility] J43.9, J98.4    Cerebrovascular accident (CVA) ([facility] [address] I63.9    COPD (chronic obstructive pulmonary disease) ([facility] [address] J44.9    HTN (hypertension) I10    Diabetes 1.5, managed as type 2 ([facility] [address] E13.9    Hyperlipidemia E78.5    CAD S/P percutaneous coronary angioplasty I25.10, Z98.61    Rhabdomyolysis M62.82    Intracranial atherosclerosis I67.2    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 Not responding, no overt s/s pain demonstrated    Speech and Language Treatment  Treatment time: 8207-5855    Subjective: [] Alert [] Cooperative     [] Confused     [] Agitated    [x] Lethargic    Objective/Assessment:  Auditory Comprehension: Pt. not responding to any writer questions despite MAX cues. Pt. Attempting to open eyes on command ~50% of the time as instructed by ST. Verbal expression:  No attempts made at communication. Pt. Unable to attempt confrontation naming despite max cues. Speech: Pt not responding to verbal stimuli, remaining with eyes closed, non verbal throughout entire session. Voice: Pt. did not vocalize or verbalize during session. Dysphagia:   Pt. Followed directions for oral motor exercises as initiated by 1:1 sitter, lingual protrusion, pt. Able to complete x4. Significantly decreased ROM demonstrated.    Pt. Unable to attempt other exercises presented by ST.     Pt. Initially with eyes open, 1:1 sitter reports pt. Has been very alert this pm.  Pt. Closed eyes when ST sat in front of him and began talking to him. Rehab aide reports pt. Responded \"good\" when she entered room to get him for PT this pm and proceeded to verbalize what seemed to be the length of 2 sentences, however, she reports this utterance to be unintelligible. Pt. Stopped responding, she reports, when he got to PT gym. Plan:  [x] Continue ST services    [] Discharge from ST:      Discharge recommendations: [] Inpatient Rehab   [] [place]   [] Outpatient Therapy  [] Follow up at trauma clinic   [] Other:       Treatment completed by: Vik Oilver A.CCC/SLP

## 2021-08-04 NOTE — PLAN OF CARE
Problem: Skin Integrity:  Goal: Skin integrity will stabilize  Description: Skin integrity will stabilize  8/4/2021 1714 by Annamaria Marx RN  Outcome: Ongoing     Problem: Falls - Risk of:  Goal: Will remain free from falls  Description: Will remain free from falls  8/4/2021 1714 by Annamaria Marx RN  Outcome: Ongoing     Problem: IP TRANSFERS  Goal: LTG - patient will transfer from one surface to another  Outcome: Ongoing     Problem: IP TRANSFERS  Goal: LTG - Patient will demonstrate safe transfer techniques  Outcome: Ongoing     Problem: IP TRANSFERS  Goal: LTG - patient will transfer from wheelchair to toilet  Outcome: Ongoing     Problem: IP TRANSFERS  Goal: STG - transfer from bed to chair  Outcome: Ongoing     Problem: IP TRANSFERS  Goal: STG - patient will perform bed mobility  Outcome: Ongoing     Problem: IP TRANSFERS  Goal: STG - Patient will roll  Outcome: Ongoing     Problem: IP TRANSFERS  Goal: STG - patient will transfer sit to and from stand  Outcome: Ongoing

## 2021-08-04 NOTE — PROGRESS NOTES
Physical Therapy  [address]  Acute Rehabilitation Physical Therapy Progress Note    Date: 21  Patient Name: Gayla Mojica       Room: 9711/2899-80  MRN: [id number]   Account: [de-identified]   : 1945  (72 y.o.) Gender: male   Referring Practitioner: Dr. Faby Michael  Diagnosis: Ischemic CVA with R dominant hemiplegia  Past Medical History:  has a past medical history of Centrilobular emphysema ([facility] [address] COPD (chronic obstructive pulmonary disease) ([facility] [address] Depression, Diabetes mellitus ([facility] [address] Former smoker, Hyperlipidemia, Hypertension, Mixed restrictive and obstructive lung disease ([facility] [address] Need for pneumococcal vaccine, and Personal history of tobacco use. Past Surgical History:   has a past surgical history that includes Neck surgery; Toe amputation (Right, greater); Cardiac surgery (2015); and Upper gastrointestinal endoscopy (N/A, 8/3/2021). Additional Pertinent Hx: Gayla Mojica is a 77 y.o. RHD male admitted to [facility] on 2021. On admit, exam significant for right-sided facial droop, right-sided weakness and severe dysarthria. Significant history with recurrent strokes, remote left temporal lobe ischemic infarct and remote bilateral occipital lobe infarcts . MRI shows Bilateral basal ganglia ischemic infarcts. Pt admitted to rehab unit on 21    Orientation Level: Unable to assess (pt nonresponsive)  Restrictions/Precautions  Restrictions/Precautions: Up as Tolerated;General Precautions; Fall Risk;Swallowing - Thickened Liquids (Mildly thick (Nectar thick))  Required Braces or Orthoses?: No  Position Activity Restriction  Other position/activity restrictions: Up w/assistance, RU/LE Weakness. MRI BRAIN- Acute infarct in the left and right basal ganglia greater on the left. Subjective: Eyes mostly closed during AM, but occasionally open. No verbal responses. Comments: Pt follows exercise cues ~50%/delayed response.  Aide reports Pt was talking with her prior to PM session; upon writer's entry, Pt's eyes closed, no verbalizations during PM session. Vital Signs  Patient Currently in Pain: Other (comment) (No response to query)    Patient Observation  Observations: Flat affect. Slightly improved alertness through AM (eyes open occasionally, some/delayed response to exercise cues; no verbalization)    Transfers  Sit to Stand: 2 Person Assistance;Maximum Assistance (walker lift. Very slow moving. )  Stand to sit: 2 Person Assistance;Minimal Assistance (B UE assist to chair from walker lift; able to stand while lift is lowered.)  Stand pivot transfers: Dependent/Total (x3 w/hemiwalker)    Ambulation 1  Surface: level tile  Device:  [employer] Walker Lift)  Other Apparatus: Right;AFO;Slider; Wheelchair follow  Assistance: 2 Person assistance;Maximum assistance  Quality of Gait: forward flexed hips, max assist to advance R LE. Verbal cues to increase L step length with G response, repeated cues necessary. Gait Deviations: Slow Anais;Decreased step length;Decreased step height;Decreased head and trunk rotation  Distance: 76' with standing rest break after 60'  Comments: Cues to shift weight initially with G return, no further assist necessary except occasional cue. Ambulation 2  Surface - 2: level tile  Device 2: Hemiwalker  Assistance 2: Dependent/Total (x3)  Quality of Gait 2: Stand pivot from one wheelchair to another: Pt demonstrates little ability to weight shift or advance either LE, is dependent for the preceding and hemiwalker movement & balance. Gait Deviations: Slow Anais;Decreased step length;Decreased step height;Decreased head and trunk rotation; Shuffles  Distance: 3'    Wheelchair Activities  Propulsion: Yes  Propulsion 1  Propulsion: Manual  Level: Level Tile  Method: RLE;LLE  Level of Assistance: Dependent/Total  Description/ Details: Pt occasionally follows cues to kick L LE but not to pull himself forward.  Largely D/T to advance bilateral LEs, advance wheelchair & steer. Attempted to encourage L UE use, but Pt lets hand drift over rim rather than grasping & pushing. Distance: 10'    Balance   Posture: Fair  Standing - Static: Fair; - (walker lift)  Standing - Dynamic: Poor (walker lift)     Exercises  Other exercises?: Yes  Other exercises 1: Seated bilat LE exercises: 1# L LE, 15 reps; passive ROM R LE, x20; orange (minimal) resistance band. (Improved response to L LE ex cues)  Other exercises 2: Sit <> Stand x1  Other exercises 3: Seated R Achilles stretch, 2x30\"   Other exercises 4: Stand pivot transfer to narrower wheelchair, D/T x3 (see amb 2). Pt does not respond to questions re: comfort; appears to fit well. Activity Tolerance: Patient limited by cognitive status     PT Equipment Recommendations  Other: TBD    Current Treatment Recommendations: Strengthening, ROM, Balance Training, Functional Mobility Training, Gait Training, Endurance Training, Transfer Training, Safety Education & Training, Wheelchair Mobility Training, Neuromuscular Re-education, Cognitive Reorientation, Home Exercise Program, Patient/Caregiver Education & Training, Equipment Evaluation, Education, & procurement, Positioning, Stair training, Modalities (Will consider using modalities as needed for neuro re-edu.)    Conditions Requiring Skilled Therapeutic Intervention  Body structures, Functions, Activity limitations: Decreased functional mobility ; Decreased ROM; Decreased strength;Decreased balance;Decreased safe awareness;Decreased endurance;Decreased posture;Decreased cognition;Decreased fine motor control;Decreased coordination  Barriers to Learning: aphasia, cognitive deficits  REQUIRES PT FOLLOW UP: Yes  Discharge Recommendations: Patient would benefit from continued therapy after discharge;24 hour supervision or assist    Goals  Short term goals  Time Frame for Short term goals: 10 days  Short term goal 1: Pt able to roll side to side at min A   Short term goal 2: Pt able to perform supine>sit at min A   Short term goal 3: Pt able to perform sit to supine at mod A  Short term goal 4: Pt able to transfer at mod A   Short term goal 5: Pt able to propel w/c with L UE/L LE, distance fo 100 ft min A   Short term goal 6: Pt able to ambulate with appropriate device distance of 50 to 100 ft, min A x2  Long term goals  Time Frame for Long term goals : By DC  Long term goal 1: Pt able perform bed mobility mod-I  Long term goal 2:  Pt able to perform transfers at CGA/min A   Long term goal 3: Pt able to ambulate with appropriate device distance of 100 to 150 ft, min A  Long term goal 4: Pt able to go up and down 4 to 5 steps with 1 UE support at mod/max A   Long term goal 5: Pt able to propel w/c level surfaces distance fo 100 to 150 ft, SBA  Long term goal 6: Improve PASS score to at least 19/36 to improve overall function. Long term goal 7: Improve standing dynamic balance with assistive device to at least fair+ to reduce fall risk.         08/04/21 0942 08/04/21 1227   PT Individual Minutes   Time In [id number]   Time Out 0930 1253   Minutes 55 33     Electronically signed by Lotus Carlin PTA on 8/4/21 at 4:04 PM EDT

## 2021-08-05 PROBLEM — E43 SEVERE MALNUTRITION (HCC): Status: ACTIVE | Noted: 2021-01-01

## 2021-08-05 NOTE — PLAN OF CARE
Nutrition Problem #1: Severe malnutrition  Intervention: Food and/or Nutrient Delivery: Continue Current Diet, Modify Tube Feeding  Nutritional Goals: Tube Feeding to meet approximately 100% of estimated needs

## 2021-08-05 NOTE — PROGRESS NOTES
[address] [facility]    ACUTE REHABILITATION OCCUPATIONAL THERAPY  DAILY NOTE    Date: 21  Patient Name: Madhu Willson      Room: 4762/7314-07    MRN: [id number]   : 1945  (72 y.o.)  Gender: male   Referring Practitioner: Dr. Ivan Petit  Diagnosis: Ischemic CVA with R dominant hemiplegia  Additional Pertinent Hx: Madhu Willson is a 77 y.o. RHD male admitted to [facility] on 2021. On admit, exam significant for right-sided facial droop, right-sided weakness and severe dysarthria. Significant history with recurrent strokes, remote left temporal lobe ischemic infarct and remote bilateral occipital lobe infarcts . MRI shows Bilateral basal ganglia ischemic infarcts. Pt admitted to rehab unit on 21    Restrictions  Restrictions/Precautions: Up as Tolerated, General Precautions, Fall Risk, Swallowing - Thickened Liquids  Other position/activity restrictions: Up w/assistance, RU/LE Weakness. MRI BRAIN- Acute infarct in the left and right basal ganglia greater on the left. Required Braces or Orthoses?: No  Equipment Used: Bed, Wheelchair, Other (SS)    Subjective  Subjective: Pt not alert during session. Comments: Writer attempted various techniques for pt engagement this date with poor response for functional engagement. .   Restrictions/Precautions: Up as Tolerated;General Precautions; Fall Risk;Swallowing - Thickened Liquids  Overall Orientation Status: Impaired  Orientation Level: Unable to assess (unable to rouse)  Patient Observation  Observations: peg tube running, seated upright in bed, eyes closed and no response throughout, IV placed in R wrist-swelling in L hand/arm present. Objective  Cognition  Overall Cognitive Status: Impaired  Perception  Overall Perceptual Status: Impaired                 Type of ROM/Therapeutic Exercise  Type of ROM/Therapeutic Exercise: PROM;AROM  Comment: BUE ROM for joint protection; pt very tight and developing contractures on R side.  Pt with Training, Cognitive/Perceptual Training  Plan Comment: 900 minutes/week for combined therapy of PT/OT/ST due to decreased tolerance to activity.   Patient Goals   Patient goals : \"I want to go home\"  Short term goals  Time Frame for Short term goals: By 10 days  Short term goal 1: Pt will complete upper body dressing/bathing with Min A and good attention to task  Short term goal 2: Pt will complete lower body dressing/bathing with max A and good safety with use of AE as needed  Short term goal 3: Pt will complete functional transfers during self care tasks with mod A x 1 and good safety  Short term goal 4: Pt will tolerate standing 4+ minutes during functional activity of choice with min A and good safety  Short term goal 5: Pt will participate in 30+ minutes of therapeutic exercises/functional activities to increase safety and independence with self care tasks  Short term goal 6: Pt will complete self feeding with min A and good attention to task  Long term goals  Time Frame for Long term goals : By discharge  Long term goal 1: Pt will complete upper body dressing with set-up assistance and good attention to task  Long term goal 2: Pt will complete lower body dressing/bathing with min A and good safety with use of AE as needed  Long term goal 3: Pt will complete functional transfers/mobility during self care tasks with min A and good safety  Long term goal 4: Pt will tolerate standing 8+ minutes during functional activity of choice with CGA and good safety  Long term goal 5: Pt will verbalize/demonstrate good understanding of adaptive equipment/adaptive strategies/durable medical equipment to increase safety and independence with self care and mobility  Long term goals 6: Pt will complete self feeding with set-up assistance and good attention to task  Long term goal 7: Vision to be further assessed        08/05/21 1324   OT Individual Minutes   Time In 1304   Time Out 1322   Minutes 18     Electronically signed by Collette Levo Shelhart, TIPTON/L on 8/5/21 at 1:36 PM EDT

## 2021-08-05 NOTE — PROGRESS NOTES
Speech Language Pathology  Speech Language Pathology  [facility]    Speech Language/DysphagiaTreatment Note    Date: 8/5/2021  Patients Name: Samantha Griffiths  MRN: [id number]  Diagnosis:   Patient Active Problem List   Diagnosis Code    Centrilobular emphysema (HCC) J43.2    Mixed restrictive and obstructive lung disease ([facility] [address] J43.9, J98.4    Cerebrovascular accident (CVA) ([facility] [address] I63.9    COPD (chronic obstructive pulmonary disease) ([facility] [address] J44.9    HTN (hypertension) I10    Diabetes 1.5, managed as type 2 ([facility] [address] E13.9    Hyperlipidemia E78.5    CAD S/P percutaneous coronary angioplasty I25.10, Z98.61    Rhabdomyolysis M62.82    Intracranial atherosclerosis I67.2    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 Not responding, no overt s/s pain demonstrated    Speech and Language Treatment  Treatment time: 8836-2621    Subjective: [] Alert [] Cooperative     [] Confused     [] Agitated    [x] Lethargic    Objective/Assessment:  Auditory Comprehension: Pt. not responding to any writer questions despite MAX cues. Pt.  Followed direction 1/3 trials with max tactile cues (close your mouth). Verbal expression:  No attempts made at communication. Speech: Pt not responding to verbal stimuli, remaining with eyes closed, non verbal throughout entire session. Voice: Pt. did not vocalize or verbalize during session. Dysphagia:   n/a      Pt. Sister Talya Young present, ST updated re: current performance. Pt. Did not acknowledge her when she talked to him. 1:1 sitter reported pt. Opened eyes briefly x1 when they sat him up in the chair.        Plan:  [x] Continue ST services    [] Discharge from ST:      Discharge recommendations: [] Inpatient Rehab   [] [place]   [] Outpatient Therapy  [] Follow up at trauma clinic   [] Other:       Treatment completed by: Mitra Medrano A.CCC/SLP

## 2021-08-05 NOTE — CONSULTS
Department of Psychiatry  Behavioral Health progress note    REASON FOR CONSULT: Agitation    History obtained from: Patient and medical record    Interim history 8 5/5/2021    -Patient has been receiving his Prozac 40 mg via PEG tube. The patient was seen at bedside. His brother was visiting. His nurse was also present. The patient has had a slight fever. He was sitting in bed with his eyes closed. The patient made no verbal responses to my requests. He did not open eyes on tapping his shoulder. Interim history on 8/4/2021-    Noted that the patient has received a PEG tube yesterday. The patient was seen at bedside. His sister and his RN were present. The patient has not spoken to either of them since morning. I attempted to engage the patient in conversation but he made no verbal response. He made intermittent eye contact. He continues to have significant psychomotor retardation. I have noted that the patient has not received Prozac for the last 2 days. He has received it today. We will continue Prozac at 40 mg at this time. Interim history 8/2/21-  The patient has had episodes of agitation over the weekend. The patient was seen at bedside. He continues to be mute and does not answer any questions. He makes intermittent eye contact but he does not offer a lot his head to answer questions. I have noted that the patient has been taking his medication Prozac regularly. We will reduce the dose as 60 mg taken every day may be too high for the patient. I have been informed that the patient will receive a PEG tube placement tomorrow. That will open up medication options.       Interim history: 7/30/2021  Patient has been seen and examined at the bedside, no major event happened over the night, patient noted to be presented as reviewed in the past, reportedly he said 1 symptoms that he is feeling fine, constricted in his affect, come across as depressed, patient has been refusing oral medications, patient took 3 doses of Prozac in the last 9 days, we will increase the dose of Prozac to 60 mg at this is a long-acting medication and even intermittent use may help,      HISTORY OF PRESENT ILLNESS:    67-year-old male who was admitted previously for management of cerebrovascular accidents, presents to the emergency department with a chief complaint of being found down by the stairs, 911 has been called, found to have right facial weakness, right-sided arm and leg weakness, dysarthria, patient has multiple ecchymosis, on right side of the arm and chest,  CT head: New ovoid low-attenuation area in the left frontal corona radiata compatible with acute/subacute infarction, that measured 2 x 1.4 cm no associated hemorrhage, diffuse parenchymal volume loss and sequela of severe chronic microvascular ischemic changes,  -Neuro is on board MRI brain W0: Acute infarct in left and right basal ganglia, left more than right,  -Resume on aspirin and Plavix 75 mg, Lipitor,  -CTA head and neck: Severe stenosis in V1 segment of left vertebral artery extensive intracranial atherosclerotic plaque with near complete occlusion of right PCA, severe stenosis of proximal A3 segment of RAHEEM, moderate to severe proximal M2 segment stenosis of left MCA,       Patient admitted to rehab afterwards for right hemiparesis, patient was extremely agitated overnight, was given Ativan around 2 AM in the night,  -Patient failed telemetry sitter over the weekend, resumed 1:1, on Seroquel as needed, he did not require since 7/23, no sedating medication on board,  -Patient current medication Prozac 40 mg daily,    The patient is currently receiving care for the above psychiatric illness.   Patient is totally mute, he is not cooperative, he is not following commands, refusing meds, trying to pull lines sometimes,      Psychiatric Review of Systems :      ·    Obsessions and Compulsions: Denies    ·    Angelina or Hypomania: Denies  · Hallucinations: Denies  ·    Panic Attacks:  Denies  ·    Delusions:  Denies  ·    Phobias:  Denies  ·    Trauma: Denies      Substance Abuse History:  Unknown history    Past Psychiatric History:  Prior Diagnosis:     Hospitalization: yes  Hx of Suicidal Attempts: no  Hx of violence:  no      Past Medical History:        Diagnosis Date    [facility]ob[facility]     COPD (chronic obstructive pulmonary disease) ([facility]     Depression     [facility]     pt refuses to take previously prescribed metformin (states PCP aware)    Former smoker     Hyperlipidemia     on 4/27/18 pt states \"I stopped taking that about a year ago\"    Hypertension     Mixed restrictive and obstructive lung disease ([facility]     Need for pneumococcal vaccine     Personal history of tobacco use        Past Surgical History:        Procedure Laterality Date    CARDIAC SURGERY  07/2015    1 stent    NECK SURGERY      TOE AMPUTATION Right greater    UPPER GASTROINTESTINAL ENDOSCOPY N/A 8/3/2021    EGD  PEG TUBE INSERTION performed by Cindi Quinonez MD at [facility]         Medications Prior to Admission:   Medications Prior to Admission: Heparin Sodium, Porcine, (HEPARIN, PORCINE,) 5000 UNIT/ML injection, Inject 1 mL into the skin every 8 hours  QUEtiapine (SEROQUEL) 25 MG tablet, Take 1 tablet by mouth nightly as needed for Agitation  methylPREDNISolone sodium (SOLU-MEDROL) 40 MG injection, Infuse 1 mL intravenously every 12 hours  melatonin 3 MG TABS tablet, Take 1 tablet by mouth nightly as needed (insomnia)  atorvastatin (LIPITOR) 80 MG tablet, Take 0.5 tablets by mouth daily (Pt takes one-half of an 80mg tab = 40mg)  tiZANidine (ZANAFLEX) 2 MG tablet, Take 1 tablet by mouth 4 times daily as needed (muscle spasms)  naproxen (NAPROSYN) 500 MG tablet, Take 1 tablet by mouth 2 times daily (with meals)  ibuprofen (ADVIL;MOTRIN) 800 MG tablet, Take 1 tablet by mouth every 8 hours as needed for Pain  lidocaine (LIDODERM) 5 %, Place 1 patch onto the skin daily 12 hours on, 12 hours off.  metoprolol succinate (TOPROL XL) 50 MG extended release tablet, Take 50 mg by mouth daily  tiotropium (SPIRIVA RESPIMAT) 2.5 MCG/ACT AERS inhaler, Inhale 2 puffs into the lungs daily  carboxymethylcellulose 1 % ophthalmic solution, Place 1 drop into both eyes 3 times daily as needed for Dry Eyes   ketotifen (ZADITOR) 0.025 % ophthalmic solution, Place 1 drop into both eyes 2 times daily as needed (itchy eyes)   isosorbide mononitrate (IMDUR) 60 MG extended release tablet, Take 30 mg by mouth daily Indications: 1/2 tablet (30mg)   finasteride (PROSCAR) 5 MG tablet, Take 5 mg by mouth daily  tamsulosin (FLOMAX) 0.4 MG capsule, Take 0.4 mg by mouth daily  fluticasone (FLONASE) 50 MCG/ACT nasal spray, 1 spray by Nasal route 2 times daily (Patient taking differently: 1 spray by Nasal route 2 times daily as needed for Rhinitis )  albuterol sulfate  (90 BASE) MCG/ACT inhaler, Inhale 2 puffs into the lungs every 6 hours as needed for Wheezing  albuterol (PROVENTIL) (2.5 MG/3ML) 0.083% nebulizer solution, Take 2.5 mg by nebulization every 6 hours as needed for Wheezing  aspirin 81 MG EC tablet, Take 81 mg by mouth daily  losartan (COZAAR) 100 MG tablet, Take 100 mg by mouth daily   nitroGLYCERIN (NITROSTAT) 0.4 MG SL tablet, Place 0.4 mg under the tongue every 5 minutes as needed for Chest pain  FLUoxetine (PROZAC) 20 MG capsule, Take 40 mg by mouth daily   furosemide (LASIX) 20 MG tablet, Take 20 mg by mouth daily as needed (swelling)   clopidogrel (PLAVIX) 75 MG tablet, Take 75 mg by mouth daily  rOPINIRole (REQUIP) 0.25 MG tablet, Take 0.25 mg by mouth nightly as needed   budesonide-formoterol (SYMBICORT) 160-4.5 MCG/ACT AERO, Inhale 2 puffs into the lungs 2 times daily. Allergies:  Patient has no known allergies. FAMILY/SOCIAL HISTORY:  History reviewed. No pertinent family history.   Social History     Socioeconomic History    Marital status:      Spouse name: Not on file    Number of children: Not on file    Years of education: Not on file    Highest education level: Not on file   Occupational History    Not on file   Tobacco Use    Smoking status: Former Smoker     Packs/day: 0.75     Years: 56.00     Pack years: 42.00     Start date: 1957     Quit date: 2013     Years since quittin.0    Smokeless tobacco: Never Used   Vaping Use    Vaping Use: Never used   Substance and Sexual Activity    Alcohol use: No    Drug use: No    Sexual activity: Not on file   Other Topics Concern    Not on file   Social History Narrative    Not on file     Social Determinants of Health     Financial Resource Strain:     Difficulty of Paying Living Expenses:    Food Insecurity:     Worried About [employer] in the Last Year:     [employer] in the Last Year:    Transportation Needs:     Lack of Transportation (Medical):      Lack of Transportation (Non-Medical):    Physical Activity:     Days of Exercise per Week:     Minutes of Exercise per Session:    Stress:     Feeling of Stress :    Social Connections:     Frequency of Communication with Friends and Family:     Frequency of Social Gatherings with Friends and Family:     Attends Church Services:     Active Member of Clubs or Organizations:     Attends Club or Organization Meetings:     Marital Status:    Intimate Partner Violence:     Fear of Current or Ex-Partner:     Emotionally Abused:     Physically Abused:     Sexually Abused:        REVIEW OF SYSTEMS    Constitutional: [] fever  [] chills  [] weight loss  []weakness [] Other:  Eyes:  [] photophobia  [] discharge [] acuity change   [] Diplopia   [] Other:  HENT:  [] sore throat  [] ear pain [] Tinnitus   [] Other  Respiratory:  [] Cough  [] Shortness of breath   [] Sputum   [] Other:   Cardiac: []Chest pain   []Palpitations []Edema  []PND  [] Other:  GI:  []Abdominal pain   []Nausea  []Vomiting []Diarrhea  [] Other:  :  [] Dysuria   []Frequency  []Hematuria  []Discharge  [] Other:  Possible Pregnancy: []Yes   []No   LMP:   Musculoskeletal:  []Back pain  []Neck pain  []Recent Injury   Skin:  []Rash  [] Itching  [] Other:  Neurologic:  [] Headache  [] Focal weakness  [] Sensory changes []Other:  Endocrine:  [] Polyuria  [] Polydipsia  [] Hair Loss  [] Other:  Lymphatic:   [] Swollen glands   Psychiatric:  As per HPI      All other systems negative except as marked or mentioned/indicated in the HPI. Ang Ra PHYSICAL EXAM:  Vitals:  /71   Pulse 84   Temp 99.2 °F (37.3 °C) (Axillary)   Resp 20   Ht 5' 10\" (1.778 m)   Wt 214 lb 4.6 oz (97.2 kg)   SpO2 93%   BMI 30.75 kg/m²        Mental Status Examination:    Level of consciousness: Asleep appearance: Sitting reclined in bed behavior/Motor: Unable to arouse patient  attitude toward examiner:   Unable to assess  Unable to complete remainder of medical exam because of patient being asleep.        LABS: REVIEWED TODAY:  Recent Labs     08/04/21  1629   WBC 10.4   HGB 11.9*   *     Recent Labs     08/04/21  1629 08/05/21  0558    145*   K 4.2 4.0   * 114*   CO2 19* 23   BUN 25* 23   CREATININE 1.48* 1.64*   GLUCOSE 150* 165*     Recent Labs     08/05/21  0558   BILITOT 0.44   ALKPHOS 263*   AST 62*   ALT 79*     Lab Results   Component Value Date    BARBSCNU NEGATIVE 07/13/2021    LABBENZ NEGATIVE 07/13/2021    LABMETH NEGATIVE 07/13/2021    PPXUR NOT REPORTED 07/13/2021     Lab Results   Component Value Date    TSH 0.28 07/14/2021     No results found for: LITHIUM  No results found for: VALPROATE, CBMZ  No results found for: LITHIUM, VALPROATE    FURTHER LABS ORDERED :      Radiology   ECHO Complete 2D W Doppler W Color    Result Date: 7/15/2021  Transthoracic Echocardiography Report (TTE)  Patient Name GOFF      Date of Study               07/15/2021               [id number] [id number] [facility]   Date of      1945  Gender Male  Birth   Age          77 year(s)  Race                        Black   Room Number  7241        Height:                     70 inch, 177.8 cm   Corporate ID [id number]    Weight:                     210 pounds, 95.3 kg  #   Patient Acct [de-identified]   BSA:          2.13 m^2      BMI:       30.13  #                                                               kg/m^2   MR #         [id number]     Sonographer                 Maru Villanueva   Accession #  [id number]  Interpreting Physician      Yolande Vance   Fellow                   Referring Nurse                           Practitioner   Interpreting             Referring Physician         Tigre Simpson MD  Fellow  Additional Comments Technically difficult study, patient supine unable to be positioned for better acoustic windows. Type of Study   TTE procedure:2D Echocardiogram, M-Mode, Doppler, Color Doppler. Procedure Date Date: 07/15/2021 Start: 08:29 AM Study Location: [address] [place] [address] Technical Quality: Adequate visualization Indications:CVA. History / Tech. Comments: Procedure explained to patient. Echo completed in the Echo Lab. RN performed bubble study. PMHx: Former smoker, COPD Hypertension, Diabetes, Hyperlipidemia Coronary artery disease, s/p stents Patient Status: Inpatient Height: 70 inches Weight: 210 pounds BSA: 2.13 m^2 BMI: 30.13 kg/m^2 CONCLUSIONS Summary Technically difficult study. Overall global left ventricular systolic function is normal, calculated ejection fraction is 50-55% Grade I (mild) left ventricular diastolic dysfunction. Technically difficult visualization of the right ventricle, but it does appear to be normal in size. Negative bubble study, no obvious intracardiac shunt noted within technical limitations of this study. No significant valvular regurgitation or stenosis seen. No significant pericardial effusion is seen.  Signature ----------------------------------------------------------------------------  Electronically signed by Joanne Morgan(Sonographer) on 07/15/2021 11:00 AM ---------------------------------------------------------------------------- ----------------------------------------------------------------------------  Electronically signed by Yolande Vance(Interpreting physician) on  07/15/2021 12:49 PM ---------------------------------------------------------------------------- FINDINGS Left Atrium Left atrium is normal in size. Inter-atrial septum is intact with no evidence for an atrial septal defect. Negative bubble study, no shunt noted. Left Ventricle Left ventricle is normal in size, normal wall thickness, global left ventricular systolic function is low normal, calculated ejection fraction is 50%. Grade I (mild) left ventricular diastolic dysfunction. Right Atrium Right atrium is normal in size. Right Ventricle Technically difficult visualization of the right ventricle, but it does appear to be normal in size. Mitral Valve Normal mitral valve structure. No mitral stenosis. Trivial mitral regurgitation. Aortic Valve Aortic valve is trileaflet. No evidence of aortic insufficiency or stenosis. Tricuspid Valve Tricuspid valve was not well visualized. Trivial tricuspid regurgitation. Insignificant tricuspid regurgitation, unable to estimate RVSP. Pulmonic Valve Pulmonic valve not well visualized but Doppler velocities are normal. Trivial pulmonic insufficiency. Pericardial Effusion No significant pericardial effusion is seen. Miscellaneous Normal aortic root dimension. E/E' average = 17.35. IVC not well visualized.  M-mode / 2D Measurements & Calculations:   LVIDd:5.6 cm(3.7 - 5.6 cm)        Diastolic YPLNKY:61.99 ml  IVSd:0.9 cm(0.6 - 1.1 cm)         Systolic AHSAED:97.92 ml  LVPWd:0.8 cm(0.6 - 1.1 cm)        Aortic Root:3 cm(2.0 - 3.7 cm)                                    LA Dimension: 3.2 cm(1.9 - 4.0 cm)  Calculated LVEF (%): 50.46 %      LA volume/Index: 48.86 ml /23m^2 LVOT:2.1 cm                                    RVDd:3 cm   Mitral:                                 Aortic   Valve Area (P1/2-Time): 2.65 cm^2       Peak Velocity: 1.91 m/s  Peak E-Wave: 1.18 m/s                   Mean Velocity: 1.06 m/s  Peak A-Wave: 1.47 m/s                   Peak Gradient: 14.59 mmHg  E/A Ratio: 0.8                          Mean Gradient: 6 mmHg  Peak Gradient: 5.57 mmHg  Mean Gradient: 2 mmHg  Deceleration Time: 280 msec             Area (continuity): 2.83 cm^2  P1/2t: 83 msec                          AV VTI: 41.8 cm   Area (continuity): 2.2 cm^2  Mean Velocity: 0.62 m/s                                           Pulmonic:                                           Peak Velocity: 1.29 m/s                                          Peak Gradient: 6.66 mmHg  Diastology / Tissue Doppler Septal Wall E' velocity:0.06 m/s Septal Wall E/E':20.8 Lateral Wall E' velocity:0.08 m/s Lateral Wall E/E':13.9    CT HEAD WO CONTRAST    Result Date: 7/21/2021  EXAMINATION: CT OF THE HEAD WITHOUT CONTRAST  7/21/2021 11:53 am TECHNIQUE: CT of the head was performed without the administration of intravenous contrast. Dose modulation, iterative reconstruction, and/or weight based adjustment of the mA/kV was utilized to reduce the radiation dose to as low as reasonably achievable. COMPARISON: July 17, 2021, MRI July 14, 2021 HISTORY: ORDERING SYSTEM PROVIDED HISTORY: Altered mental status TECHNOLOGIST PROVIDED HISTORY: eval for change in status Reason for Exam: EVAL FOR CHANGE IN STATUS Type of Exam: Subsequent/Follow-up Additional signs and symptoms: PT BECAME AGTATED & COMBATIVE OVERNIGHT Relevant Medical/Surgical History: HX STROKE FINDINGS: BRAIN/VENTRICLES: No acute intracranial hemorrhage, mass effect or midline shift. Area of hypoattenuation within the left basal ganglia and small foci of hypoattenuation in the right basal ganglia compatible with subacute infarct.   Diffuse cortical volume loss and compensatory ventricular enlargement appears unchanged. Periventricular and subcortical white matter hypoattenuation redemonstrated bilaterally compatible with severe chronic microvascular ischemic changes. Encephalomalacia of the right caudate redemonstrated compatible with remote lacunar infarct. ORBITS: The visualized portion of the orbits demonstrate no acute abnormality. SINUSES: Air-fluid level within the left sphenoid sinus. Paranasal sinuses and mastoid air cells otherwise clear. SOFT TISSUES/SKULL:  No acute abnormality of the visualized skull or soft tissues. Subacute basal ganglia infarcts redemonstrated. No gross hemorrhagic conversion or significant mass effect. New air-fluid level within the left sphenoid sinus suggesting acute sinusitis. CT HEAD WO CONTRAST    Result Date: 7/17/2021  EXAMINATION: CT OF THE HEAD WITHOUT CONTRAST  7/17/2021 10:46 am TECHNIQUE: CT of the head was performed without the administration of intravenous contrast. Dose modulation, iterative reconstruction, and/or weight based adjustment of the mA/kV was utilized to reduce the radiation dose to as low as reasonably achievable. COMPARISON: None. HISTORY: ORDERING SYSTEM PROVIDED HISTORY: change in mentation TECHNOLOGIST PROVIDED HISTORY: change in mentation Reason for Exam: change in mentation FINDINGS: BRAIN/VENTRICLES: Small area of hypodensity in the left corona radiata is similar in appearance to the previous exam.  There is no evidence of hemorrhage. No new parenchymal abnormalities are identified. ORBITS: The visualized portion of the orbits demonstrate no acute abnormality. SINUSES: The visualized paranasal sinuses and mastoid air cells demonstrate no acute abnormality. SOFT TISSUES/SKULL:  No acute abnormality of the visualized skull or soft tissues. No acute intracranial abnormality. No significant interval change.      CT HEAD WO CONTRAST    Result Date: 7/13/2021  EXAMINATION: CT OF THE HEAD WITHOUT CONTRAST 7/13/2021 1:10 pm TECHNIQUE: CT of the head was performed without the administration of intravenous contrast. Dose modulation, iterative reconstruction, and/or weight based adjustment of the mA/kV was utilized to reduce the radiation dose to as low as reasonably achievable. COMPARISON: 05/02/2021 HISTORY: ORDERING SYSTEM PROVIDED HISTORY: Last known well 2 days right-sided facial droop right-sided weakness TECHNOLOGIST PROVIDED HISTORY: Last known well 2 days right-sided facial droop right-sided weakness Decision Support Exception - unselect if not a suspected or confirmed emergency medical condition->Emergency Medical Condition (MA) Reason for Exam: Last known well 2 days right-sided facial droop right-sided weakness FINDINGS: BRAIN/VENTRICLES: Ovoid area of low attenuation in the left frontal corona radiata measures 2 x 1.4 cm, new from prior study (series 2, image 39). No acute intracranial hemorrhage. No mass effect or midline shift. No hydrocephalus. Diffuse parenchymal volume loss with enlargement of the ventricles and cerebral sulci. Old infarction in the right basal ganglia. There are nonspecific areas of hypoattenuation within the periventricular and subcortical white matter, which likely represent chronic microvascular ischemic change. Intracranial atherosclerotic disease. ORBITS: The visualized portion of the orbits demonstrate no acute abnormality. SINUSES: The visualized paranasal sinuses and mastoid air cells demonstrate no acute abnormality. SOFT TISSUES/SKULL: No acute abnormality of the visualized skull or soft tissues. 1. New ovoid low-attenuation area in the left frontal corona radiata compatible with acute/subacute infarction. This measures 2 x 1.4 cm. No associated hemorrhage. 2. Diffuse parenchymal volume loss and sequela of severe chronic microvascular ischemic changes. Findings were discussed with Dr. Abbey Hernandez at 1:31 pm on 7/13/2021.      XR CHEST PORTABLE    Result Date: 7/13/2021  EXAMINATION: ONE XRAY VIEW OF THE CHEST 7/13/2021 10:30 am COMPARISON: September 24, 2019. HISTORY: ORDERING SYSTEM PROVIDED HISTORY: Found down TECHNOLOGIST PROVIDED HISTORY: Found down Reason for Exam: supine Acuity: Acute Type of Exam: Initial FINDINGS: A portable upright frontal view chest radiograph was obtained. The heart is enlarged. The mediastinal contour and pleural spaces are otherwise within normal limits. The lungs are grossly clear. There is no focal consolidation or pneumothorax. The pulmonary vascular pattern is within normal limits. No acute thoracic osseous abnormality. Clear lungs. Cardiomegaly. No acute cardiopulmonary abnormality. VL DUP LOWER EXTREMITY VENOUS BILATERAL    Result Date: 7/15/2021    [facility]  Vascular Lower Extremities DVT Study Procedure   Patient Name GOFF      Date of Study           07/15/2021               YAYA DUBOIS   Date of      1945  Gender                  Male  Birth   Age          77 year(s)  Race                    Black   Room Number  0536        Height:                 70 inch, 177.8 cm   Corporate ID [id number]    Weight:                 210 pounds, 95.3 kg  #   Patient Acct [de-identified]   BSA:        2.13 m^2    BMI:        30.13 kg/m^2  #   MR #         [id number]     Sonographer             Alexis Rosario, T   Accession #  [id number]  Interpreting Physician  Phillip Lewis   Referring                Referring Physician     Maurice Connolly  Nurse  Practitioner  Procedure Type of Study:   Veins: Lower Extremities DVT Study, Venous Scan Lower Bilateral.  Indications for Study:CVA. Patient Status: In Patient. Technical Quality:Limited visualization. Limitation reason:legs rigid, extreme edema and resistance to compression .   Conclusions   Summary   Simultaneous real time imaging utilizing B-Mode, color doppler and  spectral waveform analysis was performed on the bilateral lower  extremities for venous examination of the deep and superficial systems. Findings are:   Right:  No evidence of deep or superficial venous thrombosis. Left:  No evidence of deep or superficial venous thrombosis. Signature   ----------------------------------------------------------------  Electronically signed by Mera Rodriguez RVT(Sonographer)  on 07/15/2021 04:23 PM  ----------------------------------------------------------------   ----------------------------------------------------------------  Electronically signed by Nevaeh Quevedo(Interpreting  physician) on 07/15/2021 08:56 PM  ----------------------------------------------------------------  Findings:   Right Impression:                    Left Impression:  The common femoral, femoral,         The common femoral, femoral,  popliteal and tibial veins           popliteal and tibial veins  demonstrate normal compressibility   demonstrate normal compressibility  and augmentation. and augmentation. Normal compressibility of the great  Normal compressibility of the great  saphenous vein. saphenous vein. Normal compressibility of the small  Normal compressibility of the small  saphenous vein. saphenous vein. Risk Factors   - The patient's risk factor(s) include: chronic lung disease, diabetes     mellitus and arterial hypertension. Velocities are measured in cm/s ; Diameters are measured in cm Right Lower Extremities DVT Study Measurements Right 2D Measurements +------------------------------------+----------+---------------+----------+ ! Location                            ! Visualized! Compressibility! Thrombosis! +------------------------------------+----------+---------------+----------+ ! Common Femoral                      !Yes       ! Yes            ! None      ! +------------------------------------+----------+---------------+----------+ ! Prox Femoral                        !Yes       ! Yes            ! None      ! +------------------------------------+----------+---------------+----------+ ! Mid Femoral                         !Yes       ! Yes            ! None      ! +------------------------------------+----------+---------------+----------+ ! Dist Femoral                        !Yes       ! Yes            ! None      ! +------------------------------------+----------+---------------+----------+ ! Deep Femoral                        !No        !               !          ! +------------------------------------+----------+---------------+----------+ ! Popliteal                           !Yes       ! Yes            ! None      ! +------------------------------------+----------+---------------+----------+ ! Sapheno Femoral Junction            ! Yes       ! Yes            ! None      ! +------------------------------------+----------+---------------+----------+ ! PTV                                 ! Yes       ! Yes            ! None      ! +------------------------------------+----------+---------------+----------+ ! Peroneal                            !No        !               !          ! +------------------------------------+----------+---------------+----------+ ! Gastroc                             ! Yes       ! Yes            ! None      ! +------------------------------------+----------+---------------+----------+ ! GSV Thigh                           ! Yes       ! Yes            ! None      ! +------------------------------------+----------+---------------+----------+ ! GSV Knee                            ! Yes       ! Yes            ! None      ! +------------------------------------+----------+---------------+----------+ ! GSV Ankle                           ! Yes       ! Yes            ! None      ! +------------------------------------+----------+---------------+----------+ ! SSV                                 ! Yes       ! Yes            ! None      ! +------------------------------------+----------+---------------+----------+ Right Doppler Measurements +---------------------------+------+------+--------------------------------+ ! Location                   ! Signal!Reflux! Reflux (msec)                   ! +---------------------------+------+------+--------------------------------+ ! Common Femoral             !Phasic!      !                                ! +---------------------------+------+------+--------------------------------+ ! Prox Femoral               !Phasic!      !                                ! +---------------------------+------+------+--------------------------------+ ! Popliteal                  !Phasic!      !                                ! +---------------------------+------+------+--------------------------------+ Left Lower Extremities DVT Study Measurements Left 2D Measurements +------------------------------------+----------+---------------+----------+ ! Location                            ! Visualized! Compressibility! Thrombosis! +------------------------------------+----------+---------------+----------+ ! Common Femoral                      !Yes       ! Yes            ! None      ! +------------------------------------+----------+---------------+----------+ ! Prox Femoral                        !Yes       ! Yes            ! None      ! +------------------------------------+----------+---------------+----------+ ! Mid Femoral                         !Yes       ! Yes            ! None      ! +------------------------------------+----------+---------------+----------+ ! Dist Femoral                        !Yes       ! Yes            ! None      ! +------------------------------------+----------+---------------+----------+ ! Deep Femoral                        !No        !               !          ! +------------------------------------+----------+---------------+----------+ ! Popliteal                           !Yes       ! Yes            ! None      ! +------------------------------------+----------+---------------+----------+ ! Sapheno Femoral Junction            ! Yes       ! Yes            ! None      ! +------------------------------------+----------+---------------+----------+ ! PTV                                 ! Yes       ! Yes            ! None      ! +------------------------------------+----------+---------------+----------+ ! Peroneal                            !Yes       ! Yes            ! None      ! +------------------------------------+----------+---------------+----------+ ! Gastroc                             ! Yes       ! Yes            ! None      ! +------------------------------------+----------+---------------+----------+ ! GSV Thigh                           ! Yes       ! Yes            ! None      ! +------------------------------------+----------+---------------+----------+ ! GSV Knee                            ! Yes       ! Yes            ! None      ! +------------------------------------+----------+---------------+----------+ ! GSV Ankle                           ! Yes       ! Yes            ! None      ! +------------------------------------+----------+---------------+----------+ ! SSV                                 ! Yes       ! Yes            ! None      ! +------------------------------------+----------+---------------+----------+ Left Doppler Measurements +---------------------------+------+------+--------------------------------+ ! Location                   ! Signal!Reflux! Reflux (msec)                   ! +---------------------------+------+------+--------------------------------+ ! Common Femoral             !Phasic!      !                                ! +---------------------------+------+------+--------------------------------+ ! Prox Femoral               !Phasic!      !                                ! +---------------------------+------+------+--------------------------------+ ! Popliteal                  !Phasic!      !                                ! +---------------------------+------+------+--------------------------------+    US SPLEEN    Result Date: 7/14/2021  EXAMINATION: ULTRASOUND OF THE SPLEEN 7/14/2021 8:29 am COMPARISON: None. HISTORY: ORDERING SYSTEM PROVIDED HISTORY: thrombocytopenia TECHNOLOGIST PROVIDED HISTORY: thrombocytopenia FINDINGS: Suboptimal study. The visualized spleen appears enlarged measuring 13.4 cm. No focal lesion is seen. No free fluid. Suboptimal study. Mild splenomegaly. CTA HEAD NECK W CONTRAST    Result Date: 7/13/2021  EXAMINATION: CTA OF THE HEAD AND NECK WITH CONTRAST 7/13/2021 1:11 pm: TECHNIQUE: CTA of the head and neck was performed with the administration of intravenous contrast. Multiplanar reformatted images are provided for review. MIP images are provided for review. Stenosis of the internal carotid arteries measured using NASCET criteria. Dose modulation, iterative reconstruction, and/or weight based adjustment of the mA/kV was utilized to reduce the radiation dose to as low as reasonably achievable. 3D surface reformatted and curved MIP images were submitted for review. COMPARISON: None. HISTORY: ORDERING SYSTEM PROVIDED HISTORY: stroke TECHNOLOGIST PROVIDED HISTORY: stroke Decision Support Exception - unselect if not a suspected or confirmed emergency medical condition->Emergency Medical Condition (MA) Reason for Exam: stroke, Cerebrovascular Accident; Fall FINDINGS: CTA NECK: AORTIC ARCH/ARCH VESSELS: No dissection or arterial injury. No significant stenosis of the brachiocephalic or subclavian arteries. CAROTID ARTERIES: No dissection, arterial injury, or hemodynamically significant stenosis by NASCET criteria. VERTEBRAL ARTERIES: No dissection, arterial injury, or significant stenosis in the right vertebral artery. Severe stenosis in the V1 segment of the left vertebral artery. SOFT TISSUES: The lung apices are clear. No cervical or superior mediastinal lymphadenopathy. The larynx and pharynx are unremarkable. No acute abnormality of the salivary glands.   Thyroid gland is diffusely enlarged and heterogeneous and contains left thyroid lobe nodule measuring 2.6 cm. BONES: Multilevel degenerative spondylosis throughout the cervical spine with fusion at C5-C6. CTA HEAD: ANTERIOR CIRCULATION: Calcified atherosclerotic plaque in the cavernous segments of the internal carotid arteries bilaterally results in mild-to-moderate cavernous ICA stenosis bilaterally. Mild stenosis in the proximal A2 segment of the right anterior cerebral artery. Severe stenosis in the proximal A3 segments of the anterior cerebral arteries bilaterally. Moderate-to-severe stenosis within M2 segment of the right middle cerebral artery. Moderate stenosis in the M1 and proximal M2 segments of the left middle cerebral artery. POSTERIOR CIRCULATION: No significant stenosis in the right intradural vertebral artery. Severe stenosis in the intracranial left vertebral artery. Mild stenosis in the basilar artery. Moderate stenosis in the P1/P2 junction of the left PCA. Severe stenosis and near complete occlusion of the right PCA. OTHER: No dural venous sinus thrombosis on this non-dedicated study. 1. Severe stenosis in the V1 segment of the left vertebral artery. 2. Extensive intracranial atherosclerotic plaque with near complete occlusion of the right PCA. 3. Severe stenoses in the proximal A3 segments of the ACAs bilaterally. 4. Moderate-to-severe proximal M2 segment stenosis in the right MCA. Moderate stenosis in the M1 and M2 segments of the left MCA. 5. Moderate stenosis in the P1/P2 junction of the left PCA. 6. Enlarged heterogeneous thyroid gland with 2.6 cm left thyroid lobe nodule. Nonemergent/outpatient thyroid ultrasound recommended. Findings were discussed with Dr. Pascual Beard at 1:43 pm on 7/13/2021. RECOMMENDATIONS: 2.6 cm incidental left thyroid nodule with heterogeneous and enlarged thyroid. Recommend thyroid US. Reference: J Am Bruno Radiol.  2015 Feb;12(2): 143-50     MRI BRAIN WO CONTRAST    Result Date: 7/14/2021  EXAMINATION: MRI OF THE BRAIN WITHOUT CONTRAST  7/14/2021 4:08 pm TECHNIQUE: Multiplanar multisequence MRI of the brain was performed without the administration of intravenous contrast. COMPARISON: None. HISTORY: ORDERING SYSTEM PROVIDED HISTORY: stroke, right sided weakness TECHNOLOGIST PROVIDED HISTORY: stroke, right sided weakness Decision Support Exception - unselect if not a suspected or confirmed emergency medical condition->Emergency Medical Condition (MA) Reason for Exam: possible stroke. pt had a fall. FINDINGS: INTRACRANIAL STRUCTURES/VENTRICLES: . acute infarcts in the left basal ganglia. Acute infarct in the right head of caudate and in the right putamen no mass effect or midline shift. No evidence of an acute intracranial hemorrhage. The ventricles and sulci are normal in size and configuration. The sellar/suprasellar regions appear unremarkable. The normal signal voids within the major intracranial vessels appear maintained. ORBITS: The visualized portion of the orbits demonstrate no acute abnormality. SINUSES: The visualized paranasal sinuses and mastoid air cells are well aerated. BONES/SOFT TISSUES: The bone marrow signal intensity appears normal. The soft tissues demonstrate no acute abnormality. Acute infarct in the left and right basal ganglia greater on the left. Diffuse volume loss with extensive chronic white matter changes. DIAGNOSIS:  Depression unspecified  Acute ischemic stroke, hemiplegia right side,      RECOMMENDATIONS  Medications: Continue Prozac 40 mg daily    We will continue to follow  Discussed with the treating physician/ team about the patient and treatment plan  Reviewed the chart    Discussed with the patient risk, benefit, alternative and common side effects for the  proposed medication treatment. Patient is consenting to the treatment. Thanks for the consult. Please call me if needed.     Electronically signed by Fadia Xavier MD on 8/5/2021 at 11:30 AM

## 2021-08-05 NOTE — PROGRESS NOTES
Comprehensive Nutrition Assessment    Type and Reason for Visit:  Reassess    Nutrition Recommendations/Plan:  Gradually increase tube feeding to 67 mL per hr with water flush of 200 mL x 3 daily which will provide: 1920 kcal, 89 gm protein, 1891 mL free fluid. Continue to offer oral diet. Nutrition Assessment:  0% PO intake today, pt refusing meals. Pt appears to be tolerating tube feeding. Water flushes increased earlier today since IV discontinued. Tube feeding to be gradually increased to goal of 67 mL per hr with water flush of 200 mL x 3 daily. Goal tube feeding will provide approximately 1600 mL of formula per day,1920 kcal, 89 gm protein and 1291 mL free fluid (excluding flushes). Future goal may be noctunral feeding at 70 mL per hr x 10 hr and 300 mL bolus x 3 daily. Malnutrition Assessment:  Malnutrition Status:  Severe malnutrition    Context:  Acute Illness     Findings of the 6 clinical characteristics of malnutrition:  Energy Intake:  7 - 50% or less of estimated energy requirements for 5 or more days  Weight Loss:  7 - Greater than 5% over 1 month     Body Fat Loss:  Unable to assess     Muscle Mass Loss:  Unable to assess    Fluid Accumulation:  No significant fluid accumulation     Strength:  Not Performed    Estimated Daily Nutrient Needs:  Energy (kcal):  1260-1003 based on [place] Carte with 1.1-1.2 factor; Weight Used for Energy Requirements:  Current (89.8kg)     Protein (g):   based on 1.2-1.4 gm per kg; Weight Used for Protein Requirements:  Ideal        Fluid (ml/day):  1531-0961; Method Used for Fluid Requirements:  1 ml/kcal      Nutrition Related Findings:  No edema. POC Glucose:157-184. Other labs and meds reviewed. Wounds:  None       Current Nutrition Therapies:    ADULT DIET;  Dysphagia - Pureed; Mildly Thick (Nectar)  ADULT TUBE FEEDING; PEG; Other Tube Feeding (specify); Jevity 1.2; Continuous; 30; Yes; 10; Q 6 hours; 67; 200; Q 8 hours  Current Tube Feeding (TF) Orders:  · Feeding Route: PEG  · Formula: Other Tube Feeding (Comment) (Jevity 1.2)  · Schedule: Continuous  · Water Flushes: 200 mL x 3 daily  · Goal TF & Flush Orders Provides: Goal of 1600 mL Jevity 1.2 per day provides: 1920 kcal, 89 gm protein, 1291 mL free fluid (excluding water flushes)    Anthropometric Measures:  · Height: 5' 10\" (177.8 cm)  · Current Body Weight: 197 lb 15.6 oz (89.8 kg)   · Admission Body Weight: 212 lb 1.3 oz (96.2 kg)    · Usual Body Weight: 212 lb 1.3 oz (96.2 kg)     · Ideal Body Weight: 166 lbs  · BMI: 28.4  · BMI Categories: Obese Class 1 (BMI 30.0-34. 9)       Nutrition Diagnosis:   · Severe malnutrition related to inadequate protein-energy intake as evidenced by intake 0-25%, weight loss greater than or equal to 5% in 1 month, poor intake prior to admission    Nutrition Interventions:   Food and/or Nutrient Delivery:  Continue Current Diet, Start Tube Feeding  Nutrition Education/Counseling:  Education not indicated   Coordination of Nutrition Care:  Continue to monitor while inpatient    Goals:  Tube Feeding to meet approximately 100% of estimated needs       Nutrition Monitoring and Evaluation:   Behavioral-Environmental Outcomes:  None Identified   Food/Nutrient Intake Outcomes:  Food and Nutrient Intake  Physical Signs/Symptoms Outcomes:  Biochemical Data, GI Status, Skin, Weight, Fluid Status or Edema     Discharge Planning:    Continue current diet, Enteral Nutrition     Some areas of assessment may be incomplete due to standard COVID-19 Precautions. Briseida Romano R.D., L.D.   Phone: [phone]

## 2021-08-05 NOTE — CARE COORDINATION
Met with sister Soren Stweart and discussed SNF choices. She has been very busy trying to get a lot of pt's things in order and had not had time. Sister understanding pt will to discharge and he will need placement. Sister agreeable to go by star rating for referrals. Review of list indicated [facility] ([facility]); referral sent.

## 2021-08-05 NOTE — PLAN OF CARE
Problem: Infection:  Goal: Will remain free from infection  Description: Will remain free from infection  8/5/2021 1740 by Danish Kim RN  Outcome: Ongoing     Problem: Safety:  Goal: Free from accidental physical injury  Description: Free from accidental physical injury  8/5/2021 1740 by Danish Kim RN  Outcome: Ongoing     Problem: Skin Integrity:  Goal: Will show no infection signs and symptoms  Description: Will show no infection signs and symptoms  8/5/2021 1740 by Danish Kim RN  Outcome: Ongoing

## 2021-08-05 NOTE — PROGRESS NOTES
Physical Therapy  [address]  Acute Rehabilitation Physical Therapy Progress Note    Date: 21  Patient Name: Irish Schultz       Room: 5098/8779-45  MRN: [id number]   Account: [de-identified]   : 1945  (72 y.o.) Gender: male   Referring Practitioner: Dr. Cueto Pain  Diagnosis: Ischemic CVA with R dominant hemiplegia  Past Medical History:  has a past medical history of Centrilobular emphysema ([facility] [address] COPD (chronic obstructive pulmonary disease) ([facility] [address] Depression, Diabetes mellitus ([facility] [address] Former smoker, Hyperlipidemia, Hypertension, Mixed restrictive and obstructive lung disease ([facility] [address] Need for pneumococcal vaccine, and Personal history of tobacco use. Past Surgical History:   has a past surgical history that includes Neck surgery; Toe amputation (Right, greater); Cardiac surgery (2015); and Upper gastrointestinal endoscopy (N/A, 8/3/2021). Additional Pertinent Hx: Irish Schultz is a 77 y.o. RHD male admitted to [facility] on 2021. On admit, exam significant for right-sided facial droop, right-sided weakness and severe dysarthria. Significant history with recurrent strokes, remote left temporal lobe ischemic infarct and remote bilateral occipital lobe infarcts . MRI shows Bilateral basal ganglia ischemic infarcts. Pt admitted to rehab unit on 21    Orientation Level: Unable to assess (pt nonresponsive)  Restrictions/Precautions  Restrictions/Precautions: Up as Tolerated;General Precautions; Fall Risk;Swallowing - Thickened Liquids (Mildly thick (Nectar thick))  Required Braces or Orthoses?: No  Position Activity Restriction  Other position/activity restrictions: Up w/assistance, RU/LE Weakness. MRI BRAIN- Acute infarct in the left and right basal ganglia greater on the left. Comments: Spent 10+ min. this AM attempting to rouse Pt using various stimuli with no return.      Vital Signs  Patient Currently in Pain: Other (comment) (No response to query)    Patient Observation  Observations: Nonresponsive today; RNSAEED report same. Exercises  Other exercises?: Yes  Other exercises 8: Supine passive ROM: R UE, 20 reps; R LE, 2x20 reps (including R achilles stretch, 3x30\")    Activity Tolerance: Other, Patient limited by cognitive status (Pt unresponsive)     PT Equipment Recommendations  Other: TBD    Current Treatment Recommendations: Strengthening, ROM, Balance Training, Functional Mobility Training, Gait Training, Endurance Training, Transfer Training, Safety Education & Training, Wheelchair Mobility Training, Neuromuscular Re-education, Cognitive Reorientation, Home Exercise Program, Patient/Caregiver Education & Training, Equipment Evaluation, Education, & procurement, Positioning, Stair training, Modalities (Will consider using modalities as needed for neuro re-edu.)    Conditions Requiring Skilled Therapeutic Intervention  Body structures, Functions, Activity limitations: Decreased functional mobility ; Decreased ROM; Decreased strength;Decreased balance;Decreased safe awareness;Decreased endurance;Decreased posture;Decreased cognition;Decreased fine motor control;Decreased coordination  Barriers to Learning: aphasia, cognitive deficits  REQUIRES PT FOLLOW UP: Yes  Discharge Recommendations: Patient would benefit from continued therapy after discharge;24 hour supervision or assist    Goals  Short term goals  Time Frame for Short term goals: 10 days  Short term goal 1: Pt able to roll side to side at min A   Short term goal 2: Pt able to perform supine>sit at min A   Short term goal 3: Pt able to perform sit to supine at mod A  Short term goal 4: Pt able to transfer at mod A   Short term goal 5: Pt able to propel w/c with L UE/L LE, distance fo 100 ft min A   Short term goal 6: Pt able to ambulate with appropriate device distance of 50 to 100 ft, min A x2  Long term goals  Time Frame for Long term goals : By DC  Long term goal 1: Pt able perform bed mobility mod-I  Long term goal 2:  Pt able to perform transfers at CGA/min A   Long term goal 3: Pt able to ambulate with appropriate device distance of 100 to 150 ft, min A  Long term goal 4: Pt able to go up and down 4 to 5 steps with 1 UE support at mod/max A   Long term goal 5: Pt able to propel w/c level surfaces distance fo 100 to 150 ft, SBA  Long term goal 6: Improve PASS score to at least 19/36 to improve overall function. Long term goal 7: Improve standing dynamic balance with assistive device to at least fair+ to reduce fall risk.         08/05/21 0915 08/05/21 1550   PT Individual Minutes   Time In 4789 1440   Time Out [id number]   Minutes 12 15     Electronically signed by Shari Chinchilla PTA on 8/5/21 at 3:55 PM EDT

## 2021-08-05 NOTE — PLAN OF CARE
Problem: Infection:  Goal: Will remain free from infection  Description: Will remain free from infection  Outcome: Ongoing     Problem: Safety:  Goal: Free from accidental physical injury  Description: Free from accidental physical injury  Outcome: Ongoing  Goal: Free from intentional harm  Description: Free from intentional harm  Outcome: Ongoing     Problem: Daily Care:  Goal: Daily care needs are met  Description: Daily care needs are met  Outcome: Ongoing     Problem: Pain:  Goal: Patient's pain/discomfort is manageable  Description: Patient's pain/discomfort is manageable  8/5/2021 0542 by Cedric Guzman RN  Outcome: Ongoing  8/4/2021 1714 by Ramin Carbajal RN  Outcome: Ongoing     Problem: Skin Integrity:  Goal: Skin integrity will stabilize  Description: Skin integrity will stabilize  8/5/2021 0542 by Cedric Guzman RN  Outcome: Ongoing  8/4/2021 1714 by Ramin Carbajal RN  Outcome: Ongoing     Problem: Discharge Planning:  Goal: Patients continuum of care needs are met  Description: Patients continuum of care needs are met  Outcome: Ongoing     Problem: Skin Integrity:  Goal: Will show no infection signs and symptoms  Description: Will show no infection signs and symptoms  Outcome: Ongoing  Goal: Absence of new skin breakdown  Description: Absence of new skin breakdown  Outcome: Ongoing     Problem: Falls - Risk of:  Goal: Will remain free from falls  Description: Will remain free from falls  8/5/2021 0542 by Cedric Guzman RN  Outcome: Ongoing  Note: Patient remained free from falls all throughout shift. Bed locked, side rails up X2, belongings and call light within reach.     8/4/2021 1714 by Ramin Carbajal RN  Outcome: Ongoing  Goal: Absence of physical injury  Description: Absence of physical injury  Outcome: Ongoing     Problem: Nutrition  Goal: Optimal nutrition therapy  Outcome: Ongoing     Problem: Musculor/Skeletal Functional Status  Goal: Highest potential functional level  Outcome: Ongoing  Goal: Absence of falls  Outcome: Ongoing     Problem: IP TRANSFERS  Goal: LTG - patient will transfer from one surface to another  8/5/2021 0542 by Ramona Hale RN  Outcome: Ongoing  8/4/2021 1714 by Kinjal Chapa RN  Outcome: Ongoing  Goal: LTG - Patient will demonstrate safe transfer techniques  8/5/2021 0542 by Ramona Hale RN  Outcome: Ongoing  8/4/2021 1714 by Kinjal Chapa RN  Outcome: Ongoing  Goal: LTG - patient will transfer from wheelchair to toilet  8/5/2021 0542 by Ramona Hale RN  Outcome: Ongoing  8/4/2021 1714 by Kinjal Chapa RN  Outcome: Ongoing  Goal: STG - transfer from bed to chair  8/5/2021 0542 by Ramona Hale RN  Outcome: Ongoing  8/4/2021 1714 by Kinjal Chapa RN  Outcome: Ongoing  Goal: STG - patient will perform bed mobility  8/5/2021 0542 by Ramona Hale RN  Outcome: Ongoing  8/4/2021 1714 by Kinjal Chapa RN  Outcome: Ongoing  Goal: STG - Patient will roll  8/5/2021 0542 by Ramona Hale RN  Outcome: Ongoing  8/4/2021 1714 by Kinjal Chapa RN  Outcome: Ongoing  Goal: STG - patient will transfer sit to and from stand  8/5/2021 0542 by Ramona Hale RN  Outcome: Ongoing  8/4/2021 1714 by Kinjal Chapa RN  Outcome: Ongoing

## 2021-08-05 NOTE — PROGRESS NOTES
Physical Medicine & Rehabilitation  Progress Note      Subjective: Radha Baird is a 77 y.o. male with ischemic CVA left PCA, RAHEEM, and MCA with right dominant hemiparesis. He appears more fatigued today. Not answering questions and does not open eyes during evaluation. Discussed increasing free water flushes and increasing tube feed rate slowly with dietician. ROS:  Unable to assess    Rehabilitation:   Progressing in therapies. PT:  Restrictions/Precautions: Up as Tolerated, General Precautions, Fall Risk, Swallowing - Thickened Liquids  Other position/activity restrictions: Up w/assistance, RU/LE Weakness. MRI BRAIN- Acute infarct in the left and right basal ganglia greater on the left. Transfers  Sit to Stand: 2 Person Assistance, Maximum Assistance (walker lift. Very slow moving. )  Stand to sit: 2 Person Assistance, Minimal Assistance (B UE assist to chair from walker lift; able to stand while lift is lowered.)  Bed to Chair: Dependent/Total (hemiwalker)  Stand Pivot Transfers: Dependent/Total (x3)  Lateral Transfers: Maximum Assistance (hemiwalker)  Comment: B UE support on elevated walker  Ambulation 1  Surface: level tile  Device:  (Green Walker Lift)  Other Apparatus: Right, AFO, Slider, Wheelchair follow  Assistance: 2 Person assistance, Maximum assistance  Quality of Gait: forward flexed hips, max assist to advance R LE. Verbal cues to increase L step length with G response, repeated cues necessary. Gait Deviations: Slow Anais, Decreased step length, Decreased step height, Decreased head and trunk rotation  Distance: 76' with standing rest break after 60'  Comments: Cues to shift weight initially with G return, no further assist necessary except occasional cue. Transfers  Sit to Stand: 2 Person Assistance, Maximum Assistance (walker lift.  Very slow moving. )  Stand to sit: 2 Person Assistance, Minimal Assistance (B UE assist to chair from walker lift; able to stand while lift is lowered.)  Bed to Chair: Dependent/Total (hemiwalker)  Stand Pivot Transfers: Dependent/Total (x3)  Lateral Transfers: Maximum Assistance (hemiwalker)  Comment: B UE support on elevated walker  Ambulation  Ambulation?: Yes (not today)  More Ambulation?: Yes  Ambulation 1  Surface: level tile  Device:  (Green Walker Lift)  Other Apparatus: Right, AFO, Slider, Wheelchair follow  Assistance: 2 Person assistance, Maximum assistance  Quality of Gait: forward flexed hips, max assist to advance R LE. Verbal cues to increase L step length with G response, repeated cues necessary. Gait Deviations: Slow Anais, Decreased step length, Decreased step height, Decreased head and trunk rotation  Distance: 76' with standing rest break after 60'  Comments: Cues to shift weight initially with G return, no further assist necessary except occasional cue. Surface: level tile  Ambulation 1  Surface: level tile  Device:  (Green Walker Lift)  Other Apparatus: Right, AFO, Slider, Wheelchair follow  Assistance: 2 Person assistance, Maximum assistance  Quality of Gait: forward flexed hips, max assist to advance R LE. Verbal cues to increase L step length with G response, repeated cues necessary. Gait Deviations: Slow Anais, Decreased step length, Decreased step height, Decreased head and trunk rotation  Distance: 76' with standing rest break after 60'  Comments: Cues to shift weight initially with G return, no further assist necessary except occasional cue. OT:  ADL  Feeding: NPO (Peg tube. )  Grooming: Dependent/Total (face washing in attempt to alert patient. )  UE Bathing: Dependent/Total  LE Bathing: None  UE Dressing: Dependent/Total  LE Dressing: Dependent/Total  Toileting: Dependent/Total (brief change in bed)  Additional Comments: ADL care tasks not therapeutic at this time due to no patient engagement.           Balance  Sitting Balance: Maximum assistance  Standing Balance: Maximum assistance (max X2)   Standing Balance  Time: 1-2 min   Activity: Transfer in SS   Comment: significant posterior lean         Bed mobility  Rolling to Left: Maximum assistance  Rolling to Right: Moderate assistance  Supine to Sit: Maximum assistance, 2 Person assistance  Sit to Supine: Maximum assistance  Scooting: Maximal assistance, 2 Person assistance  Comment: Patient in chair upon arrival and left in w/c with chair alarm in place. Transfers  Stand Pivot Transfers: Dependent/Total (with SS)  Sit to stand: Maximum assistance  Stand to sit: Maximum assistance  Transfer Comments: Pt sits EOB with eyes closed and R side lean. Requires hand over hand for grasping SS with L hand, max A X2 for standing/sitting safety, RUE mgmt, CGA standing in SS. Toilet Transfers  Toilet - Technique: Stand pivot  Equipment Used: Raised toilet seat with rails  Toilet Transfer: Maximum assistance, 2 Person assistance (max A + mod A)  Toilet Transfers Comments: Verbal cues for safety and hand placement. Wheelchair [employer] Transfers  Equipment Used: [employer], Wheelchair, Other ()  Level of Asssistance: Dependent/Total, 2 Person assistance    SPEECH:  Subjective: []? Alert     []? Cooperative     []? Confused     []? Agitated    [x]? Lethargic     Objective/Assessment:  Auditory Comprehension: Pt. not responding to any writer questions despite MAX cues. Pt.  Followed direction 1/3 trials with max tactile cues (close your mouth).    Verbal expression:  No attempts made at communication.      Speech: Pt not responding to verbal stimuli, remaining with eyes closed, non verbal throughout entire session.       Voice: Pt. did not vocalize or verbalize during session.      Dysphagia:   n/a       Pt. Sister Cherrie Sanabria present, ST updated re: current performance. Pt. Did not acknowledge her when she talked to him. 1:1 sitter reported pt. Opened eyes briefly x1 when they sat him up in the chair.        Current Medications:   Current Facility-Administered Medications: insulin lispro (HUMALOG) injection vial 0-6 Units, 0-6 Units, Subcutaneous, 4 times per day  losartan (COZAAR) tablet 50 mg, 50 mg, Oral, Daily  aspirin chewable tablet 81 mg, 81 mg, PEG Tube, Daily  clopidogrel (PLAVIX) tablet 75 mg, 75 mg, PEG Tube, Daily  heparin (porcine) injection 5,000 Units, 5,000 Units, Subcutaneous, 3 times per day  polyethylene glycol (GLYCOLAX) packet 17 g, 17 g, Per G Tube, Daily PRN  finasteride (PROSCAR) tablet 5 mg, 5 mg, PEG Tube, Daily  famotidine (PEPCID) tablet 20 mg, 20 mg, PEG Tube, Daily  FLUoxetine (PROZAC) 20 MG/5ML solution 40 mg, 40 mg, PEG Tube, Daily  melatonin tablet 6 mg, 6 mg, PEG Tube, Nightly  rosuvastatin (CRESTOR) tablet 40 mg, 40 mg, PEG Tube, Nightly  isosorbide dinitrate (ISORDIL) tablet 10 mg, 10 mg, PEG Tube, TID  tiotropium (SPIRIVA RESPIMAT) 2.5 MCG/ACT inhaler 2 puff, 2 puff, Inhalation, Lunch  albuterol sulfate  (90 Base) MCG/ACT inhaler 2 puff, 2 puff, Inhalation, Q6H PRN  [Held by provider] hydrALAZINE (APRESOLINE) tablet 10 mg, 10 mg, Oral, 3 times per day  acetaminophen (TYLENOL) tablet 650 mg, 650 mg, Oral, Q4H PRN  senna (SENOKOT) tablet 17.2 mg, 2 tablet, Oral, Daily PRN  bisacodyl (DULCOLAX) suppository 10 mg, 10 mg, Rectal, Daily PRN  glucose (GLUTOSE) 40 % oral gel 15 g, 15 g, Oral, PRN  dextrose 50 % IV solution, 12.5 g, Intravenous, PRN  glucagon (rDNA) injection 1 mg, 1 mg, Intramuscular, PRN  dextrose 5 % solution, 100 mL/hr, Intravenous, PRN      Objective:  /74   Pulse 89   Temp 98.1 °F (36.7 °C) (Oral)   Resp 18   Ht 5' 10\" (1.778 m)   Wt 198 lb (89.8 kg)   SpO2 92%   BMI 28.41 kg/m²       GEN: Well developed, no acute distress  HEENT: NCAT. Eyes closed evaluation. Mucous membranes pink and moist.  RESP: Minimal rhonchi heard on auscultation - sounds like this is coming from the upper airway (nurse to attempt suctioning). Respirations WNL and unlabored. CV: Regular rate and rhythm.  No murmurs, rubs, or gallops. ABD: Soft, non-distended, BS+ and equal.  NEURO:  Eyes closed throughout evaluation. Not answering questions. MSK:  Muscle bulk is normal bilaterally. Minimal movement noted in all limbs. LIMBS: No edema in bilateral lower limbs. SKIN: Warm and dry with good turgor. PSYCH:  Flat affect. Diagnostics:     CBC:   Recent Labs     08/04/21  1629 08/05/21  1244   WBC 10.4 8.3   RBC 4.86 4.68   HGB 11.9* 11.7*   HCT 37.7* 36.4*   MCV 77.6* 77.9*   RDW 17.6* 18.1*   * 153     BMP:   Recent Labs     08/04/21  1629 08/05/21  0558    145*   K 4.2 4.0   * 114*   CO2 19* 23   PHOS 3.3 2.8   BUN 25* 23   CREATININE 1.48* 1.64*   GLUCOSE 150* 165*     BNP: No results for input(s): BNP in the last 72 hours. PT/INR:   No results for input(s): PROTIME, INR in the last 72 hours. APTT: No results for input(s): APTT in the last 72 hours. CARDIAC ENZYMES: No results for input(s): CKMB, CKMBINDEX, TROPONINT in the last 72 hours. Invalid input(s): CKTOTAL;3  FASTING LIPID PANEL:  Lab Results   Component Value Date    CHOL 186 07/14/2021    HDL 42 07/14/2021    TRIG 59 08/05/2021     LIVER PROFILE:   Recent Labs     08/05/21  0558   AST 62*   ALT 79*   BILITOT 0.44   ALKPHOS 263*            Impression/Plan:   Impaired ADLs, gait, and mobility due to:    1. Ischemic CVA with Right dominant hemiparesis: PT/OT for gait, mobility, strengthening, endurance, ADLs, and self care. On ASA, plavix, Crestor. Can consider neurostimulant medication now that PEG tube is in place - will discuss with sister. Placed order for repeat CT head to evaluate for any change on 7/29 - patient initially refused, but imaging done 8/4 with no changes from previous study. 2. Agitation: Resolved. Has not required seroquel since 7/23. Has no sedating medicines on board to be causing drowsiness. Borderline QTc.   Telesitter added 7/28.  1:1 re-added 8/3 due to new PEG tube placement - if patient continues to not pull on PEG tube, will plan to discontinue tomorrow. 3. Dehydration, high risk for malnutrition:  Secondary to patient refusing medications and meals. Switched daily medications from morning to noon on 7/29 to see if patient would take his medications later in the day - was not successful. Patient was taking some medications at night - switched aspirin and plavix to nightly on 7/29 but he did not take them. Psych consult ordered after discussion with IM - increased prozac 7/28, changed to liquid formulation and added olanzapine on 7/30 (olanzapine discontinued at this time as this medication can be sedating). Discussed with IM - consult placed on 7/30 for general surgery to evaluate for possible PEG tube placement, as patient has not been maintaining nutrition, hydration, or medication regimen. Discussed PEG tube with family as well, as patient does not have decision-making capacity at this time. PEG tube placed 8/3 with sister's consent. Medications resumed via PEG on 8/3, tube feeds started 8/4 per dietician recommendations - will plan to increase rate slowly. Will monitor labs for any signs of refeeding syndrome. Spoke with dietician today about increasing free water flushes. 4. CELESTINO:  Secondary to dehydration. Creatinine 1.89 on 7/31. Creatinine 1.48 on 8/4 (?may be around patient's baseline), creatinine 1.65 on 8/5. Maintenance IV fluids discontinued after tube feeds started on 8/4. Discussed increasing free water flushes with dietician today, as above. 5. Elevated LFTs:  AST, ALT, alk phos elevated but has had elevation of these values recently. Will recheck tomorrow. 6. Sinusitis: Resolved. Was on augmentin  7. Insomnia: Scheduled melatonin started on 7/25  8. Thrombocytopenia: Hematology following. Was on prednisone - notified hematology that patient was refusing medications - okay for him to remain off of steroids at this time and monitor platelets.   Platelets overall improved (158 on 8/5), OK for DVT prophylaxis. 9. Anemia:  Hemoglobin 11.7 on 8/5, stable. Will monitor. 10. Leukocytosis:  Resolved. Attributed to prednisone. Will monitor. 11. HTN/CAD: On hydralazine (currently on hold), Imdur - switched to isordil on 8/3, losartan - IM resumed this medication at a lower dose on 8/4  12. DM II: On humalog sliding scale  13. COPD:  On tiotropium. Has albuterol prn. 14. Restless Leg Syndrome: On ropinirole - discontinued for now in order to minimize medications. Will monitor. 15. Depression: On fluoxetine. Psych consult and recommendations as above. 16. GERD: On famotidine  17. BPH: On finasteride, tamsulosin - discontinued as this medication cannot be crushed  18. Thyroid nodule: For outpatient monitoring  19. Bowel Management: Miralax daily prn, Senokot prn, Dulcolax prn  20. Internal medicine for medical management  21. DVT prophylaxis:  On heparin  22. Discussed recommendation for discharge to SNF with patient's family along with  on 7/30. Family provided a few choices but patient was not accepted at these facilities. Family to provide additional choices. As patient has had limited participation in therapies, will plan to discharge him to SNF as soon as it is safe.       Electronically signed by Kenna Hameed MD on 8/5/2021 at 10:33 PM

## 2021-08-06 NOTE — CONSULTS
Department of Psychiatry  Behavioral Health progress note    REASON FOR CONSULT: Agitation    History obtained from: Patient and medical record    -Interim history 8/6/2021-noted that the patient was agitated overnight. He required Ativan at 2 AM in the morning.  -The patient was seen at bedside. The patient's nurse was present. He was receiving his feeds through the PEG tube. The patient was asleep and did not wake up to verbal requests or light touch. As per his nurse he has been mostly asleep through the day but minimal.  The wakefulness. The patient's fever appears to be resolved. Interim history 8 /5/2021    -Patient has been receiving his Prozac 40 mg via PEG tube. The patient was seen at bedside. His brother was visiting. His nurse was also present. The patient has had a slight fever. He was sitting in bed with his eyes closed. The patient made no verbal responses to my requests. He did not open eyes on tapping his shoulder. Interim history on 8/4/2021-    Noted that the patient has received a PEG tube yesterday. The patient was seen at bedside. His sister and his RN were present. The patient has not spoken to either of them since morning. I attempted to engage the patient in conversation but he made no verbal response. He made intermittent eye contact. He continues to have significant psychomotor retardation. I have noted that the patient has not received Prozac for the last 2 days. He has received it today. We will continue Prozac at 40 mg at this time. Interim history 8/2/21-  The patient has had episodes of agitation over the weekend. The patient was seen at bedside. He continues to be mute and does not answer any questions. He makes intermittent eye contact but he does not offer a lot his head to answer questions. I have noted that the patient has been taking his medication Prozac regularly.   We will reduce the dose as 60 mg taken every day may be too high for the patient. I have been informed that the patient will receive a PEG tube placement tomorrow. That will open up medication options.       Interim history: 7/30/2021  Patient has been seen and examined at the bedside, no major event happened over the night, patient noted to be presented as reviewed in the past, reportedly he said 1 symptoms that he is feeling fine, constricted in his affect, come across as depressed, patient has been refusing oral medications, patient took 3 doses of Prozac in the last 9 days, we will increase the dose of Prozac to 60 mg at this is a long-acting medication and even intermittent use may help,      HISTORY OF PRESENT ILLNESS:    71-year-old male who was admitted previously for management of cerebrovascular accidents, presents to the emergency department with a chief complaint of being found down by the stairs, 911 has been called, found to have right facial weakness, right-sided arm and leg weakness, dysarthria, patient has multiple ecchymosis, on right side of the arm and chest,  CT head: New ovoid low-attenuation area in the left frontal corona radiata compatible with acute/subacute infarction, that measured 2 x 1.4 cm no associated hemorrhage, diffuse parenchymal volume loss and sequela of severe chronic microvascular ischemic changes,  -Neuro is on board MRI brain W0: Acute infarct in left and right basal ganglia, left more than right,  -Resume on aspirin and Plavix 75 mg, Lipitor,  -CTA head and neck: Severe stenosis in V1 segment of left vertebral artery extensive intracranial atherosclerotic plaque with near complete occlusion of right PCA, severe stenosis of proximal A3 segment of RAHEEM, moderate to severe proximal M2 segment stenosis of left MCA,       Patient admitted to rehab afterwards for right hemiparesis, patient was extremely agitated overnight, was given Ativan around 2 AM in the night,  -Patient failed telemetry sitter over the weekend, resumed 1:1, on Seroquel as needed, he did not require since 7/23, no sedating medication on board,  -Patient current medication Prozac 40 mg daily,    The patient is currently receiving care for the above psychiatric illness.   Patient is totally mute, he is not cooperative, he is not following commands, refusing meds, trying to pull lines sometimes,      Psychiatric Review of Systems :      ·    Obsessions and Compulsions: Denies    ·    Angelina or Hypomania: Denies  ·    Hallucinations: Denies  ·    Panic Attacks:  Denies  ·    Delusions:  Denies  ·    Phobias:  Denies  ·    Trauma: Denies      Substance Abuse History:  Unknown history    Past Psychiatric History:  Prior Diagnosis:     Hospitalization: yes  Hx of Suicidal Attempts: no  Hx of violence:  no      Past Medical History:        Diagnosis Date    [facility]     COPD [facility]chronic obstructive pulmonary disease) ([facility]     Depression     Diabetes mellitus ([facility]     pt refuses to take previously prescribed metformin (states PCP aware)    Former smoker     Hyperlipidemia     on 4/27/18 pt states \"I stopped taking that about a year ago\"    Hypertension     Mixed restrictive and obstructive lung disease ([facility]     Need for pneumococcal vaccine     Personal history of tobacco use        Past Surgical History:        Procedure Laterality Date    CARDIAC SURGERY  07/2015    1 stent    NECK SURGERY      TOE AMPUTATION Right greater    UPPER GASTROINTESTINAL ENDOSCOPY N/A 8/3/2021    EGD  PEG TUBE INSERTION performed by Cindi Quinonez MD at [facility]         Medications Prior to Admission:   Medications Prior to Admission: Heparin Sodium, Porcine, (HEPARIN, PORCINE,) 5000 UNIT/ML injection, Inject 1 mL into the skin every 8 hours  QUEtiapine (SEROQUEL) 25 MG tablet, Take 1 tablet by mouth nightly as needed for Agitation  methylPREDNISolone sodium (SOLU-MEDROL) 40 MG injection, Infuse 1 mL intravenously every 12 hours  melatonin 3 MG TABS tablet, Take 1 tablet by mouth nightly as needed (insomnia)  atorvastatin (LIPITOR) 80 MG tablet, Take 0.5 tablets by mouth daily (Pt takes one-half of an 80mg tab = 40mg)  tiZANidine (ZANAFLEX) 2 MG tablet, Take 1 tablet by mouth 4 times daily as needed (muscle spasms)  naproxen (NAPROSYN) 500 MG tablet, Take 1 tablet by mouth 2 times daily (with meals)  ibuprofen (ADVIL;MOTRIN) 800 MG tablet, Take 1 tablet by mouth every 8 hours as needed for Pain  lidocaine (LIDODERM) 5 %, Place 1 patch onto the skin daily 12 hours on, 12 hours off.  metoprolol succinate (TOPROL XL) 50 MG extended release tablet, Take 50 mg by mouth daily  tiotropium (SPIRIVA RESPIMAT) 2.5 MCG/ACT AERS inhaler, Inhale 2 puffs into the lungs daily  carboxymethylcellulose 1 % ophthalmic solution, Place 1 drop into both eyes 3 times daily as needed for Dry Eyes   ketotifen (ZADITOR) 0.025 % ophthalmic solution, Place 1 drop into both eyes 2 times daily as needed (itchy eyes)   isosorbide mononitrate (IMDUR) 60 MG extended release tablet, Take 30 mg by mouth daily Indications: 1/2 tablet (30mg)   finasteride (PROSCAR) 5 MG tablet, Take 5 mg by mouth daily  tamsulosin (FLOMAX) 0.4 MG capsule, Take 0.4 mg by mouth daily  fluticasone (FLONASE) 50 MCG/ACT nasal spray, 1 spray by Nasal route 2 times daily (Patient taking differently: 1 spray by Nasal route 2 times daily as needed for Rhinitis )  albuterol sulfate  (90 BASE) MCG/ACT inhaler, Inhale 2 puffs into the lungs every 6 hours as needed for Wheezing  albuterol (PROVENTIL) (2.5 MG/3ML) 0.083% nebulizer solution, Take 2.5 mg by nebulization every 6 hours as needed for Wheezing  aspirin 81 MG EC tablet, Take 81 mg by mouth daily  losartan (COZAAR) 100 MG tablet, Take 100 mg by mouth daily   nitroGLYCERIN (NITROSTAT) 0.4 MG SL tablet, Place 0.4 mg under the tongue every 5 minutes as needed for Chest pain  FLUoxetine (PROZAC) 20 MG capsule, Take 40 mg by mouth daily   furosemide (LASIX) 20 MG tablet, Take 20 mg by mouth daily Sexually Abused:        REVIEW OF SYSTEMS    Constitutional: [] fever  [] chills  [] weight loss  []weakness [] Other:  Eyes:  [] photophobia  [] discharge [] acuity change   [] Diplopia   [] Other:  HENT:  [] sore throat  [] ear pain [] Tinnitus   [] Other  Respiratory:  [] Cough  [] Shortness of breath   [] Sputum   [] Other:   Cardiac: []Chest pain   []Palpitations []Edema  []PND  [] Other:  GI:  []Abdominal pain   []Nausea  []Vomiting  []Diarrhea  [] Other:  :  [] Dysuria   []Frequency  []Hematuria  []Discharge  [] Other:  Possible Pregnancy: []Yes   []No   LMP:   Musculoskeletal:  []Back pain  []Neck pain  []Recent Injury   Skin:  []Rash  [] Itching  [] Other:  Neurologic:  [] Headache  [] Focal weakness  [] Sensory changes []Other:  Endocrine:  [] Polyuria  [] Polydipsia  [] Hair Loss  [] Other:  Lymphatic:   [] Swollen glands   Psychiatric:  As per HPI      All other systems negative except as marked or mentioned/indicated in the HPI. Isaias Elmore PHYSICAL EXAM:  Vitals:  /61   Pulse 70   Temp 98.2 °F (36.8 °C) (Oral)   Resp 20   Ht 5' 10\" (1.778 m)   Wt 198 lb (89.8 kg)   SpO2 93%   BMI 28.41 kg/m²        Mental Status Examination:    Level of consciousness: Asleep. Not responsive to verbal requests or light touch   appearance: Lying reclined in bed behavior/Motor: Unable to arouse patient  attitude toward examiner:   Unable to assess  Unable to complete remainder of medical exam because of patient being asleep.        LABS: REVIEWED TODAY:  Recent Labs     08/04/21  1629 08/05/21  1244   WBC 10.4 8.3   HGB 11.9* 11.7*   * 153     Recent Labs     08/04/21  1629 08/05/21  0558 08/06/21  0852    145* 145*   K 4.2 4.0 4.2   * 114* 112*   CO2 19* 23 22   BUN 25* 23 23   CREATININE 1.48* 1.64* 1.86*   GLUCOSE 150* 165* 183*     Recent Labs     08/05/21  0558 08/06/21  0852   BILITOT 0.44 0.32   ALKPHOS 263* 237*   AST 62* 63*   ALT 79* 83*     Lab Results   Component Value Date BARBSCNU NEGATIVE 07/13/2021    LABBENZ NEGATIVE 07/13/2021    LABMETH NEGATIVE 07/13/2021    PPXUR NOT REPORTED 07/13/2021     Lab Results   Component Value Date    TSH 0.28 07/14/2021     No results found for: LITHIUM  No results found for: VALPROATE, CBMZ  No results found for: LITHIUM, VALPROATE    FURTHER LABS ORDERED :      Radiology   ECHO Complete 2D W Doppler W Color    Result Date: 7/15/2021  Transthoracic Echocardiography Report (TTE)  Patient Name SHELL      Date of Study               07/15/2021               YAYA DUBOIS   Date of      1945  Gender                      Male  Birth   Age          77 year(s)  Race                        Black   Room Number  0536        Height:                     70 inch, 177.8 cm   Corporate ID [id number]    Weight:                     210 pounds, 95.3 kg  #   Patient Acct [de-identified]   BSA:          2.13 m^2      BMI:       30.13  #                                                               kg/m^2   MR #         [id number]     Marissa Gruber Player   Accession #  [id number]  Interpreting Physician      Yolande Vance   Fellow                   Referring Nurse                           Practitioner   Interpreting             Referring Physician         Zoraida Acosta MD  Fellow  Additional Comments Technically difficult study, patient supine unable to be positioned for better acoustic windows. Type of Study   TTE procedure:2D Echocardiogram, M-Mode, Doppler, Color Doppler. Procedure Date Date: 07/15/2021 Start: 08:29 AM Study Location: [facility] Technical Quality: Adequate visualization Indications:CVA. History / Tech. Comments: Procedure explained to patient. Echo completed in the Echo Lab. RN performed bubble study.  PMHx: Former smoker, COPD Hypertension, Diabetes, Hyperlipidemia Coronary artery disease, s/p stents Patient Status: Inpatient Height: 70 inches Weight: 210 pounds BSA: 2.13 m^2 BMI: 30.13 kg/m^2 CONCLUSIONS Summary Technically difficult study. Overall global left ventricular systolic function is normal, calculated ejection fraction is 50-55% Grade I (mild) left ventricular diastolic dysfunction. Technically difficult visualization of the right ventricle, but it does appear to be normal in size. Negative bubble study, no obvious intracardiac shunt noted within technical limitations of this study. No significant valvular regurgitation or stenosis seen. No significant pericardial effusion is seen. Signature ----------------------------------------------------------------------------  Electronically signed by Joanne Kurtz(Sonographer) on 07/15/2021 11:00 AM ---------------------------------------------------------------------------- ----------------------------------------------------------------------------  Electronically signed by Yolande Vance(Interpreting physician) on  07/15/2021 12:49 PM ---------------------------------------------------------------------------- FINDINGS Left Atrium Left atrium is normal in size. Inter-atrial septum is intact with no evidence for an atrial septal defect. Negative bubble study, no shunt noted. Left Ventricle Left ventricle is normal in size, normal wall thickness, global left ventricular systolic function is low normal, calculated ejection fraction is 50%. Grade I (mild) left ventricular diastolic dysfunction. Right Atrium Right atrium is normal in size. Right Ventricle Technically difficult visualization of the right ventricle, but it does appear to be normal in size. Mitral Valve Normal mitral valve structure. No mitral stenosis. Trivial mitral regurgitation. Aortic Valve Aortic valve is trileaflet. No evidence of aortic insufficiency or stenosis. Tricuspid Valve Tricuspid valve was not well visualized. Trivial tricuspid regurgitation. Insignificant tricuspid regurgitation, unable to estimate RVSP.  Pulmonic Valve Pulmonic valve not well visualized but Doppler velocities are normal. Trivial pulmonic insufficiency. Pericardial Effusion No significant pericardial effusion is seen. Miscellaneous Normal aortic root dimension. E/E' average = 17.35. IVC not well visualized. M-mode / 2D Measurements & Calculations:   LVIDd:5.6 cm(3.7 - 5.6 cm)        Diastolic DGVGYT:36.61 ml  IVSd:0.9 cm(0.6 - 1.1 cm)         Systolic NIQLCZ:41.87 ml  LVPWd:0.8 cm(0.6 - 1.1 cm)        Aortic Root:3 cm(2.0 - 3.7 cm)                                    LA Dimension: 3.2 cm(1.9 - 4.0 cm)  Calculated LVEF (%): 50.46 %      LA volume/Index: 48.86 ml /23m^2                                    LVOT:2.1 cm                                    RVDd:3 cm   Mitral:                                 Aortic   Valve Area (P1/2-Time): 2.65 cm^2       Peak Velocity: 1.91 m/s  Peak E-Wave: 1.18 m/s                   Mean Velocity: 1.06 m/s  Peak A-Wave: 1.47 m/s                   Peak Gradient: 14.59 mmHg  E/A Ratio: 0.8                          Mean Gradient: 6 mmHg  Peak Gradient: 5.57 mmHg  Mean Gradient: 2 mmHg  Deceleration Time: 280 msec             Area (continuity): 2.83 cm^2  P1/2t: 83 msec                          AV VTI: 41.8 cm   Area (continuity): 2.2 cm^2  Mean Velocity: 0.62 m/s                                           Pulmonic:                                           Peak Velocity: 1.29 m/s                                          Peak Gradient: 6.66 mmHg  Diastology / Tissue Doppler Septal Wall E' velocity:0.06 m/s Septal Wall E/E':20.8 Lateral Wall E' velocity:0.08 m/s Lateral Wall E/E':13.9    CT HEAD WO CONTRAST    Result Date: 7/21/2021  EXAMINATION: CT OF THE HEAD WITHOUT CONTRAST  7/21/2021 11:53 am TECHNIQUE: CT of the head was performed without the administration of intravenous contrast. Dose modulation, iterative reconstruction, and/or weight based adjustment of the mA/kV was utilized to reduce the radiation dose to as low as reasonably achievable.  COMPARISON: July 17, 2021, MRI July 14, 2021 HISTORY: ORDERING left corona radiata is similar in appearance to the previous exam.  There is no evidence of hemorrhage. No new parenchymal abnormalities are identified. ORBITS: The visualized portion of the orbits demonstrate no acute abnormality. SINUSES: The visualized paranasal sinuses and mastoid air cells demonstrate no acute abnormality. SOFT TISSUES/SKULL:  No acute abnormality of the visualized skull or soft tissues. No acute intracranial abnormality. No significant interval change. CT HEAD WO CONTRAST    Result Date: 7/13/2021  EXAMINATION: CT OF THE HEAD WITHOUT CONTRAST  7/13/2021 1:10 pm TECHNIQUE: CT of the head was performed without the administration of intravenous contrast. Dose modulation, iterative reconstruction, and/or weight based adjustment of the mA/kV was utilized to reduce the radiation dose to as low as reasonably achievable. COMPARISON: 05/02/2021 HISTORY: ORDERING SYSTEM PROVIDED HISTORY: Last known well 2 days right-sided facial droop right-sided weakness TECHNOLOGIST PROVIDED HISTORY: Last known well 2 days right-sided facial droop right-sided weakness Decision Support Exception - unselect if not a suspected or confirmed emergency medical condition->Emergency Medical Condition (MA) Reason for Exam: Last known well 2 days right-sided facial droop right-sided weakness FINDINGS: BRAIN/VENTRICLES: Ovoid area of low attenuation in the left frontal corona radiata measures 2 x 1.4 cm, new from prior study (series 2, image 39). No acute intracranial hemorrhage. No mass effect or midline shift. No hydrocephalus. Diffuse parenchymal volume loss with enlargement of the ventricles and cerebral sulci. Old infarction in the right basal ganglia. There are nonspecific areas of hypoattenuation within the periventricular and subcortical white matter, which likely represent chronic microvascular ischemic change. Intracranial atherosclerotic disease.  ORBITS: The visualized portion of the orbits demonstrate no acute abnormality. SINUSES: The visualized paranasal sinuses and mastoid air cells demonstrate no acute abnormality. SOFT TISSUES/SKULL: No acute abnormality of the visualized skull or soft tissues. 1. New ovoid low-attenuation area in the left frontal corona radiata compatible with acute/subacute infarction. This measures 2 x 1.4 cm. No associated hemorrhage. 2. Diffuse parenchymal volume loss and sequela of severe chronic microvascular ischemic changes. Findings were discussed with Dr. Genny Sparrow at 1:31 pm on 7/13/2021. XR CHEST PORTABLE    Result Date: 7/13/2021  EXAMINATION: ONE XRAY VIEW OF THE CHEST 7/13/2021 10:30 am COMPARISON: September 24, 2019. HISTORY: ORDERING SYSTEM PROVIDED HISTORY: Found down TECHNOLOGIST PROVIDED HISTORY: Found down Reason for Exam: supine Acuity: Acute Type of Exam: Initial FINDINGS: A portable upright frontal view chest radiograph was obtained. The heart is enlarged. The mediastinal contour and pleural spaces are otherwise within normal limits. The lungs are grossly clear. There is no focal consolidation or pneumothorax. The pulmonary vascular pattern is within normal limits. No acute thoracic osseous abnormality. Clear lungs. Cardiomegaly. No acute cardiopulmonary abnormality.      VL DUP LOWER EXTREMITY VENOUS BILATERAL    Result Date: 7/15/2021    [facility]  Vascular Lower Extremities DVT Study Procedure   Patient Name GOFF      Date of Study           07/15/2021               YAYA DUBOIS   Date of      1945  Gender                  Male  Birth   Age          77 year(s)  Race                    Black   Room Number  0536        Height:                 70 inch, 177.8 cm   Corporate ID [id number]    Weight:                 210 pounds, 95.3 kg  #   Patient Acct [de-identified]   BSA:        2.13 m^2    BMI:        30.13 kg/m^2  #   MR #         [id number]     [address] [place] [address]   Accession #  [id number]  Interpreting Physician  Oliva Esposito   Referring                Referring Physician     Selena Mejia  Nurse  Practitioner  Procedure Type of Study:   Veins: Lower Extremities DVT Study, Venous Scan Lower Bilateral.  Indications for Study:CVA. Patient Status: In Patient. Technical Quality:Limited visualization. Limitation reason:legs rigid, extreme edema and resistance to compression . Conclusions   Summary   Simultaneous real time imaging utilizing B-Mode, color doppler and  spectral waveform analysis was performed on the bilateral lower  extremities for venous examination of the deep and superficial systems. Findings are:   Right:  No evidence of deep or superficial venous thrombosis. Left:  No evidence of deep or superficial venous thrombosis. Signature   ----------------------------------------------------------------  Electronically signed by Jia Luu RVT(Sonographer)  on 07/15/2021 04:23 PM  ----------------------------------------------------------------   ----------------------------------------------------------------  Electronically signed by Nevaeh Dc(Interpreting  physician) on 07/15/2021 08:56 PM  ----------------------------------------------------------------  Findings:   Right Impression:                    Left Impression:  The common femoral, femoral,         The common femoral, femoral,  popliteal and tibial veins           popliteal and tibial veins  demonstrate normal compressibility   demonstrate normal compressibility  and augmentation. and augmentation. Normal compressibility of the great  Normal compressibility of the great  saphenous vein. saphenous vein. Normal compressibility of the small  Normal compressibility of the small  saphenous vein. saphenous vein. Risk Factors   - The patient's risk factor(s) include: chronic lung disease, diabetes     mellitus and arterial hypertension.  Velocities are measured in cm/s ; Diameters are measured in cm Right Lower Extremities DVT Study Measurements Right 2D Measurements +------------------------------------+----------+---------------+----------+ ! Location                            ! Visualized! Compressibility! Thrombosis! +------------------------------------+----------+---------------+----------+ ! Common Femoral                      !Yes       ! Yes            ! None      ! +------------------------------------+----------+---------------+----------+ ! Prox Femoral                        !Yes       ! Yes            ! None      ! +------------------------------------+----------+---------------+----------+ ! Mid Femoral                         !Yes       ! Yes            ! None      ! +------------------------------------+----------+---------------+----------+ ! Dist Femoral                        !Yes       ! Yes            ! None      ! +------------------------------------+----------+---------------+----------+ ! Deep Femoral                        !No        !               !          ! +------------------------------------+----------+---------------+----------+ ! Popliteal                           !Yes       ! Yes            ! None      ! +------------------------------------+----------+---------------+----------+ ! Sapheno Femoral Junction            ! Yes       ! Yes            ! None      ! +------------------------------------+----------+---------------+----------+ ! PTV                                 ! Yes       ! Yes            ! None      ! +------------------------------------+----------+---------------+----------+ ! Peroneal                            !No        !               !          ! +------------------------------------+----------+---------------+----------+ ! Gastroc                             ! Yes       ! Yes            ! None      ! +------------------------------------+----------+---------------+----------+ ! GSBILL Salcedo                           ! Yes       ! Yes            ! None ! +------------------------------------+----------+---------------+----------+ ! GSV Knee                            ! Yes       ! Yes            ! None      ! +------------------------------------+----------+---------------+----------+ ! GSV Ankle                           ! Yes       ! Yes            ! None      ! +------------------------------------+----------+---------------+----------+ ! SSV                                 ! Yes       ! Yes            ! None      ! +------------------------------------+----------+---------------+----------+ Right Doppler Measurements +---------------------------+------+------+--------------------------------+ ! Location                   ! Signal!Reflux! Reflux (msec)                   ! +---------------------------+------+------+--------------------------------+ ! Common Femoral             !Phasic!      !                                ! +---------------------------+------+------+--------------------------------+ ! Prox Femoral               !Phasic!      !                                ! +---------------------------+------+------+--------------------------------+ ! Popliteal                  !Phasic!      !                                ! +---------------------------+------+------+--------------------------------+ Left Lower Extremities DVT Study Measurements Left 2D Measurements +------------------------------------+----------+---------------+----------+ ! Location                            ! Visualized! Compressibility! Thrombosis! +------------------------------------+----------+---------------+----------+ ! Common Femoral                      !Yes       ! Yes            ! None      ! +------------------------------------+----------+---------------+----------+ ! Prox Femoral                        !Yes       ! Yes            ! None      ! +------------------------------------+----------+---------------+----------+ ! Mid Femoral                         !Yes       ! Yes            ! None      ! +------------------------------------+----------+---------------+----------+ ! Dist Femoral                        !Yes       ! Yes            ! None      ! +------------------------------------+----------+---------------+----------+ ! Deep Femoral                        !No        !               !          ! +------------------------------------+----------+---------------+----------+ ! Popliteal                           !Yes       ! Yes            ! None      ! +------------------------------------+----------+---------------+----------+ ! Sapheno Femoral Junction            ! Yes       ! Yes            ! None      ! +------------------------------------+----------+---------------+----------+ ! PTV                                 ! Yes       ! Yes            ! None      ! +------------------------------------+----------+---------------+----------+ ! Peroneal                            !Yes       ! Yes            ! None      ! +------------------------------------+----------+---------------+----------+ ! Gastroc                             ! Yes       ! Yes            ! None      ! +------------------------------------+----------+---------------+----------+ ! GSV Thigh                           ! Yes       ! Yes            ! None      ! +------------------------------------+----------+---------------+----------+ ! GSV Knee                            ! Yes       ! Yes            ! None      ! +------------------------------------+----------+---------------+----------+ ! GSV Ankle                           ! Yes       ! Yes            ! None      ! +------------------------------------+----------+---------------+----------+ ! SSV                                 ! Yes       ! Yes            ! None      ! +------------------------------------+----------+---------------+----------+ Left Doppler Measurements +---------------------------+------+------+--------------------------------+ ! Location                   ! Signal!Reflux! Reflux (msec) significant stenosis of the brachiocephalic or subclavian arteries. CAROTID ARTERIES: No dissection, arterial injury, or hemodynamically significant stenosis by NASCET criteria. VERTEBRAL ARTERIES: No dissection, arterial injury, or significant stenosis in the right vertebral artery. Severe stenosis in the V1 segment of the left vertebral artery. SOFT TISSUES: The lung apices are clear. No cervical or superior mediastinal lymphadenopathy. The larynx and pharynx are unremarkable. No acute abnormality of the salivary glands. Thyroid gland is diffusely enlarged and heterogeneous and contains left thyroid lobe nodule measuring 2.6 cm. BONES: Multilevel degenerative spondylosis throughout the cervical spine with fusion at C5-C6. CTA HEAD: ANTERIOR CIRCULATION: Calcified atherosclerotic plaque in the cavernous segments of the internal carotid arteries bilaterally results in mild-to-moderate cavernous ICA stenosis bilaterally. Mild stenosis in the proximal A2 segment of the right anterior cerebral artery. Severe stenosis in the proximal A3 segments of the anterior cerebral arteries bilaterally. Moderate-to-severe stenosis within M2 segment of the right middle cerebral artery. Moderate stenosis in the M1 and proximal M2 segments of the left middle cerebral artery. POSTERIOR CIRCULATION: No significant stenosis in the right intradural vertebral artery. Severe stenosis in the intracranial left vertebral artery. Mild stenosis in the basilar artery. Moderate stenosis in the P1/P2 junction of the left PCA. Severe stenosis and near complete occlusion of the right PCA. OTHER: No dural venous sinus thrombosis on this non-dedicated study. 1. Severe stenosis in the V1 segment of the left vertebral artery. 2. Extensive intracranial atherosclerotic plaque with near complete occlusion of the right PCA. 3. Severe stenoses in the proximal A3 segments of the ACAs bilaterally.  4. Moderate-to-severe proximal M2 segment stenosis in the right MCA. Moderate stenosis in the M1 and M2 segments of the left MCA. 5. Moderate stenosis in the P1/P2 junction of the left PCA. 6. Enlarged heterogeneous thyroid gland with 2.6 cm left thyroid lobe nodule. Nonemergent/outpatient thyroid ultrasound recommended. Findings were discussed with Dr. Jn Rodriguez at 1:43 pm on 7/13/2021. RECOMMENDATIONS: 2.6 cm incidental left thyroid nodule with heterogeneous and enlarged thyroid. Recommend thyroid US. Reference: J Am Bruno Radiol. 2015 Feb;12(2): 143-50     MRI BRAIN WO CONTRAST    Result Date: 7/14/2021  EXAMINATION: MRI OF THE BRAIN WITHOUT CONTRAST  7/14/2021 4:08 pm TECHNIQUE: Multiplanar multisequence MRI of the brain was performed without the administration of intravenous contrast. COMPARISON: None. HISTORY: ORDERING SYSTEM PROVIDED HISTORY: stroke, right sided weakness TECHNOLOGIST PROVIDED HISTORY: stroke, right sided weakness Decision Support Exception - unselect if not a suspected or confirmed emergency medical condition->Emergency Medical Condition (MA) Reason for Exam: possible stroke. pt had a fall. FINDINGS: INTRACRANIAL STRUCTURES/VENTRICLES: . acute infarcts in the left basal ganglia. Acute infarct in the right head of caudate and in the right putamen no mass effect or midline shift. No evidence of an acute intracranial hemorrhage. The ventricles and sulci are normal in size and configuration. The sellar/suprasellar regions appear unremarkable. The normal signal voids within the major intracranial vessels appear maintained. ORBITS: The visualized portion of the orbits demonstrate no acute abnormality. SINUSES: The visualized paranasal sinuses and mastoid air cells are well aerated. BONES/SOFT TISSUES: The bone marrow signal intensity appears normal. The soft tissues demonstrate no acute abnormality. Acute infarct in the left and right basal ganglia greater on the left.  Diffuse volume loss with extensive chronic white matter changes. DIAGNOSIS:  Depression unspecified  Acute ischemic stroke, hemiplegia right side,      RECOMMENDATIONS  Medications: Minimize Ativan use as it could worsen delirium  Olanzapine 5mg nightly added  Continue Prozac 40 mg daily    We will continue to follow  Discussed with the treating physician/ team about the patient and treatment plan  Reviewed the chart    Discussed with the patient risk, benefit, alternative and common side effects for the  proposed medication treatment. Patient is consenting to the treatment. Thanks for the consult. Please call me if needed.     Electronically signed by Shabana Mendoza MD on 8/6/2021 at 5:27 PM

## 2021-08-06 NOTE — PROGRESS NOTES
Speech Language Pathology  [facility] Acute Rehab Unit at [facility]  Speech Language Pathology    Date: 8/6/2021  Patient Name: Michelle Dia  YOB: 1945   AGE: 77 y.o. MRN: [id number]        Patient Not Available for Speech Therapy     Due to:  [] Testing  [] Hemodialysis  [] Cancelled by RN  [] Surgery   [] Intubation/Sedation/Pain Medication  [] Medical instability  [x] Other:  Unable to awaken pt. At 1000 and at 1300 for treatment on this date. Pt. Remained unresponsive with max verbal and tactile cues. Completed by Abner Escobar A.CCC/SLP

## 2021-08-06 NOTE — PLAN OF CARE
Problem: Infection:  Goal: Will remain free from infection  Description: Will remain free from infection  Outcome: Ongoing     Problem: Safety:  Goal: Free from accidental physical injury  Description: Free from accidental physical injury  Outcome: Ongoing  Note: No falls this shift. Call light within reach and siderails x2. Bed in lowest position. Patient safety maintained. Problem: Safety:  Goal: Free from intentional harm  Description: Free from intentional harm  Outcome: Ongoing  Note: No falls this shift. Call light within reach and siderails x2. Bed in lowest position. Patient safety maintained. Problem: Daily Care:  Goal: Daily care needs are met  Description: Daily care needs are met  Outcome: Ongoing     Problem: Pain:  Goal: Patient's pain/discomfort is manageable  Description: Patient's pain/discomfort is manageable  Outcome: Ongoing  Note: Pain decreased with prescribed medication. Problem: Skin Integrity:  Goal: Skin integrity will stabilize  Description: Skin integrity will stabilize  Outcome: Ongoing  Note: Skin assessment as charted. No new areas of breakdown. Problem: Discharge Planning:  Goal: Patients continuum of care needs are met  Description: Patients continuum of care needs are met  Outcome: Ongoing  Note: Case management is following for further discharge needs      Problem: Skin Integrity:  Goal: Will show no infection signs and symptoms  Description: Will show no infection signs and symptoms  8/6/2021 1420 by Dawood Hicks RN  Outcome: Ongoing     Problem: Skin Integrity:  Goal: Absence of new skin breakdown  Description: Absence of new skin breakdown  8/6/2021 1420 by Dawood Hicks RN  Outcome: Ongoing  Note: Skin assessment as charted. No new areas of breakdown. Problem: Falls - Risk of:  Goal: Will remain free from falls  Description: Will remain free from falls  8/6/2021 1420 by Dawood Hicks RN  Outcome: Ongoing  Note: No falls this shift.  Call light within reach and siderails x2. Bed in lowest position. Patient safety maintained. Problem: Falls - Risk of:  Goal: Absence of physical injury  Description: Absence of physical injury  8/6/2021 1420 by Stephanie Rosales RN  Outcome: Ongoing  Note: No falls this shift. Call light within reach and siderails x2. Bed in lowest position. Patient safety maintained. Problem: Nutrition  Goal: Optimal nutrition therapy  8/6/2021 1420 by Stephanie Rosales RN  Outcome: Ongoing     Problem: Musculor/Skeletal Functional Status  Goal: Highest potential functional level  8/6/2021 1420 by Stephanie Rosales RN  Outcome: Ongoing     Problem: Musculor/Skeletal Functional Status  Goal: Absence of falls  8/6/2021 1420 by Stephanie Rosales RN  Outcome: Ongoing  Note: No falls this shift. Call light within reach and siderails x2. Bed in lowest position. Patient safety maintained.

## 2021-08-06 NOTE — PROGRESS NOTES
[facility] Internal Medicine    CONSULTATION / HISTORY AND PHYSICAL EXAMINATION            Date:   8/6/2021  Patient name:  Sandra Braga  Date of admission:  7/20/2021  6:49 PM  MRN:   [id number]  Account:  [de-identified]  YOB: 1945  PCP:    No primary care provider on file.   Room:   [address] [place] [address]  Code Status:    Full Code    Physician Requesting Consult: Marjan Cheney MD    Reason for Consult:  medical management    Chief Complaint:       Right hemipareisi  History Obtained From:     Patient medical record nursing staff    History of Present Illness:   History is extremely limited, patient was extremely agitated overnight, was given Ativan around 2 AM in the night, patient very sleepy, not answering questions  Most of history is retrieved from E [facility]  Patient was recently admitted to [facility] with right-sided weakness, right-sided facial weakness, speech difficulties  Patient underwent MRI brain, concerning for acute infarct in left and right basal ganglia  CT head and neck, was concerning for severe stenosis in the proximal A3 segment of RAHEEM bilaterally  During hospital stay, patient had thrombocytopenia, was evaluated by hematologist, getting treated with IV steroids, and lines of immune thrombocytopenia      Past Medical History:     Past Medical History:   Diagnosis Date    Centrilobular emphysema ([address]     COPD (chronic obstructive pulmonary disease) ([address]     Depression     Diabetes mellitus ([address]     pt refuses to take previously prescribed metformin (states PCP aware)    Former smoker     Hyperlipidemia     on 4/27/18 pt states \"I stopped taking that about a year ago\"    Hypertension     Mixed restrictive and obstructive lung disease ([address]     Need for pneumococcal vaccine     Personal history of tobacco use         Past Surgical History:     Past Surgical History:   Procedure Laterality Date    CARDIAC SURGERY  07/2015    1 stent    NECK times daily as needed (itchy eyes)     Historical Provider, MD   isosorbide mononitrate (IMDUR) 60 MG extended release tablet Take 30 mg by mouth daily Indications: 1/2 tablet (30mg)     Historical Provider, MD   finasteride (PROSCAR) 5 MG tablet Take 5 mg by mouth daily    Historical Provider, MD   tamsulosin (FLOMAX) 0.4 MG capsule Take 0.4 mg by mouth daily    Historical Provider, MD   fluticasone (FLONASE) 50 MCG/ACT nasal spray 1 spray by Nasal route 2 times daily  Patient taking differently: 1 spray by Nasal route 2 times daily as needed for Rhinitis  5/31/16   Francisco Connor,    albuterol sulfate  (90 BASE) MCG/ACT inhaler Inhale 2 puffs into the lungs every 6 hours as needed for Wheezing    Historical Provider, MD   albuterol (PROVENTIL) (2.5 MG/3ML) 0.083% nebulizer solution Take 2.5 mg by nebulization every 6 hours as needed for Wheezing    Historical Provider, MD   aspirin 81 MG EC tablet Take 81 mg by mouth daily    Historical Provider, MD   losartan (COZAAR) 100 MG tablet Take 100 mg by mouth daily     Historical Provider, MD   nitroGLYCERIN (NITROSTAT) 0.4 MG SL tablet Place 0.4 mg under the tongue every 5 minutes as needed for Chest pain    Historical Provider, MD   FLUoxetine (PROZAC) 20 MG capsule Take 40 mg by mouth daily     Historical Provider, MD   furosemide (LASIX) 20 MG tablet Take 20 mg by mouth daily as needed (swelling)     Historical Provider, MD   clopidogrel (PLAVIX) 75 MG tablet Take 75 mg by mouth daily    Historical Provider, MD   rOPINIRole (REQUIP) 0.25 MG tablet Take 0.25 mg by mouth nightly as needed     Historical Provider, MD   budesonide-formoterol (SYMBICORT) 160-4.5 MCG/ACT AERO Inhale 2 puffs into the lungs 2 times daily. Historical Provider, MD        Allergies:     Patient has no known allergies. Social History:     Tobacco:    reports that he quit smoking about 8 years ago. He started smoking about 64 years ago. He has a 42.00 pack-year smoking history.  He has never used smokeless tobacco.  Alcohol:      reports no history of alcohol use. Drug Use:  reports no history of drug use. Family History:     History reviewed. No pertinent family history. Review of Systems:   Cannot be done because of patient condition    Physical Exam:     /68   Pulse 78   Temp 98.2 °F (36.8 °C) (Oral)   Resp 20   Ht 5' 10\" (1.778 m)   Wt 198 lb (89.8 kg)   SpO2 94%   BMI 28.41 kg/m²   Temp (24hrs), Av.4 °F (36.9 °C), Min:98.1 °F (36.7 °C), Max:98.8 °F (37.1 °C)    Recent Labs     21  0555 21  1045   POCGLU 214* 189* 157* 169*       Intake/Output Summary (Last 24 hours) at 2021 1123  Last data filed at 2021 0755  Gross per 24 hour   Intake 670 ml   Output --   Net 670 ml       General Appearance: Very sleepy, not answering questions  Mental status: Not oriented to person, place, and time with normal affect  Head:  normocephalic, atraumatic. Eye: no icterus, redness, pupils equal and reactive, extraocular eye movements intact, conjunctiva clear  Ear: normal external ear, no discharge, hearing intact  Nose:  no drainage noted  Mouth: mucous membranes moist  Neck: supple, no carotid bruits, thyroid not palpable  Lungs: Bilateral equal air entry, clear to ausculation, no wheezing, rales or rhonchi, normal effort  Cardiovascular: normal rate, regular rhythm, no murmur, gallop, rub.   Abdomen: Soft, nontender, nondistended, normal bowel sounds, no hepatomegaly or splenomegaly  Neurologic: Weakness in right upper and lower extremity, speech difficulties, right sided facial weakness  Skin: No gross lesions, rashes, bruising or bleeding on exposed skin area  Extremities:  peripheral pulses palpable, no pedal edema or calf pain with palpation    Investigations:      Laboratory Testing:  Recent Results (from the past 24 hour(s))   POC Glucose Fingerstick    Collection Time: 21 11:41 AM   Result Value Ref Range    POC Glucose 165 (H) 75 - 110 mg/dL   CBC    Collection Time: 08/05/21 12:44 PM   Result Value Ref Range    WBC 8.3 3.5 - 11.0 k/uL    RBC 4.68 4.5 - 5.9 m/uL    Hemoglobin 11.7 (L) 13.5 - 17.5 g/dL    Hematocrit 36.4 (L) 41 - 53 %    MCV 77.9 (L) 80 - 100 fL    MCH 24.9 (L) 26 - 34 pg    MCHC 32.0 31 - 37 g/dL    RDW 18.1 (H) 11.5 - 14.9 %    Platelets 300 591 - 212 k/uL    MPV 11.4 6.0 - 12.0 fL    NRBC Automated NOT REPORTED per 100 WBC   Ammonia    Collection Time: 08/05/21 12:44 PM   Result Value Ref Range    Ammonia 30 16 - 60 umol/L   POC Glucose Fingerstick    Collection Time: 08/05/21  4:17 PM   Result Value Ref Range    POC Glucose 184 (H) 75 - 110 mg/dL   POC Glucose Fingerstick    Collection Time: 08/05/21  8:07 PM   Result Value Ref Range    POC Glucose 214 (H) 75 - 110 mg/dL   POC Glucose Fingerstick    Collection Time: 08/05/21 11:29 PM   Result Value Ref Range    POC Glucose 189 (H) 75 - 110 mg/dL   POC Glucose Fingerstick    Collection Time: 08/06/21  5:55 AM   Result Value Ref Range    POC Glucose 157 (H) 75 - 110 mg/dL   Basic Metabolic Panel    Collection Time: 08/06/21  8:52 AM   Result Value Ref Range    Glucose 183 (H) 70 - 99 mg/dL    BUN 23 8 - 23 mg/dL    CREATININE 1.86 (H) 0.70 - 1.20 mg/dL    Bun/Cre Ratio NOT REPORTED 9 - 20    Calcium 8.6 8.6 - 10.4 mg/dL    Sodium 145 (H) 135 - 144 mmol/L    Potassium 4.2 3.7 - 5.3 mmol/L    Chloride 112 (H) 98 - 107 mmol/L    CO2 22 20 - 31 mmol/L    Anion Gap 11 9 - 17 mmol/L    GFR Non-African American 36 (L) >60 mL/min    GFR  43 (L) >60 mL/min    GFR Comment          GFR Staging NOT REPORTED    HEPATIC FUNCTION PANEL    Collection Time: 08/06/21  8:52 AM   Result Value Ref Range    Albumin 2.8 (L) 3.5 - 5.2 g/dL    Alkaline Phosphatase 237 (H) 40 - 129 U/L    ALT 83 (H) 5 - 41 U/L    AST 63 (H) <40 U/L    Total Bilirubin 0.32 0.3 - 1.2 mg/dL    Bilirubin, Direct 0.14 <0.31 mg/dL    Bilirubin, Indirect 0.18 0.00 - 1.00 mg/dL    Total Protein 5.9 (L) 6.4 - 8.3 g/dL    Globulin NOT REPORTED 1.5 - 3.8 g/dL    Albumin/Globulin Ratio NOT REPORTED 1.0 - 2.5   POC Glucose Fingerstick    Collection Time: 08/06/21 10:45 AM   Result Value Ref Range    POC Glucose 169 (H) 75 - 110 mg/dL           Consultations:   IP CONSULT TO DIETITIAN  IP CONSULT TO SOCIAL WORK  IP CONSULT TO INTERNAL MEDICINE  IP CONSULT TO ONCOLOGY  IP CONSULT TO INTERNAL MEDICINE  IP CONSULT TO PSYCHIATRY  IP CONSULT TO GENERAL SURGERY  IP CONSULT TO DIETITIAN  Assessment :      Primary Problem  <principal problem not specified>    Active Hospital Problems    Diagnosis Date Noted    Severe malnutrition ([facility] [E43] 08/05/2021    Depression [F32.9]     Acute CVA (cerebrovascular accident) ([facility] [I63.9] 07/20/2021    Acute idiopathic thrombocytopenic purpura ([facility] [D69.3] 07/16/2021    CAD S/P percutaneous coronary angioplasty [I25.10, Z98.61] 07/13/2021    Cerebrovascular accident (CVA) ([facility] [I63.9] 07/13/2021    HTN (hypertension) [I10] 07/13/2021    Hyperlipidemia [E78.5] 07/13/2021    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 Acute ischemic stroke, patient is on aspirin, Plavix, Crestor  2. 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 On DVT prophylaxis with heparin  4. Hypertension, controlled  5. Diabetes, controlled  1 episode of bradycardia, will follow, may be due to benzodiazepine . 6. Incidental finding of thyroid nodule on CTA head and neck, will need outpatient ultrasound thyroid and biopsy  Dysphagia diet  Right hemipareis with speech dif  htn improved    augmentin for sinusitis  Prednisone for ?  ITP  Flat affect depression on prozac    Needs a lot of encouragement to eat    Watch platelet  cortisl levels 18  At risk of addisonian crisis  Case dw with dr Carolyn Beltran  Patient is not taking anything oral with a dysphagia severe malnutrition risk    Case discussed with daughter  surg input noted  Iv fluids started for CELESTINO  Tube feed      Naomy Duffy MD  8/6/2021  11:23 AM    Copy sent to Dr. David Ghotra primary care provider on file. Please note that this chart was generated using voice recognition Dragon dictation software. Although every effort was made to ensure the accuracy of this automated transcription, some errors in transcription may have occurred.

## 2021-08-06 NOTE — PROGRESS NOTES
to veena Pt. Vital Signs  Patient Currently in Pain: Other (comment) (No response to query)    Patient Observation  Observations: Nonresponsive today; RN, SAEED report same. Exercises  Other exercises?: Yes  Other exercises 8: Supine passive ROM: R UE, 20 reps; R LE, 2x20 reps (including R achilles stretch, 3x30\")    Activity Tolerance: Other (Pt unresponsive)    Current Treatment Recommendations: Strengthening, ROM, Balance Training, Functional Mobility Training, Gait Training, Endurance Training, Transfer Training, Safety Education & Training, Wheelchair Mobility Training, Neuromuscular Re-education, Cognitive Reorientation, Home Exercise Program, Patient/Caregiver Education & Training, Equipment Evaluation, Education, & procurement, Positioning, Stair training, Modalities (Will consider using modalities as needed for neuro re-edu.)    Conditions Requiring Skilled Therapeutic Intervention  Body structures, Functions, Activity limitations: Decreased functional mobility ; Decreased ROM; Decreased strength;Decreased balance;Decreased safe awareness;Decreased endurance;Decreased posture;Decreased cognition;Decreased fine motor control;Decreased coordination  Barriers to Learning: aphasia, cognitive deficits  REQUIRES PT FOLLOW UP: Yes  Discharge Recommendations: Patient would benefit from continued therapy after discharge;24 hour supervision or assist    Goals  Short term goals  Time Frame for Short term goals: 10 days  Short term goal 1: Pt able to roll side to side at min A   Short term goal 2: Pt able to perform supine>sit at min A   Short term goal 3: Pt able to perform sit to supine at mod A  Short term goal 4: Pt able to transfer at mod A   Short term goal 5: Pt able to propel w/c with L UE/L LE, distance fo 100 ft min A   Short term goal 6: Pt able to ambulate with appropriate device distance of 50 to 100 ft, min A x2  Long term goals  Time Frame for Long term goals : By DC  Long term goal 1: Pt able perform bed mobility mod-I  Long term goal 2:  Pt able to perform transfers at CGA/min A   Long term goal 3: Pt able to ambulate with appropriate device distance of 100 to 150 ft, min A  Long term goal 4: Pt able to go up and down 4 to 5 steps with 1 UE support at mod/max A   Long term goal 5: Pt able to propel w/c level surfaces distance fo 100 to 150 ft, SBA  Long term goal 6: Improve PASS score to at least 19/36 to improve overall function. Long term goal 7: Improve standing dynamic balance with assistive device to at least fair+ to reduce fall risk.       08/06/21 0920   PT Individual Minutes   Time In 0855   Time Out 0912   Minutes 17   Minute Variance   Variance 73   Reason Procedure  (/refusal/nonresponsive)     Electronically signed by Melvin Cantrell PTA on 8/6/21 at 2:23 PM EDT

## 2021-08-06 NOTE — PROGRESS NOTES
[facility]   OCCUPATIONAL THERAPY MISSED TREATMENT NOTE  ACUTE REHAB  Date: 21  Patient Name: Crispin Driver       Room: 7557/0259-43  MRN: [id number]   Account #: [de-identified]    : 1945  (77 y.o.)  Gender: male   Referring Practitioner: Dr. Kurtis Cooley  Diagnosis: Ischemic CVA with R dominant hemiplegia             REASON FOR MISSED TREATMENT:  Patient unable to participate   -   Pt not able to be roused this date for AM or PM therapy. Sameer provided max verbal and tactile cuing with no response.      ARASELI Thompson

## 2021-08-06 NOTE — PROGRESS NOTES
[facility]    CONSULTATION / HISTORY AND PHYSICAL EXAMINATION            Date:   8/6/2021  Patient name:  Fahad Storm  Date of admission:  7/20/2021  6:49 PM  MRN:   [id number]  Account:  [de-identified]  YOB: 1945  PCP:    No primary care provider on file.   Room:   [address] [place] [address]  Code Status:    Full Code    Physician Requesting Consult: Maggy Prescott MD    Reason for Consult:  medical management    Chief Complaint:       Right hemipareisi  History Obtained From:     Patient medical record nursing staff    History of Present Illness:   History is extremely limited, patient was extremely agitated overnight, was given Ativan around 2 AM in the night, patient very sleepy, not answering questions  Most of history is retrieved from E HR  Patient was recently [facility] to [facility] with right-sided weakness, right-sided facial weakness, speech difficulties  Patient underwent MRI brain, concerning for acute infarct in left and right basal ganglia  CT head and neck, was concerning for severe stenosis in the proximal A3 segment of RAHEEM bilaterally  During hospital stay, patient had thrombocytopenia, was evaluated by hematologist, getting treated with IV steroids, and lines of immune thrombocytopenia      Past Medical History:     Past Medical History:   Diagnosis Date    Centrilobular emphysema ([address]     COPD (chronic obstructive pulmonary disease) ([address]     Depression     Diabetes mellitus ([address]     pt refuses to take previously prescribed metformin (states PCP aware)    Former smoker     Hyperlipidemia     on 4/27/18 pt states \"I stopped taking that about a year ago\"    Hypertension     Mixed restrictive and obstructive lung disease ([address]     Need for pneumococcal vaccine     Personal history of tobacco use         Past Surgical History:     Past Surgical History:   Procedure Laterality Date    CARDIAC SURGERY  07/2015    1 stent    NECK SURGERY      TOE AMPUTATION Right greater    UPPER GASTROINTESTINAL ENDOSCOPY N/A 8/3/2021    EGD  PEG TUBE INSERTION performed by Dave Benites MD at [facility]        Medications Prior to Admission:     Prior to Admission medications    Medication Sig Start Date End Date Taking?  Authorizing Provider   Heparin Sodium, Porcine, (HEPARIN, PORCINE,) 5000 UNIT/ML injection Inject 1 mL into the skin every 8 hours 7/20/21   Ramses Segura MD   QUEtiapine (SEROQUEL) 25 MG tablet Take 1 tablet by mouth nightly as needed for Agitation 7/20/21 7/25/21  Ramses Segura MD   methylPREDNISolone sodium (SOLU-MEDROL) 40 MG injection Infuse 1 mL intravenously every 12 hours 7/20/21   Ramses Segura MD   melatonin 3 MG TABS tablet Take 1 tablet by mouth nightly as needed (insomnia) 7/20/21   Ramses Segura MD   atorvastatin (LIPITOR) 80 MG tablet Take 0.5 tablets by mouth daily (Pt takes one-half of an 80mg tab = 40mg) 7/16/21   Ramses Segura MD   tiZANidine (ZANAFLEX) 2 MG tablet Take 1 tablet by mouth 4 times daily as needed (muscle spasms) 5/7/21   JOLIE Swann - CNP   naproxen (NAPROSYN) 500 MG tablet Take 1 tablet by mouth 2 times daily (with meals) 1/4/21   Shantanu López PA-C   ibuprofen (ADVIL;MOTRIN) 800 MG tablet Take 1 tablet by mouth every 8 hours as needed for Pain 6/2/20   Cyrus Ann MD   lidocaine (LIDODERM) 5 % Place 1 patch onto the skin daily 12 hours on, 12 hours off. 6/2/20   Cyrus Ann MD   metoprolol succinate (TOPROL XL) 50 MG extended release tablet Take 50 mg by mouth daily    Historical Provider, MD   tiotropium (SPIRIVA RESPIMAT) 2.5 MCG/ACT AERS inhaler Inhale 2 puffs into the lungs daily    Historical Provider, MD   carboxymethylcellulose 1 % ophthalmic solution Place 1 drop into both eyes 3 times daily as needed for Dry Eyes     Historical Provider, MD   ketotifen (ZADITOR) 0.025 % ophthalmic solution Place 1 drop into both eyes 2 times daily as needed (itchy eyes)     Historical Provider, MD   isosorbide mononitrate (IMDUR) 60 MG extended release tablet Take 30 mg by mouth daily Indications: 1/2 tablet (30mg)     Historical Provider, MD   finasteride (PROSCAR) 5 MG tablet Take 5 mg by mouth daily    Historical Provider, MD   tamsulosin (FLOMAX) 0.4 MG capsule Take 0.4 mg by mouth daily    Historical Provider, MD   fluticasone (FLONASE) 50 MCG/ACT nasal spray 1 spray by Nasal route 2 times daily  Patient taking differently: 1 spray by Nasal route 2 times daily as needed for Rhinitis  5/31/16   Francisco Connor,    albuterol sulfate  (90 BASE) MCG/ACT inhaler Inhale 2 puffs into the lungs every 6 hours as needed for Wheezing    Historical Provider, MD   albuterol (PROVENTIL) (2.5 MG/3ML) 0.083% nebulizer solution Take 2.5 mg by nebulization every 6 hours as needed for Wheezing    Historical Provider, MD   aspirin 81 MG EC tablet Take 81 mg by mouth daily    Historical Provider, MD   losartan (COZAAR) 100 MG tablet Take 100 mg by mouth daily     Historical Provider, MD   nitroGLYCERIN (NITROSTAT) 0.4 MG SL tablet Place 0.4 mg under the tongue every 5 minutes as needed for Chest pain    Historical Provider, MD   FLUoxetine (PROZAC) 20 MG capsule Take 40 mg by mouth daily     Historical Provider, MD   furosemide (LASIX) 20 MG tablet Take 20 mg by mouth daily as needed (swelling)     Historical Provider, MD   clopidogrel (PLAVIX) 75 MG tablet Take 75 mg by mouth daily    Historical Provider, MD   rOPINIRole (REQUIP) 0.25 MG tablet Take 0.25 mg by mouth nightly as needed     Historical Provider, MD   budesonide-formoterol (SYMBICORT) 160-4.5 MCG/ACT AERO Inhale 2 puffs into the lungs 2 times daily. Historical Provider, MD        Allergies:     Patient has no known allergies. Social History:     Tobacco:    reports that he quit smoking about 8 years ago. He started smoking about 64 years ago. He has a 42.00 pack-year smoking history.  He 165 (H) 75 - 110 mg/dL   CBC    Collection Time: 08/05/21 12:44 PM   Result Value Ref Range    WBC 8.3 3.5 - 11.0 k/uL    RBC 4.68 4.5 - 5.9 m/uL    Hemoglobin 11.7 (L) 13.5 - 17.5 g/dL    Hematocrit 36.4 (L) 41 - 53 %    MCV 77.9 (L) 80 - 100 fL    MCH 24.9 (L) 26 - 34 pg    MCHC 32.0 31 - 37 g/dL    RDW 18.1 (H) 11.5 - 14.9 %    Platelets 884 558 - 263 k/uL    MPV 11.4 6.0 - 12.0 fL    NRBC Automated NOT REPORTED per 100 WBC   Ammonia    Collection Time: 08/05/21 12:44 PM   Result Value Ref Range    Ammonia 30 16 - 60 umol/L   POC Glucose Fingerstick    Collection Time: 08/05/21  4:17 PM   Result Value Ref Range    POC Glucose 184 (H) 75 - 110 mg/dL   POC Glucose Fingerstick    Collection Time: 08/05/21  8:07 PM   Result Value Ref Range    POC Glucose 214 (H) 75 - 110 mg/dL   POC Glucose Fingerstick    Collection Time: 08/05/21 11:29 PM   Result Value Ref Range    POC Glucose 189 (H) 75 - 110 mg/dL   POC Glucose Fingerstick    Collection Time: 08/06/21  5:55 AM   Result Value Ref Range    POC Glucose 157 (H) 75 - 110 mg/dL   Basic Metabolic Panel    Collection Time: 08/06/21  8:52 AM   Result Value Ref Range    Glucose 183 (H) 70 - 99 mg/dL    BUN 23 8 - 23 mg/dL    CREATININE 1.86 (H) 0.70 - 1.20 mg/dL    Bun/Cre Ratio NOT REPORTED 9 - 20    Calcium 8.6 8.6 - 10.4 mg/dL    Sodium 145 (H) 135 - 144 mmol/L    Potassium 4.2 3.7 - 5.3 mmol/L    Chloride 112 (H) 98 - 107 mmol/L    CO2 22 20 - 31 mmol/L    Anion Gap 11 9 - 17 mmol/L    GFR Non-African American 36 (L) >60 mL/min    GFR  43 (L) >60 mL/min    GFR Comment          GFR Staging NOT REPORTED    HEPATIC FUNCTION PANEL    Collection Time: 08/06/21  8:52 AM   Result Value Ref Range    Albumin 2.8 (L) 3.5 - 5.2 g/dL    Alkaline Phosphatase 237 (H) 40 - 129 U/L    ALT 83 (H) 5 - 41 U/L    AST 63 (H) <40 U/L    Total Bilirubin 0.32 0.3 - 1.2 mg/dL    Bilirubin, Direct 0.14 <0.31 mg/dL    Bilirubin, Indirect 0.18 0.00 - 1.00 mg/dL    Total Protein 5.9 (L) 6.4 - 8.3 g/dL    Globulin NOT REPORTED 1.5 - 3.8 g/dL    Albumin/Globulin Ratio NOT REPORTED 1.0 - 2.5   POC Glucose Fingerstick    Collection Time: 08/06/21 10:45 AM   Result Value Ref Range    POC Glucose 169 (H) 75 - 110 mg/dL           Consultations:   IP CONSULT TO DIETITIAN  IP CONSULT TO SOCIAL WORK  IP CONSULT TO INTERNAL MEDICINE  IP CONSULT TO ONCOLOGY  IP CONSULT TO INTERNAL MEDICINE  IP CONSULT TO PSYCHIATRY  IP CONSULT TO GENERAL SURGERY  IP CONSULT TO DIETITIAN  Assessment :      Primary Problem  <principal problem not specified>    Active Hospital Problems    Diagnosis Date Noted    Severe malnutrition ([facility] [E43] 08/05/2021    Depression [F32.9]     Acute CVA (cerebrovascular accident) ([facility] [I63.9] 07/20/2021    [facility]ura [facility] [D69.3] 07/16/2021    CAD S/P percutaneous coronary angioplasty [I25.10, Z98.61] 07/13/2021    Cerebrovascular accident (CVA) ([facility] [I63.9] 07/13/2021    HTN (hypertension) [I10] 07/13/2021    Hyperlipidemia [E78.5] 07/13/2021    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 Acute ischemic stroke, patient is on aspirin, Plavix, Crestor  2. 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 On DVT prophylaxis with heparin  4. Hypertension, controlled  5. Diabetes, controlled  1 episode of bradycardia, will follow, may be due to benzodiazepine . 6. Incidental finding of thyroid nodule on CTA head and neck, will need outpatient ultrasound thyroid and biopsy  Dysphagia diet  Right hemipareis with speech dif  htn improved      Needs a lot of encouragement to eat    Case discussed with daughter  surg input noted  Iv fluids started for CELESTINO  Tube feed 40 ml per hr  Watch sodium  And hypercholremia  Free water per peg tube  250 bid    Naomy Duffy MD  8/6/2021  11:25 AM    Copy sent to Dr. David Ghotra primary care provider on file.     Please note that this

## 2021-08-06 NOTE — CARE COORDINATION
Met with sister Delaney Spain and provided guardianship papers with expert evaluation included. Information reviewed and encouraged to contact probate court for additional assistance.

## 2021-08-06 NOTE — PROGRESS NOTES
Physical Medicine & Rehabilitation  Progress Note      Subjective: Makenzie Soliman is a 77 y.o. male with ischemic CVA left PCA, RAHEEM, and MCA with right dominant hemiparesis. He continues to appear fatigued today. Not answering questions and does not open eyes during evaluation. Sister present at bedside - discussed starting amantadine to see if this helps with alertness. Neurology consulted for any additional recommendations. Tube feeds increasing slowly to goal rate. ROS:  Unable to assess    Rehabilitation:   Progressing in therapies. PT:  Restrictions/Precautions: Up as Tolerated, General Precautions, Fall Risk, Swallowing - Thickened Liquids (Mildly thick (Nectar thick); PEG tube/continuous feeding)  Other position/activity restrictions: Up w/assistance, RU/LE Weakness. MRI BRAIN- Acute infarct in the left and right basal ganglia greater on the left. Transfers  Sit to Stand: 2 Person Assistance, Maximum Assistance (walker lift. Very slow moving. )  Stand to sit: 2 Person Assistance, Minimal Assistance (B UE assist to chair from walker lift; able to stand while lift is lowered.)  Bed to Chair: Dependent/Total (hemiwalker)  Stand Pivot Transfers: Dependent/Total (x3)  Lateral Transfers: Maximum Assistance (hemiwalker)  Comment: B UE support on elevated walker  Ambulation 1  Surface: level tile  Device:  (Green Walker Lift)  Other Apparatus: Right, AFO, Slider, Wheelchair follow  Assistance: 2 Person assistance, Maximum assistance  Quality of Gait: forward flexed hips, max assist to advance R LE. Verbal cues to increase L step length with G response, repeated cues necessary. Gait Deviations: Slow Anais, Decreased step length, Decreased step height, Decreased head and trunk rotation  Distance: 76' with standing rest break after 60'  Comments: Cues to shift weight initially with G return, no further assist necessary except occasional cue.      Transfers  Sit to Stand: 2 Person Assistance, Maximum Assistance (walker lift. Very slow moving. )  Stand to sit: 2 Person Assistance, Minimal Assistance (B UE assist to chair from walker lift; able to stand while lift is lowered.)  Bed to Chair: Dependent/Total (hemiwalker)  Stand Pivot Transfers: Dependent/Total (x3)  Lateral Transfers: Maximum Assistance (hemiwalker)  Comment: B UE support on elevated walker  Ambulation  Ambulation?: Yes (not today)  More Ambulation?: Yes  Ambulation 1  Surface: level tile  Device:  (Green Walker Lift)  Other Apparatus: Right, AFO, Slider, Wheelchair follow  Assistance: 2 Person assistance, Maximum assistance  Quality of Gait: forward flexed hips, max assist to advance R LE. Verbal cues to increase L step length with G response, repeated cues necessary. Gait Deviations: Slow Anais, Decreased step length, Decreased step height, Decreased head and trunk rotation  Distance: 76' with standing rest break after 60'  Comments: Cues to shift weight initially with G return, no further assist necessary except occasional cue. Surface: level tile  Ambulation 1  Surface: level tile  Device:  (Green Walker Lift)  Other Apparatus: Right, AFO, Slider, Wheelchair follow  Assistance: 2 Person assistance, Maximum assistance  Quality of Gait: forward flexed hips, max assist to advance R LE. Verbal cues to increase L step length with G response, repeated cues necessary. Gait Deviations: Slow Anais, Decreased step length, Decreased step height, Decreased head and trunk rotation  Distance: 76' with standing rest break after 60'  Comments: Cues to shift weight initially with G return, no further assist necessary except occasional cue.      OT:  ADL  Feeding: NPO (Peg tube. )  Grooming: Dependent/Total (face washing in attempt to alert patient. )  UE Bathing: Dependent/Total  LE Bathing: None  UE Dressing: Dependent/Total  LE Dressing: Dependent/Total  Toileting: Dependent/Total (brief change in bed)  Additional Comments: ADL care tasks not therapeutic at this time due to no patient engagement. Balance  Sitting Balance: Maximum assistance  Standing Balance: Maximum assistance (max X2)   Standing Balance  Time: 1-2 min   Activity: Transfer in SS   Comment: significant posterior lean         Bed mobility  Rolling to Left: Maximum assistance  Rolling to Right: Moderate assistance  Supine to Sit: Maximum assistance, 2 Person assistance  Sit to Supine: Maximum assistance  Scooting: Maximal assistance, 2 Person assistance  Comment: Patient in chair upon arrival and left in w/c with chair alarm in place. Transfers  Stand Pivot Transfers: Dependent/Total (with SS)  Sit to stand: Maximum assistance  Stand to sit: Maximum assistance  Transfer Comments: Pt sits EOB with eyes closed and R side lean. Requires hand over hand for grasping SS with L hand, max A X2 for standing/sitting safety, RUE mgmt, CGA standing in SS. Toilet Transfers  Toilet - Technique: Stand pivot  Equipment Used: Raised toilet seat with rails  Toilet Transfer: Maximum assistance, 2 Person assistance (max A + mod A)  Toilet Transfers Comments: Verbal cues for safety and hand placement.          Wheelchair [employer] Transfers  Equipment Used: [employer], Wheelchair, Other ()  Level of Asssistance: Dependent/Total, 2 Person assistance    SPEECH:      Current Medications:   Current Facility-Administered Medications: OLANZapine (ZYPREXA) tablet 5 mg, 5 mg, PEG Tube, Nightly  insulin lispro (HUMALOG) injection vial 0-6 Units, 0-6 Units, Subcutaneous, 4 times per day  losartan (COZAAR) tablet 50 mg, 50 mg, Oral, Daily  aspirin chewable tablet 81 mg, 81 mg, PEG Tube, Daily  clopidogrel (PLAVIX) tablet 75 mg, 75 mg, PEG Tube, Daily  heparin (porcine) injection 5,000 Units, 5,000 Units, Subcutaneous, 3 times per day  polyethylene glycol (GLYCOLAX) packet 17 g, 17 g, Per G Tube, Daily PRN  finasteride (PROSCAR) tablet 5 mg, 5 mg, PEG Tube, Daily  famotidine (PEPCID) tablet 20 mg, 20 mg, PEG Tube, Daily  FLUoxetine (PROZAC) 20 MG/5ML solution 40 mg, 40 mg, PEG Tube, Daily  melatonin tablet 6 mg, 6 mg, PEG Tube, Nightly  rosuvastatin (CRESTOR) tablet 40 mg, 40 mg, PEG Tube, Nightly  isosorbide dinitrate (ISORDIL) tablet 10 mg, 10 mg, PEG Tube, TID  tiotropium (SPIRIVA RESPIMAT) 2.5 MCG/ACT inhaler 2 puff, 2 puff, Inhalation, Lunch  albuterol sulfate  (90 Base) MCG/ACT inhaler 2 puff, 2 puff, Inhalation, Q6H PRN  [Held by provider] hydrALAZINE (APRESOLINE) tablet 10 mg, 10 mg, Oral, 3 times per day  acetaminophen (TYLENOL) tablet 650 mg, 650 mg, Oral, Q4H PRN  senna (SENOKOT) tablet 17.2 mg, 2 tablet, Oral, Daily PRN  bisacodyl (DULCOLAX) suppository 10 mg, 10 mg, Rectal, Daily PRN  glucose (GLUTOSE) 40 % oral gel 15 g, 15 g, Oral, PRN  dextrose 50 % IV solution, 12.5 g, Intravenous, PRN  glucagon (rDNA) injection 1 mg, 1 mg, Intramuscular, PRN  dextrose 5 % solution, 100 mL/hr, Intravenous, PRN      Objective:  /66   Pulse 71   Temp 97.7 °F (36.5 °C) (Axillary)   Resp 20   Ht 5' 10\" (1.778 m)   Wt 198 lb (89.8 kg)   SpO2 100%   BMI 28.41 kg/m²       GEN: Well developed, no acute distress  HEENT: NCAT. Eyes closed during evaluation. Mucous membranes pink and moist.  RESP:  Normal breath sounds with no wheezes, rales, or rhonchi. Respirations WNL and unlabored. CV: Regular rate and rhythm. No murmurs, rubs, or gallops. ABD: Soft, non-distended, BS+ and equal.  NEURO:  Eyes closed throughout evaluation. Not answering questions. Appears to have delayed processing. Spasticity present in right upper limb. MSK:  Muscle bulk is normal bilaterally. Minimal movement noted in all limbs. LIMBS: No edema in bilateral lower limbs. SKIN: Warm and dry with good turgor. PSYCH:  Flat affect.     Diagnostics:     CBC:   Recent Labs     08/04/21  1629 08/05/21  1244   WBC 10.4 8.3   RBC 4.86 4.68   HGB 11.9* 11.7*   HCT 37.7* 36.4*   MCV 77.6* 77.9*   RDW 17.6* 18.1*   * 153     BMP: Recent Labs     08/04/21  1629 08/05/21  0558 08/06/21  0852    145* 145*   K 4.2 4.0 4.2   * 114* 112*   CO2 19* 23 22   PHOS 3.3 2.8  --    BUN 25* 23 23   CREATININE 1.48* 1.64* 1.86*   GLUCOSE 150* 165* 183*     BNP: No results for input(s): BNP in the last 72 hours. PT/INR:   No results for input(s): PROTIME, INR in the last 72 hours. APTT: No results for input(s): APTT in the last 72 hours. CARDIAC ENZYMES: No results for input(s): CKMB, CKMBINDEX, TROPONINT in the last 72 hours. Invalid input(s): CKTOTAL;3  FASTING LIPID PANEL:  Lab Results   Component Value Date    CHOL 186 07/14/2021    HDL 42 07/14/2021    TRIG 59 08/05/2021     LIVER PROFILE:   Recent Labs     08/05/21  0558 08/06/21  0852   AST 62* 63*   ALT 79* 83*   BILIDIR  --  0.14   BILITOT 0.44 0.32   ALKPHOS 263* 237*        Imaging:  CT head, 8/6/21:  Impression   There has been interval development of a hypodensity in the right basal   ganglia compared to prior study consistent with evolving subacute white   matter infarct.  No hemorrhage is noted.  Chronic microvascular change in   atrophy is demonstrated.  No midline shift. Impression/Plan:   Impaired ADLs, gait, and mobility due to:    1. Ischemic CVA with Right dominant hemiparesis: PT/OT for gait, mobility, strengthening, endurance, ADLs, and self care. On ASA, plavix, Crestor. Can consider neurostimulant medication now that PEG tube is in place - discussed trialing amantadine with sister on 8/6 - if neurology does not have any further recommendations, will plan to start this medication. Placed order for repeat CT head to evaluate for any change on 7/29 - patient initially refused, but imaging done 8/4 with no changes from previous study. Neurology consulted due to increased lethargy - ordered repeat CT head on 8/6 - results as above; will follow up on any additional recommendations. 2. Agitation: Resolved. Has not required seroquel since 7/23.  Has no sedating medicines on board to be causing drowsiness. Borderline QTc. Telesitter added 7/28.  1:1 re-added 8/3 due to new PEG tube placement, discontinued 8/6. Will continue telesitter for now. 3. Dehydration, high risk for malnutrition:  Secondary to patient refusing medications and meals. Switched daily medications from morning to noon on 7/29 to see if patient would take his medications later in the day - was not successful. Patient was taking some medications at night - switched aspirin and plavix to nightly on 7/29 but he did not take them. Psych consult ordered after discussion with IM - increased prozac 7/28, changed to liquid formulation and added olanzapine on 7/30 (olanzapine was later discontinued as this medication can be sedating). Discussed with IM - consult placed on 7/30 for general surgery to evaluate for possible PEG tube placement, as patient has not been maintaining nutrition, hydration, or medication regimen. Discussed PEG tube with family as well, as patient does not have decision-making capacity at this time. PEG tube placed 8/3 with sister's consent. Medications resumed via PEG on 8/3, tube feeds started 8/4 per dietician recommendations - increasing rate slowly to goal.  Will monitor labs for any signs of refeeding syndrome. Free water flushes increased 8/5.  4. CELESTINO:  Secondary to dehydration. Creatinine 1.86 on 8/6 (baseline creatinine appears to be around 1.2-1.5). Free water flushes increased 8/5, as above. 5. Elevated LFTs:  AST, ALT, alk phos elevated but stable - has had elevation of these values recently. Will monitor. 6. Sinusitis: Resolved. Was on augmentin  7. Insomnia: Scheduled melatonin started on 7/25  8. Thrombocytopenia: Hematology following. Was on prednisone - notified hematology that patient was refusing medications - okay for him to remain off of steroids at this time and monitor platelets.   Platelets overall improved (158 on 8/5), OK for DVT prophylaxis. 9. Anemia:  Hemoglobin 11.7 on 8/5, stable. Will monitor. 10. Leukocytosis:  Resolved. Attributed to prednisone. Will monitor. 11. HTN/CAD: On hydralazine (currently on hold), Imdur - switched to isordil on 8/3, losartan - IM resumed this medication at a lower dose on 8/4  12. DM II: On humalog sliding scale  13. COPD:  On tiotropium. Has albuterol prn. 14. Restless Leg Syndrome: On ropinirole - discontinued for now in order to minimize medications. Will monitor. 15. Depression: On fluoxetine. Psych consult and recommendations as above. Starting olanzapine nightly on 8/6 - will need to discuss with psychiatry if patient is started on amantadine. 16. GERD: On famotidine  17. BPH: On finasteride, tamsulosin - discontinued as this medication cannot be crushed  18. Thyroid nodule: For outpatient monitoring  19. Bowel Management: Miralax daily prn, Senokot prn, Dulcolax prn  20. Internal medicine for medical management  21. DVT prophylaxis:  On heparin  22. Discussed recommendation for discharge to SNF with patient's family along with  on 7/30. Family provided a few choices but patient was not accepted at these facilities.  reaching out to additional SNFs but patient has not yet been accepted to a facility. As patient has had limited participation in therapies, will plan to discharge him to SNF as soon as it is safe.       Electronically signed by Sterling Brothers MD on 8/6/2021 at 8:36 PM

## 2021-08-06 NOTE — PLAN OF CARE
Problem: Skin Integrity:  Goal: Will show no infection signs and symptoms  Description: Will show no infection signs and symptoms  8/6/2021 0507 by Wilfredo Hart RN  Outcome: Ongoing     Problem: Skin Integrity:  Goal: Absence of new skin breakdown  Description: Absence of new skin breakdown  Outcome: Ongoing     Problem: Falls - Risk of:  Goal: Will remain free from falls  Description: Will remain free from falls  Outcome: Ongoing     Problem: Falls - Risk of:  Goal: Absence of physical injury  Description: Absence of physical injury  Outcome: Ongoing     Problem: Nutrition  Goal: Optimal nutrition therapy  Outcome: Ongoing     Problem: Musculor/Skeletal Functional Status  Goal: Highest potential functional level  Outcome: Ongoing     Problem: Musculor/Skeletal Functional Status  Goal: Absence of falls  Outcome: Ongoing

## 2021-08-07 PROBLEM — G93.40 ENCEPHALOPATHY: Status: ACTIVE | Noted: 2021-01-01

## 2021-08-07 NOTE — CONSULTS
76 yo bm with mental ststus changes  . Patient was admitted to [facility] on July 13 after being found down with weakness right arm and and leg 3/5 strength with right facial droop and extensive dysarthria seen last well 2 days prior . MRI of Head with bilateral acute basal ganglia infarctions more prominent on left with extensive bilateral chronic periventricular small vessel ischemia . He does have history of prior cerebral infarction on aspirin and plavix although unclear if he was taking this medication . CTA head and neck with severe left V1 vertebral stenosis . Near complete occlusion right PCA . Severe proximal A3 stenosis . Moderate to severe proximal right M2 stenosis . Moderate to severe left M1 and M2 stenosis . Moderate to severe left P1, P2 stenosis . Patient was seen by neurointerventional service recommending plavix , aspirin 81 mg along with statin . Patient had platelet count of 47S on admission from ITP seen by hematology getting platelet transfusion and IV steroids with these now being 136 k . Patient was transferred to [facility] for rehabilitation  There has been improvement of right side weakness to 4/5 walking 2 feet with assist with hemiwalker in PT . He developed agitation during the night seen by pscyhiatry placed on zyprexa 5 mg qd, prozac 40 mg qd . Patient failed swallow study getting PEG placement . He has been during day with fatigue sleepy nonverbal leading to neurology consultation . FU Head CT with new hypodensity right basal ganglia with extensive bilateral chronic periventricular small vesel ischemia . Creatinine 1.71 . Significant medications plavix 75 mg po qd, aspirin 81 mg qd per GT  , crestor 40 mg qd per GT   prozac 40 mg qd per GT  , zyprexa 5 mg qd  Per GT . Testing . Cardiac 2 D echo LVF EF 50-55 % . Grade 1 diastolic dysfunction . MRI of Head with bilateral acute basal ganglia infarctions more prominent on left with extensive bilateral chronic periventricular small (PROZAC) 20 MG/5ML solution 40 mg  40 mg PEG Tube Daily Branden Velásquez MD   40 mg at 08/07/21 0940    melatonin tablet 6 mg  6 mg PEG Tube Nightly Branden Velásquez MD   6 mg at 08/06/21 2311    rosuvastatin (CRESTOR) tablet 40 mg  40 mg PEG Tube Nightly Branden Velásquez MD   40 mg at 08/06/21 2351    isosorbide dinitrate (ISORDIL) tablet 10 mg  10 mg PEG Tube TID Branden Velásquez MD   10 mg at 08/07/21 1256    tiotropium (SPIRIVA RESPIMAT) 2.5 MCG/ACT inhaler 2 puff  2 puff Inhalation Lunch Elli Abdullahi MD   2 puff at 08/07/21 1257    albuterol sulfate  (90 Base) MCG/ACT inhaler 2 puff  2 puff Inhalation Q6H PRN Elli Abdullahi MD   2 puff at 08/05/21 1746    [Held by provider] hydrALAZINE (APRESOLINE) tablet 10 mg  10 mg Oral 3 times per day Elli Abdullahi MD   10 mg at 07/26/21 2202    acetaminophen (TYLENOL) tablet 650 mg  650 mg Oral Q4H PRN Elli Abdullahi MD   650 mg at 07/25/21 0823    senna (SENOKOT) tablet 17.2 mg  2 tablet Oral Daily PRN Elli Abdullahi MD   17.2 mg at 08/06/21 1139    bisacodyl (DULCOLAX) suppository 10 mg  10 mg Rectal Daily PRN Elli Abdullahi MD   10 mg at 08/06/21 2308    glucose (GLUTOSE) 40 % oral gel 15 g  15 g Oral PRN Elli Abdullahi MD        dextrose 50 % IV solution  12.5 g Intravenous PRN Elli Abdullahi MD        glucagon (rDNA) injection 1 mg  1 mg Intramuscular PRN Elli Abdullahi MD        dextrose 5 % solution  100 mL/hr Intravenous PRN Elli Abdullahi MD           No Known Allergies    ROS:   Constitutional                  Negative for fever and chills   HEENT                            Negative for ear discharge, ear pain, nosebleed  Eyes                                Negative for photophobia, pain and discharge  Respiratory                      Negative for hemoptysis and sputum  Cardiovascular                Negative for orthopnea, claudication and PND  Gastrointestinal               Negative for abdominal pain, diarrhea, blood in stool  Musculoskeletal               Negative for joint pain, negative for myalgia  Skin                                 Negative for rash or itching  Endo/heme/allergies       Negative for polydipsia, environmental allergy  Psychiatric                       Negative for suicidal ideation. Patient is not anxious    Vitals:    08/07/21 0543   BP: 125/76   Pulse: 89   Resp: 16   Temp: 100 °F (37.8 °C)   SpO2:      Admission weight: 210 lb 1.6 oz (95.3 kg)    Neurological Examination  Constitutional .General exam well groomed   Head/ Ears /Nose/Throat/external ear . Normal exam  Neck and thyroid . Normal size. No bruits  Respiratory . Breathsounds clear bilaterally  Cardiovascular: Auscultation of heart with regular rate and rhythm   Musculoskeletal. Muscle tone increased right arm and right leg                                                           Muscle strength Lifting bilateral arms partially on right  arm . Withdrawing legs decreased on right                                                                                No dysmetria or dysdiadokinesis  No tremor   Decrease right fine motor  Orientation Stuporous opening eyes to stimulation . Nonverbal . Not following commands   Attention and concentration reduced  Language process nonverbal . Not following commands    Cranial nerve 2 normal visual fthreat  Cranial nerve 3, 4 and 6 . Extraocular muscles are intact . Pupils are equal and reactive   Cranial nerve 5 . Intact corneal reflex. Normal facial sensation  Cranial nerve 7 decrease right NLF   Cranial nerve 8. Grossly intact hearing   Cranial nerve 9 and 10. Symmetric palate elevation   Cranial nerve 11 , 5 out of 5 strength   Cranial Nerve 12 midline tongue . No atrophy  Sensation . Normal pinprick and light touch   Deep Tendon Reflexes brisker on right   Plantar response equivocal bilaterally    Assessment :    Encephalopathy . Suspect new cerebral infarction .  Recent left cerebral infraction with right hemiparesis     Plan:    MRI of Head . MRA head and neck .  EKG

## 2021-08-07 NOTE — PROGRESS NOTES
Physical Therapy          Physical Therapy Cancel Note      DATE: 2021    NAME: Samantha Griffiths  MRN: [id number]   : 1945      Patient not seen for Physical Therapy this morning due to not being able to wake pt. HERLINDA Florentino asked to assist with waking pt for therapy and again was not successful despite verbal and tactile stimulation. CT results from  showed that: there has been interval development of hypodensity in the right mid basal ganglia since prior study consistent with an evolving subacute infarct.     Electronically signed by Judith Lind PTA on 2021 at 8:48 AM

## 2021-08-07 NOTE — PROGRESS NOTES
Nutrition Note    Nurse reports pt has not been participating in therapies. Pt appears to be tolerating tube feeding well. Labs noted. Loose, soft stool x 1 recorded this morning. Will continue continuous tube feedings for now. Nurse to further clarify desired water flushes with physician. Vahid Pack R.D., LSTEPHENIE.   Phone: [phone]

## 2021-08-07 NOTE — PROGRESS NOTES
Physical Therapy          Physical Therapy Cancel Note      DATE: 2021    NAME: Giacomo Decker  MRN: [id number]   : 1945      Patient not seen this date for Physical Therapy due to:    Patient unable to participate   -  Additional attempt to engage pt in therapy this PM however pt continues to be non responsive with various tactile and verbal technique approaches. PT will continue to follow next date.      Electronically signed by Parviz Alvarado PTA on 2021 at 3:09 PM

## 2021-08-07 NOTE — PROGRESS NOTES
Writer called MRI to see if they were ready for pt at this time. Writer spoke to Zach Mata who stated the MRI tech was called in about about 15 minutes ago and she would re-call the unit when she arrives and is ready.

## 2021-08-07 NOTE — PLAN OF CARE
Problem: Infection:  Goal: Will remain free from infection  Description: Will remain free from infection  Outcome: Ongoing     Problem: Safety:  Goal: Free from accidental physical injury  Description: Free from accidental physical injury  Outcome: Ongoing     Problem: Safety:  Goal: Free from intentional harm  Description: Free from intentional harm  Outcome: Ongoing     Problem: Daily Care:  Goal: Daily care needs are met  Description: Daily care needs are met  Outcome: Ongoing     Problem: Pain:  Goal: Patient's pain/discomfort is manageable  Description: Patient's pain/discomfort is manageable  Outcome: Ongoing     Problem: Skin Integrity:  Goal: Skin integrity will stabilize  Description: Skin integrity will stabilize  Outcome: Ongoing     Problem: Discharge Planning:  Goal: Patients continuum of care needs are met  Description: Patients continuum of care needs are met  Outcome: Ongoing     Problem: Skin Integrity:  Goal: Will show no infection signs and symptoms  Description: Will show no infection signs and symptoms  Outcome: Ongoing     Problem: Skin Integrity:  Goal: Absence of new skin breakdown  Description: Absence of new skin breakdown  Outcome: Ongoing     Problem: Falls - Risk of:  Goal: Will remain free from falls  Description: Will remain free from falls  Outcome: Ongoing     Problem: Falls - Risk of:  Goal: Absence of physical injury  Description: Absence of physical injury  Outcome: Ongoing     Problem: Nutrition  Goal: Optimal nutrition therapy  Outcome: Ongoing     Problem: Musculor/Skeletal Functional Status  Goal: Highest potential functional level  Outcome: Ongoing     Problem: Musculor/Skeletal Functional Status  Goal: Absence of falls  Outcome: Ongoing

## 2021-08-07 NOTE — PROGRESS NOTES
Physical Medicine & Rehabilitation  Progress Note      Subjective: Graham Aldana is a 77 y.o. male with ischemic CVA left PCA, RAHEEM, and MCA with right dominant hemiparesis. Pt is not answering questions and does not open eyes during evaluation. Neurology consulted for any additional recommendations, still pending  Tube feeds increasing slowly to goal rate. ROS:  Unable to assess    Rehabilitation:   Progressing in therapies. PT:         Orientation Level: Unable to assess (pt nonresponsive)  Restrictions/Precautions  Restrictions/Precautions: Up as Tolerated;General Precautions; Fall Risk;Swallowing - Thickened Liquids (Mildly thick (Nectar thick); PEG tube/continuous feeding)  Required Braces or Orthoses?: No  Position Activity Restriction  Other position/activity restrictions: Up w/assistance, RU/LE Weakness. MRI BRAIN- Acute infarct in the left and right basal ganglia greater on the left. OT    Patient unable to participate   -   Pt not able to be roused this date for AM or PM therapy. Wrtier provided max verbal and tactile cuing with no response. SPEECH:    Unable to awaken pt. At 1000 and at 1300 for treatment on this date.   Pt. Remained unresponsive with max verbal and tactile cues.         Current Medications:   Current Facility-Administered Medications: OLANZapine (ZYPREXA) tablet 5 mg, 5 mg, PEG Tube, Nightly  insulin lispro (HUMALOG) injection vial 0-6 Units, 0-6 Units, Subcutaneous, 4 times per day  losartan (COZAAR) tablet 50 mg, 50 mg, Oral, Daily  aspirin chewable tablet 81 mg, 81 mg, PEG Tube, Daily  clopidogrel (PLAVIX) tablet 75 mg, 75 mg, PEG Tube, Daily  heparin (porcine) injection 5,000 Units, 5,000 Units, Subcutaneous, 3 times per day  polyethylene glycol (GLYCOLAX) packet 17 g, 17 g, Per G Tube, Daily PRN  finasteride (PROSCAR) tablet 5 mg, 5 mg, PEG Tube, Daily  famotidine (PEPCID) tablet 20 mg, 20 mg, PEG Tube, Daily  FLUoxetine (PROZAC) 20 MG/5ML solution 40 mg, 40 mg, PEG Tube, Daily  melatonin tablet 6 mg, 6 mg, PEG Tube, Nightly  rosuvastatin (CRESTOR) tablet 40 mg, 40 mg, PEG Tube, Nightly  isosorbide dinitrate (ISORDIL) tablet 10 mg, 10 mg, PEG Tube, TID  tiotropium (SPIRIVA RESPIMAT) 2.5 MCG/ACT inhaler 2 puff, 2 puff, Inhalation, Lunch  albuterol sulfate  (90 Base) MCG/ACT inhaler 2 puff, 2 puff, Inhalation, Q6H PRN  [Held by provider] hydrALAZINE (APRESOLINE) tablet 10 mg, 10 mg, Oral, 3 times per day  acetaminophen (TYLENOL) tablet 650 mg, 650 mg, Oral, Q4H PRN  senna (SENOKOT) tablet 17.2 mg, 2 tablet, Oral, Daily PRN  bisacodyl (DULCOLAX) suppository 10 mg, 10 mg, Rectal, Daily PRN  glucose (GLUTOSE) 40 % oral gel 15 g, 15 g, Oral, PRN  dextrose 50 % IV solution, 12.5 g, Intravenous, PRN  glucagon (rDNA) injection 1 mg, 1 mg, Intramuscular, PRN  dextrose 5 % solution, 100 mL/hr, Intravenous, PRN      Objective:  /76   Pulse 89   Temp 100 °F (37.8 °C) (Oral)   Resp 16   Ht 5' 10\" (1.778 m)   Wt 198 lb (89.8 kg)   SpO2 100%   BMI 28.41 kg/m²       GEN: Well developed, no acute distress  HEENT: NCAT. Eyes closed during evaluation. Mucous membranes pink and moist.  RESP:  Normal breath sounds with no wheezes, rales, or rhonchi. Respirations WNL and unlabored. CV: Regular rate and rhythm. No murmurs, rubs, or gallops. ABD: Soft, non-distended, BS+ and equal.  NEURO:  Eyes closed throughout evaluation. Not answering questions. Appears to have delayed processing. Spasticity present in right upper limb. MSK:  Muscle bulk is normal bilaterally. Minimal movement noted in all limbs. LIMBS: No edema in bilateral lower limbs. SKIN: Warm and dry with good turgor. PSYCH:  Flat affect.     Diagnostics:     CBC:   Recent Labs     08/04/21  1629 08/05/21  1244 08/07/21  0638   WBC 10.4 8.3 9.0   RBC 4.86 4.68 4.74   HGB 11.9* 11.7* 11.6*   HCT 37.7* 36.4* 37.3*   MCV 77.6* 77.9* 78.8*   RDW 17.6* 18.1* 18.2*   * 153 136*     BMP:   Recent Labs - ordered repeat CT head on 8/6 - results as above; will follow up on any additional recommendations. 2. Agitation: Resolved. Has not required seroquel since 7/23. Has no sedating medicines on board to be causing drowsiness. Borderline QTc. Telesitter added 7/28.  1:1 re-added 8/3 due to new PEG tube placement, discontinued 8/6. Will continue telesitter for now. 3. Dehydration, high risk for malnutrition:  Secondary to patient refusing medications and meals. Switched daily medications from morning to noon on 7/29 to see if patient would take his medications later in the day - was not successful. Patient was taking some medications at night - switched aspirin and plavix to nightly on 7/29 but he did not take them. Psych consult ordered after discussion with IM - increased prozac 7/28, changed to liquid formulation and added olanzapine on 7/30 (olanzapine was later discontinued as this medication can be sedating). Discussed with IM - consult placed on 7/30 for general surgery to evaluate for possible PEG tube placement, as patient has not been maintaining nutrition, hydration, or medication regimen. Discussed PEG tube with family as well, as patient does not have decision-making capacity at this time. PEG tube placed 8/3 with sister's consent. Medications resumed via PEG on 8/3, tube feeds started 8/4 per dietician recommendations - increasing rate slowly to goal.  Will monitor labs for any signs of refeeding syndrome. Free water flushes increased 8/5.  4. CELESTINO:  Secondary to dehydration. Creatinine 1.86 on 8/6 (baseline creatinine appears to be around 1.2-1.5). Free water flushes increased 8/5, as above. 5. Elevated LFTs:  AST, ALT, alk phos elevated but stable - has had elevation of these values recently. Will monitor. 6. Sinusitis: Resolved. Was on augmentin  7. Insomnia: Scheduled melatonin started on 7/25  8. Thrombocytopenia: Hematology following.  Was on prednisone - notified hematology that patient was refusing medications - okay for him to remain off of steroids at this time and monitor platelets. Platelets overall improved (158 on 8/5), OK for DVT prophylaxis. 9. Anemia:  Hemoglobin 11.7 on 8/5, stable. Will monitor. 10. Leukocytosis:  Resolved. Attributed to prednisone. Will monitor. 11. HTN/CAD: On hydralazine (currently on hold), Imdur - switched to isordil on 8/3, losartan - IM resumed this medication at a lower dose on 8/4  12. DM II: On humalog sliding scale  13. COPD:  On tiotropium. Has albuterol prn. 14. Restless Leg Syndrome: On ropinirole - discontinued for now in order to minimize medications. Will monitor. 15. Depression: On fluoxetine. Psych consult and recommendations as above. Starting olanzapine nightly on 8/6 - will need to discuss with psychiatry if patient is started on amantadine. 16. GERD: On famotidine  17. BPH: On finasteride, tamsulosin - discontinued as this medication cannot be crushed  18. Thyroid nodule: For outpatient monitoring  19. Bowel Management: Miralax daily prn, Senokot prn, Dulcolax prn  20. Internal medicine for medical management  21. DVT prophylaxis:  On heparin  22. Discussed recommendation for discharge to SNF with patient's family along with  on 7/30. Family provided a few choices but patient was not accepted at these facilities.  reaching out to additional SNFs but patient has not yet been accepted to a facility. As patient has had limited participation in therapies, will plan to discharge him to SNF as soon as it is safe.       Electronically signed by Halina Cortes MD on 8/7/2021 at 11:14 AM

## 2021-08-07 NOTE — PROGRESS NOTES
Speech Language Pathology  [facility] at [facility]  Speech Language Pathology    Date: 8/7/2021  Patient Name: Valentine Abdul  YOB: 1945   AGE: 77 y.o. MRN: [id number]        Patient Not Available for Speech Therapy     Due to:  [] Testing  [] Hemodialysis  [] Cancelled by RN  [] Surgery   [] Intubation/Sedation/Pain Medication  [] Medical instability  [x] Other:  Unable to awaken pt. At 1000 for treatment on this date. Pt. remained unresponsive with max verbal and tactile cues. RN, Duong Colorado, notified and reports Pt. did not work with OT or PT this morning. Completed by:  Vitaliy Jorge M.A., CCC-SLP

## 2021-08-07 NOTE — PROGRESS NOTES
Surgery Progress Note             POD # 4    PATIENT NAME: Masha Toledo     TODAY'S DATE: 8/7/2021, 1:30 PM    Chief complaint: s/p PEG    SUBJECTIVE:    Pt is tolerating continuous tube feeding will little residual.  Patients pain is well controlled. Pt is  passing gas. Pt has had a bowel movement. OBJECTIVE:   VITALS:  /76   Pulse 89   Temp 100 °F (37.8 °C) (Oral)   Resp 16   Ht 5' 10\" (1.778 m)   Wt 198 lb (89.8 kg)   SpO2 100%   BMI 28.41 kg/m²     INTAKE/OUTPUT:      Intake/Output Summary (Last 24 hours) at 8/7/2021 1330  Last data filed at 8/7/2021 0956  Gross per 24 hour   Intake 620 ml   Output --   Net 620 ml                 CONSTITUTIONAL:  fatigued and somnolent.   No acute distress  CARDIOVASCULAR:  regular rate and rhythm and No Murmur  LUNGS:  CTA Bilaterally  ABDOMEN:   Abdomen soft, non-tender, non-distended  INCISION: Incision clean/dry/intact    Data:  CBC:   Lab Results   Component Value Date    WBC 9.0 08/07/2021    RBC 4.74 08/07/2021    RBC 4.88 05/03/2012    HGB 11.6 08/07/2021    HCT 37.3 08/07/2021    MCV 78.8 08/07/2021    MCH 24.4 08/07/2021    MCHC 31.0 08/07/2021    RDW 18.2 08/07/2021     08/07/2021     05/03/2012    MPV 11.0 08/07/2021     BMP:    Lab Results   Component Value Date     08/07/2021    K 4.2 08/07/2021     08/07/2021    CO2 22 08/07/2021    BUN 24 08/07/2021    LABALBU 2.9 08/07/2021    CREATININE 1.71 08/07/2021    CALCIUM 8.9 08/07/2021    GFRAA 48 08/07/2021    LABGLOM 39 08/07/2021    GLUCOSE 200 08/07/2021    GLUCOSE 122 05/03/2012     ASSESSMENT     Hospital Problems         Last Modified POA    Cerebrovascular accident (CVA) ([address] 7/21/2021 Yes    HTN (hypertension) 7/21/2021 Yes    Hyperlipidemia 7/21/2021 Yes    CAD S/P percutaneous coronary angioplasty 7/21/2021 Yes    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  Tolerating G tube feeding    Electronically signed by Leila Coronel MD  on 8/7/2021 at 1:30 PM

## 2021-08-07 NOTE — PROGRESS NOTES
[address]    OCCUPATIONAL THERAPY MISSED TREATMENT NOTE   ACUTE REHAB  Date: 21  Patient Name: Rm Clifton       Room: 8447/1752-04  MRN: [id number]   Account #: [de-identified]    : 1945  (77 y.o.)  Gender: male   Referring Practitioner: Dr. Franck Pacheco  Diagnosis: Ischemic CVA with R dominant hemiplegia             REASON FOR MISSED TREATMENT:  Patient unable to participate   -   Pt not alert or responsive for therapy this morning. OT will check back later this date.        [address] [place]

## 2021-08-07 NOTE — PLAN OF CARE
Problem: Infection:  Goal: Will remain free from infection  8/7/2021 1827 by Jett Baker RN  Outcome: Ongoing     Problem: Safety:  Goal: Free from accidental physical injury  8/7/2021 1827 by Jett Baker RN  Outcome: Ongoing     Problem: Safety:  Goal: Free from intentional harm  8/7/2021 1827 by Jett Baker RN  Outcome: Ongoing     Problem: Pain:  Goal: Patient's pain/discomfort is manageable  8/7/2021 1827 by Jett Baker RN  Outcome: Ongoing       Problem: Skin Integrity:  Goal: Skin integrity will stabilize  8/7/2021 1827 by Jett Baker RN  Outcome: Ongoing     Problem: Skin Integrity:  Goal: Will show no infection signs and symptoms  8/7/2021 1827 by Jett Baker RN  Outcome: Ongoing     Problem: Skin Integrity:  Goal: Absence of new skin breakdown  8/7/2021 1827 by Jett Baker RN  Outcome: Ongoing     Problem: Falls - Risk of:  Goal: Will remain free from falls  8/7/2021 1827 by Jett Baker RN  Outcome: Ongoing

## 2021-08-07 NOTE — PROGRESS NOTES
[place] [address] Internal Medicine    CONSULTATION / HISTORY AND PHYSICAL EXAMINATION            Date:   8/7/2021  Patient name:  Jak Driver  Date of admission:  7/20/2021  6:49 PM  MRN:   [id number]  Account:  [de-identified]  YOB: 1945  PCP:    No primary care provider on file. Room:   [address] [place] [address]  Code Status:    Full Code    Physician Requesting Consult: Aden Jordan MD    Reason for Consult: Medical management    Chief Complaint:     No chief complaint on file.       History Obtained From:     patient, electronic medical record    History of Present Illness/ Interval History:     History is extremely limited, patient was extremely agitated overnight, was given Ativan around 2 AM in the night, patient very sleepy, not answering questions  Most of history is retrieved from E HR  Patient was recently admitted to [place] [address] with right-sided weakness, right-sided facial weakness, speech difficulties  Patient underwent MRI brain, concerning for acute infarct in left and right basal ganglia  CT head and neck, was concerning for severe stenosis in the proximal A3 segment of RAHEEM bilaterally  During hospital stay, patient had thrombocytopenia, was evaluated by hematologist, getting treated with IV steroids, and lines of immune thrombocytopenia    08/07/21   Patient's mental status continues to deteriorate, more drowsy today not opening eyes to command      Past Medical History:     Past Medical History:   Diagnosis Date    Centrilobular emphysema ([address]     COPD (chronic obstructive pulmonary disease) ([address]     Depression     Diabetes mellitus ([address]     pt refuses to take previously prescribed metformin (states PCP aware)    Former smoker     Hyperlipidemia     on 4/27/18 pt states \"I stopped taking that about a year ago\"    Hypertension     Mixed restrictive and obstructive lung disease ([address]     Need for pneumococcal vaccine     Personal history of tobacco use as needed for Dry Eyes     Historical Provider, MD   ketotifen (ZADITOR) 0.025 % ophthalmic solution Place 1 drop into both eyes 2 times daily as needed (itchy eyes)     Historical Provider, MD   isosorbide mononitrate (IMDUR) 60 MG extended release tablet Take 30 mg by mouth daily Indications: 1/2 tablet (30mg)     Historical Provider, MD   finasteride (PROSCAR) 5 MG tablet Take 5 mg by mouth daily    Historical Provider, MD   tamsulosin (FLOMAX) 0.4 MG capsule Take 0.4 mg by mouth daily    Historical Provider, MD   fluticasone (FLONASE) 50 MCG/ACT nasal spray 1 spray by Nasal route 2 times daily  Patient taking differently: 1 spray by Nasal route 2 times daily as needed for Rhinitis  5/31/16   Francisco Connor DO   albuterol sulfate  (90 BASE) MCG/ACT inhaler Inhale 2 puffs into the lungs every 6 hours as needed for Wheezing    Historical Provider, MD   albuterol (PROVENTIL) (2.5 MG/3ML) 0.083% nebulizer solution Take 2.5 mg by nebulization every 6 hours as needed for Wheezing    Historical Provider, MD   aspirin 81 MG EC tablet Take 81 mg by mouth daily    Historical Provider, MD   losartan (COZAAR) 100 MG tablet Take 100 mg by mouth daily     Historical Provider, MD   nitroGLYCERIN (NITROSTAT) 0.4 MG SL tablet Place 0.4 mg under the tongue every 5 minutes as needed for Chest pain    Historical Provider, MD   FLUoxetine (PROZAC) 20 MG capsule Take 40 mg by mouth daily     Historical Provider, MD   furosemide (LASIX) 20 MG tablet Take 20 mg by mouth daily as needed (swelling)     Historical Provider, MD   clopidogrel (PLAVIX) 75 MG tablet Take 75 mg by mouth daily    Historical Provider, MD   rOPINIRole (REQUIP) 0.25 MG tablet Take 0.25 mg by mouth nightly as needed     Historical Provider, MD   budesonide-formoterol (SYMBICORT) 160-4.5 MCG/ACT AERO Inhale 2 puffs into the lungs 2 times daily. Historical Provider, MD        Allergies:     Patient has no known allergies.     Social History:     Tobacco: reports that he quit smoking about 8 years ago. He started smoking about 64 years ago. He has a 42.00 pack-year smoking history. He has never used smokeless tobacco.  Alcohol:      reports no history of alcohol use. Drug Use:  reports no history of drug use. Family History:     History reviewed. No pertinent family history. Review of Systems:     Positive and Negative as described in HPI. Patient nonverbal      Physical Exam:     /76   Pulse 89   Temp 99 °F (37.2 °C)   Resp 16   Ht 5' 10\" (1.778 m)   Wt 198 lb (89.8 kg)   SpO2 100%   BMI 28.41 kg/m²   Temp (24hrs), Av.5 °F (37.5 °C), Min:99 °F (37.2 °C), Max:100 °F (37.8 °C)      General appearance: Sleepy, not opening eyes to command-apparently no change from yesterday per nursing. Eyes: Anicteric sclera. Pupils are equally round and reactive to light. Extraocular movements are intact.   Lungs:  clear to auscultation bilaterally, normal effort  Heart:  regular rate and rhythm, no murmur  Abdomen:  soft, nontender, nondistended, normal bowel sounds, no masses, hepatomegaly, splenomegaly  Extremities:  no edema, redness, tenderness in the calves  Skin:  no gross lesions, rashes, induration  Neuro: Encephalopathic    Investigations:      Laboratory Testing:  Lab Results   Component Value Date    WBC 9.0 2021    HGB 11.6 (L) 2021    HCT 37.3 (L) 2021    MCV 78.8 (L) 2021     (L) 2021     Lab Results   Component Value Date     2021    K 4.2 2021     2021    CO2 22 2021    BUN 24 2021    CREATININE 1.71 2021    GLUCOSE 200 2021    GLUCOSE 122 2012    CALCIUM 8.9 2021         Assessment :      Primary Problem  <principal problem not specified>    Active Hospital Problems    Diagnosis Date Noted    Encephalopathy [G93.40] 2021    Severe malnutrition ([address] [E43] 2021    Depression [F32.9]     Acute CVA [facility]cerebrovascular accident) ([facility] [I63.9] 07/20/2021    Acute idiopathic thrombocytopenic purpura ([facility] [D69.3] 07/16/2021    CAD S/P percutaneous coronary angioplasty [I25.10, Z98.61] 07/13/2021    Cerebrovascular accident (CVA) ([facility] [I63.9] 07/13/2021    HTN (hypertension) Kenneth Hanna 07/13/2021    Hyperlipidemia [E78.5] 07/13/2021    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 [I66.21]     Right hemiparesis ([facility] [G81.91]        Plan: Active Problems:    Cerebrovascular accident (CVA) ([facility]    HTN (hypertension)    Hyperlipidemia    CAD S/P percutaneous coronary angioplasty    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 *    1. Acute ischemic stroke, patient is on aspirin, Plavix, Crestor  2. 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 On DVT prophylaxis with heparin  4. Hypertension, controlled  5. Diabetes, controlled  1 episode of bradycardia, will follow, may be due to benzodiazepine . 6. Incidental finding of thyroid nodule on CTA head and neck, will need outpatient ultrasound thyroid and biopsy    8/7  Worsening encephalopathy-MRI ordered per neurology continue tube feed  Sodium better  Liver enzymes mildly elevated and uptrending    Consultations:   IP CONSULT TO DIETITIAN  IP CONSULT TO SOCIAL WORK  IP CONSULT TO INTERNAL MEDICINE  IP CONSULT TO ONCOLOGY  IP CONSULT TO INTERNAL MEDICINE  IP CONSULT TO PSYCHIATRY  IP CONSULT Marito Argueta MD  8/7/2021  7:46 PM    Copy sent to Dr. Savanna Leblanc primary care provider on file. Please note that this chart was generated using voice recognition Dragon dictation software.   Although every effort was made to ensure the accuracy of this automated transcription, some errors in transcription may have occurred.

## 2021-08-07 NOTE — PROGRESS NOTES
Spoke with patients sister, Marian Cao, regarding guardian status. She states will be finalized Monday in court. Dr Fernandez Fraction recommended palliative care and discuss further options for patient. Rosi Da Silva states she can come in on Monday to discuss palliative care and end of life care, including code status.

## 2021-08-08 PROBLEM — I69.30 CEREBRAL MULTI-INFARCT STATE: Status: ACTIVE | Noted: 2021-01-01

## 2021-08-08 NOTE — PROGRESS NOTES
RN spoke with Dr. Jin Mancilla and received orders for a low intensity heparin gtt and a mari/ loop recorder to be done tomorrow per Cardiology. New consult entered for Dr. Tony Fisher.

## 2021-08-08 NOTE — H&P
[address]      [facility]     HISTORY AND PHYSICAL EXAMINATION            Date:   8/8/2021  Patient name:  Gayla Mojica  Date of admission:  8/8/2021 11:30 AM  MRN:   [id number]  Account:  [de-identified]  YOB: 1945  PCP:    No primary care provider on file. Room:   [address] [place] [address]  Code Status:    Full Code    Chief Complaint:     Altered mental status    History Obtained From:     reason patient could not give history:  altered mental status, somnolent    History of Present Illness: Jessica Sullivan is a 56-year-old male who was recently admitted to [facility] acute rehab unit following ischemic CVA (left PCA, RAHEEM and MCA) with right sided hemiparesis. Patient has past medical history of CVA, coronary artery disease s/p percutaneous coronary angioplasty, hyperlipidemia, hypertension, diabetes, emphysema/COPD. History difficult to obtain as patient is somnolent and nonresponsive. Per prior documentation, patient had increasing lethargy on 8/6. CT head obtained which showed: There has been interval development of a hypodensity in the right basal ganglia compared to prior study consistent with evolving subacute white matter infarct. No hemorrhage is noted. Chronic microvascular change in atrophy is demonstrated. No midline shift. Spoke with family - sister and niece - who stated he is somewhat confused at baseline, but has become significantly more lethargic/began to be agitated and combative about 1.5 weeks ago while in the acute rehab unit. He was transferred to medicine service for management of altered mental status.   Patient has been coughing intermittently; frothy/brown substance suctioned from mouth  Pneumonia/sepsis work-up including chest x-ray, procalcitonin, lactic acid, mycoplasma, strep pneumo, Legionella, blood culture x2, urinalysis pending. Over the past few days, patient has had intermittent elevations in temperature, up to 100 °F    Past Medical History:     Past Medical History:   Diagnosis Date    [facility]rilobular [facility]ma ([facility]     COPD (chronic obstructive pulmonary disease) ([facility]     Depression     Diabetes mellitus ([facility]     pt refuses to take previously prescribed metformin (states PCP aware)    Former smoker     Hyperlipidemia     on 4/27/18 pt states \"I stopped taking that about a year ago\"    Hypertension     Mixed restrictive and obstructive lung disease ([facility]     Need for pneumococcal vaccine     Personal history of tobacco use         Past SurgicalHistory:     Past Surgical History:   Procedure Laterality Date    CARDIAC SURGERY  07/2015    1 stent    NECK SURGERY      TOE AMPUTATION Right greater    UPPER GASTROINTESTINAL ENDOSCOPY N/A 8/3/2021    EGD  PEG TUBE INSERTION performed by Alvaro Hernandez MD at [address] [place] [address]        Medications Prior to Admission:        Prior to Admission medications    Medication Sig Start Date End Date Taking?  Authorizing Provider   Heparin Sodium, Porcine, (HEPARIN, PORCINE,) 5000 UNIT/ML injection Inject 1 mL into the skin every 8 hours 7/20/21   Gabby Gomez MD   QUEtiapine (SEROQUEL) 25 MG tablet Take 1 tablet by mouth nightly as needed for Agitation 7/20/21 7/25/21  Gabby Gomez MD   methylPREDNISolone sodium (SOLU-MEDROL) 40 MG injection Infuse 1 mL intravenously every 12 hours 7/20/21   Gabby Gomez MD   melatonin 3 MG TABS tablet Take 1 tablet by mouth nightly as needed (insomnia) 7/20/21   Gabby Gomez MD   atorvastatin (LIPITOR) 80 MG tablet Take 0.5 tablets by mouth daily (Pt takes one-half of an 80mg tab = 40mg) 7/16/21   Gabby Gomez MD   tiZANidine (ZANAFLEX) 2 MG tablet Take 1 tablet by mouth 4 times daily as needed (muscle spasms) 5/7/21   JOLIE Gallo - CNP   naproxen (NAPROSYN) 500 MG tablet Take 1 tablet by mouth 2 times daily (with meals) 1/4/21   Heath Young PA-C   ibuprofen (ADVIL;MOTRIN) 800 MG tablet Take 1 tablet by mouth every 8 hours as needed for Pain 6/2/20   Maria Eugenia Carter MD   lidocaine (LIDODERM) 5 % Place 1 patch onto the skin daily 12 hours on, 12 hours off. 6/2/20   Maria Eugenia Carter MD   metoprolol succinate (TOPROL XL) 50 MG extended release tablet Take 50 mg by mouth daily    Historical Provider, MD   tiotropium (SPIRIVA RESPIMAT) 2.5 MCG/ACT AERS inhaler Inhale 2 puffs into the lungs daily    Historical Provider, MD   carboxymethylcellulose 1 % ophthalmic solution Place 1 drop into both eyes 3 times daily as needed for Dry Eyes     Historical Provider, MD   ketotifen (ZADITOR) 0.025 % ophthalmic solution Place 1 drop into both eyes 2 times daily as needed (itchy eyes)     Historical Provider, MD   isosorbide mononitrate (IMDUR) 60 MG extended release tablet Take 30 mg by mouth daily Indications: 1/2 tablet (30mg)     Historical Provider, MD   finasteride (PROSCAR) 5 MG tablet Take 5 mg by mouth daily    Historical Provider, MD   tamsulosin (FLOMAX) 0.4 MG capsule Take 0.4 mg by mouth daily    Historical Provider, MD   fluticasone (FLONASE) 50 MCG/ACT nasal spray 1 spray by Nasal route 2 times daily  Patient taking differently: 1 spray by Nasal route 2 times daily as needed for Rhinitis  5/31/16   Francisco Connor DO   albuterol sulfate  (90 BASE) MCG/ACT inhaler Inhale 2 puffs into the lungs every 6 hours as needed for Wheezing    Historical Provider, MD   albuterol (PROVENTIL) (2.5 MG/3ML) 0.083% nebulizer solution Take 2.5 mg by nebulization every 6 hours as needed for Wheezing    Historical Provider, MD   aspirin 81 MG EC tablet Take 81 mg by mouth daily    Historical Provider, MD   losartan (COZAAR) 100 MG tablet Take 100 mg by mouth daily     Historical Provider, MD   nitroGLYCERIN (NITROSTAT) 0.4 MG SL tablet Place 0.4 mg under the tongue every 5 minutes as needed for Chest pain    Historical Provider, MD   FLUoxetine (PROZAC) 20 MG capsule Take 40 mg by mouth daily     Historical Provider, MD   furosemide (LASIX) 20 MG tablet Take 20 mg by mouth daily as needed (swelling)     Historical Provider, MD   clopidogrel (PLAVIX) 75 MG tablet Take 75 mg by mouth daily    Historical Provider, MD   rOPINIRole (REQUIP) 0.25 MG tablet Take 0.25 mg by mouth nightly as needed     Historical Provider, MD   budesonide-formoterol (SYMBICORT) 160-4.5 MCG/ACT AERO Inhale 2 puffs into the lungs 2 times daily. Historical Provider, MD        Allergies:     Patient has no known allergies. Social History:     Tobacco:    reports that he quit smoking about 8 years ago. He started smoking about 64 years ago. He has a 42.00 pack-year smoking history. He has never used smokeless tobacco.  Alcohol:      reports no history of alcohol use. Drug Use:  reports no history of drug use. Family History:     No family history on file. Review of Systems:   Review of Systems   Unable to obtain due to patient altered mental status    Physical Exam:   BP (!) 114/50   Pulse 73   Temp 99.3 °F (37.4 °C)   Resp 18   SpO2 93%   Temp (24hrs), Av.9 °F (37.2 °C), Min:98.4 °F (36.9 °C), Max:99.3 °F (37.4 °C)    Recent Labs     21  1602 21  0017 21  0549 21  1324   POCGLU 171* 176* 253* 207*     No intake or output data in the 24 hours ending 21 1553    Physical Exam  Constitutional:       Comments: Lethargic, nonresponsive to verbal or tactile stimuli   HENT:      Mouth/Throat:      Comments: White/yellow coating on tongue  Cardiovascular:      Rate and Rhythm: Normal rate and regular rhythm. Pulmonary:      Effort: No respiratory distress. Breath sounds: No wheezing, rhonchi or rales. Abdominal:      General: There is no distension. Tenderness: There is no guarding. Musculoskeletal:      Right lower leg: No edema. Left lower leg: No edema.       Comments: Rigidity of left upper extremity on passive range of motion     Neurological:      Comments: Lethargic, not responsive to verbal or tactile stimuli, withdrew from pain on right lower extremtiy            Investigations:     Laboratory Testing:  Recent Results (from the past 24 hour(s))   POC Glucose Fingerstick    Collection Time: 08/07/21  4:02 PM   Result Value Ref Range    POC Glucose 171 (H) 75 - 110 mg/dL   EKG 12 Lead    Collection Time: 08/07/21  9:24 PM   Result Value Ref Range    Ventricular Rate 82 BPM    Atrial Rate 82 BPM    P-R Interval 142 ms    QRS Duration 80 ms    Q-T Interval 382 ms    QTc Calculation (Bazett) 446 ms    P Axis 60 degrees    R Axis -26 degrees    T Axis 73 degrees   POC Glucose Fingerstick    Collection Time: 08/08/21 12:17 AM   Result Value Ref Range    POC Glucose 176 (H) 75 - 110 mg/dL   POC Glucose Fingerstick    Collection Time: 08/08/21  5:49 AM   Result Value Ref Range    POC Glucose 253 (H) 75 - 110 mg/dL   POC Glucose Fingerstick    Collection Time: 08/08/21  1:24 PM   Result Value Ref Range    POC Glucose 207 (H) 75 - 110 mg/dL   Procalcitonin    Collection Time: 08/08/21  1:52 PM   Result Value Ref Range    Procalcitonin 0.26 (H) <0.09 ng/mL   Lactic Acid    Collection Time: 08/08/21  1:52 PM   Result Value Ref Range    Lactic Acid 2.0 0.5 - 2.2 mmol/L       Imaging/Diagnostics:  CT HEAD WO CONTRAST    Result Date: 8/6/2021  EXAMINATION: CT OF THE HEAD WITHOUT CONTRAST  8/6/2021 2:21 pm TECHNIQUE: CT of the head was performed without the administration of intravenous contrast. Dose modulation, iterative reconstruction, and/or weight based adjustment of the mA/kV was utilized to reduce the radiation dose to as low as reasonably achievable. COMPARISON: 4 August 2021 HISTORY: ORDERING SYSTEM PROVIDED HISTORY: Pt w/ bilateral basal ganglia infarcts, right hemiparesis; minimally interactive over the last week and having increased lethargy the last few days.  TECHNOLOGIST PROVIDED HISTORY: Pt w/ bilateral basal ganglia infarcts, right hemiparesis; minimally interactive over the last week and having increased lethargy the last few days. Reason for Exam: Increased lethargy the last few days. Acuity: Unknown Type of Exam: Unknown FINDINGS: BRAIN/VENTRICLES: There is no acute intracranial hemorrhage, mass effect or midline shift. No abnormal extra-axial fluid collection. The gray-white differentiation is maintained without evidence of an acute infarct. There is no evidence of hydrocephalus. Remote right-sided basal ganglial infarcts are noted. However, there is been interval development of hypodensity in the right mid basal ganglia since prior study consistent with an evolving subacute infarct. No significant mass effect is noted. Ventricles are proportionate to cerebral atrophy. ORBITS: The visualized portion of the orbits demonstrate no acute abnormality. SINUSES: The visualized paranasal sinuses and mastoid air cells demonstrate no acute abnormality. SOFT TISSUES/SKULL:  No acute abnormality of the visualized skull or soft tissues. There has been interval development of a hypodensity in the right basal ganglia compared to prior study consistent with evolving subacute white matter infarct. No hemorrhage is noted. Chronic microvascular change in atrophy is demonstrated. No midline shift. CT HEAD WO CONTRAST    Result Date: 8/4/2021  EXAMINATION: CT OF THE HEAD WITHOUT CONTRAST  8/4/2021 1:25 pm TECHNIQUE: CT of the head was performed without the administration of intravenous contrast. Dose modulation, iterative reconstruction, and/or weight based adjustment of the mA/kV was utilized to reduce the radiation dose to as low as reasonably achievable.  COMPARISON: CT head 07/21/2021 and 07/17/2021 HISTORY: ORDERING SYSTEM PROVIDED HISTORY: Patient with history of left-sided CVA with right hemiparesis, not communicating with staff, please evaluate for any change from previous imaging and any other abnormality TECHNOLOGIST PROVIDED HISTORY: Patient with history of left-sided CVA with right hemiparesis, not communicating with staff, please evaluate for any change from previous imaging and any other abnormality Reason for Exam: Patient with history of left-sided CVA with right hemiparesis, not communicating with staff, please evaluate for any change from previous imaging and any other abnormality Acuity: Unknown Type of Exam: Unknown FINDINGS: BRAIN/VENTRICLES: There is no acute intracranial hemorrhage, mass effect or midline shift. No abnormal extra-axial fluid collection. The gray-white differentiation is maintained without evidence of an acute infarct. There is prominence of the ventricles and sulci due to global parenchymal volume loss. There are nonspecific areas of hypoattenuation within the periventricular and subcortical white matter, which likely represent chronic microvascular ischemic change. 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 Remote lacunar stroke left caudate lobe. ORBITS: The visualized portion of the orbits demonstrate no acute abnormality. SINUSES: The visualized paranasal sinuses and mastoid air cells demonstrate no acute abnormality. SOFT TISSUES/SKULL: No acute abnormality of the visualized skull or soft tissues. 1. No acute intracranial abnormality. 2. 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 Remote lacunar stroke left caudate lobe. 3. Senescent changes. White matter hypoattenuation described is typical of microvascular ischemic disease or as sequela of dysmyelinating/demyelinating processes.        MRA HEAD WO CONTRAST    Result Date: 8/7/2021  EXAMINATION: MRA OF THE NECK WITHOUT CONTRAST; MRI OF THE BRAIN WITHOUT CONTRAST; MRA OF THE HEAD WITHOUT CONTRAST 8/7/2021 8:28 pm; 8/7/2021 8:34 pm; 8/7/2021 8:27 pm: TECHNIQUE: Multiplanar multisequence MRA of the neck was performed brachiocephalic or subclavian arteries. CAROTID ARTERIES: No dissection, arterial injury, or hemodynamically significant stenosis by NASCET criteria. VERTEBRAL ARTERIES: No dissection, arterial injury, or significant stenosis. MRA HEAD: ANTERIOR CIRCULATION: Suspected focal high-grade stenosis involving the A1 segment of the left anterior cerebral artery is identified. Right middle cerebral artery focal moderate grade stenosis involving the M1 segment is present. Suspected multifocal moderate to high-grade stenosis involving the right middle cerebral artery M2 segment is seen. No further evidence of significant stenosis of the intracranial internal carotid, anterior cerebral, or middle cerebral arteries. POSTERIOR CIRCULATION: Bilateral posterior cerebral artery P2 segment suspected focal moderate to high-grade stenosis are noted. No further evidence of significant stenosis of the vertebral, basilar, or posterior cerebral arteries. 1. Multifocal acute ischemia involving the bilateral basal ganglia, as discussed above, posterior commissure of the corpus callosum, lateral margin of left thalamus and the left greater than right frontal parietal paramedian subcortical and deep white matter. 2. No evidence of associated hemorrhagic conversion. 3. Finding suggestive of embolic phenomenon. Recommend clinical correlation. 4. Suspected focal high-grade stenosis involving A1 segment of the left anterior cerebral artery. 5. Suspected right middle cerebral artery M1 segment focal moderate grade stenosis. 6. Suspected right middle cerebral artery M2 segment multifocal moderate to high-grade stenosis. 7. Bilateral posterior cerebral artery P2 segment suspected multifocal moderate to high-grade stenosis. 8. Grossly unremarkable MR angiogram of the neck. Note: MR angiographic evaluation of the neck is severely limited by patient motion related artifact.  9. Severe cerebral white matter chronic microvascular ischemic disease. 10. Mild diffuse cerebral atrophy. Critical results were called by Dr. Thien Rand to Dr. Ayaan Blanchard On 8/7/2021 at 22:11. MRA NECK WO CONTRAST    Result Date: 8/7/2021  EXAMINATION: MRA OF THE NECK WITHOUT CONTRAST; MRI OF THE BRAIN WITHOUT CONTRAST; MRA OF THE HEAD WITHOUT CONTRAST 8/7/2021 8:28 pm; 8/7/2021 8:34 pm; 8/7/2021 8:27 pm: TECHNIQUE: Multiplanar multisequence MRA of the neck was performed without the administration of intravenous contrast. Stenosis of the internal carotid arteries measured using NASCET criteria.; Multiplanar multisequence MRI of the brain was performed without the administration of intravenous contrast.; MRA of the head was performed utilizing time-of-flight imaging with MIP images. No intravenous contrast was administered. COMPARISON: None. HISTORY: ORDERING SYSTEM PROVIDED HISTORY: stroke TECHNOLOGIST PROVIDED HISTORY: stroke Reason for Exam: stroke Additional signs and symptoms: patient unable to give history and unable to follow exam instructions due to mental status FINDINGS: MRI BRAIN: INTRACRANIAL STRUCTURES/VENTRICLES: Multifocal diffusion restriction abnormality related to acute ischemia are seen involving the right basal ganglia within the putamen, caudate nucleus and anterior limb of the right internal capsule, the posterior limb of the left internal capsule and lateral margin of the left thalamus, the posterior commissure of the corpus callosum, and left greater than right frontal parietal paramedian subcortical and deep white matter . Can fluent increased T2/FLAIR signal is noted within the cerebral white matter consistent with severe microvascular ischemic change. No mass effect or midline shift. No evidence of an acute intracranial hemorrhage. Mild diffuse decrease in cerebral volume is noted with corresponding prominence of the sulci and ventricles. The sellar/suprasellar regions appear unremarkable.   The normal signal voids within the major intracranial vessels appear maintained. ORBITS: The visualized portion of the orbits demonstrate no acute abnormality. SINUSES: The visualized paranasal sinuses and mastoid air cells are well aerated. BONES/SOFT TISSUES: The bone marrow signal intensity appears normal. The soft tissues demonstrate no acute abnormality. MRA NECK: AORTIC ARCH/ARCH VESSELS: No dissection or arterial injury. No significant stenosis of the brachiocephalic or subclavian arteries. CAROTID ARTERIES: No dissection, arterial injury, or hemodynamically significant stenosis by NASCET criteria. VERTEBRAL ARTERIES: No dissection, arterial injury, or significant stenosis. MRA HEAD: ANTERIOR CIRCULATION: Suspected focal high-grade stenosis involving the A1 segment of the left anterior cerebral artery is identified. Right middle cerebral artery focal moderate grade stenosis involving the M1 segment is present. Suspected multifocal moderate to high-grade stenosis involving the right middle cerebral artery M2 segment is seen. No further evidence of significant stenosis of the intracranial internal carotid, anterior cerebral, or middle cerebral arteries. POSTERIOR CIRCULATION: Bilateral posterior cerebral artery P2 segment suspected focal moderate to high-grade stenosis are noted. No further evidence of significant stenosis of the vertebral, basilar, or posterior cerebral arteries. 1. Multifocal acute ischemia involving the bilateral basal ganglia, as discussed above, posterior commissure of the corpus callosum, lateral margin of left thalamus and the left greater than right frontal parietal paramedian subcortical and deep white matter. 2. No evidence of associated hemorrhagic conversion. 3. Finding suggestive of embolic phenomenon. Recommend clinical correlation. 4. Suspected focal high-grade stenosis involving A1 segment of the left anterior cerebral artery. 5. Suspected right middle cerebral artery M1 segment focal moderate grade stenosis. 6. Suspected right middle cerebral artery M2 segment multifocal moderate to high-grade stenosis. 7. Bilateral posterior cerebral artery P2 segment suspected multifocal moderate to high-grade stenosis. 8. Grossly unremarkable MR angiogram of the neck. Note: MR angiographic evaluation of the neck is severely limited by patient motion related artifact. 9. Severe cerebral white matter chronic microvascular ischemic disease. 10. Mild diffuse cerebral atrophy. Critical results were called by Dr. Manisha Sinclair to Dr. Hansel Rinaldi On 8/7/2021 at 22:11. MRI BRAIN WO CONTRAST    Result Date: 8/7/2021  EXAMINATION: MRA OF THE NECK WITHOUT CONTRAST; MRI OF THE BRAIN WITHOUT CONTRAST; MRA OF THE HEAD WITHOUT CONTRAST 8/7/2021 8:28 pm; 8/7/2021 8:34 pm; 8/7/2021 8:27 pm: TECHNIQUE: Multiplanar multisequence MRA of the neck was performed without the administration of intravenous contrast. Stenosis of the internal carotid arteries measured using NASCET criteria.; Multiplanar multisequence MRI of the brain was performed without the administration of intravenous contrast.; MRA of the head was performed utilizing time-of-flight imaging with MIP images. No intravenous contrast was administered. COMPARISON: None. HISTORY: ORDERING SYSTEM PROVIDED HISTORY: stroke TECHNOLOGIST PROVIDED HISTORY: stroke Reason for Exam: stroke Additional signs and symptoms: patient unable to give history and unable to follow exam instructions due to mental status FINDINGS: MRI BRAIN: INTRACRANIAL STRUCTURES/VENTRICLES: Multifocal diffusion restriction abnormality related to acute ischemia are seen involving the right basal ganglia within the putamen, caudate nucleus and anterior limb of the right internal capsule, the posterior limb of the left internal capsule and lateral margin of the left thalamus, the posterior commissure of the corpus callosum, and left greater than right frontal parietal paramedian subcortical and deep white matter .     Can fluent increased T2/FLAIR signal is noted within the cerebral white matter consistent with severe microvascular ischemic change. No mass effect or midline shift. No evidence of an acute intracranial hemorrhage. Mild diffuse decrease in cerebral volume is noted with corresponding prominence of the sulci and ventricles. The sellar/suprasellar regions appear unremarkable. The normal signal voids within the major intracranial vessels appear maintained. ORBITS: The visualized portion of the orbits demonstrate no acute abnormality. SINUSES: The visualized paranasal sinuses and mastoid air cells are well aerated. BONES/SOFT TISSUES: The bone marrow signal intensity appears normal. The soft tissues demonstrate no acute abnormality. MRA NECK: AORTIC ARCH/ARCH VESSELS: No dissection or arterial injury. No significant stenosis of the brachiocephalic or subclavian arteries. CAROTID ARTERIES: No dissection, arterial injury, or hemodynamically significant stenosis by NASCET criteria. VERTEBRAL ARTERIES: No dissection, arterial injury, or significant stenosis. MRA HEAD: ANTERIOR CIRCULATION: Suspected focal high-grade stenosis involving the A1 segment of the left anterior cerebral artery is identified. Right middle cerebral artery focal moderate grade stenosis involving the M1 segment is present. Suspected multifocal moderate to high-grade stenosis involving the right middle cerebral artery M2 segment is seen. No further evidence of significant stenosis of the intracranial internal carotid, anterior cerebral, or middle cerebral arteries. POSTERIOR CIRCULATION: Bilateral posterior cerebral artery P2 segment suspected focal moderate to high-grade stenosis are noted. No further evidence of significant stenosis of the vertebral, basilar, or posterior cerebral arteries.      1. Multifocal acute ischemia involving the bilateral basal ganglia, as discussed above, posterior commissure of the corpus callosum, lateral margin of left thalamus and the left greater than right frontal parietal paramedian subcortical and deep white matter. 2. No evidence of associated hemorrhagic conversion. 3. Finding suggestive of embolic phenomenon. Recommend clinical correlation. 4. Suspected focal high-grade stenosis involving A1 segment of the left anterior cerebral artery. 5. Suspected right middle cerebral artery M1 segment focal moderate grade stenosis. 6. Suspected right middle cerebral artery M2 segment multifocal moderate to high-grade stenosis. 7. Bilateral posterior cerebral artery P2 segment suspected multifocal moderate to high-grade stenosis. 8. Grossly unremarkable MR angiogram of the neck. Note: MR angiographic evaluation of the neck is severely limited by patient motion related artifact. 9. Severe cerebral white matter chronic microvascular ischemic disease. 10. Mild diffuse cerebral atrophy. Critical results were called by Dr. Kevin Trivedi to Dr. Minerva Paredes On 8/7/2021 at 22:11. Assessment :      Primary Problem  Altered mental status    There are no active hospital problems to display for this patient.       Plan:     Patient status Admit as inpatient in the  Progressive Unit/Step down    Altered mental status, secondary to metabolic encephalopathy versus pneumonia/sepsis  -Per family/nursing, patient is somewhat altered at baseline  -Patient high risk for aspiration due to history of multiple strokes and altered mental status  -Pending mycoplasma, strep pneumo, Legionella, blood culture x2, urinalysis  -Low-grade fevers over past few days, intermittently  -LFTs elevated: Alkaline phosphatase 227, ALT 94, AST 74, consider ammonia level if infectious workup negative  -CXR showed no acute pulmonary process, procalcitonin 0.26, lactic acid 2.0  -Treat empirically for aspiration pneumonia with IV Unasyn    History of multiple CVA  -Aspirin 81mg, rosuvastatin 40mg and Plavix 75mg  -Neurology on board    Protein calorie malnutrition  -Albumin 2.9, total protein 6.1  -Patient receiving PEG tube feeds  -Dietary on board    CELESTINO on CKD, likely due to poor oral intake, improving  -Creatinine elevated slightly above baseline (1.4-1.5) at 1.86 on 8/7    Diabetes mellitus  -Low dose sliding scale insulin  -Continue to monitor blood glucose levels   -Hypoglycemia protocol    Essential Hypertension  -Cozaar 50mg daily  -Hydralazine 10mg TID    DVT ppx: Heparin 5,000 units SubQ TID  GI ppx: famotidine 20mg daily     Consultations:   IP CONSULT TO GENERAL SURGERY  IP CONSULT TO INTERNAL MEDICINE  IP CONSULT TO ONCOLOGY  IP CONSULT TO PALLIATIVE CARE  IP CONSULT TO NEUROLOGY  IP CONSULT TO DIETITIAN    Patient is admitted as inpatient status because of co-morbiditieslisted above, severity of signs and symptoms as outlined, requirement for current medical therapies and most importantly because of direct risk to patient if care not provided in a hospital setting. Elizabeth Barboza MD  8/8/2021  3:53 PM    Attestation and add on       I have discussed the care of Srini Pyle , including pertinent history and exam findings,      8/9/21    with the resident. I have seen and examined the patient and the key elements of all parts of the encounter have been performed by me . I agree with the assessment, plan and orders as documented by the resident. Principal Problem:    Altered mental status  Active Problems:    Severe malnutrition (HCC)    Encephalopathy    Cerebral multi-infarct state  Resolved Problems:    * No resolved hospital problems. *         ---- ;     MD LISSET Titus [address], [address] [place] [address].    Phone [phone]   Fax: [phone]  Answering Service: [phone]

## 2021-08-08 NOTE — PROGRESS NOTES
Active problem Encephalopathy . Acute cerebral multi infarct state  . Recent left cerebral infarction with right hemiparesis . Intracranial atherosclerosis. The condition is MRI of Head with acute infarctions right cerebellar , right lentiform nucleus , left periventricular along with left RAHEEM with extensive bilateral chronic periventricular shronic small vessel ischemia . MRA of Head and neck high grade stenosis anuja tA1 , moderate stenosis right M1 , moderate to high grade stenosis right M2 .Multifocal bilateral P2 moderate to high grade stenosis . Platelet count 868B . He is stuporous opening eyes to stimulation nonverbal positive visual threat witdrawing all limbs on stimulation right side less than left  . 76 yo bm with mental status changes . Patient was admitted to [facility] on July 13 after being found down with weakness right arm and and leg 3/5 strength with right facial droop and extensive dysarthria seen last well 2 days prior . MRI of Head with bilateral acute basal ganglia infarctions more prominent on left with extensive bilateral chronic periventricular small vessel ischemia . He does have history of prior cerebral infarction on aspirin and plavix although unclear if he was taking this medication . CTA head and neck with severe left V1 vertebral stenosis . Near complete occlusion right PCA . Severe proximal A3 stenosis . Moderate to severe proximal right M2 stenosis . Moderate to severe left M1 and M2 stenosis . Moderate to severe left P1, P2 stenosis . Patient was seen by neurointerventional service recommending plavix , aspirin 81 mg along with statin . Patient had platelet count of 36O on admission from ITP seen by hematology getting platelet transfusion and IV steroids with these now being 136 k . Patient was transferred to [facility] for [facility]  There has been improvement of right side weakness to 4/5 walking 2 feet with assist with hemiwalker in PT .  He developed agitation during the night seen by pscyhiatry placed on zyprexa 5 mg qd, prozac 40 mg qd . Patient failed swallow study getting PEG placement . He has been during day with fatigue sleepy nonverbal leading to neurology consultation . FU Head CT with new hypodensity right basal ganglia with extensive bilateral chronic periventricular small vesel ischemia . Creatinine 1.71 . Significant medications plavix 75 mg po qd, aspirin 81 mg qd per GT  , crestor 40 mg qd per GT   prozac 40 mg qd per GT  , zyprexa 5 mg qd  Per GT . Testing . Cardiac 2 D echo LVF EF 50-55 % . Grade 1 diastolic dysfunction . MRI of Head with bilateral acute basal ganglia infarctions more prominent on left with extensive bilateral chronic periventricular small vessel ischemia . CTA head and neck with severe left V1 vertebral stenosis . Near complete occlusion right PCA . Severe proximal A3 stenosis . Moderate to severe proximal right M2 stenosis . Moderate to severe left M1 and M2 stenosis . Moderate to severe left P1, P2 stenosis. FU Head CT with new hypodensity right basal ganglia with extensive bilateral chronic periventricular small vessel ischemia . MRI of Head with acute infarctions right cerebellar , right lentiform nucleus , left periventricular along with left RAHEEM with extensive bilateral chronic periventricular chronic small vessel ischemia .  MRA of Head and neck high grade stenosi left A1 , moderate stenosis right M1 , moderate to high grade stenosis right M2 .Multifocal bilateral P2 moderate to high grade stenosis         Past Medical History:   Diagnosis Date    Centrilobular emphysema ([address]     COPD (chronic obstructive pulmonary disease) ([address]     Depression     Diabetes mellitus ([address]     pt refuses to take previously prescribed metformin (states PCP aware)    Former smoker     Hyperlipidemia     on 4/27/18 pt states \"I stopped taking that about a year ago\"    Hypertension     Mixed restrictive and obstructive lung disease ([address]     Need for pneumococcal vaccine     Personal history of tobacco use        Past Surgical History:   Procedure Laterality Date    CARDIAC SURGERY  2015    1 stent    NECK SURGERY      TOE AMPUTATION Right greater    UPPER GASTROINTESTINAL ENDOSCOPY N/A 8/3/2021    EGD  PEG TUBE INSERTION performed by Sabina Skinner MD at [address] [place] [address]       No family history on file. Social History     Socioeconomic History    Marital status:      Spouse name: Not on file    Number of children: Not on file    Years of education: Not on file    Highest education level: Not on file   Occupational History    Not on file   Tobacco Use    Smoking status: Former Smoker     Packs/day: 0.75     Years: 56.00     Pack years: 42.00     Start date: 1957     Quit date: 2013     Years since quittin.0    Smokeless tobacco: Never Used   Vaping Use    Vaping Use: Never used   Substance and Sexual Activity    Alcohol use: No    Drug use: No    Sexual activity: Not on file   Other Topics Concern    Not on file   Social History Narrative    Not on file     Social Determinants of Health     Financial Resource Strain:     Difficulty of Paying Living Expenses:    Food Insecurity:     Worried About [address] in the Last Year:     [address] [facility] [address] in the Last Year:    Transportation Needs:     Lack of Transportation (Medical):      Lack of Transportation (Non-Medical):    Physical Activity:     Days of Exercise per Week:     Minutes of Exercise per Session:    Stress:     Feeling of Stress :    Social Connections:     Frequency of Communication with Friends and Family:     Frequency of Social Gatherings with Friends and Family:     Attends Christianity Services:     Active Member of Clubs or Organizations:     Attends Club or Organization Meetings:     Marital Status:    Intimate Partner Violence:     Fear of Current or Ex-Partner:     Emotionally Abused:     Physically Abused:     Sexually Abused: Current Facility-Administered Medications   Medication Dose Route Frequency Provider Last Rate Last Admin    acetaminophen (TYLENOL) tablet 650 mg  650 mg Oral Q4H PRN Denise Tatum MD        bisacodyl (DULCOLAX) suppository 10 mg  10 mg Rectal Daily PRN Denise Tatum MD        polyethylene glycol [facility]) packet 17 g  17 g Per G Tube Daily PRN Denise Tatum MD        [facility]) tablet 17.2 mg  2 tablet Oral Daily PRN Denise Tatum MD        albuterol sulfate  (90 Base) MCG/ACT inhaler 2 puff  2 puff Inhalation Q6H PRN Denise Tatum MD        [START ON 8/9/2021] aspirin chewable tablet 81 mg  81 mg PEG Tube Daily Denise Tatum MD        [START ON 8/9/2021] clopidogrel (PLAVIX) tablet 75 mg  75 mg PEG Tube Daily Denise Tatum MD        dextrose 5 % solution  100 mL/hr Intravenous PRN Denise Tatum MD        dextrose 50 % IV solution  12.5 g Intravenous PRN Denise Tatum MD        [START ON 8/9/2021] famotidine (PEPCID) tablet 20 mg  20 mg PEG Tube Daily MD Delma Roy ON 8/9/2021] finasteride (PROSCAR) tablet 5 mg  5 mg PEG Tube Daily Denise Tatum MD        [START ON 8/9/2021] FLUoxetine (PROZAC) 20 MG/5ML solution 40 mg  40 mg PEG Tube Daily Denise Tatum MD        glucagon (rDNA) injection 1 mg  1 mg Intramuscular PRN Denise Tatum MD        glucose (GLUTOSE) 40 % oral gel 15 g  15 g Oral PRN Denise Tatum MD        heparin (porcine) injection 5,000 Units  5,000 Units Subcutaneous 3 times per day Denise Tatum MD   5,000 Units at 08/08/21 1410    hydrALAZINE (APRESOLINE) tablet 10 mg  10 mg Oral 3 times per day Denise Tatum MD        insulin lispro (HUMALOG) injection vial 0-6 Units  0-6 Units Subcutaneous 4 times per day Denise Tatum MD   2 Units at 08/08/21 1410    isosorbide dinitrate (ISORDIL) tablet 10 mg  10 mg PEG Tube TID Monica Black MD   10 mg at 08/08/21 1543    [START ON 8/9/2021] losartan (COZAAR) tablet 50 mg  50 mg Oral Daily oMnica Black MD        melatonin tablet 6 mg  6 mg PEG Tube Nightly Monica Black MD        OLANZapine [facility]) tablet 5 mg  5 mg PEG Tube Nightly Monica Black MD        rosuvastatin (CRESTOR) tablet 40 mg  40 mg PEG Tube Nightly Monica Black MD        tiotropium (SPIRIVA RESPIMAT) 2.5 MCG/ACT inhaler 2 puff  2 puff Inhalation Lunch Monica Black MD   2 puff at 08/08/21 1327    ampicillin-sulbactam (UNASYN) 1500 mg IVPB minibag  1,500 mg Intravenous Q6H Pérez Willard MD           No Known Allergies    ROS:   Constitutional                  Negative for fever and chills   HEENT                            Negative for ear discharge, ear pain, nosebleed  Eyes                                Negative for photophobia, pain and discharge  Respiratory                      Negative for hemoptysis and sputum  Cardiovascular                Negative for orthopnea, claudication and PND  Gastrointestinal               Negative for abdominal pain, diarrhea, blood in stool  Musculoskeletal               Negative for joint pain, negative for myalgia  Skin                                 Negative for rash or itching  Endo/heme/allergies       Negative for polydipsia, environmental allergy  Psychiatric                       Negative for suicidal ideation. Patient is not anxious    Vitals:    08/08/21 1515   BP: (!) 114/50   Pulse: 73   Resp: 18   Temp: 99.3 °F (37.4 °C)   SpO2: 93%     Admission weight:      Neurological Examination  Constitutional .General exam well groomed   Head/ Ears /Nose/Throat/external ear . Normal exam  Neck and thyroid . Normal size. No bruits  Respiratory . Breathsounds clear bilaterally  Cardiovascular:  Auscultation of heart with regular rate and rhythm   Musculoskeletal. Muscle tone

## 2021-08-08 NOTE — FLOWSHEET NOTE
08/08/21 1036   Encounter Summary   Services provided to: Patient   Referral/Consult From: Palliative Care   Continue Visiting   (8/8/21)   Complexity of Encounter Low   Length of Encounter 15 minutes   Spiritual/Gnosticism   Type Spiritual support   Assessment Sleeping   Intervention Prayer

## 2021-08-08 NOTE — PROGRESS NOTES
Physical Medicine & Rehabilitation  Progress Note      Subjective: Fahad Storm is a 77 y.o. male with ischemic CVA left PCA, RAHEEM, and MCA with right dominant hemiparesis. Pt is not answering questions and does not open eyes during evaluation. Tube feeds increasing slowly to goal rate. ROS:  Unable to assess    Rehabilitation:       PT:       No change in pt's status from yesterday. Pt remains non responsive. Dr Tequila Calero recommended palliative care and discuss further options for patient.  Pt's sister Awa Brenner states she can come in on Monday to discuss palliative care and end of life care, including code status.      Orientation Level: Unable to assess (pt nonresponsive)  Restrictions/Precautions  Restrictions/Precautions: Up as Tolerated;General Precautions; Fall Risk;Swallowing - Thickened Liquids (Mildly thick (Nectar thick); PEG tube/continuous feeding)  Required Braces or Orthoses?: No  Position Activity Restriction  Other position/activity restrictions: Up w/assistance, RU/LE Weakness. MRI BRAIN- Acute infarct in the left and right basal ganglia greater on the left. OT    Patient unable to participate   -   Pt not able to be roused this date for AM or PM therapy. Wrtier provided max verbal and tactile cuing with no response. SPEECH:    Unable to awaken pt. At 1000 and at 1300 for treatment on this date.   Pt. Remained unresponsive with max verbal and tactile cues.         Current Medications:   Current Facility-Administered Medications: OLANZapine (ZYPREXA) tablet 5 mg, 5 mg, PEG Tube, Nightly  insulin lispro (HUMALOG) injection vial 0-6 Units, 0-6 Units, Subcutaneous, 4 times per day  losartan (COZAAR) tablet 50 mg, 50 mg, Oral, Daily  aspirin chewable tablet 81 mg, 81 mg, PEG Tube, Daily  clopidogrel (PLAVIX) tablet 75 mg, 75 mg, PEG Tube, Daily  heparin (porcine) injection 5,000 Units, 5,000 Units, Subcutaneous, 3 times per day  polyethylene glycol (GLYCOLAX) packet 17 g, 17 g, Per G Tube, Daily PRN  finasteride (PROSCAR) tablet 5 mg, 5 mg, PEG Tube, Daily  famotidine (PEPCID) tablet 20 mg, 20 mg, PEG Tube, Daily  FLUoxetine (PROZAC) 20 MG/5ML solution 40 mg, 40 mg, PEG Tube, Daily  melatonin tablet 6 mg, 6 mg, PEG Tube, Nightly  rosuvastatin (CRESTOR) tablet 40 mg, 40 mg, PEG Tube, Nightly  isosorbide dinitrate (ISORDIL) tablet 10 mg, 10 mg, PEG Tube, TID  tiotropium (SPIRIVA RESPIMAT) 2.5 MCG/ACT inhaler 2 puff, 2 puff, Inhalation, Lunch  albuterol sulfate  (90 Base) MCG/ACT inhaler 2 puff, 2 puff, Inhalation, Q6H PRN  [Held by provider] hydrALAZINE (APRESOLINE) tablet 10 mg, 10 mg, Oral, 3 times per day  acetaminophen (TYLENOL) tablet 650 mg, 650 mg, Oral, Q4H PRN  senna (SENOKOT) tablet 17.2 mg, 2 tablet, Oral, Daily PRN  bisacodyl (DULCOLAX) suppository 10 mg, 10 mg, Rectal, Daily PRN  glucose (GLUTOSE) 40 % oral gel 15 g, 15 g, Oral, PRN  dextrose 50 % IV solution, 12.5 g, Intravenous, PRN  glucagon (rDNA) injection 1 mg, 1 mg, Intramuscular, PRN  dextrose 5 % solution, 100 mL/hr, Intravenous, PRN      Objective:  /64   Pulse 84   Temp 98.4 °F (36.9 °C)   Resp 20   Ht 5' 10\" (1.778 m)   Wt 198 lb (89.8 kg)   SpO2 94%   BMI 28.41 kg/m²       GEN: Well developed, no acute distress  HEENT: NCAT. Eyes closed during evaluation. Mucous membranes pink and moist.  RESP:  Normal breath sounds with no wheezes, rales, or rhonchi. Respirations WNL and unlabored. CV: Regular rate and rhythm. No murmurs, rubs, or gallops. ABD: Soft, non-distended, BS+ and equal.  NEURO:  Eyes closed throughout evaluation. Not answering questions. Appears to have delayed processing. Spasticity present in right upper limb. MSK:  Muscle bulk is normal bilaterally. Minimal movement noted in all limbs. LIMBS: No edema in bilateral lower limbs. SKIN: Warm and dry with good turgor. PSYCH:  Flat affect.     Diagnostics:     CBC:   Recent Labs     08/05/21  1244 08/07/21  0638   WBC 8.3 9.0   RBC 4.68 4.74   HGB 11.7* 11.6*   HCT 36.4* 37.3*   MCV 77.9* 78.8*   RDW 18.1* 18.2*    136*     BMP:   Recent Labs     08/06/21  0852 08/07/21  0812   * 142   K 4.2 4.2   * 109*   CO2 22 22   PHOS  --  2.7   BUN 23 24*   CREATININE 1.86* 1.71*   GLUCOSE 183* 200*     BNP: No results for input(s): BNP in the last 72 hours. PT/INR:   No results for input(s): PROTIME, INR in the last 72 hours. APTT: No results for input(s): APTT in the last 72 hours. CARDIAC ENZYMES: No results for input(s): CKMB, CKMBINDEX, TROPONINT in the last 72 hours. Invalid input(s): CKTOTAL;3  FASTING LIPID PANEL:  Lab Results   Component Value Date    CHOL 186 07/14/2021    HDL 42 07/14/2021    TRIG 59 08/05/2021     LIVER PROFILE:   Recent Labs     08/06/21  0852 08/07/21  0812   AST 63* 74*   ALT 83* 94*   BILIDIR 0.14 0.12   BILITOT 0.32 0.26*   ALKPHOS 237* 227*        Imaging:    MRI  Brain 8/7    INTRACRANIAL STRUCTURES/VENTRICLES: Multifocal diffusion restriction   abnormality related to acute ischemia are seen involving the right basal   ganglia within the putamen, caudate nucleus and anterior limb of the right   internal capsule, the posterior limb of the left internal capsule and lateral   margin of the left thalamus, the posterior commissure of the corpus callosum,   and left greater than right frontal parietal paramedian subcortical and deep   white matter .    Can fluent increased T2/FLAIR signal is noted within the   cerebral white matter consistent with severe microvascular ischemic change. No mass effect or midline shift.  No evidence of an acute intracranial   hemorrhage.  Mild diffuse decrease in cerebral volume is noted with   corresponding prominence of the sulci and ventricles.  The sellar/suprasellar   regions appear unremarkable.  The normal signal voids within the major   intracranial vessels appear maintained.       ORBITS: The visualized portion of the orbits demonstrate no acute abnormality.     SINUSES: The visualized paranasal sinuses and mastoid air cells are well   aerated.       BONES/SOFT TISSUES: The bone marrow signal intensity appears normal. The soft   tissues demonstrate no acute abnormality.               MRA head 8/7    Impression   1. Multifocal acute ischemia involving the bilateral basal ganglia, as   discussed above, posterior commissure of the corpus callosum, lateral margin   of left thalamus and the left greater than right frontal parietal paramedian   subcortical and deep white matter. 2. No evidence of associated hemorrhagic conversion. 3. Finding suggestive of embolic phenomenon.  Recommend clinical correlation. 4. Suspected focal high-grade stenosis involving A1 segment of the left   anterior cerebral artery. 5. Suspected right middle cerebral artery M1 segment focal moderate grade   stenosis. 6. Suspected right middle cerebral artery M2 segment multifocal moderate to   high-grade stenosis. 7. Bilateral posterior cerebral artery P2 segment suspected multifocal   moderate to high-grade stenosis. 8. Grossly unremarkable MR angiogram of the neck.  Note: MR angiographic   evaluation of the neck is severely limited by patient motion related artifact. 9. Severe cerebral white matter chronic microvascular ischemic disease. 10. Mild diffuse cerebral atrophy. Critical results were called by Dr. Kevin Trivedi to Dr. Minerva Paredes On   8/7/2021 at 22:11. Impression/Plan:   Impaired ADLs, gait, and mobility due to:    1. Ischemic CVA with Right dominant hemiparesis: PT/OT for gait, mobility, strengthening, endurance, ADLs, and self care. On ASA, plavix, Crestor. Can consider neurostimulant medication now that PEG tube is in place - discussed trialing amantadine with sister on 8/6 - if neurology does not have any further recommendations, will plan to start this medication.   Placed order for repeat CT head to evaluate for any change on 7/29 - patient initially refused, but imaging done 8/4 with no changes from previous study. Neurology consulted due to increased lethargy - ordered repeat CT head on 8/6 - results as above; will follow up on any additional recommendations. 2. Agitation: Resolved. Has not required seroquel since 7/23. Has no sedating medicines on board to be causing drowsiness. Borderline QTc. Telesitter added 7/28.  1:1 re-added 8/3 due to new PEG tube placement, discontinued 8/6. Will continue telesitter for now. 3. Dehydration, high risk for malnutrition:  Secondary to patient refusing medications and meals. Switched daily medications from morning to noon on 7/29 to see if patient would take his medications later in the day - was not successful. Patient was taking some medications at night - switched aspirin and plavix to nightly on 7/29 but he did not take them. Psych consult ordered after discussion with IM - increased prozac 7/28, changed to liquid formulation and added olanzapine on 7/30 (olanzapine was later discontinued as this medication can be sedating). Discussed with IM - consult placed on 7/30 for general surgery to evaluate for possible PEG tube placement, as patient has not been maintaining nutrition, hydration, or medication regimen. Discussed PEG tube with family as well, as patient does not have decision-making capacity at this time. PEG tube placed 8/3 with sister's consent. Medications resumed via PEG on 8/3, tube feeds started 8/4 per dietician recommendations - increasing rate slowly to goal.  Will monitor labs for any signs of refeeding syndrome. Free water flushes increased 8/5.  4. CELESTINO:  Secondary to dehydration. Creatinine 1.86 on 8/6 (baseline creatinine appears to be around 1.2-1.5). Free water flushes increased 8/5, as above. 5. Elevated LFTs:  AST, ALT, alk phos elevated but stable - has had elevation of these values recently. Will monitor. 6. Sinusitis: Resolved. Was on augmentin  7.  Insomnia: Scheduled melatonin started on 7/25  8. Thrombocytopenia: Hematology following. Was on prednisone - notified hematology that patient was refusing medications - okay for him to remain off of steroids at this time and monitor platelets. Platelets overall improved (158 on 8/5), OK for DVT prophylaxis. 9. Anemia:  Hemoglobin 11.7 on 8/5, stable. Will monitor. 10. Leukocytosis:  Resolved. Attributed to prednisone. Will monitor. 11. HTN/CAD: On hydralazine (currently on hold), Imdur - switched to isordil on 8/3, losartan - IM resumed this medication at a lower dose on 8/4  12. DM II: On humalog sliding scale  13. COPD:  On tiotropium. Has albuterol prn. 14. Restless Leg Syndrome: On ropinirole - discontinued for now in order to minimize medications. Will monitor. 15. Depression: On fluoxetine. Psych consult and recommendations as above. Starting olanzapine nightly on 8/6 - will need to discuss with psychiatry if patient is started on amantadine. 16. GERD: On famotidine  17. BPH: On finasteride, tamsulosin - discontinued as this medication cannot be crushed  18. Thyroid nodule: For outpatient monitoring  19. Bowel Management: Miralax daily prn, Senokot prn, Dulcolax prn  20. Internal medicine for medical management  21. DVT prophylaxis:  On heparin  22. Discussed recommendation for discharge to SNF with patient's family along with  on 7/30. Family provided a few choices but patient was not accepted at these facilities.  reaching out to additional SNFs but patient has not yet been accepted to a facility. As patient has had limited participation in therapies, will plan to discharge him to SNF as soon as it is safe. Neurology called after pt is seen to transfer patient to acute care  For medical mgt and changes in the patient's overall status.     Electronically signed by Jayy Pedersen MD on 8/8/2021 at 10:41 AM

## 2021-08-08 NOTE — PROGRESS NOTES
Patients family notified of transfer to medical unit. Patient transported to PCU 2099, via hospital bed.

## 2021-08-08 NOTE — PROGRESS NOTES
Physical Therapy          Physical Therapy Cancel Note      DATE: 2021    NAME: Diandra Brito  MRN: [id number]   : 1945      Patient not seen this date for Physical Therapy due to: No change in pt's status from yesterday. Pt remains non responsive. Dr Darcy Dobbs recommended palliative care and discuss further options for patient. Pt's sister Dacia Mckeon states she can come in on Monday to discuss palliative care and end of life care, including code status.        Electronically signed by Young Alford PTA on 2021 at 8:41 AM

## 2021-08-08 NOTE — PROGRESS NOTES
Dr Alexia Naranjo notified of consult by phone at 6:51pm  To make pt npo at [facility]

## 2021-08-08 NOTE — PROGRESS NOTES
[address]   OCCUPATIONAL THERAPY MISSED TREATMENT NOTE   ACUTE REHAB  Date: 21  Patient Name: Harvey Quezada       Room: 4756/0386-94  MRN: [id number]   Account #: [de-identified]    : 1945  (72 y.o.)  Gender: male   Referring Practitioner: Dr. Jt Farah  Diagnosis: Ischemic CVA with R dominant hemiplegia           REASON FOR MISSED TREATMENT: Pt being transferred to Progressive Care Unit due to decline in medical and rehab status.          [address], [place]

## 2021-08-08 NOTE — PLAN OF CARE
Problem: Daily Care:  Goal: Daily care needs are met  Description: Daily care needs are met  Outcome: Met This Shift   All daily cares have been met for this pt, so far this shift. See Daily Cares flow sheet for added information. Will continue to monitor for additional needs.        Problem: Pain:  Goal: Patient's pain/discomfort is manageable  Description: Patient's pain/discomfort is manageable  Outcome: Met This Shift   Pain assessment completed so far this shift. Pt. able to rest without the use of pain medication. Patient denies any pain so far this shift. Will continue to monitor.         Problem: Skin Integrity:  Goal: Skin integrity will stabilize  Description: Skin integrity will stabilize  Outcome: Met This Shift   Skin assessment completed this shift. Nutrition and Hydration status assessed with adequate intake. Cesar Score as charted. Bilateral heels remain elevated on pillows throughout the shift. Patient able to reposition self for comfort and to prevent breakdown. Patient verbalizes understanding of pressure ulcer prevention measures. Skin integrity maintained. No new skin breakdown noted. Skin to high risk pressure areas including coccyx and heels are clear.     Urmila / Incontinence care provided as needed throughout the shift. Aloe Vesta Moisture Barrier ointment applied to buttocks as a preventative measure.        Problem: Falls - Risk of:  Goal: Will remain free from falls  Description: Will remain free from falls  Outcome: Met This Shift   No falls or injuries sustained at this time. No attempts to get out of bed without nursing assistance. Call light within reach and pt. uses appropriately for assistance. Siderails up x 2. Nonskid footwear remains on. Bed in low and locked position. Hourly nursing rounds made. Pt. Alert and oriented, aware of limitations, and exhibits good safety judgement. Pt. uses assistive devices appropriately.  Pt. understands individual fall risk factors.     Pt. reoriented to surroundings and reminded to use call light with each nurse/patient interaction.     Pt. room located close to nurse's station.      Bed alarm / Personal alarm remains engaged throughout the shift as a precaution.     Tele-sitter also in pts room for added safety.

## 2021-08-09 PROBLEM — E43 SEVERE MALNUTRITION (HCC): Status: ACTIVE | Noted: 2021-01-01

## 2021-08-09 PROBLEM — R41.82 ALTERED MENTAL STATUS: Status: ACTIVE | Noted: 2021-01-01

## 2021-08-09 NOTE — PLAN OF CARE
Problem: Non-Violent Restraints  Goal: Removal from restraints as soon as assessed to be safe  8/9/2021 1737 by Khalida Valadez RN  Outcome: Ongoing  8/9/2021 0344 by Roopa Brown RN  Outcome: Ongoing  Note: No restraints were used on pt.    Goal: No harm/injury to patient while restraints in use  8/9/2021 1737 by Khalida Valadez RN  Outcome: Ongoing  8/9/2021 0344 by Roopa Brown RN  Outcome: Ongoing  Goal: Patient's dignity will be maintained  8/9/2021 1737 by Khalida Valadez RN  Outcome: Ongoing  8/9/2021 0344 by Roopa Brown RN  Outcome: Ongoing     Problem: Infection:  Goal: Will remain free from infection  Description: Will remain free from infection  8/9/2021 1737 by Khalida Valadez RN  Outcome: Ongoing  8/9/2021 0344 by Roopa Brown RN  Outcome: Ongoing     Problem: Safety:  Goal: Free from accidental physical injury  Description: Free from accidental physical injury  8/9/2021 1737 by Khalida Valadez RN  Outcome: Ongoing  8/9/2021 0344 by Roopa Brown RN  Outcome: Ongoing  Note: Pt free from any physical injury  Goal: Free from intentional harm  Description: Free from intentional harm  8/9/2021 1737 by Khalida Valadez RN  Outcome: Ongoing  8/9/2021 0344 by Roopa Brown RN  Outcome: Ongoing     Problem: Daily Care:  Goal: Daily care needs are met  Description: Daily care needs are met  8/9/2021 1737 by Khalida Valadez RN  Outcome: Ongoing  8/9/2021 0344 by Roopa Brown RN  Outcome: Ongoing     Problem: Pain:  Goal: Patient's pain/discomfort is manageable  Description: Patient's pain/discomfort is manageable  8/9/2021 1737 by Khalida Valadez RN  Outcome: Ongoing  8/9/2021 0344 by Roopa Brown RN  Outcome: Ongoing     Problem: Skin Integrity:  Goal: Skin integrity will stabilize  Description: Skin integrity will stabilize  8/9/2021 1737 by Khaldia Valadez RN  Outcome: Ongoing  8/9/2021 0344 by Roopa Brown RN  Outcome: Ongoing     Problem: Discharge Planning:  Goal: Patients continuum of care needs are met  Description: Patients continuum of care needs are met  8/9/2021 1737 by Kedar Moseley RN  Outcome: Ongoing  8/9/2021 0344 by Tiara Esposito RN  Outcome: Ongoing     Problem: Nutrition  Goal: Optimal nutrition therapy  Outcome: Ongoing

## 2021-08-09 NOTE — FLOWSHEET NOTE
08/09/21 1051   Provider Notification   Reason for Communication Evaluate   Provider Name Pharmacy   Provider Notification Other (Comment)   Method of Communication Call   Recommendation about calcium gluconate admin obtained.

## 2021-08-09 NOTE — CONSULTS
Surgical History:   has a past surgical history that includes Neck surgery; Toe amputation (Right, greater); Cardiac surgery (07/2015); and Upper gastrointestinal endoscopy (N/A, 8/3/2021). Medications:    Prior to Admission medications    Medication Sig Start Date End Date Taking?  Authorizing Provider   Heparin Sodium, Porcine, (HEPARIN, PORCINE,) 5000 UNIT/ML injection Inject 1 mL into the skin every 8 hours 7/20/21   Nikki Garcia MD   QUEtiapine (SEROQUEL) 25 MG tablet Take 1 tablet by mouth nightly as needed for Agitation 7/20/21 7/25/21  Nikki Garcia MD   methylPREDNISolone sodium (SOLU-MEDROL) 40 MG injection Infuse 1 mL intravenously every 12 hours 7/20/21   Nikki Garcia MD   melatonin 3 MG TABS tablet Take 1 tablet by mouth nightly as needed (insomnia) 7/20/21   Nikki Garcia MD   atorvastatin (LIPITOR) 80 MG tablet Take 0.5 tablets by mouth daily (Pt takes one-half of an 80mg tab = 40mg) 7/16/21   Nikki Garcia MD   tiZANidine (ZANAFLEX) 2 MG tablet Take 1 tablet by mouth 4 times daily as needed (muscle spasms) 5/7/21   JOLIE Sheikh - CNP   naproxen (NAPROSYN) 500 MG tablet Take 1 tablet by mouth 2 times daily (with meals) 1/4/21   eJsika Gonzalez PA-C   ibuprofen (ADVIL;MOTRIN) 800 MG tablet Take 1 tablet by mouth every 8 hours as needed for Pain 6/2/20   Wolfgang King MD   lidocaine (LIDODERM) 5 % Place 1 patch onto the skin daily 12 hours on, 12 hours off. 6/2/20   Wolfgang King MD   metoprolol succinate (TOPROL XL) 50 MG extended release tablet Take 50 mg by mouth daily    Historical Provider, MD   tiotropium (SPIRIVA RESPIMAT) 2.5 MCG/ACT AERS inhaler Inhale 2 puffs into the lungs daily    Historical Provider, MD   carboxymethylcellulose 1 % ophthalmic solution Place 1 drop into both eyes 3 times daily as needed for Dry Eyes     Historical Provider, MD   ketotifen (ZADITOR) 0.025 % ophthalmic solution Place 1 drop into both eyes 2 times daily as needed (itchy eyes)     Historical Provider, MD   isosorbide mononitrate (IMDUR) 60 MG extended release tablet Take 30 mg by mouth daily Indications: 1/2 tablet (30mg)     Historical Provider, MD   finasteride (PROSCAR) 5 MG tablet Take 5 mg by mouth daily    Historical Provider, MD   tamsulosin (FLOMAX) 0.4 MG capsule Take 0.4 mg by mouth daily    Historical Provider, MD   fluticasone (FLONASE) 50 MCG/ACT nasal spray 1 spray by Nasal route 2 times daily  Patient taking differently: 1 spray by Nasal route 2 times daily as needed for Rhinitis  5/31/16   Francisco Connor DO   albuterol sulfate  (90 BASE) MCG/ACT inhaler Inhale 2 puffs into the lungs every 6 hours as needed for Wheezing    Historical Provider, MD   albuterol (PROVENTIL) (2.5 MG/3ML) 0.083% nebulizer solution Take 2.5 mg by nebulization every 6 hours as needed for Wheezing    Historical Provider, MD   aspirin 81 MG EC tablet Take 81 mg by mouth daily    Historical Provider, MD   losartan (COZAAR) 100 MG tablet Take 100 mg by mouth daily     Historical Provider, MD   nitroGLYCERIN (NITROSTAT) 0.4 MG SL tablet Place 0.4 mg under the tongue every 5 minutes as needed for Chest pain    Historical Provider, MD   FLUoxetine (PROZAC) 20 MG capsule Take 40 mg by mouth daily     Historical Provider, MD   furosemide (LASIX) 20 MG tablet Take 20 mg by mouth daily as needed (swelling)     Historical Provider, MD   clopidogrel (PLAVIX) 75 MG tablet Take 75 mg by mouth daily    Historical Provider, MD   rOPINIRole (REQUIP) 0.25 MG tablet Take 0.25 mg by mouth nightly as needed     Historical Provider, MD   budesonide-formoterol (SYMBICORT) 160-4.5 MCG/ACT AERO Inhale 2 puffs into the lungs 2 times daily.     Historical Provider, MD     Current Facility-Administered Medications   Medication Dose Route Frequency Provider Last Rate Last Admin    hydrALAZINE (APRESOLINE) injection 10 mg  10 mg Intravenous Q8H PRN Elizabeth Barboza MD        acetaminophen (TYLENOL) tablet 650 mg  650 mg Oral Q4H PRN Lenita Phoenix, MD        bisacodyl (DULCOLAX) suppository 10 mg  10 mg Rectal Daily PRN Lenita Phoenix, MD        polyethylene glycol Noni Maze) packet 17 g  17 g Per G Tube Daily PRN Lenita Phoenix, MD        [facility]) tablet 17.2 mg  2 tablet Oral Daily PRN Lenita Phoenix, MD        albuterol sulfate  (90 Base) MCG/ACT inhaler 2 puff  2 puff Inhalation Q6H PRN Lenita Phoenix, MD        aspirin chewable tablet 81 mg  81 mg PEG Tube Daily Lenita Phoenix, MD        dextrose 5 % solution  100 mL/hr Intravenous PRN Lenita Phoenix, MD        dextrose 50 % IV solution  12.5 g Intravenous PRN Lenita Phoenix, MD        famotidine (PEPCID) tablet 20 mg  20 mg PEG Tube Daily Lenita Phoenix, MD        finasteride (PROSCAR) tablet 5 mg  5 mg PEG Tube Daily Lenita Phoenix, MD        FLUoxetine (PROZAC) 20 MG/5ML solution 40 mg  40 mg PEG Tube Daily Lenita Phoenix, MD        glucagon (rDNA) injection 1 mg  1 mg Intramuscular PRN Lenita Phoenix, MD        glucose (GLUTOSE) 40 % oral gel 15 g  15 g Oral PRN Lenita Phoenix, MD        [facility] by provider] hydrALAZINE (APRESOLINE) tablet 10 mg  10 mg Oral 3 times per day Lenita Phoenix, MD   10 mg at 08/09/21 7857    insulin lispro (HUMALOG) injection vial 0-6 Units  0-6 Units Subcutaneous 4 times per day Lenita Phoenix, MD   2 Units at 08/08/21 1712    [Held by provider] isosorbide dinitrate (ISORDIL) tablet 10 mg  10 mg PEG Tube TID Lenita Phoenix, MD   10 mg at 08/08/21 1543    [Held by provider] losartan (COZAAR) tablet 50 mg  50 mg Oral Daily Lenita Phoenix, MD        melatonin tablet 6 mg  6 mg PEG Tube Nightly Lenita Phoenix, MD   6 mg at 08/08/21 2130    OLANZapine (ZYPREXA) tablet 5 mg  5 mg PEG Tube Nightly Lenita Phoenix, MD   5 mg at 08/08/21 3925  rosuvastatin (CRESTOR) tablet 40 mg  40 mg PEG Tube Nightly Riddhi Gamino MD   40 mg at 21 2130    tiotropium (SPIRIVA RESPIMAT) 2.5 MCG/ACT inhaler 2 puff  2 puff Inhalation Lunch Riddhi Gamino MD   2 puff at 21 1200    ampicillin-sulbactam (UNASYN) 1500 mg IVPB minibag  1,500 mg Intravenous Christiano Johnston MD   Stopped at 21 1835    heparin (porcine) injection 4,000 Units  4,000 Units Intravenous PRN Riddhi Gamino MD        heparin (porcine) injection 2,000 Units  2,000 Units Intravenous PRN Riddhi Gamino MD        heparin 25,000 units in dextrose 5% 250 mL (premix) infusion  10.9 Units/kg/hr Intravenous Continuous Riddhi Gamino MD 9 mL/hr at 21 0927 9.9 Units/kg/hr at 21 3285       Allergies:  Patient has no known allergies. Social History:   reports that he quit smoking about 8 years ago. He started smoking about 64 years ago. He has a 42.00 pack-year smoking history. He has never used smokeless tobacco. He reports that he does not drink alcohol and does not use drugs.      Family History: Unable to obtain due to altered mental status    REVIEW OF SYSTEMS:      Unable to obtain review of system due to patient being nonverbal    PHYSICAL EXAM:        /66   Pulse 79   Temp 98.4 °F (36.9 °C) (Axillary)   Resp 20   Ht 5' 10\" (1.778 m)   Wt 212 lb 1.3 oz (96.2 kg)   SpO2 94%   BMI 30.43 kg/m²    Temp (24hrs), Av.3 °F (36.8 °C), Min:98.1 °F (36.7 °C), Max:98.6 °F (37 °C)      General appearance -ill-appearing  Mental status -not alert or cooperative  Eyes -pupils reactive  Ears - bilateral TM's and external ear canals normal   Mouth - mucous membranes moist, pharynx normal without lesions   Neck - supple, no significant adenopathy   Lymphatics - no palpable lymphadenopathy, no hepatosplenomegaly   Chest -decreased breathing sounds  Heart - normal rate, regular rhythm, normal S1, S2, no murmurs  Abdomen - soft, nontender, nondistended, no masses or organomegaly   Neurological -patient is not alert awake oriented does not follow commands  Musculoskeletal - no joint tenderness, deformity or swelling   Extremities - peripheral pulses normal, no pedal edema, no clubbing or cyanosis   Skin - normal coloration and turgor, no rashes, no suspicious skin lesions noted ,      DATA:      Labs:     Results for orders placed or performed during the hospital encounter of 08/08/21   Culture, Blood 1    Specimen: Blood   Result Value Ref Range    Specimen Description . BLOOD  LEFT AC, NO VOLUMES LISTED     Special Requests NOT REPORTED     Culture NO GROWTH 1 DAY    Culture, Blood 1    Specimen: Blood   Result Value Ref Range    Specimen Description . BLOOD  LEFT AC, NO VOLUMES LISTED     Special Requests NOT REPORTED     Culture NO GROWTH 1 DAY    Urinalysis Reflex to Culture    Specimen: Urine, clean catch   Result Value Ref Range    Color, UA YELLOW YELLOW    Turbidity UA CLEAR CLEAR    Glucose, Ur NEGATIVE NEGATIVE    Bilirubin Urine NEGATIVE NEGATIVE    Ketones, Urine NEGATIVE NEGATIVE    Specific Gravity, UA 1.018 1.000 - 1.030    Urine Hgb NEGATIVE NEGATIVE    pH, UA 6.5 5.0 - 8.0    Protein, UA 1+ (A) NEGATIVE    Urobilinogen, Urine Normal Normal    Nitrite, Urine NEGATIVE NEGATIVE    Leukocyte Esterase, Urine NEGATIVE NEGATIVE    Urinalysis Comments NOT REPORTED    Procalcitonin   Result Value Ref Range    Procalcitonin 0.26 (H) <0.09 ng/mL   Mycoplasma Ab,IgM   Result Value Ref Range    Mycoplasma pneumo IgM 0.24 <0.91   Lactic Acid   Result Value Ref Range    Lactic Acid 2.0 0.5 - 2.2 mmol/L   CBC   Result Value Ref Range    WBC 11.4 (H) 3.5 - 11.0 k/uL    RBC 4.48 (L) 4.5 - 5.9 m/uL    Hemoglobin 10.7 (L) 13.5 - 17.5 g/dL    Hematocrit 35.2 (L) 41 - 53 %    MCV 78.7 (L) 80 - 100 fL    MCH 23.9 (L) 26 - 34 pg    MCHC 30.4 (L) 31 - 37 g/dL    RDW 17.9 (H) 11.5 - 14.9 %    Platelets 259 (L) 273 - 450 k/uL    MPV 10.9 6.0 - 12.0 fL NRBC Automated NOT REPORTED per 100 WBC   HEPATIC FUNCTION PANEL   Result Value Ref Range    Albumin 2.9 (L) 3.5 - 5.2 g/dL    Alkaline Phosphatase 184 (H) 40 - 129 U/L     (H) 5 - 41 U/L     (H) <40 U/L    Total Bilirubin 0.21 (L) 0.3 - 1.2 mg/dL    Bilirubin, Direct 0.10 <0.31 mg/dL    Bilirubin, Indirect 0.11 0.00 - 1.00 mg/dL    Total Protein 6.3 (L) 6.4 - 8.3 g/dL    Globulin NOT REPORTED 1.5 - 3.8 g/dL    Albumin/Globulin Ratio NOT REPORTED 1.0 - 2.5   CBC   Result Value Ref Range    WBC 14.4 (H) 3.5 - 11.0 k/uL    RBC 4.38 (L) 4.5 - 5.9 m/uL    Hemoglobin 10.7 (L) 13.5 - 17.5 g/dL    Hematocrit 34.6 (L) 41 - 53 %    MCV 78.9 (L) 80 - 100 fL    MCH 24.3 (L) 26 - 34 pg    MCHC 30.9 (L) 31 - 37 g/dL    RDW 17.8 (H) 11.5 - 14.9 %    Platelets 920 (L) 164 - 450 k/uL    MPV 10.8 6.0 - 12.0 fL    NRBC Automated NOT REPORTED per 100 WBC   APTT   Result Value Ref Range    PTT 45.7 (H) 24.0 - 36.0 sec   Protime-INR   Result Value Ref Range    Protime 14.7 (H) 11.8 - 14.6 sec    INR 1.1    Basic Metabolic Panel   Result Value Ref Range    Glucose 154 (H) 70 - 99 mg/dL    BUN 31 (H) 8 - 23 mg/dL    CREATININE 1.96 (H) 0.70 - 1.20 mg/dL    Bun/Cre Ratio NOT REPORTED 9 - 20    Calcium 9.2 8.6 - 10.4 mg/dL    Sodium 147 (H) 135 - 144 mmol/L    Potassium 5.8 (H) 3.7 - 5.3 mmol/L    Chloride 111 (H) 98 - 107 mmol/L    CO2 27 20 - 31 mmol/L    Anion Gap 9 9 - 17 mmol/L    GFR Non-African American 34 (L) >60 mL/min    GFR  41 (L) >60 mL/min    GFR Comment          GFR Staging NOT REPORTED    Magnesium   Result Value Ref Range    Magnesium 2.6 1.6 - 2.6 mg/dL   Phosphorus   Result Value Ref Range    Phosphorus 4.7 (H) 2.5 - 4.5 mg/dL   HEPARIN LEVEL/ANTI-XA   Result Value Ref Range    Anti-XA Unfrac Heparin 0.67 0.30 - 0.70 IU/L   HEPARIN LEVEL/ANTI-XA   Result Value Ref Range    Anti-XA Unfrac Heparin 0.76 (HH) 0.30 - 0.70 IU/L   HEPARIN LEVEL/ANTI-XA   Result Value Ref Range    Anti-XA Unfrac Heparin 0.55 0.30 - 0.70 IU/L   Basic Metabolic Panel w/ Reflex to MG   Result Value Ref Range    Glucose 147 (H) 70 - 99 mg/dL    BUN 28 (H) 8 - 23 mg/dL    CREATININE 1.79 (H) 0.70 - 1.20 mg/dL    Bun/Cre Ratio NOT REPORTED 9 - 20    Calcium 9.3 8.6 - 10.4 mg/dL    Sodium 145 (H) 135 - 144 mmol/L    Potassium 5.6 (H) 3.7 - 5.3 mmol/L    Chloride 110 (H) 98 - 107 mmol/L    CO2 26 20 - 31 mmol/L    Anion Gap 9 9 - 17 mmol/L    GFR Non-African American 37 (L) >60 mL/min    GFR  45 (L) >60 mL/min    GFR Comment          GFR Staging NOT REPORTED    BLOOD GAS, VENOUS   Result Value Ref Range    pH, David 7.409 7.320 - 7.420    pCO2, David 44.5 39.0 - 55.0    pO2, David 37.7 30 - 50    HCO3, Venous 28.2 24.0 - 30.0 mmol/L    Positive Base Excess, David 3.5 (H) 0.0 - 2.0 mmol/L    Negative Base Excess, David NOT REPORTED 0.0 - 2.0 mmol/L    O2 Sat, David 69.2 60.0 - 85.0 %    Total Hb NOT REPORTED 12.0 - 16.0 g/dl    Oxyhemoglobin NOT REPORTED 95.0 - 98.0 %    Carboxyhemoglobin 2.1 0 - 5 %    Methemoglobin 0.8 0.0 - 1.9 %    Pt Temp NOT REPORTED     pH, David, Temp Adj NOT REPORTED 7.320 - 7.420    pCO2, David, Temp Adj NOT REPORTED 39.0 - 55.0 mmHg    pO2, David, Temp Adj NOT REPORTED 30.0 - 50.0 mmHg    O2 Device/Flow/% NOT REPORTED     Respiratory Rate NOT REPORTED     Adelfo Test NOT REPORTED     Sample Site NOT REPORTED     Pt.  Position NOT REPORTED     Mode NOT REPORTED     Set Rate NOT REPORTED     Total Rate NOT REPORTED     VT NOT REPORTED     FIO2 NOT REPORTED     Peep/Cpap NOT REPORTED     PSV NOT REPORTED     Text for Respiratory NOT REPORTED     NOTIFICATION NOT REPORTED     NOTIFICATION TIME NOT REPORTED    CK   Result Value Ref Range    Total  39 - 308 U/L   Myoglobin   Result Value Ref Range    Myoglobin 280 (H) 28 - 72 ng/mL   Ammonia   Result Value Ref Range    Ammonia 18 16 - 60 umol/L   Microscopic Urinalysis   Result Value Ref Range    -          WBC, UA 2 TO 5 /HPF    RBC, UA 0 TO 2 /HPF    Casts UA NOT REPORTED /LPF    Crystals, UA NOT REPORTED None /HPF    Epithelial Cells UA 0 TO 2 /HPF    Renal Epithelial, UA NOT REPORTED 0 /HPF    Bacteria, UA FEW (A) None    Mucus, UA NOT REPORTED None    Trichomonas, UA NOT REPORTED None    Amorphous, UA 1+ (A) None    Other Observations UA NOT REPORTED NOT REQ.     Yeast, UA NOT REPORTED None   POC Glucose Fingerstick   Result Value Ref Range    POC Glucose 207 (H) 75 - 110 mg/dL   POC Glucose Fingerstick   Result Value Ref Range    POC Glucose 236 (H) 75 - 110 mg/dL   POC Glucose Fingerstick   Result Value Ref Range    POC Glucose 198 (H) 75 - 110 mg/dL   POC Glucose Fingerstick   Result Value Ref Range    POC Glucose 170 (H) 75 - 110 mg/dL   POC Glucose Fingerstick   Result Value Ref Range    POC Glucose 130 (H) 75 - 110 mg/dL   POC Glucose Fingerstick   Result Value Ref Range    POC Glucose 133 (H) 75 - 110 mg/dL   EKG 12 Lead   Result Value Ref Range    Ventricular Rate 54 BPM    Atrial Rate 54 BPM    P-R Interval 146 ms    QRS Duration 92 ms    Q-T Interval 426 ms    QTc Calculation (Bazett) 403 ms    P Axis 65 degrees    R Axis -35 degrees    T Axis 68 degrees         IMAGING DATA:    ECHO Complete 2D W Doppler W Color    Result Date: 7/15/2021  Transthoracic Echocardiography Report (TTE)  Patient Name SHELL      Date of Study               07/15/2021               YAYA DUBOIS   Date of      1945  Gender                      Male  Birth   Age          77 year(s)  Race                        Black   Room Number  0536        Height:                     70 inch, 177.8 cm   Corporate ID [id number]    Weight:                     210 pounds, 95.3 kg  #   Patient Acct [de-identified]   BSA:          2.13 m^2      BMI:       30.13  #                                                               kg/m^2   MR #         [id number]     Sonographer                 Saundra Olivia   Accession #  [id number]  Interpreting Physician      Yolande Vance   Fellow Referring Nurse                           Practitioner   Interpreting             Referring Physician         Norm Olvera MD  Fellow  Additional Comments Technically difficult study, patient supine unable to be positioned for better acoustic windows. Type of Study   TTE procedure:2D Echocardiogram, M-Mode, Doppler, Color Doppler. Procedure Date Date: 07/15/2021 Start: 08:29 AM Study Location: [facility] Technical Quality: Adequate visualization Indications:CVA. History / Tech. Comments: Procedure explained to patient. Echo completed in the Echo Lab. RN performed bubble study. PMHx: Former smoker, COPD Hypertension, Diabetes, Hyperlipidemia Coronary artery disease, s/p stents Patient Status: Inpatient Height: 70 inches Weight: 210 pounds BSA: 2.13 m^2 BMI: 30.13 kg/m^2 CONCLUSIONS Summary Technically difficult study. Overall global left ventricular systolic function is normal, calculated ejection fraction is 50-55% Grade I (mild) left ventricular diastolic dysfunction. Technically difficult visualization of the right ventricle, but it does appear to be normal in size. Negative bubble study, no obvious intracardiac shunt noted within technical limitations of this study. No significant valvular regurgitation or stenosis seen. No significant pericardial effusion is seen. Signature ----------------------------------------------------------------------------  Electronically signed by Makenna VasquezSonographer) on 07/15/2021 11:00 AM ---------------------------------------------------------------------------- ----------------------------------------------------------------------------  Electronically signed by Yolande Vance(Interpreting physician) on  07/15/2021 12:49 PM ---------------------------------------------------------------------------- FINDINGS Left Atrium Left atrium is normal in size. Inter-atrial septum is intact with no evidence for an atrial septal defect.  Negative bubble study, no shunt noted. Left Ventricle Left ventricle is normal in size, normal wall thickness, global left ventricular systolic function is low normal, calculated ejection fraction is 50%. Grade I (mild) left ventricular diastolic dysfunction. Right Atrium Right atrium is normal in size. Right Ventricle Technically difficult visualization of the right ventricle, but it does appear to be normal in size. Mitral Valve Normal mitral valve structure. No mitral stenosis. Trivial mitral regurgitation. Aortic Valve Aortic valve is trileaflet. No evidence of aortic insufficiency or stenosis. Tricuspid Valve Tricuspid valve was not well visualized. Trivial tricuspid regurgitation. Insignificant tricuspid regurgitation, unable to estimate RVSP. Pulmonic Valve Pulmonic valve not well visualized but Doppler velocities are normal. Trivial pulmonic insufficiency. Pericardial Effusion No significant pericardial effusion is seen. Miscellaneous Normal aortic root dimension. E/E' average = 17.35. IVC not well visualized.  M-mode / 2D Measurements & Calculations:   LVIDd:5.6 cm(3.7 - 5.6 cm)        Diastolic ZMVGV.05 ml  IVSd:0.9 cm(0.6 - 1.1 cm)         Systolic ZQHKHM:91.70 ml  LVPWd:0.8 cm(0.6 - 1.1 cm)        Aortic Root:3 cm(2.0 - 3.7 cm)                                    LA Dimension: 3.2 cm(1.9 - 4.0 cm)  Calculated LVEF (%): 50.46 %      LA volume/Index: 48.86 ml /23m^2                                    LVOT:2.1 cm                                    RVDd:3 cm   Mitral:                                 Aortic   Valve Area (P1/2-Time): 2.65 cm^2       Peak Velocity: 1.91 m/s  Peak E-Wave: 1.18 m/s                   Mean Velocity: 1.06 m/s  Peak A-Wave: 1.47 m/s                   Peak Gradient: 14.59 mmHg  E/A Ratio: 0.8                          Mean Gradient: 6 mmHg  Peak Gradient: 5.57 mmHg  Mean Gradient: 2 mmHg  Deceleration Time: 280 msec             Area (continuity): 2.83 cm^2  P1/2t: 83 msec                          AV VTI: 41.8 cm   Area (continuity): 2.2 cm^2  Mean Velocity: 0.62 m/s                                           Pulmonic:                                           Peak Velocity: 1.29 m/s                                          Peak Gradient: 6.66 mmHg  Diastology / Tissue Doppler Septal Wall E' velocity:0.06 m/s Septal Wall E/E':20.8 Lateral Wall E' velocity:0.08 m/s Lateral Wall E/E':13.9    CT HEAD WO CONTRAST    Result Date: 8/9/2021  EXAMINATION: CT OF THE HEAD WITHOUT CONTRAST  8/9/2021 2:04 pm TECHNIQUE: CT of the head was performed without the administration of intravenous contrast. Dose modulation, iterative reconstruction, and/or weight based adjustment of the mA/kV was utilized to reduce the radiation dose to as low as reasonably achievable. COMPARISON: MRI brain performed 08/07/2021. HISTORY: ORDERING SYSTEM PROVIDED HISTORY: stroke TECHNOLOGIST PROVIDED HISTORY: stroke Reason for Exam: stroke; ams Acuity: Unknown Type of Exam: Unknown Additional signs and symptoms: patient unable to stay still; turned head during exam FINDINGS: BRAIN/VENTRICLES: There is no acute intracranial hemorrhage, mass effect, or midline shift. There is satisfactory overall gray-white matter differentiation. There is extensive chronic microvascular disease. There are evolving areas of ischemia in the left periventricular white matter, and corpus callosum posteriorly, in the right basal ganglia. The ventricular structures are symmetric and unremarkable. The infratentorial structures are unremarkable. ORBITS: The visualized portion of the orbits demonstrate no acute abnormality. SINUSES: The visualized paranasal sinuses and mastoid air cells demonstrate no acute abnormality. SOFT TISSUES/SKULL:  No acute abnormality of the visualized skull or soft tissues. Chronic microvascular disease with scattered areas of evolving ischemia as described above.      CT HEAD WO CONTRAST    Result Date: 8/6/2021  EXAMINATION: CT OF THE HEAD WITHOUT CONTRAST  8/6/2021 2:21 pm TECHNIQUE: CT of the head was performed without the administration of intravenous contrast. Dose modulation, iterative reconstruction, and/or weight based adjustment of the mA/kV was utilized to reduce the radiation dose to as low as reasonably achievable. COMPARISON: 4 August 2021 HISTORY: ORDERING SYSTEM PROVIDED HISTORY: Pt w/ bilateral basal ganglia infarcts, right hemiparesis; minimally interactive over the last week and having increased lethargy the last few days. TECHNOLOGIST PROVIDED HISTORY: Pt w/ bilateral basal ganglia infarcts, right hemiparesis; minimally interactive over the last week and having increased lethargy the last few days. Reason for Exam: Increased lethargy the last few days. Acuity: Unknown Type of Exam: Unknown FINDINGS: BRAIN/VENTRICLES: There is no acute intracranial hemorrhage, mass effect or midline shift. No abnormal extra-axial fluid collection. The gray-white differentiation is maintained without evidence of an acute infarct. There is no evidence of hydrocephalus. Remote right-sided basal ganglial infarcts are noted. However, there is been interval development of hypodensity in the right mid basal ganglia since prior study consistent with an evolving subacute infarct. No significant mass effect is noted. Ventricles are proportionate to cerebral atrophy. ORBITS: The visualized portion of the orbits demonstrate no acute abnormality. SINUSES: The visualized paranasal sinuses and mastoid air cells demonstrate no acute abnormality. SOFT TISSUES/SKULL:  No acute abnormality of the visualized skull or soft tissues. There has been interval development of a hypodensity in the right basal ganglia compared to prior study consistent with evolving subacute white matter infarct. No hemorrhage is noted. Chronic microvascular change in atrophy is demonstrated. No midline shift.      CT HEAD WO CONTRAST    Result Date: 8/4/2021  EXAMINATION: CT OF THE HEAD WITHOUT CONTRAST  8/4/2021 1:25 pm TECHNIQUE: CT of the head was performed without the administration of intravenous contrast. Dose modulation, iterative reconstruction, and/or weight based adjustment of the mA/kV was utilized to reduce the radiation dose to as low as reasonably achievable. COMPARISON: CT head 07/21/2021 and 07/17/2021 HISTORY: ORDERING SYSTEM PROVIDED HISTORY: Patient with history of left-sided CVA with right hemiparesis, not communicating with staff, please evaluate for any change from previous imaging and any other abnormality TECHNOLOGIST PROVIDED HISTORY: Patient with history of left-sided CVA with right hemiparesis, not communicating with staff, please evaluate for any change from previous imaging and any other abnormality Reason for Exam: Patient with history of left-sided CVA with right hemiparesis, not communicating with staff, please evaluate for any change from previous imaging and any other abnormality Acuity: Unknown Type of Exam: Unknown FINDINGS: BRAIN/VENTRICLES: There is no acute intracranial hemorrhage, mass effect or midline shift. No abnormal extra-axial fluid collection. The gray-white differentiation is maintained without evidence of an acute infarct. There is prominence of the ventricles and sulci due to global parenchymal volume loss. There are nonspecific areas of hypoattenuation within the periventricular and subcortical white matter, which likely represent chronic microvascular ischemic change. 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 Remote lacunar stroke left caudate lobe. ORBITS: The visualized portion of the orbits demonstrate no acute abnormality. SINUSES: The visualized paranasal sinuses and mastoid air cells demonstrate no acute abnormality. SOFT TISSUES/SKULL: No acute abnormality of the visualized skull or soft tissues. 1. No acute intracranial abnormality.  2. 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 Remote lacunar stroke left caudate lobe. 3. Senescent changes. White matter hypoattenuation described is typical of microvascular ischemic disease or as sequela of dysmyelinating/demyelinating processes. CT HEAD WO CONTRAST    Result Date: 7/21/2021  EXAMINATION: CT OF THE HEAD WITHOUT CONTRAST  7/21/2021 11:53 am TECHNIQUE: CT of the head was performed without the administration of intravenous contrast. Dose modulation, iterative reconstruction, and/or weight based adjustment of the mA/kV was utilized to reduce the radiation dose to as low as reasonably achievable. COMPARISON: July 17, 2021, MRI July 14, 2021 HISTORY: ORDERING SYSTEM PROVIDED HISTORY: Altered mental status TECHNOLOGIST PROVIDED HISTORY: eval for change in status Reason for Exam: EVAL FOR CHANGE IN STATUS Type of Exam: Subsequent/Follow-up Additional signs and symptoms: PT BECAME AGTATED & COMBATIVE OVERNIGHT Relevant Medical/Surgical History: HX STROKE FINDINGS: BRAIN/VENTRICLES: No acute intracranial hemorrhage, mass effect or midline shift. Area of hypoattenuation within the left basal ganglia and small foci of hypoattenuation in the right basal ganglia compatible with subacute infarct. Diffuse cortical volume loss and compensatory ventricular enlargement appears unchanged. Periventricular and subcortical white matter hypoattenuation redemonstrated bilaterally compatible with severe chronic microvascular ischemic changes. Encephalomalacia of the right caudate redemonstrated compatible with remote lacunar infarct. ORBITS: The visualized portion of the orbits demonstrate no acute abnormality. SINUSES: Air-fluid level within the left sphenoid sinus. Paranasal sinuses and mastoid air cells otherwise clear. SOFT TISSUES/SKULL:  No acute abnormality of the visualized skull or soft tissues. Subacute basal ganglia infarcts redemonstrated. No gross hemorrhagic conversion or significant mass effect.  New air-fluid level within the left sphenoid sinus suggesting acute sinusitis. CT HEAD WO CONTRAST    Result Date: 7/17/2021  EXAMINATION: CT OF THE HEAD WITHOUT CONTRAST  7/17/2021 10:46 am TECHNIQUE: CT of the head was performed without the administration of intravenous contrast. Dose modulation, iterative reconstruction, and/or weight based adjustment of the mA/kV was utilized to reduce the radiation dose to as low as reasonably achievable. COMPARISON: None. HISTORY: ORDERING SYSTEM PROVIDED HISTORY: change in mentation TECHNOLOGIST PROVIDED HISTORY: change in mentation Reason for Exam: change in mentation FINDINGS: BRAIN/VENTRICLES: Small area of hypodensity in the left corona radiata is similar in appearance to the previous exam.  There is no evidence of hemorrhage. No new parenchymal abnormalities are identified. ORBITS: The visualized portion of the orbits demonstrate no acute abnormality. SINUSES: The visualized paranasal sinuses and mastoid air cells demonstrate no acute abnormality. SOFT TISSUES/SKULL:  No acute abnormality of the visualized skull or soft tissues. No acute intracranial abnormality. No significant interval change. CT HEAD WO CONTRAST    Result Date: 7/13/2021  EXAMINATION: CT OF THE HEAD WITHOUT CONTRAST  7/13/2021 1:10 pm TECHNIQUE: CT of the head was performed without the administration of intravenous contrast. Dose modulation, iterative reconstruction, and/or weight based adjustment of the mA/kV was utilized to reduce the radiation dose to as low as reasonably achievable.  COMPARISON: 05/02/2021 HISTORY: ORDERING SYSTEM PROVIDED HISTORY: Last known well 2 days right-sided facial droop right-sided weakness TECHNOLOGIST PROVIDED HISTORY: Last known well 2 days right-sided facial droop right-sided weakness Decision Support Exception - unselect if not a suspected or confirmed emergency medical condition->Emergency Medical Condition (MA) Reason for Exam: Last known well 2 days right-sided facial droop right-sided weakness FINDINGS: BRAIN/VENTRICLES: Ovoid area of low attenuation in the left frontal corona radiata measures 2 x 1.4 cm, new from prior study (series 2, image 39). No acute intracranial hemorrhage. No mass effect or midline shift. No hydrocephalus. Diffuse parenchymal volume loss with enlargement of the ventricles and cerebral sulci. Old infarction in the right basal ganglia. There are nonspecific areas of hypoattenuation within the periventricular and subcortical white matter, which likely represent chronic microvascular ischemic change. Intracranial atherosclerotic disease. ORBITS: The visualized portion of the orbits demonstrate no acute abnormality. SINUSES: The visualized paranasal sinuses and mastoid air cells demonstrate no acute abnormality. SOFT TISSUES/SKULL: No acute abnormality of the visualized skull or soft tissues. 1. New ovoid low-attenuation area in the left frontal corona radiata compatible with acute/subacute infarction. This measures 2 x 1.4 cm. No associated hemorrhage. 2. Diffuse parenchymal volume loss and sequela of severe chronic microvascular ischemic changes. Findings were discussed with Dr. Marianne Adams at 1:31 pm on 7/13/2021. MRA HEAD WO CONTRAST    Result Date: 8/7/2021  EXAMINATION: MRA OF THE NECK WITHOUT CONTRAST; MRI OF THE BRAIN WITHOUT CONTRAST; MRA OF THE HEAD WITHOUT CONTRAST 8/7/2021 8:28 pm; 8/7/2021 8:34 pm; 8/7/2021 8:27 pm: TECHNIQUE: Multiplanar multisequence MRA of the neck was performed without the administration of intravenous contrast. Stenosis of the internal carotid arteries measured using NASCET criteria.; Multiplanar multisequence MRI of the brain was performed without the administration of intravenous contrast.; MRA of the head was performed utilizing time-of-flight imaging with MIP images. No intravenous contrast was administered. COMPARISON: None.  HISTORY: ORDERING SYSTEM PROVIDED HISTORY: stroke TECHNOLOGIST PROVIDED HISTORY: stroke Reason for Exam: stroke Additional signs and symptoms: patient unable to give history and unable to follow exam instructions due to mental status FINDINGS: MRI BRAIN: INTRACRANIAL STRUCTURES/VENTRICLES: Multifocal diffusion restriction abnormality related to acute ischemia are seen involving the right basal ganglia within the putamen, caudate nucleus and anterior limb of the right internal capsule, the posterior limb of the left internal capsule and lateral margin of the left thalamus, the posterior commissure of the corpus callosum, and left greater than right frontal parietal paramedian subcortical and deep white matter . Can fluent increased T2/FLAIR signal is noted within the cerebral white matter consistent with severe microvascular ischemic change. No mass effect or midline shift. No evidence of an acute intracranial hemorrhage. Mild diffuse decrease in cerebral volume is noted with corresponding prominence of the sulci and ventricles. The sellar/suprasellar regions appear unremarkable. The normal signal voids within the major intracranial vessels appear maintained. ORBITS: The visualized portion of the orbits demonstrate no acute abnormality. SINUSES: The visualized paranasal sinuses and mastoid air cells are well aerated. BONES/SOFT TISSUES: The bone marrow signal intensity appears normal. The soft tissues demonstrate no acute abnormality. MRA NECK: AORTIC ARCH/ARCH VESSELS: No dissection or arterial injury. No significant stenosis of the brachiocephalic or subclavian arteries. CAROTID ARTERIES: No dissection, arterial injury, or hemodynamically significant stenosis by NASCET criteria. VERTEBRAL ARTERIES: No dissection, arterial injury, or significant stenosis. MRA HEAD: ANTERIOR CIRCULATION: Suspected focal high-grade stenosis involving the A1 segment of the left anterior cerebral artery is identified.   Right middle cerebral artery focal moderate grade stenosis involving the M1 segment is present. Suspected multifocal moderate to high-grade stenosis involving the right middle cerebral artery M2 segment is seen. No further evidence of significant stenosis of the intracranial internal carotid, anterior cerebral, or middle cerebral arteries. POSTERIOR CIRCULATION: Bilateral posterior cerebral artery P2 segment suspected focal moderate to high-grade stenosis are noted. No further evidence of significant stenosis of the vertebral, basilar, or posterior cerebral arteries. 1. Multifocal acute ischemia involving the bilateral basal ganglia, as discussed above, posterior commissure of the corpus callosum, lateral margin of left thalamus and the left greater than right frontal parietal paramedian subcortical and deep white matter. 2. No evidence of associated hemorrhagic conversion. 3. Finding suggestive of embolic phenomenon. Recommend clinical correlation. 4. Suspected focal high-grade stenosis involving A1 segment of the left anterior cerebral artery. 5. Suspected right middle cerebral artery M1 segment focal moderate grade stenosis. 6. Suspected right middle cerebral artery M2 segment multifocal moderate to high-grade stenosis. 7. Bilateral posterior cerebral artery P2 segment suspected multifocal moderate to high-grade stenosis. 8. Grossly unremarkable MR angiogram of the neck. Note: MR angiographic evaluation of the neck is severely limited by patient motion related artifact. 9. Severe cerebral white matter chronic microvascular ischemic disease. 10. Mild diffuse cerebral atrophy. Critical results were called by Dr. Sherlyn Mcclellan to Dr. Virgen Jones On 8/7/2021 at 22:11.      XR CHEST PORTABLE    Result Date: 8/8/2021  EXAMINATION: ONE XRAY VIEW OF THE CHEST 8/8/2021 11:58 am COMPARISON: 07/13/2021 HISTORY: ORDERING SYSTEM PROVIDED HISTORY: cough and fever TECHNOLOGIST PROVIDED HISTORY: cough and fever Reason for Exam: cough and fever Acuity: Acute Type of Exam: Initial FINDINGS: No lung infiltrate utilizing B-Mode, color doppler and  spectral waveform analysis was performed on the bilateral lower  extremities for venous examination of the deep and superficial systems. Findings are:   Right:  No evidence of deep or superficial venous thrombosis. Left:  No evidence of deep or superficial venous thrombosis. Signature   ----------------------------------------------------------------  Electronically signed by Chris Jolley RVT(Sonographer)  on 07/15/2021 04:23 PM  ----------------------------------------------------------------   ----------------------------------------------------------------  Electronically signed by Nevaeh Davis(Interpreting  physician) on 07/15/2021 08:56 PM  ----------------------------------------------------------------  Findings:   Right Impression:                    Left Impression:  The common femoral, femoral,         The common femoral, femoral,  popliteal and tibial veins           popliteal and tibial veins  demonstrate normal compressibility   demonstrate normal compressibility  and augmentation. and augmentation. Normal compressibility of the great  Normal compressibility of the great  saphenous vein. saphenous vein. Normal compressibility of the small  Normal compressibility of the small  saphenous vein. saphenous vein. Risk Factors   - The patient's risk factor(s) include: chronic lung disease, diabetes     mellitus and arterial hypertension. Velocities are measured in cm/s ; Diameters are measured in cm Right Lower Extremities DVT Study Measurements Right 2D Measurements +------------------------------------+----------+---------------+----------+ ! Location                            ! Visualized! Compressibility! Thrombosis! +------------------------------------+----------+---------------+----------+ ! Common Femoral                      !Yes       ! Yes            ! None      ! +------------------------------------+----------+---------------+----------+ ! Prox Femoral                        !Yes       ! Yes            ! None      ! +------------------------------------+----------+---------------+----------+ ! Mid Femoral                         !Yes       ! Yes            ! None      ! +------------------------------------+----------+---------------+----------+ ! Dist Femoral                        !Yes       ! Yes            ! None      ! +------------------------------------+----------+---------------+----------+ ! Deep Femoral                        !No        !               !          ! +------------------------------------+----------+---------------+----------+ ! Popliteal                           !Yes       ! Yes            ! None      ! +------------------------------------+----------+---------------+----------+ ! Sapheno Femoral Junction            ! Yes       ! Yes            ! None      ! +------------------------------------+----------+---------------+----------+ ! PTV                                 ! Yes       ! Yes            ! None      ! +------------------------------------+----------+---------------+----------+ ! Peroneal                            !No        !               !          ! +------------------------------------+----------+---------------+----------+ ! Gastroc                             ! Yes       ! Yes            ! None      ! +------------------------------------+----------+---------------+----------+ ! GSV Thigh                           ! Yes       ! Yes            ! None      ! +------------------------------------+----------+---------------+----------+ ! GSV Knee                            ! Yes       ! Yes            ! None      ! +------------------------------------+----------+---------------+----------+ ! GSV Ankle                           ! Yes       ! Yes            ! None      ! +------------------------------------+----------+---------------+----------+ ! SSV !Yes       !Yes            ! None      ! +------------------------------------+----------+---------------+----------+ Right Doppler Measurements +---------------------------+------+------+--------------------------------+ ! Location                   ! Signal!Reflux! Reflux (msec)                   ! +---------------------------+------+------+--------------------------------+ ! Common Femoral             !Phasic!      !                                ! +---------------------------+------+------+--------------------------------+ ! Prox Femoral               !Phasic!      !                                ! +---------------------------+------+------+--------------------------------+ ! Popliteal                  !Phasic!      !                                ! +---------------------------+------+------+--------------------------------+ Left Lower Extremities DVT Study Measurements Left 2D Measurements +------------------------------------+----------+---------------+----------+ ! Location                            ! Visualized! Compressibility! Thrombosis! +------------------------------------+----------+---------------+----------+ ! Common Femoral                      !Yes       ! Yes            ! None      ! +------------------------------------+----------+---------------+----------+ ! Prox Femoral                        !Yes       ! Yes            ! None      ! +------------------------------------+----------+---------------+----------+ ! Mid Femoral                         !Yes       ! Yes            ! None      ! +------------------------------------+----------+---------------+----------+ ! Dist Femoral                        !Yes       ! Yes            ! None      ! +------------------------------------+----------+---------------+----------+ ! Deep Femoral                        !No        !               !          ! +------------------------------------+----------+---------------+----------+ ! Popliteal !Yes       !Yes            ! None      ! +------------------------------------+----------+---------------+----------+ ! Sapheno Femoral Junction            ! Yes       ! Yes            ! None      ! +------------------------------------+----------+---------------+----------+ ! PTV                                 ! Yes       ! Yes            ! None      ! +------------------------------------+----------+---------------+----------+ ! Peroneal                            !Yes       ! Yes            ! None      ! +------------------------------------+----------+---------------+----------+ ! Gastroc                             ! Yes       ! Yes            ! None      ! +------------------------------------+----------+---------------+----------+ ! GSV Thigh                           ! Yes       ! Yes            ! None      ! +------------------------------------+----------+---------------+----------+ ! GSV Knee                            ! Yes       ! Yes            ! None      ! +------------------------------------+----------+---------------+----------+ ! GSV Ankle                           ! Yes       ! Yes            ! None      ! +------------------------------------+----------+---------------+----------+ ! SSV                                 ! Yes       ! Yes            ! None      ! +------------------------------------+----------+---------------+----------+ Left Doppler Measurements +---------------------------+------+------+--------------------------------+ ! Location                   ! Signal!Reflux! Reflux (msec)                   ! +---------------------------+------+------+--------------------------------+ ! Common Femoral             !Phasic!      !                                ! +---------------------------+------+------+--------------------------------+ ! Prox Femoral               !Phasic!      !                                ! +---------------------------+------+------+--------------------------------+ ! Popliteal !Phasic!      !                                ! +---------------------------+------+------+--------------------------------+    US SPLEEN    Result Date: 7/14/2021  EXAMINATION: ULTRASOUND OF THE SPLEEN 7/14/2021 8:29 am COMPARISON: None. HISTORY: ORDERING SYSTEM PROVIDED HISTORY: thrombocytopenia TECHNOLOGIST PROVIDED HISTORY: thrombocytopenia FINDINGS: Suboptimal study. The visualized spleen appears enlarged measuring 13.4 cm. No focal lesion is seen. No free fluid. Suboptimal study. Mild splenomegaly. CTA HEAD NECK W CONTRAST    Result Date: 7/13/2021  EXAMINATION: CTA OF THE HEAD AND NECK WITH CONTRAST 7/13/2021 1:11 pm: TECHNIQUE: CTA of the head and neck was performed with the administration of intravenous contrast. Multiplanar reformatted images are provided for review. MIP images are provided for review. Stenosis of the internal carotid arteries measured using NASCET criteria. Dose modulation, iterative reconstruction, and/or weight based adjustment of the mA/kV was utilized to reduce the radiation dose to as low as reasonably achievable. 3D surface reformatted and curved MIP images were submitted for review. COMPARISON: None. HISTORY: ORDERING SYSTEM PROVIDED HISTORY: stroke TECHNOLOGIST PROVIDED HISTORY: stroke Decision Support Exception - unselect if not a suspected or confirmed emergency medical condition->Emergency Medical Condition (MA) Reason for Exam: stroke, Cerebrovascular Accident; Fall FINDINGS: CTA NECK: AORTIC ARCH/ARCH VESSELS: No dissection or arterial injury. No significant stenosis of the brachiocephalic or subclavian arteries. CAROTID ARTERIES: No dissection, arterial injury, or hemodynamically significant stenosis by NASCET criteria. VERTEBRAL ARTERIES: No dissection, arterial injury, or significant stenosis in the right vertebral artery. Severe stenosis in the V1 segment of the left vertebral artery. SOFT TISSUES: The lung apices are clear.   No cervical or superior mediastinal lymphadenopathy. The larynx and pharynx are unremarkable. No acute abnormality of the salivary glands. Thyroid gland is diffusely enlarged and heterogeneous and contains left thyroid lobe nodule measuring 2.6 cm. BONES: Multilevel degenerative spondylosis throughout the cervical spine with fusion at C5-C6. CTA HEAD: ANTERIOR CIRCULATION: Calcified atherosclerotic plaque in the cavernous segments of the internal carotid arteries bilaterally results in mild-to-moderate cavernous ICA stenosis bilaterally. Mild stenosis in the proximal A2 segment of the right anterior cerebral artery. Severe stenosis in the proximal A3 segments of the anterior cerebral arteries bilaterally. Moderate-to-severe stenosis within M2 segment of the right middle cerebral artery. Moderate stenosis in the M1 and proximal M2 segments of the left middle cerebral artery. POSTERIOR CIRCULATION: No significant stenosis in the right intradural vertebral artery. Severe stenosis in the intracranial left vertebral artery. Mild stenosis in the basilar artery. Moderate stenosis in the P1/P2 junction of the left PCA. Severe stenosis and near complete occlusion of the right PCA. OTHER: No dural venous sinus thrombosis on this non-dedicated study. 1. Severe stenosis in the V1 segment of the left vertebral artery. 2. Extensive intracranial atherosclerotic plaque with near complete occlusion of the right PCA. 3. Severe stenoses in the proximal A3 segments of the ACAs bilaterally. 4. Moderate-to-severe proximal M2 segment stenosis in the right MCA. Moderate stenosis in the M1 and M2 segments of the left MCA. 5. Moderate stenosis in the P1/P2 junction of the left PCA. 6. Enlarged heterogeneous thyroid gland with 2.6 cm left thyroid lobe nodule. Nonemergent/outpatient thyroid ultrasound recommended. Findings were discussed with Dr. Rishabh Muller at 1:43 pm on 7/13/2021.  RECOMMENDATIONS: 2.6 cm incidental left thyroid nodule with heterogeneous and enlarged thyroid. Recommend thyroid US. Reference: J Am Bruno Radiol. 2015 Feb;12(2): 143-50     MRA NECK WO CONTRAST    Result Date: 8/7/2021  EXAMINATION: MRA OF THE NECK WITHOUT CONTRAST; MRI OF THE BRAIN WITHOUT CONTRAST; MRA OF THE HEAD WITHOUT CONTRAST 8/7/2021 8:28 pm; 8/7/2021 8:34 pm; 8/7/2021 8:27 pm: TECHNIQUE: Multiplanar multisequence MRA of the neck was performed without the administration of intravenous contrast. Stenosis of the internal carotid arteries measured using NASCET criteria.; Multiplanar multisequence MRI of the brain was performed without the administration of intravenous contrast.; MRA of the head was performed utilizing time-of-flight imaging with MIP images. No intravenous contrast was administered. COMPARISON: None. HISTORY: ORDERING SYSTEM PROVIDED HISTORY: stroke TECHNOLOGIST PROVIDED HISTORY: stroke Reason for Exam: stroke Additional signs and symptoms: patient unable to give history and unable to follow exam instructions due to mental status FINDINGS: MRI BRAIN: INTRACRANIAL STRUCTURES/VENTRICLES: Multifocal diffusion restriction abnormality related to acute ischemia are seen involving the right basal ganglia within the putamen, caudate nucleus and anterior limb of the right internal capsule, the posterior limb of the left internal capsule and lateral margin of the left thalamus, the posterior commissure of the corpus callosum, and left greater than right frontal parietal paramedian subcortical and deep white matter . Can fluent increased T2/FLAIR signal is noted within the cerebral white matter consistent with severe microvascular ischemic change. No mass effect or midline shift. No evidence of an acute intracranial hemorrhage. Mild diffuse decrease in cerebral volume is noted with corresponding prominence of the sulci and ventricles. The sellar/suprasellar regions appear unremarkable.   The normal signal voids within the major intracranial vessels appear maintained. ORBITS: The visualized portion of the orbits demonstrate no acute abnormality. SINUSES: The visualized paranasal sinuses and mastoid air cells are well aerated. BONES/SOFT TISSUES: The bone marrow signal intensity appears normal. The soft tissues demonstrate no acute abnormality. MRA NECK: AORTIC ARCH/ARCH VESSELS: No dissection or arterial injury. No significant stenosis of the brachiocephalic or subclavian arteries. CAROTID ARTERIES: No dissection, arterial injury, or hemodynamically significant stenosis by NASCET criteria. VERTEBRAL ARTERIES: No dissection, arterial injury, or significant stenosis. MRA HEAD: ANTERIOR CIRCULATION: Suspected focal high-grade stenosis involving the A1 segment of the left anterior cerebral artery is identified. Right middle cerebral artery focal moderate grade stenosis involving the M1 segment is present. Suspected multifocal moderate to high-grade stenosis involving the right middle cerebral artery M2 segment is seen. No further evidence of significant stenosis of the intracranial internal carotid, anterior cerebral, or middle cerebral arteries. POSTERIOR CIRCULATION: Bilateral posterior cerebral artery P2 segment suspected focal moderate to high-grade stenosis are noted. No further evidence of significant stenosis of the vertebral, basilar, or posterior cerebral arteries. 1. Multifocal acute ischemia involving the bilateral basal ganglia, as discussed above, posterior commissure of the corpus callosum, lateral margin of left thalamus and the left greater than right frontal parietal paramedian subcortical and deep white matter. 2. No evidence of associated hemorrhagic conversion. 3. Finding suggestive of embolic phenomenon. Recommend clinical correlation. 4. Suspected focal high-grade stenosis involving A1 segment of the left anterior cerebral artery. 5. Suspected right middle cerebral artery M1 segment focal moderate grade stenosis.  6. Suspected right middle cerebral artery M2 segment multifocal moderate to high-grade stenosis. 7. Bilateral posterior cerebral artery P2 segment suspected multifocal moderate to high-grade stenosis. 8. Grossly unremarkable MR angiogram of the neck. Note: MR angiographic evaluation of the neck is severely limited by patient motion related artifact. 9. Severe cerebral white matter chronic microvascular ischemic disease. 10. Mild diffuse cerebral atrophy. Critical results were called by Dr. Kristi Brown to Dr. Cece Carmona On 8/7/2021 at 22:11. MRI BRAIN WO CONTRAST    Result Date: 8/7/2021  EXAMINATION: MRA OF THE NECK WITHOUT CONTRAST; MRI OF THE BRAIN WITHOUT CONTRAST; MRA OF THE HEAD WITHOUT CONTRAST 8/7/2021 8:28 pm; 8/7/2021 8:34 pm; 8/7/2021 8:27 pm: TECHNIQUE: Multiplanar multisequence MRA of the neck was performed without the administration of intravenous contrast. Stenosis of the internal carotid arteries measured using NASCET criteria.; Multiplanar multisequence MRI of the brain was performed without the administration of intravenous contrast.; MRA of the head was performed utilizing time-of-flight imaging with MIP images. No intravenous contrast was administered. COMPARISON: None. HISTORY: ORDERING SYSTEM PROVIDED HISTORY: stroke TECHNOLOGIST PROVIDED HISTORY: stroke Reason for Exam: stroke Additional signs and symptoms: patient unable to give history and unable to follow exam instructions due to mental status FINDINGS: MRI BRAIN: INTRACRANIAL STRUCTURES/VENTRICLES: Multifocal diffusion restriction abnormality related to acute ischemia are seen involving the right basal ganglia within the putamen, caudate nucleus and anterior limb of the right internal capsule, the posterior limb of the left internal capsule and lateral margin of the left thalamus, the posterior commissure of the corpus callosum, and left greater than right frontal parietal paramedian subcortical and deep white matter .     Can fluent increased T2/FLAIR signal ORBITS: The visualized portion of the orbits demonstrate no acute abnormality. SINUSES: The visualized paranasal sinuses and mastoid air cells are well aerated. BONES/SOFT TISSUES: The bone marrow signal intensity appears normal. The soft tissues demonstrate no acute abnormality. Acute infarct in the left and right basal ganglia greater on the left. Diffuse volume loss with extensive chronic white matter changes. IMPRESSION:   Primary Problem  Altered mental status    Active Hospital Problems    Diagnosis Date Noted    Altered mental status [R41.82] 08/09/2021    Severe malnutrition ([address] [E43] 08/09/2021    Cerebral multi-infarct state [I69.30] 08/08/2021    Encephalopathy [G93.40] 08/07/2021       1. 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 Recurrent stroke  3. Altered mental status    RECOMMENDATIONS:  1. I personally reviewed results of lab work-up imaging studies and other relevant clinical data. 2. Reviewed records from previous hospitalization. 3. Patient is not able to give any meaningful history. 4. Patient was diagnosed with ITP with platelet count of 4 at presentation. Patient had a good response to steroids. Platelet count are now around 120,000. Patient is not on any steroids  5. Recommend continued monitoring of platelet count. Do not recommend rechallenging with steroids at this point  6. Okay to give antiplatelet therapy or anticoagulation as long as platelet count is above 50,000 and there is no evidence of bleeding  7. Patient has extensive bilateral recurrent strokes. I believe overall prognosis is poor. Agree with palliative care consultation to review goals and expectations. 8. Avoid myelosuppressive drugs  9. We will continue to follow      Discussed with  Nurse. Thank you for asking us to see this patient.           Dhruv Luong MD          This note is created with the assistance of a speech recognition program.  While intending to generate a document that actually reflects the content of the visit, the document can still have some errors including those of syntax and sound a like substitutions which may escape proof reading. It such instances, actual meaning can be extrapolated by contextual diversion.

## 2021-08-09 NOTE — PROGRESS NOTES
[facility] Neurology   IN-PATIENT SERVICE      NEUROLOGY PROGRESS  NOTE            Date:   8/9/2021  Patient name:  Samantha Griffiths  Date of admission:  8/8/2021  YOB: 1945      Interval History:     Patient sitting in bed, minimally responsive. There is right gaze preference. There is withdrawal to pain. He is moaning to painful stimulation. Awaiting repeat CT head. IZABELLA is pending. Systolic blood pressures 98185X. Currently on heparin drip, aspirin 81 mg daily. Platelets 070. History of Present Illness: The patient is a 77 y.o. male who presents with No chief complaint on file. . The patient was seen and examined and the chart was reviewed. Patient initially [facility] with strokelike symptoms. He had been found down 2 days prior with severe dysarthria and right hemiparesis, facial droop. There was evidence of prior bilateral strokes on imaging. CTA showed diffuse intracranial atherosclerotic disease and near occlusion of right PCA. Medical therapy was recommended. Found to have new left cortical ischemic stroke. Patient had thrombocytopenia in which hematology oncology was following so antiplatelets were held. Eventually they were restarted. Eventually discharged on 7/22 [facility] acute rehab unit. Patient eventually had PEG tube placed. He also had to be started on Zyprexa by psychiatry for agitation. Neurology reconsulted on 8/7 for mental status changes. Recommendation for MRI of the head and MRA head and neck. MRI showed new infarcts in the right cerebellar, right lentiform nucleus, left periventricular region. MRA confirms intracranial atherosclerotic disease. Patient noted to be stuporous nonverbal withdrawing the right side less than the left side. He is readmitted to the progressive care unit. Patient placed on low intensity heparin with aspirin 81 daily and stopping Plavix.   There is plan for IZABELLA and follow-up head CT in the next 24 hours. Past Medical History:     Past Medical History:   Diagnosis Date    Centrilobular emphysema ([facility] [address] [facility]     COPD (chronic obstructive pulmonary disease) ([facility] [facility]     Depression     Diabetes mellitus ([facility] [address] [facility]     pt refuses to take previously prescribed metformin (states PCP aware)    Former smoker     Hyperlipidemia     on 18 pt states \"I stopped taking that about a year ago\"    Hypertension     Mixed restrictive and obstructive lung disease ([facility] [address]     Need for pneumococcal vaccine     Personal history of tobacco use         Past Surgical History:     Past Surgical History:   Procedure Laterality Date    CARDIAC SURGERY  2015    1 stent    NECK SURGERY      TOE AMPUTATION Right greater    UPPER GASTROINTESTINAL ENDOSCOPY N/A 8/3/2021    EGD  PEG TUBE INSERTION performed by Checo Mccallum MD at [facility]        Medications during admission:      calcium gluconate  1,000 mg Intravenous Once    aspirin  81 mg PEG Tube Daily    famotidine  20 mg PEG Tube Daily    finasteride  5 mg PEG Tube Daily    FLUoxetine  40 mg PEG Tube Daily    hydrALAZINE  10 mg Oral 3 times per day    insulin lispro  0-6 Units Subcutaneous 4 times per day    isosorbide dinitrate  10 mg PEG Tube TID    losartan  50 mg Oral Daily    melatonin  6 mg PEG Tube Nightly    OLANZapine  5 mg PEG Tube Nightly    rosuvastatin  40 mg PEG Tube Nightly    tiotropium  2 puff Inhalation Lunch    ampicillin-sulbactam  1,500 mg Intravenous Q6H         Physical Exam:   /61   Pulse 59   Temp 98.1 °F (36.7 °C) (Axillary)   Resp 20   Ht 5' 10\" (1.778 m)   Wt 212 lb 1.3 oz (96.2 kg)   SpO2 96%   BMI 30.43 kg/m²   Temp (24hrs), Av.6 °F (37 °C), Min:98.1 °F (36.7 °C), Max:99.3 °F (37.4 °C)        Neurological examination:    Mental status   Patient is very lethargic. Eyes open spontaneously with right gaze preference. Occasionally moans when stimulated. Not following commands.       Cranial nerves   Pupil response: Present     Oculocephalic reflex: Right gaze preference  Corneal Reflex:            Present     Facial grimace to pin:  Present     Gag/Cough reflex:       Present        Motor and sensory function   increased tone present bilateral upper extremities with withdrawal to pain in the right greater than left hemibody. DTR brisk throughout  Plantar response: Upgoing on left  (-) Grayson's sign bilaterally    Gait  Not tested patient is very lethargic         Diagnostics:      Laboratory Testing:  CBC:   Recent Labs     08/07/21  0638 08/08/21  1920 08/09/21  0839   WBC 9.0 14.4* 11.4*   HGB 11.6* 10.7* 10.7*   * 135* 140*     BMP:    Recent Labs     08/07/21  0812 08/09/21  0839    147*   K 4.2 5.8*   * 111*   CO2 22 27   BUN 24* 31*   CREATININE 1.71* 1.96*   GLUCOSE 200* 154*         Lab Results   Component Value Date    CHOL 186 07/14/2021    LDLCHOLESTEROL 129 07/14/2021    HDL 42 07/14/2021    TRIG 59 08/05/2021     (H) 08/09/2021     (H) 08/09/2021    TSH 0.28 (L) 07/14/2021    INR 1.1 08/08/2021    LABA1C 7.7 (H) 07/14/2021    [id number] 07/13/2021         Imaging/Diagnostics:      MRI brain (7/14/2021): Acute infarct left and right basal ganglia, greater on the left. Diffuse volume loss and extensive chronic ischemic white matter changes. MRI brain (8/7/2021): Multifocal acute infarcts involving the bilateral basal ganglia, greater on the right, posterior commissure of the corpus callosum, lateral margin of the left thalamus, left greater than right frontal parietal paramedian subcortical and deep white matter. CTA head and neck (7/13/2021): Severe stenosis V1 segment of left vertebral artery. Near complete occlusion right PCA. Severe stenosis proximal A3 segment of ACAs bilaterally. Moderate to severe proximal M2 segment stenosis in the right MCA. Moderate stenosis M1, M2 segments of the left MCA.   Moderate stenosis of the P1/P2 junction of the left PCA. MRA head and neck (8/7/2021): Focal high-grade stenosis involving A1 segment of left RAHEEM. Right MCA M1 segment focal moderate grade stenosis. Right MCA M2 segment multifocal moderate to high-grade stenosis. Bilateral PCA P2 segment multifocal moderate to high-grade stenosis. 2D echo: EF 50-55%. Negative bubble study. LA is normal size. I personally reviewed all of the above medications, clinical laboratory, imaging and other diagnostic tests. Impression:      1. Multiple bihemispheric acute/subacute infarcts with right greater than left hemiparesis, subsequent encephalopathy with minimally responsive state  2. Significant intracranial diffuse atherosclerotic disease likely etiology of patient's strokes  3. status post PEG tube placement    Plan:      Currently on low intensity heparin and aspirin.  IZABELLA is pending   Repeat CT head is pending   Palliative care has been consulted   Patient overall prognosis for functional neurologic recovery is guarded at this time.  We will await IZABELLA, likely etiology of strokes is intracranial atherosclerotic disease. If IZABELLA unremarkable we will likely stop heparin and transition to aspirin plus Brilinta for secondary stroke prevention   Crestor 40 mg   Continue Zyprexa   Avoid significant hypotension.   SBP goal normotensive - 140s   We will follow        Electronically signed by Cory Castaneda DO on 8/9/2021 at 10:09 AM      Cory Castaneda [address] [place] [address]  Neurology

## 2021-08-09 NOTE — PLAN OF CARE
Problem: Non-Violent Restraints  Goal: Removal from restraints as soon as assessed to be safe  Outcome: Ongoing  Note: No restraints were used on pt.       Problem: Safety:  Goal: Free from accidental physical injury  Description: Free from accidental physical injury  Outcome: Ongoing  Note: Pt free from any physical injury     Problem: Skin Integrity:  Goal: Skin integrity will stabilize  Description: Skin integrity will stabilize  Outcome: Ongoing

## 2021-08-09 NOTE — PROGRESS NOTES
Physical Therapy        Physical Therapy Cancel Note      DATE: 2021    NAME: Harvey Quezada  MRN: [id number]   : 1945      Patient not seen this date for Physical Therapy due to:    Testing: BLE Dopplers ordered.  Discussed with HERLINDA Kennedy and Nahun Beckford      Electronically signed by Joel Manning, PT on 2021 at 3:36 PM

## 2021-08-09 NOTE — PROGRESS NOTES
SW spoke to Georgina from [facility] was made while pt was in ARU). Updates sent. NIYA also awaits input from Palliative Care. Please note if SNF will be the discharge plan, PT will need to participate with therapy as his insurance will require a pre-cert.

## 2021-08-09 NOTE — PROGRESS NOTES
[place]  Speech Language Pathology    Date: 8/9/2021  Patient Name: Giacomo Decker  YOB: 1945   AGE: 77 y.o. MRN: [id number]        Patient Not Available for Speech Therapy     Due to:  [] Testing  [] Hemodialysis  [] Cancelled by RN  [] Surgery   [] Intubation/Sedation/Pain Medication  [] Medical instability  [x] Other:   Attempted evaluation this AM, unable to awaken pt despite max verbal/tactile cues. Will reattempt when pt is more awake and alert. Addendum: re-attempted ST evaluation this afternoon, pt again asleep upon arrival, unable to awaken despite max verbal/tactile cues. Next scheduled treatment: as able    Completed by:  Maru Rich, SLP, M.A., [address] [facility]

## 2021-08-09 NOTE — PROGRESS NOTES
Comprehensive Nutrition Assessment    Type and Reason for Visit:  Consult (Tube feeding ordering and management)    Nutrition Recommendations/Plan: Start Jevity 1.2 at 20 ml increase as tolerated to 70 ml/hr. Monitor sodium level and adjust free water flushes as needed. Nutrition Assessment:  Pt was on acute rehab noted to not be participating in therapies but then became more unresponsive and transferred to medical side. Pt had been NPO for IZABELLA but RN indicated tube feedings can now restart. Writer ordered Jevity 1.2 to start at 20 ml increase as tolerated to 70 ml/hr to provide; 2018 kcal, 93 gm protein, 1356 ml free water. Na up to 145, ordered 200 ml flushes every 8 hours for additional 600 ml. Malnutrition Assessment:  Malnutrition Status:  Severe malnutrition    Context:  Acute Illness     Findings of the 6 clinical characteristics of malnutrition:  Energy Intake:  7 - 50% or less of estimated energy requirements for 5 or more days  Weight Loss:  1 - 5% over 1 month     Body Fat Loss:  Unable to assess     Muscle Mass Loss:  Unable to assess    Fluid Accumulation:  No significant fluid accumulation     Strength:  Not Performed    Estimated Daily Nutrient Needs:  Energy (kcal):  7485-7883 kcals basede on Richardson-St. Jeor with 1.1-1.2 factor using adm wt 96.2 kg; Weight Used for Energy Requirements:  Admission     Protein (g):  91-1-5 gm protein based on 1.2-1.4 gm/kg using IBW; Weight Used for Protein Requirements:  Ideal        Nutrition Related Findings:  No edema. labs and meds reviewed. Wounds:  None       Current Nutrition Therapies:    Diet NPO  ADULT TUBE FEEDING; PEG; Other Tube Feeding (specify); Jevity 1.2; Continuous; 20; Yes; 10; Q 4 hours; 70; 200; Q 8 hours  Current Tube Feeding (TF) Orders:  · Feeding Route: PEG  · Formula:  Other Tube Feeding (Comment) (Jevity 1.2)  · Schedule: Continuous  · Water Flushes: 200 ml x 3 daily  · Goal TF & Flush Orders Provides: at goal: 2018 kcal, 93 gm protein      Anthropometric Measures:  · Height: 5' 10\" (177.8 cm)  · Current Body Weight: 212 lb (96.2 kg)   · Admission Body Weight: 212 lb (96.2 kg)     · Ideal Body Weight: 166 lbs; % Ideal Body Weight 127.7 %   · BMI: 30.4  · BMI Categories: Obese Class 1 (BMI 30.0-34. 9)       Nutrition Diagnosis:   · Severe malnutrition (based on assessment from 8-5) related to inadequate protein-energy intake as evidenced by intake 0-25%, weight loss greater than or equal to 5% in 1 month, poor intake prior to admission      Nutrition Interventions:   Food and/or Nutrient Delivery:  Continue NPO, Start Tube Feeding  Nutrition Education/Counseling:  Education not indicated   Coordination of Nutrition Care:  Continue to monitor while inpatient    Goals:  Tube feeding to meet approximately 100% of estimated needs. Nutrition Monitoring and Evaluation:   Behavioral-Environmental Outcomes:  None Identified   Food/Nutrient Intake Outcomes:  Enteral Nutrition Intake/Tolerance  Physical Signs/Symptoms Outcomes:  Biochemical Data, GI Status, Fluid Status or Edema, Nutrition Focused Physical Findings, Skin, Weight     Discharge Planning:    Enteral Nutrition     Some areas of assessment may be incomplete due to COVID-19 precautions. Yolanda Agudelo R.D. L.JAVAN.   Clinical Dietitian  Office: [phone]

## 2021-08-09 NOTE — CARE COORDINATION
CASE MANAGEMENT NOTE:    Admission Date:  8/8/2021 Crispin Driver is a 77 y.o.  male    Admitted for : Altered mental status [R41.82]    Met with:  Chart Notes    PCP: None listed                                Insurance:  [facility]      Is patient alert and oriented at time of discussion:  No - Patient unresponsive    Current Residence/ Living Arrangements:  Came to us from [address] [place] [address] - Will follow for return vs SNF vs code status change             Potential Assistance Needed: Yes    SNF needed: Maybe    Freedom of choice and list provided: NA    Pharmacy:  [address] [place] [address]       Does Patient want to use MEDS to BEDS? N/A    Is patient currently receiving oral anticoagulation therapy? No    [facility] greater than 14[facility]? No  If yes, pt needs a follow up appointment made within 7 days. Family Members/Caregivers that pt would like involved in their care:    Yes    If yes, list name here:  Sister Shahnaz Abdalla - Noted from previous note that sister is working on guardianship through the court    Transportation Provider:  N/a             Discharge Plan:  8/9: Richard Ewing - ([id number]) - Came to us from [address] [place] [address] - Not sure patient will be able to return. May need SNF vs code status change. Palliative Consult. Sister Shahnaz Abdalla is working on guardianship through the court. Patient unresponsive - CT head and neuro consult (hx of many strokes). Cardio consult - IZABELLA with loop recorder insertion today. Surgery consult for new PEG placement.  ANASTASIIA NEEDS SIGNED/COMPLETED. Tesfaye Heading                 Electronically signed by: Fernando Cobb RN on 8/9/2021 at 1:42 PM

## 2021-08-09 NOTE — PLAN OF CARE
Nutrition Problem #1: Severe malnutrition (based on assessment from 8-5)  Intervention: Food and/or Nutrient Delivery: Continue NPO, Start Tube Feeding  Nutritional Goals: Tube feeding to meet approximately 100% of estimated needs.

## 2021-08-09 NOTE — CARE COORDINATION
DISCHARGE PLANNING NOTE:    [facility] notified [facility] to medical [facility]    #[id number]    Electronically signed by Rajesh Josue RN on 8/9/2021 at 9:39 AM

## 2021-08-09 NOTE — PROGRESS NOTES
[address] [employer]    PROGRESS NOTE             8/9/2021    10:12 AM    Name:   Diandra Brito  MRN:     [id number]     Acct:      [de-identified]   Room:   2099/2099-01   Day:  0  Admit Date:  8/8/2021 11:30 AM    PCP:  No primary care provider on file. Code Status:  Full Code    Subjective:     C/C: Altered mental status    Interval History Status: not changed. Patient seen and examined at bedtime. Patient still unresponsive and lethargic. 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 Patient has past medical history of CVA, coronary artery disease s/p percutaneous coronary angioplasty, hyperlipidemia, hypertension, diabetes, emphysema/COPD.     History difficult to obtain as patient is somnolent and nonresponsive. Per prior documentation, patient had increasing lethargy on 8/6. CT head obtained which showed: There has been interval development of a hypodensity in the right basal ganglia compared to prior study consistent with evolving subacute white matter infarct. No hemorrhage is noted. Chronic microvascular change in atrophy is demonstrated. No midline shift.      Spoke with family - sister and niece - who stated he is somewhat confused at baseline, but has become significantly more lethargic/began to be agitated and combative about 1.5 weeks ago while in the acute rehab unit.     He was transferred to medicine service for management of altered mental status.   Over the past few days, patient has had intermittent elevations in temperature, up to 100 °F  Patient has been coughing intermittently; frothy/brown substance suctioned from mouth  Pneumonia/sepsis work-up including chest x-ray, procalcitonin, lactic acid, mycoplasma, strep pneumo, Legionella, blood culture x2, urinalysis pending. CXR no acute process, procalcitonin 0.26, lactic acid 2.0, blood culture no growth to date       Review of Systems:     Review of Systems   Reason unable to perform ROS: patient unresponsive. Medications: Allergies:  No Known Allergies    Current Meds:   Scheduled Meds:    calcium gluconate  1,000 mg Intravenous Once    aspirin  81 mg PEG Tube Daily    famotidine  20 mg PEG Tube Daily    finasteride  5 mg PEG Tube Daily    FLUoxetine  40 mg PEG Tube Daily    hydrALAZINE  10 mg Oral 3 times per day    insulin lispro  0-6 Units Subcutaneous 4 times per day    isosorbide dinitrate  10 mg PEG Tube TID    losartan  50 mg Oral Daily    melatonin  6 mg PEG Tube Nightly    OLANZapine  5 mg PEG Tube Nightly    rosuvastatin  40 mg PEG Tube Nightly    tiotropium  2 puff Inhalation Lunch    ampicillin-sulbactam  1,500 mg Intravenous Q6H     Continuous Infusions:    dextrose      heparin (PORCINE) Infusion 9.9 Units/kg/hr (08/09/21 0927)     PRN Meds: acetaminophen, bisacodyl, polyethylene glycol, senna, albuterol sulfate HFA, dextrose, dextrose, glucagon (rDNA), glucose, heparin (porcine), heparin (porcine)    Data:     Past Medical History:   has a past medical history of Centrilobular emphysema ([facility] [address] COPD (chronic obstructive pulmonary disease) ([facility] [address] Depression, Diabetes mellitus ([facility] [address] Former smoker, Hyperlipidemia, Hypertension, Mixed restrictive and obstructive lung disease ([facility] [address] Need for pneumococcal vaccine, and Personal history of tobacco use. Social History:   reports that he quit smoking about 8 years ago. He started smoking about 64 years ago. He has a 42.00 pack-year smoking history. He has never used smokeless tobacco. He reports that he does not drink alcohol and does not use drugs. Family History: No family history on file.     Vitals:  /61   Pulse 59   Temp 98.1 °F (36.7 °C) (Axillary)   Resp 20   Ht 5' 10\" (1.778 m) Wt 212 lb 1.3 oz (96.2 kg)   SpO2 96%   BMI 30.43 kg/m²   Temp (24hrs), Av.6 °F (37 °C), Min:98.1 °F (36.7 °C), Max:99.3 °F (37.4 °C)    Recent Labs     21  1324 21  1612 21  0017 21  0602   POCGLU 207* 236* 198* 170*       I/O(24Hr):     Intake/Output Summary (Last 24 hours) at 2021 1012  Last data filed at 2021 0452  Gross per 24 hour   Intake 280 ml   Output 150 ml   Net 130 ml       Labs:    CBC:   Lab Results   Component Value Date    WBC 11.4 2021    RBC 4.48 2021    RBC 4.88 2012    HGB 10.7 2021    HCT 35.2 2021    MCV 78.7 2021    MCH 23.9 2021    MCHC 30.4 2021    RDW 17.9 2021     2021     2012    MPV 10.9 2021     CMP:    Lab Results   Component Value Date     2021    K 5.8 2021     2021    CO2 27 2021    BUN 31 2021    CREATININE 1.96 2021    GFRAA 41 2021    LABGLOM 34 2021    GLUCOSE 154 2021    GLUCOSE 122 2012    PROT 6.3 2021    LABALBU 2.9 2021    CALCIUM 9.2 2021    BILITOT 0.21 2021    ALKPHOS 184 2021     2021     2021     BMP:    Lab Results   Component Value Date     2021    K 5.8 2021     2021    CO2 27 2021    BUN 31 2021    LABALBU 2.9 2021    CREATININE 1.96 2021    CALCIUM 9.2 2021    GFRAA 41 2021    LABGLOM 34 2021    GLUCOSE 154 2021    GLUCOSE 122 2012     Hepatic Function Panel:    Lab Results   Component Value Date    ALKPHOS 184 2021     2021     2021    PROT 6.3 2021    BILITOT 0.21 2021    BILIDIR 0.10 2021    IBILI 0.11 2021    LABALBU 2.9 2021       Lab Results   Component Value Date/Time    SPECIAL NOT REPORTED 2021 02:38 PM     Lab Results   Component Value Date/Time CULTURE NO GROWTH 10 HOURS 08/08/2021 02:38 PM         Radiology:    ECHO Complete 2D W Doppler W Color    Result Date: 7/15/2021  Transthoracic Echocardiography Report (TTE)  Patient Name GOFF      Date of Study               07/15/2021               YAYA DUBOIS   Date of      1945  Gender                      Male  Birth   Age          77 year(s)  Race                        Black   Room Number  0536        Height:                     70 inch, 177.8 cm   Corporate ID [id number]    Weight:                     210 pounds, 95.3 kg  #   Patient Acct [de-identified]   BSA:          2.13 m^2      BMI:       30.13  #                                                               kg/m^2   MR #         [id number]     Haydee Zavaleta   Accession #  [id number]  Interpreting Physician      Yolande Vance   Fellow                   Referring Nurse                           Practitioner   Interpreting             Referring Physician         Heena Jessica MD  Fellow  Additional Comments Technically difficult study, patient supine unable to be positioned for better acoustic windows. Type of Study   TTE procedure:2D Echocardiogram, M-Mode, Doppler, Color Doppler. Procedure Date Date: 07/15/2021 Start: 08:29 AM Study Location: [facility] Technical Quality: Adequate visualization Indications:CVA. History / Tech. Comments: Procedure explained to patient. Echo completed in the Echo Lab. RN performed bubble study. PMHx: Former smoker, COPD Hypertension, Diabetes, Hyperlipidemia Coronary artery disease, s/p stents Patient Status: Inpatient Height: 70 inches Weight: 210 pounds BSA: 2.13 m^2 BMI: 30.13 kg/m^2 CONCLUSIONS Summary Technically difficult study. Overall global left ventricular systolic function is normal, calculated ejection fraction is 50-55% Grade I (mild) left ventricular diastolic dysfunction.  Technically difficult visualization of the right ventricle, but it does appear to be normal in size. Negative bubble study, no obvious intracardiac shunt noted within technical limitations of this study. No significant valvular regurgitation or stenosis seen. No significant pericardial effusion is seen. Signature ----------------------------------------------------------------------------  Electronically signed by Joanne Madison(Sonographer) on 07/15/2021 11:00 AM ---------------------------------------------------------------------------- ----------------------------------------------------------------------------  Electronically signed by Yolande Vance(Interpreting physician) on  07/15/2021 12:49 PM ---------------------------------------------------------------------------- FINDINGS Left Atrium Left atrium is normal in size. Inter-atrial septum is intact with no evidence for an atrial septal defect. Negative bubble study, no shunt noted. Left Ventricle Left ventricle is normal in size, normal wall thickness, global left ventricular systolic function is low normal, calculated ejection fraction is 50%. Grade I (mild) left ventricular diastolic dysfunction. Right Atrium Right atrium is normal in size. Right Ventricle Technically difficult visualization of the right ventricle, but it does appear to be normal in size. Mitral Valve Normal mitral valve structure. No mitral stenosis. Trivial mitral regurgitation. Aortic Valve Aortic valve is trileaflet. No evidence of aortic insufficiency or stenosis. Tricuspid Valve Tricuspid valve was not well visualized. Trivial tricuspid regurgitation. Insignificant tricuspid regurgitation, unable to estimate RVSP. Pulmonic Valve Pulmonic valve not well visualized but Doppler velocities are normal. Trivial pulmonic insufficiency. Pericardial Effusion No significant pericardial effusion is seen. Miscellaneous Normal aortic root dimension. E/E' average = 17.35. IVC not well visualized.  M-mode / 2D Measurements & Calculations:   LVIDd:5.6 cm(3.7 - 5.6 cm)        Diastolic Volume:95.21 ml  IVSd:0.9 cm(0.6 - 1.1 cm)         Systolic JRSBDR:57.12 ml  LVPWd:0.8 cm(0.6 - 1.1 cm)        Aortic Root:3 cm(2.0 - 3.7 cm)                                    LA Dimension: 3.2 cm(1.9 - 4.0 cm)  Calculated LVEF (%): 50.46 %      LA volume/Index: 48.86 ml /23m^2                                    LVOT:2.1 cm                                    RVDd:3 cm   Mitral:                                 Aortic   Valve Area (P1/2-Time): 2.65 cm^2       Peak Velocity: 1.91 m/s  Peak E-Wave: 1.18 m/s                   Mean Velocity: 1.06 m/s  Peak A-Wave: 1.47 m/s                   Peak Gradient: 14.59 mmHg  E/A Ratio: 0.8                          Mean Gradient: 6 mmHg  Peak Gradient: 5.57 mmHg  Mean Gradient: 2 mmHg  Deceleration Time: 280 msec             Area (continuity): 2.83 cm^2  P1/2t: 83 msec                          AV VTI: 41.8 cm   Area (continuity): 2.2 cm^2  Mean Velocity: 0.62 m/s                                           Pulmonic:                                           Peak Velocity: 1.29 m/s                                          Peak Gradient: 6.66 mmHg  Diastology / Tissue Doppler Septal Wall E' velocity:0.06 m/s Septal Wall E/E':20.8 Lateral Wall E' velocity:0.08 m/s Lateral Wall E/E':13.9    CT HEAD WO CONTRAST    Result Date: 8/6/2021  EXAMINATION: CT OF THE HEAD WITHOUT CONTRAST  8/6/2021 2:21 pm TECHNIQUE: CT of the head was performed without the administration of intravenous contrast. Dose modulation, iterative reconstruction, and/or weight based adjustment of the mA/kV was utilized to reduce the radiation dose to as low as reasonably achievable. COMPARISON: 4 August 2021 HISTORY: ORDERING SYSTEM PROVIDED HISTORY: Pt w/ bilateral basal ganglia infarcts, right hemiparesis; minimally interactive over the last week and having increased lethargy the last few days.  TECHNOLOGIST PROVIDED HISTORY: Pt w/ bilateral basal ganglia infarcts, right hemiparesis; minimally interactive over the last week and having increased lethargy the last few days. Reason for Exam: Increased lethargy the last few days. Acuity: Unknown Type of Exam: Unknown FINDINGS: BRAIN/VENTRICLES: There is no acute intracranial hemorrhage, mass effect or midline shift. No abnormal extra-axial fluid collection. The gray-white differentiation is maintained without evidence of an acute infarct. There is no evidence of hydrocephalus. Remote right-sided basal ganglial infarcts are noted. However, there is been interval development of hypodensity in the right mid basal ganglia since prior study consistent with an evolving subacute infarct. No significant mass effect is noted. Ventricles are proportionate to cerebral atrophy. ORBITS: The visualized portion of the orbits demonstrate no acute abnormality. SINUSES: The visualized paranasal sinuses and mastoid air cells demonstrate no acute abnormality. SOFT TISSUES/SKULL:  No acute abnormality of the visualized skull or soft tissues. There has been interval development of a hypodensity in the right basal ganglia compared to prior study consistent with evolving subacute white matter infarct. No hemorrhage is noted. Chronic microvascular change in atrophy is demonstrated. No midline shift. CT HEAD WO CONTRAST    Result Date: 8/4/2021  EXAMINATION: CT OF THE HEAD WITHOUT CONTRAST  8/4/2021 1:25 pm TECHNIQUE: CT of the head was performed without the administration of intravenous contrast. Dose modulation, iterative reconstruction, and/or weight based adjustment of the mA/kV was utilized to reduce the radiation dose to as low as reasonably achievable.  COMPARISON: CT head 07/21/2021 and 07/17/2021 HISTORY: ORDERING SYSTEM PROVIDED HISTORY: Patient with history of left-sided CVA with right hemiparesis, not communicating with staff, please evaluate for any change from previous imaging and any other abnormality TECHNOLOGIST PROVIDED HISTORY: Patient with history of left-sided CVA with right hemiparesis, not communicating with staff, please evaluate for any change from previous imaging and any other abnormality Reason for Exam: Patient with history of left-sided CVA with right hemiparesis, not communicating with staff, please evaluate for any change from previous imaging and any other abnormality Acuity: Unknown Type of Exam: Unknown FINDINGS: BRAIN/VENTRICLES: There is no acute intracranial hemorrhage, mass effect or midline shift. No abnormal extra-axial fluid collection. The gray-white differentiation is maintained without evidence of an acute infarct. There is prominence of the ventricles and sulci due to global parenchymal volume loss. There are nonspecific areas of hypoattenuation within the periventricular and subcortical white matter, which likely represent chronic microvascular ischemic change. 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 Remote lacunar stroke left caudate lobe. ORBITS: The visualized portion of the orbits demonstrate no acute abnormality. SINUSES: The visualized paranasal sinuses and mastoid air cells demonstrate no acute abnormality. SOFT TISSUES/SKULL: No acute abnormality of the visualized skull or soft tissues. 1. No acute intracranial abnormality. 2. 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 Remote lacunar stroke left caudate lobe. 3. Senescent changes. White matter hypoattenuation described is typical of microvascular ischemic disease or as sequela of dysmyelinating/demyelinating processes.        MRA HEAD WO CONTRAST    Result Date: 8/7/2021  EXAMINATION: MRA OF THE NECK WITHOUT CONTRAST; MRI OF THE BRAIN WITHOUT CONTRAST; MRA OF THE HEAD WITHOUT CONTRAST 8/7/2021 8:28 pm; 8/7/2021 8:34 pm; 8/7/2021 8:27 pm: TECHNIQUE: Multiplanar multisequence MRA of the neck was performed without the administration of intravenous contrast. Stenosis of the internal carotid arteries measured using NASCET criteria.; Multiplanar multisequence MRI of the brain was performed without the administration of intravenous contrast.; MRA of the head was performed utilizing time-of-flight imaging with MIP images. No intravenous contrast was administered. COMPARISON: None. HISTORY: ORDERING SYSTEM PROVIDED HISTORY: stroke TECHNOLOGIST PROVIDED HISTORY: stroke Reason for Exam: stroke Additional signs and symptoms: patient unable to give history and unable to follow exam instructions due to mental status FINDINGS: MRI BRAIN: INTRACRANIAL STRUCTURES/VENTRICLES: Multifocal diffusion restriction abnormality related to acute ischemia are seen involving the right basal ganglia within the putamen, caudate nucleus and anterior limb of the right internal capsule, the posterior limb of the left internal capsule and lateral margin of the left thalamus, the posterior commissure of the corpus callosum, and left greater than right frontal parietal paramedian subcortical and deep white matter . Can fluent increased T2/FLAIR signal is noted within the cerebral white matter consistent with severe microvascular ischemic change. No mass effect or midline shift. No evidence of an acute intracranial hemorrhage. Mild diffuse decrease in cerebral volume is noted with corresponding prominence of the sulci and ventricles. The sellar/suprasellar regions appear unremarkable. The normal signal voids within the major intracranial vessels appear maintained. ORBITS: The visualized portion of the orbits demonstrate no acute abnormality. SINUSES: The visualized paranasal sinuses and mastoid air cells are well aerated. BONES/SOFT TISSUES: The bone marrow signal intensity appears normal. The soft tissues demonstrate no acute abnormality. MRA NECK: AORTIC ARCH/ARCH VESSELS: No dissection or arterial injury. No significant stenosis of the brachiocephalic or subclavian arteries.  CAROTID ARTERIES: No dissection, arterial injury, or hemodynamically significant stenosis by NASCET criteria. VERTEBRAL ARTERIES: No dissection, arterial injury, or significant stenosis. MRA HEAD: ANTERIOR CIRCULATION: Suspected focal high-grade stenosis involving the A1 segment of the left anterior cerebral artery is identified. Right middle cerebral artery focal moderate grade stenosis involving the M1 segment is present. Suspected multifocal moderate to high-grade stenosis involving the right middle cerebral artery M2 segment is seen. No further evidence of significant stenosis of the intracranial internal carotid, anterior cerebral, or middle cerebral arteries. POSTERIOR CIRCULATION: Bilateral posterior cerebral artery P2 segment suspected focal moderate to high-grade stenosis are noted. No further evidence of significant stenosis of the vertebral, basilar, or posterior cerebral arteries. 1. Multifocal acute ischemia involving the bilateral basal ganglia, as discussed above, posterior commissure of the corpus callosum, lateral margin of left thalamus and the left greater than right frontal parietal paramedian subcortical and deep white matter. 2. No evidence of associated hemorrhagic conversion. 3. Finding suggestive of embolic phenomenon. Recommend clinical correlation. 4. Suspected focal high-grade stenosis involving A1 segment of the left anterior cerebral artery. 5. Suspected right middle cerebral artery M1 segment focal moderate grade stenosis. 6. Suspected right middle cerebral artery M2 segment multifocal moderate to high-grade stenosis. 7. Bilateral posterior cerebral artery P2 segment suspected multifocal moderate to high-grade stenosis. 8. Grossly unremarkable MR angiogram of the neck. Note: MR angiographic evaluation of the neck is severely limited by patient motion related artifact. 9. Severe cerebral white matter chronic microvascular ischemic disease. 10. Mild diffuse cerebral atrophy.  Critical results were called by Dr. Portillo Urias to Dr. Mae Jean-Baptiste On 8/7/2021 at 22:11.       MRA NECK WO CONTRAST    Result Date: 8/7/2021  EXAMINATION: MRA OF THE NECK WITHOUT CONTRAST; MRI OF THE BRAIN WITHOUT CONTRAST; MRA OF THE HEAD WITHOUT CONTRAST 8/7/2021 8:28 pm; 8/7/2021 8:34 pm; 8/7/2021 8:27 pm: TECHNIQUE: Multiplanar multisequence MRA of the neck was performed without the administration of intravenous contrast. Stenosis of the internal carotid arteries measured using NASCET criteria.; Multiplanar multisequence MRI of the brain was performed without the administration of intravenous contrast.; MRA of the head was performed utilizing time-of-flight imaging with MIP images. No intravenous contrast was administered. COMPARISON: None. HISTORY: ORDERING SYSTEM PROVIDED HISTORY: stroke TECHNOLOGIST PROVIDED HISTORY: stroke Reason for Exam: stroke Additional signs and symptoms: patient unable to give history and unable to follow exam instructions due to mental status FINDINGS: MRI BRAIN: INTRACRANIAL STRUCTURES/VENTRICLES: Multifocal diffusion restriction abnormality related to acute ischemia are seen involving the right basal ganglia within the putamen, caudate nucleus and anterior limb of the right internal capsule, the posterior limb of the left internal capsule and lateral margin of the left thalamus, the posterior commissure of the corpus callosum, and left greater than right frontal parietal paramedian subcortical and deep white matter . Can fluent increased T2/FLAIR signal is noted within the cerebral white matter consistent with severe microvascular ischemic change. No mass effect or midline shift. No evidence of an acute intracranial hemorrhage. Mild diffuse decrease in cerebral volume is noted with corresponding prominence of the sulci and ventricles. The sellar/suprasellar regions appear unremarkable. The normal signal voids within the major intracranial vessels appear maintained. ORBITS: The visualized portion of the orbits demonstrate no acute abnormality.  SINUSES: The visualized paranasal sinuses and mastoid air cells are well aerated. BONES/SOFT TISSUES: The bone marrow signal intensity appears normal. The soft tissues demonstrate no acute abnormality. MRA NECK: AORTIC ARCH/ARCH VESSELS: No dissection or arterial injury. No significant stenosis of the brachiocephalic or subclavian arteries. CAROTID ARTERIES: No dissection, arterial injury, or hemodynamically significant stenosis by NASCET criteria. VERTEBRAL ARTERIES: No dissection, arterial injury, or significant stenosis. MRA HEAD: ANTERIOR CIRCULATION: Suspected focal high-grade stenosis involving the A1 segment of the left anterior cerebral artery is identified. Right middle cerebral artery focal moderate grade stenosis involving the M1 segment is present. Suspected multifocal moderate to high-grade stenosis involving the right middle cerebral artery M2 segment is seen. No further evidence of significant stenosis of the intracranial internal carotid, anterior cerebral, or middle cerebral arteries. POSTERIOR CIRCULATION: Bilateral posterior cerebral artery P2 segment suspected focal moderate to high-grade stenosis are noted. No further evidence of significant stenosis of the vertebral, basilar, or posterior cerebral arteries. 1. Multifocal acute ischemia involving the bilateral basal ganglia, as discussed above, posterior commissure of the corpus callosum, lateral margin of left thalamus and the left greater than right frontal parietal paramedian subcortical and deep white matter. 2. No evidence of associated hemorrhagic conversion. 3. Finding suggestive of embolic phenomenon. Recommend clinical correlation. 4. Suspected focal high-grade stenosis involving A1 segment of the left anterior cerebral artery. 5. Suspected right middle cerebral artery M1 segment focal moderate grade stenosis. 6. Suspected right middle cerebral artery M2 segment multifocal moderate to high-grade stenosis.  7. Bilateral posterior cerebral artery P2 segment suspected multifocal moderate to high-grade stenosis. 8. Grossly unremarkable MR angiogram of the neck. Note: MR angiographic evaluation of the neck is severely limited by patient motion related artifact. 9. Severe cerebral white matter chronic microvascular ischemic disease. 10. Mild diffuse cerebral atrophy. Critical results were called by Dr. Nataliia Rincon to Dr. Dawna Garza On 8/7/2021 at 22:11. MRI BRAIN WO CONTRAST    Result Date: 8/7/2021  EXAMINATION: MRA OF THE NECK WITHOUT CONTRAST; MRI OF THE BRAIN WITHOUT CONTRAST; MRA OF THE HEAD WITHOUT CONTRAST 8/7/2021 8:28 pm; 8/7/2021 8:34 pm; 8/7/2021 8:27 pm: TECHNIQUE: Multiplanar multisequence MRA of the neck was performed without the administration of intravenous contrast. Stenosis of the internal carotid arteries measured using NASCET criteria.; Multiplanar multisequence MRI of the brain was performed without the administration of intravenous contrast.; MRA of the head was performed utilizing time-of-flight imaging with MIP images. No intravenous contrast was administered. COMPARISON: None. HISTORY: ORDERING SYSTEM PROVIDED HISTORY: stroke TECHNOLOGIST PROVIDED HISTORY: stroke Reason for Exam: stroke Additional signs and symptoms: patient unable to give history and unable to follow exam instructions due to mental status FINDINGS: MRI BRAIN: INTRACRANIAL STRUCTURES/VENTRICLES: Multifocal diffusion restriction abnormality related to acute ischemia are seen involving the right basal ganglia within the putamen, caudate nucleus and anterior limb of the right internal capsule, the posterior limb of the left internal capsule and lateral margin of the left thalamus, the posterior commissure of the corpus callosum, and left greater than right frontal parietal paramedian subcortical and deep white matter . Can fluent increased T2/FLAIR signal is noted within the cerebral white matter consistent with severe microvascular ischemic change.  No mass effect or midline shift. No evidence of an acute intracranial hemorrhage. Mild diffuse decrease in cerebral volume is noted with corresponding prominence of the sulci and ventricles. The sellar/suprasellar regions appear unremarkable. The normal signal voids within the major intracranial vessels appear maintained. ORBITS: The visualized portion of the orbits demonstrate no acute abnormality. SINUSES: The visualized paranasal sinuses and mastoid air cells are well aerated. BONES/SOFT TISSUES: The bone marrow signal intensity appears normal. The soft tissues demonstrate no acute abnormality. MRA NECK: AORTIC ARCH/ARCH VESSELS: No dissection or arterial injury. No significant stenosis of the brachiocephalic or subclavian arteries. CAROTID ARTERIES: No dissection, arterial injury, or hemodynamically significant stenosis by NASCET criteria. VERTEBRAL ARTERIES: No dissection, arterial injury, or significant stenosis. MRA HEAD: ANTERIOR CIRCULATION: Suspected focal high-grade stenosis involving the A1 segment of the left anterior cerebral artery is identified. Right middle cerebral artery focal moderate grade stenosis involving the M1 segment is present. Suspected multifocal moderate to high-grade stenosis involving the right middle cerebral artery M2 segment is seen. No further evidence of significant stenosis of the intracranial internal carotid, anterior cerebral, or middle cerebral arteries. POSTERIOR CIRCULATION: Bilateral posterior cerebral artery P2 segment suspected focal moderate to high-grade stenosis are noted. No further evidence of significant stenosis of the vertebral, basilar, or posterior cerebral arteries. 1. Multifocal acute ischemia involving the bilateral basal ganglia, as discussed above, posterior commissure of the corpus callosum, lateral margin of left thalamus and the left greater than right frontal parietal paramedian subcortical and deep white matter.  2. No evidence of associated hemorrhagic conversion. 3. Finding suggestive of embolic phenomenon. Recommend clinical correlation. 4. Suspected focal high-grade stenosis involving A1 segment of the left anterior cerebral artery. 5. Suspected right middle cerebral artery M1 segment focal moderate grade stenosis. 6. Suspected right middle cerebral artery M2 segment multifocal moderate to high-grade stenosis. 7. Bilateral posterior cerebral artery P2 segment suspected multifocal moderate to high-grade stenosis. 8. Grossly unremarkable MR angiogram of the neck. Note: MR angiographic evaluation of the neck is severely limited by patient motion related artifact. 9. Severe cerebral white matter chronic microvascular ischemic disease. 10. Mild diffuse cerebral atrophy. Critical results were called by Dr. Kev Lira to Dr. Chema Neely On 8/7/2021 at 22:11. Physical Examination:        Physical Exam  Constitutional:       Comments: Lethargic, unresponsive   Eyes:      Pupils: Pupils are equal, round, and reactive to light. Cardiovascular:      Rate and Rhythm: Normal rate and regular rhythm. Pulmonary:      Effort: No respiratory distress. Breath sounds: No wheezing, rhonchi or rales. Abdominal:      General: Bowel sounds are normal. There is no distension. Tenderness: There is no abdominal tenderness. Musculoskeletal:      Right lower leg: No edema. Left lower leg: No edema. Neurological:      Comments: Lethargic  Rigidity with passive range of motion in upper extremities bilaterally           Assessment:        Primary Problem  Altered mental status    Active Hospital Problems    Diagnosis Date Noted    Altered mental status [R41.82] 08/09/2021    Cerebral multi-infarct state [I69.30] 08/08/2021    Encephalopathy [G93.40] 08/07/2021       Plan:         Altered mental status, secondary to metabolic encephalopathy versus pneumonia/sepsis  -Patient high risk for aspiration due to history of multiple strokes and altered mental status  -Pending mycoplasma, strep pneumo, Legionella, urinalysis  -Blood cultures x2 no growth so far  -LFTs trending up  -Ammonia level ordered  -CXR showed no acute pulmonary process, procalcitonin 0.26, lactic acid 2.0  -Treat empirically for aspiration pneumonia with IV Unasyn      History of multiple CVA with acute multi-infarct   -MRI brain showed multiple new infarcts bilaterally  -Plan for IZABELLA and loop recorder procedure to r/o cardiac source of emboli   -Aspirin 81mg, rosuvastatin 40mg and Plavix 75mg  -Neurology on board    Electrolyte Disturbance  -potassium 5.8, sodium 147  -one time dose calcium gluconate 1,000mg  -12 lead EKG  -CK, Myoglobin, VBG and repeat BMP in 2 hours ordered    CELESTINO on CKD, likely due to poor intake vs rhabdomyolysis, worsening  -Creatinine elevated, baseline (1.4-1.5)   -1.86 on 8/7, 1.96 this AM   -CK and myoglobin ordered   -Continue to monitor     Protein calorie malnutrition  -Albumin 2.9, total protein 6.1  -Patient receiving PEG tube feeds  -Dietary on board     Diabetes mellitus  -Low dose sliding scale insulin  -Continue to monitor blood glucose levels   -Hypoglycemia protocol     Essential Hypertension  -Cozaar 50mg daily  -Hydralazine 10mg TID     DVT ppx: low dose Heparin drip  GI ppx: famotidine 20mg daily     Toin Rivera MD  8/9/2021  10:12 AM     Attestation and add on       I have discussed the care of Peggy Cobb , including pertinent history and exam findings,      8/9/21    with the resident. I have seen and examined the patient and the key elements of all parts of the encounter have been performed by me . I agree with the assessment, plan and orders as documented by the resident. Principal Problem:    Altered mental status  Active Problems:    Severe malnutrition (HCC)    Encephalopathy    Cerebral multi-infarct state  Resolved Problems:    * No resolved hospital problems.  *         ---- ;     Reny Lucio MD      [facility] [address], [address] [place] [address].    Phone [phone]   Fax: [phone]  Answering Service: [phone]

## 2021-08-09 NOTE — PROGRESS NOTES
.. PALLIATIVE CARE TEAM    Patient: Ximena Morrow  Room: 2099/2099-01    Reason For Consult   Goals of care evaluation  Distress management  Symptom Management  Guidance and support  Facilitate communications  Assistance in coordinating care  Recommendations for the above    Impression: Ximena Morrow is a 77y.o. year old male with  has a past medical history of Centrilobular emphysema ([address] COPD (chronic obstructive pulmonary disease) ([address] Depression, Diabetes mellitus ([address] Former smoker, Hyperlipidemia, Hypertension, Mixed restrictive and obstructive lung disease ([address] Need for pneumococcal vaccine, and Personal history of tobacco use. .  Currently hospitalized for the management of AMS. The Palliative Care Team is following to assist with goals of care and family support. Code Status  Full Code    Vital Signs:  /66   Pulse 81   Temp 98.4 °F (36.9 °C) (Axillary)   Resp 20   Ht 5' 10\" (1.778 m)   Wt 212 lb 1.3 oz (96.2 kg)   SpO2 94%   BMI 30.43 kg/m²     Patient Active Problem List   Diagnosis    Centrilobular emphysema (HCC)    Mixed restrictive and obstructive lung disease ([address]    Cerebrovascular accident (CVA) ([address]    COPD (chronic obstructive pulmonary disease) ([address]    HTN (hypertension)    Diabetes 1.5, managed as type 2 ([address]    Hyperlipidemia    CAD S/P percutaneous coronary angioplasty    Rhabdomyolysis    Intracranial atherosclerosis    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  I get an update from the bedside nurse [facility] and she states that the patient just got back from CT scan and that are ruling out hemorrhagic brain bleed, and that the plan is for a IZABELLA tomorrow to R/O embolic source. Emory KHANNA states that the patient     I reach out to the patient's sister Tricia Tapia at [phone]. I introduce my palliative care support role to her. I ask about the patient and his next of Kin. Whitney Mason states that he has 1 daughter and that he has not seen her for years, and that they have reconnected in the past, but not recently and she lives in [place] with her Mom she believes. Whitney Mason states that her name is King Sue and that'brenda  name. Whitney Mason states that both of their parents are  and that she is his only sibling. I ask Whitney Mason about if she has had a medical update about her brother's condition, and she states\" no. \" I tell her that her brother's stroke has extended and that he is not responding and that he responds to pain and that he groins. I tell her that they are doing more tests as well. I ask if she is coming to see the patient, and she says\" yes I was going to today, but I can come tomorrow at 1 pm.     I will meet Whitney Mason tomorrow at 1pm. I offer her much emotional support and she is appreciative. Will follow for goals of care and family support. Goals/Plan of care  Education/support to family  Communications with primary service  Providing support for coping/adaptation/distress of family  Continue with current plan of care  Code status clarified: Full Code  Validating patient/family distress  Continued communication updates  Patient is not able to communicate. CT scan done today with a plan for a IZABELLA. I reach out to the patient's sister Tricia Tapia, the patient's only sibling. She is able to come tomorrow and I will meet her then at 1pm tomorrow.      Electronically signed by   Geovany Dallas RN  Palliative Care Team  on 2021 at 3:08 PM

## 2021-08-09 NOTE — FLOWSHEET NOTE
08/09/21 1721   Encounter Summary   Services provided to: Patient   Referral/Consult From: Palliative Care   Complexity of Encounter Low   Length of Encounter 15 minutes   Spiritual/Yazidi   Type Spiritual support   Assessment Sleeping   Intervention Prayer

## 2021-08-09 NOTE — PROGRESS NOTES
[facility] [address]                 PATIENT NAME: Mignon Garcia     TODAY'S DATE: 8/9/2021, 9:25 AM    SUBJECTIVE:  POD#6 PEG  Pt seen and examined. Afebrile, VSS. Leukocytosis improved, hemoglobin stable. Patient not following commands, not opening eyes to voice. Neurology following. Discussed with nursing staff. Bowels are moving. Patient was tolerating tube feeds however NPO after midnight for IZABELLA today. PEG site clean. OBJECTIVE:   VITALS:  /61   Pulse 58   Temp 98.1 °F (36.7 °C) (Axillary)   Resp 20   Ht 5' 10\" (1.778 m)   Wt 212 lb 1.3 oz (96.2 kg)   SpO2 96%   BMI 30.43 kg/m²      INTAKE/OUTPUT:      Intake/Output Summary (Last 24 hours) at 8/9/2021 0925  Last data filed at 8/9/2021 0452  Gross per 24 hour   Intake 280 ml   Output 150 ml   Net 130 ml                 CONSTITUTIONAL:  Not responding to verbal stimuli, somnolent. No acute distress  HEART:   RRR  LUNGS:   Diminished throughout   ABDOMEN:   Abdomen soft, non-tender, non-distended, PEG site clean  EXTREMITIES:   Non-tender b/l    Data:  CBC:   Lab Results   Component Value Date    WBC 11.4 08/09/2021    RBC 4.48 08/09/2021    RBC 4.88 05/03/2012    HGB 10.7 08/09/2021    HCT 35.2 08/09/2021    MCV 78.7 08/09/2021    MCH 23.9 08/09/2021    MCHC 30.4 08/09/2021    RDW 17.9 08/09/2021     08/09/2021     05/03/2012    MPV 10.9 08/09/2021     BMP:    Lab Results   Component Value Date     08/09/2021    K 5.8 08/09/2021     08/09/2021    CO2 27 08/09/2021    BUN 31 08/09/2021    LABALBU 2.9 08/09/2021    CREATININE 1.96 08/09/2021    CALCIUM 9.2 08/09/2021    GFRAA 41 08/09/2021    LABGLOM 34 08/09/2021    GLUCOSE 154 08/09/2021    GLUCOSE 122 05/03/2012       Radiology Review:  No new images to review       ASSESSMENT     Active Problems:    Encephalopathy    Cerebral multi-infarct state  Resolved Problems:    * No resolved hospital problems. *      Plan  1.  NPO for IZABELLA today  2. Restart tube feeds following procedure  3. Surgically stable  4. Continue medical management   5. Patient was seen and examined. Events noted. IZABELLA was canceled. PEG site is clean. Abdomen is soft. Restart tube feeds.       Electronically signed by Buford Mortimer, PA-C  [id number] [address]

## 2021-08-10 NOTE — PROGRESS NOTES
Patient transported back to [facility]. No change to assessment.

## 2021-08-10 NOTE — PROGRESS NOTES
Procedure cancelled after Dr Nandini Evans in to assess patient and discussed with Dr Casey Soto. Transportation arrangements made for return trip back to [facility]. Yahir Sher RN called to update.

## 2021-08-10 NOTE — PLAN OF CARE
Problem: Non-Violent Restraints  Goal: Removal from restraints as soon as assessed to be safe  8/10/2021 0116 by Clarita Robert RN  Outcome: Ongoing  8/9/2021 1737 by Liam Viramontes RN  Outcome: Ongoing  Goal: No harm/injury to patient while restraints in use  8/10/2021 0116 by Clarita Robert RN  Outcome: Ongoing  8/9/2021 1737 by Liam Viramontes RN  Outcome: Ongoing  Goal: Patient's dignity will be maintained  8/10/2021 0116 by Clarita Robert RN  Outcome: Ongoing  8/9/2021 1737 by Liam Viramontes RN  Outcome: Ongoing     Problem: Infection:  Goal: Will remain free from infection  Description: Will remain free from infection  8/10/2021 0116 by Clarita Robert RN  Outcome: Ongoing  8/9/2021 1737 by Liam Viramontes RN  Outcome: Ongoing     Problem: Safety:  Goal: Free from accidental physical injury  Description: Free from accidental physical injury  8/10/2021 0116 by Clarita Robert RN  Outcome: Ongoing  8/9/2021 1737 by Liam Viramontes RN  Outcome: Ongoing  Goal: Free from intentional harm  Description: Free from intentional harm  8/10/2021 0116 by Clarita Robert RN  Outcome: Ongoing  8/9/2021 1737 by Liam Viramontes RN  Outcome: Ongoing     Problem: Daily Care:  Goal: Daily care needs are met  Description: Daily care needs are met  8/10/2021 0116 by Clarita Robert RN  Outcome: Ongoing  8/9/2021 1737 by Liam Viramontes RN  Outcome: Ongoing     Problem: Pain:  Goal: Patient's pain/discomfort is manageable  Description: Patient's pain/discomfort is manageable  8/10/2021 0116 by Clarita Robert RN  Outcome: Ongoing  8/9/2021 1737 by Liam Viramontes RN  Outcome: Ongoing     Problem: Skin Integrity:  Goal: Skin integrity will stabilize  Description: Skin integrity will stabilize  8/10/2021 0116 by Clarita Robert RN  Outcome: Ongoing  8/9/2021 1737 by Liam Viramontes RN  Outcome: Ongoing     Problem: Discharge Planning:  Goal: Patients continuum of care needs are met  Description: Patients continuum of care needs are met  8/10/2021 0116 by Jose Goins RN  Outcome: Ongoing  8/9/2021 1737 by Aakash Casper RN  Outcome: Ongoing     Problem: Nutrition  Goal: Optimal nutrition therapy  8/10/2021 0116 by Jose Goins RN  Outcome: Ongoing  8/9/2021 1737 by Aakash Casper RN  Outcome: Ongoing     Problem: Falls - Risk of:  Goal: Will remain free from falls  Description: Will remain free from falls  Outcome: Ongoing  Goal: Absence of physical injury  Description: Absence of physical injury  Outcome: Ongoing

## 2021-08-10 NOTE — PROGRESS NOTES
This worker discussed case with BARBARA Adams and Palliative RN Katelynn. Angley Soria was instrumental in trying to contact pt's daughter (to no avail) and getting expert evaluation completed for pt's sister to get guardianship. Katelynn has palliative care meeting arranged for tomorrow with pt's sister.

## 2021-08-10 NOTE — PROGRESS NOTES
Neurology ---     Patient currently at [facility] receiving IZABELLA at this time. I will attempt to call his Navneet Guzman and give her an update regarding his neurologic condition. There is a meeting plan for hospice tomorrow morning, as discussed with Katelynn palliative care nurse RN. Also if all measures of trying to obtain contact with his daughter are exhausted the sister Delaney Spain will likely take over as legal POA if she can obtain guardianship.     Gregg Nelson, [address] [facility]  Neurology

## 2021-08-10 NOTE — PROGRESS NOTES
[place]  Speech Language Pathology    Date: 8/10/2021  Patient Name: Ceferino Peralta  YOB: 1945   AGE: 77 y.o. MRN: [id number]        Patient Not Available for Speech Therapy     Due to:  [] Testing  [] Hemodialysis  [] Cancelled by RN  [] Surgery   [] Intubation/Sedation/Pain Medication  [] Medical instability  [x] Other:   Attempted evaluation this PM, Pt. at [facility] receiving IZABELLA at this time. RN reports that palliative care is on board. ST to continue to follow. Next scheduled treatment: as able    Completed by:  Darrel Alicea, SLP, M.A., CCC-SLP

## 2021-08-10 NOTE — PLAN OF CARE
Safety maintained, call light is within reach, no s/s or c/o distress, current skin integrity maintained, bed alarm remains on, side rails are up x2  Problem: Non-Violent Restraints  Goal: Removal from restraints as soon as assessed to be safe  Outcome: Ongoing  Goal: No harm/injury to patient while restraints in use  Outcome: Ongoing  Goal: Patient's dignity will be maintained  Outcome: Ongoing     Problem: Infection:  Goal: Will remain free from infection  Description: Will remain free from infection  Outcome: Ongoing     Problem: Safety:  Goal: Free from accidental physical injury  Description: Free from accidental physical injury  Outcome: Ongoing  Goal: Free from intentional harm  Description: Free from intentional harm  Outcome: Ongoing     Problem: Daily Care:  Goal: Daily care needs are met  Description: Daily care needs are met  Outcome: Ongoing     Problem: Skin Integrity:  Goal: Skin integrity will stabilize  Description: Skin integrity will stabilize  Outcome: Ongoing     Problem: Pain:  Goal: Patient's pain/discomfort is manageable  Description: Patient's pain/discomfort is manageable  Outcome: Ongoing

## 2021-08-10 NOTE — PROGRESS NOTES
Patient seen and examined. Afebrile, VSS. No leukocytosis, hemoglobin stable. POD#7 PEG tube placement. Patient not responsive, discussed with nursing staff. Patient returned from [facility]; procedure reportedly not performed due to patient condition. Tube feeds to be restarted. PEG site is clean. Abdomen is soft, non-distended. Surgically stable. Palliative care meeting tomorrow with patient's sister. Continue supportive care. Patient was seen and examined. Did not have a IZABELLA done. Resume tube feeds. Abdomen is soft. PEG site is clean.     Gerson Camp PA-C  [address] [facility] Surgery

## 2021-08-10 NOTE — PROGRESS NOTES
disease) ([facility] Depression, Diabetes mellitus ([facility] Former smoker, Hyperlipidemia, Hypertension, Mixed restrictive and obstructive lung disease ([facility] Need for pneumococcal vaccine, and Personal history of tobacco use. Past Surgical History:   has a past surgical history that includes Neck surgery; Toe amputation (Right, greater); Cardiac surgery (07/2015); and Upper gastrointestinal endoscopy (N/A, 8/3/2021). Medications:    Prior to Admission medications    Medication Sig Start Date End Date Taking?  Authorizing Provider   Heparin Sodium, Porcine, (HEPARIN, PORCINE,) 5000 UNIT/ML injection Inject 1 mL into the skin every 8 hours 7/20/21   Ilana Mcdonald MD   QUEtiapine (SEROQUEL) 25 MG tablet Take 1 tablet by mouth nightly as needed for Agitation 7/20/21 7/25/21  Ilana Mcdonald MD   methylPREDNISolone sodium (SOLU-MEDROL) 40 MG injection Infuse 1 mL intravenously every 12 hours 7/20/21   Ilana Mcdonald MD   melatonin 3 MG TABS tablet Take 1 tablet by mouth nightly as needed (insomnia) 7/20/21   Ilana Mcdonald MD   atorvastatin (LIPITOR) 80 MG tablet Take 0.5 tablets by mouth daily (Pt takes one-half of an 80mg tab = 40mg) 7/16/21   Ilana Mcdonald MD   tiZANidine (ZANAFLEX) 2 MG tablet Take 1 tablet by mouth 4 times daily as needed (muscle spasms) 5/7/21   JOLIE Flores - CNP   naproxen (NAPROSYN) 500 MG tablet Take 1 tablet by mouth 2 times daily (with meals) 1/4/21   Lito Narayan PA-C   ibuprofen (ADVIL;MOTRIN) 800 MG tablet Take 1 tablet by mouth every 8 hours as needed for Pain 6/2/20   Anali Rae MD   lidocaine (LIDODERM) 5 % Place 1 patch onto the skin daily 12 hours on, 12 hours off. 6/2/20   Anali Rae MD   metoprolol succinate (TOPROL XL) 50 MG extended release tablet Take 50 mg by mouth daily    Historical Provider, MD   tiotropium (SPIRIVA RESPIMAT) 2.5 MCG/ACT AERS inhaler Inhale 2 puffs into the lungs daily    Historical Provider, MD   carboxymethylcellulose 1 % ophthalmic solution Place 1 drop into both eyes 3 times daily as needed for Dry Eyes     Historical Provider, MD   ketotifen (ZADITOR) 0.025 % ophthalmic solution Place 1 drop into both eyes 2 times daily as needed (itchy eyes)     Historical Provider, MD   isosorbide mononitrate (IMDUR) 60 MG extended release tablet Take 30 mg by mouth daily Indications: 1/2 tablet (30mg)     Historical Provider, MD   finasteride (PROSCAR) 5 MG tablet Take 5 mg by mouth daily    Historical Provider, MD   tamsulosin (FLOMAX) 0.4 MG capsule Take 0.4 mg by mouth daily    Historical Provider, MD   fluticasone (FLONASE) 50 MCG/ACT nasal spray 1 spray by Nasal route 2 times daily  Patient taking differently: 1 spray by Nasal route 2 times daily as needed for Rhinitis  5/31/16   Francisco Connor DO   albuterol sulfate  (90 BASE) MCG/ACT inhaler Inhale 2 puffs into the lungs every 6 hours as needed for Wheezing    Historical Provider, MD   albuterol (PROVENTIL) (2.5 MG/3ML) 0.083% nebulizer solution Take 2.5 mg by nebulization every 6 hours as needed for Wheezing    Historical Provider, MD   aspirin 81 MG EC tablet Take 81 mg by mouth daily    Historical Provider, MD   losartan (COZAAR) 100 MG tablet Take 100 mg by mouth daily     Historical Provider, MD   nitroGLYCERIN (NITROSTAT) 0.4 MG SL tablet Place 0.4 mg under the tongue every 5 minutes as needed for Chest pain    Historical Provider, MD   FLUoxetine (PROZAC) 20 MG capsule Take 40 mg by mouth daily     Historical Provider, MD   furosemide (LASIX) 20 MG tablet Take 20 mg by mouth daily as needed (swelling)     Historical Provider, MD   clopidogrel (PLAVIX) 75 MG tablet Take 75 mg by mouth daily    Historical Provider, MD   rOPINIRole (REQUIP) 0.25 MG tablet Take 0.25 mg by mouth nightly as needed     Historical Provider, MD   budesonide-formoterol (SYMBICORT) 160-4.5 MCG/ACT AERO Inhale 2 puffs into the lungs 2 times daily.     Historical Provider, MD     Current Facility-Administered Medications   Medication Dose Route Frequency Provider Last Rate Last Admin    hydrALAZINE (APRESOLINE) injection 10 mg  10 mg Intravenous Q8H PRN Liz Mullins MD        acetaminophen (TYLENOL) tablet 650 mg  650 mg Oral Q4H PRN Pk Rosen MD        bisacodyl (DULCOLAX) suppository 10 mg  10 mg Rectal Daily PRN Pk Rosen MD        polyethylene glycol [facility]) packet 17 g  17 g Per G Tube Daily PRN Pk Rosen MD        [facility]) tablet 17.2 mg  2 tablet Oral Daily PRN Pk Rosen MD        albuterol sulfate  (90 Base) MCG/ACT inhaler 2 puff  2 puff Inhalation Q6H PRN Pk Rosen MD        aspirin chewable tablet 81 mg  81 mg PEG Tube Daily Pk Rosen MD        dextrose 5 % solution  100 mL/hr Intravenous PRN Pk Rosen MD        dextrose 50 % IV solution  12.5 g Intravenous PRN Pk Rosen MD        famotidine (PEPCID) tablet 20 mg  20 mg PEG Tube Daily Pk Rosen MD        finasteride (PROSCAR) tablet 5 mg  5 mg PEG Tube Daily Pk Rosen MD        FLUoxetine (PROZAC) 20 MG/5ML solution 40 mg  40 mg PEG Tube Daily Pk Rosen MD        glucagon (rDNA) injection 1 mg  1 mg Intramuscular PRN Pk Rosen MD        glucose (GLUTOSE) 40 % oral gel 15 g  15 g Oral PRN Pk Rosen MD        Shelby Sieve by provider] hydrALAZINE (APRESOLINE) tablet 10 mg  10 mg Oral 3 times per day Pk Rosen MD   10 mg at 08/09/21 4266    insulin lispro (HUMALOG) injection vial 0-6 Units  0-6 Units Subcutaneous 4 times per day Pk Rosen MD   1 Units at 08/10/21 0043    [Held by provider] isosorbide dinitrate (ISORDIL) tablet 10 mg  10 mg PEG Tube TID Pk Rosen MD   10 mg at 08/08/21 1543    [Held by provider] losartan (COZAAR) tablet 50 mg  50 mg Oral Daily Vince Reed Chema Neely MD        melatonin tablet 6 mg  6 mg PEG Tube Nightly Simi Rolle MD   6 mg at 21    OLANZapine (ZYPREXA) tablet 5 mg  5 mg PEG Tube Nightly Simi Rolle MD   5 mg at 21    rosuvastatin (CRESTOR) tablet 40 mg  40 mg PEG Tube Nightly Simi Rolle MD   40 mg at 21    tiotropium (SPIRIVA RESPIMAT) 2.5 MCG/ACT inhaler 2 puff  2 puff Inhalation Lunch Simi Rolle MD   2 puff at 21 1200    ampicillin-sulbactam (UNASYN) 1500 mg IVPB minibag  1,500 mg Intravenous Q6H Morenita Glover MD   Stopped at 08/10/21 0603    heparin (porcine) injection 4,000 Units  4,000 Units Intravenous PRN Simi Rolle MD        heparin (porcine) injection 2,000 Units  2,000 Units Intravenous PRN Simi Rolle MD        heparin 25,000 units in dextrose 5% 250 mL (premix) infusion  10.9 Units/kg/hr Intravenous Continuous Simi Rolle MD 9 mL/hr at 21 9.9 Units/kg/hr at 21       Allergies:  Patient has no known allergies. Social History:   reports that he quit smoking about 8 years ago. He started smoking about 64 years ago. He has a 42.00 pack-year smoking history. He has never used smokeless tobacco. He reports that he does not drink alcohol and does not use drugs.      Family History: Unable to obtain due to altered mental status    REVIEW OF SYSTEMS:      Unable to obtain review of system due to patient being nonverbal    PHYSICAL EXAM:        /89   Pulse 80   Temp 98.3 °F (36.8 °C) (Axillary)   Resp 18   Ht 5' 10\" (1.778 m)   Wt 215 lb 6.2 oz (97.7 kg)   SpO2 94%   BMI 30.91 kg/m²    Temp (24hrs), Av.6 °F (37 °C), Min:98.3 °F (36.8 °C), Max:99 °F (37.2 °C)      General appearance -ill-appearing  Mental status -not alert or cooperative  Eyes -pupils reactive  Ears - bilateral TM's and external ear canals normal   Mouth - mucous membranes moist, pharynx normal without lesions Neck - supple, no significant adenopathy   Lymphatics - no palpable lymphadenopathy, no hepatosplenomegaly   Chest -decreased breathing sounds  Heart - normal rate, regular rhythm, normal S1, S2, no murmurs  Abdomen - soft, nontender, nondistended, no masses or organomegaly   Neurological -patient is not alert awake oriented does not follow commands  Musculoskeletal - no joint tenderness, deformity or swelling   Extremities - peripheral pulses normal, no pedal edema, no clubbing or cyanosis   Skin - normal coloration and turgor, no rashes, no suspicious skin lesions noted ,      DATA:      Labs:     Results for orders placed or performed during the hospital encounter of 08/08/21   Culture, Blood 1    Specimen: Blood   Result Value Ref Range    Specimen Description . BLOOD  LEFT AC, NO VOLUMES LISTED     Special Requests NOT REPORTED     Culture NO GROWTH 2 DAYS    Culture, Blood 1    Specimen: Blood   Result Value Ref Range    Specimen Description . BLOOD  LEFT AC, NO VOLUMES LISTED     Special Requests NOT REPORTED     Culture NO GROWTH 2 DAYS    Legionella Ag, Ur    Specimen: Urine, straight catheter   Result Value Ref Range    Legionella Pneumophilia Ag, Urine NEGATIVE    STREP PNEUMONIAE ANTIGEN    Specimen: Urine, straight catheter   Result Value Ref Range    Source . CLEAN CATCH URINE     Strep pneumo Ag NEGATIVE    Urinalysis Reflex to Culture    Specimen: Urine, clean catch   Result Value Ref Range    Color, UA YELLOW YELLOW    Turbidity UA CLEAR CLEAR    Glucose, Ur NEGATIVE NEGATIVE    Bilirubin Urine NEGATIVE NEGATIVE    Ketones, Urine NEGATIVE NEGATIVE    Specific Gravity, UA 1.018 1.000 - 1.030    Urine Hgb NEGATIVE NEGATIVE    pH, UA 6.5 5.0 - 8.0    Protein, UA 1+ (A) NEGATIVE    Urobilinogen, Urine Normal Normal    Nitrite, Urine NEGATIVE NEGATIVE    Leukocyte Esterase, Urine NEGATIVE NEGATIVE    Urinalysis Comments NOT REPORTED    Procalcitonin   Result Value Ref Range    Procalcitonin 0.26 (H) <0.09 ng/mL   Mycoplasma Ab,IgM   Result Value Ref Range    Mycoplasma pneumo IgM 0.24 <0.91   Lactic Acid   Result Value Ref Range    Lactic Acid 2.0 0.5 - 2.2 mmol/L   CBC   Result Value Ref Range    WBC 11.4 (H) 3.5 - 11.0 k/uL    RBC 4.48 (L) 4.5 - 5.9 m/uL    Hemoglobin 10.7 (L) 13.5 - 17.5 g/dL    Hematocrit 35.2 (L) 41 - 53 %    MCV 78.7 (L) 80 - 100 fL    MCH 23.9 (L) 26 - 34 pg    MCHC 30.4 (L) 31 - 37 g/dL    RDW 17.9 (H) 11.5 - 14.9 %    Platelets 842 (L) 904 - 450 k/uL    MPV 10.9 6.0 - 12.0 fL    NRBC Automated NOT REPORTED per 100 WBC   HEPATIC FUNCTION PANEL   Result Value Ref Range    Albumin 2.9 (L) 3.5 - 5.2 g/dL    Alkaline Phosphatase 184 (H) 40 - 129 U/L     (H) 5 - 41 U/L     (H) <40 U/L    Total Bilirubin 0.21 (L) 0.3 - 1.2 mg/dL    Bilirubin, Direct 0.10 <0.31 mg/dL    Bilirubin, Indirect 0.11 0.00 - 1.00 mg/dL    Total Protein 6.3 (L) 6.4 - 8.3 g/dL    Globulin NOT REPORTED 1.5 - 3.8 g/dL    Albumin/Globulin Ratio NOT REPORTED 1.0 - 2.5   CBC   Result Value Ref Range    WBC 14.4 (H) 3.5 - 11.0 k/uL    RBC 4.38 (L) 4.5 - 5.9 m/uL    Hemoglobin 10.7 (L) 13.5 - 17.5 g/dL    Hematocrit 34.6 (L) 41 - 53 %    MCV 78.9 (L) 80 - 100 fL    MCH 24.3 (L) 26 - 34 pg    MCHC 30.9 (L) 31 - 37 g/dL    RDW 17.8 (H) 11.5 - 14.9 %    Platelets 385 (L) 156 - 450 k/uL    MPV 10.8 6.0 - 12.0 fL    NRBC Automated NOT REPORTED per 100 WBC   APTT   Result Value Ref Range    PTT 45.7 (H) 24.0 - 36.0 sec   Protime-INR   Result Value Ref Range    Protime 14.7 (H) 11.8 - 14.6 sec    INR 1.1    Basic Metabolic Panel   Result Value Ref Range    Glucose 154 (H) 70 - 99 mg/dL    BUN 31 (H) 8 - 23 mg/dL    CREATININE 1.96 (H) 0.70 - 1.20 mg/dL    Bun/Cre Ratio NOT REPORTED 9 - 20    Calcium 9.2 8.6 - 10.4 mg/dL    Sodium 147 (H) 135 - 144 mmol/L    Potassium 5.8 (H) 3.7 - 5.3 mmol/L    Chloride 111 (H) 98 - 107 mmol/L    CO2 27 20 - 31 mmol/L    Anion Gap 9 9 - 17 mmol/L    GFR Non-African American 34 (L) >60 mL/min Position NOT REPORTED     Mode NOT REPORTED     Set Rate NOT REPORTED     Total Rate NOT REPORTED     VT NOT REPORTED     FIO2 NOT REPORTED     Peep/Cpap NOT REPORTED     PSV NOT REPORTED     Text for Respiratory NOT REPORTED     NOTIFICATION NOT REPORTED     NOTIFICATION TIME NOT REPORTED    CK   Result Value Ref Range    Total  39 - 308 U/L   Myoglobin   Result Value Ref Range    Myoglobin 280 (H) 28 - 72 ng/mL   Ammonia   Result Value Ref Range    Ammonia 18 16 - 60 umol/L   HEPARIN LEVEL/ANTI-XA   Result Value Ref Range    Anti-XA Unfrac Heparin 0.37 0.30 - 0.70 IU/L   Microscopic Urinalysis   Result Value Ref Range    -          WBC, UA 2 TO 5 /HPF    RBC, UA 0 TO 2 /HPF    Casts UA NOT REPORTED /LPF    Crystals, UA NOT REPORTED None /HPF    Epithelial Cells UA 0 TO 2 /HPF    Renal Epithelial, UA NOT REPORTED 0 /HPF    Bacteria, UA FEW (A) None    Mucus, UA NOT REPORTED None    Trichomonas, UA NOT REPORTED None    Amorphous, UA 1+ (A) None    Other Observations UA NOT REPORTED NOT REQ.     Yeast, UA NOT REPORTED None   Potassium   Result Value Ref Range    Potassium 5.3 3.7 - 5.3 mmol/L   HEPARIN LEVEL/ANTI-XA   Result Value Ref Range    Anti-XA Unfrac Heparin 0.39 0.30 - 0.70 IU/L   Basic Metabolic Panel w/ Reflex to MG   Result Value Ref Range    Glucose 127 (H) 70 - 99 mg/dL    BUN 25 (H) 8 - 23 mg/dL    CREATININE 1.45 (H) 0.70 - 1.20 mg/dL    Bun/Cre Ratio NOT REPORTED 9 - 20    Calcium 8.7 8.6 - 10.4 mg/dL    Sodium 139 135 - 144 mmol/L    Potassium 4.4 3.7 - 5.3 mmol/L    Chloride 105 98 - 107 mmol/L    CO2 25 20 - 31 mmol/L    Anion Gap 9 9 - 17 mmol/L    GFR Non-African American 48 (L) >60 mL/min    GFR  58 (L) >60 mL/min    GFR Comment          GFR Staging NOT REPORTED    CBC   Result Value Ref Range    WBC 9.3 3.5 - 11.0 k/uL    RBC 4.48 (L) 4.5 - 5.9 m/uL    Hemoglobin 10.9 (L) 13.5 - 17.5 g/dL    Hematocrit 35.4 (L) 41 - 53 %    MCV 79.0 (L) 80 - 100 fL    MCH 24.4 (L) 26 - 34 pg    MCHC 30.9 (L) 31 - 37 g/dL    RDW 18.2 (H) 11.5 - 14.9 %    Platelets 675 (L) 298 - 450 k/uL    MPV 12.0 6.0 - 12.0 fL    NRBC Automated NOT REPORTED per 100 WBC   POC Glucose Fingerstick   Result Value Ref Range    POC Glucose 207 (H) 75 - 110 mg/dL   POC Glucose Fingerstick   Result Value Ref Range    POC Glucose 236 (H) 75 - 110 mg/dL   POC Glucose Fingerstick   Result Value Ref Range    POC Glucose 198 (H) 75 - 110 mg/dL   POC Glucose Fingerstick   Result Value Ref Range    POC Glucose 170 (H) 75 - 110 mg/dL   POC Glucose Fingerstick   Result Value Ref Range    POC Glucose 130 (H) 75 - 110 mg/dL   POC Glucose Fingerstick   Result Value Ref Range    POC Glucose 133 (H) 75 - 110 mg/dL   POC Glucose Fingerstick   Result Value Ref Range    POC Glucose 141 (H) 75 - 110 mg/dL   POC Glucose Fingerstick   Result Value Ref Range    POC Glucose 123 (H) 75 - 110 mg/dL   EKG 12 Lead   Result Value Ref Range    Ventricular Rate 54 BPM    Atrial Rate 54 BPM    P-R Interval 146 ms    QRS Duration 92 ms    Q-T Interval 426 ms    QTc Calculation (Bazett) 403 ms    P Axis 65 degrees    R Axis -35 degrees    T Axis 68 degrees         IMAGING DATA:    ECHO Complete 2D W Doppler W Color    Result Date: 7/15/2021  Transthoracic Echocardiography Report (TTE)  Patient Name GOFF      Date of Study               07/15/2021               YAYA DUBOIS   Date of      1945  Gender                      Male  Birth   Age          77 year(s)  Race                        Black   Room Number  0536        Height:                     70 inch, 177.8 cm   Corporate ID [id number]    Weight:                     210 pounds, 95.3 kg  #   Patient Acct [de-identified]   BSA:          2.13 m^2      BMI:       30.13  #                                                               kg/m^2   MR #         [id number]     Sonographer                 Ancelmo Naylor   Accession #  [id number]  Interpreting Physician      Yolande Vance   Fellow Referring Nurse                           Practitioner   Interpreting             Referring Physician         Vlad Godfrey MD  Fellow  Additional Comments Technically difficult study, patient supine unable to be positioned for better acoustic windows. Type of Study   TTE procedure:2D Echocardiogram, M-Mode, Doppler, Color Doppler. Procedure Date Date: 07/15/2021 Start: 08:29 AM Study Location: [facility] Technical Quality: Adequate visualization Indications:CVA. History / Tech. Comments: Procedure explained to patient. Echo completed in the Echo Lab. RN performed bubble study. PMHx: Former smoker, COPD Hypertension, Diabetes, Hyperlipidemia Coronary artery disease, s/p stents Patient Status: Inpatient Height: 70 inches Weight: 210 pounds BSA: 2.13 m^2 BMI: 30.13 kg/m^2 CONCLUSIONS Summary Technically difficult study. Overall global left ventricular systolic function is normal, calculated ejection fraction is 50-55% Grade I (mild) left ventricular diastolic dysfunction. Technically difficult visualization of the right ventricle, but it does appear to be normal in size. Negative bubble study, no obvious intracardiac shunt noted within technical limitations of this study. No significant valvular regurgitation or stenosis seen. No significant pericardial effusion is seen. Signature ----------------------------------------------------------------------------  Electronically signed by Joanne Iglesias(Sonographer) on 07/15/2021 11:00 AM ---------------------------------------------------------------------------- ----------------------------------------------------------------------------  Electronically signed by Yolande Vance(Interpreting physician) on  07/15/2021 12:49 PM ---------------------------------------------------------------------------- FINDINGS Left Atrium Left atrium is normal in size. Inter-atrial septum is intact with no evidence for an atrial septal defect.  Negative bubble study, no shunt noted. Left Ventricle Left ventricle is normal in size, normal wall thickness, global left ventricular systolic function is low normal, calculated ejection fraction is 50%. Grade I (mild) left ventricular diastolic dysfunction. Right Atrium Right atrium is normal in size. Right Ventricle Technically difficult visualization of the right ventricle, but it does appear to be normal in size. Mitral Valve Normal mitral valve structure. No mitral stenosis. Trivial mitral regurgitation. Aortic Valve Aortic valve is trileaflet. No evidence of aortic insufficiency or stenosis. Tricuspid Valve Tricuspid valve was not well visualized. Trivial tricuspid regurgitation. Insignificant tricuspid regurgitation, unable to estimate RVSP. Pulmonic Valve Pulmonic valve not well visualized but Doppler velocities are normal. Trivial pulmonic insufficiency. Pericardial Effusion No significant pericardial effusion is seen. Miscellaneous Normal aortic root dimension. E/E' average = 17.35. IVC not well visualized.  M-mode / 2D Measurements & Calculations:   LVIDd:5.6 cm(3.7 - 5.6 cm)        Diastolic SWDWTY:10.39 ml  IVSd:0.9 cm(0.6 - 1.1 cm)         Systolic AMFPWB:40.13 ml  LVPWd:0.8 cm(0.6 - 1.1 cm)        Aortic Root:3 cm(2.0 - 3.7 cm)                                    LA Dimension: 3.2 cm(1.9 - 4.0 cm)  Calculated LVEF (%): 50.46 %      LA volume/Index: 48.86 ml /23m^2                                    LVOT:2.1 cm                                    RVDd:3 cm   Mitral:                                 Aortic   Valve Area (P1/2-Time): 2.65 cm^2       Peak Velocity: 1.91 m/s  Peak E-Wave: 1.18 m/s                   Mean Velocity: 1.06 m/s  Peak A-Wave: 1.47 m/s                   Peak Gradient: 14.59 mmHg  E/A Ratio: 0.8                          Mean Gradient: 6 mmHg  Peak Gradient: 5.57 mmHg  Mean Gradient: 2 mmHg  Deceleration Time: 280 msec             Area (continuity): 2.83 cm^2  P1/2t: 83 msec                          AV VTI: 41.8 cm   Area (continuity): 2.2 cm^2  Mean Velocity: 0.62 m/s                                           Pulmonic:                                           Peak Velocity: 1.29 m/s                                          Peak Gradient: 6.66 mmHg  Diastology / Tissue Doppler Septal Wall E' velocity:0.06 m/s Septal Wall E/E':20.8 Lateral Wall E' velocity:0.08 m/s Lateral Wall E/E':13.9    CT HEAD WO CONTRAST    Result Date: 8/9/2021  EXAMINATION: CT OF THE HEAD WITHOUT CONTRAST  8/9/2021 2:04 pm TECHNIQUE: CT of the head was performed without the administration of intravenous contrast. Dose modulation, iterative reconstruction, and/or weight based adjustment of the mA/kV was utilized to reduce the radiation dose to as low as reasonably achievable. COMPARISON: MRI brain performed 08/07/2021. HISTORY: ORDERING SYSTEM PROVIDED HISTORY: stroke TECHNOLOGIST PROVIDED HISTORY: stroke Reason for Exam: stroke; ams Acuity: Unknown Type of Exam: Unknown Additional signs and symptoms: patient unable to stay still; turned head during exam FINDINGS: BRAIN/VENTRICLES: There is no acute intracranial hemorrhage, mass effect, or midline shift. There is satisfactory overall gray-white matter differentiation. There is extensive chronic microvascular disease. There are evolving areas of ischemia in the left periventricular white matter, and corpus callosum posteriorly, in the right basal ganglia. The ventricular structures are symmetric and unremarkable. The infratentorial structures are unremarkable. ORBITS: The visualized portion of the orbits demonstrate no acute abnormality. SINUSES: The visualized paranasal sinuses and mastoid air cells demonstrate no acute abnormality. SOFT TISSUES/SKULL:  No acute abnormality of the visualized skull or soft tissues. Chronic microvascular disease with scattered areas of evolving ischemia as described above.      CT HEAD WO CONTRAST    Result Date: 8/6/2021  EXAMINATION: CT OF THE HEAD WITHOUT CONTRAST  8/6/2021 2:21 pm TECHNIQUE: CT of the head was performed without the administration of intravenous contrast. Dose modulation, iterative reconstruction, and/or weight based adjustment of the mA/kV was utilized to reduce the radiation dose to as low as reasonably achievable. COMPARISON: 4 August 2021 HISTORY: ORDERING SYSTEM PROVIDED HISTORY: Pt w/ bilateral basal ganglia infarcts, right hemiparesis; minimally interactive over the last week and having increased lethargy the last few days. TECHNOLOGIST PROVIDED HISTORY: Pt w/ bilateral basal ganglia infarcts, right hemiparesis; minimally interactive over the last week and having increased lethargy the last few days. Reason for Exam: Increased lethargy the last few days. Acuity: Unknown Type of Exam: Unknown FINDINGS: BRAIN/VENTRICLES: There is no acute intracranial hemorrhage, mass effect or midline shift. No abnormal extra-axial fluid collection. The gray-white differentiation is maintained without evidence of an acute infarct. There is no evidence of hydrocephalus. Remote right-sided basal ganglial infarcts are noted. However, there is been interval development of hypodensity in the right mid basal ganglia since prior study consistent with an evolving subacute infarct. No significant mass effect is noted. Ventricles are proportionate to cerebral atrophy. ORBITS: The visualized portion of the orbits demonstrate no acute abnormality. SINUSES: The visualized paranasal sinuses and mastoid air cells demonstrate no acute abnormality. SOFT TISSUES/SKULL:  No acute abnormality of the visualized skull or soft tissues. There has been interval development of a hypodensity in the right basal ganglia compared to prior study consistent with evolving subacute white matter infarct. No hemorrhage is noted. Chronic microvascular change in atrophy is demonstrated. No midline shift.      CT HEAD WO CONTRAST    Result Date: 8/4/2021  EXAMINATION: CT OF THE HEAD WITHOUT CONTRAST  8/4/2021 1:25 pm TECHNIQUE: CT of the head was performed without the administration of intravenous contrast. Dose modulation, iterative reconstruction, and/or weight based adjustment of the mA/kV was utilized to reduce the radiation dose to as low as reasonably achievable. COMPARISON: CT head 07/21/2021 and 07/17/2021 HISTORY: ORDERING SYSTEM PROVIDED HISTORY: Patient with history of left-sided CVA with right hemiparesis, not communicating with staff, please evaluate for any change from previous imaging and any other abnormality TECHNOLOGIST PROVIDED HISTORY: Patient with history of left-sided CVA with right hemiparesis, not communicating with staff, please evaluate for any change from previous imaging and any other abnormality Reason for Exam: Patient with history of left-sided CVA with right hemiparesis, not communicating with staff, please evaluate for any change from previous imaging and any other abnormality Acuity: Unknown Type of Exam: Unknown FINDINGS: BRAIN/VENTRICLES: There is no acute intracranial hemorrhage, mass effect or midline shift. No abnormal extra-axial fluid collection. The gray-white differentiation is maintained without evidence of an acute infarct. There is prominence of the ventricles and sulci due to global parenchymal volume loss. There are nonspecific areas of hypoattenuation within the periventricular and subcortical white matter, which likely represent chronic microvascular ischemic change. 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 Remote lacunar stroke left caudate lobe. ORBITS: The visualized portion of the orbits demonstrate no acute abnormality. SINUSES: The visualized paranasal sinuses and mastoid air cells demonstrate no acute abnormality. SOFT TISSUES/SKULL: No acute abnormality of the visualized skull or soft tissues. 1. No acute intracranial abnormality.  2. 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 Remote lacunar stroke left caudate lobe. 3. Senescent changes. White matter hypoattenuation described is typical of microvascular ischemic disease or as sequela of dysmyelinating/demyelinating processes. CT HEAD WO CONTRAST    Result Date: 7/21/2021  EXAMINATION: CT OF THE HEAD WITHOUT CONTRAST  7/21/2021 11:53 am TECHNIQUE: CT of the head was performed without the administration of intravenous contrast. Dose modulation, iterative reconstruction, and/or weight based adjustment of the mA/kV was utilized to reduce the radiation dose to as low as reasonably achievable. COMPARISON: July 17, 2021, MRI July 14, 2021 HISTORY: ORDERING SYSTEM PROVIDED HISTORY: Altered mental status TECHNOLOGIST PROVIDED HISTORY: eval for change in status Reason for Exam: EVAL FOR CHANGE IN STATUS Type of Exam: Subsequent/Follow-up Additional signs and symptoms: PT BECAME AGTATED & COMBATIVE OVERNIGHT Relevant Medical/Surgical History: HX STROKE FINDINGS: BRAIN/VENTRICLES: No acute intracranial hemorrhage, mass effect or midline shift. Area of hypoattenuation within the left basal ganglia and small foci of hypoattenuation in the right basal ganglia compatible with subacute infarct. Diffuse cortical volume loss and compensatory ventricular enlargement appears unchanged. Periventricular and subcortical white matter hypoattenuation redemonstrated bilaterally compatible with severe chronic microvascular ischemic changes. Encephalomalacia of the right caudate redemonstrated compatible with remote lacunar infarct. ORBITS: The visualized portion of the orbits demonstrate no acute abnormality. SINUSES: Air-fluid level within the left sphenoid sinus. Paranasal sinuses and mastoid air cells otherwise clear. SOFT TISSUES/SKULL:  No acute abnormality of the visualized skull or soft tissues. Subacute basal ganglia infarcts redemonstrated. No gross hemorrhagic conversion or significant mass effect.  New air-fluid level within the left sphenoid sinus suggesting acute sinusitis. CT HEAD WO CONTRAST    Result Date: 7/17/2021  EXAMINATION: CT OF THE HEAD WITHOUT CONTRAST  7/17/2021 10:46 am TECHNIQUE: CT of the head was performed without the administration of intravenous contrast. Dose modulation, iterative reconstruction, and/or weight based adjustment of the mA/kV was utilized to reduce the radiation dose to as low as reasonably achievable. COMPARISON: None. HISTORY: ORDERING SYSTEM PROVIDED HISTORY: change in mentation TECHNOLOGIST PROVIDED HISTORY: change in mentation Reason for Exam: change in mentation FINDINGS: BRAIN/VENTRICLES: Small area of hypodensity in the left corona radiata is similar in appearance to the previous exam.  There is no evidence of hemorrhage. No new parenchymal abnormalities are identified. ORBITS: The visualized portion of the orbits demonstrate no acute abnormality. SINUSES: The visualized paranasal sinuses and mastoid air cells demonstrate no acute abnormality. SOFT TISSUES/SKULL:  No acute abnormality of the visualized skull or soft tissues. No acute intracranial abnormality. No significant interval change. CT HEAD WO CONTRAST    Result Date: 7/13/2021  EXAMINATION: CT OF THE HEAD WITHOUT CONTRAST  7/13/2021 1:10 pm TECHNIQUE: CT of the head was performed without the administration of intravenous contrast. Dose modulation, iterative reconstruction, and/or weight based adjustment of the mA/kV was utilized to reduce the radiation dose to as low as reasonably achievable.  COMPARISON: 05/02/2021 HISTORY: ORDERING SYSTEM PROVIDED HISTORY: Last known well 2 days right-sided facial droop right-sided weakness TECHNOLOGIST PROVIDED HISTORY: Last known well 2 days right-sided facial droop right-sided weakness Decision Support Exception - unselect if not a suspected or confirmed emergency medical condition->Emergency Medical Condition (MA) Reason for Exam: Last known well 2 days right-sided facial droop right-sided weakness FINDINGS: BRAIN/VENTRICLES: Ovoid area of low attenuation in the left frontal corona radiata measures 2 x 1.4 cm, new from prior study (series 2, image 39). No acute intracranial hemorrhage. No mass effect or midline shift. No hydrocephalus. Diffuse parenchymal volume loss with enlargement of the ventricles and cerebral sulci. Old infarction in the right basal ganglia. There are nonspecific areas of hypoattenuation within the periventricular and subcortical white matter, which likely represent chronic microvascular ischemic change. Intracranial atherosclerotic disease. ORBITS: The visualized portion of the orbits demonstrate no acute abnormality. SINUSES: The visualized paranasal sinuses and mastoid air cells demonstrate no acute abnormality. SOFT TISSUES/SKULL: No acute abnormality of the visualized skull or soft tissues. 1. New ovoid low-attenuation area in the left frontal corona radiata compatible with acute/subacute infarction. This measures 2 x 1.4 cm. No associated hemorrhage. 2. Diffuse parenchymal volume loss and sequela of severe chronic microvascular ischemic changes. Findings were discussed with Dr. Concepcion Ovalle at 1:31 pm on 7/13/2021. MRA HEAD WO CONTRAST    Result Date: 8/7/2021  EXAMINATION: MRA OF THE NECK WITHOUT CONTRAST; MRI OF THE BRAIN WITHOUT CONTRAST; MRA OF THE HEAD WITHOUT CONTRAST 8/7/2021 8:28 pm; 8/7/2021 8:34 pm; 8/7/2021 8:27 pm: TECHNIQUE: Multiplanar multisequence MRA of the neck was performed without the administration of intravenous contrast. Stenosis of the internal carotid arteries measured using NASCET criteria.; Multiplanar multisequence MRI of the brain was performed without the administration of intravenous contrast.; MRA of the head was performed utilizing time-of-flight imaging with MIP images. No intravenous contrast was administered. COMPARISON: None.  HISTORY: ORDERING SYSTEM PROVIDED HISTORY: stroke TECHNOLOGIST PROVIDED HISTORY: stroke Reason for Exam: stroke Additional signs and symptoms: patient unable to give history and unable to follow exam instructions due to mental status FINDINGS: MRI BRAIN: INTRACRANIAL STRUCTURES/VENTRICLES: Multifocal diffusion restriction abnormality related to acute ischemia are seen involving the right basal ganglia within the putamen, caudate nucleus and anterior limb of the right internal capsule, the posterior limb of the left internal capsule and lateral margin of the left thalamus, the posterior commissure of the corpus callosum, and left greater than right frontal parietal paramedian subcortical and deep white matter . Can fluent increased T2/FLAIR signal is noted within the cerebral white matter consistent with severe microvascular ischemic change. No mass effect or midline shift. No evidence of an acute intracranial hemorrhage. Mild diffuse decrease in cerebral volume is noted with corresponding prominence of the sulci and ventricles. The sellar/suprasellar regions appear unremarkable. The normal signal voids within the major intracranial vessels appear maintained. ORBITS: The visualized portion of the orbits demonstrate no acute abnormality. SINUSES: The visualized paranasal sinuses and mastoid air cells are well aerated. BONES/SOFT TISSUES: The bone marrow signal intensity appears normal. The soft tissues demonstrate no acute abnormality. MRA NECK: AORTIC ARCH/ARCH VESSELS: No dissection or arterial injury. No significant stenosis of the brachiocephalic or subclavian arteries. CAROTID ARTERIES: No dissection, arterial injury, or hemodynamically significant stenosis by NASCET criteria. VERTEBRAL ARTERIES: No dissection, arterial injury, or significant stenosis. MRA HEAD: ANTERIOR CIRCULATION: Suspected focal high-grade stenosis involving the A1 segment of the left anterior cerebral artery is identified.   Right middle cerebral artery focal moderate grade stenosis involving the M1 segment is present. Suspected multifocal moderate to high-grade stenosis involving the right middle cerebral artery M2 segment is seen. No further evidence of significant stenosis of the intracranial internal carotid, anterior cerebral, or middle cerebral arteries. POSTERIOR CIRCULATION: Bilateral posterior cerebral artery P2 segment suspected focal moderate to high-grade stenosis are noted. No further evidence of significant stenosis of the vertebral, basilar, or posterior cerebral arteries. 1. Multifocal acute ischemia involving the bilateral basal ganglia, as discussed above, posterior commissure of the corpus callosum, lateral margin of left thalamus and the left greater than right frontal parietal paramedian subcortical and deep white matter. 2. No evidence of associated hemorrhagic conversion. 3. Finding suggestive of embolic phenomenon. Recommend clinical correlation. 4. Suspected focal high-grade stenosis involving A1 segment of the left anterior cerebral artery. 5. Suspected right middle cerebral artery M1 segment focal moderate grade stenosis. 6. Suspected right middle cerebral artery M2 segment multifocal moderate to high-grade stenosis. 7. Bilateral posterior cerebral artery P2 segment suspected multifocal moderate to high-grade stenosis. 8. Grossly unremarkable MR angiogram of the neck. Note: MR angiographic evaluation of the neck is severely limited by patient motion related artifact. 9. Severe cerebral white matter chronic microvascular ischemic disease. 10. Mild diffuse cerebral atrophy. Critical results were called by Dr. Portillo Urias to Dr. Mae Jean-Baptiste On 8/7/2021 at 22:11.      XR CHEST PORTABLE    Result Date: 8/8/2021  EXAMINATION: ONE XRAY VIEW OF THE CHEST 8/8/2021 11:58 am COMPARISON: 07/13/2021 HISTORY: ORDERING SYSTEM PROVIDED HISTORY: cough and fever TECHNOLOGIST PROVIDED HISTORY: cough and fever Reason for Exam: cough and fever Acuity: Acute Type of Exam: Initial FINDINGS: No lung infiltrate utilizing B-Mode, color doppler and  spectral waveform analysis was performed on the bilateral lower  extremities for venous examination of the deep and superficial systems. Findings are:   Right:  No evidence of deep or superficial venous thrombosis. Left:  No evidence of deep or superficial venous thrombosis. Signature   ----------------------------------------------------------------  Electronically signed by Asim Clifton RVT(Sonographer)  on 07/15/2021 04:23 PM  ----------------------------------------------------------------   ----------------------------------------------------------------  Electronically signed by Leigh Ann Martinezammed(Interpreting  physician) on 07/15/2021 08:56 PM  ----------------------------------------------------------------  Findings:   Right Impression:                    Left Impression:  The common femoral, femoral,         The common femoral, femoral,  popliteal and tibial veins           popliteal and tibial veins  demonstrate normal compressibility   demonstrate normal compressibility  and augmentation. and augmentation. Normal compressibility of the great  Normal compressibility of the great  saphenous vein. saphenous vein. Normal compressibility of the small  Normal compressibility of the small  saphenous vein. saphenous vein. Risk Factors   - The patient's risk factor(s) include: chronic lung disease, diabetes     mellitus and arterial hypertension. Velocities are measured in cm/s ; Diameters are measured in cm Right Lower Extremities DVT Study Measurements Right 2D Measurements +------------------------------------+----------+---------------+----------+ ! Location                            ! Visualized! Compressibility! Thrombosis! +------------------------------------+----------+---------------+----------+ ! Common Femoral                      !Yes       ! Yes            ! None      ! +------------------------------------+----------+---------------+----------+ ! Prox Femoral                        !Yes       ! Yes            ! None      ! +------------------------------------+----------+---------------+----------+ ! Mid Femoral                         !Yes       ! Yes            ! None      ! +------------------------------------+----------+---------------+----------+ ! Dist Femoral                        !Yes       ! Yes            ! None      ! +------------------------------------+----------+---------------+----------+ ! Deep Femoral                        !No        !               !          ! +------------------------------------+----------+---------------+----------+ ! Popliteal                           !Yes       ! Yes            ! None      ! +------------------------------------+----------+---------------+----------+ ! Sapheno Femoral Junction            ! Yes       ! Yes            ! None      ! +------------------------------------+----------+---------------+----------+ ! PTV                                 ! Yes       ! Yes            ! None      ! +------------------------------------+----------+---------------+----------+ ! Peroneal                            !No        !               !          ! +------------------------------------+----------+---------------+----------+ ! Gastroc                             ! Yes       ! Yes            ! None      ! +------------------------------------+----------+---------------+----------+ ! GSV Thigh                           ! Yes       ! Yes            ! None      ! +------------------------------------+----------+---------------+----------+ ! GSV Knee                            ! Yes       ! Yes            ! None      ! +------------------------------------+----------+---------------+----------+ ! GSV Ankle                           ! Yes       ! Yes            ! None      ! +------------------------------------+----------+---------------+----------+ ! SSV !Yes       !Yes            ! None      ! +------------------------------------+----------+---------------+----------+ Right Doppler Measurements +---------------------------+------+------+--------------------------------+ ! Location                   ! Signal!Reflux! Reflux (msec)                   ! +---------------------------+------+------+--------------------------------+ ! Common Femoral             !Phasic!      !                                ! +---------------------------+------+------+--------------------------------+ ! Prox Femoral               !Phasic!      !                                ! +---------------------------+------+------+--------------------------------+ ! Popliteal                  !Phasic!      !                                ! +---------------------------+------+------+--------------------------------+ Left Lower Extremities DVT Study Measurements Left 2D Measurements +------------------------------------+----------+---------------+----------+ ! Location                            ! Visualized! Compressibility! Thrombosis! +------------------------------------+----------+---------------+----------+ ! Common Femoral                      !Yes       ! Yes            ! None      ! +------------------------------------+----------+---------------+----------+ ! Prox Femoral                        !Yes       ! Yes            ! None      ! +------------------------------------+----------+---------------+----------+ ! Mid Femoral                         !Yes       ! Yes            ! None      ! +------------------------------------+----------+---------------+----------+ ! Dist Femoral                        !Yes       ! Yes            ! None      ! +------------------------------------+----------+---------------+----------+ ! Deep Femoral                        !No        !               !          ! +------------------------------------+----------+---------------+----------+ ! Popliteal !Yes       !Yes            ! None      ! +------------------------------------+----------+---------------+----------+ ! Sapheno Femoral Junction            ! Yes       ! Yes            ! None      ! +------------------------------------+----------+---------------+----------+ ! PTV                                 ! Yes       ! Yes            ! None      ! +------------------------------------+----------+---------------+----------+ ! Peroneal                            !Yes       ! Yes            ! None      ! +------------------------------------+----------+---------------+----------+ ! Gastroc                             ! Yes       ! Yes            ! None      ! +------------------------------------+----------+---------------+----------+ ! GSV Thigh                           ! Yes       ! Yes            ! None      ! +------------------------------------+----------+---------------+----------+ ! GSV Knee                            ! Yes       ! Yes            ! None      ! +------------------------------------+----------+---------------+----------+ ! GSV Ankle                           ! Yes       ! Yes            ! None      ! +------------------------------------+----------+---------------+----------+ ! SSV                                 ! Yes       ! Yes            ! None      ! +------------------------------------+----------+---------------+----------+ Left Doppler Measurements +---------------------------+------+------+--------------------------------+ ! Location                   ! Signal!Reflux! Reflux (msec)                   ! +---------------------------+------+------+--------------------------------+ ! Common Femoral             !Phasic!      !                                ! +---------------------------+------+------+--------------------------------+ ! Prox Femoral               !Phasic!      !                                ! +---------------------------+------+------+--------------------------------+ ! Popliteal !Phasic!      !                                ! +---------------------------+------+------+--------------------------------+    US SPLEEN    Result Date: 7/14/2021  EXAMINATION: ULTRASOUND OF THE SPLEEN 7/14/2021 8:29 am COMPARISON: None. HISTORY: ORDERING SYSTEM PROVIDED HISTORY: thrombocytopenia TECHNOLOGIST PROVIDED HISTORY: thrombocytopenia FINDINGS: Suboptimal study. The visualized spleen appears enlarged measuring 13.4 cm. No focal lesion is seen. No free fluid. Suboptimal study. Mild splenomegaly. CTA HEAD NECK W CONTRAST    Result Date: 7/13/2021  EXAMINATION: CTA OF THE HEAD AND NECK WITH CONTRAST 7/13/2021 1:11 pm: TECHNIQUE: CTA of the head and neck was performed with the administration of intravenous contrast. Multiplanar reformatted images are provided for review. MIP images are provided for review. Stenosis of the internal carotid arteries measured using NASCET criteria. Dose modulation, iterative reconstruction, and/or weight based adjustment of the mA/kV was utilized to reduce the radiation dose to as low as reasonably achievable. 3D surface reformatted and curved MIP images were submitted for review. COMPARISON: None. HISTORY: ORDERING SYSTEM PROVIDED HISTORY: stroke TECHNOLOGIST PROVIDED HISTORY: stroke Decision Support Exception - unselect if not a suspected or confirmed emergency medical condition->Emergency Medical Condition (MA) Reason for Exam: stroke, Cerebrovascular Accident; Fall FINDINGS: CTA NECK: AORTIC ARCH/ARCH VESSELS: No dissection or arterial injury. No significant stenosis of the brachiocephalic or subclavian arteries. CAROTID ARTERIES: No dissection, arterial injury, or hemodynamically significant stenosis by NASCET criteria. VERTEBRAL ARTERIES: No dissection, arterial injury, or significant stenosis in the right vertebral artery. Severe stenosis in the V1 segment of the left vertebral artery. SOFT TISSUES: The lung apices are clear.   No cervical or superior mediastinal lymphadenopathy. The larynx and pharynx are unremarkable. No acute abnormality of the salivary glands. Thyroid gland is diffusely enlarged and heterogeneous and contains left thyroid lobe nodule measuring 2.6 cm. BONES: Multilevel degenerative spondylosis throughout the cervical spine with fusion at C5-C6. CTA HEAD: ANTERIOR CIRCULATION: Calcified atherosclerotic plaque in the cavernous segments of the internal carotid arteries bilaterally results in mild-to-moderate cavernous ICA stenosis bilaterally. Mild stenosis in the proximal A2 segment of the right anterior cerebral artery. Severe stenosis in the proximal A3 segments of the anterior cerebral arteries bilaterally. Moderate-to-severe stenosis within M2 segment of the right middle cerebral artery. Moderate stenosis in the M1 and proximal M2 segments of the left middle cerebral artery. POSTERIOR CIRCULATION: No significant stenosis in the right intradural vertebral artery. Severe stenosis in the intracranial left vertebral artery. Mild stenosis in the basilar artery. Moderate stenosis in the P1/P2 junction of the left PCA. Severe stenosis and near complete occlusion of the right PCA. OTHER: No dural venous sinus thrombosis on this non-dedicated study. 1. Severe stenosis in the V1 segment of the left vertebral artery. 2. Extensive intracranial atherosclerotic plaque with near complete occlusion of the right PCA. 3. Severe stenoses in the proximal A3 segments of the ACAs bilaterally. 4. Moderate-to-severe proximal M2 segment stenosis in the right MCA. Moderate stenosis in the M1 and M2 segments of the left MCA. 5. Moderate stenosis in the P1/P2 junction of the left PCA. 6. Enlarged heterogeneous thyroid gland with 2.6 cm left thyroid lobe nodule. Nonemergent/outpatient thyroid ultrasound recommended. Findings were discussed with Dr. Yamil Ngo at 1:43 pm on 7/13/2021.  RECOMMENDATIONS: 2.6 cm incidental left thyroid nodule with heterogeneous and enlarged thyroid. Recommend thyroid US. Reference: J Am Bruno Radiol. 2015 Feb;12(2): 143-50     MRA NECK WO CONTRAST    Result Date: 8/7/2021  EXAMINATION: MRA OF THE NECK WITHOUT CONTRAST; MRI OF THE BRAIN WITHOUT CONTRAST; MRA OF THE HEAD WITHOUT CONTRAST 8/7/2021 8:28 pm; 8/7/2021 8:34 pm; 8/7/2021 8:27 pm: TECHNIQUE: Multiplanar multisequence MRA of the neck was performed without the administration of intravenous contrast. Stenosis of the internal carotid arteries measured using NASCET criteria.; Multiplanar multisequence MRI of the brain was performed without the administration of intravenous contrast.; MRA of the head was performed utilizing time-of-flight imaging with MIP images. No intravenous contrast was administered. COMPARISON: None. HISTORY: ORDERING SYSTEM PROVIDED HISTORY: stroke TECHNOLOGIST PROVIDED HISTORY: stroke Reason for Exam: stroke Additional signs and symptoms: patient unable to give history and unable to follow exam instructions due to mental status FINDINGS: MRI BRAIN: INTRACRANIAL STRUCTURES/VENTRICLES: Multifocal diffusion restriction abnormality related to acute ischemia are seen involving the right basal ganglia within the putamen, caudate nucleus and anterior limb of the right internal capsule, the posterior limb of the left internal capsule and lateral margin of the left thalamus, the posterior commissure of the corpus callosum, and left greater than right frontal parietal paramedian subcortical and deep white matter . Can fluent increased T2/FLAIR signal is noted within the cerebral white matter consistent with severe microvascular ischemic change. No mass effect or midline shift. No evidence of an acute intracranial hemorrhage. Mild diffuse decrease in cerebral volume is noted with corresponding prominence of the sulci and ventricles. The sellar/suprasellar regions appear unremarkable.   The normal signal voids within the major intracranial vessels appear maintained. ORBITS: The visualized portion of the orbits demonstrate no acute abnormality. SINUSES: The visualized paranasal sinuses and mastoid air cells are well aerated. BONES/SOFT TISSUES: The bone marrow signal intensity appears normal. The soft tissues demonstrate no acute abnormality. MRA NECK: AORTIC ARCH/ARCH VESSELS: No dissection or arterial injury. No significant stenosis of the brachiocephalic or subclavian arteries. CAROTID ARTERIES: No dissection, arterial injury, or hemodynamically significant stenosis by NASCET criteria. VERTEBRAL ARTERIES: No dissection, arterial injury, or significant stenosis. MRA HEAD: ANTERIOR CIRCULATION: Suspected focal high-grade stenosis involving the A1 segment of the left anterior cerebral artery is identified. Right middle cerebral artery focal moderate grade stenosis involving the M1 segment is present. Suspected multifocal moderate to high-grade stenosis involving the right middle cerebral artery M2 segment is seen. No further evidence of significant stenosis of the intracranial internal carotid, anterior cerebral, or middle cerebral arteries. POSTERIOR CIRCULATION: Bilateral posterior cerebral artery P2 segment suspected focal moderate to high-grade stenosis are noted. No further evidence of significant stenosis of the vertebral, basilar, or posterior cerebral arteries. 1. Multifocal acute ischemia involving the bilateral basal ganglia, as discussed above, posterior commissure of the corpus callosum, lateral margin of left thalamus and the left greater than right frontal parietal paramedian subcortical and deep white matter. 2. No evidence of associated hemorrhagic conversion. 3. Finding suggestive of embolic phenomenon. Recommend clinical correlation. 4. Suspected focal high-grade stenosis involving A1 segment of the left anterior cerebral artery. 5. Suspected right middle cerebral artery M1 segment focal moderate grade stenosis.  6. Suspected right middle is noted within the cerebral white matter consistent with severe microvascular ischemic change. No mass effect or midline shift. No evidence of an acute intracranial hemorrhage. Mild diffuse decrease in cerebral volume is noted with corresponding prominence of the sulci and ventricles. The sellar/suprasellar regions appear unremarkable. The normal signal voids within the major intracranial vessels appear maintained. ORBITS: The visualized portion of the orbits demonstrate no acute abnormality. SINUSES: The visualized paranasal sinuses and mastoid air cells are well aerated. BONES/SOFT TISSUES: The bone marrow signal intensity appears normal. The soft tissues demonstrate no acute abnormality. MRA NECK: AORTIC ARCH/ARCH VESSELS: No dissection or arterial injury. No significant stenosis of the brachiocephalic or subclavian arteries. CAROTID ARTERIES: No dissection, arterial injury, or hemodynamically significant stenosis by NASCET criteria. VERTEBRAL ARTERIES: No dissection, arterial injury, or significant stenosis. MRA HEAD: ANTERIOR CIRCULATION: Suspected focal high-grade stenosis involving the A1 segment of the left anterior cerebral artery is identified. Right middle cerebral artery focal moderate grade stenosis involving the M1 segment is present. Suspected multifocal moderate to high-grade stenosis involving the right middle cerebral artery M2 segment is seen. No further evidence of significant stenosis of the intracranial internal carotid, anterior cerebral, or middle cerebral arteries. POSTERIOR CIRCULATION: Bilateral posterior cerebral artery P2 segment suspected focal moderate to high-grade stenosis are noted. No further evidence of significant stenosis of the vertebral, basilar, or posterior cerebral arteries.      1. Multifocal acute ischemia involving the bilateral basal ganglia, as discussed above, posterior commissure of the corpus callosum, lateral margin of left thalamus and the left greater than right frontal parietal paramedian subcortical and deep white matter. 2. No evidence of associated hemorrhagic conversion. 3. Finding suggestive of embolic phenomenon. Recommend clinical correlation. 4. Suspected focal high-grade stenosis involving A1 segment of the left anterior cerebral artery. 5. Suspected right middle cerebral artery M1 segment focal moderate grade stenosis. 6. Suspected right middle cerebral artery M2 segment multifocal moderate to high-grade stenosis. 7. Bilateral posterior cerebral artery P2 segment suspected multifocal moderate to high-grade stenosis. 8. Grossly unremarkable MR angiogram of the neck. Note: MR angiographic evaluation of the neck is severely limited by patient motion related artifact. 9. Severe cerebral white matter chronic microvascular ischemic disease. 10. Mild diffuse cerebral atrophy. Critical results were called by Dr. Kevin Trivedi to Dr. Minerva Paredes On 8/7/2021 at 22:11. MRI BRAIN WO CONTRAST    Result Date: 7/14/2021  EXAMINATION: MRI OF THE BRAIN WITHOUT CONTRAST  7/14/2021 4:08 pm TECHNIQUE: Multiplanar multisequence MRI of the brain was performed without the administration of intravenous contrast. COMPARISON: None. HISTORY: ORDERING SYSTEM PROVIDED HISTORY: stroke, right sided weakness TECHNOLOGIST PROVIDED HISTORY: stroke, right sided weakness Decision Support Exception - unselect if not a suspected or confirmed emergency medical condition->Emergency Medical Condition (MA) Reason for Exam: possible stroke. pt had a fall. FINDINGS: INTRACRANIAL STRUCTURES/VENTRICLES: . acute infarcts in the left basal ganglia. Acute infarct in the right head of caudate and in the right putamen no mass effect or midline shift. No evidence of an acute intracranial hemorrhage. The ventricles and sulci are normal in size and configuration. The sellar/suprasellar regions appear unremarkable. The normal signal voids within the major intracranial vessels appear maintained. ORBITS: The visualized portion of the orbits demonstrate no acute abnormality. SINUSES: The visualized paranasal sinuses and mastoid air cells are well aerated. BONES/SOFT TISSUES: The bone marrow signal intensity appears normal. The soft tissues demonstrate no acute abnormality. Acute infarct in the left and right basal ganglia greater on the left. Diffuse volume loss with extensive chronic white matter changes. 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 Recurrent stroke  3. Altered mental status    RECOMMENDATIONS:  1. I personally reviewed results of lab work-up imaging studies and other relevant clinical data. 2. Reviewed records from previous hospitalization. 3. Patient unable to give any meaningful history  4. Platelet count is stable  5. Okay to give anticoagulation or antiplatelet therapy as long as platelet count is above 50,000  6. Patient has extensive bilateral recurrent strokes. I believe overall prognosis is poor. Agree with palliative care consultation to review goals and expectations. 7. Avoid myelosuppressive drugs  8. We will continue to follow      Discussed with  Nurse. Thank you for asking us to see this patient. Amber Luong MD          This note is created with the assistance of a speech recognition program.  While intending to generate a document that actually reflects the content of the visit, the document can still have some errors including those of syntax and sound a like substitutions which may escape proof reading. It such instances, actual meaning can be extrapolated by contextual diversion.

## 2021-08-10 NOTE — FLOWSHEET NOTE
08/10/21 1007   Encounter Summary   Services provided to: Patient   Referral/Consult From: Palliative Care   Complexity of Encounter Low   Length of Encounter 15 minutes   Spiritual/Scientologist   Type Spiritual support   Assessment Sleeping   Intervention Prayer

## 2021-08-10 NOTE — PROGRESS NOTES
[facility]: DEREK BERRY   OCCUPATIONAL THERAPY MISSED TREATMENT NOTE   INPATIENT   Date: 8/10/21  Patient Name: Masha Toledo       Room:   MRN: [id number]   Account #: [de-identified]    : 1945  (72 y.o.)  Gender: male                 REASON FOR MISSED TREATMENT:  Patient unable to participate   -   10:49 - Occupational therapy attempted to arouse patient. Pt remained eyes close despite max verbal and tactile cues. HERLINDA Alvarado notified. Occupational therapy will continue to follow.         Nancy Bernal, OT

## 2021-08-10 NOTE — PROGRESS NOTES
Physical Therapy Cancel Note      DATE: 8/10/2021    NAME: Samantha Griffiths  MRN: [id number]   : 1945      Patient not seen this date for Physical Therapy due to:    Surgery/Procedure: Pt is at [facility] for IZABELLA. Will complete eval as able.       Electronically signed by Milagro Bassett PT on 8/10/2021 at 1:04 PM

## 2021-08-10 NOTE — PROGRESS NOTES
[address] [facility]    PROGRESS NOTE             8/10/2021    1:42 PM    Name:   Harvey Quezada  MRN:     [id number]     Acct:      [de-identified]   Room:   2099/2099-01   Day:  1  Admit Date:  8/8/2021 11:30 AM    PCP:  No primary care provider on file. Code Status:  Full Code    Subjective:     C/C: No chief complaint on file. Altered mental status     Interval History Status: not changed. 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 Patient has past medical history of CVA, coronary artery disease s/p percutaneous coronary angioplasty, hyperlipidemia, hypertension, diabetes, emphysema/COPD.     History difficult to obtain as patient is somnolent and nonresponsive. Per prior documentation, patient had increasing lethargy on 8/6. CT head obtained which showed: There has been interval development of a hypodensity in the right basal ganglia compared to prior study consistent with evolving subacute white matter infarct. No hemorrhage is noted. Chronic microvascular change in atrophy is demonstrated. No midline shift.      Spoke with family - sister and niece - who stated he is somewhat confused at baseline, but has become significantly more lethargic/began to be agitated and combative about 1.5 weeks ago while in the acute rehab unit.     He was transferred to medicine service for management of altered mental status.   For a few days prior to transfer, patient has had intermittent elevations in temperature, up to 100 °F  Patient has been coughing intermittently; frothy/brown substance suctioned from mouth    Pneumonia/sepsis work-up: CXR no acute process, procalcitonin 0.26, lactic acid 2.0, legionella/strep/mycoplasma negative,  blood culture no growth to date, urinalysis negative  Metabolic encephalopathy workup: LFTs elevated, ammonia wnl, myoglobin elevated, CK wnl. Review of Systems:     Review of Systems   Reason unable to perform ROS: patient lethargic and not responsive. Medications: Allergies:  No Known Allergies    Current Meds:   Scheduled Meds:    nystatin  5 mL Oral 4x Daily    aspirin  81 mg PEG Tube Daily    famotidine  20 mg PEG Tube Daily    finasteride  5 mg PEG Tube Daily    FLUoxetine  40 mg PEG Tube Daily    [Held by provider] hydrALAZINE  10 mg Oral 3 times per day    insulin lispro  0-6 Units Subcutaneous 4 times per day    [Held by provider] isosorbide dinitrate  10 mg PEG Tube TID    [Held by provider] losartan  50 mg Oral Daily    melatonin  6 mg PEG Tube Nightly    OLANZapine  5 mg PEG Tube Nightly    rosuvastatin  40 mg PEG Tube Nightly    tiotropium  2 puff Inhalation Lunch    ampicillin-sulbactam  1,500 mg Intravenous Q6H     Continuous Infusions:    dextrose      heparin (PORCINE) Infusion 9.9 Units/kg/hr (08/09/21 2134)     PRN Meds: hydrALAZINE, acetaminophen, bisacodyl, polyethylene glycol, senna, albuterol sulfate HFA, dextrose, dextrose, glucagon (rDNA), glucose, heparin (porcine), heparin (porcine)    Data:     Past Medical History:   has a past medical history of Centrilobular emphysema (HCC), COPD (chronic obstructive pulmonary disease) ([facility] [address] Depression, Diabetes mellitus ([facility] [address] Former smoker, Hyperlipidemia, Hypertension, Mixed restrictive and obstructive lung disease ([facility] [address] Need for pneumococcal vaccine, and Personal history of tobacco use. Social History:   reports that he quit smoking about 8 years ago. He started smoking about 64 years ago. He has a 42.00 pack-year smoking history. He has never used smokeless tobacco. He reports that he does not drink alcohol and does not use drugs. Family History: No family history on file.     Vitals:  /89   Pulse 80   Temp 98.3 °F (36.8 °C) (Axillary)   Resp 18   Ht 5' 10\" (1.778 m)   Wt 215 lb 6.2 oz (97.7 kg)   SpO2 94%   BMI 30.91 kg/m²   Temp (24hrs), Av.5 °F (36.9 °C), Min:98 °F (36.7 °C), Max:99 °F (37.2 °C)    Recent Labs     21  1830 08/10/21  0013 08/10/21  0613 08/10/21  1124   POCGLU 133* 141* 123* 113*       I/O(24Hr): Intake/Output Summary (Last 24 hours) at 8/10/2021 1342  Last data filed at 8/10/2021 1202  Gross per 24 hour   Intake 2522.38 ml   Output 500 ml   Net 2.38 ml       Labs:    CBC:   Lab Results   Component Value Date    WBC 9.3 08/10/2021    RBC 4.48 08/10/2021    RBC 4.88 2012    HGB 10.9 08/10/2021    HCT 35.4 08/10/2021    MCV 79.0 08/10/2021    MCH 24.4 08/10/2021    MCHC 30.9 08/10/2021    RDW 18.2 08/10/2021     08/10/2021     2012    MPV 12.0 08/10/2021     BMP:    Lab Results   Component Value Date     08/10/2021    K 4.4 08/10/2021     08/10/2021    CO2 25 08/10/2021    BUN 25 08/10/2021    LABALBU 2.9 2021    CREATININE 1.45 08/10/2021    CALCIUM 8.7 08/10/2021    GFRAA 58 08/10/2021    LABGLOM 48 08/10/2021    GLUCOSE 127 08/10/2021    GLUCOSE 122 2012       Lab Results   Component Value Date/Time    SPECIAL NOT REPORTED 2021 02:38 PM     Lab Results   Component Value Date/Time    CULTURE NO GROWTH 2 DAYS 2021 02:38 PM         Radiology:      CT HEAD WO CONTRAST    Result Date: 2021  EXAMINATION: CT OF THE HEAD WITHOUT CONTRAST  2021 2:04 pm TECHNIQUE: CT of the head was performed without the administration of intravenous contrast. Dose modulation, iterative reconstruction, and/or weight based adjustment of the mA/kV was utilized to reduce the radiation dose to as low as reasonably achievable. COMPARISON: MRI brain performed 2021.  HISTORY: ORDERING SYSTEM PROVIDED HISTORY: stroke TECHNOLOGIST PROVIDED HISTORY: stroke Reason for Exam: stroke; ams Acuity: Unknown Type of Exam: Unknown Additional signs and symptoms: patient unable to stay still; turned head during exam FINDINGS: BRAIN/VENTRICLES: There is no acute intracranial hemorrhage, mass effect, or midline shift. There is satisfactory overall gray-white matter differentiation. There is extensive chronic microvascular disease. There are evolving areas of ischemia in the left periventricular white matter, and corpus callosum posteriorly, in the right basal ganglia. The ventricular structures are symmetric and unremarkable. The infratentorial structures are unremarkable. ORBITS: The visualized portion of the orbits demonstrate no acute abnormality. SINUSES: The visualized paranasal sinuses and mastoid air cells demonstrate no acute abnormality. SOFT TISSUES/SKULL:  No acute abnormality of the visualized skull or soft tissues. Chronic microvascular disease with scattered areas of evolving ischemia as described above. CT HEAD WO CONTRAST    Result Date: 8/6/2021  EXAMINATION: CT OF THE HEAD WITHOUT CONTRAST  8/6/2021 2:21 pm TECHNIQUE: CT of the head was performed without the administration of intravenous contrast. Dose modulation, iterative reconstruction, and/or weight based adjustment of the mA/kV was utilized to reduce the radiation dose to as low as reasonably achievable. COMPARISON: 4 August 2021 HISTORY: ORDERING SYSTEM PROVIDED HISTORY: Pt w/ bilateral basal ganglia infarcts, right hemiparesis; minimally interactive over the last week and having increased lethargy the last few days. TECHNOLOGIST PROVIDED HISTORY: Pt w/ bilateral basal ganglia infarcts, right hemiparesis; minimally interactive over the last week and having increased lethargy the last few days. Reason for Exam: Increased lethargy the last few days. Acuity: Unknown Type of Exam: Unknown FINDINGS: BRAIN/VENTRICLES: There is no acute intracranial hemorrhage, mass effect or midline shift. No abnormal extra-axial fluid collection. The gray-white differentiation is maintained without evidence of an acute infarct.   There is no evidence of hydrocephalus. Remote right-sided basal ganglial infarcts are noted. However, there is been interval development of hypodensity in the right mid basal ganglia since prior study consistent with an evolving subacute infarct. No significant mass effect is noted. Ventricles are proportionate to cerebral atrophy. ORBITS: The visualized portion of the orbits demonstrate no acute abnormality. SINUSES: The visualized paranasal sinuses and mastoid air cells demonstrate no acute abnormality. SOFT TISSUES/SKULL:  No acute abnormality of the visualized skull or soft tissues. There has been interval development of a hypodensity in the right basal ganglia compared to prior study consistent with evolving subacute white matter infarct. No hemorrhage is noted. Chronic microvascular change in atrophy is demonstrated. No midline shift. CT HEAD WO CONTRAST    Result Date: 8/4/2021  EXAMINATION: CT OF THE HEAD WITHOUT CONTRAST  8/4/2021 1:25 pm TECHNIQUE: CT of the head was performed without the administration of intravenous contrast. Dose modulation, iterative reconstruction, and/or weight based adjustment of the mA/kV was utilized to reduce the radiation dose to as low as reasonably achievable.  COMPARISON: CT head 07/21/2021 and 07/17/2021 HISTORY: ORDERING SYSTEM PROVIDED HISTORY: Patient with history of left-sided CVA with right hemiparesis, not communicating with staff, please evaluate for any change from previous imaging and any other abnormality TECHNOLOGIST PROVIDED HISTORY: Patient with history of left-sided CVA with right hemiparesis, not communicating with staff, please evaluate for any change from previous imaging and any other abnormality Reason for Exam: Patient with history of left-sided CVA with right hemiparesis, not communicating with staff, please evaluate for any change from previous imaging and any other abnormality Acuity: Unknown Type of Exam: Unknown FINDINGS: BRAIN/VENTRICLES: There is no acute intracranial hemorrhage, mass effect or midline shift. No abnormal extra-axial fluid collection. The gray-white differentiation is maintained without evidence of an acute infarct. There is prominence of the ventricles and sulci due to global parenchymal volume loss. There are nonspecific areas of hypoattenuation within the periventricular and subcortical white matter, which likely represent chronic microvascular ischemic change. 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 Remote lacunar stroke left caudate lobe. ORBITS: The visualized portion of the orbits demonstrate no acute abnormality. SINUSES: The visualized paranasal sinuses and mastoid air cells demonstrate no acute abnormality. SOFT TISSUES/SKULL: No acute abnormality of the visualized skull or soft tissues. 1. No acute intracranial abnormality. 2. 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 Remote lacunar stroke left caudate lobe. 3. Senescent changes. White matter hypoattenuation described is typical of microvascular ischemic disease or as sequela of dysmyelinating/demyelinating processes. MRA HEAD WO CONTRAST    Result Date: 8/7/2021  EXAMINATION: MRA OF THE NECK WITHOUT CONTRAST; MRI OF THE BRAIN WITHOUT CONTRAST; MRA OF THE HEAD WITHOUT CONTRAST 8/7/2021 8:28 pm; 8/7/2021 8:34 pm; 8/7/2021 8:27 pm: TECHNIQUE: Multiplanar multisequence MRA of the neck was performed without the administration of intravenous contrast. Stenosis of the internal carotid arteries measured using NASCET criteria.; Multiplanar multisequence MRI of the brain was performed without the administration of intravenous contrast.; MRA of the head was performed utilizing time-of-flight imaging with MIP images. No intravenous contrast was administered. COMPARISON: None.  HISTORY: ORDERING SYSTEM PROVIDED HISTORY: stroke TECHNOLOGIST PROVIDED HISTORY: stroke Reason for Exam: stroke Additional signs and symptoms: patient unable to give history and unable to follow exam instructions due to mental status FINDINGS: MRI BRAIN: INTRACRANIAL STRUCTURES/VENTRICLES: Multifocal diffusion restriction abnormality related to acute ischemia are seen involving the right basal ganglia within the putamen, caudate nucleus and anterior limb of the right internal capsule, the posterior limb of the left internal capsule and lateral margin of the left thalamus, the posterior commissure of the corpus callosum, and left greater than right frontal parietal paramedian subcortical and deep white matter . Can fluent increased T2/FLAIR signal is noted within the cerebral white matter consistent with severe microvascular ischemic change. No mass effect or midline shift. No evidence of an acute intracranial hemorrhage. Mild diffuse decrease in cerebral volume is noted with corresponding prominence of the sulci and ventricles. The sellar/suprasellar regions appear unremarkable. The normal signal voids within the major intracranial vessels appear maintained. ORBITS: The visualized portion of the orbits demonstrate no acute abnormality. SINUSES: The visualized paranasal sinuses and mastoid air cells are well aerated. BONES/SOFT TISSUES: The bone marrow signal intensity appears normal. The soft tissues demonstrate no acute abnormality. MRA NECK: AORTIC ARCH/ARCH VESSELS: No dissection or arterial injury. No significant stenosis of the brachiocephalic or subclavian arteries. CAROTID ARTERIES: No dissection, arterial injury, or hemodynamically significant stenosis by NASCET criteria. VERTEBRAL ARTERIES: No dissection, arterial injury, or significant stenosis. MRA HEAD: ANTERIOR CIRCULATION: Suspected focal high-grade stenosis involving the A1 segment of the left anterior cerebral artery is identified. Right middle cerebral artery focal moderate grade stenosis involving the M1 segment is present.   Suspected multifocal moderate to high-grade stenosis involving the right middle cerebral artery M2 segment is seen. No further evidence of significant stenosis of the intracranial internal carotid, anterior cerebral, or middle cerebral arteries. POSTERIOR CIRCULATION: Bilateral posterior cerebral artery P2 segment suspected focal moderate to high-grade stenosis are noted. No further evidence of significant stenosis of the vertebral, basilar, or posterior cerebral arteries. 1. Multifocal acute ischemia involving the bilateral basal ganglia, as discussed above, posterior commissure of the corpus callosum, lateral margin of left thalamus and the left greater than right frontal parietal paramedian subcortical and deep white matter. 2. No evidence of associated hemorrhagic conversion. 3. Finding suggestive of embolic phenomenon. Recommend clinical correlation. 4. Suspected focal high-grade stenosis involving A1 segment of the left anterior cerebral artery. 5. Suspected right middle cerebral artery M1 segment focal moderate grade stenosis. 6. Suspected right middle cerebral artery M2 segment multifocal moderate to high-grade stenosis. 7. Bilateral posterior cerebral artery P2 segment suspected multifocal moderate to high-grade stenosis. 8. Grossly unremarkable MR angiogram of the neck. Note: MR angiographic evaluation of the neck is severely limited by patient motion related artifact. 9. Severe cerebral white matter chronic microvascular ischemic disease. 10. Mild diffuse cerebral atrophy. Critical results were called by Dr. Jeovanny Zhou to Dr. Xavi Bai On 8/7/2021 at 22:11. XR CHEST PORTABLE    Result Date: 8/8/2021  EXAMINATION: ONE XRAY VIEW OF THE CHEST 8/8/2021 11:58 am COMPARISON: 07/13/2021 HISTORY: ORDERING SYSTEM PROVIDED HISTORY: cough and fever TECHNOLOGIST PROVIDED HISTORY: cough and fever Reason for Exam: cough and fever Acuity: Acute Type of Exam: Initial FINDINGS: No lung infiltrate or consolidation.  No pneumothorax or pleural effusion. Heart size is normal.     No acute abnormality. VL Lower Extremity Bilateral Venous Duplex    Result Date: 8/9/2021    [facility] [place], [facility]  Vascular Lower Extremities DVT Study Procedure   Patient Name   GOFF       Date of Study           08/09/2021                 YAYA DUBOIS   Date of Birth  1945   Gender                  Male   Age            77 year(s)   Race                    Black   Room Number    2099   Corporate ID # [id number]   Patient Acct # [de-identified]   MR #           [id number]       Sonographer             Canelo Marie   Accession #    [id number]   Interpreting Physician  Ifeanyi Mcmanus   Referring      Nas Muñoz,   Referring Physician  Nurse          APRN-CNP  Practitioner  Procedure Type of Study:   Veins: Lower Extremities DVT Study, Venous Scan Lower Bilateral.  Indications for Study:R/O DVT. Patient Status: In Patient. Technical Quality:Adequate visualization. Conclusions   Summary   No evidence of superficial or deep venous thrombosis in both lower  extremities. Signature   ----------------------------------------------------------------  Electronically signed by Canelo Marie(Sonographer) on  08/09/2021 07:56 AM  ----------------------------------------------------------------   ----------------------------------------------------------------  Electronically signed by Carlen Craze Reyes,Arthur(Interpreting  physician) on 08/09/2021 11:02 PM  ----------------------------------------------------------------  Findings:   Right Impression:                    Left Impression:   The common femoral, femoral,         The common femoral, femoral,  popliteal and tibial veins           popliteal and tibial veins  demonstrate normal compressibility   demonstrate normal compressibility  and augmentation. and augmentation. Normal compressibility of the great  Normal compressibility of the great  saphenous vein. saphenous vein.    Normal compressibility of the small  Normal compressibility of the small  saphenous vein. saphenous vein. Velocities are measured in cm/s ; Diameters are measured in cm Right Lower Extremities DVT Study Measurements Right 2D Measurements +------------------------------------+----------+---------------+----------+ ! Location                            ! Visualized! Compressibility! Thrombosis! +------------------------------------+----------+---------------+----------+ ! Common Femoral                      !Yes       ! Yes            ! None      ! +------------------------------------+----------+---------------+----------+ ! Prox Femoral                        !Yes       ! Yes            ! None      ! +------------------------------------+----------+---------------+----------+ ! Mid Femoral                         !Yes       ! Yes            ! None      ! +------------------------------------+----------+---------------+----------+ ! Dist Femoral                        !Yes       ! Yes            ! None      ! +------------------------------------+----------+---------------+----------+ ! Deep Femoral                        !No        !               !          ! +------------------------------------+----------+---------------+----------+ ! Popliteal                           !Yes       ! Yes            ! None      ! +------------------------------------+----------+---------------+----------+ ! Sapheno Femoral Junction            ! Yes       ! Yes            ! None      ! +------------------------------------+----------+---------------+----------+ ! PTV                                 ! Yes       ! Yes            ! None      ! +------------------------------------+----------+---------------+----------+ ! Peroneal                            !Partial   !Yes            ! None      ! +------------------------------------+----------+---------------+----------+ ! Gastroc                             ! Yes       ! Yes            ! None      ! +------------------------------------+----------+---------------+----------+ ! GSV Thigh                           ! Yes       ! Yes            ! None      ! +------------------------------------+----------+---------------+----------+ ! GSV Knee                            ! Yes       ! Yes            ! None      ! +------------------------------------+----------+---------------+----------+ ! GSV Ankle                           ! Yes       ! Yes            ! None      ! +------------------------------------+----------+---------------+----------+ ! SSV                                 ! Partial   !Yes            ! None      ! +------------------------------------+----------+---------------+----------+ Right Doppler Measurements +---------------------------+------+------+--------------------------------+ ! Location                   ! Signal!Reflux! Reflux (msec)                   ! +---------------------------+------+------+--------------------------------+ ! Common Femoral             !Phasic!      !                                ! +---------------------------+------+------+--------------------------------+ ! Prox Femoral               !Phasic!      !                                ! +---------------------------+------+------+--------------------------------+ ! Popliteal                  !Phasic!      !                                ! +---------------------------+------+------+--------------------------------+ Left Lower Extremities DVT Study Measurements Left 2D Measurements +------------------------------------+----------+---------------+----------+ ! Location                            ! Visualized! Compressibility! Thrombosis! +------------------------------------+----------+---------------+----------+ ! Common Femoral                      !Yes       ! Yes            ! None      ! +------------------------------------+----------+---------------+----------+ ! Prox Femoral                        !Yes       ! Yes            ! None      ! +------------------------------------+----------+---------------+----------+ ! Mid Femoral                         !Yes       ! Yes            ! None      ! +------------------------------------+----------+---------------+----------+ ! Dist Femoral                        !Yes       ! Yes            ! None      ! +------------------------------------+----------+---------------+----------+ ! Deep Femoral                        !No        !               !          ! +------------------------------------+----------+---------------+----------+ ! Popliteal                           !Yes       ! Yes            ! None      ! +------------------------------------+----------+---------------+----------+ ! Sapheno Femoral Junction            ! Yes       ! Yes            ! None      ! +------------------------------------+----------+---------------+----------+ ! PTV                                 ! Yes       ! Yes            ! None      ! +------------------------------------+----------+---------------+----------+ ! Peroneal                            !Partial   !Yes            ! None      ! +------------------------------------+----------+---------------+----------+ ! Gastroc                             ! Yes       ! Yes            ! None      ! +------------------------------------+----------+---------------+----------+ ! GSV Thigh                           ! Yes       ! Yes            ! None      ! +------------------------------------+----------+---------------+----------+ ! GSV Knee                            ! Yes       ! Yes            ! None      ! +------------------------------------+----------+---------------+----------+ ! GSV Ankle                           ! Yes       ! Yes            ! None      ! +------------------------------------+----------+---------------+----------+ ! SSV                                 ! Yes       ! Yes            ! None      ! +------------------------------------+----------+---------------+----------+ Left Doppler Measurements +---------------------------+------+------+--------------------------------+ ! Location                   ! Signal!Reflux! Reflux (msec)                   ! +---------------------------+------+------+--------------------------------+ ! Common Femoral             !Phasic!      !                                ! +---------------------------+------+------+--------------------------------+ ! Prox Femoral               !Phasic!      !                                ! +---------------------------+------+------+--------------------------------+ ! Popliteal                  !Phasic!      !                                ! +---------------------------+------+------+--------------------------------+    MRA NECK WO CONTRAST    Result Date: 8/7/2021  EXAMINATION: MRA OF THE NECK WITHOUT CONTRAST; MRI OF THE BRAIN WITHOUT CONTRAST; MRA OF THE HEAD WITHOUT CONTRAST 8/7/2021 8:28 pm; 8/7/2021 8:34 pm; 8/7/2021 8:27 pm: TECHNIQUE: Multiplanar multisequence MRA of the neck was performed without the administration of intravenous contrast. Stenosis of the internal carotid arteries measured using NASCET criteria.; Multiplanar multisequence MRI of the brain was performed without the administration of intravenous contrast.; MRA of the head was performed utilizing time-of-flight imaging with MIP images. No intravenous contrast was administered. COMPARISON: None.  HISTORY: ORDERING SYSTEM PROVIDED HISTORY: stroke TECHNOLOGIST PROVIDED HISTORY: stroke Reason for Exam: stroke Additional signs and symptoms: patient unable to give history and unable to follow exam instructions due to mental status FINDINGS: MRI BRAIN: INTRACRANIAL STRUCTURES/VENTRICLES: Multifocal diffusion restriction abnormality related to acute ischemia are seen involving the right basal ganglia within the putamen, caudate nucleus and anterior limb of the right internal capsule, the posterior limb of the left internal capsule and lateral margin of the left thalamus, the posterior commissure of the corpus callosum, and left greater than right frontal parietal paramedian subcortical and deep white matter . Can fluent increased T2/FLAIR signal is noted within the cerebral white matter consistent with severe microvascular ischemic change. No mass effect or midline shift. No evidence of an acute intracranial hemorrhage. Mild diffuse decrease in cerebral volume is noted with corresponding prominence of the sulci and ventricles. The sellar/suprasellar regions appear unremarkable. The normal signal voids within the major intracranial vessels appear maintained. ORBITS: The visualized portion of the orbits demonstrate no acute abnormality. SINUSES: The visualized paranasal sinuses and mastoid air cells are well aerated. BONES/SOFT TISSUES: The bone marrow signal intensity appears normal. The soft tissues demonstrate no acute abnormality. MRA NECK: AORTIC ARCH/ARCH VESSELS: No dissection or arterial injury. No significant stenosis of the brachiocephalic or subclavian arteries. CAROTID ARTERIES: No dissection, arterial injury, or hemodynamically significant stenosis by NASCET criteria. VERTEBRAL ARTERIES: No dissection, arterial injury, or significant stenosis. MRA HEAD: ANTERIOR CIRCULATION: Suspected focal high-grade stenosis involving the A1 segment of the left anterior cerebral artery is identified. Right middle cerebral artery focal moderate grade stenosis involving the M1 segment is present. Suspected multifocal moderate to high-grade stenosis involving the right middle cerebral artery M2 segment is seen. No further evidence of significant stenosis of the intracranial internal carotid, anterior cerebral, or middle cerebral arteries. POSTERIOR CIRCULATION: Bilateral posterior cerebral artery P2 segment suspected focal moderate to high-grade stenosis are noted. No further evidence of significant stenosis of the vertebral, basilar, or posterior cerebral arteries.      1. Multifocal acute ischemia involving the bilateral basal ganglia, as discussed above, posterior commissure of the corpus callosum, lateral margin of left thalamus and the left greater than right frontal parietal paramedian subcortical and deep white matter. 2. No evidence of associated hemorrhagic conversion. 3. Finding suggestive of embolic phenomenon. Recommend clinical correlation. 4. Suspected focal high-grade stenosis involving A1 segment of the left anterior cerebral artery. 5. Suspected right middle cerebral artery M1 segment focal moderate grade stenosis. 6. Suspected right middle cerebral artery M2 segment multifocal moderate to high-grade stenosis. 7. Bilateral posterior cerebral artery P2 segment suspected multifocal moderate to high-grade stenosis. 8. Grossly unremarkable MR angiogram of the neck. Note: MR angiographic evaluation of the neck is severely limited by patient motion related artifact. 9. Severe cerebral white matter chronic microvascular ischemic disease. 10. Mild diffuse cerebral atrophy. Critical results were called by Dr. Sherry Thurman to Dr. Poornima Heart On 8/7/2021 at 22:11. MRI BRAIN WO CONTRAST    Result Date: 8/7/2021  EXAMINATION: MRA OF THE NECK WITHOUT CONTRAST; MRI OF THE BRAIN WITHOUT CONTRAST; MRA OF THE HEAD WITHOUT CONTRAST 8/7/2021 8:28 pm; 8/7/2021 8:34 pm; 8/7/2021 8:27 pm: TECHNIQUE: Multiplanar multisequence MRA of the neck was performed without the administration of intravenous contrast. Stenosis of the internal carotid arteries measured using NASCET criteria.; Multiplanar multisequence MRI of the brain was performed without the administration of intravenous contrast.; MRA of the head was performed utilizing time-of-flight imaging with MIP images. No intravenous contrast was administered. COMPARISON: None.  HISTORY: ORDERING SYSTEM PROVIDED HISTORY: stroke TECHNOLOGIST PROVIDED HISTORY: stroke Reason for Exam: stroke Additional signs and symptoms: patient unable to give history and unable to follow exam instructions due to mental status FINDINGS: MRI BRAIN: INTRACRANIAL STRUCTURES/VENTRICLES: Multifocal diffusion restriction abnormality related to acute ischemia are seen involving the right basal ganglia within the putamen, caudate nucleus and anterior limb of the right internal capsule, the posterior limb of the left internal capsule and lateral margin of the left thalamus, the posterior commissure of the corpus callosum, and left greater than right frontal parietal paramedian subcortical and deep white matter . Can fluent increased T2/FLAIR signal is noted within the cerebral white matter consistent with severe microvascular ischemic change. No mass effect or midline shift. No evidence of an acute intracranial hemorrhage. Mild diffuse decrease in cerebral volume is noted with corresponding prominence of the sulci and ventricles. The sellar/suprasellar regions appear unremarkable. The normal signal voids within the major intracranial vessels appear maintained. ORBITS: The visualized portion of the orbits demonstrate no acute abnormality. SINUSES: The visualized paranasal sinuses and mastoid air cells are well aerated. BONES/SOFT TISSUES: The bone marrow signal intensity appears normal. The soft tissues demonstrate no acute abnormality. MRA NECK: AORTIC ARCH/ARCH VESSELS: No dissection or arterial injury. No significant stenosis of the brachiocephalic or subclavian arteries. CAROTID ARTERIES: No dissection, arterial injury, or hemodynamically significant stenosis by NASCET criteria. VERTEBRAL ARTERIES: No dissection, arterial injury, or significant stenosis. MRA HEAD: ANTERIOR CIRCULATION: Suspected focal high-grade stenosis involving the A1 segment of the left anterior cerebral artery is identified. Right middle cerebral artery focal moderate grade stenosis involving the M1 segment is present.   Suspected multifocal moderate to high-grade stenosis involving the right middle cerebral artery M2 segment is seen. No further evidence of significant stenosis of the intracranial internal carotid, anterior cerebral, or middle cerebral arteries. POSTERIOR CIRCULATION: Bilateral posterior cerebral artery P2 segment suspected focal moderate to high-grade stenosis are noted. No further evidence of significant stenosis of the vertebral, basilar, or posterior cerebral arteries. 1. Multifocal acute ischemia involving the bilateral basal ganglia, as discussed above, posterior commissure of the corpus callosum, lateral margin of left thalamus and the left greater than right frontal parietal paramedian subcortical and deep white matter. 2. No evidence of associated hemorrhagic conversion. 3. Finding suggestive of embolic phenomenon. Recommend clinical correlation. 4. Suspected focal high-grade stenosis involving A1 segment of the left anterior cerebral artery. 5. Suspected right middle cerebral artery M1 segment focal moderate grade stenosis. 6. Suspected right middle cerebral artery M2 segment multifocal moderate to high-grade stenosis. 7. Bilateral posterior cerebral artery P2 segment suspected multifocal moderate to high-grade stenosis. 8. Grossly unremarkable MR angiogram of the neck. Note: MR angiographic evaluation of the neck is severely limited by patient motion related artifact. 9. Severe cerebral white matter chronic microvascular ischemic disease. 10. Mild diffuse cerebral atrophy. Critical results were called by Dr. Chantell Fang to Dr. Josefina Le On 8/7/2021 at 22:11. Physical Examination:        Physical Exam  Constitutional:       Comments: Lethargic   Cardiovascular:      Rate and Rhythm: Normal rate and regular rhythm. Heart sounds: No murmur heard. No friction rub. No gallop. Pulmonary:      Effort: No respiratory distress. Breath sounds: Wheezing present. Abdominal:      General: Bowel sounds are normal. There is no distension.       Palpations: Abdomen is soft. Musculoskeletal:      Right lower leg: No edema. Left lower leg: No edema. 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]     Cerebral multi-infarct state [I69.30] 08/08/2021    Encephalopathy [G93.40] 08/07/2021       Plan:         Altered mental status, secondary to metabolic encephalopathy versus pneumonia/sepsis  -Patient high risk for aspiration due to history of multiple strokes and altered mental status  -Blood cultures x2 no growth so far  -LFTs elevated, ammonia level wnl  -CXR showed no acute pulmonary process, procalcitonin 0.26, lactic acid 2.0, mycoplasma, strep pneumo, legionella, urinalysis negative  -Treat empirically for aspiration pneumonia with IV Unasyn      History of multiple CVA with acute multi-infarct   -MRI brain showed multiple new infarcts bilaterally  -Plan for IZABELLA and loop recorder procedure to r/o cardiac source of emboli   -Neurology on board     Electrolyte Disturbance  -potassium 5.8, sodium 147  -one time dose calcium gluconate 1,000mg  -12 lead EKG  -CK, Myoglobin, VBG and repeat BMP in 2 hours ordered     CELESTINO on CKD, likely due to poor intake vs rhabdomyolysis, improved  -Creatinine elevated, baseline (1.4-1.5)   -1.86 on 8/7, 1.45 this AM  -CK wnl, myoglobin elevated at 280  -Continue to monitor      Protein calorie malnutrition  -Albumin 2.9, total protein 6.1  -Patient receiving PEG tube feeds  -Dietary on board     Diabetes mellitus  -Low dose sliding scale insulin  -Continue to monitor blood glucose levels   -Hypoglycemia protocol     Essential Hypertension  -Cozaar 50mg daily  -Hydralazine 10mg TID     DVT ppx: low dose Heparin drip  GI ppx: famotidine 20mg daily    Kadeem Paredes MD  8/10/2021  1:42 PM     Attending Physician Statement    I have discussed the case of Radha Baird, including pertinent history and exam findings with the resident. I have seen and examined the patient and the key elements of the encounter have been performed by me. I agree with the assessment, plan, and orders as documented by the resident.   Patient is nonresponsive at present with history of multiple strokes and his overall prognosis is poor  Electronically signed by Kadeem Praedes MD on 8/10/2021 at 1:42 PM

## 2021-08-10 NOTE — PLAN OF CARE
Patient was transferred to [facility] for IZABELLA/loop. However, on examination he is minimally responsive and barely withdraws his extremities on sternal rubbing. Review of chart reveals that he was seen by Neurology yesterday and had similar neurological status. Given his neurological status and that he's unable to follow any  commands, cannot proceed with IZABELLA. Hence, patient will be transferred back to [facility] and would consider IZABELLA at a later time if improvement in neurological status. Plan was discussed with Dr. Swapna Gandhi.

## 2021-08-10 NOTE — PROGRESS NOTES
.. PALLIATIVE CARE TEAM    Patient: Lesia Patterson  Room: 2099/2099-01    Reason For Consult   Goals of care evaluation  Distress management  Symptom Management  Guidance and support  Facilitate communications  Assistance in coordinating care  Recommendations for the above    Impression: Lesia Patterson is a 77y.o. year old male with  has a past medical history of Centrilobular emphysema ([address] COPD (chronic obstructive pulmonary disease) ([address] Depression, Diabetes mellitus ([address] Former smoker, Hyperlipidemia, Hypertension, Mixed restrictive and obstructive lung disease ([address] Need for pneumococcal vaccine, and Personal history of tobacco use. .  Currently hospitalized for the management of Altered mental status. The Palliative Care Team is following to assist with goals of care and family support. Code Status  Full Code    Vital Signs:  /89   Pulse 80   Temp 98.3 °F (36.8 °C) (Axillary)   Resp 18   Ht 5' 10\" (1.778 m)   Wt 215 lb 6.2 oz (97.7 kg)   SpO2 94%   BMI 30.91 kg/m²     Patient Active Problem List   Diagnosis    Centrilobular emphysema (HCC)    Mixed restrictive and obstructive lung disease ([address]    Cerebrovascular accident (CVA) ([address]    COPD (chronic obstructive pulmonary disease) ([address]    HTN (hypertension)    Diabetes 1.5, managed as type 2 ([address]    Hyperlipidemia    CAD S/P percutaneous coronary angioplasty    Rhabdomyolysis    Intracranial atherosclerosis    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 [place] for IZABELLA.  I call the patient's sister Brett Tello at [phone]. I update [facility] that the patient is being transported now for a IZABELLA. We again talk about meeting today at 1 pm, and I update her that the patient will not be here as he left for a test.    nAalimildredradha Lantry and I again talk about trying to reach the patient's daughter Bridgette Olivares. Analiradha Lantry states that her daughter Mika Barber as well tried to reach her as well via Facebook and Yoanna Dsouza is not on [employer]. [facility] states that she cannot get into the patient's phone as he is not able to communicate. Jenny Lantry states that she is well has guardianship papers and was having difficulty completing the paperwork, and reaching the [employer] system for assistance. I set up a meeting tomorrow at [place] [id number] for the Jenny Mathis and I to meet and to connect her with the medical team for an update. She states\" I will be there at 10am tomorrow. I go then to talk with Darwin Cazares the . She reaches out to [employer] [place] per phone another  that has been working with the patient's sister Jenny Mathis. I was updated by Javi Pemberton that we have reached out for assistance to help and try locate the patient's only biological daughter. Angie updated me that Paul who is a  for [employer] was unable to locate the patient's daughter as well. So at this time Ivette Nathan the patient's biological sister is the patient's known next of kin. I update Dr. Maggy Lagos Neurologist about who is the contact and next of kin for the patient at this time. He asks to call family with an update and I tell him that it is the patient's sister Ivette Evans. Dr. Maggy Lagos stated that he will call her with an update. Will follow for goals of care, and I will meet with [facility] tomorrow at 10 am. Dr. Maggy Lagos as well stated that if he is rounding at 10 am tomorrow, that he will be happy to meet with [facility] at that time as well. Will follow for goals of care and family support as well.            Goals/Plan of care  Education/support to family  Communications with primary service  Providing support for coping/adaptation/distress of family  Continue with current plan of care  Code status clarified: Full Code  Validating patient/family distress  Continued communication updates  Patient was transported for a IZABELLA at [facility]. Blayne Will meet with the patient's sister tomorrow at 8 am. Dr. Lalo Medrano will call the patient's sister Judith Lazaro with a medical update.      Electronically signed by   Ciro Angela RN  Palliative Care Team  on 8/10/2021 at 11:33 AM

## 2021-08-11 NOTE — PROGRESS NOTES
[place]  Speech Language Pathology    Date: 8/11/2021  Patient Name: Makenzie Soliman  YOB: 1945   AGE: 77 y.o. MRN: [id number]        Patient Not Available for Speech Therapy     Due to:  [] Testing  [] Hemodialysis  [] Cancelled by RN  [] Surgery   [] Intubation/Sedation/Pain Medication  [] Medical instability  [x] Other:  Pt. Remains unresponsive, not opening eyes, not responding verbally or with y/n gestures, not attempting to follow commands. Next scheduled treatment: 08/12    Completed by: Queenie Mendoza A.CCC/SLP

## 2021-08-11 NOTE — PROGRESS NOTES
Patient seen and examined. Afebrile, VSS. Patient not responsive; discussed with nursing staff. PEG site clean. Tolerating tube feeds at goal. Bowels are moving. Abdomen is soft, non-tender, non-distended. Surgically stable. Continue tube feeds as tolerated. Palliative care meeting today. Continue medical management.      Justin Chahal PA-C  [facility]

## 2021-08-11 NOTE — FLOWSHEET NOTE
DEX for pt's sister Naeem Pompa offering support.       08/11/21 1515   Encounter Summary   Services provided to: Family   Referral/Consult From: Palliative Care   Complexity of Encounter Low   Length of Encounter 15 minutes   Spiritual/Congregation   Type Spiritual support

## 2021-08-11 NOTE — CARE COORDINATION
DISCHARGE PLANNING NOTE:    Palliative on board. Was supposed to meet with patients sister today, however she is now in the hospital herself. Patient unresponsive. Most likely will need SNF vs Hospice. Will continue to follow along.      Electronically signed by Robert Kay RN on 8/11/2021 at 2:43 PM

## 2021-08-11 NOTE — PROGRESS NOTES
[facility]: DEREK BERRY   Occupational Therapy Evaluation  Date: 21  Patient Name: Gayla Mojica       Room:   MRN: [id number]  Account: [de-identified]   : 1945  (72 y.o.) Gender: male     Discharge Recommendations: Continue to assess   Further Occupational Therapy is recommended upon facility discharge. Referring Practitioner: Sen Garcia MD  Diagnosis: AMS       Treatment Diagnosis: Impaired self care status  Past Medical History:  has a past medical history of Centrilobular emphysema ([address] COPD (chronic obstructive pulmonary disease) ([facility] Depression, Diabetes mellitus ([facility] Former smoker, Hyperlipidemia, Hypertension, Mixed restrictive and obstructive lung disease ([facility] Need for pneumococcal vaccine, and Personal history of tobacco use. Past Surgical History:   has a past surgical history that includes Neck surgery; Toe amputation (Right, greater); Cardiac surgery (2015); and Upper gastrointestinal endoscopy (N/A, 8/3/2021). Restrictions  Restrictions/Precautions: Fall Risk  Implants present? : Metal implants (cardiac stent, h/o neck surgery)  Other position/activity restrictions: NPO, up with assist, PEG tube, IV, telemetry     Vitals  Temp: 98.6 °F (37 °C)  Pulse: 71  Resp: 18  BP: 128/80  Height: 5' 10\" (177.8 cm)  Weight: 215 lb 6.2 oz (97.7 kg)  BMI (Calculated): 31  Oxygen Therapy  SpO2: 99 %  Pulse Oximeter Device Mode: Intermittent  Pulse Oximeter Device Location: Finger  O2 Device: None (Room air)  FiO2 : 21 %  O2 Flow Rate (L/min): 0 L/min  Blood Gas  Performed?: No  Level of Consciousness: Responds to Pain (2)    Subjective  Subjective: Pt in bed upon arrival. Pt maintains eyes closed throughout session. Pt unresponsive to verbal and tactile stimuli.   Comments: Ok per [employer]  Overall Orientation Status: Impaired  Orientation Level: Unable to assess  Vision  Vision:  (unable to assess)  Hearing  Hearing:  (unable to assess)  Social/Functional History  Lives With: Alone  Type of Home: House  Home Layout: One level  Home Access: Stairs to enter with rails  Entrance Stairs - Number of Steps: 4  Entrance Stairs - Rails: Left  Bathroom Shower/Tub: Tub/Shower unit, Curtain  Bathroom Toilet: Standard  Bathroom Equipment: Hand-held shower  Bathroom Accessibility: Accessible  Home Equipment: get2play  ADL Assistance: Independent  Homemaking Assistance: Independent  Homemaking Responsibilities: Yes  Ambulation Assistance: Independent (Cane if needed with back pain)  Transfer Assistance: Independent  Active : Yes  Mode of Transportation: Car  Occupation: Retired  Type of occupation: Marine   IADL Comments: Pt sleeps in an adjustable bed  Additional Comments: Pt has a sister who is retired and can assist some. Social history obtained from chart review due to pt unable to provide information. Objective      Cognition  Overall Cognitive Status: Impaired  Additional Comments: Pt unable to follow commands at this time       ADL  Feeding: NPO, Dependent/Total (Peg tube. )  Grooming: Dependent/Total  UE Bathing: Dependent/Total  LE Bathing: Dependent/Total  UE Dressing: Dependent/Total  LE Dressing: Dependent/Total  Toileting: Dependent/Total  Additional Comments: ADL scores based on clinical reasoning and skilled observation unless otherwise noted. Pt unable to be aroused or follow commands at this time despite max verbal and tacile cues. UE Function           LUE Strength  Gross LUE Strength: Exceptions to [facility]  LUE Strength Comment: Unable to MMT due to increased tone and pt unable to follow commands     LUE Tone: Hypertonic  Tone Description: Increased tone noted with extension. LUE PROM (degrees)  LUE General PROM: Questionable tone with extension vs resistance with movement. Unable to complete full ROM.   LUE AROM (degrees)  LUE General AROM: Questionable purposeful AROM in LUE  Left Hand PROM (degrees)  Left Hand General PROM: Questionable tone with extension vs resistance with movement. Unable to complete full ROM. RUE Strength  Gross RUE Strength: Exceptions to [facility]  RUE Strength Comment: Unable to MMT due to increased tone and pt unable to follow commands      RUE Tone: Hypertonic  Tone Description: Increased flexion tone noted  RUE PROM (degrees)  RUE General PROM: Increased flexed tone noted. Unable to achieve full ROM. Right Hand PROM (degrees)  Right Hand General PROM: Increased tone noted with inablility to achieve full ROM        Fine Motor Skills  Coordination  Movements Are Fluid And Coordinated: No                           Mobility          Balance  Sitting Balance: Unable to assess(comment)  Standing Balance: Unable to assess(comment)        Bed mobility  Rolling to Left: 2 Person assistance;Dependent/Total  Rolling to Right: 2 Person assistance;Dependent/Total  Comment: Pt unable to follow commands or provide assistance requiring 2 person total assist to assist with bottom hygiene.              Assessment  Performance deficits / Impairments: Decreased ADL status, Decreased functional mobility , Decreased ROM, Decreased strength, Decreased safe awareness, Decreased cognition, Decreased endurance, Decreased sensation, Decreased balance, Decreased vision/visual deficit, Decreased high-level IADLs, Decreased fine motor control, Decreased coordination, Decreased posture  Treatment Diagnosis: Impaired self care status  Prognosis: Guarded  Decision Making: Low Complexity  REQUIRES OT FOLLOW UP: Yes            Functional Outcome Measures  AM-PAC Daily Activity Inpatient   How much help for putting on and taking off regular lower body clothing?: Total  How much help for Bathing?: Total  How much help for Toileting?: Total  How much help for putting on and taking off regular upper body clothing?: Total  How much help for taking care of personal grooming?: Total  How much help for eating meals?: Total  AM-PAC Inpatient Daily Activity Raw Score: 6  AM-PAC Inpatient ADL T-Scale Score : 17.07  ADL Inpatient CMS 0-100% Score: 100  ADL Inpatient CMS G-Code Modifier : CN       Goals  Patient Goals   Patient goals : Pt unable to state  Short term goals  Time Frame for Short term goals: By discharge  Short term goal 1: Pt will appropriately respond to stimuli 2:5 trials to increase participate in self care tasks  Short term goal 2: Pt will participate in simple grooming task with max A  Short term goal 3: Pt will participate in PROM/stretching to BUE for contracture management. Short term goal 4: Pt will participate in bed mobility with max x 2 to assist with lower body hygiene  Short term goal 5: -  Short term goal 6: -  Long term goals  Time Frame for Long term goals : -  Long term goal 1: -  Long term goal 2: -  Long term goal 3: -  Long term goal 4: -  Long term goal 5: -  Long term goals 6: -  Long term goal 7: -    Plan  Safety Devices  Safety Devices in place: Yes  Type of devices:  All fall risk precautions in place, Bed alarm in place, Call light within reach, Patient at risk for falls, Left in bed, Nurse notified     Plan  Times per week: 2-3  Times per day: Daily  Current Treatment Recommendations: Self-Care / ADL, Strengthening, ROM, Balance Training, Functional Mobility Training, Endurance Training, Neuromuscular Re-education, Cognitive Reorientation, Pain Management, Safety Education & Training, Patient/Caregiver Education & Training, Equipment Evaluation, Education, & procurement, Home Management Training, Cognitive/Perceptual Training  Plan Comment: -          OT Individual Minutes  Time In: 1400  Time Out: 1430  Minutes: 30    Electronically signed by Nitesh Lyle OT on 8/11/21 at 4:22 PM EDT

## 2021-08-11 NOTE — PROGRESS NOTES
[id number] [facility] Neurology   IN-PATIENT SERVICE      NEUROLOGY PROGRESS  NOTE            Date:   8/11/2021  Patient name:  Irish Schultz  Date of admission:  8/8/2021  YOB: 1945      Interval History:     No significant changes in patient's condition. Continues to be minimally responsive, therapy is at bedside unable to do much for them. Discussed patient's condition at length over the phone with his Sister Bennie Cramer yesterday. Apparently she checked into the hospital overnight due to chest pain. Palliative care had meeting with Ladonna's daughter this morning. IZABELLA was aborted secondary to decreased level of consciousness yesterday. History of Present Illness: The patient is a 77 y.o. male who presents with No chief complaint on file. . The patient was seen and examined and the chart was reviewed. Patient initially presented to [facility] on 7/13 with strokelike symptoms. He had been found down 2 days prior with severe dysarthria and right hemiparesis, facial droop. There was evidence of prior bilateral strokes on imaging. CTA showed diffuse intracranial atherosclerotic disease and near occlusion of right PCA. Medical therapy was recommended. Found to have new left cortical ischemic stroke. Patient had thrombocytopenia in which hematology oncology was following so antiplatelets were held. Eventually they were restarted. Eventually discharged on 7/22 [facility] acute rehab unit. Patient eventually had PEG tube placed. He also had to be started on Zyprexa by psychiatry for agitation. Neurology reconsulted on 8/7 for mental status changes. Recommendation for MRI of the head and MRA head and neck. MRI showed new infarcts in the right cerebellar, right lentiform nucleus, left periventricular region. MRA confirms intracranial atherosclerotic disease. Patient noted to be stuporous nonverbal withdrawing the right side less than the left side.   He is readmitted to the progressive care unit. Patient placed on low intensity heparin with aspirin 81 daily and stopping Plavix. There is plan for IZABELLA and follow-up head CT in the next 24 hours. Patient sitting in bed, minimally responsive. There is right gaze preference. There is withdrawal to pain. He is moaning to painful stimulation. Awaiting repeat CT head. IZABELLA is pending. Systolic blood pressures 97814S. Currently on heparin drip, aspirin 81 mg daily. Platelets 403.     Past Medical History:     Past Medical History:   Diagnosis Date    Centrilobular emphysema ([address]     COPD [facility]) [facility]     Depression     Diabetes mellitus ([facility]     pt refuses to take previously prescribed metformin (states PCP aware)    Former smoker     Hyperlipidemia     on 4/27/18 pt states \"I stopped taking that about a year ago\"    Hypertension     Mixed restrictive and obstructive lung disease ([facility]     Need for pneumococcal vaccine     Personal history of tobacco use         Past Surgical History:     Past Surgical History:   Procedure Laterality Date    CARDIAC SURGERY  07/2015    1 stent    NECK SURGERY      TOE AMPUTATION Right greater    UPPER GASTROINTESTINAL ENDOSCOPY N/A 8/3/2021    EGD  PEG TUBE INSERTION performed by Ashley Farley MD at [facility]        Medications during admission:      nystatin  5 mL Oral 4x Daily    aspirin  81 mg PEG Tube Daily    famotidine  20 mg PEG Tube Daily    FLUoxetine  40 mg PEG Tube Daily    [Held by provider] hydrALAZINE  10 mg Oral 3 times per day    insulin lispro  0-6 Units Subcutaneous 4 times per day    [Held by provider] isosorbide dinitrate  10 mg PEG Tube TID    [Held by provider] losartan  50 mg Oral Daily    melatonin  6 mg PEG Tube Nightly    OLANZapine  5 mg PEG Tube Nightly    rosuvastatin  40 mg PEG Tube Nightly    tiotropium  2 puff Inhalation Lunch    ampicillin-sulbactam  1,500 mg Intravenous Q6H         Physical Exam:   BP 128/80   Pulse 71   Temp 98.6 °F (37 °C) (Axillary)   Resp 18   Ht 5' 10\" (1.778 m)   Wt 215 lb 6.2 oz (97.7 kg)   SpO2 99%   BMI 30.91 kg/m²   Temp (24hrs), Av.4 °F (36.9 °C), Min:98.2 °F (36.8 °C), Max:98.8 °F (37.1 °C)          Neurological examination:    Mental status    Patient is very lethargic. Eyes open spontaneously with right gaze preference. Occasionally moans when stimulated. Not following commands.       Cranial nerves    Pupil response:            Present     Oculocephalic reflex: Right gaze preference  Corneal Reflex:            Present     Facial grimace to pin:  Present     Gag/Cough reflex:       Present         Motor and sensory function   increased tone present bilateral upper extremities with withdrawal to pain in the right greater than left hemibody.      DTR brisk throughout  Plantar response: Upgoing on left  (-) Grayson's sign bilaterally    Gait  Not tested patient is very lethargic             Diagnostics:      Laboratory Testing:  CBC:   Recent Labs     21  1920 21  0839 08/10/21  0800   WBC 14.4* 11.4* 9.3   HGB 10.7* 10.7* 10.9*   * 140* 141*     BMP:    Recent Labs     21  1318 21  1318 21  1900 08/10/21  0800 21  0802   *  --   --  139 139   K 5.6*   < > 5.3 4.4 4.4   *  --   --  105 107   CO2 26  --   --  25 24   BUN 28*  --   --  25* 25*   CREATININE 1.79*  --   --  1.45* 1.54*   GLUCOSE 147*  --   --  127* 195*    < > = values in this interval not displayed. Lab Results   Component Value Date    CHOL 186 2021    LDLCHOLESTEROL 129 2021    HDL 42 2021    TRIG 59 2021     (H) 2021     (H) 2021    TSH 0.28 (L) 2021    INR 1.1 2021    LABA1C 7.7 (H) 2021    [id number] 667 2021         Imaging/Diagnostics:        MRI brain (2021): Acute infarct left and right basal ganglia, greater on the left.   Diffuse volume loss and extensive chronic ischemic white matter changes. MRI brain (8/7/2021): Multifocal acute infarcts involving the bilateral basal ganglia, greater on the right, posterior commissure of the corpus callosum, lateral margin of the left thalamus, left greater than right frontal parietal paramedian subcortical and deep white matter.               CTA head and neck (7/13/2021): Severe stenosis V1 segment of left vertebral artery. Near complete occlusion right PCA. Severe stenosis proximal A3 segment of ACAs bilaterally. Moderate to severe proximal M2 segment stenosis in the right MCA. Moderate stenosis M1, M2 segments of the left MCA. Moderate stenosis of the P1/P2 junction of the left PCA.     MRA head and neck (8/7/2021): Focal high-grade stenosis involving A1 segment of left RAHEEM. Right MCA M1 segment focal moderate grade stenosis. Right MCA M2 segment multifocal moderate to high-grade stenosis. Bilateral PCA P2 segment multifocal moderate to high-grade stenosis. 2D echo: EF 50-55%. Negative bubble study. LA is normal size. I personally reviewed all of the above medications, clinical laboratory, imaging and other diagnostic tests. Impression:      1. Multiple bihemispheric acute/subacute infarcts with right greater than left hemiparesis, subsequent encephalopathy with minimally responsive state  2. Significant intracranial diffuse atherosclerotic disease likely etiology of patient's strokes  3. status post PEG tube placement    Plan:      IZABELLA to be discontinued as patient is too lethargic to have this done at this time.  Had extensive discussion over the phone with his Sister Cristina Valente yesterday discussing Anna's condition. Explained that unfortunately due to the multiple strokes he is left minimally responsive, bedbound and dependent on all activities of daily living.   She was supposed to be here for the palliative care meeting this morning but unfortunately was admitted to the hospital herself per report. At this time I do not see any further neurological work-up indicated. Unfortunately patient's prognosis for functional neurologic recovery is poor. I would recommend patient be discharged to SNF versus recommendation for hospice if family agrees on that. Discussed with nurse at bedside.         Electronically signed by Jordan Baxter DO on 8/11/2021 at 2:07 PM      Jordan Baxter, [address] [place] [address]

## 2021-08-11 NOTE — PROGRESS NOTES
.. PALLIATIVE CARE TEAM    Patient: Lucero Gallardo  Room: 2099/2099-01    Reason For Consult   Goals of care evaluation  Distress management  Symptom Management  Guidance and support  Facilitate communications  Assistance in coordinating care  Recommendations for the above    Impression: Lucero Gallardo is a 77y.o. year old male with  has a past medical history of Centrilobular emphysema ([address] COPD (chronic obstructive pulmonary disease) ([address] Depression, Diabetes mellitus ([address] Former smoker, Hyperlipidemia, Hypertension, Mixed restrictive and obstructive lung disease ([address] Need for pneumococcal vaccine, and Personal history of tobacco use. .  Currently hospitalized for the management of Altered mental status. The Palliative Care Team is following to assist with goals of care and family support.      Code Status  Full Code    Vital Signs:  /87   Pulse 87   Temp 98.8 °F (37.1 °C) (Axillary)   Resp 20   Ht 5' 10\" (1.778 m)   Wt 215 lb 6.2 oz (97.7 kg)   SpO2 99%   BMI 30.91 kg/m²     Patient Active Problem List   Diagnosis    Centrilobular emphysema (HCC)    Mixed restrictive and obstructive lung disease ([address]    Cerebrovascular accident (CVA) ([address]    COPD (chronic obstructive pulmonary disease) ([address]    HTN (hypertension)    Diabetes 1.5, managed as type 2 ([address]    Hyperlipidemia    CAD S/P percutaneous coronary angioplasty    Rhabdomyolysis    Intracranial atherosclerosis    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 She is taking care of her own personal issues and her daughter Leigh Ann Skaggs will meet with me this am around [address] Granado and I again talk about the process of getting guardianship for her brother the patient. I tell her that I did talk with Keisha Khushi, and as Johanna Isidra stated that she  was Lidia Mullins is obtaining guardianship. I go over the process again as the papers have the Expert evaluation completed, and Whitney Mason needs to fill in her portion the best that she can, and then take it to probate office. Whitney Mason stated that she worked in the courts at one time, and has the contacts number for Probate to complete the guardianship paperwork. I tell her that she at this time is her brother's decision maker because his biological daughter has not been contacted. I ask Whitney Mason if she talked with Dr. Ty Gottron the neuroligist, and she states \" yes I did after I talked to you. \"   I asked what he told her. Whitney Mason stated\" he told me that he had many other strokes and there is damage to both sides of brain, and that the vessels are damaged that feed his brain. \"   I then explain the patient's code status classification. I tell Whitney Mason with this information that we have, we need to discuss what the wishes of the patient are. I explain the full code status classification and all that means, and Whitney Mason states \"please talk to my daughter about that today and we will discuss it. \"   I tell her yes for sure and I offer much emotional support to her. I then meet Ladonna's daughter , the patient's niece Leigh Ann Skaggs. I introduce my palliative care support role. We go and talk in private. We begin as we talk about her cousin the patient;s daughter, and the attempt to reach her and she states\" Yes I as well have not been able to find her as I have searched on facebook as well Faheem Wanda, and I have not had any luck. I then ask Leigh Ann Skaggs if she had talked to her Mom about what the neurologist told her, and she stated\" no not really as my Mom has a lot of stress herself. \" I tell her that I did talk with her Mom,and the stated update was that \" her Ivana Regis has had many strokes and it had damaged both sides of his, and that the vessels are damaged also that feed the brain. \" Christi Meza is very tearful and asks about the patient , and I tell her that he is not verbally communicating and that he cannot swallow and has no purposeful movement of his arms and legs. \" Christi Meza is very tearful and we talk at length about what his life was like and the dynamics and his stubborn attitude. We then discuss his quality of life and how he would want to live. At this time since he cannot be his own voice, that her Mom is. I discussed the fact that he is not a candidate for the acute rehab again, and that we are looking at a nursing home with possibly limited functions and having to live his life in bed. We as well talked about the option of hospice care. Christi Meza was very tearful and then angry and frustrated and states\" I thought I came here to get information and that you would give me answers. \" She states\" how can I talk to the doctor? \" I tell her that we can have Dr. Rich Belizean call her and her Mom again, and they can ask the hard questions as he is the expert in her Uncle's condition and outcome. She states \"can't we give him a chance. \" I again tell her that all these questions definitely can be answered and she can ask them. I again tell her that since I am not the doctor that we can make arrangements to make that happen. Christi Meza is very emotional and then states \" I need to go, and I am going to see my Uncle. \"     I then go to the bedside, and Christi Meza is present. I ask her if I can offer any other support and she states\" no I am fine. \"     Will follow for goals of care and family support.      Goals/Plan of care  Education/support to family  Communications with primary service  Providing support for coping/adaptation/distress of family  Discussing meaning/purpose   Continue with current plan of care  Code status clarified: Full Code  Validating patient/family distress  Continued communication updates  Recognizing, reflecting, and empathizing with family members' anticipatory grief  Patient is unable to verbally communicate. He has no purposeful movement of his extremities at this time. I have a lenghty conversation with the patient's niece Samira Pryor. She is very tearful and frustrated and does not have a good understanding of the patient's condition and outcome. I suggest another request for both her and her Mom Jesus Alberto Conte to talk with the neurologist together, and get all the hard questions answered. I explain some options of care moving forward. Will follow for goals of care and family support.      Electronically signed by   Francine Aiken RN  Palliative Care Team  on 8/11/2021 at 9:45 AM

## 2021-08-11 NOTE — PLAN OF CARE
Problem: Infection:  Goal: Will remain free from infection  Description: Will remain free from infection  8/11/2021 0051 by Enzo Johns RN  Outcome: Ongoing  8/10/2021 1738 by Aleksandar Sandoval RN  Outcome: Ongoing     Problem: Safety:  Goal: Free from accidental physical injury  Description: Free from accidental physical injury  8/11/2021 0051 by Enzo Johns RN  Outcome: Ongoing  8/10/2021 1738 by Aleksandar Sandoval RN  Outcome: Ongoing  Goal: Free from intentional harm  Description: Free from intentional harm  8/11/2021 0051 by Enzo Johns RN  Outcome: Ongoing  8/10/2021 1738 by Aleksandar Sandoval RN  Outcome: Ongoing     Problem: Daily Care:  Goal: Daily care needs are met  Description: Daily care needs are met  8/11/2021 0051 by Enzo Johns RN  Outcome: Ongoing  8/10/2021 1738 by Aleksandar Sandoval RN  Outcome: Ongoing     Problem: Pain:  Goal: Patient's pain/discomfort is manageable  Description: Patient's pain/discomfort is manageable  8/11/2021 0051 by Enzo Johns RN  Outcome: Ongoing  8/10/2021 1738 by Aleksandar Sandoval RN  Outcome: Ongoing     Problem: Skin Integrity:  Goal: Skin integrity will stabilize  Description: Skin integrity will stabilize  8/11/2021 0051 by Enzo Johns RN  Outcome: Ongoing  8/10/2021 1738 by Aleksandar Sandoval RN  Outcome: Ongoing     Problem: Discharge Planning:  Goal: Patients continuum of care needs are met  Description: Patients continuum of care needs are met  8/11/2021 0051 by Enzo Johns RN  Outcome: Ongoing  8/10/2021 1738 by Aleksandar Sandoval RN  Outcome: Ongoing     Problem: Nutrition  Goal: Optimal nutrition therapy  8/11/2021 0051 by Enzo Johns RN  Outcome: Ongoing  8/10/2021 1738 by Aleksandar Sandoval RN  Outcome: Ongoing     Problem: Falls - Risk of:  Goal: Will remain free from falls  Description: Will remain free from falls  8/11/2021 0051 by Enzo Johns RN  Outcome: Ongoing  8/10/2021 1738 by Aleksandar Sandoval RN  Outcome: Ongoing  Goal: Absence of physical injury  Description: Absence of physical injury  8/11/2021 0051 by Jose Goins RN  Outcome: Ongoing  8/10/2021 1738 by Twila Morejon RN  Outcome: Ongoing     Problem: Skin Integrity:  Goal: Will show no infection signs and symptoms  Description: Will show no infection signs and symptoms  8/11/2021 0051 by Jose Goins RN  Outcome: Ongoing  8/10/2021 1738 by Twila Morejon RN  Outcome: Ongoing  Goal: Absence of new skin breakdown  Description: Absence of new skin breakdown  8/11/2021 0051 by Jose Goins RN  Outcome: Ongoing  8/10/2021 1738 by Twila Morejon RN  Outcome: Ongoing     Problem: Non-Violent Restraints  Goal: Removal from restraints as soon as assessed to be safe  8/11/2021 0051 by Jose Goins RN  Outcome: Completed  8/10/2021 1738 by Twila Morejon RN  Outcome: Ongoing  Goal: No harm/injury to patient while restraints in use  8/11/2021 0051 by Jose Goins RN  Outcome: Completed  8/10/2021 1738 by Twila Morjeon RN  Outcome: Ongoing  Goal: Patient's dignity will be maintained  8/11/2021 0051 by Jose Goins RN  Outcome: Completed  8/10/2021 1738 by Twila Morejon RN  Outcome: Ongoing

## 2021-08-11 NOTE — PROGRESS NOTES
[address]    PROGRESS NOTE             8/11/2021    9:43 AM    Name:   Samantha Griffiths  MRN:     [id number]     Acct:      [de-identified]   Room:   2099/2099-01  IP Day:  2  Admit Date:  8/8/2021 11:30 AM    PCP:  No primary care provider on file. Code Status:  Full Code    Subjective:     C/C: No chief complaint on file. Altered mental status     Interval History Status: not changed. Patient was seen and evaluated at the bedside. He was unresponsive. Breathing regularly. Responsive to noxious stimuli. Oral thrush improving with nystatin. Motor exam 0/5 in both extremities (limitation due to mental status). Unable to evaluate sensory deficits. Spasticity noted in in right extremities. Palliative care meeting today at 10 am. Plan was discussed with the nurse. IZABELLA was not performed due to pt unable to follow any commands. 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 Patient has past medical history of CVA, coronary artery disease s/p percutaneous coronary angioplasty, hyperlipidemia, hypertension, diabetes, emphysema/COPD.     History difficult to obtain as patient is somnolent and nonresponsive. Per prior documentation, patient had increasing lethargy on 8/6. CT head obtained which showed: There has been interval development of a hypodensity in the right basal ganglia compared to prior study consistent with evolving subacute white matter infarct. No hemorrhage is noted. Chronic microvascular change in atrophy is demonstrated.   No midline shift.      Spoke with family - sister and niece - who stated he is somewhat confused at baseline, but has become significantly more lethargic/began to be agitated and combative about 1.5 weeks ago while in the acute rehab unit.     He was transferred to medicine service for management of altered mental status. For a few days prior to transfer, patient has had intermittent elevations in temperature, up to 100 °F  Patient has been coughing intermittently; frothy/brown substance suctioned from mouth    Pneumonia/sepsis work-up: CXR no acute process, procalcitonin 0.26, lactic acid 2.0, legionella/strep/mycoplasma negative,  blood culture no growth to date, urinalysis negative  Metabolic encephalopathy workup: LFTs elevated, ammonia wnl, myoglobin elevated, CK wnl. Review of Systems:     Review of Systems   Unable to perform ROS: Mental status change (patient lethargic and not responsive)         Medications:      Allergies:  No Known Allergies    Current Meds:   Scheduled Meds:    nystatin  5 mL Oral 4x Daily    aspirin  81 mg PEG Tube Daily    famotidine  20 mg PEG Tube Daily    finasteride  5 mg PEG Tube Daily    FLUoxetine  40 mg PEG Tube Daily    [Held by provider] hydrALAZINE  10 mg Oral 3 times per day    insulin lispro  0-6 Units Subcutaneous 4 times per day    [Held by provider] isosorbide dinitrate  10 mg PEG Tube TID    [Held by provider] losartan  50 mg Oral Daily    melatonin  6 mg PEG Tube Nightly    OLANZapine  5 mg PEG Tube Nightly    rosuvastatin  40 mg PEG Tube Nightly    tiotropium  2 puff Inhalation Lunch    ampicillin-sulbactam  1,500 mg Intravenous Q6H     Continuous Infusions:    dextrose      heparin (PORCINE) Infusion 10.9 Units/kg/hr (08/11/21 0153)     PRN Meds: hydrALAZINE, acetaminophen, bisacodyl, polyethylene glycol, senna, albuterol sulfate HFA, dextrose, dextrose, glucagon (rDNA), glucose, heparin (porcine), heparin (porcine)    Data:     Past Medical History:   has a past medical history of Centrilobular emphysema ([address] COPD (chronic obstructive pulmonary disease) ([facility] [address] Depression, Diabetes mellitus ([address] Former smoker, Hyperlipidemia, Hypertension, Mixed restrictive and obstructive lung disease [facility]), Need for pneumococcal vaccine, and Personal history of tobacco use. Social History:   reports that he quit smoking about 8 years ago. He started smoking about 64 years ago. He has a 42.00 pack-year smoking history. He has never used smokeless tobacco. He reports that he does not drink alcohol and does not use drugs. Family History: No family history on file. Vitals:  /87   Pulse 87   Temp 98.8 °F (37.1 °C) (Axillary)   Resp 20   Ht 5' 10\" (1.778 m)   Wt 215 lb 6.2 oz (97.7 kg)   SpO2 99%   BMI 30.91 kg/m²   Temp (24hrs), Av.3 °F (36.8 °C), Min:98 °F (36.7 °C), Max:98.8 °F (37.1 °C)    Recent Labs     08/10/21  1124 08/10/21  1802 21  0024 21  0631   POCGLU 113* 103 159* 186*       I/O(24Hr):     Intake/Output Summary (Last 24 hours) at 2021 0943  Last data filed at 2021 0533  Gross per 24 hour   Intake 1167.04 ml   Output 1675 ml   Net -507.96 ml       Labs:    CBC:   Lab Results   Component Value Date    WBC 9.3 08/10/2021    RBC 4.48 08/10/2021    RBC 4.88 2012    HGB 10.9 08/10/2021    HCT 35.4 08/10/2021    MCV 79.0 08/10/2021    MCH 24.4 08/10/2021    MCHC 30.9 08/10/2021    RDW 18.2 08/10/2021     08/10/2021     2012    MPV 12.0 08/10/2021     BMP:    Lab Results   Component Value Date     2021    K 4.4 2021     2021    CO2 24 2021    BUN 25 2021    LABALBU 2.9 2021    CREATININE 1.54 2021    CALCIUM 8.6 2021    GFRAA 54 2021    LABGLOM 44 2021    GLUCOSE 195 2021    GLUCOSE 122 2012       Lab Results   Component Value Date/Time    SPECIAL NOT REPORTED 2021 02:38 PM     Lab Results   Component Value Date/Time    CULTURE NO GROWTH 3 DAYS 2021 02:38 PM         Radiology:      CT HEAD WO CONTRAST    Result Date: 2021  EXAMINATION: CT OF THE HEAD WITHOUT CONTRAST  2021 2:04 pm TECHNIQUE: CT of the head was performed without the administration of intravenous contrast. Dose modulation, iterative reconstruction, and/or weight based adjustment of the mA/kV was utilized to reduce the radiation dose to as low as reasonably achievable. COMPARISON: MRI brain performed 08/07/2021. HISTORY: ORDERING SYSTEM PROVIDED HISTORY: stroke TECHNOLOGIST PROVIDED HISTORY: stroke Reason for Exam: stroke; ams Acuity: Unknown Type of Exam: Unknown Additional signs and symptoms: patient unable to stay still; turned head during exam FINDINGS: BRAIN/VENTRICLES: There is no acute intracranial hemorrhage, mass effect, or midline shift. There is satisfactory overall gray-white matter differentiation. There is extensive chronic microvascular disease. There are evolving areas of ischemia in the left periventricular white matter, and corpus callosum posteriorly, in the right basal ganglia. The ventricular structures are symmetric and unremarkable. The infratentorial structures are unremarkable. ORBITS: The visualized portion of the orbits demonstrate no acute abnormality. SINUSES: The visualized paranasal sinuses and mastoid air cells demonstrate no acute abnormality. SOFT TISSUES/SKULL:  No acute abnormality of the visualized skull or soft tissues. Chronic microvascular disease with scattered areas of evolving ischemia as described above. CT HEAD WO CONTRAST    Result Date: 8/6/2021  EXAMINATION: CT OF THE HEAD WITHOUT CONTRAST  8/6/2021 2:21 pm TECHNIQUE: CT of the head was performed without the administration of intravenous contrast. Dose modulation, iterative reconstruction, and/or weight based adjustment of the mA/kV was utilized to reduce the radiation dose to as low as reasonably achievable. COMPARISON: 4 August 2021 HISTORY: ORDERING SYSTEM PROVIDED HISTORY: Pt w/ bilateral basal ganglia infarcts, right hemiparesis; minimally interactive over the last week and having increased lethargy the last few days.  TECHNOLOGIST PROVIDED HISTORY: Pt w/ bilateral basal ganglia infarcts, right hemiparesis; minimally interactive over the last week and having increased lethargy the last few days. Reason for Exam: Increased lethargy the last few days. Acuity: Unknown Type of Exam: Unknown FINDINGS: BRAIN/VENTRICLES: There is no acute intracranial hemorrhage, mass effect or midline shift. No abnormal extra-axial fluid collection. The gray-white differentiation is maintained without evidence of an acute infarct. There is no evidence of hydrocephalus. Remote right-sided basal ganglial infarcts are noted. However, there is been interval development of hypodensity in the right mid basal ganglia since prior study consistent with an evolving subacute infarct. No significant mass effect is noted. Ventricles are proportionate to cerebral atrophy. ORBITS: The visualized portion of the orbits demonstrate no acute abnormality. SINUSES: The visualized paranasal sinuses and mastoid air cells demonstrate no acute abnormality. SOFT TISSUES/SKULL:  No acute abnormality of the visualized skull or soft tissues. There has been interval development of a hypodensity in the right basal ganglia compared to prior study consistent with evolving subacute white matter infarct. No hemorrhage is noted. Chronic microvascular change in atrophy is demonstrated. No midline shift. CT HEAD WO CONTRAST    Result Date: 8/4/2021  EXAMINATION: CT OF THE HEAD WITHOUT CONTRAST  8/4/2021 1:25 pm TECHNIQUE: CT of the head was performed without the administration of intravenous contrast. Dose modulation, iterative reconstruction, and/or weight based adjustment of the mA/kV was utilized to reduce the radiation dose to as low as reasonably achievable.  COMPARISON: CT head 07/21/2021 and 07/17/2021 HISTORY: ORDERING SYSTEM PROVIDED HISTORY: Patient with history of left-sided CVA with right hemiparesis, not communicating with staff, please evaluate for any change from previous imaging and any other abnormality TECHNOLOGIST PROVIDED HISTORY: Patient with history of left-sided CVA with right hemiparesis, not communicating with staff, please evaluate for any change from previous imaging and any other abnormality Reason for Exam: Patient with history of left-sided CVA with right hemiparesis, not communicating with staff, please evaluate for any change from previous imaging and any other abnormality Acuity: Unknown Type of Exam: Unknown FINDINGS: BRAIN/VENTRICLES: There is no acute intracranial hemorrhage, mass effect or midline shift. No abnormal extra-axial fluid collection. The gray-white differentiation is maintained without evidence of an acute infarct. There is prominence of the ventricles and sulci due to global parenchymal volume loss. There are nonspecific areas of hypoattenuation within the periventricular and subcortical white matter, which likely represent chronic microvascular ischemic change. 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 Remote lacunar stroke left caudate lobe. ORBITS: The visualized portion of the orbits demonstrate no acute abnormality. SINUSES: The visualized paranasal sinuses and mastoid air cells demonstrate no acute abnormality. SOFT TISSUES/SKULL: No acute abnormality of the visualized skull or soft tissues. 1. No acute intracranial abnormality. 2. 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 Remote lacunar stroke left caudate lobe. 3. Senescent changes. White matter hypoattenuation described is typical of microvascular ischemic disease or as sequela of dysmyelinating/demyelinating processes.        MRA HEAD WO CONTRAST    Result Date: 8/7/2021  EXAMINATION: MRA OF THE NECK WITHOUT CONTRAST; MRI OF THE BRAIN WITHOUT CONTRAST; MRA OF THE HEAD WITHOUT CONTRAST 8/7/2021 8:28 pm; 8/7/2021 8:34 pm; 8/7/2021 8:27 pm: TECHNIQUE: Multiplanar multisequence MRA of the neck was performed without the administration of intravenous contrast. Stenosis of the internal carotid arteries measured using NASCET criteria.; Multiplanar multisequence MRI of the brain was performed without the administration of intravenous contrast.; MRA of the head was performed utilizing time-of-flight imaging with MIP images. No intravenous contrast was administered. COMPARISON: None. HISTORY: ORDERING SYSTEM PROVIDED HISTORY: stroke TECHNOLOGIST PROVIDED HISTORY: stroke Reason for Exam: stroke Additional signs and symptoms: patient unable to give history and unable to follow exam instructions due to mental status FINDINGS: MRI BRAIN: INTRACRANIAL STRUCTURES/VENTRICLES: Multifocal diffusion restriction abnormality related to acute ischemia are seen involving the right basal ganglia within the putamen, caudate nucleus and anterior limb of the right internal capsule, the posterior limb of the left internal capsule and lateral margin of the left thalamus, the posterior commissure of the corpus callosum, and left greater than right frontal parietal paramedian subcortical and deep white matter . Can fluent increased T2/FLAIR signal is noted within the cerebral white matter consistent with severe microvascular ischemic change. No mass effect or midline shift. No evidence of an acute intracranial hemorrhage. Mild diffuse decrease in cerebral volume is noted with corresponding prominence of the sulci and ventricles. The sellar/suprasellar regions appear unremarkable. The normal signal voids within the major intracranial vessels appear maintained. ORBITS: The visualized portion of the orbits demonstrate no acute abnormality. SINUSES: The visualized paranasal sinuses and mastoid air cells are well aerated. BONES/SOFT TISSUES: The bone marrow signal intensity appears normal. The soft tissues demonstrate no acute abnormality. MRA NECK: AORTIC ARCH/ARCH VESSELS: No dissection or arterial injury.   No significant stenosis of the brachiocephalic or subclavian arteries. CAROTID ARTERIES: No dissection, arterial injury, or hemodynamically significant stenosis by NASCET criteria. VERTEBRAL ARTERIES: No dissection, arterial injury, or significant stenosis. MRA HEAD: ANTERIOR CIRCULATION: Suspected focal high-grade stenosis involving the A1 segment of the left anterior cerebral artery is identified. Right middle cerebral artery focal moderate grade stenosis involving the M1 segment is present. Suspected multifocal moderate to high-grade stenosis involving the right middle cerebral artery M2 segment is seen. No further evidence of significant stenosis of the intracranial internal carotid, anterior cerebral, or middle cerebral arteries. POSTERIOR CIRCULATION: Bilateral posterior cerebral artery P2 segment suspected focal moderate to high-grade stenosis are noted. No further evidence of significant stenosis of the vertebral, basilar, or posterior cerebral arteries. 1. Multifocal acute ischemia involving the bilateral basal ganglia, as discussed above, posterior commissure of the corpus callosum, lateral margin of left thalamus and the left greater than right frontal parietal paramedian subcortical and deep white matter. 2. No evidence of associated hemorrhagic conversion. 3. Finding suggestive of embolic phenomenon. Recommend clinical correlation. 4. Suspected focal high-grade stenosis involving A1 segment of the left anterior cerebral artery. 5. Suspected right middle cerebral artery M1 segment focal moderate grade stenosis. 6. Suspected right middle cerebral artery M2 segment multifocal moderate to high-grade stenosis. 7. Bilateral posterior cerebral artery P2 segment suspected multifocal moderate to high-grade stenosis. 8. Grossly unremarkable MR angiogram of the neck. Note: MR angiographic evaluation of the neck is severely limited by patient motion related artifact. 9. Severe cerebral white matter chronic microvascular ischemic disease.  10. Mild diffuse cerebral atrophy. Critical results were called by Dr. Rendall Canavan to Dr. Jaswinder Milton On 8/7/2021 at 22:11. XR CHEST PORTABLE    Result Date: 8/8/2021  EXAMINATION: ONE XRAY VIEW OF THE CHEST 8/8/2021 11:58 am COMPARISON: 07/13/2021 HISTORY: ORDERING SYSTEM PROVIDED HISTORY: cough and fever TECHNOLOGIST PROVIDED HISTORY: cough and fever Reason for Exam: cough and fever Acuity: Acute Type of Exam: Initial FINDINGS: No lung infiltrate or consolidation. No pneumothorax or pleural effusion. Heart size is normal.     No acute abnormality. VL Lower Extremity Bilateral Venous Duplex    Result Date: 8/9/2021    [address] [place] [address] Lower Extremities DVT Study Procedure   Patient Name   GOFF       Date of Study           08/09/2021                 YAYA DUBOIS   Date of Birth  1945   Gender                  Male   Age            77 year(s)   Race                    Black   Room Number    2099   Corporate ID # [id number]   Patient Acct # [de-identified]   MR #           [id number]       Sonographer             Canelo Marie   Accession #    [id number]   Interpreting Physician  [address] [place] [address]      Dell Acuna,   Referring Physician  Nurse          APRN-CNP  Practitioner  Procedure Type of Study:   Veins: Lower Extremities DVT Study, Venous Scan Lower Bilateral.  Indications for Study:R/O DVT. Patient Status: In Patient. Technical Quality:Adequate visualization. Conclusions   Summary   No evidence of superficial or deep venous thrombosis in both lower  extremities.    Signature   ----------------------------------------------------------------  Electronically signed by Canelo Marie(Sonographer) on  08/09/2021 07:56 AM  ----------------------------------------------------------------   ----------------------------------------------------------------  Electronically signed by Lyndol Bloodgood Reyes,Arthur([facility]  physician) on 08/09/2021 11:02 PM  ---------------------------------------------------------------- Findings:   Right Impression:                    Left Impression:   The common femoral, femoral,         The common femoral, femoral,  popliteal and tibial veins           popliteal and tibial veins  demonstrate normal compressibility   demonstrate normal compressibility  and augmentation. and augmentation. Normal compressibility of the great  Normal compressibility of the great  saphenous vein. saphenous vein. Normal compressibility of the small  Normal compressibility of the small  saphenous vein. saphenous vein. Velocities are measured in cm/s ; Diameters are measured in cm Right Lower Extremities DVT Study Measurements Right 2D Measurements +------------------------------------+----------+---------------+----------+ ! Location                            ! Visualized! Compressibility! Thrombosis! +------------------------------------+----------+---------------+----------+ ! Common Femoral                      !Yes       ! Yes            ! None      ! +------------------------------------+----------+---------------+----------+ ! Prox Femoral                        !Yes       ! Yes            ! None      ! +------------------------------------+----------+---------------+----------+ ! Mid Femoral                         !Yes       ! Yes            ! None      ! +------------------------------------+----------+---------------+----------+ ! Dist Femoral                        !Yes       ! Yes            ! None      ! +------------------------------------+----------+---------------+----------+ ! Deep Femoral                        !No        !               !          ! +------------------------------------+----------+---------------+----------+ ! Popliteal                           !Yes       ! Yes            ! None      ! +------------------------------------+----------+---------------+----------+ ! Sapheno Femoral Junction            ! Yes       ! Yes            ! None ! +------------------------------------+----------+---------------+----------+ ! PTV                                 ! Yes       ! Yes            ! None      ! +------------------------------------+----------+---------------+----------+ ! Peroneal                            !Partial   !Yes            ! None      ! +------------------------------------+----------+---------------+----------+ ! Gastroc                             ! Yes       ! Yes            ! None      ! +------------------------------------+----------+---------------+----------+ ! GSV Thigh                           ! Yes       ! Yes            ! None      ! +------------------------------------+----------+---------------+----------+ ! GSV Knee                            ! Yes       ! Yes            ! None      ! +------------------------------------+----------+---------------+----------+ ! GSV Ankle                           ! Yes       ! Yes            ! None      ! +------------------------------------+----------+---------------+----------+ ! SSV                                 ! Partial   !Yes            ! None      ! +------------------------------------+----------+---------------+----------+ Right Doppler Measurements +---------------------------+------+------+--------------------------------+ ! Location                   ! Signal!Reflux! Reflux (msec)                   ! +---------------------------+------+------+--------------------------------+ ! Common Femoral             !Phasic!      !                                ! +---------------------------+------+------+--------------------------------+ ! Prox Femoral               !Phasic!      !                                ! +---------------------------+------+------+--------------------------------+ ! Popliteal                  !Phasic!      !                                ! +---------------------------+------+------+--------------------------------+ Left Lower Extremities DVT Study Measurements Left 2D Measurements !Yes       !Yes            ! None      ! +------------------------------------+----------+---------------+----------+ ! GSV Ankle                           ! Yes       ! Yes            ! None      ! +------------------------------------+----------+---------------+----------+ ! SSV                                 ! Yes       ! Yes            ! None      ! +------------------------------------+----------+---------------+----------+ Left Doppler Measurements +---------------------------+------+------+--------------------------------+ ! Location                   ! Signal!Reflux! Reflux (msec)                   ! +---------------------------+------+------+--------------------------------+ ! Common Femoral             !Phasic!      !                                ! +---------------------------+------+------+--------------------------------+ ! Prox Femoral               !Phasic!      !                                ! +---------------------------+------+------+--------------------------------+ ! Popliteal                  !Phasic!      !                                ! +---------------------------+------+------+--------------------------------+    MRA NECK WO CONTRAST    Result Date: 8/7/2021  EXAMINATION: MRA OF THE NECK WITHOUT CONTRAST; MRI OF THE BRAIN WITHOUT CONTRAST; MRA OF THE HEAD WITHOUT CONTRAST 8/7/2021 8:28 pm; 8/7/2021 8:34 pm; 8/7/2021 8:27 pm: TECHNIQUE: Multiplanar multisequence MRA of the neck was performed without the administration of intravenous contrast. Stenosis of the internal carotid arteries measured using NASCET criteria.; Multiplanar multisequence MRI of the brain was performed without the administration of intravenous contrast.; MRA of the head was performed utilizing time-of-flight imaging with MIP images. No intravenous contrast was administered. COMPARISON: None.  HISTORY: ORDERING SYSTEM PROVIDED HISTORY: stroke TECHNOLOGIST PROVIDED HISTORY: stroke Reason for Exam: stroke Additional signs and symptoms: patient unable to give history and unable to follow exam instructions due to mental status FINDINGS: MRI BRAIN: INTRACRANIAL STRUCTURES/VENTRICLES: Multifocal diffusion restriction abnormality related to acute ischemia are seen involving the right basal ganglia within the putamen, caudate nucleus and anterior limb of the right internal capsule, the posterior limb of the left internal capsule and lateral margin of the left thalamus, the posterior commissure of the corpus callosum, and left greater than right frontal parietal paramedian subcortical and deep white matter . Can fluent increased T2/FLAIR signal is noted within the cerebral white matter consistent with severe microvascular ischemic change. No mass effect or midline shift. No evidence of an acute intracranial hemorrhage. Mild diffuse decrease in cerebral volume is noted with corresponding prominence of the sulci and ventricles. The sellar/suprasellar regions appear unremarkable. The normal signal voids within the major intracranial vessels appear maintained. ORBITS: The visualized portion of the orbits demonstrate no acute abnormality. SINUSES: The visualized paranasal sinuses and mastoid air cells are well aerated. BONES/SOFT TISSUES: The bone marrow signal intensity appears normal. The soft tissues demonstrate no acute abnormality. MRA NECK: AORTIC ARCH/ARCH VESSELS: No dissection or arterial injury. No significant stenosis of the brachiocephalic or subclavian arteries. CAROTID ARTERIES: No dissection, arterial injury, or hemodynamically significant stenosis by NASCET criteria. VERTEBRAL ARTERIES: No dissection, arterial injury, or significant stenosis. MRA HEAD: ANTERIOR CIRCULATION: Suspected focal high-grade stenosis involving the A1 segment of the left anterior cerebral artery is identified. Right middle cerebral artery focal moderate grade stenosis involving the M1 segment is present.   Suspected multifocal moderate to high-grade stenosis involving the right middle cerebral artery M2 segment is seen. No further evidence of significant stenosis of the intracranial internal carotid, anterior cerebral, or middle cerebral arteries. POSTERIOR CIRCULATION: Bilateral posterior cerebral artery P2 segment suspected focal moderate to high-grade stenosis are noted. No further evidence of significant stenosis of the vertebral, basilar, or posterior cerebral arteries. 1. Multifocal acute ischemia involving the bilateral basal ganglia, as discussed above, posterior commissure of the corpus callosum, lateral margin of left thalamus and the left greater than right frontal parietal paramedian subcortical and deep white matter. 2. No evidence of associated hemorrhagic conversion. 3. Finding suggestive of embolic phenomenon. Recommend clinical correlation. 4. Suspected focal high-grade stenosis involving A1 segment of the left anterior cerebral artery. 5. Suspected right middle cerebral artery M1 segment focal moderate grade stenosis. 6. Suspected right middle cerebral artery M2 segment multifocal moderate to high-grade stenosis. 7. Bilateral posterior cerebral artery P2 segment suspected multifocal moderate to high-grade stenosis. 8. Grossly unremarkable MR angiogram of the neck. Note: MR angiographic evaluation of the neck is severely limited by patient motion related artifact. 9. Severe cerebral white matter chronic microvascular ischemic disease. 10. Mild diffuse cerebral atrophy. Critical results were called by Dr. Mayte Garcia to Dr. Yeni Lee On 8/7/2021 at 22:11.      MRI BRAIN WO CONTRAST    Result Date: 8/7/2021  EXAMINATION: MRA OF THE NECK WITHOUT CONTRAST; MRI OF THE BRAIN WITHOUT CONTRAST; MRA OF THE HEAD WITHOUT CONTRAST 8/7/2021 8:28 pm; 8/7/2021 8:34 pm; 8/7/2021 8:27 pm: TECHNIQUE: Multiplanar multisequence MRA of the neck was performed without the administration of intravenous contrast. Stenosis of the internal carotid arteries measured using NASCET criteria.; Multiplanar multisequence MRI of the brain was performed without the administration of intravenous contrast.; MRA of the head was performed utilizing time-of-flight imaging with MIP images. No intravenous contrast was administered. COMPARISON: None. HISTORY: ORDERING SYSTEM PROVIDED HISTORY: stroke TECHNOLOGIST PROVIDED HISTORY: stroke Reason for Exam: stroke Additional signs and symptoms: patient unable to give history and unable to follow exam instructions due to mental status FINDINGS: MRI BRAIN: INTRACRANIAL STRUCTURES/VENTRICLES: Multifocal diffusion restriction abnormality related to acute ischemia are seen involving the right basal ganglia within the putamen, caudate nucleus and anterior limb of the right internal capsule, the posterior limb of the left internal capsule and lateral margin of the left thalamus, the posterior commissure of the corpus callosum, and left greater than right frontal parietal paramedian subcortical and deep white matter . Can fluent increased T2/FLAIR signal is noted within the cerebral white matter consistent with severe microvascular ischemic change. No mass effect or midline shift. No evidence of an acute intracranial hemorrhage. Mild diffuse decrease in cerebral volume is noted with corresponding prominence of the sulci and ventricles. The sellar/suprasellar regions appear unremarkable. The normal signal voids within the major intracranial vessels appear maintained. ORBITS: The visualized portion of the orbits demonstrate no acute abnormality. SINUSES: The visualized paranasal sinuses and mastoid air cells are well aerated. BONES/SOFT TISSUES: The bone marrow signal intensity appears normal. The soft tissues demonstrate no acute abnormality. MRA NECK: AORTIC ARCH/ARCH VESSELS: No dissection or arterial injury. No significant stenosis of the brachiocephalic or subclavian arteries.  CAROTID ARTERIES: No dissection, arterial injury, or hemodynamically significant stenosis by NASCET criteria. VERTEBRAL ARTERIES: No dissection, arterial injury, or significant stenosis. MRA HEAD: ANTERIOR CIRCULATION: Suspected focal high-grade stenosis involving the A1 segment of the left anterior cerebral artery is identified. Right middle cerebral artery focal moderate grade stenosis involving the M1 segment is present. Suspected multifocal moderate to high-grade stenosis involving the right middle cerebral artery M2 segment is seen. No further evidence of significant stenosis of the intracranial internal carotid, anterior cerebral, or middle cerebral arteries. POSTERIOR CIRCULATION: Bilateral posterior cerebral artery P2 segment suspected focal moderate to high-grade stenosis are noted. No further evidence of significant stenosis of the vertebral, basilar, or posterior cerebral arteries. 1. Multifocal acute ischemia involving the bilateral basal ganglia, as discussed above, posterior commissure of the corpus callosum, lateral margin of left thalamus and the left greater than right frontal parietal paramedian subcortical and deep white matter. 2. No evidence of associated hemorrhagic conversion. 3. Finding suggestive of embolic phenomenon. Recommend clinical correlation. 4. Suspected focal high-grade stenosis involving A1 segment of the left anterior cerebral artery. 5. Suspected right middle cerebral artery M1 segment focal moderate grade stenosis. 6. Suspected right middle cerebral artery M2 segment multifocal moderate to high-grade stenosis. 7. Bilateral posterior cerebral artery P2 segment suspected multifocal moderate to high-grade stenosis. 8. Grossly unremarkable MR angiogram of the neck. Note: MR angiographic evaluation of the neck is severely limited by patient motion related artifact. 9. Severe cerebral white matter chronic microvascular ischemic disease. 10. Mild diffuse cerebral atrophy.  Critical results were called by Dr. Eulogio Retana to Dr. Gabrielle Emerson On 8/7/2021 at 22:11. Physical Examination:        Physical Exam  Constitutional:       Appearance: He is obese. He is ill-appearing. Comments: Lethargic   HENT:      Head: Normocephalic and atraumatic. Nose: Nose normal.   Cardiovascular:      Rate and Rhythm: Normal rate and regular rhythm. Heart sounds: No murmur heard. No friction rub. No gallop. Pulmonary:      Effort: No respiratory distress. Breath sounds: Wheezing present. Abdominal:      General: Bowel sounds are normal. There is no distension. Palpations: Abdomen is soft. Musculoskeletal:      Right lower leg: No edema. Left lower leg: No edema. Comments: Spasticity of bilateral upper extremities, more so on the right  Normal passive range of motion of bilateral lower extremities   Skin:     Capillary Refill: Capillary refill takes 2 to 3 seconds. Neurological:      Mental Status: He is unresponsive. GCS: GCS eye subscore is 1. GCS verbal subscore is 1. GCS motor subscore is 4. Motor: Abnormal muscle tone present.       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      ~History of multiple CVA with acute multi-infarct   -MRI brain showed multiple new infarcts bilaterally  -Plan for IZABELLA and loop recorder procedure to r/o cardiac source of emboli: IZABELLA not performed due to patient being unresponsive.  -Neurology on board: Awaiting recommendations. Patient was at [facility] yesterday for IZABELLA  -Heme-onc consulted for thrombocytopenia and recurrent stroke: Okay to give anticoagulation or antiplatelet platelet is above 73,041. Overall prognosis is poor. Avoid myelosuppressive drugs. Palliative care: Meeting with family today at [facility] as per nurse.      ~Electrolyte Disturbance  -potassium 4.4, sodium 139  -one time dose calcium gluconate 1,000mg  -12 lead EKG 8/9: Sinus bradycardia with sinus arrhythmia. -, Myoglobin 280     ~CELESTINO on CKD, likely due to poor intake vs rhabdomyolysis, improved  -Creatinine elevated, baseline (1.4-1.5)   -1.86 on 8/7, 1.45 this AM  -CK wnl, myoglobin elevated at 280  -Continue to monitor      ~Protein calorie malnutrition  -Albumin 2.9, total protein 6.1  -Patient receiving PEG tube feeds  -Dietary on board     ~Diabetes mellitus  -Low dose sliding scale insulin  -Continue to monitor blood glucose levels   -Hypoglycemia protocol     ~Essential Hypertension  -Cozaar 50mg daily  -Hydralazine 10mg TID    ~PEG tube assesment   General surgery: PEG site is clean resume tube feeds. Abdomen is soft.     DVT ppx: low dose Heparin drip  GI ppx: famotidine 20mg daily    Gregg Choi MD  8/11/2021  9:43 AM   Attending Physician Statement    I have discussed the case of Mignon Garcia, including pertinent history and exam findings with the resident. I have seen and examined the patient and the key elements of the encounter have been performed by me. I agree with the assessment, plan, and orders as documented by the resident. Patient has had multiple strokes and is comatose.   Does have oral candidiasis which will be treated with nystatin.   His medications were reviewed at this time there is no need for finasteride  Electronically signed by Kaitlynn Cabezas MD on 8/11/2021 at 9:44 AM

## 2021-08-11 NOTE — PROGRESS NOTES
Physical Therapy    Facility/Department: [facility] PROGRESSIVE CARE  Initial Assessment    NAME: Makenzie Solmian  : 1945  MRN: [id number]    Date of Service: 2021    Discharge Recommendations:  Continue to assess pending progress        Assessment   Body structures, Functions, Activity limitations: Decreased cognition;Decreased ROM; Decreased endurance;Decreased functional mobility   Assessment: Pt does not participate in PT eval. Will cont per POC and reassess as appropriate depending on pt's ability to benefit from skilled PT intervention. Treatment Diagnosis: AMS  Specific instructions for Next Treatment: ROM, skin assessment, reorientation, proper positioning  Prognosis: Guarded  Decision Making: Low Complexity  History: Patient was admitted to [facility] on  after being found down with weakness right arm and and leg 3/5 strength with right facial droop and extensive dysarthria seen last well 2 days prior . MRI of Head with bilateral acute basal ganglia infarctions more prominent on left with extensive bilateral chronic periventricular small vessel ischemia . He does have history of prior cerebral infarction on aspirin and plavix although unclear if he was taking this medication . CTA head and neck with severe left V1 vertebral stenosis . Near complete occlusion right PCA . Severe proximal A3 stenosis . Moderate to severe proximal right M2 stenosis . Moderate to severe left M1 and M2 stenosis . Moderate to severe left P1, P2 stenosis . Patient was seen by neurointerventional service recommending plavix , aspirin 81 mg along with statin . Patient had platelet count of 94O on admission from ITP seen by hematology getting platelet transfusion and IV steroids with these now being 136 k . Patient was transferred to [facility] for rehabilitation  There has been improvement of right side weakness to 4/5 walking 2 feet with assist with hemiwalker in PT .  He developed agitation during the night seen by of Head and neck high grade stenosi left A1 , moderate stenosis right M1 , moderate to high grade stenosis right M2 .Multifocal bilateral P2 moderate to high grade stenosis  Response To Previous Treatment: Not applicable  Family / Caregiver Present: No  Diagnosis: AMS  Follows Commands: Impaired  General Comment  Comments: Jenny PATE ok's session. Subjective  Subjective: Pt is partially R sidelying in bed with R LE in frog leg position. Pt maintains eyes closed, noted to have blinking motions. Pt is unresponsive to verbal and manual stimuli. Pain Screening  Patient Currently in Pain: Other (comment) (Pt is unable to verbalize. No grimacing noted during session, moans 1x when turning to R side.)  Vital Signs  Patient Currently in Pain: Other (comment) (Pt is unable to verbalize. No grimacing noted during session, moans 1x when turning to R side.)  Pre Treatment Pain Screening  Intervention List: Patient able to continue with treatment    Orientation  Orientation  Orientation Level: Unable to assess  Social/Functional History  Social/Functional History  Lives With: Alone  Type of Home: House  Home Layout: One level  Home Access: Stairs to enter with rails  Entrance Stairs - Number of Steps: 4  Entrance Stairs - Rails: Left  Bathroom Shower/Tub: Tub/Shower unit, Curtain  Bathroom Toilet: Standard  Bathroom Equipment: Hand-held shower  Bathroom Accessibility: Accessible  Home Equipment: Yovani Robles  ADL Assistance: Independent  Homemaking Assistance: Independent  Homemaking Responsibilities: Yes  Ambulation Assistance: Independent (Cane if needed with back pain)  Transfer Assistance: Independent  Active : Yes  Mode of Transportation: Car  Occupation: Retired  Type of occupation: [employer]  IADL Comments: Pt sleeps in an adjustable bed  Additional Comments: Pt has a sister who is retired and can assist some. Social history obtained from chart review due to pt unable to provide information.    Cognition Training, Positioning  Safety Devices  Type of devices: Patient at risk for falls, Left in bed, Call light within reach, Bed alarm in place (3 rails up, heels floated)  Restraints  Initially in place: No    G-Code       OutComes Score                                                  AM-PAC Score  AM-PAC Inpatient Mobility Raw Score : 6 (08/11/21 1505)  AM-PAC Inpatient T-Scale Score : 23.55 (08/11/21 1505)  Mobility Inpatient CMS 0-100% Score: 100 (08/11/21 1505)  Mobility Inpatient CMS G-Code Modifier : CN (08/11/21 1505)          Goals  Short term goals  Time Frame for Short term goals: 10 sessions  Short term goal 1: Pt will increase participation in PT session to maximize mobility  Short term goal 2: Pt will perform rolling in bed with Max A x1  Short term goal 3: Pt will demo WFL Bilat LE ROM  Short term goal 4: PT will participate in turning schedule and repositioning to prevent effects of immobility  Short term goal 5: . Short term goal 6: .  Long term goals  Time Frame for Long term goals : .  Long term goal 1: .  Long term goal 2: . Long term goal 3: .  Long term goal 4: .   Long term goal 5: .  Long term goal 6: .  Long term goal 7: .  Patient Goals   Patient goals : unable to state       Therapy Time   Individual Concurrent Group Co-treatment   Time In       1400   Time Out       1430   Minutes       [address] [place] [address],

## 2021-08-11 NOTE — PROGRESS NOTES
Today's Date: 8/11/2021  Patient Name: Cesar Snyder  Date of admission: 8/8/2021 11:30 AM  Patient's age: 77 y.o., 1945  Admission Dx: Altered mental status [R41.82]    Reason for Consult: management recommendations  Requesting Physician: Ney Burns MD    CHIEF COMPLAINT: Thrombocytopenia. Recurrent stroke. History Obtained From:  electronic medical record, medical team    Interval history:    Patient seen and examined  Labs and vital reviewed  Patient unable to give any history  Platelet count is stable  No new complaint interval event per nurse          HISTORY OF PRESENT ILLNESS:      The patient is a 77 y.o.  male who is admitted to the hospital for worsening mental status and recurrent strokes    Patient was initially admitted to the hospital on 7/13 with strokelike symptoms. Imaging studies showed evidence of prior bilateral strokes on imaging. CTA done showed diffuse intracranial atherosclerosis disease and near occlusion of right PCA. After evaluation medical therapy was recommended. Further evaluation also revealed new left cortical ischemic stroke. Patient platelet count at presentation was only 4. 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 After recovery of the platelet count patient was started on antiplatelet therapy. Patient was discharged on 7/22 [place] [address] rehab. Repeat MRI shows new infarcts in the right cerebellar right lentiform nucleus and left periventricular region. MRA confirms atherosclerotic disease. Patient is now placed on low intensity heparin with aspirin. Patient is being worked up with a IZABELLA.   Patient's most latest blood count is 140,000    Patient mentation has worsened and is nonverbal does not follow commands not able to give any history    Past Medical History:   has a past medical history of Centrilobular emphysema ([address] COPD (chronic obstructive pulmonary disease) ([facility] [address] Depression, Diabetes mellitus ([facility] [address] Former smoker, Hyperlipidemia, Hypertension, Mixed restrictive and obstructive lung disease ([facility] [address] Need for pneumococcal vaccine, and Personal history of tobacco use. Past Surgical History:   has a past surgical history that includes Neck surgery; Toe amputation (Right, greater); Cardiac surgery (07/2015); and Upper gastrointestinal endoscopy (N/A, 8/3/2021). Medications:    Prior to Admission medications    Medication Sig Start Date End Date Taking?  Authorizing Provider   Heparin Sodium, Porcine, (HEPARIN, PORCINE,) 5000 UNIT/ML injection Inject 1 mL into the skin every 8 hours 7/20/21   Te Lira MD   QUEtiapine (SEROQUEL) 25 MG tablet Take 1 tablet by mouth nightly as needed for Agitation 7/20/21 7/25/21  Te Lira MD   methylPREDNISolone sodium (SOLU-MEDROL) 40 MG injection Infuse 1 mL intravenously every 12 hours 7/20/21   Te Lira MD   melatonin 3 MG TABS tablet Take 1 tablet by mouth nightly as needed (insomnia) 7/20/21   Te Lira MD   atorvastatin (LIPITOR) 80 MG tablet Take 0.5 tablets by mouth daily (Pt takes one-half of an 80mg tab = 40mg) 7/16/21   Te Lira MD   tiZANidine (ZANAFLEX) 2 MG tablet Take 1 tablet by mouth 4 times daily as needed (muscle spasms) 5/7/21   JOLIE Julian CNP   naproxen (NAPROSYN) 500 MG tablet Take 1 tablet by mouth 2 times daily (with meals) 1/4/21   Su Nash PA-C   ibuprofen (ADVIL;MOTRIN) 800 MG tablet Take 1 tablet by mouth every 8 hours as needed for Pain 6/2/20   Mariana Rose MD   lidocaine (LIDODERM) 5 % Place 1 patch onto the skin daily 12 hours on, 12 hours off. 6/2/20   Mariana Rose MD   metoprolol succinate (TOPROL XL) 50 MG extended release tablet Take 50 mg by mouth daily    Historical Provider, MD   tiotropium (SPIRIVA RESPIMAT) 2.5 MCG/ACT AERS inhaler Inhale 2 puffs into the lungs daily    Historical Provider, MD   carboxymethylcellulose 1 % ophthalmic solution Place 1 drop into both eyes 3 times daily as needed for Dry Eyes     Historical Provider, MD   ketotifen (ZADITOR) 0.025 % ophthalmic solution Place 1 drop into both eyes 2 times daily as needed (itchy eyes)     Historical Provider, MD   isosorbide mononitrate (IMDUR) 60 MG extended release tablet Take 30 mg by mouth daily Indications: 1/2 tablet (30mg)     Historical Provider, MD   finasteride (PROSCAR) 5 MG tablet Take 5 mg by mouth daily    Historical Provider, MD   tamsulosin (FLOMAX) 0.4 MG capsule Take 0.4 mg by mouth daily    Historical Provider, MD   fluticasone (FLONASE) 50 MCG/ACT nasal spray 1 spray by Nasal route 2 times daily  Patient taking differently: 1 spray by Nasal route 2 times daily as needed for Rhinitis  5/31/16   Francisco Connor DO   albuterol sulfate  (90 BASE) MCG/ACT inhaler Inhale 2 puffs into the lungs every 6 hours as needed for Wheezing    Historical Provider, MD   albuterol (PROVENTIL) (2.5 MG/3ML) 0.083% nebulizer solution Take 2.5 mg by nebulization every 6 hours as needed for Wheezing    Historical Provider, MD   aspirin 81 MG EC tablet Take 81 mg by mouth daily    Historical Provider, MD   losartan (COZAAR) 100 MG tablet Take 100 mg by mouth daily     Historical Provider, MD   nitroGLYCERIN (NITROSTAT) 0.4 MG SL tablet Place 0.4 mg under the tongue every 5 minutes as needed for Chest pain    Historical Provider, MD   FLUoxetine (PROZAC) 20 MG capsule Take 40 mg by mouth daily     Historical Provider, MD   furosemide (LASIX) 20 MG tablet Take 20 mg by mouth daily as needed (swelling)     Historical Provider, MD   clopidogrel (PLAVIX) 75 MG tablet Take 75 mg by mouth daily    Historical Provider, MD   rOPINIRole (REQUIP) 0.25 MG tablet Take 0.25 mg by mouth nightly as needed     Historical Provider, MD   budesonide-formoterol (SYMBICORT) 160-4.5 MCG/ACT AERO Inhale 2 puffs into the lungs 2 times daily.     Historical Provider, MD     Current Facility-Administered Medications   Medication Dose Route Frequency Provider Last Rate Last Admin    nystatin (MYCOSTATIN) 529584 UNIT/ML suspension 500,000 Units  5 mL Oral 4x Daily Robert Oliveira MD        hydrALAZINE (APRESOLINE) injection 10 mg  10 mg Intravenous Q8H PRN Lesli Rogers MD        acetaminophen (TYLENOL) tablet 650 mg  650 mg Oral Q4H PRN Denise Tatum MD        bisacodyl (DULCOLAX) suppository 10 mg  10 mg Rectal Daily PRN Denise Tatum MD        polyethylene glycol [facility]) packet 17 g  17 g Per G Tube Daily PRN Denies Tatum MD        [facility]) tablet 17.2 mg  2 tablet Oral Daily PRN Denise Tatum MD        albuterol sulfate  (90 Base) MCG/ACT inhaler 2 puff  2 puff Inhalation Q6H PRN Denise Tatum MD        aspirin chewable tablet 81 mg  81 mg PEG Tube Daily Denise Tatum MD        dextrose 5 % solution  100 mL/hr Intravenous PRN Denise Tatum MD        dextrose 50 % IV solution  12.5 g Intravenous PRN Denise Tatum MD        famotidine (PEPCID) tablet 20 mg  20 mg PEG Tube Daily Denise Tatum MD        FLUoxetine (PROZAC) 20 MG/5ML solution 40 mg  40 mg PEG Tube Daily Denise Tatum MD        glucagon (rDNA) injection 1 mg  1 mg Intramuscular PRN Denise Tatum MD        glucose (GLUTOSE) 40 % oral gel 15 g  15 g Oral PRN Denise Tatum MD        [facility] [place] by provider] hydrALAZINE (APRESOLINE) tablet 10 mg  10 mg Oral 3 times per day Denise Tatum MD   10 mg at 08/09/21 1174    insulin lispro (HUMALOG) injection vial 0-6 Units  0-6 Units Subcutaneous 4 times per day Denise Tatum MD   1 Units at 08/11/21 1397    [Held by provider] isosorbide dinitrate (ISORDIL) tablet 10 mg  10 mg PEG Tube TID Denise Tatum MD   10 mg at 08/08/21 1543    [Held by provider] losartan (COZAAR) tablet 50 mg  50 mg Oral Daily Derl Mehdi [facility] MD        melatonin tablet 6 mg  6 mg PEG Tube Nightly Josiane Blackmon MD   6 mg at 08/10/21 2058    OLANZapine (ZYPREXA) tablet 5 mg  5 mg PEG Tube Nightly Josiane Blackmon MD   5 mg at 08/10/21 2058    rosuvastatin (CRESTOR) tablet 40 mg  40 mg PEG Tube Nightly Josiane Blackmon MD   40 mg at 08/10/21 2058    tiotropium (SPIRIVA RESPIMAT) 2.5 MCG/ACT inhaler 2 puff  2 puff Inhalation Lunch Josiane Blackmon MD   2 puff at 21 115    ampicillin-sulbactam (UNASYN) 1500 mg IVPB minibag  1,500 mg Intravenous Q6H Radha Lagunas MD   Stopped at 21 0543    heparin (porcine) injection 4,000 Units  4,000 Units Intravenous PRN Josiane Blackmon MD        heparin (porcine) injection 2,000 Units  2,000 Units Intravenous PRN Josiane Blackmon MD   2,000 Units at 08/10/21 2143    heparin 25,000 units in dextrose 5% 250 mL (premix) infusion  10.9 Units/kg/hr Intravenous Continuous Josiane Blackmon MD 9.9 mL/hr at 21 10.9 Units/kg/hr at 21       Allergies:  Patient has no known allergies. Social History:   reports that he quit smoking about 8 years ago. He started smoking about 64 years ago. He has a 42.00 pack-year smoking history. He has never used smokeless tobacco. He reports that he does not drink alcohol and does not use drugs.      Family History: Unable to obtain due to altered mental status    REVIEW OF SYSTEMS:      Unable to obtain review of system due to patient being nonverbal    PHYSICAL EXAM:        /80   Pulse 71   Temp 98.6 °F (37 °C) (Axillary)   Resp 18   Ht 5' 10\" (1.778 m)   Wt 215 lb 6.2 oz (97.7 kg)   SpO2 99%   BMI 30.91 kg/m²    Temp (24hrs), Av.4 °F (36.9 °C), Min:98.2 °F (36.8 °C), Max:98.8 °F (37.1 °C)      General appearance -ill-appearing  Mental status -not alert or cooperative  Eyes -pupils reactive  Ears - bilateral TM's and external ear canals normal   Mouth - mucous membranes moist, pharynx normal without lesions   Neck - supple, no significant adenopathy   Lymphatics - no palpable lymphadenopathy, no hepatosplenomegaly   Chest -decreased breathing sounds  Heart - normal rate, regular rhythm, normal S1, S2, no murmurs  Abdomen - soft, nontender, nondistended, no masses or organomegaly   Neurological -patient is not alert awake oriented does not follow commands  Musculoskeletal - no joint tenderness, deformity or swelling   Extremities - peripheral pulses normal, no pedal edema, no clubbing or cyanosis   Skin - normal coloration and turgor, no rashes, no suspicious skin lesions noted ,      DATA:      Labs:     Results for orders placed or performed during the hospital encounter of 08/08/21   Culture, Blood 1    Specimen: Blood   Result Value Ref Range    Specimen Description . BLOOD  LEFT AC, NO VOLUMES LISTED     Special Requests NOT REPORTED     Culture NO GROWTH 3 DAYS    Culture, Blood 1    Specimen: Blood   Result Value Ref Range    Specimen Description . BLOOD  LEFT AC, NO VOLUMES LISTED     Special Requests NOT REPORTED     Culture NO GROWTH 3 DAYS    Legionella Ag, Ur    Specimen: Urine, straight catheter   Result Value Ref Range    Legionella Pneumophilia Ag, Urine NEGATIVE    STREP PNEUMONIAE ANTIGEN    Specimen: Urine, straight catheter   Result Value Ref Range    Source . CLEAN CATCH URINE     Strep pneumo Ag NEGATIVE    Urinalysis Reflex to Culture    Specimen: Urine, clean catch   Result Value Ref Range    Color, UA YELLOW YELLOW    Turbidity UA CLEAR CLEAR    Glucose, Ur NEGATIVE NEGATIVE    Bilirubin Urine NEGATIVE NEGATIVE    Ketones, Urine NEGATIVE NEGATIVE    Specific Gravity, UA 1.018 1.000 - 1.030    Urine Hgb NEGATIVE NEGATIVE    pH, UA 6.5 5.0 - 8.0    Protein, UA 1+ (A) NEGATIVE    Urobilinogen, Urine Normal Normal    Nitrite, Urine NEGATIVE NEGATIVE    Leukocyte Esterase, Urine NEGATIVE NEGATIVE    Urinalysis Comments NOT REPORTED    Procalcitonin   Result Value Ref Range    Procalcitonin 0.26 (H) <0.09 ng/mL   Mycoplasma Ab,IgM   Result Value Ref Range    Mycoplasma pneumo IgM 0.24 <0.91   Lactic Acid   Result Value Ref Range    Lactic Acid 2.0 0.5 - 2.2 mmol/L   CBC   Result Value Ref Range    WBC 11.4 (H) 3.5 - 11.0 k/uL    RBC 4.48 (L) 4.5 - 5.9 m/uL    Hemoglobin 10.7 (L) 13.5 - 17.5 g/dL    Hematocrit 35.2 (L) 41 - 53 %    MCV 78.7 (L) 80 - 100 fL    MCH 23.9 (L) 26 - 34 pg    MCHC 30.4 (L) 31 - 37 g/dL    RDW 17.9 (H) 11.5 - 14.9 %    Platelets 220 (L) 667 - 450 k/uL    MPV 10.9 6.0 - 12.0 fL    NRBC Automated NOT REPORTED per 100 WBC   HEPATIC FUNCTION PANEL   Result Value Ref Range    Albumin 2.9 (L) 3.5 - 5.2 g/dL    Alkaline Phosphatase 184 (H) 40 - 129 U/L     (H) 5 - 41 U/L     (H) <40 U/L    Total Bilirubin 0.21 (L) 0.3 - 1.2 mg/dL    Bilirubin, Direct 0.10 <0.31 mg/dL    Bilirubin, Indirect 0.11 0.00 - 1.00 mg/dL    Total Protein 6.3 (L) 6.4 - 8.3 g/dL    Globulin NOT REPORTED 1.5 - 3.8 g/dL    Albumin/Globulin Ratio NOT REPORTED 1.0 - 2.5   CBC   Result Value Ref Range    WBC 14.4 (H) 3.5 - 11.0 k/uL    RBC 4.38 (L) 4.5 - 5.9 m/uL    Hemoglobin 10.7 (L) 13.5 - 17.5 g/dL    Hematocrit 34.6 (L) 41 - 53 %    MCV 78.9 (L) 80 - 100 fL    MCH 24.3 (L) 26 - 34 pg    MCHC 30.9 (L) 31 - 37 g/dL    RDW 17.8 (H) 11.5 - 14.9 %    Platelets 841 (L) 790 - 450 k/uL    MPV 10.8 6.0 - 12.0 fL    NRBC Automated NOT REPORTED per 100 WBC   APTT   Result Value Ref Range    PTT 45.7 (H) 24.0 - 36.0 sec   Protime-INR   Result Value Ref Range    Protime 14.7 (H) 11.8 - 14.6 sec    INR 1.1    Basic Metabolic Panel   Result Value Ref Range    Glucose 154 (H) 70 - 99 mg/dL    BUN 31 (H) 8 - 23 mg/dL    CREATININE 1.96 (H) 0.70 - 1.20 mg/dL    Bun/Cre Ratio NOT REPORTED 9 - 20    Calcium 9.2 8.6 - 10.4 mg/dL    Sodium 147 (H) 135 - 144 mmol/L    Potassium 5.8 (H) 3.7 - 5.3 mmol/L    Chloride 111 (H) 98 - 107 mmol/L    CO2 27 20 - 31 mmol/L    Anion Gap 9 9 - 17 mmol/L    GFR Non- 34 (L) >60 mL/min    GFR  41 (L) >60 mL/min    GFR Comment          GFR Staging NOT REPORTED    Magnesium   Result Value Ref Range    Magnesium 2.6 1.6 - 2.6 mg/dL   Phosphorus   Result Value Ref Range    Phosphorus 4.7 (H) 2.5 - 4.5 mg/dL   HEPARIN LEVEL/ANTI-XA   Result Value Ref Range    Anti-XA Unfrac Heparin 0.67 0.30 - 0.70 IU/L   HEPARIN LEVEL/ANTI-XA   Result Value Ref Range    Anti-XA Unfrac Heparin 0.76 (HH) 0.30 - 0.70 IU/L   HEPARIN LEVEL/ANTI-XA   Result Value Ref Range    Anti-XA Unfrac Heparin 0.55 0.30 - 0.70 IU/L   HEPARIN LEVEL/ANTI-XA   Result Value Ref Range    Anti-XA Unfrac Heparin 0.49 0.30 - 0.70 IU/L   Basic Metabolic Panel w/ Reflex to MG   Result Value Ref Range    Glucose 147 (H) 70 - 99 mg/dL    BUN 28 (H) 8 - 23 mg/dL    CREATININE 1.79 (H) 0.70 - 1.20 mg/dL    Bun/Cre Ratio NOT REPORTED 9 - 20    Calcium 9.3 8.6 - 10.4 mg/dL    Sodium 145 (H) 135 - 144 mmol/L    Potassium 5.6 (H) 3.7 - 5.3 mmol/L    Chloride 110 (H) 98 - 107 mmol/L    CO2 26 20 - 31 mmol/L    Anion Gap 9 9 - 17 mmol/L    GFR Non-African American 37 (L) >60 mL/min    GFR  45 (L) >60 mL/min    GFR Comment          GFR Staging NOT REPORTED    BLOOD GAS, VENOUS   Result Value Ref Range    pH, David 7.409 7.320 - 7.420    pCO2, David 44.5 39.0 - 55.0    pO2, David 37.7 30 - 50    HCO3, Venous 28.2 24.0 - 30.0 mmol/L    Positive Base Excess, David 3.5 (H) 0.0 - 2.0 mmol/L    Negative Base Excess, David NOT REPORTED 0.0 - 2.0 mmol/L    O2 Sat, David 69.2 60.0 - 85.0 %    Total Hb NOT REPORTED 12.0 - 16.0 g/dl    Oxyhemoglobin NOT REPORTED 95.0 - 98.0 %    Carboxyhemoglobin 2.1 0 - 5 %    Methemoglobin 0.8 0.0 - 1.9 %    Pt Temp NOT REPORTED     pH, David, Temp Adj NOT REPORTED 7.320 - 7.420    pCO2, David, Temp Adj NOT REPORTED 39.0 - 55.0 mmHg    pO2, David, Temp Adj NOT REPORTED 30.0 - 50.0 mmHg    O2 Device/Flow/% NOT REPORTED     Respiratory Rate NOT REPORTED     Adelfo Test NOT REPORTED 80 - 100 fL    MCH 24.4 (L) 26 - 34 pg    MCHC 30.9 (L) 31 - 37 g/dL    RDW 18.2 (H) 11.5 - 14.9 %    Platelets 834 (L) 893 - 450 k/uL    MPV 12.0 6.0 - 12.0 fL    NRBC Automated NOT REPORTED per 100 WBC   HEPARIN LEVEL/ANTI-XA   Result Value Ref Range    Anti-XA Unfrac Heparin 0.21 (L) 0.30 - 0.70 IU/L   HEPARIN LEVEL/ANTI-XA   Result Value Ref Range    Anti-XA Unfrac Heparin 0.73 (HH) 0.30 - 0.70 IU/L   Basic Metabolic Panel   Result Value Ref Range    Glucose 195 (H) 70 - 99 mg/dL    BUN 25 (H) 8 - 23 mg/dL    CREATININE 1.54 (H) 0.70 - 1.20 mg/dL    Bun/Cre Ratio NOT REPORTED 9 - 20    Calcium 8.6 8.6 - 10.4 mg/dL    Sodium 139 135 - 144 mmol/L    Potassium 4.4 3.7 - 5.3 mmol/L    Chloride 107 98 - 107 mmol/L    CO2 24 20 - 31 mmol/L    Anion Gap 8 (L) 9 - 17 mmol/L    GFR Non-African American 44 (L) >60 mL/min    GFR  54 (L) >60 mL/min    GFR Comment          GFR Staging NOT REPORTED    Magnesium   Result Value Ref Range    Magnesium 2.4 1.6 - 2.6 mg/dL   Phosphorus   Result Value Ref Range    Phosphorus 3.9 2.5 - 4.5 mg/dL   HEPARIN LEVEL/ANTI-XA   Result Value Ref Range    Anti-XA Unfrac Heparin 0.48 0.30 - 0.70 IU/L   HEPARIN LEVEL/ANTI-XA   Result Value Ref Range    Anti-XA Unfrac Heparin 0.45 0.30 - 0.70 IU/L   POC Glucose Fingerstick   Result Value Ref Range    POC Glucose 207 (H) 75 - 110 mg/dL   POC Glucose Fingerstick   Result Value Ref Range    POC Glucose 236 (H) 75 - 110 mg/dL   POC Glucose Fingerstick   Result Value Ref Range    POC Glucose 198 (H) 75 - 110 mg/dL   POC Glucose Fingerstick   Result Value Ref Range    POC Glucose 170 (H) 75 - 110 mg/dL   POC Glucose Fingerstick   Result Value Ref Range    POC Glucose 130 (H) 75 - 110 mg/dL   POC Glucose Fingerstick   Result Value Ref Range    POC Glucose 133 (H) 75 - 110 mg/dL   POC Glucose Fingerstick   Result Value Ref Range    POC Glucose 141 (H) 75 - 110 mg/dL   POC Glucose Fingerstick   Result Value Ref Range    POC Glucose 123 (H) 75 - 110 mg/dL   POC Glucose Fingerstick   Result Value Ref Range    POC Glucose 113 (H) 75 - 110 mg/dL   POC Glucose Fingerstick   Result Value Ref Range    POC Glucose 103 75 - 110 mg/dL   POC Glucose Fingerstick   Result Value Ref Range    POC Glucose 159 (H) 75 - 110 mg/dL   POC Glucose Fingerstick   Result Value Ref Range    POC Glucose 186 (H) 75 - 110 mg/dL   POC Glucose Fingerstick   Result Value Ref Range    POC Glucose 148 (H) 75 - 110 mg/dL   EKG 12 Lead   Result Value Ref Range    Ventricular Rate 54 BPM    Atrial Rate 54 BPM    P-R Interval 146 ms    QRS Duration 92 ms    Q-T Interval 426 ms    QTc Calculation (Bazett) 403 ms    P Axis 65 degrees    R Axis -35 degrees    T Axis 68 degrees         IMAGING DATA:    ECHO Complete 2D W Doppler W Color    Result Date: 7/15/2021  Transthoracic Echocardiography Report (TTE)  Patient Name GOFF      Date of Study               07/15/2021               YAYA DUBOIS   Date of      1945  Gender                      Male  Birth   Age          77 year(s)  Race                        Black   Room Number  0536        Height:                     70 inch, 177.8 cm   Corporate ID [id number]    Weight:                     210 pounds, 95.3 kg  #   Patient Acct [de-identified]   BSA:          2.13 m^2      BMI:       30.13  #                                                               kg/m^2   MR #         [id number]     Sonographer                 Sandra Avery   Accession #  [id number]  Interpreting Physician      Yolande Vance   Fellow                   Referring Nurse                           Practitioner   Interpreting             Referring Physician         Gracie Kumar MD  Fellow  Additional Comments Technically difficult study, patient supine unable to be positioned for better acoustic windows. Type of Study   TTE procedure:2D Echocardiogram, M-Mode, Doppler, Color Doppler.   Procedure Date Date: 07/15/2021 Start: 08:29 AM Study Location: [address] [place] [address] Hospital Technical Quality: Adequate visualization Indications:CVA. History / Tech. Comments: Procedure explained to patient. Echo completed in the Echo Lab. RN performed bubble study. PMHx: Former smoker, COPD Hypertension, Diabetes, Hyperlipidemia Coronary artery disease, s/p stents Patient Status: Inpatient Height: 70 inches Weight: 210 pounds BSA: 2.13 m^2 BMI: 30.13 kg/m^2 CONCLUSIONS Summary Technically difficult study. Overall global left ventricular systolic function is normal, calculated ejection fraction is 50-55% Grade I (mild) left ventricular diastolic dysfunction. Technically difficult visualization of the right ventricle, but it does appear to be normal in size. Negative bubble study, no obvious intracardiac shunt noted within technical limitations of this study. No significant valvular regurgitation or stenosis seen. No significant pericardial effusion is seen. Signature ----------------------------------------------------------------------------  Electronically signed by Joanne Russell(Sonographer) on 07/15/2021 11:00 AM ---------------------------------------------------------------------------- ----------------------------------------------------------------------------  Electronically signed by Yolande Vance(Interpreting physician) on  07/15/2021 12:49 PM ---------------------------------------------------------------------------- FINDINGS Left Atrium Left atrium is normal in size. Inter-atrial septum is intact with no evidence for an atrial septal defect. Negative bubble study, no shunt noted. Left Ventricle Left ventricle is normal in size, normal wall thickness, global left ventricular systolic function is low normal, calculated ejection fraction is 50%. Grade I (mild) left ventricular diastolic dysfunction. Right Atrium Right atrium is normal in size. Right Ventricle Technically difficult visualization of the right ventricle, but it does appear to be normal in size.  Mitral Valve Normal mitral valve structure. No mitral stenosis. Trivial mitral regurgitation. Aortic Valve Aortic valve is trileaflet. No evidence of aortic insufficiency or stenosis. Tricuspid Valve Tricuspid valve was not well visualized. Trivial tricuspid regurgitation. Insignificant tricuspid regurgitation, unable to estimate RVSP. Pulmonic Valve Pulmonic valve not well visualized but Doppler velocities are normal. Trivial pulmonic insufficiency. Pericardial Effusion No significant pericardial effusion is seen. Miscellaneous Normal aortic root dimension. E/E' average = 17.35. IVC not well visualized.  M-mode / 2D Measurements & Calculations:   LVIDd:5.6 cm(3.7 - 5.6 cm)        Diastolic LFZSXE:89.93 ml  IVSd:0.9 cm(0.6 - 1.1 cm)         Systolic XAPRSY:75.73 ml  LVPWd:0.8 cm(0.6 - 1.1 cm)        Aortic Root:3 cm(2.0 - 3.7 cm)                                    LA Dimension: 3.2 cm(1.9 - 4.0 cm)  Calculated LVEF (%): 50.46 %      LA volume/Index: 48.86 ml /23m^2                                    LVOT:2.1 cm                                    RVDd:3 cm   Mitral:                                 Aortic   Valve Area (P1/2-Time): 2.65 cm^2       Peak Velocity: 1.91 m/s  Peak E-Wave: 1.18 m/s                   Mean Velocity: 1.06 m/s  Peak A-Wave: 1.47 m/s                   Peak Gradient: 14.59 mmHg  E/A Ratio: 0.8                          Mean Gradient: 6 mmHg  Peak Gradient: 5.57 mmHg  Mean Gradient: 2 mmHg  Deceleration Time: 280 msec             Area (continuity): 2.83 cm^2  P1/2t: 83 msec                          AV VTI: 41.8 cm   Area (continuity): 2.2 cm^2  Mean Velocity: 0.62 m/s                                           Pulmonic:                                           Peak Velocity: 1.29 m/s                                          Peak Gradient: 6.66 mmHg  Diastology / Tissue Doppler Septal Wall E' velocity:0.06 m/s Septal Wall E/E':20.8 Lateral Wall E' velocity:0.08 m/s Lateral Wall E/E':13.9    CT HEAD WO CONTRAST    Result Date: 8/9/2021  EXAMINATION: CT OF THE HEAD WITHOUT CONTRAST  8/9/2021 2:04 pm TECHNIQUE: CT of the head was performed without the administration of intravenous contrast. Dose modulation, iterative reconstruction, and/or weight based adjustment of the mA/kV was utilized to reduce the radiation dose to as low as reasonably achievable. COMPARISON: MRI brain performed 08/07/2021. HISTORY: ORDERING SYSTEM PROVIDED HISTORY: stroke TECHNOLOGIST PROVIDED HISTORY: stroke Reason for Exam: stroke; ams Acuity: Unknown Type of Exam: Unknown Additional signs and symptoms: patient unable to stay still; turned head during exam FINDINGS: BRAIN/VENTRICLES: There is no acute intracranial hemorrhage, mass effect, or midline shift. There is satisfactory overall gray-white matter differentiation. There is extensive chronic microvascular disease. There are evolving areas of ischemia in the left periventricular white matter, and corpus callosum posteriorly, in the right basal ganglia. The ventricular structures are symmetric and unremarkable. The infratentorial structures are unremarkable. ORBITS: The visualized portion of the orbits demonstrate no acute abnormality. SINUSES: The visualized paranasal sinuses and mastoid air cells demonstrate no acute abnormality. SOFT TISSUES/SKULL:  No acute abnormality of the visualized skull or soft tissues. Chronic microvascular disease with scattered areas of evolving ischemia as described above. CT HEAD WO CONTRAST    Result Date: 8/6/2021  EXAMINATION: CT OF THE HEAD WITHOUT CONTRAST  8/6/2021 2:21 pm TECHNIQUE: CT of the head was performed without the administration of intravenous contrast. Dose modulation, iterative reconstruction, and/or weight based adjustment of the mA/kV was utilized to reduce the radiation dose to as low as reasonably achievable.  COMPARISON: 4 August 2021 HISTORY: ORDERING SYSTEM PROVIDED HISTORY: Pt w/ bilateral basal ganglia infarcts, right hemiparesis; CVA with right hemiparesis, not communicating with staff, please evaluate for any change from previous imaging and any other abnormality TECHNOLOGIST PROVIDED HISTORY: Patient with history of left-sided CVA with right hemiparesis, not communicating with staff, please evaluate for any change from previous imaging and any other abnormality Reason for Exam: Patient with history of left-sided CVA with right hemiparesis, not communicating with staff, please evaluate for any change from previous imaging and any other abnormality Acuity: Unknown Type of Exam: Unknown FINDINGS: BRAIN/VENTRICLES: There is no acute intracranial hemorrhage, mass effect or midline shift. No abnormal extra-axial fluid collection. The gray-white differentiation is maintained without evidence of an acute infarct. There is prominence of the ventricles and sulci due to global parenchymal volume loss. There are nonspecific areas of hypoattenuation within the periventricular and subcortical white matter, which likely represent chronic microvascular ischemic change. 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 Remote lacunar stroke left caudate lobe. ORBITS: The visualized portion of the orbits demonstrate no acute abnormality. SINUSES: The visualized paranasal sinuses and mastoid air cells demonstrate no acute abnormality. SOFT TISSUES/SKULL: No acute abnormality of the visualized skull or soft tissues. 1. No acute intracranial abnormality. 2. 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 Remote lacunar stroke left caudate lobe. 3. Senescent changes. White matter hypoattenuation described is typical of microvascular ischemic disease or as sequela of dysmyelinating/demyelinating processes.      CT HEAD WO CONTRAST    Result Date: 7/21/2021  EXAMINATION: CT OF THE HEAD WITHOUT CONTRAST  7/21/2021 11:53 am TECHNIQUE: CT of the head was performed without the administration of intravenous contrast. Dose modulation, iterative reconstruction, and/or weight based adjustment of the mA/kV was utilized to reduce the radiation dose to as low as reasonably achievable. COMPARISON: July 17, 2021, MRI July 14, 2021 HISTORY: ORDERING SYSTEM PROVIDED HISTORY: Altered mental status TECHNOLOGIST PROVIDED HISTORY: eval for change in status Reason for Exam: EVAL FOR CHANGE IN STATUS Type of Exam: Subsequent/Follow-up Additional signs and symptoms: PT BECAME AGTATED & COMBATIVE OVERNIGHT Relevant Medical/Surgical History: HX STROKE FINDINGS: BRAIN/VENTRICLES: No acute intracranial hemorrhage, mass effect or midline shift. Area of hypoattenuation within the left basal ganglia and small foci of hypoattenuation in the right basal ganglia compatible with subacute infarct. Diffuse cortical volume loss and compensatory ventricular enlargement appears unchanged. Periventricular and subcortical white matter hypoattenuation redemonstrated bilaterally compatible with severe chronic microvascular ischemic changes. Encephalomalacia of the right caudate redemonstrated compatible with remote lacunar infarct. ORBITS: The visualized portion of the orbits demonstrate no acute abnormality. SINUSES: Air-fluid level within the left sphenoid sinus. Paranasal sinuses and mastoid air cells otherwise clear. SOFT TISSUES/SKULL:  No acute abnormality of the visualized skull or soft tissues. Subacute basal ganglia infarcts redemonstrated. No gross hemorrhagic conversion or significant mass effect. New air-fluid level within the left sphenoid sinus suggesting acute sinusitis.      CT HEAD WO CONTRAST    Result Date: 7/17/2021  EXAMINATION: CT OF THE HEAD WITHOUT CONTRAST  7/17/2021 10:46 am TECHNIQUE: CT of the head was performed without the administration of intravenous contrast. Dose modulation, iterative reconstruction, and/or weight based adjustment of the mA/kV was utilized to reduce the radiation dose to as low as reasonably achievable. COMPARISON: None. HISTORY: ORDERING SYSTEM PROVIDED HISTORY: change in mentation TECHNOLOGIST PROVIDED HISTORY: change in mentation Reason for Exam: change in mentation FINDINGS: BRAIN/VENTRICLES: Small area of hypodensity in the left corona radiata is similar in appearance to the previous exam.  There is no evidence of hemorrhage. No new parenchymal abnormalities are identified. ORBITS: The visualized portion of the orbits demonstrate no acute abnormality. SINUSES: The visualized paranasal sinuses and mastoid air cells demonstrate no acute abnormality. SOFT TISSUES/SKULL:  No acute abnormality of the visualized skull or soft tissues. No acute intracranial abnormality. No significant interval change. CT HEAD WO CONTRAST    Result Date: 7/13/2021  EXAMINATION: CT OF THE HEAD WITHOUT CONTRAST  7/13/2021 1:10 pm TECHNIQUE: CT of the head was performed without the administration of intravenous contrast. Dose modulation, iterative reconstruction, and/or weight based adjustment of the mA/kV was utilized to reduce the radiation dose to as low as reasonably achievable. COMPARISON: 05/02/2021 HISTORY: ORDERING SYSTEM PROVIDED HISTORY: Last known well 2 days right-sided facial droop right-sided weakness TECHNOLOGIST PROVIDED HISTORY: Last known well 2 days right-sided facial droop right-sided weakness Decision Support Exception - unselect if not a suspected or confirmed emergency medical condition->Emergency Medical Condition (MA) Reason for Exam: Last known well 2 days right-sided facial droop right-sided weakness FINDINGS: BRAIN/VENTRICLES: Ovoid area of low attenuation in the left frontal corona radiata measures 2 x 1.4 cm, new from prior study (series 2, image 39). No acute intracranial hemorrhage. No mass effect or midline shift. No hydrocephalus. Diffuse parenchymal volume loss with enlargement of the ventricles and cerebral sulci. Old infarction in the right basal ganglia.  There are nonspecific areas of hypoattenuation within the periventricular and subcortical white matter, which likely represent chronic microvascular ischemic change. Intracranial atherosclerotic disease. ORBITS: The visualized portion of the orbits demonstrate no acute abnormality. SINUSES: The visualized paranasal sinuses and mastoid air cells demonstrate no acute abnormality. SOFT TISSUES/SKULL: No acute abnormality of the visualized skull or soft tissues. 1. New ovoid low-attenuation area in the left frontal corona radiata compatible with acute/subacute infarction. This measures 2 x 1.4 cm. No associated hemorrhage. 2. Diffuse parenchymal volume loss and sequela of severe chronic microvascular ischemic changes. Findings were discussed with Dr. Jn Rodriguez at 1:31 pm on 7/13/2021. MRA HEAD WO CONTRAST    Result Date: 8/7/2021  EXAMINATION: MRA OF THE NECK WITHOUT CONTRAST; MRI OF THE BRAIN WITHOUT CONTRAST; MRA OF THE HEAD WITHOUT CONTRAST 8/7/2021 8:28 pm; 8/7/2021 8:34 pm; 8/7/2021 8:27 pm: TECHNIQUE: Multiplanar multisequence MRA of the neck was performed without the administration of intravenous contrast. Stenosis of the internal carotid arteries measured using NASCET criteria.; Multiplanar multisequence MRI of the brain was performed without the administration of intravenous contrast.; MRA of the head was performed utilizing time-of-flight imaging with MIP images. No intravenous contrast was administered. COMPARISON: None.  HISTORY: ORDERING SYSTEM PROVIDED HISTORY: stroke TECHNOLOGIST PROVIDED HISTORY: stroke Reason for Exam: stroke Additional signs and symptoms: patient unable to give history and unable to follow exam instructions due to mental status FINDINGS: MRI BRAIN: INTRACRANIAL STRUCTURES/VENTRICLES: Multifocal diffusion restriction abnormality related to acute ischemia are seen involving the right basal ganglia within the putamen, caudate nucleus and anterior limb of the right internal capsule, the posterior limb of the left internal capsule and lateral margin of the left thalamus, the posterior commissure of the corpus callosum, and left greater than right frontal parietal paramedian subcortical and deep white matter . Can fluent increased T2/FLAIR signal is noted within the cerebral white matter consistent with severe microvascular ischemic change. No mass effect or midline shift. No evidence of an acute intracranial hemorrhage. Mild diffuse decrease in cerebral volume is noted with corresponding prominence of the sulci and ventricles. The sellar/suprasellar regions appear unremarkable. The normal signal voids within the major intracranial vessels appear maintained. ORBITS: The visualized portion of the orbits demonstrate no acute abnormality. SINUSES: The visualized paranasal sinuses and mastoid air cells are well aerated. BONES/SOFT TISSUES: The bone marrow signal intensity appears normal. The soft tissues demonstrate no acute abnormality. MRA NECK: AORTIC ARCH/ARCH VESSELS: No dissection or arterial injury. No significant stenosis of the brachiocephalic or subclavian arteries. CAROTID ARTERIES: No dissection, arterial injury, or hemodynamically significant stenosis by NASCET criteria. VERTEBRAL ARTERIES: No dissection, arterial injury, or significant stenosis. MRA HEAD: ANTERIOR CIRCULATION: Suspected focal high-grade stenosis involving the A1 segment of the left anterior cerebral artery is identified. Right middle cerebral artery focal moderate grade stenosis involving the M1 segment is present. Suspected multifocal moderate to high-grade stenosis involving the right middle cerebral artery M2 segment is seen. No further evidence of significant stenosis of the intracranial internal carotid, anterior cerebral, or middle cerebral arteries. POSTERIOR CIRCULATION: Bilateral posterior cerebral artery P2 segment suspected focal moderate to high-grade stenosis are noted.   No further evidence of significant stenosis of the vertebral, basilar, or posterior cerebral arteries. 1. Multifocal acute ischemia involving the bilateral basal ganglia, as discussed above, posterior commissure of the corpus callosum, lateral margin of left thalamus and the left greater than right frontal parietal paramedian subcortical and deep white matter. 2. No evidence of associated hemorrhagic conversion. 3. Finding suggestive of embolic phenomenon. Recommend clinical correlation. 4. Suspected focal high-grade stenosis involving A1 segment of the left anterior cerebral artery. 5. Suspected right middle cerebral artery M1 segment focal moderate grade stenosis. 6. Suspected right middle cerebral artery M2 segment multifocal moderate to high-grade stenosis. 7. Bilateral posterior cerebral artery P2 segment suspected multifocal moderate to high-grade stenosis. 8. Grossly unremarkable MR angiogram of the neck. Note: MR angiographic evaluation of the neck is severely limited by patient motion related artifact. 9. Severe cerebral white matter chronic microvascular ischemic disease. 10. Mild diffuse cerebral atrophy. Critical results were called by Dr. Yaron Schuster to Dr. Gabrielle Emerson On 8/7/2021 at 22:11. XR CHEST PORTABLE    Result Date: 8/8/2021  EXAMINATION: ONE XRAY VIEW OF THE CHEST 8/8/2021 11:58 am COMPARISON: 07/13/2021 HISTORY: ORDERING SYSTEM PROVIDED HISTORY: cough and fever TECHNOLOGIST PROVIDED HISTORY: cough and fever Reason for Exam: cough and fever Acuity: Acute Type of Exam: Initial FINDINGS: No lung infiltrate or consolidation. No pneumothorax or pleural effusion. Heart size is normal.     No acute abnormality. XR CHEST PORTABLE    Result Date: 7/13/2021  EXAMINATION: ONE XRAY VIEW OF THE CHEST 7/13/2021 10:30 am COMPARISON: September 24, 2019.  HISTORY: ORDERING SYSTEM PROVIDED HISTORY: Found down TECHNOLOGIST PROVIDED HISTORY: Found down Reason for Exam: supine Acuity: Acute Type of Exam: Initial FINDINGS: A portable upright frontal view chest radiograph was obtained. The heart is enlarged. The mediastinal contour and pleural spaces are otherwise within normal limits. The lungs are grossly clear. There is no focal consolidation or pneumothorax. The pulmonary vascular pattern is within normal limits. No acute thoracic osseous abnormality. Clear lungs. Cardiomegaly. No acute cardiopulmonary abnormality. VL DUP LOWER EXTREMITY VENOUS BILATERAL    Result Date: 7/15/2021    [facility]  Vascular Lower Extremities DVT Study Procedure   Patient Name SHELL      Date of Study           07/15/2021               YAYA DUBOIS   Date of      1945  Gender                  Male  Birth   Age          77 year(s)  Race                    Black   Room Number  0536        Height:                 70 inch, 177.8 cm   Corporate ID [id number]    Weight:                 210 pounds, 95.3 kg  #   Patient Acct [de-identified]   BSA:        2.13 m^2    BMI:        30.13 kg/m^2  #   MR #         [id number]     Sonographer             Josie Pink [facility] [[email or web address]]   Accession #  [id number]  Interpreting Physician  Kailee Youssef   Referring                Referring Physician     Ilana Mcdonald  Nurse  Practitioner  Procedure Type of Study:   Veins: Lower Extremities DVT Study, Venous Scan Lower Bilateral.  Indications for Study:CVA. Patient Status: In Patient. Technical Quality:Limited visualization. Limitation reason:legs rigid, extreme edema and resistance to compression . Conclusions   Summary   Simultaneous real time imaging utilizing B-Mode, color doppler and  spectral waveform analysis was performed on the bilateral lower  extremities for venous examination of the deep and superficial systems. Findings are:   Right:  No evidence of deep or superficial venous thrombosis. Left:  No evidence of deep or superficial venous thrombosis.    Signature   ----------------------------------------------------------------  Electronically !None      ! +------------------------------------+----------+---------------+----------+ ! Deep Femoral                        !No        !               !          ! +------------------------------------+----------+---------------+----------+ ! Popliteal                           !Yes       ! Yes            ! None      ! +------------------------------------+----------+---------------+----------+ ! Sapheno Femoral Junction            ! Yes       ! Yes            ! None      ! +------------------------------------+----------+---------------+----------+ ! PTV                                 ! Yes       ! Yes            ! None      ! +------------------------------------+----------+---------------+----------+ ! Peroneal                            !No        !               !          ! +------------------------------------+----------+---------------+----------+ ! Gastroc                             ! Yes       ! Yes            ! None      ! +------------------------------------+----------+---------------+----------+ ! GSV Thigh                           ! Yes       ! Yes            ! None      ! +------------------------------------+----------+---------------+----------+ ! GSV Knee                            ! Yes       ! Yes            ! None      ! +------------------------------------+----------+---------------+----------+ ! GSV Ankle                           ! Yes       ! Yes            ! None      ! +------------------------------------+----------+---------------+----------+ ! SSV                                 ! Yes       ! Yes            ! None      ! +------------------------------------+----------+---------------+----------+ Right Doppler Measurements +---------------------------+------+------+--------------------------------+ ! Location                   ! Signal!Reflux! Reflux (msec)                   ! +---------------------------+------+------+--------------------------------+ ! Common Femoral             !Phasic!      ! ! +---------------------------+------+------+--------------------------------+ ! Prox Femoral               !Phasic!      !                                ! +---------------------------+------+------+--------------------------------+ ! Popliteal                  !Phasic!      !                                ! +---------------------------+------+------+--------------------------------+ Left Lower Extremities DVT Study Measurements Left 2D Measurements +------------------------------------+----------+---------------+----------+ ! Location                            ! Visualized! Compressibility! Thrombosis! +------------------------------------+----------+---------------+----------+ ! Common Femoral                      !Yes       ! Yes            ! None      ! +------------------------------------+----------+---------------+----------+ ! Prox Femoral                        !Yes       ! Yes            ! None      ! +------------------------------------+----------+---------------+----------+ ! Mid Femoral                         !Yes       ! Yes            ! None      ! +------------------------------------+----------+---------------+----------+ ! Dist Femoral                        !Yes       ! Yes            ! None      ! +------------------------------------+----------+---------------+----------+ ! Deep Femoral                        !No        !               !          ! +------------------------------------+----------+---------------+----------+ ! Popliteal                           !Yes       ! Yes            ! None      ! +------------------------------------+----------+---------------+----------+ ! Sapheno Femoral Junction            ! Yes       ! Yes            ! None      ! +------------------------------------+----------+---------------+----------+ ! PTV                                 ! Yes       ! Yes            ! None      ! +------------------------------------+----------+---------------+----------+ ! Cecelia !Yes       !Yes            ! None      ! +------------------------------------+----------+---------------+----------+ ! Gastroc                             ! Yes       ! Yes            ! None      ! +------------------------------------+----------+---------------+----------+ ! GSV Thigh                           ! Yes       ! Yes            ! None      ! +------------------------------------+----------+---------------+----------+ ! GSV Knee                            ! Yes       ! Yes            ! None      ! +------------------------------------+----------+---------------+----------+ ! GSV Ankle                           ! Yes       ! Yes            ! None      ! +------------------------------------+----------+---------------+----------+ ! SSV                                 ! Yes       ! Yes            ! None      ! +------------------------------------+----------+---------------+----------+ Left Doppler Measurements +---------------------------+------+------+--------------------------------+ ! Location                   ! Signal!Reflux! Reflux (msec)                   ! +---------------------------+------+------+--------------------------------+ ! Common Femoral             !Phasic!      !                                ! +---------------------------+------+------+--------------------------------+ ! Prox Femoral               !Phasic!      !                                ! +---------------------------+------+------+--------------------------------+ ! Popliteal                  !Phasic!      !                                ! +---------------------------+------+------+--------------------------------+    US SPLEEN    Result Date: 7/14/2021  EXAMINATION: ULTRASOUND OF THE SPLEEN 7/14/2021 8:29 am COMPARISON: None. HISTORY: ORDERING SYSTEM PROVIDED HISTORY: thrombocytopenia TECHNOLOGIST PROVIDED HISTORY: thrombocytopenia FINDINGS: Suboptimal study. The visualized spleen appears enlarged measuring 13.4 cm.  No focal lesion is seen. No free fluid. Suboptimal study. Mild splenomegaly. CTA HEAD NECK W CONTRAST    Result Date: 7/13/2021  EXAMINATION: CTA OF THE HEAD AND NECK WITH CONTRAST 7/13/2021 1:11 pm: TECHNIQUE: CTA of the head and neck was performed with the administration of intravenous contrast. Multiplanar reformatted images are provided for review. MIP images are provided for review. Stenosis of the internal carotid arteries measured using NASCET criteria. Dose modulation, iterative reconstruction, and/or weight based adjustment of the mA/kV was utilized to reduce the radiation dose to as low as reasonably achievable. 3D surface reformatted and curved MIP images were submitted for review. COMPARISON: None. HISTORY: ORDERING SYSTEM PROVIDED HISTORY: stroke TECHNOLOGIST PROVIDED HISTORY: stroke Decision Support Exception - unselect if not a suspected or confirmed emergency medical condition->Emergency Medical Condition (MA) Reason for Exam: stroke, Cerebrovascular Accident; Fall FINDINGS: CTA NECK: AORTIC ARCH/ARCH VESSELS: No dissection or arterial injury. No significant stenosis of the brachiocephalic or subclavian arteries. CAROTID ARTERIES: No dissection, arterial injury, or hemodynamically significant stenosis by NASCET criteria. VERTEBRAL ARTERIES: No dissection, arterial injury, or significant stenosis in the right vertebral artery. Severe stenosis in the V1 segment of the left vertebral artery. SOFT TISSUES: The lung apices are clear. No cervical or superior mediastinal lymphadenopathy. The larynx and pharynx are unremarkable. No acute abnormality of the salivary glands. Thyroid gland is diffusely enlarged and heterogeneous and contains left thyroid lobe nodule measuring 2.6 cm. BONES: Multilevel degenerative spondylosis throughout the cervical spine with fusion at C5-C6.  CTA HEAD: ANTERIOR CIRCULATION: Calcified atherosclerotic plaque in the cavernous segments of the internal carotid arteries bilaterally results in mild-to-moderate cavernous ICA stenosis bilaterally. Mild stenosis in the proximal A2 segment of the right anterior cerebral artery. Severe stenosis in the proximal A3 segments of the anterior cerebral arteries bilaterally. Moderate-to-severe stenosis within M2 segment of the right middle cerebral artery. Moderate stenosis in the M1 and proximal M2 segments of the left middle cerebral artery. POSTERIOR CIRCULATION: No significant stenosis in the right intradural vertebral artery. Severe stenosis in the intracranial left vertebral artery. Mild stenosis in the basilar artery. Moderate stenosis in the P1/P2 junction of the left PCA. Severe stenosis and near complete occlusion of the right PCA. OTHER: No dural venous sinus thrombosis on this non-dedicated study. 1. Severe stenosis in the V1 segment of the left vertebral artery. 2. Extensive intracranial atherosclerotic plaque with near complete occlusion of the right PCA. 3. Severe stenoses in the proximal A3 segments of the ACAs bilaterally. 4. Moderate-to-severe proximal M2 segment stenosis in the right MCA. Moderate stenosis in the M1 and M2 segments of the left MCA. 5. Moderate stenosis in the P1/P2 junction of the left PCA. 6. Enlarged heterogeneous thyroid gland with 2.6 cm left thyroid lobe nodule. Nonemergent/outpatient thyroid ultrasound recommended. Findings were discussed with Dr. Genny Sparrow at 1:43 pm on 7/13/2021. RECOMMENDATIONS: 2.6 cm incidental left thyroid nodule with heterogeneous and enlarged thyroid. Recommend thyroid US. Reference: J Am Bruno Radiol.  2015 Feb;12(2): 143-50     MRA NECK WO CONTRAST    Result Date: 8/7/2021  EXAMINATION: MRA OF THE NECK WITHOUT CONTRAST; MRI OF THE BRAIN WITHOUT CONTRAST; MRA OF THE HEAD WITHOUT CONTRAST 8/7/2021 8:28 pm; 8/7/2021 8:34 pm; 8/7/2021 8:27 pm: TECHNIQUE: Multiplanar multisequence MRA of the neck was performed without the administration of intravenous contrast. Stenosis of the internal carotid arteries measured using NASCET criteria.; Multiplanar multisequence MRI of the brain was performed without the administration of intravenous contrast.; MRA of the head was performed utilizing time-of-flight imaging with MIP images. No intravenous contrast was administered. COMPARISON: None. HISTORY: ORDERING SYSTEM PROVIDED HISTORY: stroke TECHNOLOGIST PROVIDED HISTORY: stroke Reason for Exam: stroke Additional signs and symptoms: patient unable to give history and unable to follow exam instructions due to mental status FINDINGS: MRI BRAIN: INTRACRANIAL STRUCTURES/VENTRICLES: Multifocal diffusion restriction abnormality related to acute ischemia are seen involving the right basal ganglia within the putamen, caudate nucleus and anterior limb of the right internal capsule, the posterior limb of the left internal capsule and lateral margin of the left thalamus, the posterior commissure of the corpus callosum, and left greater than right frontal parietal paramedian subcortical and deep white matter . Can fluent increased T2/FLAIR signal is noted within the cerebral white matter consistent with severe microvascular ischemic change. No mass effect or midline shift. No evidence of an acute intracranial hemorrhage. Mild diffuse decrease in cerebral volume is noted with corresponding prominence of the sulci and ventricles. The sellar/suprasellar regions appear unremarkable. The normal signal voids within the major intracranial vessels appear maintained. ORBITS: The visualized portion of the orbits demonstrate no acute abnormality. SINUSES: The visualized paranasal sinuses and mastoid air cells are well aerated. BONES/SOFT TISSUES: The bone marrow signal intensity appears normal. The soft tissues demonstrate no acute abnormality. MRA NECK: AORTIC ARCH/ARCH VESSELS: No dissection or arterial injury. No significant stenosis of the brachiocephalic or subclavian arteries.  CAROTID ARTERIES: No dissection, arterial injury, or hemodynamically significant stenosis by NASCET criteria. VERTEBRAL ARTERIES: No dissection, arterial injury, or significant stenosis. MRA HEAD: ANTERIOR CIRCULATION: Suspected focal high-grade stenosis involving the A1 segment of the left anterior cerebral artery is identified. Right middle cerebral artery focal moderate grade stenosis involving the M1 segment is present. Suspected multifocal moderate to high-grade stenosis involving the right middle cerebral artery M2 segment is seen. No further evidence of significant stenosis of the intracranial internal carotid, anterior cerebral, or middle cerebral arteries. POSTERIOR CIRCULATION: Bilateral posterior cerebral artery P2 segment suspected focal moderate to high-grade stenosis are noted. No further evidence of significant stenosis of the vertebral, basilar, or posterior cerebral arteries. 1. Multifocal acute ischemia involving the bilateral basal ganglia, as discussed above, posterior commissure of the corpus callosum, lateral margin of left thalamus and the left greater than right frontal parietal paramedian subcortical and deep white matter. 2. No evidence of associated hemorrhagic conversion. 3. Finding suggestive of embolic phenomenon. Recommend clinical correlation. 4. Suspected focal high-grade stenosis involving A1 segment of the left anterior cerebral artery. 5. Suspected right middle cerebral artery M1 segment focal moderate grade stenosis. 6. Suspected right middle cerebral artery M2 segment multifocal moderate to high-grade stenosis. 7. Bilateral posterior cerebral artery P2 segment suspected multifocal moderate to high-grade stenosis. 8. Grossly unremarkable MR angiogram of the neck. Note: MR angiographic evaluation of the neck is severely limited by patient motion related artifact. 9. Severe cerebral white matter chronic microvascular ischemic disease. 10. Mild diffuse cerebral atrophy.  Critical results were called by Dr. Yaron Schuster to Dr. Gabrielle Emersno On 8/7/2021 at 22:11. MRI BRAIN WO CONTRAST    Result Date: 8/7/2021  EXAMINATION: MRA OF THE NECK WITHOUT CONTRAST; MRI OF THE BRAIN WITHOUT CONTRAST; MRA OF THE HEAD WITHOUT CONTRAST 8/7/2021 8:28 pm; 8/7/2021 8:34 pm; 8/7/2021 8:27 pm: TECHNIQUE: Multiplanar multisequence MRA of the neck was performed without the administration of intravenous contrast. Stenosis of the internal carotid arteries measured using NASCET criteria.; Multiplanar multisequence MRI of the brain was performed without the administration of intravenous contrast.; MRA of the head was performed utilizing time-of-flight imaging with MIP images. No intravenous contrast was administered. COMPARISON: None. HISTORY: ORDERING SYSTEM PROVIDED HISTORY: stroke TECHNOLOGIST PROVIDED HISTORY: stroke Reason for Exam: stroke Additional signs and symptoms: patient unable to give history and unable to follow exam instructions due to mental status FINDINGS: MRI BRAIN: INTRACRANIAL STRUCTURES/VENTRICLES: Multifocal diffusion restriction abnormality related to acute ischemia are seen involving the right basal ganglia within the putamen, caudate nucleus and anterior limb of the right internal capsule, the posterior limb of the left internal capsule and lateral margin of the left thalamus, the posterior commissure of the corpus callosum, and left greater than right frontal parietal paramedian subcortical and deep white matter . Can fluent increased T2/FLAIR signal is noted within the cerebral white matter consistent with severe microvascular ischemic change. No mass effect or midline shift. No evidence of an acute intracranial hemorrhage. Mild diffuse decrease in cerebral volume is noted with corresponding prominence of the sulci and ventricles. The sellar/suprasellar regions appear unremarkable. The normal signal voids within the major intracranial vessels appear maintained.  ORBITS: The visualized multifocal moderate to high-grade stenosis. 7. Bilateral posterior cerebral artery P2 segment suspected multifocal moderate to high-grade stenosis. 8. Grossly unremarkable MR angiogram of the neck. Note: MR angiographic evaluation of the neck is severely limited by patient motion related artifact. 9. Severe cerebral white matter chronic microvascular ischemic disease. 10. Mild diffuse cerebral atrophy. Critical results were called by Dr. Kristi Brown to Dr. Cece Carmona On 8/7/2021 at 22:11. MRI BRAIN WO CONTRAST    Result Date: 7/14/2021  EXAMINATION: MRI OF THE BRAIN WITHOUT CONTRAST  7/14/2021 4:08 pm TECHNIQUE: Multiplanar multisequence MRI of the brain was performed without the administration of intravenous contrast. COMPARISON: None. HISTORY: ORDERING SYSTEM PROVIDED HISTORY: stroke, right sided weakness TECHNOLOGIST PROVIDED HISTORY: stroke, right sided weakness Decision Support Exception - unselect if not a suspected or confirmed emergency medical condition->Emergency Medical Condition (MA) Reason for Exam: possible stroke. pt had a fall. FINDINGS: INTRACRANIAL STRUCTURES/VENTRICLES: . acute infarcts in the left basal ganglia. Acute infarct in the right head of caudate and in the right putamen no mass effect or midline shift. No evidence of an acute intracranial hemorrhage. The ventricles and sulci are normal in size and configuration. The sellar/suprasellar regions appear unremarkable. The normal signal voids within the major intracranial vessels appear maintained. ORBITS: The visualized portion of the orbits demonstrate no acute abnormality. SINUSES: The visualized paranasal sinuses and mastoid air cells are well aerated. BONES/SOFT TISSUES: The bone marrow signal intensity appears normal. The soft tissues demonstrate no acute abnormality. Acute infarct in the left and right basal ganglia greater on the left. Diffuse volume loss with extensive chronic white matter changes.          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 Recurrent stroke  3. Altered mental status    RECOMMENDATIONS:  1. I personally reviewed results of lab work-up imaging studies and other relevant clinical data. 2. Reviewed records from previous hospitalization. 3. Patient's power of  her sister herself is hospitalized at this point and no decision has been made on patient's for the treatment planning. 4. Okay to give anticoagulation as long as platelet count above 50,000  5. Patient has extensive bilateral recurrent strokes. I believe overall prognosis is poor. Agree with palliative care consultation to review goals and expectations. 6. Avoid myelosuppressive drugs  7. We will continue to follow      Discussed with  Nurse. Thank you for asking us to see this patient. Leah Luong MD          This note is created with the assistance of a speech recognition program.  While intending to generate a document that actually reflects the content of the visit, the document can still have some errors including those of syntax and sound a like substitutions which may escape proof reading. It such instances, actual meaning can be extrapolated by contextual diversion.

## 2021-08-12 PROBLEM — B37.0 ORAL CANDIDIASIS: Status: ACTIVE | Noted: 2021-01-01

## 2021-08-12 NOTE — PROGRESS NOTES
[place]  Speech Language Pathology    Date: 8/12/2021  Patient Name: Harvey Quezada  YOB: 1945   AGE: 77 y.o. MRN: [id number]        Patient Not Available for Speech Therapy     Due to:  [] Testing  [] Hemodialysis  [] Cancelled by RN  [] Surgery   [] Intubation/Sedation/Pain Medication  [] Medical instability  [x] Other:  Attempt 0940- pt. c PT  Attempt 1502 and 4072- Pt receiving nursing care    Next scheduled treatment: 08/13  Completed by Sheryl Ayala A.CCC/SLP

## 2021-08-12 NOTE — PROGRESS NOTES
Pt is awake; does have delayed but purposeful behavior as in attempting to move leg appropriately ie when commanded to wiggle toes of left foot pt attempts to move left leg; also sticks out out tongue when commanded; tongue is very dry and coated yellow

## 2021-08-12 NOTE — PROGRESS NOTES
message sent per [employer] to Dr Dexter Garrison as requested per Dr Lidia Cheng re: SSQ anticoag trx ve IV heparin drip infusion; await response

## 2021-08-12 NOTE — CARE COORDINATION
ONGOING DISCHARGE PLANNING NOTE:    Writer reviewed notes, and discharge plan is to discharge to home vs hospice   Pallative care reached out to patients dtr   Per Pallative dtr will speak to the Doctor   Case management will reach out after this conversation has taken place to talk about ecf vs hospice will follow      Electronically signed by Katelynn Mckinnon RN on 8/12/2021 at 3:06 PM

## 2021-08-12 NOTE — PROGRESS NOTES
Comprehensive Nutrition Assessment    Type and Reason for Visit:  Reassess    Nutrition Recommendations/Plan: Continue tube feeding regimen as ordered. Nutrition Assessment:  Notes indicating pt slightly more responsive today. Palliative care working with daughter to determine ECF vs hospice care; at this time pt remains a full code. Pt tolerating Jevity 1.2 at 70 ml/hr getting 200 ml water flushes every 8 hours. Malnutrition Assessment:  Malnutrition Status:  Severe malnutrition    Context:  Acute Illness     Findings of the 6 clinical characteristics of malnutrition:  Energy Intake:  7 - 50% or less of estimated energy requirements for 5 or more days  Weight Loss:  1 - 5% over 1 month     Body Fat Loss:  Unable to assess     Muscle Mass Loss:  Unable to assess    Fluid Accumulation:  No significant fluid accumulation     Strength:  Not Performed    Estimated Daily Nutrient Needs:  Energy (kcal):  6160-9577 kcals basede on [place] with 1.1-1.2 factor using adm wt 96.2 kg; Weight Used for Energy Requirements:  Admission     Protein (g):  91-1-5 gm protein based on 1.2-1.4 gm/kg using IBW; Weight Used for Protein Requirements:  Ideal        Nutrition Related Findings:  No edema. labs & meds reviewed. Wounds:  None       Current Nutrition Therapies:    Diet NPO  ADULT TUBE FEEDING; PEG; Other Tube Feeding (specify); Jevity 1.2; Continuous; 20; Yes; 10; Q 4 hours; 70; 200; Q 8 hours  Current Tube Feeding (TF) Orders:  · Feeding Route: PEG  · Formula: Other Tube Feeding (Comment) (Jevity 1.2)  · Schedule: Continuous  · Water Flushes: 200 ml x 3 daily  · Goal TF & Flush Orders Provides: at goal: 2018 kcal, 93 gm protein      Anthropometric Measures:  · Height: 5' 10\" (177.8 cm)  · Current Body Weight: 212 lb (96.2 kg)   · Admission Body Weight: 212 lb (96.2 kg)     · Ideal Body Weight: 166 lbs; % Ideal Body Weight 127.7 %   · BMI: 30.4  · BMI Categories: Obese Class 1 (BMI 30.0-34. 9) Nutrition Diagnosis:   · Severe malnutrition (based on assessment from 8-5) related to inadequate protein-energy intake as evidenced by intake 0-25%, weight loss greater than or equal to 5% in 1 month, poor intake prior to admission      Nutrition Interventions:   Food and/or Nutrient Delivery:  Continue NPO, Continue Current Tube Feeding  Nutrition Education/Counseling:  Education not indicated   Coordination of Nutrition Care:  Continue to monitor while inpatient    Goals:  Tube feeding to meet approximately 100% of estimated needs. Nutrition Monitoring and Evaluation:   Behavioral-Environmental Outcomes:  None Identified   Food/Nutrient Intake Outcomes:  Enteral Nutrition Intake/Tolerance  Physical Signs/Symptoms Outcomes:  Biochemical Data, GI Status, Fluid Status or Edema, Nutrition Focused Physical Findings, Skin, Weight     Discharge Planning:    Enteral Nutrition     Some areas of assessment may be incomplete due to COVID-19 precautions. Magali Navarro R.D. L.JAVAN.   Clinical Dietitian  Office: [phone]

## 2021-08-12 NOTE — PROGRESS NOTES
Physical Therapy  DATE: 2021  NAME: Rm Clifton  MRN: [id number]   : 1945    Discharge Recommendations: Continue to Assess (pending progress)     Subjective: Pt is in bed upon arrival. Pt is sleeping. Difficult to rouse initially however with RUE/RLE ROM pt starts to respond. Pt is able to squeeze writers hand with his L hand and wiggle L toes. Pt's ROM is limited on R sided to due being guarded. Pt only opens eyes x1 during tx. Pain: KYA  Patient follows: Some Commands  Is patient on ventilator: No  Is patient on sedation: No  Precautions: NPO, up with assist, PEG tube, IV, telemetry    Therapeutic exercises:  UE/LE(s)  Bilateral Passive range of motion all planes x 15 reps  bilateral gastrocnemius stretching 3 reps x 30 seconds    Goals  Short Term Goals  Short term goal 1: Pt will increase participation in PT session to maximize mobility  Short term goal 2: Pt will perform rolling in bed with Max A x1  Short term goal 3: Pt will demo WFL Bilat LE ROM  Short term goal 4: PT will participate in turning schedule and repositioning to prevent effects of immobility  Short term goal 5: .    Plan: Progress functional mobility as medically appropriate.    Time In: 05  Time Out: 0957  Time Coded Minutes (treatment minutes): 23  Rehab Potential: Guarded  Treatments/week: 2x/wk    Caron Hull PTA

## 2021-08-12 NOTE — DISCHARGE INSTR - COC
Continuity of Care Form    Patient Name: Lesia Patterson   :  1945  MRN:  [id number]    Admit date:  2021  Discharge date:  ***    Code Status Order: Full Code   Advance Directives:     Admitting Physician:  Simi Rolle MD  PCP: No primary care provider on file.     Discharging Nurse: [facility] Unit/Room#: 2099/2099-01  Discharging Unit Phone Number: ***    Emergency Contact:   Extended Emergency Contact Information  Primary Emergency Contact: Munir Cedillo  Address: [address] Phone: [phone]  Relation: Brother/Sister  Secondary Emergency Contact: Kin Borne  Address: [address] Phone: [phone]  Relation: Niece/Nephew    Past Surgical History:  Past Surgical History:   Procedure Laterality Date    CARDIAC SURGERY  2015    1 stent    NECK SURGERY      TOE AMPUTATION Right greater    UPPER GASTROINTESTINAL ENDOSCOPY N/A 8/3/2021    EGD  PEG TUBE INSERTION performed by Tash Parekh MD at [facility]       Immunization History:   Immunization History   Administered Date(s) Administered    COVID-19, Pfizer, PF, 30mcg/0.3mL 2021    Pneumococcal Conjugate 13-valent Jose Marion) 2016       Active Problems:  Patient Active Problem List   Diagnosis Code    Centrilobular emphysema ([facility] [address] J43.2    Mixed restrictive and obstructive lung disease ([facility] [address] J43.9, J98.4    Cerebrovascular accident (CVA) ([facility] [address] I63.9    COPD (chronic obstructive pulmonary disease) ([facility] [address] J44.9    HTN (hypertension) I10    Diabetes 1.5, managed as type 2 (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 [address] G81.91    Noncompliance with medications Z91.14    Acute idiopathic thrombocytopenic purpura [facility]) D69.3    Acute CVA (cerebrovascular accident) ([facility] [address] I63.9    Depression F32.9    Severe malnutrition (HCC) E43    Encephalopathy G93.40    Cerebral multi-infarct state I69.30    Altered mental status R41.82    History of ITP Z86.2       Isolation/Infection:   Isolation          No Isolation        Patient Infection Status     Infection Onset Added Last Indicated Last Indicated By Review Planned Expiration Resolved Resolved By    None active    Resolved    COVID-19 Rule Out 21 COVID-19, Rapid (Ordered)   21 Rule-Out Test Resulted          Nurse Assessment:  Last Vital Signs: BP (!) 154/92   Pulse 74   Temp 98.2 °F (36.8 °C) (Axillary)   Resp 20   Ht 5' 10\" (1.778 m)   Wt 212 lb 11.9 oz (96.5 kg)   SpO2 100%   BMI 30.53 kg/m²     Last documented pain score (0-10 scale): Pain Level: 0  Last Weight:   Wt Readings from Last 1 Encounters:   21 212 lb 11.9 oz (96.5 kg)     Mental Status:  {IP PT MENTAL STATUS:}    IV Access:  { ANASTASIIA IV ACCESS:217921444}    Nursing Mobility/ADLs:  Walking   {CHP DME VMDA:923762608}  Transfer  {CHP DME MXHX:021606994}  Bathing  {CHP DME MWR}  Dressing  {CHP DME NMFA:638605201}  Toileting  {CHP DME RQXR:191488752}  Feeding  {CHP DME IDAL:235570980}  Med Admin  {P DME REYO:857904081}  Med Delivery   { ANASTASIIA MED Delivery:271772240}    Wound Care Documentation and Therapy:        Elimination:  Continence:   · Bowel: {YES / ZQ:78229}  · Bladder: {YES / UC:00930}  Urinary Catheter: {Urinary Catheter:944092979}   Colostomy/Ileostomy/Ileal Conduit: {YES / IF:33836}       Date of Last BM: ***    Intake/Output Summary (Last 24 hours) at 2021 0837  Last data filed at 2021 0530  Gross per 24 hour   Intake 2187.11 ml   Output 975 ml   Net 1212.11 ml     I/O last 3 completed shifts:   In: 2187.1 [I.V.:239.1; NG/GT:1748; IV Piggyback:200]  Out: 975 [Urine:975]    Safety Concerns:     [address] ANASTASIIA Safety Concerns:488130140}    Impairments/Disabilities:      [address] ANASTASIIA Impairments/Disabilities:259039845}    Nutrition Therapy:  Current Nutrition Therapy:   [address] ANASTASIIA Diet List:946750971}    Routes of Feeding: {CHP DME Other Feedings:474021050}  Liquids: {Slp liquid thickness:90413}  Daily Fluid Restriction: {CHP DME Yes amt example:424029286}  Last Modified Barium Swallow with Video (Video Swallowing Test): {Done Not Done HSYV:341357626}    Treatments at the Time of Hospital Discharge:   Respiratory Treatments: ***  Oxygen Therapy:  {Therapy; copd oxygen:78388}  Ventilator:    {MH CC Vent XKUI:881618955}    Rehab Therapies: Physical Therapy and Occupational Therapy  Weight Bearing Status/Restrictions: Other Medical Equipment (for information only, NOT a DME order): Other Treatments: skilled nursing assessment per protocol medication eduction     Patient's personal belongings (please select all that are sent with patient):  {CHP DME Belongings:861715912}    RN SIGNATURE:  {Esignature:990760122}    CASE MANAGEMENT/SOCIAL WORK SECTION    Inpatient Status Date: 8/9/21    Readmission Risk Assessment Score:  Readmission Risk              Risk of Unplanned Readmission:  27           Discharging to Facility/ Agency       Dialysis Facility (if applicable)   · Name:  · Address:  · Dialysis Schedule:  · Phone:  · Fax:    / signature: Electronically signed by Christina Jones RN on 8/12/21 at 8:38 AM EDT    PHYSICIAN SECTION    Prognosis: {Prognosis:0978614745}    Condition at Discharge: [address] Patient Condition:079407343}    Rehab Potential (if transferring to Rehab): {Prognosis:5667297439}    Recommended Labs or Other Treatments After Discharge: ***    Physician Certification: I certify the above information and transfer of Erroll Credit  is necessary for the continuing treatment of the diagnosis listed and that he requires {Admit to Appropriate Level of Care:05032} for {GREATER/LESS:769852709} 30 days. Update Admission H&P: {CHP DME Changes in DGDCO:038086018}    PHYSICIAN SIGNATURE:  {Esignature:771605710}

## 2021-08-12 NOTE — PROGRESS NOTES
Patient seen and examined. Afebrile, VSS. No leukocytosis, hemoglobin stable. Discussed with nursing staff. Still on heparin gtt. PEG site clean. Tolerating tube feeds at goal. Bowels are moving. Abdomen soft, non-tender, non-distended. Surgically stable. Discharge planning. Continue medical management.      Edwin Ramos PA-C  [facility] Surgery

## 2021-08-12 NOTE — PROGRESS NOTES
[facility] Neurology   IN-PATIENT SERVICE      NEUROLOGY PROGRESS  NOTE            Date:   8/12/2021  Patient name:  Peggy Cobb  Date of admission:  8/8/2021  YOB: 1945      Interval History:     No significant changes. Per nurse he was slightly more responsive earlier. Attempts to occasionally squeeze my hand on left. Eyes are roving more today. Is not tracking or following commands. No family at bedside. History of Present Illness: The patient is a 77 y. o. male who presents with No chief complaint on file. . The patient was seen and examined and the chart was reviewed.      Patient initially presented to [address] [place] [address] on 7/13 with strokelike symptoms. Lam Villa had been found down 2 days prior with severe dysarthria and right hemiparesis, facial droop. Michael Paz was evidence of prior bilateral strokes on imaging.  CTA showed diffuse intracranial atherosclerotic disease and near occlusion of right PCA.  Medical therapy was recommended.  Found to have new left cortical ischemic stroke.  Patient had thrombocytopenia in which hematology oncology was following so antiplatelets were held. [facility] they were restarted. [facility] discharged on 7/22 [facility] acute rehab unit.  Patient eventually had PEG tube placed. Lam Villa also had to be started on Zyprexa by psychiatry for agitation.  Neurology reconsulted on 8/7 for mental status changes.  Recommendation for MRI of the head and MRA head and neck.  MRI showed new infarcts in the right cerebellar, right lentiform nucleus, left periventricular region.   MRA confirms intracranial atherosclerotic disease.  Patient noted to be stuporous nonverbal withdrawing the right side less than the left side.  He is readmitted to the progressive care unit.  Patient placed on low intensity heparin with aspirin 81 daily and stopping Plavix. Michael Paz is plan for IZABELLA and follow-up head CT in the next 24 hours.     Patient sitting in bed, minimally responsive. Michael Paz is right gaze preference. Terry Genao is withdrawal to pain.  He is moaning to painful stimulation. Awaiting repeat CT head. IZABELLA is pending. Systolic blood pressures 70413I. Currently on heparin drip, aspirin 81 mg daily. Platelets 558.     Past Medical History:     Past Medical History:   Diagnosis Date    Centrilobular emphysema ([address]     [facility] [facility]     Depression     Diabetes mellitus ([facility] [address]     pt refuses to take previously prescribed metformin (states PCP aware)    Former smoker     Hyperlipidemia     on 18 pt states \"I stopped taking that about a year ago\"    Hypertension     Mixed restrictive and obstructive lung disease ([facility] [address] [facility]     Need for pneumococcal vaccine     Personal history of tobacco use         Past Surgical History:     Past Surgical History:   Procedure Laterality Date    CARDIAC SURGERY  2015    1 stent    NECK SURGERY      TOE AMPUTATION Right greater    UPPER GASTROINTESTINAL ENDOSCOPY N/A 8/3/2021    EGD  PEG TUBE INSERTION performed by Checo Mccallum MD at [facility]        Medications during admission:      nystatin  5 mL Oral 4x Daily    aspirin  81 mg PEG Tube Daily    famotidine  20 mg PEG Tube Daily    FLUoxetine  40 mg PEG Tube Daily    [Held by provider] hydrALAZINE  10 mg Oral 3 times per day    insulin lispro  0-6 Units Subcutaneous 4 times per day    [Held by provider] isosorbide dinitrate  10 mg PEG Tube TID    [Held by provider] losartan  50 mg Oral Daily    melatonin  6 mg PEG Tube Nightly    OLANZapine  5 mg PEG Tube Nightly    rosuvastatin  40 mg PEG Tube Nightly    tiotropium  2 puff Inhalation Lunch    ampicillin-sulbactam  1,500 mg Intravenous Q6H         Physical Exam:   BP (!) 154/92   Pulse 74   Temp 98.2 °F (36.8 °C) (Axillary)   Resp 20   Ht 5' 10\" (1.778 m)   Wt 212 lb 11.9 oz (96.5 kg)   SpO2 100%   BMI 30.53 kg/m²   Temp (24hrs), Av.8 °F (37.1 °C), Min:98.2 °F (36.8 °C), Max:99.5 °F (37.5 °C)          Neurological examination:    Mental status    Patient is lethargic.  Eyes open spontaneously roving eye movements.  Nonverbal.  Occasionally will follow commands by squeezing hand on left side.      Cranial nerves    Pupil response:            Present     Oculocephalic reflex: Roving eye movements  Corneal Reflex:            Present     Facial grimace to pin:  Present     Gag/Cough reflex:       Present         Motor and sensory function   increased tone present bilateral upper extremities with withdrawal to pain in the right greater than left hemibody.      DTR brisk throughout  Plantar response: Upgoing on left  (-) Grayson's sign bilaterally    Gait  Not tested patient is very lethargic               Diagnostics:      Laboratory Testing:  CBC:   Recent Labs     08/10/21  0800 08/12/21  0729   WBC 9.3 9.1   HGB 10.9* 10.7*   * 172     BMP:    Recent Labs     08/09/21  1318 08/09/21  1318 08/09/21  1900 08/10/21  0800 08/11/21  0802   *  --   --  139 139   K 5.6*   < > 5.3 4.4 4.4   *  --   --  105 107   CO2 26  --   --  25 24   BUN 28*  --   --  25* 25*   CREATININE 1.79*  --   --  1.45* 1.54*   GLUCOSE 147*  --   --  127* 195*    < > = values in this interval not displayed. Lab Results   Component Value Date    CHOL 186 07/14/2021    LDLCHOLESTEROL 129 07/14/2021    HDL 42 07/14/2021    TRIG 59 08/05/2021     (H) 08/09/2021     (H) 08/09/2021    TSH 0.28 (L) 07/14/2021    INR 1.1 08/08/2021    LABA1C 7.7 (H) 07/14/2021    [id number] 07/13/2021       Imaging/Diagnostics:      MRI brain (7/14/2021): Acute infarct left and right basal ganglia, greater on the left.  Diffuse volume loss and extensive chronic ischemic white matter changes.         MRI brain (8/7/2021): Multifocal acute infarcts involving the bilateral basal ganglia, greater on the right, posterior commissure of the corpus callosum, lateral margin of the left thalamus, left greater than right frontal parietal paramedian subcortical and deep white matter.               CTA head and neck (7/13/2021): Severe stenosis V1 segment of left vertebral artery.  Near complete occlusion right PCA.  Severe stenosis proximal A3 segment of ACAs bilaterally.  Moderate to severe proximal M2 segment stenosis in the right MCA.  Moderate stenosis M1, M2 segments of the left MCA.  Moderate stenosis of the P1/P2 junction of the left PCA.     MRA head and neck (8/7/2021): Focal high-grade stenosis involving A1 segment of left RAHEEM.  Right MCA M1 segment focal moderate grade stenosis.  Right MCA M2 segment multifocal moderate to high-grade stenosis.  Bilateral PCA P2 segment multifocal moderate to high-grade stenosis.      2D echo: EF 50-55%.  Negative bubble study.  LA is normal size. I personally reviewed all of the above medications, clinical laboratory, imaging and other diagnostic tests. Impression:      1. Multiple  bihemispheric acute/subacute infarcts with right greater than left hemiparesis, subsequent encephalopathy with minimally responsive state  2. Significant intracranial diffuse atherosclerotic disease likely etiology of patient's strokes  3. status post PEG tube placement    Plan:      Discontinue heparin drip   Transition to aspirin 81 mg, Brilinta 90 mg twice daily   Poor prognosis for functional neurologic recovery   Patient is stable neurologically for discharge at this time. Discharge to SNF unless family makes decision regarding changes in goals of care   Palliative care is on board.         Electronically signed by Gregg Nelson DO on 8/12/2021 at 10:00 AM      Gregg Nelson [address] [place] [address]  Neurology

## 2021-08-12 NOTE — PLAN OF CARE
Problem: Infection:  Goal: Will remain free from infection  Description: Will remain free from infection  8/12/2021 0123 by Angy Durán RN  Outcome: Ongoing  8/11/2021 1835 by Jamee Gan RN  Outcome: Ongoing     Problem: Safety:  Goal: Free from accidental physical injury  Description: Free from accidental physical injury  8/12/2021 0123 by Angy Durán RN  Outcome: Ongoing  8/11/2021 1835 by Jamee Gan RN  Outcome: Ongoing  Goal: Free from intentional harm  Description: Free from intentional harm  8/12/2021 0123 by Angy Durán RN  Outcome: Ongoing  8/11/2021 1835 by Jamee Gan RN  Outcome: Ongoing     Problem: Daily Care:  Goal: Daily care needs are met  Description: Daily care needs are met  8/12/2021 0123 by Angy Durán RN  Outcome: Ongoing  8/11/2021 1835 by Jamee Gan RN  Outcome: Ongoing     Problem: Pain:  Goal: Patient's pain/discomfort is manageable  Description: Patient's pain/discomfort is manageable  8/12/2021 0123 by Angy Durán RN  Outcome: Ongoing  8/11/2021 1835 by Jamee Gan RN  Outcome: Ongoing     Problem: Skin Integrity:  Goal: Skin integrity will stabilize  Description: Skin integrity will stabilize  8/12/2021 0123 by Angy Durán RN  Outcome: Ongoing  8/11/2021 1835 by Jamee Gan RN  Outcome: Ongoing     Problem: Discharge Planning:  Goal: Patients continuum of care needs are met  Description: Patients continuum of care needs are met  8/12/2021 0123 by Angy Durán RN  Outcome: Ongoing  8/11/2021 1835 by Jamee Gan RN  Outcome: Ongoing     Problem: Nutrition  Goal: Optimal nutrition therapy  8/12/2021 0123 by Angy Durán RN  Outcome: Ongoing  8/11/2021 1835 by Jamee Gan RN  Outcome: Ongoing     Problem: Falls - Risk of:  Goal: Will remain free from falls  Description: Will remain free from falls  8/12/2021 0123 by Angy Durán RN  Outcome: Ongoing  8/11/2021 1835 by Jamee Gan RN  Outcome: Ongoing  Goal: Absence of physical injury  Description: Absence of physical injury  8/12/2021 0123 by Nicole Ireland RN  Outcome: Ongoing  8/11/2021 1835 by Calvin Galdamez RN  Outcome: Ongoing     Problem: Skin Integrity:  Goal: Will show no infection signs and symptoms  Description: Will show no infection signs and symptoms  8/12/2021 0123 by Nicole Ireland RN  Outcome: Ongoing  8/11/2021 1835 by Calvin Galdamez RN  Outcome: Ongoing  Goal: Absence of new skin breakdown  Description: Absence of new skin breakdown  8/12/2021 0123 by Nicole Ireland RN  Outcome: Ongoing  8/11/2021 1835 by Calvin Galdamez RN  Outcome: Ongoing

## 2021-08-12 NOTE — PLAN OF CARE
BRONCHOSPASM/BRONCHOCONSTRICTION   []  IMPROVE AERATION/BREATH SOUNDS  [x]   ADMINISTER BRONCHODILATOR THERAPY AS APPROPRIATE  [x]   ASSESS BREATH SOUNDS  [x]   IMPLEMENT AEROSOL/MDI PROTOCOL  [x]   PATIENT EDUCATION AS NEEDED

## 2021-08-12 NOTE — FLOWSHEET NOTE
08/12/21 1658   Encounter Summary   Services provided to: Patient   Referral/Consult From: Palliative Care   Complexity of Encounter Low   Length of Encounter 15 minutes   Spiritual/Scientologist   Type Spiritual support   Assessment Unable to respond   Intervention Prayer

## 2021-08-12 NOTE — PLAN OF CARE
Problem: Skin Integrity:  Goal: Will show no infection signs and symptoms  Description: Will show no infection signs and symptoms  Outcome: Met This Shift  Goal: Absence of new skin breakdown  Description: Absence of new skin breakdown  Outcome: Met This Shift     Problem: Nutrition  Goal: Optimal nutrition therapy  Outcome: Ongoing  Note: Tube feeding at goal 70ml/hr     Problem: Discharge Planning:  Goal: Patients continuum of care needs are met  Description: Patients continuum of care needs are met  Outcome: Not Met This Shift     Problem: Skin Integrity:  Goal: Skin integrity will stabilize  Description: Skin integrity will stabilize  Outcome: Ongoing     Problem: Pain:  Goal: Patient's pain/discomfort is manageable  Description: Patient's pain/discomfort is manageable  Outcome: Met This Shift     Problem: Safety:  Goal: Free from accidental physical injury  Description: Free from accidental physical injury  Outcome: Met This Shift  Goal: Free from intentional harm  Description: Free from intentional harm  Outcome: Met This Shift     Problem: Infection:  Goal: Will remain free from infection  Description: Will remain free from infection  Outcome: Ongoing

## 2021-08-12 NOTE — PROGRESS NOTES
Today's Date: 8/12/2021  Patient Name: Claudia Reyes  Date of admission: 8/8/2021 11:30 AM  Patient's age: 77 y.o., 1945  Admission Dx: Altered mental status [R41.82]    Reason for Consult: management recommendations  Requesting Physician: Pk Rosen MD    CHIEF COMPLAINT: Thrombocytopenia. Recurrent stroke. History Obtained From:  electronic medical record, medical team    Interval history:    Patient seen and examined  Labs and vital reviewed  No new complaint interval event per nurse  Platelet count is stable  Patient unable to give any history        HISTORY OF PRESENT ILLNESS:      The patient is a 77 y.o.  male who is admitted to the hospital for worsening mental status and recurrent strokes    Patient was initially admitted to the hospital on 7/13 with strokelike symptoms. Imaging studies showed evidence of prior bilateral strokes on imaging. CTA done showed diffuse intracranial atherosclerosis disease and near occlusion of right PCA. After evaluation medical therapy was recommended. Further evaluation also revealed new left cortical ischemic stroke. Patient platelet count at presentation was only 4. 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 After recovery of the platelet count patient was started on antiplatelet therapy. Patient was discharged on 7/22 [place] [id number] rehab. Repeat MRI shows new infarcts in the right cerebellar right lentiform nucleus and left periventricular region. MRA confirms atherosclerotic disease. Patient is now placed on low intensity heparin with aspirin. Patient is being worked up with a IZABELLA.   Patient's most latest blood count is 140,000    Patient mentation has worsened and is nonverbal does not follow commands not able to give any history    Past Medical History:   has a past medical history of Centrilobular emphysema ([address] COPD (chronic obstructive pulmonary disease) ([address] Depression, Diabetes mellitus ([facility] [address] [facility] Former smoker, Hyperlipidemia, Hypertension, Mixed restrictive and obstructive lung disease ([facility] [address] Need for pneumococcal vaccine, and Personal history of tobacco use. Past Surgical History:   has a past surgical history that includes Neck surgery; Toe amputation (Right, greater); Cardiac surgery (07/2015); and Upper gastrointestinal endoscopy (N/A, 8/3/2021). Medications:    Prior to Admission medications    Medication Sig Start Date End Date Taking?  Authorizing Provider   Heparin Sodium, Porcine, (HEPARIN, PORCINE,) 5000 UNIT/ML injection Inject 1 mL into the skin every 8 hours 7/20/21   Gilford Head, MD   QUEtiapine (SEROQUEL) 25 MG tablet Take 1 tablet by mouth nightly as needed for Agitation 7/20/21 7/25/21  Gilford Head, MD   methylPREDNISolone sodium (SOLU-MEDROL) 40 MG injection Infuse 1 mL intravenously every 12 hours 7/20/21   Gilford Head, MD   melatonin 3 MG TABS tablet Take 1 tablet by mouth nightly as needed (insomnia) 7/20/21   Gilford Head, MD   atorvastatin (LIPITOR) 80 MG tablet Take 0.5 tablets by mouth daily (Pt takes one-half of an 80mg tab = 40mg) 7/16/21   Gilford Head, MD   tiZANidine (ZANAFLEX) 2 MG tablet Take 1 tablet by mouth 4 times daily as needed (muscle spasms) 5/7/21   JOLIE Chong CNP   naproxen (NAPROSYN) 500 MG tablet Take 1 tablet by mouth 2 times daily (with meals) 1/4/21   David Huston PA-C   ibuprofen (ADVIL;MOTRIN) 800 MG tablet Take 1 tablet by mouth every 8 hours as needed for Pain 6/2/20   Esme Tripp MD   lidocaine (LIDODERM) 5 % Place 1 patch onto the skin daily 12 hours on, 12 hours off. 6/2/20   Esme Tripp MD   metoprolol succinate (TOPROL XL) 50 MG extended release tablet Take 50 mg by mouth daily    Historical Provider, MD   tiotropium (SPIRIVA RESPIMAT) 2.5 MCG/ACT AERS inhaler Inhale 2 puffs into the lungs daily    Historical Provider, MD   carboxymethylcellulose 1 % ophthalmic solution Place 1 drop into both eyes 3 times daily as needed for Dry Eyes     Historical Provider, MD   ketotifen (ZADITOR) 0.025 % ophthalmic solution Place 1 drop into both eyes 2 times daily as needed (itchy eyes)     Historical Provider, MD   isosorbide mononitrate (IMDUR) 60 MG extended release tablet Take 30 mg by mouth daily Indications: 1/2 tablet (30mg)     Historical Provider, MD   finasteride (PROSCAR) 5 MG tablet Take 5 mg by mouth daily    Historical Provider, MD   tamsulosin (FLOMAX) 0.4 MG capsule Take 0.4 mg by mouth daily    Historical Provider, MD   fluticasone (FLONASE) 50 MCG/ACT nasal spray 1 spray by Nasal route 2 times daily  Patient taking differently: 1 spray by Nasal route 2 times daily as needed for Rhinitis  5/31/16   Francisco Connor DO   albuterol sulfate  (90 BASE) MCG/ACT inhaler Inhale 2 puffs into the lungs every 6 hours as needed for Wheezing    Historical Provider, MD   albuterol (PROVENTIL) (2.5 MG/3ML) 0.083% nebulizer solution Take 2.5 mg by nebulization every 6 hours as needed for Wheezing    Historical Provider, MD   aspirin 81 MG EC tablet Take 81 mg by mouth daily    Historical Provider, MD   losartan (COZAAR) 100 MG tablet Take 100 mg by mouth daily     Historical Provider, MD   nitroGLYCERIN (NITROSTAT) 0.4 MG SL tablet Place 0.4 mg under the tongue every 5 minutes as needed for Chest pain    Historical Provider, MD   FLUoxetine (PROZAC) 20 MG capsule Take 40 mg by mouth daily     Historical Provider, MD   furosemide (LASIX) 20 MG tablet Take 20 mg by mouth daily as needed (swelling)     Historical Provider, MD   clopidogrel (PLAVIX) 75 MG tablet Take 75 mg by mouth daily    Historical Provider, MD   rOPINIRole (REQUIP) 0.25 MG tablet Take 0.25 mg by mouth nightly as needed     Historical Provider, MD   budesonide-formoterol (SYMBICORT) 160-4.5 MCG/ACT AERO Inhale 2 puffs into the lungs 2 times daily.     Historical Provider, MD     Current Facility-Administered Medications   Medication Dose Route Frequency Provider Last Rate Last Admin    albuterol (PROVENTIL) nebulizer solution 2.5 mg  2.5 mg Nebulization As Directed RT PRN Glenn Mayer MD        heparin (porcine) injection 5,000 Units  5,000 Units Subcutaneous 3 times per day Glenn Mayer MD   5,000 Units at 08/12/21 1500    ticagrelor (BRILINTA) tablet 90 mg  90 mg Oral BID Payam Hernandez DO        nystatin (MYCOSTATIN) 786298 UNIT/ML suspension 500,000 Units  5 mL Oral 4x Daily Juan A Webster MD   500,000 Units at 08/12/21 1303    hydrALAZINE (APRESOLINE) injection 10 mg  10 mg Intravenous Q8H PRN Chang Victoria MD        acetaminophen (TYLENOL) tablet 650 mg  650 mg Oral Q4H PRN Taylor Simon MD        bisacodyl (DULCOLAX) suppository 10 mg  10 mg Rectal Daily PRN Taylor Simon MD        polyethylene glycol [facility]) packet 17 g  17 g Per G Tube Daily PRN Taylor Simon MD        [facility]) tablet 17.2 mg  2 tablet Oral Daily PRN Taylor Simon MD        albuterol sulfate  (90 Base) MCG/ACT inhaler 2 puff  2 puff Inhalation Q6H PRN Taylor Simon MD        aspirin chewable tablet 81 mg  81 mg PEG Tube Daily Taylor Simon MD   81 mg at 08/12/21 1008    dextrose 5 % solution  100 mL/hr Intravenous PRN Taylor Simon MD        dextrose 50 % IV solution  12.5 g Intravenous PRN Taylor Simon MD        famotidine (PEPCID) tablet 20 mg  20 mg PEG Tube Daily Taylor Simon MD   20 mg at 08/12/21 1008    FLUoxetine (PROZAC) 20 MG/5ML solution 40 mg  40 mg PEG Tube Daily Taylor Simon MD   40 mg at 08/12/21 1010    glucagon (rDNA) injection 1 mg  1 mg Intramuscular PRN Taylor Simon MD        glucose (GLUTOSE) 40 % oral gel 15 g  15 g Oral PRN Taylor Simon MD        [facility] by provider] hydrALAZINE (APRESOLINE) tablet 10 mg  10 mg Oral 3 times per day Eric June Keely Mack MD   10 mg at 21 4450    insulin lispro (HUMALOG) injection vial 0-6 Units  0-6 Units Subcutaneous 4 times per day Murray Dunn MD   2 Units at 21 1259    [Held by provider] isosorbide dinitrate (ISORDIL) tablet 10 mg  10 mg PEG Tube TID Murray Dunn MD   10 mg at 21 1543    [Held by provider] losartan (COZAAR) tablet 50 mg  50 mg Oral Daily Murray Dunn MD        melatonin tablet 6 mg  6 mg PEG Tube Nightly Murray Dunn MD   6 mg at 21    OLANZapine (ZYPREXA) tablet 5 mg  5 mg PEG Tube Nightly Murray Dunn MD   5 mg at 21    rosuvastatin (CRESTOR) tablet 40 mg  40 mg PEG Tube Nightly Murray Dunn MD   40 mg at 21    tiotropium (SPIRIVA RESPIMAT) 2.5 MCG/ACT inhaler 2 puff  2 puff Inhalation Lunch Murray Dunn MD   2 puff at 21 1053    ampicillin-sulbactam (UNASYN) 1500 mg IVPB minibag  1,500 mg Intravenous Q6H Nicolle Carrion MD   Stopped at 21 1040       Allergies:  Patient has no known allergies. Social History:   reports that he quit smoking about 8 years ago. He started smoking about 64 years ago. He has a 42.00 pack-year smoking history. He has never used smokeless tobacco. He reports that he does not drink alcohol and does not use drugs.      Family History: Unable to obtain due to altered mental status    REVIEW OF SYSTEMS:      Unable to obtain review of system due to patient being nonverbal    PHYSICAL EXAM:        /80   Pulse 83   Temp 98.5 °F (36.9 °C) (Axillary)   Resp 18   Ht 5' 10\" (1.778 m)   Wt 212 lb 11.9 oz (96.5 kg)   SpO2 100%   BMI 30.53 kg/m²    Temp (24hrs), Av.8 °F (37.1 °C), Min:98.2 °F (36.8 °C), Max:99.5 °F (37.5 °C)      General appearance -ill-appearing  Mental status -not alert or cooperative  Eyes -pupils reactive  Ears - bilateral TM's and external ear canals normal   Mouth - mucous membranes moist, pharynx normal without lesions   Neck - supple, no significant adenopathy   Lymphatics - no palpable lymphadenopathy, no hepatosplenomegaly   Chest -decreased breathing sounds  Heart - normal rate, regular rhythm, normal S1, S2, no murmurs  Abdomen - soft, nontender, nondistended, no masses or organomegaly   Neurological -patient is not alert awake oriented does not follow commands  Musculoskeletal - no joint tenderness, deformity or swelling   Extremities - peripheral pulses normal, no pedal edema, no clubbing or cyanosis   Skin - normal coloration and turgor, no rashes, no suspicious skin lesions noted ,      DATA:      Labs:     Results for orders placed or performed during the hospital encounter of 08/08/21   Culture, Blood 1    Specimen: Blood   Result Value Ref Range    Specimen Description . BLOOD  LEFT AC, NO VOLUMES LISTED     Special Requests NOT REPORTED     Culture NO GROWTH 4 DAYS    Culture, Blood 1    Specimen: Blood   Result Value Ref Range    Specimen Description . BLOOD  LEFT AC, NO VOLUMES LISTED     Special Requests NOT REPORTED     Culture NO GROWTH 4 DAYS    Legionella Ag, Ur    Specimen: Urine, straight catheter   Result Value Ref Range    Legionella Pneumophilia Ag, Urine NEGATIVE    STREP PNEUMONIAE ANTIGEN    Specimen: Urine, straight catheter   Result Value Ref Range    Source . CLEAN CATCH URINE     Strep pneumo Ag NEGATIVE    Urinalysis Reflex to Culture    Specimen: Urine, clean catch   Result Value Ref Range    Color, UA YELLOW YELLOW    Turbidity UA CLEAR CLEAR    Glucose, Ur NEGATIVE NEGATIVE    Bilirubin Urine NEGATIVE NEGATIVE    Ketones, Urine NEGATIVE NEGATIVE    Specific Gravity, UA 1.018 1.000 - 1.030    Urine Hgb NEGATIVE NEGATIVE    pH, UA 6.5 5.0 - 8.0    Protein, UA 1+ (A) NEGATIVE    Urobilinogen, Urine Normal Normal    Nitrite, Urine NEGATIVE NEGATIVE    Leukocyte Esterase, Urine NEGATIVE NEGATIVE    Urinalysis Comments NOT REPORTED    Procalcitonin   Result Value Ref Range Procalcitonin 0.26 (H) <0.09 ng/mL   Mycoplasma Ab,IgM   Result Value Ref Range    Mycoplasma pneumo IgM 0.24 <0.91   Lactic Acid   Result Value Ref Range    Lactic Acid 2.0 0.5 - 2.2 mmol/L   CBC   Result Value Ref Range    WBC 11.4 (H) 3.5 - 11.0 k/uL    RBC 4.48 (L) 4.5 - 5.9 m/uL    Hemoglobin 10.7 (L) 13.5 - 17.5 g/dL    Hematocrit 35.2 (L) 41 - 53 %    MCV 78.7 (L) 80 - 100 fL    MCH 23.9 (L) 26 - 34 pg    MCHC 30.4 (L) 31 - 37 g/dL    RDW 17.9 (H) 11.5 - 14.9 %    Platelets 514 (L) 645 - 450 k/uL    MPV 10.9 6.0 - 12.0 fL    NRBC Automated NOT REPORTED per 100 WBC   HEPATIC FUNCTION PANEL   Result Value Ref Range    Albumin 2.9 (L) 3.5 - 5.2 g/dL    Alkaline Phosphatase 184 (H) 40 - 129 U/L     (H) 5 - 41 U/L     (H) <40 U/L    Total Bilirubin 0.21 (L) 0.3 - 1.2 mg/dL    Bilirubin, Direct 0.10 <0.31 mg/dL    Bilirubin, Indirect 0.11 0.00 - 1.00 mg/dL    Total Protein 6.3 (L) 6.4 - 8.3 g/dL    Globulin NOT REPORTED 1.5 - 3.8 g/dL    Albumin/Globulin Ratio NOT REPORTED 1.0 - 2.5   CBC   Result Value Ref Range    WBC 14.4 (H) 3.5 - 11.0 k/uL    RBC 4.38 (L) 4.5 - 5.9 m/uL    Hemoglobin 10.7 (L) 13.5 - 17.5 g/dL    Hematocrit 34.6 (L) 41 - 53 %    MCV 78.9 (L) 80 - 100 fL    MCH 24.3 (L) 26 - 34 pg    MCHC 30.9 (L) 31 - 37 g/dL    RDW 17.8 (H) 11.5 - 14.9 %    Platelets 128 (L) 511 - 450 k/uL    MPV 10.8 6.0 - 12.0 fL    NRBC Automated NOT REPORTED per 100 WBC   APTT   Result Value Ref Range    PTT 45.7 (H) 24.0 - 36.0 sec   Protime-INR   Result Value Ref Range    Protime 14.7 (H) 11.8 - 14.6 sec    INR 1.1    Basic Metabolic Panel   Result Value Ref Range    Glucose 154 (H) 70 - 99 mg/dL    BUN 31 (H) 8 - 23 mg/dL    CREATININE 1.96 (H) 0.70 - 1.20 mg/dL    Bun/Cre Ratio NOT REPORTED 9 - 20    Calcium 9.2 8.6 - 10.4 mg/dL    Sodium 147 (H) 135 - 144 mmol/L    Potassium 5.8 (H) 3.7 - 5.3 mmol/L    Chloride 111 (H) 98 - 107 mmol/L    CO2 27 20 - 31 mmol/L    Anion Gap 9 9 - 17 mmol/L    GFR Non- 34 (L) >60 mL/min    GFR  41 (L) >60 mL/min    GFR Comment          GFR Staging NOT REPORTED    Magnesium   Result Value Ref Range    Magnesium 2.6 1.6 - 2.6 mg/dL   Phosphorus   Result Value Ref Range    Phosphorus 4.7 (H) 2.5 - 4.5 mg/dL   HEPARIN LEVEL/ANTI-XA   Result Value Ref Range    Anti-XA Unfrac Heparin 0.67 0.30 - 0.70 IU/L   HEPARIN LEVEL/ANTI-XA   Result Value Ref Range    Anti-XA Unfrac Heparin 0.76 (HH) 0.30 - 0.70 IU/L   HEPARIN LEVEL/ANTI-XA   Result Value Ref Range    Anti-XA Unfrac Heparin 0.55 0.30 - 0.70 IU/L   HEPARIN LEVEL/ANTI-XA   Result Value Ref Range    Anti-XA Unfrac Heparin 0.49 0.30 - 0.70 IU/L   Basic Metabolic Panel w/ Reflex to MG   Result Value Ref Range    Glucose 147 (H) 70 - 99 mg/dL    BUN 28 (H) 8 - 23 mg/dL    CREATININE 1.79 (H) 0.70 - 1.20 mg/dL    Bun/Cre Ratio NOT REPORTED 9 - 20    Calcium 9.3 8.6 - 10.4 mg/dL    Sodium 145 (H) 135 - 144 mmol/L    Potassium 5.6 (H) 3.7 - 5.3 mmol/L    Chloride 110 (H) 98 - 107 mmol/L    CO2 26 20 - 31 mmol/L    Anion Gap 9 9 - 17 mmol/L    GFR Non-African American 37 (L) >60 mL/min    GFR  45 (L) >60 mL/min    GFR Comment          GFR Staging NOT REPORTED    BLOOD GAS, VENOUS   Result Value Ref Range    pH, David 7.409 7.320 - 7.420    pCO2, David 44.5 39.0 - 55.0    pO2, David 37.7 30 - 50    HCO3, Venous 28.2 24.0 - 30.0 mmol/L    Positive Base Excess, David 3.5 (H) 0.0 - 2.0 mmol/L    Negative Base Excess, David NOT REPORTED 0.0 - 2.0 mmol/L    O2 Sat, David 69.2 60.0 - 85.0 %    Total Hb NOT REPORTED 12.0 - 16.0 g/dl    Oxyhemoglobin NOT REPORTED 95.0 - 98.0 %    Carboxyhemoglobin 2.1 0 - 5 %    Methemoglobin 0.8 0.0 - 1.9 %    Pt Temp NOT REPORTED     pH, David, Temp Adj NOT REPORTED 7.320 - 7.420    pCO2, David, Temp Adj NOT REPORTED 39.0 - 55.0 mmHg    pO2, David, Temp Adj NOT REPORTED 30.0 - 50.0 mmHg    O2 Device/Flow/% NOT REPORTED     Respiratory Rate NOT REPORTED     Adelfo Test NOT REPORTED Sample Site NOT REPORTED     Pt. Position NOT REPORTED     Mode NOT REPORTED     Set Rate NOT REPORTED     Total Rate NOT REPORTED     VT NOT REPORTED     FIO2 NOT REPORTED     Peep/Cpap NOT REPORTED     PSV NOT REPORTED     Text for Respiratory NOT REPORTED     NOTIFICATION NOT REPORTED     NOTIFICATION TIME NOT REPORTED    CK   Result Value Ref Range    Total  39 - 308 U/L   Myoglobin   Result Value Ref Range    Myoglobin 280 (H) 28 - 72 ng/mL   Ammonia   Result Value Ref Range    Ammonia 18 16 - 60 umol/L   HEPARIN LEVEL/ANTI-XA   Result Value Ref Range    Anti-XA Unfrac Heparin 0.37 0.30 - 0.70 IU/L   Microscopic Urinalysis   Result Value Ref Range    -          WBC, UA 2 TO 5 /HPF    RBC, UA 0 TO 2 /HPF    Casts UA NOT REPORTED /LPF    Crystals, UA NOT REPORTED None /HPF    Epithelial Cells UA 0 TO 2 /HPF    Renal Epithelial, UA NOT REPORTED 0 /HPF    Bacteria, UA FEW (A) None    Mucus, UA NOT REPORTED None    Trichomonas, UA NOT REPORTED None    Amorphous, UA 1+ (A) None    Other Observations UA NOT REPORTED NOT REQ.     Yeast, UA NOT REPORTED None   Potassium   Result Value Ref Range    Potassium 5.3 3.7 - 5.3 mmol/L   HEPARIN LEVEL/ANTI-XA   Result Value Ref Range    Anti-XA Unfrac Heparin 0.39 0.30 - 0.70 IU/L   Basic Metabolic Panel w/ Reflex to MG   Result Value Ref Range    Glucose 127 (H) 70 - 99 mg/dL    BUN 25 (H) 8 - 23 mg/dL    CREATININE 1.45 (H) 0.70 - 1.20 mg/dL    Bun/Cre Ratio NOT REPORTED 9 - 20    Calcium 8.7 8.6 - 10.4 mg/dL    Sodium 139 135 - 144 mmol/L    Potassium 4.4 3.7 - 5.3 mmol/L    Chloride 105 98 - 107 mmol/L    CO2 25 20 - 31 mmol/L    Anion Gap 9 9 - 17 mmol/L    GFR Non-African American 48 (L) >60 mL/min    GFR  58 (L) >60 mL/min    GFR Comment          GFR Staging NOT REPORTED    CBC   Result Value Ref Range    WBC 9.3 3.5 - 11.0 k/uL    RBC 4.48 (L) 4.5 - 5.9 m/uL    Hemoglobin 10.9 (L) 13.5 - 17.5 g/dL    Hematocrit 35.4 (L) 41 - 53 %    MCV 79.0 (L) 80 - 100 fL    MCH 24.4 (L) 26 - 34 pg    MCHC 30.9 (L) 31 - 37 g/dL    RDW 18.2 (H) 11.5 - 14.9 %    Platelets 805 (L) 029 - 450 k/uL    MPV 12.0 6.0 - 12.0 fL    NRBC Automated NOT REPORTED per 100 WBC   HEPARIN LEVEL/ANTI-XA   Result Value Ref Range    Anti-XA Unfrac Heparin 0.21 (L) 0.30 - 0.70 IU/L   HEPARIN LEVEL/ANTI-XA   Result Value Ref Range    Anti-XA Unfrac Heparin 0.73 (HH) 0.30 - 0.70 IU/L   Basic Metabolic Panel   Result Value Ref Range    Glucose 195 (H) 70 - 99 mg/dL    BUN 25 (H) 8 - 23 mg/dL    CREATININE 1.54 (H) 0.70 - 1.20 mg/dL    Bun/Cre Ratio NOT REPORTED 9 - 20    Calcium 8.6 8.6 - 10.4 mg/dL    Sodium 139 135 - 144 mmol/L    Potassium 4.4 3.7 - 5.3 mmol/L    Chloride 107 98 - 107 mmol/L    CO2 24 20 - 31 mmol/L    Anion Gap 8 (L) 9 - 17 mmol/L    GFR Non-African American 44 (L) >60 mL/min    GFR  54 (L) >60 mL/min    GFR Comment          GFR Staging NOT REPORTED    Magnesium   Result Value Ref Range    Magnesium 2.4 1.6 - 2.6 mg/dL   Phosphorus   Result Value Ref Range    Phosphorus 3.9 2.5 - 4.5 mg/dL   HEPARIN LEVEL/ANTI-XA   Result Value Ref Range    Anti-XA Unfrac Heparin 0.48 0.30 - 0.70 IU/L   HEPARIN LEVEL/ANTI-XA   Result Value Ref Range    Anti-XA Unfrac Heparin 0.45 0.30 - 0.70 IU/L   HEPARIN LEVEL/ANTI-XA   Result Value Ref Range    Anti-XA Unfrac Heparin 0.38 0.30 - 0.70 IU/L   HEPARIN LEVEL/ANTI-XA   Result Value Ref Range    Anti-XA Unfrac Heparin 0.29 (L) 0.30 - 0.70 IU/L   CBC   Result Value Ref Range    WBC 9.1 3.5 - 11.0 k/uL    RBC 4.41 (L) 4.5 - 5.9 m/uL    Hemoglobin 10.7 (L) 13.5 - 17.5 g/dL    Hematocrit 34.6 (L) 41 - 53 %    MCV 78.4 (L) 80 - 100 fL    MCH 24.1 (L) 26 - 34 pg    MCHC 30.8 (L) 31 - 37 g/dL    RDW 17.3 (H) 11.5 - 14.9 %    Platelets 664 480 - 732 k/uL    MPV 10.7 6.0 - 12.0 fL    NRBC Automated NOT REPORTED per 100 WBC   HEPARIN LEVEL/ANTI-XA   Result Value Ref Range    Anti-XA Unfrac Heparin 0.75 () 0.30 - 0.70 IU/L   POC Glucose Fingerstick   Result Value Ref Range    POC Glucose 207 (H) 75 - 110 mg/dL   POC Glucose Fingerstick   Result Value Ref Range    POC Glucose 236 (H) 75 - 110 mg/dL   POC Glucose Fingerstick   Result Value Ref Range    POC Glucose 198 (H) 75 - 110 mg/dL   POC Glucose Fingerstick   Result Value Ref Range    POC Glucose 170 (H) 75 - 110 mg/dL   POC Glucose Fingerstick   Result Value Ref Range    POC Glucose 130 (H) 75 - 110 mg/dL   POC Glucose Fingerstick   Result Value Ref Range    POC Glucose 133 (H) 75 - 110 mg/dL   POC Glucose Fingerstick   Result Value Ref Range    POC Glucose 141 (H) 75 - 110 mg/dL   POC Glucose Fingerstick   Result Value Ref Range    POC Glucose 123 (H) 75 - 110 mg/dL   POC Glucose Fingerstick   Result Value Ref Range    POC Glucose 113 (H) 75 - 110 mg/dL   POC Glucose Fingerstick   Result Value Ref Range    POC Glucose 103 75 - 110 mg/dL   POC Glucose Fingerstick   Result Value Ref Range    POC Glucose 159 (H) 75 - 110 mg/dL   POC Glucose Fingerstick   Result Value Ref Range    POC Glucose 186 (H) 75 - 110 mg/dL   POC Glucose Fingerstick   Result Value Ref Range    POC Glucose 148 (H) 75 - 110 mg/dL   POC Glucose Fingerstick   Result Value Ref Range    POC Glucose 168 (H) 75 - 110 mg/dL   POC Glucose Fingerstick   Result Value Ref Range    POC Glucose 158 (H) 75 - 110 mg/dL   POC Glucose Fingerstick   Result Value Ref Range    POC Glucose 214 (H) 75 - 110 mg/dL   POC Glucose Fingerstick   Result Value Ref Range    POC Glucose 223 (H) 75 - 110 mg/dL   EKG 12 Lead   Result Value Ref Range    Ventricular Rate 54 BPM    Atrial Rate 54 BPM    P-R Interval 146 ms    QRS Duration 92 ms    Q-T Interval 426 ms    QTc Calculation (Bazett) 403 ms    P Axis 65 degrees    R Axis -35 degrees    T Axis 68 degrees         IMAGING DATA:    ECHO Complete 2D W Doppler W Color    Result Date: 7/15/2021  Transthoracic Echocardiography Report (TTE)  Patient Name GOFF      Date of Study               07/15/2021 Douglas Odor   Date of      1945  Gender                      Male  Birth   Age          77 year(s)  Race                        Black   Room Number  0536        Height:                     70 inch, 177.8 cm   Corporate ID [id number]    Weight:                     210 pounds, 95.3 kg  #   Patient Acct [de-identified]   BSA:          2.13 m^2      BMI:       30.13  #                                                               kg/m^2   MR #         [id number]     Sonographer                 Parris Talbert   Accession #  [id number]  Interpreting Physician      Yolande Vance   Fellow                   Referring Nurse                           Practitioner   Interpreting             Referring Physician         Chrissy Villanueva MD  Fellow  Additional Comments Technically difficult study, patient supine unable to be positioned for better acoustic windows. Type of Study   TTE procedure:2D Echocardiogram, M-Mode, Doppler, Color Doppler. Procedure Date Date: 07/15/2021 Start: 08:29 AM Study Location: [facility] Technical Quality: Adequate visualization Indications:CVA. History / Tech. Comments: Procedure explained to patient. Echo completed in the Echo Lab. RN performed bubble study. PMHx: Former smoker, COPD Hypertension, Diabetes, Hyperlipidemia Coronary artery disease, s/p stents Patient Status: Inpatient Height: 70 inches Weight: 210 pounds BSA: 2.13 m^2 BMI: 30.13 kg/m^2 CONCLUSIONS Summary Technically difficult study. Overall global left ventricular systolic function is normal, calculated ejection fraction is 50-55% Grade I (mild) left ventricular diastolic dysfunction. Technically difficult visualization of the right ventricle, but it does appear to be normal in size. Negative bubble study, no obvious intracardiac shunt noted within technical limitations of this study. No significant valvular regurgitation or stenosis seen. No significant pericardial effusion is seen.  Signature ----------------------------------------------------------------------------  Electronically signed by Joanne Sparks(Sonographer) on 07/15/2021 11:00 AM ---------------------------------------------------------------------------- ----------------------------------------------------------------------------  Electronically signed by Yolande Vance(Interpreting physician) on  07/15/2021 12:49 PM ---------------------------------------------------------------------------- FINDINGS Left Atrium Left atrium is normal in size. Inter-atrial septum is intact with no evidence for an atrial septal defect. Negative bubble study, no shunt noted. Left Ventricle Left ventricle is normal in size, normal wall thickness, global left ventricular systolic function is low normal, calculated ejection fraction is 50%. Grade I (mild) left ventricular diastolic dysfunction. Right Atrium Right atrium is normal in size. Right Ventricle Technically difficult visualization of the right ventricle, but it does appear to be normal in size. Mitral Valve Normal mitral valve structure. No mitral stenosis. Trivial mitral regurgitation. Aortic Valve Aortic valve is trileaflet. No evidence of aortic insufficiency or stenosis. Tricuspid Valve Tricuspid valve was not well visualized. Trivial tricuspid regurgitation. Insignificant tricuspid regurgitation, unable to estimate RVSP. Pulmonic Valve Pulmonic valve not well visualized but Doppler velocities are normal. Trivial pulmonic insufficiency. Pericardial Effusion No significant pericardial effusion is seen. Miscellaneous Normal aortic root dimension. E/E' average = 17.35. IVC not well visualized.  M-mode / 2D Measurements & Calculations:   LVIDd:5.6 cm(3.7 - 5.6 cm)        Diastolic ELSJPW:17.93 ml  IVSd:0.9 cm(0.6 - 1.1 cm)         Systolic QCWAYB:16.99 ml  LVPWd:0.8 cm(0.6 - 1.1 cm)        Aortic Root:3 cm(2.0 - 3.7 cm)                                    LA Dimension: 3.2 cm(1.9 - 4.0 cm)  Calculated LVEF (%): 50.46 %      LA volume/Index: 48.86 ml /23m^2                                    LVOT:2.1 cm                                    RVDd:3 cm   Mitral:                                 Aortic   Valve Area (P1/2-Time): 2.65 cm^2       Peak Velocity: 1.91 m/s  Peak E-Wave: 1.18 m/s                   Mean Velocity: 1.06 m/s  Peak A-Wave: 1.47 m/s                   Peak Gradient: 14.59 mmHg  E/A Ratio: 0.8                          Mean Gradient: 6 mmHg  Peak Gradient: 5.57 mmHg  Mean Gradient: 2 mmHg  Deceleration Time: 280 msec             Area (continuity): 2.83 cm^2  P1/2t: 83 msec                          AV VTI: 41.8 cm   Area (continuity): 2.2 cm^2  Mean Velocity: 0.62 m/s                                           Pulmonic:                                           Peak Velocity: 1.29 m/s                                          Peak Gradient: 6.66 mmHg  Diastology / Tissue Doppler Septal Wall E' velocity:0.06 m/s Septal Wall E/E':20.8 Lateral Wall E' velocity:0.08 m/s Lateral Wall E/E':13.9    CT HEAD WO CONTRAST    Result Date: 8/9/2021  EXAMINATION: CT OF THE HEAD WITHOUT CONTRAST  8/9/2021 2:04 pm TECHNIQUE: CT of the head was performed without the administration of intravenous contrast. Dose modulation, iterative reconstruction, and/or weight based adjustment of the mA/kV was utilized to reduce the radiation dose to as low as reasonably achievable. COMPARISON: MRI brain performed 08/07/2021. HISTORY: ORDERING SYSTEM PROVIDED HISTORY: stroke TECHNOLOGIST PROVIDED HISTORY: stroke Reason for Exam: stroke; ams Acuity: Unknown Type of Exam: Unknown Additional signs and symptoms: patient unable to stay still; turned head during exam FINDINGS: BRAIN/VENTRICLES: There is no acute intracranial hemorrhage, mass effect, or midline shift. There is satisfactory overall gray-white matter differentiation. There is extensive chronic microvascular disease.   There are evolving areas of ischemia in the left periventricular white matter, and corpus callosum posteriorly, in the right basal ganglia. The ventricular structures are symmetric and unremarkable. The infratentorial structures are unremarkable. ORBITS: The visualized portion of the orbits demonstrate no acute abnormality. SINUSES: The visualized paranasal sinuses and mastoid air cells demonstrate no acute abnormality. SOFT TISSUES/SKULL:  No acute abnormality of the visualized skull or soft tissues. Chronic microvascular disease with scattered areas of evolving ischemia as described above. CT HEAD WO CONTRAST    Result Date: 8/6/2021  EXAMINATION: CT OF THE HEAD WITHOUT CONTRAST  8/6/2021 2:21 pm TECHNIQUE: CT of the head was performed without the administration of intravenous contrast. Dose modulation, iterative reconstruction, and/or weight based adjustment of the mA/kV was utilized to reduce the radiation dose to as low as reasonably achievable. COMPARISON: 4 August 2021 HISTORY: ORDERING SYSTEM PROVIDED HISTORY: Pt w/ bilateral basal ganglia infarcts, right hemiparesis; minimally interactive over the last week and having increased lethargy the last few days. TECHNOLOGIST PROVIDED HISTORY: Pt w/ bilateral basal ganglia infarcts, right hemiparesis; minimally interactive over the last week and having increased lethargy the last few days. Reason for Exam: Increased lethargy the last few days. Acuity: Unknown Type of Exam: Unknown FINDINGS: BRAIN/VENTRICLES: There is no acute intracranial hemorrhage, mass effect or midline shift. No abnormal extra-axial fluid collection. The gray-white differentiation is maintained without evidence of an acute infarct. There is no evidence of hydrocephalus. Remote right-sided basal ganglial infarcts are noted. However, there is been interval development of hypodensity in the right mid basal ganglia since prior study consistent with an evolving subacute infarct. No significant mass effect is noted.   Ventricles are proportionate to cerebral atrophy. ORBITS: The visualized portion of the orbits demonstrate no acute abnormality. SINUSES: The visualized paranasal sinuses and mastoid air cells demonstrate no acute abnormality. SOFT TISSUES/SKULL:  No acute abnormality of the visualized skull or soft tissues. There has been interval development of a hypodensity in the right basal ganglia compared to prior study consistent with evolving subacute white matter infarct. No hemorrhage is noted. Chronic microvascular change in atrophy is demonstrated. No midline shift. CT HEAD WO CONTRAST    Result Date: 8/4/2021  EXAMINATION: CT OF THE HEAD WITHOUT CONTRAST  8/4/2021 1:25 pm TECHNIQUE: CT of the head was performed without the administration of intravenous contrast. Dose modulation, iterative reconstruction, and/or weight based adjustment of the mA/kV was utilized to reduce the radiation dose to as low as reasonably achievable. COMPARISON: CT head 07/21/2021 and 07/17/2021 HISTORY: ORDERING SYSTEM PROVIDED HISTORY: Patient with history of left-sided CVA with right hemiparesis, not communicating with staff, please evaluate for any change from previous imaging and any other abnormality TECHNOLOGIST PROVIDED HISTORY: Patient with history of left-sided CVA with right hemiparesis, not communicating with staff, please evaluate for any change from previous imaging and any other abnormality Reason for Exam: Patient with history of left-sided CVA with right hemiparesis, not communicating with staff, please evaluate for any change from previous imaging and any other abnormality Acuity: Unknown Type of Exam: Unknown FINDINGS: BRAIN/VENTRICLES: There is no acute intracranial hemorrhage, mass effect or midline shift. No abnormal extra-axial fluid collection. The gray-white differentiation is maintained without evidence of an acute infarct. There is prominence of the ventricles and sulci due to global parenchymal volume loss.  There are nonspecific areas of hypoattenuation within the periventricular and subcortical white matter, which likely represent chronic microvascular ischemic change. 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 Remote lacunar stroke left caudate lobe. ORBITS: The visualized portion of the orbits demonstrate no acute abnormality. SINUSES: The visualized paranasal sinuses and mastoid air cells demonstrate no acute abnormality. SOFT TISSUES/SKULL: No acute abnormality of the visualized skull or soft tissues. 1. No acute intracranial abnormality. 2. 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 Remote lacunar stroke left caudate lobe. 3. Senescent changes. White matter hypoattenuation described is typical of microvascular ischemic disease or as sequela of dysmyelinating/demyelinating processes. CT HEAD WO CONTRAST    Result Date: 7/21/2021  EXAMINATION: CT OF THE HEAD WITHOUT CONTRAST  7/21/2021 11:53 am TECHNIQUE: CT of the head was performed without the administration of intravenous contrast. Dose modulation, iterative reconstruction, and/or weight based adjustment of the mA/kV was utilized to reduce the radiation dose to as low as reasonably achievable. COMPARISON: July 17, 2021, MRI July 14, 2021 HISTORY: ORDERING SYSTEM PROVIDED HISTORY: Altered mental status TECHNOLOGIST PROVIDED HISTORY: eval for change in status Reason for Exam: EVAL FOR CHANGE IN STATUS Type of Exam: Subsequent/Follow-up Additional signs and symptoms: PT BECAME AGTATED & COMBATIVE OVERNIGHT Relevant Medical/Surgical History: HX STROKE FINDINGS: BRAIN/VENTRICLES: No acute intracranial hemorrhage, mass effect or midline shift. Area of hypoattenuation within the left basal ganglia and small foci of hypoattenuation in the right basal ganglia compatible with subacute infarct. Diffuse cortical volume loss and compensatory ventricular enlargement appears unchanged.   Periventricular and subcortical white matter hypoattenuation redemonstrated bilaterally compatible with severe chronic microvascular ischemic changes. Encephalomalacia of the right caudate redemonstrated compatible with remote lacunar infarct. ORBITS: The visualized portion of the orbits demonstrate no acute abnormality. SINUSES: Air-fluid level within the left sphenoid sinus. Paranasal sinuses and mastoid air cells otherwise clear. SOFT TISSUES/SKULL:  No acute abnormality of the visualized skull or soft tissues. Subacute basal ganglia infarcts redemonstrated. No gross hemorrhagic conversion or significant mass effect. New air-fluid level within the left sphenoid sinus suggesting acute sinusitis. CT HEAD WO CONTRAST    Result Date: 7/17/2021  EXAMINATION: CT OF THE HEAD WITHOUT CONTRAST  7/17/2021 10:46 am TECHNIQUE: CT of the head was performed without the administration of intravenous contrast. Dose modulation, iterative reconstruction, and/or weight based adjustment of the mA/kV was utilized to reduce the radiation dose to as low as reasonably achievable. COMPARISON: None. HISTORY: ORDERING SYSTEM PROVIDED HISTORY: change in mentation TECHNOLOGIST PROVIDED HISTORY: change in mentation Reason for Exam: change in mentation FINDINGS: BRAIN/VENTRICLES: Small area of hypodensity in the left corona radiata is similar in appearance to the previous exam.  There is no evidence of hemorrhage. No new parenchymal abnormalities are identified. ORBITS: The visualized portion of the orbits demonstrate no acute abnormality. SINUSES: The visualized paranasal sinuses and mastoid air cells demonstrate no acute abnormality. SOFT TISSUES/SKULL:  No acute abnormality of the visualized skull or soft tissues. No acute intracranial abnormality. No significant interval change.      CT HEAD WO CONTRAST    Result Date: 7/13/2021  EXAMINATION: CT OF THE HEAD WITHOUT CONTRAST  7/13/2021 1:10 pm TECHNIQUE: CT of the head was performed without the administration of intravenous contrast. Dose modulation, iterative reconstruction, and/or weight based adjustment of the mA/kV was utilized to reduce the radiation dose to as low as reasonably achievable. COMPARISON: 05/02/2021 HISTORY: ORDERING SYSTEM PROVIDED HISTORY: Last known well 2 days right-sided facial droop right-sided weakness TECHNOLOGIST PROVIDED HISTORY: Last known well 2 days right-sided facial droop right-sided weakness Decision Support Exception - unselect if not a suspected or confirmed emergency medical condition->Emergency Medical Condition (MA) Reason for Exam: Last known well 2 days right-sided facial droop right-sided weakness FINDINGS: BRAIN/VENTRICLES: Ovoid area of low attenuation in the left frontal corona radiata measures 2 x 1.4 cm, new from prior study (series 2, image 39). No acute intracranial hemorrhage. No mass effect or midline shift. No hydrocephalus. Diffuse parenchymal volume loss with enlargement of the ventricles and cerebral sulci. Old infarction in the right basal ganglia. There are nonspecific areas of hypoattenuation within the periventricular and subcortical white matter, which likely represent chronic microvascular ischemic change. Intracranial atherosclerotic disease. ORBITS: The visualized portion of the orbits demonstrate no acute abnormality. SINUSES: The visualized paranasal sinuses and mastoid air cells demonstrate no acute abnormality. SOFT TISSUES/SKULL: No acute abnormality of the visualized skull or soft tissues. 1. New ovoid low-attenuation area in the left frontal corona radiata compatible with acute/subacute infarction. This measures 2 x 1.4 cm. No associated hemorrhage. 2. Diffuse parenchymal volume loss and sequela of severe chronic microvascular ischemic changes. Findings were discussed with Dr. Deedee Thomas at 1:31 pm on 7/13/2021.      MRA HEAD WO CONTRAST    Result Date: 8/7/2021  EXAMINATION: MRA OF THE NECK WITHOUT CONTRAST; MRI OF THE BRAIN WITHOUT CONTRAST; MRA OF THE HEAD WITHOUT CONTRAST 8/7/2021 8:28 pm; 8/7/2021 8:34 pm; 8/7/2021 8:27 pm: TECHNIQUE: Multiplanar multisequence MRA of the neck was performed without the administration of intravenous contrast. Stenosis of the internal carotid arteries measured using NASCET criteria.; Multiplanar multisequence MRI of the brain was performed without the administration of intravenous contrast.; MRA of the head was performed utilizing time-of-flight imaging with MIP images. No intravenous contrast was administered. COMPARISON: None. HISTORY: ORDERING SYSTEM PROVIDED HISTORY: stroke TECHNOLOGIST PROVIDED HISTORY: stroke Reason for Exam: stroke Additional signs and symptoms: patient unable to give history and unable to follow exam instructions due to mental status FINDINGS: MRI BRAIN: INTRACRANIAL STRUCTURES/VENTRICLES: Multifocal diffusion restriction abnormality related to acute ischemia are seen involving the right basal ganglia within the putamen, caudate nucleus and anterior limb of the right internal capsule, the posterior limb of the left internal capsule and lateral margin of the left thalamus, the posterior commissure of the corpus callosum, and left greater than right frontal parietal paramedian subcortical and deep white matter . Can fluent increased T2/FLAIR signal is noted within the cerebral white matter consistent with severe microvascular ischemic change. No mass effect or midline shift. No evidence of an acute intracranial hemorrhage. Mild diffuse decrease in cerebral volume is noted with corresponding prominence of the sulci and ventricles. The sellar/suprasellar regions appear unremarkable. The normal signal voids within the major intracranial vessels appear maintained. ORBITS: The visualized portion of the orbits demonstrate no acute abnormality. SINUSES: The visualized paranasal sinuses and mastoid air cells are well aerated.  BONES/SOFT TISSUES: The bone marrow signal the neck. Note: MR angiographic evaluation of the neck is severely limited by patient motion related artifact. 9. Severe cerebral white matter chronic microvascular ischemic disease. 10. Mild diffuse cerebral atrophy. Critical results were called by Dr. Amy Chun to Dr. Bimal Crook On 8/7/2021 at 22:11. XR CHEST PORTABLE    Result Date: 8/8/2021  EXAMINATION: ONE XRAY VIEW OF THE CHEST 8/8/2021 11:58 am COMPARISON: 07/13/2021 HISTORY: ORDERING SYSTEM PROVIDED HISTORY: cough and fever TECHNOLOGIST PROVIDED HISTORY: cough and fever Reason for Exam: cough and fever Acuity: Acute Type of Exam: Initial FINDINGS: No lung infiltrate or consolidation. No pneumothorax or pleural effusion. Heart size is normal.     No acute abnormality. XR CHEST PORTABLE    Result Date: 7/13/2021  EXAMINATION: ONE XRAY VIEW OF THE CHEST 7/13/2021 10:30 am COMPARISON: September 24, 2019. HISTORY: ORDERING SYSTEM PROVIDED HISTORY: Found down TECHNOLOGIST PROVIDED HISTORY: Found down Reason for Exam: supine Acuity: Acute Type of Exam: Initial FINDINGS: A portable upright frontal view chest radiograph was obtained. The heart is enlarged. The mediastinal contour and pleural spaces are otherwise within normal limits. The lungs are grossly clear. There is no focal consolidation or pneumothorax. The pulmonary vascular pattern is within normal limits. No acute thoracic osseous abnormality. Clear lungs. Cardiomegaly. No acute cardiopulmonary abnormality.      VL DUP LOWER EXTREMITY VENOUS BILATERAL    Result Date: 7/15/2021    [facility]  Vascular Lower Extremities DVT Study Procedure   Patient Name GOFF      Date of Study           07/15/2021               YAYA DUBOIS   Date of      1945  Gender                  Male  Birth   Age          77 year(s)  Race                    Black   Room Number  0536        Height:                 70 inch, 177.8 cm   Corporate ID [id number]    Weight:                 210 pounds, 95.3 kg  #   Patient Acct [de-identified]   BSA:        2.13 m^2    BMI:        30.13 kg/m^2  #   MR #         [id number]     Sonographer             Blythe Bernheim, RVT   Accession #  [id number]  Interpreting Physician  Gay Dewey   Referring                Referring Physician     Armando Zapien  Nurse  Practitioner  Procedure Type of Study:   Veins: Lower Extremities DVT Study, Venous Scan Lower Bilateral.  Indications for Study:CVA. Patient Status: In Patient. Technical Quality:Limited visualization. Limitation reason:legs rigid, extreme edema and resistance to compression . Conclusions   Summary   Simultaneous real time imaging utilizing B-Mode, color doppler and  spectral waveform analysis was performed on the bilateral lower  extremities for venous examination of the deep and superficial systems. Findings are:   Right:  No evidence of deep or superficial venous thrombosis. Left:  No evidence of deep or superficial venous thrombosis. Signature   ----------------------------------------------------------------  Electronically signed by Blythe Bernheim, RVT(Sonographer)  on 07/15/2021 04:23 PM  ----------------------------------------------------------------   ----------------------------------------------------------------  Electronically signed by Nevaeh Hahn(Interpreting  physician) on 07/15/2021 08:56 PM  ----------------------------------------------------------------  Findings:   Right Impression:                    Left Impression:  The common femoral, femoral,         The common femoral, femoral,  popliteal and tibial veins           popliteal and tibial veins  demonstrate normal compressibility   demonstrate normal compressibility  and augmentation. and augmentation. Normal compressibility of the great  Normal compressibility of the great  saphenous vein. saphenous vein.   Normal compressibility of the small  Normal compressibility of the small saphenous vein. saphenous vein. Risk Factors   - The patient's risk factor(s) include: chronic lung disease, diabetes     mellitus and arterial hypertension. Velocities are measured in cm/s ; Diameters are measured in cm Right Lower Extremities DVT Study Measurements Right 2D Measurements +------------------------------------+----------+---------------+----------+ ! Location                            ! Visualized! Compressibility! Thrombosis! +------------------------------------+----------+---------------+----------+ ! Common Femoral                      !Yes       ! Yes            ! None      ! +------------------------------------+----------+---------------+----------+ ! Prox Femoral                        !Yes       ! Yes            ! None      ! +------------------------------------+----------+---------------+----------+ ! Mid Femoral                         !Yes       ! Yes            ! None      ! +------------------------------------+----------+---------------+----------+ ! Dist Femoral                        !Yes       ! Yes            ! None      ! +------------------------------------+----------+---------------+----------+ ! Deep Femoral                        !No        !               !          ! +------------------------------------+----------+---------------+----------+ ! Popliteal                           !Yes       ! Yes            ! None      ! +------------------------------------+----------+---------------+----------+ ! Sapheno Femoral Junction            ! Yes       ! Yes            ! None      ! +------------------------------------+----------+---------------+----------+ ! PTV                                 ! Yes       ! Yes            ! None      ! +------------------------------------+----------+---------------+----------+ ! Peroneal                            !No        !               !          ! +------------------------------------+----------+---------------+----------+ ! Gastroc !Yes       !Yes            ! None      ! +------------------------------------+----------+---------------+----------+ ! GSV Thigh                           ! Yes       ! Yes            ! None      ! +------------------------------------+----------+---------------+----------+ ! GSV Knee                            ! Yes       ! Yes            ! None      ! +------------------------------------+----------+---------------+----------+ ! GSV Ankle                           ! Yes       ! Yes            ! None      ! +------------------------------------+----------+---------------+----------+ ! SSV                                 ! Yes       ! Yes            ! None      ! +------------------------------------+----------+---------------+----------+ Right Doppler Measurements +---------------------------+------+------+--------------------------------+ ! Location                   ! Signal!Reflux! Reflux (msec)                   ! +---------------------------+------+------+--------------------------------+ ! Common Femoral             !Phasic!      !                                ! +---------------------------+------+------+--------------------------------+ ! Prox Femoral               !Phasic!      !                                ! +---------------------------+------+------+--------------------------------+ ! Popliteal                  !Phasic!      !                                ! +---------------------------+------+------+--------------------------------+ Left Lower Extremities DVT Study Measurements Left 2D Measurements +------------------------------------+----------+---------------+----------+ ! Location                            ! Visualized! Compressibility! Thrombosis! +------------------------------------+----------+---------------+----------+ ! Common Femoral                      !Yes       ! Yes            ! None      ! +------------------------------------+----------+---------------+----------+ ! Prox Femoral !Yes       !Yes            ! None      ! +------------------------------------+----------+---------------+----------+ ! Mid Femoral                         !Yes       ! Yes            ! None      ! +------------------------------------+----------+---------------+----------+ ! Dist Femoral                        !Yes       ! Yes            ! None      ! +------------------------------------+----------+---------------+----------+ ! Deep Femoral                        !No        !               !          ! +------------------------------------+----------+---------------+----------+ ! Popliteal                           !Yes       ! Yes            ! None      ! +------------------------------------+----------+---------------+----------+ ! Sapheno Femoral Junction            ! Yes       ! Yes            ! None      ! +------------------------------------+----------+---------------+----------+ ! PTV                                 ! Yes       ! Yes            ! None      ! +------------------------------------+----------+---------------+----------+ ! Peroneal                            !Yes       ! Yes            ! None      ! +------------------------------------+----------+---------------+----------+ ! Gastroc                             ! Yes       ! Yes            ! None      ! +------------------------------------+----------+---------------+----------+ ! GSV Thigh                           ! Yes       ! Yes            ! None      ! +------------------------------------+----------+---------------+----------+ ! GSV Knee                            ! Yes       ! Yes            ! None      ! +------------------------------------+----------+---------------+----------+ ! GSV Ankle                           ! Yes       ! Yes            ! None      ! +------------------------------------+----------+---------------+----------+ ! SSV                                 ! Yes       ! Yes            ! None      ! +------------------------------------+----------+---------------+----------+ Left Doppler Measurements +---------------------------+------+------+--------------------------------+ ! Location                   ! Signal!Reflux! Reflux (msec)                   ! +---------------------------+------+------+--------------------------------+ ! Common Femoral             !Phasic!      !                                ! +---------------------------+------+------+--------------------------------+ ! Prox Femoral               !Phasic!      !                                ! +---------------------------+------+------+--------------------------------+ ! Popliteal                  !Phasic!      !                                ! +---------------------------+------+------+--------------------------------+    US SPLEEN    Result Date: 7/14/2021  EXAMINATION: ULTRASOUND OF THE SPLEEN 7/14/2021 8:29 am COMPARISON: None. HISTORY: ORDERING SYSTEM PROVIDED HISTORY: thrombocytopenia TECHNOLOGIST PROVIDED HISTORY: thrombocytopenia FINDINGS: Suboptimal study. The visualized spleen appears enlarged measuring 13.4 cm. No focal lesion is seen. No free fluid. Suboptimal study. Mild splenomegaly. CTA HEAD NECK W CONTRAST    Result Date: 7/13/2021  EXAMINATION: CTA OF THE HEAD AND NECK WITH CONTRAST 7/13/2021 1:11 pm: TECHNIQUE: CTA of the head and neck was performed with the administration of intravenous contrast. Multiplanar reformatted images are provided for review. MIP images are provided for review. Stenosis of the internal carotid arteries measured using NASCET criteria. Dose modulation, iterative reconstruction, and/or weight based adjustment of the mA/kV was utilized to reduce the radiation dose to as low as reasonably achievable. 3D surface reformatted and curved MIP images were submitted for review. COMPARISON: None.  HISTORY: ORDERING SYSTEM PROVIDED HISTORY: stroke TECHNOLOGIST PROVIDED HISTORY: stroke Decision Support Exception - unselect if not a suspected or confirmed emergency medical condition->Emergency Medical Condition (MA) Reason for Exam: stroke, Cerebrovascular Accident; Fall FINDINGS: CTA NECK: AORTIC ARCH/ARCH VESSELS: No dissection or arterial injury. No significant stenosis of the brachiocephalic or subclavian arteries. CAROTID ARTERIES: No dissection, arterial injury, or hemodynamically significant stenosis by NASCET criteria. VERTEBRAL ARTERIES: No dissection, arterial injury, or significant stenosis in the right vertebral artery. Severe stenosis in the V1 segment of the left vertebral artery. SOFT TISSUES: The lung apices are clear. No cervical or superior mediastinal lymphadenopathy. The larynx and pharynx are unremarkable. No acute abnormality of the salivary glands. Thyroid gland is diffusely enlarged and heterogeneous and contains left thyroid lobe nodule measuring 2.6 cm. BONES: Multilevel degenerative spondylosis throughout the cervical spine with fusion at C5-C6. CTA HEAD: ANTERIOR CIRCULATION: Calcified atherosclerotic plaque in the cavernous segments of the internal carotid arteries bilaterally results in mild-to-moderate cavernous ICA stenosis bilaterally. Mild stenosis in the proximal A2 segment of the right anterior cerebral artery. Severe stenosis in the proximal A3 segments of the anterior cerebral arteries bilaterally. Moderate-to-severe stenosis within M2 segment of the right middle cerebral artery. Moderate stenosis in the M1 and proximal M2 segments of the left middle cerebral artery. POSTERIOR CIRCULATION: No significant stenosis in the right intradural vertebral artery. Severe stenosis in the intracranial left vertebral artery. Mild stenosis in the basilar artery. Moderate stenosis in the P1/P2 junction of the left PCA. Severe stenosis and near complete occlusion of the right PCA. OTHER: No dural venous sinus thrombosis on this non-dedicated study.      1. Severe stenosis in the V1 segment of the left vertebral artery. 2. Extensive intracranial atherosclerotic plaque with near complete occlusion of the right PCA. 3. Severe stenoses in the proximal A3 segments of the ACAs bilaterally. 4. Moderate-to-severe proximal M2 segment stenosis in the right MCA. Moderate stenosis in the M1 and M2 segments of the left MCA. 5. Moderate stenosis in the P1/P2 junction of the left PCA. 6. Enlarged heterogeneous thyroid gland with 2.6 cm left thyroid lobe nodule. Nonemergent/outpatient thyroid ultrasound recommended. Findings were discussed with Dr. Jn Rodriguez at 1:43 pm on 7/13/2021. RECOMMENDATIONS: 2.6 cm incidental left thyroid nodule with heterogeneous and enlarged thyroid. Recommend thyroid US. Reference: J Am Bruno Radiol. 2015 Feb;12(2): 143-50     MRA NECK WO CONTRAST    Result Date: 8/7/2021  EXAMINATION: MRA OF THE NECK WITHOUT CONTRAST; MRI OF THE BRAIN WITHOUT CONTRAST; MRA OF THE HEAD WITHOUT CONTRAST 8/7/2021 8:28 pm; 8/7/2021 8:34 pm; 8/7/2021 8:27 pm: TECHNIQUE: Multiplanar multisequence MRA of the neck was performed without the administration of intravenous contrast. Stenosis of the internal carotid arteries measured using NASCET criteria.; Multiplanar multisequence MRI of the brain was performed without the administration of intravenous contrast.; MRA of the head was performed utilizing time-of-flight imaging with MIP images. No intravenous contrast was administered. COMPARISON: None.  HISTORY: ORDERING SYSTEM PROVIDED HISTORY: stroke TECHNOLOGIST PROVIDED HISTORY: stroke Reason for Exam: stroke Additional signs and symptoms: patient unable to give history and unable to follow exam instructions due to mental status FINDINGS: MRI BRAIN: INTRACRANIAL STRUCTURES/VENTRICLES: Multifocal diffusion restriction abnormality related to acute ischemia are seen involving the right basal ganglia within the putamen, caudate nucleus and anterior limb of the right internal capsule, the posterior limb of the left internal capsule and lateral margin of the left thalamus, the posterior commissure of the corpus callosum, and left greater than right frontal parietal paramedian subcortical and deep white matter . Can fluent increased T2/FLAIR signal is noted within the cerebral white matter consistent with severe microvascular ischemic change. No mass effect or midline shift. No evidence of an acute intracranial hemorrhage. Mild diffuse decrease in cerebral volume is noted with corresponding prominence of the sulci and ventricles. The sellar/suprasellar regions appear unremarkable. The normal signal voids within the major intracranial vessels appear maintained. ORBITS: The visualized portion of the orbits demonstrate no acute abnormality. SINUSES: The visualized paranasal sinuses and mastoid air cells are well aerated. BONES/SOFT TISSUES: The bone marrow signal intensity appears normal. The soft tissues demonstrate no acute abnormality. MRA NECK: AORTIC ARCH/ARCH VESSELS: No dissection or arterial injury. No significant stenosis of the brachiocephalic or subclavian arteries. CAROTID ARTERIES: No dissection, arterial injury, or hemodynamically significant stenosis by NASCET criteria. VERTEBRAL ARTERIES: No dissection, arterial injury, or significant stenosis. MRA HEAD: ANTERIOR CIRCULATION: Suspected focal high-grade stenosis involving the A1 segment of the left anterior cerebral artery is identified. Right middle cerebral artery focal moderate grade stenosis involving the M1 segment is present. Suspected multifocal moderate to high-grade stenosis involving the right middle cerebral artery M2 segment is seen. No further evidence of significant stenosis of the intracranial internal carotid, anterior cerebral, or middle cerebral arteries. POSTERIOR CIRCULATION: Bilateral posterior cerebral artery P2 segment suspected focal moderate to high-grade stenosis are noted.   No further evidence of significant stenosis of the vertebral, basilar, or posterior cerebral arteries. 1. Multifocal acute ischemia involving the bilateral basal ganglia, as discussed above, posterior commissure of the corpus callosum, lateral margin of left thalamus and the left greater than right frontal parietal paramedian subcortical and deep white matter. 2. No evidence of associated hemorrhagic conversion. 3. Finding suggestive of embolic phenomenon. Recommend clinical correlation. 4. Suspected focal high-grade stenosis involving A1 segment of the left anterior cerebral artery. 5. Suspected right middle cerebral artery M1 segment focal moderate grade stenosis. 6. Suspected right middle cerebral artery M2 segment multifocal moderate to high-grade stenosis. 7. Bilateral posterior cerebral artery P2 segment suspected multifocal moderate to high-grade stenosis. 8. Grossly unremarkable MR angiogram of the neck. Note: MR angiographic evaluation of the neck is severely limited by patient motion related artifact. 9. Severe cerebral white matter chronic microvascular ischemic disease. 10. Mild diffuse cerebral atrophy. Critical results were called by Dr. Estela Hernandez to Dr. Luis Gay On 8/7/2021 at 22:11. MRI BRAIN WO CONTRAST    Result Date: 8/7/2021  EXAMINATION: MRA OF THE NECK WITHOUT CONTRAST; MRI OF THE BRAIN WITHOUT CONTRAST; MRA OF THE HEAD WITHOUT CONTRAST 8/7/2021 8:28 pm; 8/7/2021 8:34 pm; 8/7/2021 8:27 pm: TECHNIQUE: Multiplanar multisequence MRA of the neck was performed without the administration of intravenous contrast. Stenosis of the internal carotid arteries measured using NASCET criteria.; Multiplanar multisequence MRI of the brain was performed without the administration of intravenous contrast.; MRA of the head was performed utilizing time-of-flight imaging with MIP images. No intravenous contrast was administered. COMPARISON: None.  HISTORY: ORDERING SYSTEM PROVIDED HISTORY: stroke TECHNOLOGIST PROVIDED HISTORY: stroke Reason for Exam: stroke Additional signs and symptoms: patient unable to give history and unable to follow exam instructions due to mental status FINDINGS: MRI BRAIN: INTRACRANIAL STRUCTURES/VENTRICLES: Multifocal diffusion restriction abnormality related to acute ischemia are seen involving the right basal ganglia within the putamen, caudate nucleus and anterior limb of the right internal capsule, the posterior limb of the left internal capsule and lateral margin of the left thalamus, the posterior commissure of the corpus callosum, and left greater than right frontal parietal paramedian subcortical and deep white matter . Can fluent increased T2/FLAIR signal is noted within the cerebral white matter consistent with severe microvascular ischemic change. No mass effect or midline shift. No evidence of an acute intracranial hemorrhage. Mild diffuse decrease in cerebral volume is noted with corresponding prominence of the sulci and ventricles. The sellar/suprasellar regions appear unremarkable. The normal signal voids within the major intracranial vessels appear maintained. ORBITS: The visualized portion of the orbits demonstrate no acute abnormality. SINUSES: The visualized paranasal sinuses and mastoid air cells are well aerated. BONES/SOFT TISSUES: The bone marrow signal intensity appears normal. The soft tissues demonstrate no acute abnormality. MRA NECK: AORTIC ARCH/ARCH VESSELS: No dissection or arterial injury. No significant stenosis of the brachiocephalic or subclavian arteries. CAROTID ARTERIES: No dissection, arterial injury, or hemodynamically significant stenosis by NASCET criteria. VERTEBRAL ARTERIES: No dissection, arterial injury, or significant stenosis. MRA HEAD: ANTERIOR CIRCULATION: Suspected focal high-grade stenosis involving the A1 segment of the left anterior cerebral artery is identified. Right middle cerebral artery focal moderate grade stenosis involving the M1 segment is present.   Suspected HISTORY: stroke, right sided weakness Decision Support Exception - unselect if not a suspected or confirmed emergency medical condition->Emergency Medical Condition (MA) Reason for Exam: possible stroke. pt had a fall. FINDINGS: INTRACRANIAL STRUCTURES/VENTRICLES: . acute infarcts in the left basal ganglia. Acute infarct in the right head of caudate and in the right putamen no mass effect or midline shift. No evidence of an acute intracranial hemorrhage. The ventricles and sulci are normal in size and configuration. The sellar/suprasellar regions appear unremarkable. The normal signal voids within the major intracranial vessels appear maintained. ORBITS: The visualized portion of the orbits demonstrate no acute abnormality. SINUSES: The visualized paranasal sinuses and mastoid air cells are well aerated. BONES/SOFT TISSUES: The bone marrow signal intensity appears normal. The soft tissues demonstrate no acute abnormality. Acute infarct in the left and right basal ganglia greater on the left. Diffuse volume loss with extensive chronic white matter changes. 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 Recurrent stroke  3. Altered mental status    RECOMMENDATIONS:  1. I personally reviewed results of lab work-up imaging studies and other relevant clinical data. 2. Continue to monitor platelet count  3. Okay to give anticoagulation and antiplatelet therapy as long as platelet count is above 50,000  4. Prognosis is poor. Quality of life is poor.   Patient will be an appropriate candidate for comfort measures  5. Reviewed records from previous hospitalization. 6. Avoid myelosuppressive drugs  7. We will continue to follow      Discussed with  Nurse. Thank you for asking us to see this patient. Lucia Luong MD          This note is created with the assistance of a speech recognition program.  While intending to generate a document that actually reflects the content of the visit, the document can still have some errors including those of syntax and sound a like substitutions which may escape proof reading. It such instances, actual meaning can be extrapolated by contextual diversion.

## 2021-08-12 NOTE — FLOWSHEET NOTE
08/11/21 2025   Encounter Summary   Services provided to: Patient   Referral/Consult From: Rounding   Complexity of Encounter Low   Length of Encounter 15 minutes   Routine   Type Follow up   Assessment Sleeping   Intervention Prayer   Outcome Did not respond

## 2021-08-13 NOTE — PROGRESS NOTES
Addendum to notes of 8/121/21: Dr Junito Rojas  that he has been made several attempts to contact the pt's daughter at the number provided by [facility] ,but was  Unsuccessful; he stated he will attempt to contact her again today

## 2021-08-13 NOTE — PROGRESS NOTES
Patient was seen and examined. Awake. Afebrile vital signs are stable. Tolerating tube feeds. Abdomen is soft. PEG site is clean. Extremity positive edema. Blood work was reviewed. Creatinine is improved. Sodium potassium normal.  Yesterday CBC showed normal white count. Hemoglobin is stable. Surgically stable for discharge to [facility]. Local PEG site care. Tube feeds as tolerated.

## 2021-08-13 NOTE — PROGRESS NOTES
created by: Wagner Cramer on 8/12/2021 1:30 PM      Electronically signed by:  Samia Presclarisa HAIRSTON 8/13/2021 9:09 AM

## 2021-08-13 NOTE — PROGRESS NOTES
[address]    PROGRESS NOTE             8/13/2021    7:34 PM    Name:   Lorena Cohen  MRN:     [id number]     Acct:      [de-identified]   Room:   209/2099-01  IP Day:  4  Admit Date:  8/8/2021 11:30 AM    PCP:  No primary care provider on file. Code Status:  Full Code    Subjective:     C/C: No chief complaint on file. Altered mental status     Interval History Status: not changed. Diet: Episodes of bradycardia and tachycardia. Unchanged neuro exam.  Patient currently on BiPAP. EKG 12-lead pending. Started on hydralazine 10 mg every 8 hours. Losartan 50 mg oral daily. Patient on aspirin and Brilinta. Awaiting decision from family. Palliative care on board. 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 Patient has past medical history of CVA, coronary artery disease s/p percutaneous coronary angioplasty, hyperlipidemia, hypertension, diabetes, emphysema/COPD.     History difficult to obtain as patient is somnolent and nonresponsive. Per prior documentation, patient had increasing lethargy on 8/6. CT head obtained which showed: There has been interval development of a hypodensity in the right basal ganglia compared to prior study consistent with evolving subacute white matter infarct. No hemorrhage is noted. Chronic microvascular change in atrophy is demonstrated. No midline shift.      Spoke with family - sister and niece - who stated he is somewhat confused at baseline, but has become significantly more lethargic/began to be agitated and combative about 1.5 weeks ago while in the acute rehab unit.     He was transferred to medicine service for management of altered mental status.   For a few days prior to transfer, patient has had intermittent elevations in temperature, up to 100 °F  Patient has been coughing intermittently; frothy/brown substance suctioned from mouth    Pneumonia/sepsis work-up: CXR no acute process, procalcitonin 0.26, lactic acid 2.0, legionella/strep/mycoplasma negative,  blood culture no growth to date, urinalysis negative  Metabolic encephalopathy workup: LFTs elevated, ammonia wnl, myoglobin elevated, CK wnl. Review of Systems:     Review of Systems   Unable to perform ROS: Mental status change (patient lethargic and not responsive)   Constitutional: Negative for diaphoresis. Medications: Allergies:  No Known Allergies    Current Meds:   Scheduled Meds:    insulin lispro  0-12 Units Subcutaneous TID WC    insulin lispro  0-6 Units Subcutaneous Nightly    insulin glargine  10 Units Subcutaneous Nightly    heparin (porcine)  5,000 Units Subcutaneous 3 times per day    ticagrelor  90 mg Oral BID    albuterol  2.5 mg Nebulization TID    nystatin  5 mL Oral 4x Daily    aspirin  81 mg PEG Tube Daily    famotidine  20 mg PEG Tube Daily    hydrALAZINE  10 mg Oral 3 times per day    losartan  50 mg Oral Daily    melatonin  6 mg PEG Tube Nightly    OLANZapine  5 mg PEG Tube Nightly    rosuvastatin  40 mg PEG Tube Nightly    tiotropium  2 puff Inhalation Lunch    ampicillin-sulbactam  1,500 mg Intravenous Q6H     Continuous Infusions:    dextrose       PRN Meds: albuterol, hydrALAZINE, acetaminophen, bisacodyl, polyethylene glycol, senna, albuterol sulfate HFA, dextrose, dextrose, glucagon (rDNA), glucose    Data:     Past Medical History:   has a past medical history of Centrilobular emphysema (HCC), COPD (chronic obstructive pulmonary disease) ([facility] [address] Depression, Diabetes mellitus ([facility] [address] Former smoker, Hyperlipidemia, Hypertension, Mixed restrictive and obstructive lung disease ([facility] [address] Need for pneumococcal vaccine, and Personal history of tobacco use. Social History:   reports that he quit smoking about 8 years ago.  He started smoking about 64 years ago. He has a 42.00 pack-year smoking history. He has never used smokeless tobacco. He reports that he does not drink alcohol and does not use drugs. Family History: No family history on file. Vitals:  BP (!) 150/66   Pulse 95   Temp 98.3 °F (36.8 °C) (Oral)   Resp 23   Ht 5' 10\" (1.778 m)   Wt 212 lb 8.4 oz (96.4 kg)   SpO2 100%   BMI 30.49 kg/m²   Temp (24hrs), Av °F (36.7 °C), Min:97.8 °F (36.6 °C), Max:98.3 °F (36.8 °C)    Recent Labs     21  1806 21  0004 21  0600 21  1632   POCGLU 188* 226* 291* 214*       I/O(24Hr):     Intake/Output Summary (Last 24 hours) at 2021 1934  Last data filed at 2021 9196  Gross per 24 hour   Intake 422 ml   Output 450 ml   Net -28 ml       Labs:    CBC:   Lab Results   Component Value Date    WBC 9.1 2021    RBC 4.41 2021    RBC 4.88 2012    HGB 10.7 2021    HCT 34.6 2021    MCV 78.4 2021    MCH 24.1 2021    MCHC 30.8 2021    RDW 17.3 2021     2021     2012    MPV 10.7 2021     BMP:    Lab Results   Component Value Date     2021    K 5.1 2021     2021    CO2 21 2021    BUN 22 2021    LABALBU 2.9 2021    CREATININE 1.49 2021    CALCIUM 9.0 2021    GFRAA 56 2021    LABGLOM 46 2021    GLUCOSE 301 2021    GLUCOSE 122 2012       Lab Results   Component Value Date/Time    SPECIAL NOT REPORTED 2021 02:38 PM     Lab Results   Component Value Date/Time    CULTURE NO GROWTH 5 DAYS 2021 02:38 PM         Radiology:      CT HEAD WO CONTRAST    Result Date: 2021  EXAMINATION: CT OF THE HEAD WITHOUT CONTRAST  2021 2:04 pm TECHNIQUE: CT of the head was performed without the administration of intravenous contrast. Dose modulation, iterative reconstruction, and/or weight based adjustment of the mA/kV was utilized to reduce the radiation dose to as low as reasonably achievable. COMPARISON: MRI brain performed 08/07/2021. HISTORY: ORDERING SYSTEM PROVIDED HISTORY: stroke TECHNOLOGIST PROVIDED HISTORY: stroke Reason for Exam: stroke; ams Acuity: Unknown Type of Exam: Unknown Additional signs and symptoms: patient unable to stay still; turned head during exam FINDINGS: BRAIN/VENTRICLES: There is no acute intracranial hemorrhage, mass effect, or midline shift. There is satisfactory overall gray-white matter differentiation. There is extensive chronic microvascular disease. There are evolving areas of ischemia in the left periventricular white matter, and corpus callosum posteriorly, in the right basal ganglia. The ventricular structures are symmetric and unremarkable. The infratentorial structures are unremarkable. ORBITS: The visualized portion of the orbits demonstrate no acute abnormality. SINUSES: The visualized paranasal sinuses and mastoid air cells demonstrate no acute abnormality. SOFT TISSUES/SKULL:  No acute abnormality of the visualized skull or soft tissues. Chronic microvascular disease with scattered areas of evolving ischemia as described above. CT HEAD WO CONTRAST    Result Date: 8/6/2021  EXAMINATION: CT OF THE HEAD WITHOUT CONTRAST  8/6/2021 2:21 pm TECHNIQUE: CT of the head was performed without the administration of intravenous contrast. Dose modulation, iterative reconstruction, and/or weight based adjustment of the mA/kV was utilized to reduce the radiation dose to as low as reasonably achievable. COMPARISON: 4 August 2021 HISTORY: ORDERING SYSTEM PROVIDED HISTORY: Pt w/ bilateral basal ganglia infarcts, right hemiparesis; minimally interactive over the last week and having increased lethargy the last few days. TECHNOLOGIST PROVIDED HISTORY: Pt w/ bilateral basal ganglia infarcts, right hemiparesis; minimally interactive over the last week and having increased lethargy the last few days.  Reason for Exam: Increased lethargy the last few days. Acuity: Unknown Type of Exam: Unknown FINDINGS: BRAIN/VENTRICLES: There is no acute intracranial hemorrhage, mass effect or midline shift. No abnormal extra-axial fluid collection. The gray-white differentiation is maintained without evidence of an acute infarct. There is no evidence of hydrocephalus. Remote right-sided basal ganglial infarcts are noted. However, there is been interval development of hypodensity in the right mid basal ganglia since prior study consistent with an evolving subacute infarct. No significant mass effect is noted. Ventricles are proportionate to cerebral atrophy. ORBITS: The visualized portion of the orbits demonstrate no acute abnormality. SINUSES: The visualized paranasal sinuses and mastoid air cells demonstrate no acute abnormality. SOFT TISSUES/SKULL:  No acute abnormality of the visualized skull or soft tissues. There has been interval development of a hypodensity in the right basal ganglia compared to prior study consistent with evolving subacute white matter infarct. No hemorrhage is noted. Chronic microvascular change in atrophy is demonstrated. No midline shift. CT HEAD WO CONTRAST    Result Date: 8/4/2021  EXAMINATION: CT OF THE HEAD WITHOUT CONTRAST  8/4/2021 1:25 pm TECHNIQUE: CT of the head was performed without the administration of intravenous contrast. Dose modulation, iterative reconstruction, and/or weight based adjustment of the mA/kV was utilized to reduce the radiation dose to as low as reasonably achievable.  COMPARISON: CT head 07/21/2021 and 07/17/2021 HISTORY: ORDERING SYSTEM PROVIDED HISTORY: Patient with history of left-sided CVA with right hemiparesis, not communicating with staff, please evaluate for any change from previous imaging and any other abnormality TECHNOLOGIST PROVIDED HISTORY: Patient with history of left-sided CVA with right hemiparesis, not communicating with staff, please evaluate for any change from previous imaging and any other abnormality Reason for Exam: Patient with history of left-sided CVA with right hemiparesis, not communicating with staff, please evaluate for any change from previous imaging and any other abnormality Acuity: Unknown Type of Exam: Unknown FINDINGS: BRAIN/VENTRICLES: There is no acute intracranial hemorrhage, mass effect or midline shift. No abnormal extra-axial fluid collection. The gray-white differentiation is maintained without evidence of an acute infarct. There is prominence of the ventricles and sulci due to global parenchymal volume loss. There are nonspecific areas of hypoattenuation within the periventricular and subcortical white matter, which likely represent chronic microvascular ischemic change. 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 Remote lacunar stroke left caudate lobe. ORBITS: The visualized portion of the orbits demonstrate no acute abnormality. SINUSES: The visualized paranasal sinuses and mastoid air cells demonstrate no acute abnormality. SOFT TISSUES/SKULL: No acute abnormality of the visualized skull or soft tissues. 1. No acute intracranial abnormality. 2. 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 Remote lacunar stroke left caudate lobe. 3. Senescent changes. White matter hypoattenuation described is typical of microvascular ischemic disease or as sequela of dysmyelinating/demyelinating processes.        MRA HEAD WO CONTRAST    Result Date: 8/7/2021  EXAMINATION: MRA OF THE NECK WITHOUT CONTRAST; MRI OF THE BRAIN WITHOUT CONTRAST; MRA OF THE HEAD WITHOUT CONTRAST 8/7/2021 8:28 pm; 8/7/2021 8:34 pm; 8/7/2021 8:27 pm: TECHNIQUE: Multiplanar multisequence MRA of the neck was performed without the administration of intravenous contrast. Stenosis of the internal carotid arteries measured using NASCET criteria.; Multiplanar multisequence MRI of the brain was performed without the administration of intravenous contrast.; MRA of the head was performed utilizing time-of-flight imaging with MIP images. No intravenous contrast was administered. COMPARISON: None. HISTORY: ORDERING SYSTEM PROVIDED HISTORY: stroke TECHNOLOGIST PROVIDED HISTORY: stroke Reason for Exam: stroke Additional signs and symptoms: patient unable to give history and unable to follow exam instructions due to mental status FINDINGS: MRI BRAIN: INTRACRANIAL STRUCTURES/VENTRICLES: Multifocal diffusion restriction abnormality related to acute ischemia are seen involving the right basal ganglia within the putamen, caudate nucleus and anterior limb of the right internal capsule, the posterior limb of the left internal capsule and lateral margin of the left thalamus, the posterior commissure of the corpus callosum, and left greater than right frontal parietal paramedian subcortical and deep white matter . Can fluent increased T2/FLAIR signal is noted within the cerebral white matter consistent with severe microvascular ischemic change. No mass effect or midline shift. No evidence of an acute intracranial hemorrhage. Mild diffuse decrease in cerebral volume is noted with corresponding prominence of the sulci and ventricles. The sellar/suprasellar regions appear unremarkable. The normal signal voids within the major intracranial vessels appear maintained. ORBITS: The visualized portion of the orbits demonstrate no acute abnormality. SINUSES: The visualized paranasal sinuses and mastoid air cells are well aerated. BONES/SOFT TISSUES: The bone marrow signal intensity appears normal. The soft tissues demonstrate no acute abnormality. MRA NECK: AORTIC ARCH/ARCH VESSELS: No dissection or arterial injury. No significant stenosis of the brachiocephalic or subclavian arteries. CAROTID ARTERIES: No dissection, arterial injury, or hemodynamically significant stenosis by NASCET criteria.  VERTEBRAL ARTERIES: No dissection, arterial injury, OF THE CHEST 8/8/2021 11:58 am COMPARISON: 07/13/2021 HISTORY: ORDERING SYSTEM PROVIDED HISTORY: cough and fever TECHNOLOGIST PROVIDED HISTORY: cough and fever Reason for Exam: cough and fever Acuity: Acute Type of Exam: Initial FINDINGS: No lung infiltrate or consolidation. No pneumothorax or pleural effusion. Heart size is normal.     No acute abnormality. VL Lower Extremity Bilateral Venous Duplex    Result Date: 8/9/2021    [facility] [address] [place] [address]  Vascular Lower Extremities DVT Study Procedure   Patient Name   GOFF       Date of Study           08/09/2021                 YAYA DUBOIS   Date of Birth  1945   Gender                  Male   Age            77 year(s)   Race                    Black   Room Number    2099   Corporate ID # [id number]   Patient Acct # [de-identified]   MR #           [id number]       Sonographer             Canelo Marie   Accession #    [id number]   Interpreting Physician  Will Robledo   Referring      Rosalva Gray,   Referring Physician  Nurse          APRN-CNP  Practitioner  Procedure Type of Study:   Veins: Lower Extremities DVT Study, Venous Scan Lower Bilateral.  Indications for Study:R/O DVT. Patient Status: In Patient. Technical Quality:Adequate visualization. Conclusions   Summary   No evidence of superficial or deep venous thrombosis in both lower  extremities.    Signature   ----------------------------------------------------------------  Electronically signed by Canelo Marie(Sonographer) on  08/09/2021 07:56 AM  ----------------------------------------------------------------   ----------------------------------------------------------------  Electronically signed by Teddy Sprinkles Reyes,Arthur(Interpreting  physician) on 08/09/2021 11:02 PM  ----------------------------------------------------------------  Findings:   Right Impression:                    Left Impression:   The common femoral, femoral,         The common femoral, femoral,  popliteal and tibial veins +------------------------------------+----------+---------------+----------+ ! Peroneal                            !Partial   !Yes            ! None      ! +------------------------------------+----------+---------------+----------+ ! Gastroc                             ! Yes       ! Yes            ! None      ! +------------------------------------+----------+---------------+----------+ ! GSV Thigh                           ! Yes       ! Yes            ! None      ! +------------------------------------+----------+---------------+----------+ ! GSV Knee                            ! Yes       ! Yes            ! None      ! +------------------------------------+----------+---------------+----------+ ! GSV Ankle                           ! Yes       ! Yes            ! None      ! +------------------------------------+----------+---------------+----------+ ! SSV                                 ! Partial   !Yes            ! None      ! +------------------------------------+----------+---------------+----------+ Right Doppler Measurements +---------------------------+------+------+--------------------------------+ ! Location                   ! Signal!Reflux! Reflux (msec)                   ! +---------------------------+------+------+--------------------------------+ ! Common Femoral             !Phasic!      !                                ! +---------------------------+------+------+--------------------------------+ ! Prox Femoral               !Phasic!      !                                ! +---------------------------+------+------+--------------------------------+ ! Popliteal                  !Phasic!      !                                ! +---------------------------+------+------+--------------------------------+ Left Lower Extremities DVT Study Measurements Left 2D Measurements +------------------------------------+----------+---------------+----------+ ! Location                            ! Visualized! Compressibility! Thrombosis! !Yes       !Yes            ! None      ! +------------------------------------+----------+---------------+----------+ ! SSV                                 ! Yes       ! Yes            ! None      ! +------------------------------------+----------+---------------+----------+ Left Doppler Measurements +---------------------------+------+------+--------------------------------+ ! Location                   ! Signal!Reflux! Reflux (msec)                   ! +---------------------------+------+------+--------------------------------+ ! Common Femoral             !Phasic!      !                                ! +---------------------------+------+------+--------------------------------+ ! Prox Femoral               !Phasic!      !                                ! +---------------------------+------+------+--------------------------------+ ! Popliteal                  !Phasic!      !                                ! +---------------------------+------+------+--------------------------------+    MRA NECK WO CONTRAST    Result Date: 8/7/2021  EXAMINATION: MRA OF THE NECK WITHOUT CONTRAST; MRI OF THE BRAIN WITHOUT CONTRAST; MRA OF THE HEAD WITHOUT CONTRAST 8/7/2021 8:28 pm; 8/7/2021 8:34 pm; 8/7/2021 8:27 pm: TECHNIQUE: Multiplanar multisequence MRA of the neck was performed without the administration of intravenous contrast. Stenosis of the internal carotid arteries measured using NASCET criteria.; Multiplanar multisequence MRI of the brain was performed without the administration of intravenous contrast.; MRA of the head was performed utilizing time-of-flight imaging with MIP images. No intravenous contrast was administered. COMPARISON: None.  HISTORY: ORDERING SYSTEM PROVIDED HISTORY: stroke TECHNOLOGIST PROVIDED HISTORY: stroke Reason for Exam: stroke Additional signs and symptoms: patient unable to give history and unable to follow exam instructions due to mental status FINDINGS: MRI BRAIN: INTRACRANIAL STRUCTURES/VENTRICLES: Multifocal diffusion restriction abnormality related to acute ischemia are seen involving the right basal ganglia within the putamen, caudate nucleus and anterior limb of the right internal capsule, the posterior limb of the left internal capsule and lateral margin of the left thalamus, the posterior commissure of the corpus callosum, and left greater than right frontal parietal paramedian subcortical and deep white matter . Can fluent increased T2/FLAIR signal is noted within the cerebral white matter consistent with severe microvascular ischemic change. No mass effect or midline shift. No evidence of an acute intracranial hemorrhage. Mild diffuse decrease in cerebral volume is noted with corresponding prominence of the sulci and ventricles. The sellar/suprasellar regions appear unremarkable. The normal signal voids within the major intracranial vessels appear maintained. ORBITS: The visualized portion of the orbits demonstrate no acute abnormality. SINUSES: The visualized paranasal sinuses and mastoid air cells are well aerated. BONES/SOFT TISSUES: The bone marrow signal intensity appears normal. The soft tissues demonstrate no acute abnormality. MRA NECK: AORTIC ARCH/ARCH VESSELS: No dissection or arterial injury. No significant stenosis of the brachiocephalic or subclavian arteries. CAROTID ARTERIES: No dissection, arterial injury, or hemodynamically significant stenosis by NASCET criteria. VERTEBRAL ARTERIES: No dissection, arterial injury, or significant stenosis. MRA HEAD: ANTERIOR CIRCULATION: Suspected focal high-grade stenosis involving the A1 segment of the left anterior cerebral artery is identified. Right middle cerebral artery focal moderate grade stenosis involving the M1 segment is present. Suspected multifocal moderate to high-grade stenosis involving the right middle cerebral artery M2 segment is seen.   No further evidence of significant stenosis of the intracranial internal carotid, anterior cerebral, or middle cerebral arteries. POSTERIOR CIRCULATION: Bilateral posterior cerebral artery P2 segment suspected focal moderate to high-grade stenosis are noted. No further evidence of significant stenosis of the vertebral, basilar, or posterior cerebral arteries. 1. Multifocal acute ischemia involving the bilateral basal ganglia, as discussed above, posterior commissure of the corpus callosum, lateral margin of left thalamus and the left greater than right frontal parietal paramedian subcortical and deep white matter. 2. No evidence of associated hemorrhagic conversion. 3. Finding suggestive of embolic phenomenon. Recommend clinical correlation. 4. Suspected focal high-grade stenosis involving A1 segment of the left anterior cerebral artery. 5. Suspected right middle cerebral artery M1 segment focal moderate grade stenosis. 6. Suspected right middle cerebral artery M2 segment multifocal moderate to high-grade stenosis. 7. Bilateral posterior cerebral artery P2 segment suspected multifocal moderate to high-grade stenosis. 8. Grossly unremarkable MR angiogram of the neck. Note: MR angiographic evaluation of the neck is severely limited by patient motion related artifact. 9. Severe cerebral white matter chronic microvascular ischemic disease. 10. Mild diffuse cerebral atrophy. Critical results were called by Dr. Rl Edmonds to Dr. Marlee Luu On 8/7/2021 at 22:11.      MRI BRAIN WO CONTRAST    Result Date: 8/7/2021  EXAMINATION: MRA OF THE NECK WITHOUT CONTRAST; MRI OF THE BRAIN WITHOUT CONTRAST; MRA OF THE HEAD WITHOUT CONTRAST 8/7/2021 8:28 pm; 8/7/2021 8:34 pm; 8/7/2021 8:27 pm: TECHNIQUE: Multiplanar multisequence MRA of the neck was performed without the administration of intravenous contrast. Stenosis of the internal carotid arteries measured using NASCET criteria.; Multiplanar multisequence MRI of the brain was performed without the administration of intravenous contrast.; MRA of the head was performed utilizing time-of-flight imaging with MIP images. No intravenous contrast was administered. COMPARISON: None. HISTORY: ORDERING SYSTEM PROVIDED HISTORY: stroke TECHNOLOGIST PROVIDED HISTORY: stroke Reason for Exam: stroke Additional signs and symptoms: patient unable to give history and unable to follow exam instructions due to mental status FINDINGS: MRI BRAIN: INTRACRANIAL STRUCTURES/VENTRICLES: Multifocal diffusion restriction abnormality related to acute ischemia are seen involving the right basal ganglia within the putamen, caudate nucleus and anterior limb of the right internal capsule, the posterior limb of the left internal capsule and lateral margin of the left thalamus, the posterior commissure of the corpus callosum, and left greater than right frontal parietal paramedian subcortical and deep white matter . Can fluent increased T2/FLAIR signal is noted within the cerebral white matter consistent with severe microvascular ischemic change. No mass effect or midline shift. No evidence of an acute intracranial hemorrhage. Mild diffuse decrease in cerebral volume is noted with corresponding prominence of the sulci and ventricles. The sellar/suprasellar regions appear unremarkable. The normal signal voids within the major intracranial vessels appear maintained. ORBITS: The visualized portion of the orbits demonstrate no acute abnormality. SINUSES: The visualized paranasal sinuses and mastoid air cells are well aerated. BONES/SOFT TISSUES: The bone marrow signal intensity appears normal. The soft tissues demonstrate no acute abnormality. MRA NECK: AORTIC ARCH/ARCH VESSELS: No dissection or arterial injury. No significant stenosis of the brachiocephalic or subclavian arteries. CAROTID ARTERIES: No dissection, arterial injury, or hemodynamically significant stenosis by NASCET criteria. VERTEBRAL ARTERIES: No dissection, arterial injury, or significant stenosis.  MRA HEAD: ANTERIOR CIRCULATION: Suspected focal high-grade stenosis involving the A1 segment of the left anterior cerebral artery is identified. Right middle cerebral artery focal moderate grade stenosis involving the M1 segment is present. Suspected multifocal moderate to high-grade stenosis involving the right middle cerebral artery M2 segment is seen. No further evidence of significant stenosis of the intracranial internal carotid, anterior cerebral, or middle cerebral arteries. POSTERIOR CIRCULATION: Bilateral posterior cerebral artery P2 segment suspected focal moderate to high-grade stenosis are noted. No further evidence of significant stenosis of the vertebral, basilar, or posterior cerebral arteries. 1. Multifocal acute ischemia involving the bilateral basal ganglia, as discussed above, posterior commissure of the corpus callosum, lateral margin of left thalamus and the left greater than right frontal parietal paramedian subcortical and deep white matter. 2. No evidence of associated hemorrhagic conversion. 3. Finding suggestive of embolic phenomenon. Recommend clinical correlation. 4. Suspected focal high-grade stenosis involving A1 segment of the left anterior cerebral artery. 5. Suspected right middle cerebral artery M1 segment focal moderate grade stenosis. 6. Suspected right middle cerebral artery M2 segment multifocal moderate to high-grade stenosis. 7. Bilateral posterior cerebral artery P2 segment suspected multifocal moderate to high-grade stenosis. 8. Grossly unremarkable MR angiogram of the neck. Note: MR angiographic evaluation of the neck is severely limited by patient motion related artifact. 9. Severe cerebral white matter chronic microvascular ischemic disease. 10. Mild diffuse cerebral atrophy. Critical results were called by Dr. Kayla Anthony to Dr. Joseph Jimenez On 8/7/2021 at 22:11. Physical Examination:        Physical Exam  Constitutional:       Appearance: He is obese.  He is ill-appearing. Comments: Lethargic   HENT:      Head: Normocephalic and atraumatic. Nose: Nose normal.      Mouth/Throat:      Comments: Oral thrush noted on the tongue. Cardiovascular:      Rate and Rhythm: Normal rate and regular rhythm. Heart sounds: No murmur heard. No friction rub. No gallop. Pulmonary:      Effort: No respiratory distress. Breath sounds: Wheezing present. Abdominal:      General: Bowel sounds are normal. There is no distension. Palpations: Abdomen is soft. Musculoskeletal:      Right lower leg: No edema. Left lower leg: No edema. Comments: Spasticity of bilateral upper extremities, more so on the right  Normal passive range of motion of bilateral lower extremities   Skin:     Capillary Refill: Capillary refill takes 2 to 3 seconds. Neurological:      Mental Status: He is unresponsive. GCS: GCS eye subscore is 1. GCS verbal subscore is 1. GCS motor subscore is 4. Motor: Abnormal muscle tone present.       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  -Blood cultures x2 no growth so far  -LFTs elevated, ammonia level wnl  -CXR showed no acute pulmonary process, procalcitonin 0.26, lactic acid 2.0, mycoplasma, strep pneumo, legionella, urinalysis negative  -Treat empirically for aspiration pneumonia with IV Unasyn (discontinued)      ~History of multiple CVA with acute multi-infarct   -MRI brain showed multiple new infarcts bilaterally  -Plan for IZABELLA and loop recorder procedure to r/o cardiac source of emboli: IZABELLA not performed due to patient being unresponsive.  -Neurology on board: awaiting recommendations. Off high dose heparin. Currently on asa only. -Heme-onc consulted for thrombocytopenia and recurrent stroke: Okay to give anticoagulation or antiplatelet platelet is above 29,827. Overall prognosis is poor. Avoid myelosuppressive drugs. Palliative care: Meeting with family today at [facility] as per nurse.      ~Electrolyte Disturbance  -potassium 4.4> 5.1, sodium 139> 135  -one time dose calcium gluconate 1,000mg  -12 lead EKG 8/9: Sinus bradycardia with sinus arrhythmia.  2 episodes of tachyarrhythmia. -, Myoglobin 280     ~CELESTINO on CKD, likely due to poor intake vs rhabdomyolysis, improved  -Creatinine elevated, baseline (1.4-1.5)   -1.86> 1.49 on 8/13  -CK wnl, myoglobin elevated at 280  -Continue to monitor.     ~Protein calorie malnutrition  -Albumin 2.9, total protein 6.1  -Patient receiving PEG tube feeds  -Dietary on board     ~Diabetes mellitus  -Low dose sliding scale insulin  -Continue to monitor blood glucose levels   -Hypoglycemia protocol     ~Essential Hypertension  -Cozaar 50mg daily  -Hydralazine 10mg TID    ~PEG tube assesment   General surgery: PEG site is clean resume tube feeds. Abdomen is soft.     ~Oral Candidiasis  -On nystatin oral swab via nurse. ~Apniec episodes 2/2 JAIRON  -Bipap overnight  -Respiratory care eval and treat  -Albuterol aerosols. ~Hyperglycemia  Lantus 10 units  Medium dose sliding scale    DVT ppx: on heaprin subcu TID. GI ppx: famotidine 20mg daily. Palliative Care: In discussion with family regarding goals of care, family support and CODE status.      Kimberly Carpio MD  8/13/2021  7:34 PM   Attending Physician Statement    I have discussed the case of Danielle Ewing Gian, including pertinent history and exam findings with the resident. I have seen and examined the patient and the key elements of the encounter have been performed by me. I agree with the assessment, plan, and orders as documented by the resident.   His overall prognosis is poor, his therapy was revised  Electronically signed by Christiano Orta MD on 8/13/2021 at 7:34 PM

## 2021-08-13 NOTE — PROGRESS NOTES
Patient seen and examined. Afebrile, VSS. Patient more active this afternoon, but still minimally responsive. Discussed with nursing staff. Patient is tolerating tube feeds at goal. Bowels are moving. PEG site clean. Abdomen soft, non-tender non-distended. Surgically stable. Continue medical management. Discharge planning.      Gerry Wright PA-C  [facility] Surgery

## 2021-08-13 NOTE — PROGRESS NOTES
[place]  Speech Language Pathology    Date: 8/13/2021  Patient Name: Cesar Snyder  YOB: 1945   AGE: 77 y.o. MRN: [id number]        Patient Not Available for Speech Therapy     Due to:  [] Testing  [] Hemodialysis  [] Cancelled by RN  [] Surgery   [] Intubation/Sedation/Pain Medication  [] Medical instability  [x] Other:   Attempted evaluation this AM (3712), Pt not responding to verbal/tactile stimuli. Next scheduled treatment: as able    Completed by:  Macrina Mullins, SLP, M.A., [address] [facility]

## 2021-08-13 NOTE — CARE COORDINATION
SW attempted to call this patient's daughter to talk about placement and where she might want him to go. There was no answer, SW left a message and asked for a return call. SW left contact information.

## 2021-08-13 NOTE — PLAN OF CARE
Problem: Infection:  Goal: Will remain free from infection  Description: Will remain free from infection  8/13/2021 0340 by Alyx Bloom RN  Outcome: Ongoing  8/12/2021 1811 by Seth Fuentes RN  Outcome: Ongoing     Problem: Safety:  Goal: Free from accidental physical injury  Description: Free from accidental physical injury  8/13/2021 0340 by Alyx Bloom RN  Outcome: Ongoing  8/12/2021 1811 by Seth Fuentes RN  Outcome: Met This Shift  Goal: Free from intentional harm  Description: Free from intentional harm  8/12/2021 1811 by Seth Fuentes RN  Outcome: Met This Shift     Problem: Daily Care:  Goal: Daily care needs are met  Description: Daily care needs are met  8/13/2021 0340 by Alyx Bloom RN  Outcome: Ongoing  8/12/2021 1811 by Seth Fuentes RN  Outcome: Met This Shift     Problem: Skin Integrity:  Goal: Skin integrity will stabilize  Description: Skin integrity will stabilize  8/13/2021 0340 by Alyx Bloom RN  Outcome: Ongoing  8/12/2021 1811 by Seth Fuentes RN  Outcome: Ongoing     Problem: Discharge Planning:  Goal: Patients continuum of care needs are met  Description: Patients continuum of care needs are met  8/13/2021 0340 by Alyx Bloom RN  Outcome: Ongoing  8/12/2021 1811 by Seth Fuentes RN  Outcome: Not Met This Shift     Problem: Nutrition  Goal: Optimal nutrition therapy  8/13/2021 0340 by Alyx Bloom RN  Outcome: Ongoing  8/12/2021 1811 by Seth Fuentes RN  Outcome: Ongoing  Note: Tube feeding at goal 70ml/hr     Problem: Falls - Risk of:  Goal: Will remain free from falls  Description: Will remain free from falls  8/13/2021 0340 by Alyx Bloom RN  Outcome: Ongoing  8/12/2021 1811 by Seth Fuentes RN  Outcome: Met This Shift  Goal: Absence of physical injury  Description: Absence of physical injury  8/12/2021 1811 by Seth Fuentes RN  Outcome: Met This Shift  Note: Complete bedrest maintained     Problem: Skin Integrity:  Goal: Will show no infection signs and symptoms  Description: Will show no infection signs and symptoms  8/13/2021 0340 by Davy Grey RN  Outcome: Ongoing  8/12/2021 1811 by Nikky Sears RN  Outcome: Met This Shift  Goal: Absence of new skin breakdown  Description: Absence of new skin breakdown  8/13/2021 0340 by Davy Grey RN  Outcome: Ongoing  8/12/2021 1811 by Nikky Sears RN  Outcome: Met This Shift

## 2021-08-13 NOTE — PROGRESS NOTES
[place]  Speech Language Pathology    Date: 8/13/2021  Patient Name: Oscar Garcia  YOB: 1945   AGE: 77 y.o. MRN: [id number]        Patient Not Available for Speech Therapy     Due to:  [] Testing  [] Hemodialysis  [] Cancelled by RN  [] Surgery   [] Intubation/Sedation/Pain Medication  [] Medical instability  [x] Other:   Pt. Remains nonresponsive at this time, unable to follow commands c tactile cues, not opening eyes. ST attempting to assist pt. To relax his hand as he is tightly clenched, pt. Strongly resisting/guarding, attempting to pull telemetry lines that were between his clenched hand. Pt. Required 2 person assist (with training RN) to pull lines from his fist.     Per RN, Ruben Andersen, pt. More awake today, able to follow some commands and making purposeful movements.      Next scheduled treatment: as able    Completed by: Tessie Beltre M.A., CCC-SLP

## 2021-08-13 NOTE — PROGRESS NOTES
[facility] Neurology   IN-PATIENT SERVICE      NEUROLOGY PROGRESS  NOTE            Date:   8/13/2021  Patient name:  Claudia Reyes  Date of admission:  8/8/2021  YOB: 1945      Interval History:     Discussed overall clinical picture and prognosis with patient's daughter Jaylon Erickson over the phone at length earlier this morning. Not much change in clinical condition overnight. History of Present Illness:      The patient is a 77 y. o. male who presents with No chief complaint on file. . The patient was seen and examined and the chart was reviewed.      Patient initially presented to [place] [address] on 7/13 with strokelike symptoms. Lexie Simmons had been found down 2 days prior with severe dysarthria and right hemiparesis, facial droop. [place] was evidence of prior bilateral strokes on imaging.  CTA showed diffuse intracranial atherosclerotic disease and near occlusion of right PCA.  Medical therapy was recommended.  Found to have new left cortical ischemic stroke.  Patient had thrombocytopenia in which hematology oncology was following so antiplatelets were held. [facility] they were restarted. [facility] discharged on 7/22 [facility] acute rehab unit.  Patient eventually had PEG tube placed. Lexie Simmons also had to be started on Zyprexa by psychiatry for agitation.  Neurology reconsulted on 8/7 for mental status changes.  Recommendation for MRI of the head and MRA head and neck.  MRI showed new infarcts in the right cerebellar, right lentiform nucleus, left periventricular region.   MRA confirms intracranial atherosclerotic disease.  Patient noted to be stuporous nonverbal withdrawing the right side less than the left side.  He is readmitted to the progressive care unit.  Patient placed on low intensity heparin with aspirin 81 daily and stopping Plavix. [place] is plan for IZABELLA and follow-up head CT in the next 24 hours.     Patient sitting in bed, minimally responsive.  There is right gaze preference. [place] is withdrawal to pain.  He is moaning to painful stimulation. Awaiting repeat CT head. IZABELLA is pending. Systolic blood pressures 52142Y. Currently on heparin drip, aspirin 81 mg daily. Platelets 115.     Past Medical History:     Past Medical History:   Diagnosis Date    Centrilobular emphysema ([facility] [address]     COPD (chronic obstructive pulmonary disease) ([facility] [address]     Depression     Diabetes mellitus ([facility] [address]     pt refuses to take previously prescribed metformin (states PCP aware)    Former smoker     Hyperlipidemia     on 18 pt states \"I stopped taking that about a year ago\"    Hypertension     Mixed restrictive and obstructive lung disease ([facility] [address]     Need for pneumococcal vaccine     Personal history of tobacco use         Past Surgical History:     Past Surgical History:   Procedure Laterality Date    CARDIAC SURGERY  2015    1 stent    NECK SURGERY      TOE AMPUTATION Right greater    UPPER GASTROINTESTINAL ENDOSCOPY N/A 8/3/2021    EGD  PEG TUBE INSERTION performed by Marietta Alvarez MD at [facility]        Medications during admission:      insulin lispro  0-12 Units Subcutaneous TID WC    insulin lispro  0-6 Units Subcutaneous Nightly    insulin glargine  10 Units Subcutaneous Nightly    heparin (porcine)  5,000 Units Subcutaneous 3 times per day    ticagrelor  90 mg Oral BID    albuterol  2.5 mg Nebulization TID    nystatin  5 mL Oral 4x Daily    aspirin  81 mg PEG Tube Daily    famotidine  20 mg PEG Tube Daily    hydrALAZINE  10 mg Oral 3 times per day    losartan  50 mg Oral Daily    melatonin  6 mg PEG Tube Nightly    OLANZapine  5 mg PEG Tube Nightly    rosuvastatin  40 mg PEG Tube Nightly    tiotropium  2 puff Inhalation Lunch    ampicillin-sulbactam  1,500 mg Intravenous Q6H         Physical Exam:   BP (!) 128/114   Pulse 95   Temp 98.3 °F (36.8 °C) (Oral)   Resp 23   Ht 5' 10\" (1.778 m)   Wt 212 lb 8.4 oz (96.4 kg)   SpO2 97%   BMI 30.49 kg/m²   Temp (24hrs), Av °F (36.7 °C), Min:97.8 °F (36.6 °C), Max:98.5 °F (36.9 °C)          Neurological examination:    Mental status    Patient is lethargic.  Eyes open spontaneously roving eye movements.  Nonverbal.  Occasionally will follow commands by squeezing hand on left side.      Cranial nerves    Pupil response:            Present     Oculocephalic reflex: Roving eye movements  Corneal Reflex:            Present     Facial grimace to pin:  Present     Gag/Cough reflex:       Present         Motor and sensory function   increased tone present bilateral upper extremities with withdrawal to pain in the right greater than left hemibody.      DTR brisk throughout  Plantar response: Upgoing on left  (-) Grayson's sign bilaterally    Gait  Not tested patient is lethargic                    Diagnostics:      Laboratory Testing:  CBC:   Recent Labs     08/12/21  0729   WBC 9.1   HGB 10.7*        BMP:    Recent Labs     08/11/21  0802 08/13/21  0513    135   K 4.4 5.1    103   CO2 24 21   BUN 25* 22   CREATININE 1.54* 1.49*   GLUCOSE 195* 301*         Lab Results   Component Value Date    CHOL 186 07/14/2021    LDLCHOLESTEROL 129 07/14/2021    HDL 42 07/14/2021    TRIG 59 08/05/2021     (H) 08/09/2021     (H) 08/09/2021    TSH 0.28 (L) 07/14/2021    INR 1.1 08/08/2021    LABA1C 7.7 (H) 07/14/2021    [id number] 07/13/2021       Imaging/Diagnostics:        MRI brain (7/14/2021): Acute infarct left and right basal ganglia, greater on the left.  Diffuse volume loss and extensive chronic ischemic white matter changes.         MRI brain (8/7/2021): Multifocal acute infarcts involving the bilateral basal ganglia, greater on the right, posterior commissure of the corpus callosum, lateral margin of the left thalamus, left greater than right frontal parietal paramedian subcortical and deep white matter.               CTA head and neck (7/13/2021): Severe stenosis V1 segment of left vertebral artery.  Near complete occlusion right PCA.  Severe stenosis proximal A3 segment of ACAs bilaterally.  Moderate to severe proximal M2 segment stenosis in the right MCA.  Moderate stenosis M1, M2 segments of the left MCA.  Moderate stenosis of the P1/P2 junction of the left PCA.     MRA head and neck (8/7/2021): Focal high-grade stenosis involving A1 segment of left RAHEEM.  Right MCA M1 segment focal moderate grade stenosis.  Right MCA M2 segment multifocal moderate to high-grade stenosis.  Bilateral PCA P2 segment multifocal moderate to high-grade stenosis.      2D echo: EF 50-55%.  Negative bubble study.  LA is normal size. I personally reviewed all of the above medications, clinical laboratory, imaging and other diagnostic tests. Impression:      1. Multiple   bihemispheric acute/subacute infarcts with right greater than left hemiparesis, subsequent encephalopathy with minimally responsive state  2. Significant intracranial diffuse atherosclerotic disease likely etiology of patient's strokes  3. status post PEG tube placement    Plan:      Aspirin 81 mg daily, Brilinta 90 mg twice daily   Crestor 40 mg   No need for IZABELLA   Discussed patient prognosis over the phone with his daughter Chris Spear. She is able to repeat the patient's decision maker. She will talk it over with her cousin and attempt to make a decision regarding further goals of care over the next couple of days. She is considering having patient sent to SNF versus hospice care.  Palliative care is on board   Patient is stable neurologically for discharge at this time. No further neurologic work-up is indicated.  Follow-up as outpatient in 6 weeks.         Electronically signed by Alvin Paredes DO on 8/13/2021 at 10:52 AM      Alvin Paredes [address] [place] [address]  Neurology

## 2021-08-13 NOTE — PROGRESS NOTES
NONINVASIVE VENTILATION    PROVIDE OPTIMAL VENTILATION/ACCEPTABLE SPO2   IMPLEMENT NONINVASIVE VENTILATION PROTOCOL   MAINTAIN ACCEPTABLE SPO2   ASSESS SKIN INTEGRITY/BREAKDOWN SCORE   PATIENT EDUCATION AS NEEDED   BIPAP AS NEEDED        PROVIDE ADEQUATE OXYGENATION WITH ACCEPTABLE SP02/ABG'S    [x]  IDENTIFY APPROPRIATE OXYGEN THERAPY  [x]   MONITOR SP02/ABG'S AS NEEDED   [x]   PATIENT EDUCATION AS NEEDED    BRONCHOSPASM/BRONCHOCONSTRICTION     [x]         IMPROVE AERATION/BREATH SOUNDS  [x]   ADMINISTER BRONCHODILATOR THERAPY AS APPROPRIATE  [x]   ASSESS BREATH SOUNDS  []   IMPLEMENT AEROSOL/MDI PROTOCOL  [x]   PATIENT EDUCATION AS NEEDED    Pt remains on bipap, pt has worn all night, current SpO2 is 94% on FiO2 of 21%. Diminished breath sounds through out anteriorly.  Pt not real responsive however that is pts baseline

## 2021-08-13 NOTE — PROGRESS NOTES
[address] [facility]   INPATIENT OCCUPATIONAL THERAPY  PROGRESS NOTE  Date: 2021  Patient Name: Srini Pyle      Room: -  MRN: [id number]    : 1945  (72 y.o.) Gender: male     Discharge Recommendations:  Further Occupational Therapy is recommended upon facility discharge. Equipment Needed:  (TBD)    Referring Practitioner: Darío Miner MD  Diagnosis: AMS  General  Chart Reviewed: Yes  Patient assessed for rehabilitation services?: Yes  Additional Pertinent Hx: Srini Pyle is a 77 y.o. RHD male admitted to [facility] on 2021. On admit, exam significant for right-sided facial droop, right-sided weakness and severe dysarthria. Significant history with recurrent strokes, remote left temporal lobe ischemic infarct and remote bilateral occipital lobe infarcts . MRI shows Bilateral basal ganglia ischemic infarcts. Pt admitted to rehab unit on 21  Response to previous treatment: Patient unable to report, no changes reported from family or staff  Family / Caregiver Present: No  Referring Practitioner: Darío Miner MD  Diagnosis: AMS    Restrictions  Restrictions/Precautions: Fall Risk  Implants present? : Metal implants (cardiac stent, h/o neck surgery)  Other position/activity restrictions: NPO, up with assist, PEG tube, IV, telemetry  Required Braces or Orthoses?: No      Subjective  Subjective: Pt does not verbalize during this encounter. He does lightly rub writers hand at one point in response to palm massage, he also shakes his L foot to the beat of the music once playing indicating task involvement and awareness. Comments: Pt supine with what appears with decorticate posture and his tounge enlarged and sticking out of his mouth; he does not appear to be able to correct his tongue positioning. His eyes do open a few times during our encounter, does not appear to consistent with begin requested.  He presents with high muscle tone but is able to demo muscular relaxation in to L forearm/hand with palmar massage. His RUE nails appear to be digging into his palmar crease;  I was able to insert heavy resistance therapy sponges into his palms to increase protection to skin and for joint protection. His L hand does open fully during massage; his RUE digits remain flexed unless writer manually extends. Patient Currently in Pain: No  Overall Orientation Status: Impaired  Orientation Level: Unable to assess  Patient Observation  Observations: intermittent reponses using hand squeezes  Pain Assessment  Heredia-Kenney Pain Rating: No hurt  Clinical Progression: Not changed    Objective  Cognition  Overall Cognitive Status: Impaired  Additional Comments: Pt unable to follow >90% commands at this time. Grasps writers hand x3. Balance  Sitting Balance: Unable to assess(comment)  Standing Balance: Unable to assess(comment)         ADL  Feeding: NPO;Dependent/Total (Peg tube. )  Grooming: Dependent/Total  UE Bathing: Dependent/Total  LE Bathing: Dependent/Total  UE Dressing: Dependent/Total  LE Dressing: Dependent/Total  Toileting: Dependent/Total  Additional Comments: ADL scores based on clinical reasoning and skilled observation unless otherwise noted. Pt unable to be fully aroused or follow commands at this time despite max verbal and tacile cues. Type of ROM/Therapeutic Exercise  Type of ROM/Therapeutic Exercise: AAROM  Comment: BUE ROM to hands/elbows as able to tolerance. RUE with max dfificulty extending digts/elbow/wrist from decorticate posture; LUE with improved result, able to PROM digits and wrist, elbow remained resistive WFL.  One incident post smalls crease massage pt L hand and forearm returned to fairly normal tone and rested on his chest.  Exercises  Shoulder Flexion: KYA  Elbow Flexion: x  Elbow Extension: x  Wrist Flexion: x  Wrist Extension: x  Finger Flexion: x  Finger Extension: x  Grasp/Release: x        Additional Activities: reflexive sensory tasks: F+ muscular response to cold to L side and RLE foot tickle; cervical relaxation present with warm compress to face/ears/neck      Vision  Vision Comment: KYA  Positioning  Adaptations: RLE pushing into bed rail: repositioned BLE to offload from rail, EPC cuff tubing adjusted to reduce pitting into leg, and padding placed between RLE and bedrail for skin protection; use of sensory engaging tasks (music, tactile input, muscle massage); barrier placed into palm between digits(nail) and palm for skin protection; palmar massage to encourage hand relaxation and digit manipulation         Assessment  Performance deficits / Impairments: Decreased ADL status; Decreased functional mobility ; Decreased ROM; Decreased strength;Decreased safe awareness;Decreased cognition;Decreased endurance;Decreased sensation;Decreased balance;Decreased vision/visual deficit; Decreased high-level IADLs;Decreased fine motor control;Decreased coordination;Decreased posture  Assessment: poor response to commands but demoing reflexive reponses and f+ self driven responses to auditory input (70s jazz)  Prognosis: Guarded  Discharge Recommendations: Patient would benefit from continued therapy after discharge;24 hour supervision or assist  Activity Tolerance: Treatment limited secondary to decreased cognition;Patient limited by fatigue  Activity Tolerance: motor plan and decreased problem solving re new deficits and likely visual deficits impact adl performance but good participation with step by step directions. with exertion becomes max fatigued and less able to remain alert. was placed in recliner at end of 2nd am treatment to allow upright for eating and recline to nap.  reviewed with pt's 1:1 and RN recommend jeet BEE x 2 with nsg transfer. also ed 1:1, RN and PTA that some of pt's perceived decreased cooperation and even agitation may be at baseline cognition deficits. Safety Devices in place: Yes  Type of devices:  All fall risk ARASELI Manzano on 8/13/21 at 1:22 PM EDT

## 2021-08-13 NOTE — FLOWSHEET NOTE
08/13/21 1107   Encounter Summary   Services provided to: Patient   Referral/Consult From: Palliative Care   Complexity of Encounter Low   Length of Encounter 15 minutes   Routine   Type Follow up   Assessment Sleeping   Intervention Prayer   Outcome Did not respond

## 2021-08-13 NOTE — PROGRESS NOTES
Physician Progress Note      Jami Horn  CSN #:                  [id number]  :                       1945  ADMIT DATE:       2021 11:30 AM  [address] [place] DATE:  RESPONDING  PROVIDER #:        Mera Merlos MD          QUERY TEXT:    Patient admitted with Altered Mental Status . Noted documentation of sepsis in   H & P on  in the form of \"metabolic encephalopathy vs. pna/sepsis\" and in   proceeding daily progress notes. In order to support the diagnosis of sepsis,   please include additional clinical indicators in your documentation. Or   please document if the diagnosis of sepsis has been ruled out after further   study    The medical record reflects the following:  Risk Factors: AMS, unresponsiveness with a GCS of 6, Hx of multiple strokes   with residual effects, DM, COPD, Malnutrition, Aspiration PNA  Clinical Indicators: T max since admission: 99.3 ? F (37. 4? C), Respiratory rate   of 16-20/ min, SpO2 94/100% on RA, no desaturations documented. BP range of   105/50- 154/92 since admission, HR 66-87. LABS: ProCal 0.26, LA 2.0, WBC:   14.4, 0% BANDS. UA/Cx: No Growth  : BC x2:no growth x4 days, Strep pneumo Ag & Legionella: Negative,   Mycoplasma pneumo IgM : Negative, COVID rapid: Negative  Treatment: ampicillin-sulbactam for PNA, Continuous monitoring of Labs and   hemodynamics. Options provided:  -- Sepsis, found POA, as evidenced by, Please document evidence. -- Sepsis was ruled out after study  -- Other - I will add my own diagnosis  -- Disagree - Not applicable / Not valid  -- Disagree - Clinically unable to determine / Unknown  -- Refer to Clinical Documentation Reviewer    PROVIDER RESPONSE TEXT:    Sepsis was ruled out after study. Query created by: Bal Jhaveri on 2021 12:29 PM      QUERY TEXT:    Pt admitted with AMS. Noted documentation of CVA in the form of \"Multiple   bihemispheric acute/subacute infarcts\"  by Neurology consult. .  If possible, please document in progress notes and discharge summary:    The medical record reflects the following:  Risk Factors: Previous Stroke's- most recent 7/13/21 with deficits, near   occlusion of right PCA, intracranial atherosclerotic disease, ITP  Clinical Indicators: Transferred to the hospital for worsening mental status   changes. Documentation describes patient as \" unresponsive to verbal stimuli. Nonverbal, responsive to noxious stimuli only\". MRI brain (8/7/2021):   \"Multifocal acute infarcts involving the bilateral basal ganglia, greater on   the right, posterior commissure of the corpus callosum, lateral margin of the   left thalamus, left greater than right frontal parietal paramedian subcortical   and deep white matter\", Comparison of MRI brain (7/14/2021): \"Acute infarct   left and right basal ganglia, greater on the l  Treatment: Neuro and Oncology consults suggest treatment in the form of   Palliative care. Medically treating with Heparin gtt, which neurology suggests   transition to aspirin and Brilinta. Keep SBP >140's. Options provided:  -- New acute/ subacute multi CVA's confirmed present on admission  -- Other - I will add my own diagnosis  -- Disagree - Not applicable / Not valid  -- Disagree - Clinically unable to determine / Unknown  -- Refer to Clinical Documentation Reviewer    PROVIDER RESPONSE TEXT:    This patient was diagnosed with New acute/ subacute multi CVA's that were   present on admission. Query created by: Woody Locke on 8/12/2021 12:30 PM      QUERY TEXT:    Patient admitted with AMS. Noted documentation of CELESTINO on CKD in patients H&P   on 8/8. In order to support the diagnosis of CELESTINO, please include additional   clinical indicators in your documentation.   Or please document if the   diagnosis of CELESTINO has been ruled out after further study    The medical record reflects the following:  Risk Factors: CKD, DM, AMS, Aspiration PNA, acute illness, unresponsive state   with

## 2021-08-14 NOTE — PROGRESS NOTES
[facility]  Speech Language Pathology    Date: 8/14/2021  Patient Name: Claudia Reyes  YOB: 1945   AGE: 77 y.o. MRN: [id number]        Patient Not Available for Speech Therapy     Due to:  [] Testing  [] Hemodialysis  [] Cancelled by RN  [] Surgery   [] Intubation/Sedation/Pain Medication  [] Medical instability  [x] Other:   Pt. Remains nonresponsive at this time, unable to follow commands c tactile cues, not opening eyes.        Next scheduled treatment: as able    Completed by: Khalida Mcmanus M.A., CCC-SLP

## 2021-08-14 NOTE — CARE COORDINATION
Discharge planner attempted to call this patient's daughter to talk about placement and where she might want him to go. There was no answer, Discharge planner left a mssage and asked for a return call. Discharge planner left contact information.

## 2021-08-14 NOTE — PROGRESS NOTES
[address] [facility]    PROGRESS NOTE             8/14/2021    9:34 AM    Name:   Ceferino Peralta  MRN:     [id number]     Acct:      [de-identified]   Room:   2099/2099-01  IP Day:  5  Admit Date:  8/8/2021 11:30 AM    PCP:  No primary care provider on file. Code Status:  Full Code    Subjective:     C/C: No chief complaint on file. Interval History Status: not changed. Patient on BiPAP. Awaiting decision from family regarding ECF placement, likely will not do hospice at this time. Brief History:     Porter Ward is a 76-year-old male who was recently admitted to [facility] acute rehab unit following ischemic CVA (left PCA, RAHEEM and MCA) with right sided hemiparesis. Patient has past medical history of CVA, coronary artery disease s/p percutaneous coronary angioplasty, hyperlipidemia, hypertension, diabetes, emphysema/COPD.     History difficult to obtain as patient is somnolent and nonresponsive. Per prior documentation, patient had increasing lethargy on 8/6. CT head obtained which showed: There has been interval development of a hypodensity in the right basal ganglia compared to prior study consistent with evolving subacute white matter infarct. No hemorrhage is noted. Chronic microvascular change in atrophy is demonstrated. No midline shift.      Spoke with family - sister and niece - who stated he is somewhat confused at baseline, but has become significantly more lethargic/began to be agitated and combative about 1.5 weeks ago while in the acute rehab unit.     He was transferred to medicine service for management of altered mental status.   For a few days prior to transfer, patient has had intermittent elevations in temperature, up to 100 °F  Patient had also been coughing intermittently; frothy/brown substance suctioned from mouth     Pneumonia/sepsis work-up: CXR no acute process, procalcitonin 0.26, lactic acid 2.0, legionella/strep/mycoplasma negative,  blood culture no growth to date, urinalysis negative  Metabolic encephalopathy workup: LFTs elevated, ammonia wnl, myoglobin elevated, CK wnl. Family has been difficult to get in touch with regarding discharge planning for patient. Will likely be discharged to [facility], awaiting family decision regarding placement. Review of Systems:     Review of Systems  Unable to perform - patient lethargic/nonresponsive    Medications: Allergies:  No Known Allergies    Current Meds:   Scheduled Meds:    insulin lispro  0-12 Units Subcutaneous TID WC    insulin lispro  0-6 Units Subcutaneous Nightly    insulin glargine  10 Units Subcutaneous Nightly    heparin (porcine)  5,000 Units Subcutaneous 3 times per day    ticagrelor  90 mg Oral BID    albuterol  2.5 mg Nebulization TID    nystatin  5 mL Oral 4x Daily    aspirin  81 mg PEG Tube Daily    famotidine  20 mg PEG Tube Daily    hydrALAZINE  10 mg Oral 3 times per day    losartan  50 mg Oral Daily    melatonin  6 mg PEG Tube Nightly    OLANZapine  5 mg PEG Tube Nightly    rosuvastatin  40 mg PEG Tube Nightly    tiotropium  2 puff Inhalation Lunch    ampicillin-sulbactam  1,500 mg Intravenous Q6H     Continuous Infusions:    dextrose       PRN Meds: albuterol, hydrALAZINE, acetaminophen, bisacodyl, polyethylene glycol, senna, albuterol sulfate HFA, dextrose, dextrose, glucagon (rDNA), glucose    Data:     Past Medical History:   has a past medical history of Centrilobular emphysema (HCC), COPD (chronic obstructive pulmonary disease) ([facility] [address] Depression, Diabetes mellitus ([facility] [address] Former smoker, Hyperlipidemia, Hypertension, Mixed restrictive and obstructive lung disease ([facility] [address] Need for pneumococcal vaccine, and Personal history of tobacco use. Social History:   reports that he quit smoking about 8 years ago. He started smoking about 64 years ago. He has a 42.00 pack-year smoking history.  He has never used smokeless tobacco. He reports that he does not drink alcohol and does not use drugs. Family History: No family history on file. Vitals:  /76   Pulse 83   Temp 99.6 °F (37.6 °C) (Axillary)   Resp 26   Ht 5' 10\" (1.778 m)   Wt 212 lb 8.4 oz (96.4 kg)   SpO2 100%   BMI 30.49 kg/m²   Temp (24hrs), Av.8 °F (37.1 °C), Min:98.3 °F (36.8 °C), Max:99.6 °F (37.6 °C)    Recent Labs     21  1632 21  2356 21  0703   POCGLU 214* 189* 198* 238*       I/O(24Hr): No intake or output data in the 24 hours ending 21 0934    Labs:    CBC:   Lab Results   Component Value Date    WBC 13.5 2021    RBC 4.71 2021    RBC 4.88 2012    HGB 11.3 2021    HCT 36.5 2021    MCV 77.4 2021    MCH 23.9 2021    MCHC 30.9 2021    RDW 17.0 2021     2021     2012    MPV 10.5 2021     BMP:    Lab Results   Component Value Date     2021    K 5.1 2021     2021    CO2 21 2021    BUN 22 2021    LABALBU 2.9 2021    CREATININE 1.49 2021    CALCIUM 9.0 2021    GFRAA 56 2021    LABGLOM 46 2021    GLUCOSE 301 2021    GLUCOSE 122 2012       Lab Results   Component Value Date/Time    SPECIAL NOT REPORTED 2021 02:38 PM     Lab Results   Component Value Date/Time    CULTURE NO GROWTH 6 DAYS 2021 02:38 PM         Physical Examination:        Physical Exam  Constitutional:       Comments: Lethargic, nonresponsive  BiPAP in place   Cardiovascular:      Rate and Rhythm: Normal rate and regular rhythm. Heart sounds: S1 normal and S2 normal. No murmur heard. No friction rub. No gallop. Pulmonary:      Effort: Pulmonary effort is normal.      Breath sounds: Normal breath sounds. No wheezing, rhonchi or rales. Abdominal:      General: Bowel sounds are normal. There is no distension. Palpations: Abdomen is soft.       Tenderness: There is no abdominal tenderness. Musculoskeletal:      Cervical back: Normal range of motion. No tenderness. Right lower leg: No edema. Left lower leg: No edema. Comments: Rigidity of bilateral upper extremities, worse on the right  Rigidity of right lower extremity. Unable to examine left lower extremity due to patient positioning   Lymphadenopathy:      Cervical: No cervical adenopathy. Neurological:      Cranial Nerves: Cranial nerves are intact. Deep Tendon Reflexes: Reflexes are normal and symmetric. Assessment:        Primary Problem  Altered mental status    Active Hospital Problems    Diagnosis Date Noted    Oral candidiasis [B37.0] 08/12/2021    Altered mental status [R41.82] 08/09/2021    Severe malnutrition ([facility] [address] [E43] 08/09/2021    History of ITP [Z86.2]     Cerebral multi-infarct state [I69.30] 08/08/2021    Encephalopathy [G93.40] 08/07/2021       Plan: Altered mental status, likely 2/2 to multi-infarct state  -History of multiple CVA with acute multi-infarct; MRI brain on 8/7 showed multiple new infarcts bilaterally  -was prior plan for IZABELLA and loop recorder procedure to r/o cardiac source of emboli: IZABELLA not performed due to patient being unresponsive.  -Neurology on board; aspirin, brilinta 90mg, and rosuvastatin 40mg  -Heme-onc consulted for thrombocytopenia and recurrent stroke: Okay to give anticoagulation or antiplatelet platelet is above 07,258. Overall prognosis is poor. Avoid myelosuppressive drugs.  -Palliative care on board, awaiting family decision regarding placement for patient (ECF?)      Electrolyte Disturbance, resolved  -potassium 4.4> 5.1, sodium 139> 135  -one time dose calcium gluconate 1,000mg given 8/9  -12 lead EKG 8/9: Sinus bradycardia with sinus arrhythmia.  2 episodes of tachyarrhythmia.   -, Myoglobin 280     CELESTINO on CKD, likely due to poor intake vs rhabdomyolysis, resolved  -Creatinine elevated, baseline (1.4-1.5)

## 2021-08-14 NOTE — PROGRESS NOTES
[facility]   IN-PATIENT SERVICE      NEUROLOGY PROGRESS  NOTE            Date:   8/14/2021  Patient name:  Leesa Garduno  Date of admission:  8/8/2021  YOB: 1945      Interval History:     No changes overnight. Awaiting to hear back from family. History of Present Illness: The patient is a 77 y. o. male who presents with No chief complaint on file. . The patient was seen and examined and the chart was reviewed.      Patient initially presented to [facility] on 7/13 with strokelike symptoms. [facility] had been found down 2 days prior with severe dysarthria and right hemiparesis, facial droop. Abram Carlton was evidence of prior bilateral strokes on imaging.  CTA showed diffuse intracranial atherosclerotic disease and near occlusion of right PCA.  Medical therapy was recommended.  Found to have new left cortical ischemic stroke.  Patient had thrombocytopenia in which hematology oncology was following so antiplatelets were held. [facility] they were restarted. [facility] discharged on 7/22 [facility] acute rehab unit.  Patient eventually had PEG tube placed. [facility] also had to be started on Zyprexa by psychiatry for agitation.  Neurology reconsulted on 8/7 for mental status changes.  Recommendation for MRI of the head and MRA head and neck.  MRI showed new infarcts in the right cerebellar, right lentiform nucleus, left periventricular region.  MRA confirms intracranial atherosclerotic disease.  Patient noted to be stuporous nonverbal withdrawing the right side less than the left side.  He is readmitted to the progressive care unit.  Patient placed on low intensity heparin with aspirin 81 daily and stopping Plavix. Abram Carlton is plan for IZABELLA and follow-up head CT in the next 24 hours.     Patient sitting in bed, minimally responsive.  There is right gaze preference. Abram Carlton is withdrawal to pain.  He is moaning to painful stimulation. Awaiting repeat CT head. IZABELLA is pending.   Systolic blood pressures 71414Q. Currently on heparin drip, aspirin 81 mg daily. Platelets 606. Past Medical History:     Past Medical History:   Diagnosis Date    [facility]rilob[facility]se[facility]     COPD (chronic obstructive pulmonary disease) [facility]     Depression     Diabetes mellitus ([facility]     pt refuses to take previously prescribed metformin (states PCP aware)    Former smoker     Hyperlipidemia     on 19 pt states \"I stopped taking that about a year ago\"    Hypertension     Mixed restrictive and obstructive lung disease ([facility]     Need for pneumococcal vaccine     Personal history of tobacco use         Past Surgical History:     Past Surgical History:   Procedure Laterality Date    CARDIAC SURGERY  2015    1 stent    NECK SURGERY      TOE AMPUTATION Right greater    UPPER GASTROINTESTINAL ENDOSCOPY N/A 8/3/2021    EGD  PEG TUBE INSERTION performed by Seda Hale MD at [facility]        Medications during admission:      insulin lispro  0-12 Units Subcutaneous TID WC    insulin lispro  0-6 Units Subcutaneous Nightly    insulin glargine  10 Units Subcutaneous Nightly    heparin (porcine)  5,000 Units Subcutaneous 3 times per day    ticagrelor  90 mg Oral BID    albuterol  2.5 mg Nebulization TID    nystatin  5 mL Oral 4x Daily    aspirin  81 mg PEG Tube Daily    famotidine  20 mg PEG Tube Daily    hydrALAZINE  10 mg Oral 3 times per day    losartan  50 mg Oral Daily    melatonin  6 mg PEG Tube Nightly    OLANZapine  5 mg PEG Tube Nightly    rosuvastatin  40 mg PEG Tube Nightly    tiotropium  2 puff Inhalation Lunch         Physical Exam:   BP (!) 151/89   Pulse 84   Temp 99.4 °F (37.4 °C) (Axillary)   Resp 22   Ht 5' 10\" (1.778 m)   Wt 212 lb 8.4 oz (96.4 kg)   SpO2 99%   BMI 30.49 kg/m²   Temp (24hrs), Av.2 °F (37.3 °C), Min:98.4 °F (36.9 °C), Max:99.6 °F (37.6 °C)          Neurological examination:      Mental status    Patient is lethargic. Eyes open spontaneously. Occasionally moans when stimulated. Not following commands.       Cranial nerves    Pupil response:            Present     Oculocephalic reflex: Present  Corneal Reflex:            Present     Facial grimace to pin:  Present     Gag/Cough reflex:       Present         Motor and sensory function   increased tone present bilateral upper extremities with withdrawal to pain in the right greater than left hemibody.      DTR brisk throughout  Plantar response: Upgoing on left  (-) Grayson's sign bilaterally    Gait  Not tested patient is very lethargic               Diagnostics:      Laboratory Testing:  CBC:   Recent Labs     08/12/21  0729 08/14/21  0537   WBC 9.1 13.5*   HGB 10.7* 11.3*    218     BMP:    Recent Labs     08/13/21  0513      K 5.1      CO2 21   BUN 22   CREATININE 1.49*   GLUCOSE 301*         Lab Results   Component Value Date    CHOL 186 07/14/2021    LDLCHOLESTEROL 129 07/14/2021    HDL 42 07/14/2021    TRIG 59 08/05/2021     (H) 08/09/2021     (H) 08/09/2021    TSH 0.28 (L) 07/14/2021    INR 1.1 08/08/2021    LABA1C 7.7 (H) 07/14/2021    [id number] 07/13/2021         Imaging/Diagnostics:      MRI brain (7/14/2021): Acute infarct left and right basal ganglia, greater on the left.  Diffuse volume loss and extensive chronic ischemic white matter changes.         MRI brain (8/7/2021): Multifocal acute infarcts involving the bilateral basal ganglia, greater on the right, posterior commissure of the corpus callosum, lateral margin of the left thalamus, left greater than right frontal parietal paramedian subcortical and deep white matter.               CTA head and neck (7/13/2021): Severe stenosis V1 segment of left vertebral artery.  Near complete occlusion right PCA.  Severe stenosis proximal A3 segment of ACAs bilaterally.  Moderate to severe proximal M2 segment stenosis in the right MCA.  Moderate stenosis M1, M2 segments of the left MCA.  Moderate stenosis of the P1/P2 junction of the left PCA.     MRA head and neck (8/7/2021): Focal high-grade stenosis involving A1 segment of left RAHEEM.  Right MCA M1 segment focal moderate grade stenosis.  Right MCA M2 segment multifocal moderate to high-grade stenosis.  Bilateral PCA P2 segment multifocal moderate to high-grade stenosis.      2D echo: EF 50-55%.  Negative bubble study.  LA is normal size. I personally reviewed all of the above medications, clinical laboratory, imaging and other diagnostic tests. Impression:      1. Multiple bihemispheric acute/subacute infarcts with right greater than left hemiparesis, subsequent encephalopathy with minimally responsive state  2. Significant intracranial diffuse atherosclerotic disease likely etiology of patient's strokes  3. status post PEG tube placement    Plan:      Continue aspirin 81 mg daily, Brilinta 90 mg twice daily   Crestor 40 mg   Prognosis for functional neurologic recovery poor   Awaiting family decision for discharge   No further neurologic work-up indicated at this time. We will sign off, please do not hesitate to contact with any further questions or concerns.         Electronically signed by Lucy Chan DO on 8/14/2021 at 3:07 PM      Lucy Chan [address] [place] [address]  Neurology

## 2021-08-14 NOTE — PLAN OF CARE
Problem: Safety:  Goal: Free from accidental physical injury  Description: Free from accidental physical injury  8/14/2021 0335 by Darwin Traore RN  Outcome: Ongoing  8/13/2021 1806 by Elicia Morris RN  Outcome: Met This Shift  Note: Complete bedrest maintained as pt is not able to get out of bed at this time. PT /OT trx continued  Goal: Free from intentional harm  Description: Free from intentional harm  8/13/2021 1806 by Elicia Morris RN  Outcome: Completed     Problem: Daily Care:  Goal: Daily care needs are met  Description: Daily care needs are met  8/14/2021 0335 by Darwin Traore RN  Outcome: Ongoing  8/13/2021 1806 by Elicia Morris RN  Outcome: Met This Shift     Problem: Discharge Planning:  Goal: Patients continuum of care needs are met  Description: Patients continuum of care needs are met  8/14/2021 0335 by Darwin Traore RN  Outcome: Ongoing  8/13/2021 1806 by Elicia Morris RN  Outcome: Not Met This Shift  Note: SW working with family re: placement.

## 2021-08-14 NOTE — PROGRESS NOTES
Today's Date: 8/14/2021  Patient Name: Diandra Brito  Date of admission: 8/8/2021 11:30 AM  Patient's age: 77 y.o., 1945  Admission Dx: Altered mental status [R41.82]    Reason for Consult: management recommendations  Requesting Physician: Cj Villegas MD    CHIEF COMPLAINT: Thrombocytopenia. Recurrent stroke. History Obtained From:  electronic medical record, medical team    Interval history:    Patient seen and examined  Labs and vital reviewed  No clinical meaningful recovery  Patient remains nonverbal  No new complaint per nurse      HISTORY OF PRESENT ILLNESS:      The patient is a 77 y.o.  male who is admitted to the hospital for worsening mental status and recurrent strokes    Patient was initially admitted to the hospital on 7/13 with strokelike symptoms. Imaging studies showed evidence of prior bilateral strokes on imaging. CTA done showed diffuse intracranial atherosclerosis disease and near occlusion of right PCA. After evaluation medical therapy was recommended. Further evaluation also revealed new left cortical ischemic stroke. Patient platelet count at presentation was only 4. 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 After recovery of the platelet count patient was started on antiplatelet therapy. Patient was discharged on 7/22 [place] [id number] rehab. Repeat MRI shows new infarcts in the right cerebellar right lentiform nucleus and left periventricular region. MRA confirms atherosclerotic disease. Patient is now placed on low intensity heparin with aspirin. Patient is being worked up with a IZABELLA.   Patient's most latest blood count is 140,000    Patient mentation has worsened and is nonverbal does not follow commands not able to give any history    Past Medical History:   has a past medical history of Centrilobular emphysema ([address] COPD (chronic obstructive pulmonary disease) ([facility] [address] Depression, Diabetes mellitus ([facility] [address] Former smoker, Hyperlipidemia, Hypertension, Mixed restrictive and obstructive lung disease ([facility] [address] Need for pneumococcal vaccine, and Personal history of tobacco use. Past Surgical History:   has a past surgical history that includes Neck surgery; Toe amputation (Right, greater); Cardiac surgery (07/2015); and Upper gastrointestinal endoscopy (N/A, 8/3/2021). Medications:    Prior to Admission medications    Medication Sig Start Date End Date Taking?  Authorizing Provider   Heparin Sodium, Porcine, (HEPARIN, PORCINE,) 5000 UNIT/ML injection Inject 1 mL into the skin every 8 hours 7/20/21   Gabby Gomez MD   QUEtiapine (SEROQUEL) 25 MG tablet Take 1 tablet by mouth nightly as needed for Agitation 7/20/21 7/25/21  Gabby Gomez MD   methylPREDNISolone sodium (SOLU-MEDROL) 40 MG injection Infuse 1 mL intravenously every 12 hours 7/20/21   Gabby Gomez MD   melatonin 3 MG TABS tablet Take 1 tablet by mouth nightly as needed (insomnia) 7/20/21   Gabby Gomez MD   atorvastatin (LIPITOR) 80 MG tablet Take 0.5 tablets by mouth daily (Pt takes one-half of an 80mg tab = 40mg) 7/16/21   Gabby Gomez MD   tiZANidine (ZANAFLEX) 2 MG tablet Take 1 tablet by mouth 4 times daily as needed (muscle spasms) 5/7/21   JOLIE Gallo CNP   naproxen (NAPROSYN) 500 MG tablet Take 1 tablet by mouth 2 times daily (with meals) 1/4/21   Luis Miguel Louise PA-C   ibuprofen (ADVIL;MOTRIN) 800 MG tablet Take 1 tablet by mouth every 8 hours as needed for Pain 6/2/20   Loida Patel MD   lidocaine (LIDODERM) 5 % Place 1 patch onto the skin daily 12 hours on, 12 hours off. 6/2/20   Loida Patel MD   metoprolol succinate (TOPROL XL) 50 MG extended release tablet Take 50 mg by mouth daily    Historical Provider, MD   tiotropium (SPIRIVA RESPIMAT) 2.5 MCG/ACT AERS inhaler Inhale 2 puffs into the lungs daily    Historical Provider, MD   carboxymethylcellulose 1 % ophthalmic solution Place 1 Santiago Romeo MD        famotidine (PEPCID) tablet 20 mg  20 mg PEG Tube Daily Anuja Ross MD   20 mg at 21 08    glucagon (rDNA) injection 1 mg  1 mg Intramuscular PRN Anuja Ross MD        glucose (GLUTOSE) 40 % oral gel 15 g  15 g Oral PRN Anuja Ross MD        hydrALAZINE (APRESOLINE) tablet 10 mg  10 mg Oral 3 times per day Anuja Ross MD   10 mg at 21 0556    losartan (COZAAR) tablet 50 mg  50 mg Oral Daily Anuja Ross MD        melatonin tablet 6 mg  6 mg PEG Tube Nightly Anuja Ross MD   6 mg at 21    OLANZapine (ZYPREXA) tablet 5 mg  5 mg PEG Tube Nightly Anuja Ross MD   5 mg at 21    rosuvastatin (CRESTOR) tablet 40 mg  40 mg PEG Tube Nightly Anuja Ross MD   40 mg at 21    tiotropium (SPIRIVA RESPIMAT) 2.5 MCG/ACT inhaler 2 puff  2 puff Inhalation Lunch Anuja Ross MD   2 puff at 21 0831       Allergies:  Patient has no known allergies. Social History:   reports that he quit smoking about 8 years ago. He started smoking about 64 years ago. He has a 42.00 pack-year smoking history. He has never used smokeless tobacco. He reports that he does not drink alcohol and does not use drugs.      Family History: Unable to obtain due to altered mental status    REVIEW OF SYSTEMS:      Unable to obtain review of system due to patient being nonverbal    PHYSICAL EXAM:        /76   Pulse 83   Temp 99.6 °F (37.6 °C) (Axillary)   Resp 26   Ht 5' 10\" (1.778 m)   Wt 212 lb 8.4 oz (96.4 kg)   SpO2 100%   BMI 30.49 kg/m²    Temp (24hrs), Av.9 °F (37.2 °C), Min:98.3 °F (36.8 °C), Max:99.6 °F (37.6 °C)      General appearance -ill-appearing  Mental status -not alert or cooperative  Eyes -pupils reactive  Ears - bilateral TM's and external ear canals normal   Mouth - mucous membranes moist, pharynx normal without lesions   Neck - supple, no significant adenopathy   Lymphatics - no palpable lymphadenopathy, no hepatosplenomegaly   Chest -decreased breathing sounds  Heart - normal rate, regular rhythm, normal S1, S2, no murmurs  Abdomen - soft, nontender, nondistended, no masses or organomegaly   Neurological -patient is not alert awake oriented does not follow commands  Musculoskeletal - no joint tenderness, deformity or swelling   Extremities - peripheral pulses normal, no pedal edema, no clubbing or cyanosis   Skin - normal coloration and turgor, no rashes, no suspicious skin lesions noted ,      DATA:      Labs:     Results for orders placed or performed during the hospital encounter of 08/08/21   Culture, Blood 1    Specimen: Blood   Result Value Ref Range    Specimen Description . BLOOD  LEFT AC, NO VOLUMES LISTED     Special Requests NOT REPORTED     Culture NO GROWTH 6 DAYS    Culture, Blood 1    Specimen: Blood   Result Value Ref Range    Specimen Description . BLOOD  LEFT AC, NO VOLUMES LISTED     Special Requests NOT REPORTED     Culture NO GROWTH 6 DAYS    Legionella Ag, Ur    Specimen: Urine, straight catheter   Result Value Ref Range    Legionella Pneumophilia Ag, Urine NEGATIVE    STREP PNEUMONIAE ANTIGEN    Specimen: Urine, straight catheter   Result Value Ref Range    Source . CLEAN CATCH URINE     Strep pneumo Ag NEGATIVE    Urinalysis Reflex to Culture    Specimen: Urine, clean catch   Result Value Ref Range    Color, UA YELLOW YELLOW    Turbidity UA CLEAR CLEAR    Glucose, Ur NEGATIVE NEGATIVE    Bilirubin Urine NEGATIVE NEGATIVE    Ketones, Urine NEGATIVE NEGATIVE    Specific Gravity, UA 1.018 1.000 - 1.030    Urine Hgb NEGATIVE NEGATIVE    pH, UA 6.5 5.0 - 8.0    Protein, UA 1+ (A) NEGATIVE    Urobilinogen, Urine Normal Normal    Nitrite, Urine NEGATIVE NEGATIVE    Leukocyte Esterase, Urine NEGATIVE NEGATIVE    Urinalysis Comments NOT REPORTED    Procalcitonin   Result Value Ref Range    Procalcitonin 0.26 (H) <0.09 ng/mL   Mycoplasma Ab,IgM   Result Value Ref Range    Mycoplasma pneumo IgM 0.24 <0.91   Lactic Acid   Result Value Ref Range    Lactic Acid 2.0 0.5 - 2.2 mmol/L   CBC   Result Value Ref Range    WBC 11.4 (H) 3.5 - 11.0 k/uL    RBC 4.48 (L) 4.5 - 5.9 m/uL    Hemoglobin 10.7 (L) 13.5 - 17.5 g/dL    Hematocrit 35.2 (L) 41 - 53 %    MCV 78.7 (L) 80 - 100 fL    MCH 23.9 (L) 26 - 34 pg    MCHC 30.4 (L) 31 - 37 g/dL    RDW 17.9 (H) 11.5 - 14.9 %    Platelets 354 (L) 857 - 450 k/uL    MPV 10.9 6.0 - 12.0 fL    NRBC Automated NOT REPORTED per 100 WBC   HEPATIC FUNCTION PANEL   Result Value Ref Range    Albumin 2.9 (L) 3.5 - 5.2 g/dL    Alkaline Phosphatase 184 (H) 40 - 129 U/L     (H) 5 - 41 U/L     (H) <40 U/L    Total Bilirubin 0.21 (L) 0.3 - 1.2 mg/dL    Bilirubin, Direct 0.10 <0.31 mg/dL    Bilirubin, Indirect 0.11 0.00 - 1.00 mg/dL    Total Protein 6.3 (L) 6.4 - 8.3 g/dL    Globulin NOT REPORTED 1.5 - 3.8 g/dL    Albumin/Globulin Ratio NOT REPORTED 1.0 - 2.5   CBC   Result Value Ref Range    WBC 14.4 (H) 3.5 - 11.0 k/uL    RBC 4.38 (L) 4.5 - 5.9 m/uL    Hemoglobin 10.7 (L) 13.5 - 17.5 g/dL    Hematocrit 34.6 (L) 41 - 53 %    MCV 78.9 (L) 80 - 100 fL    MCH 24.3 (L) 26 - 34 pg    MCHC 30.9 (L) 31 - 37 g/dL    RDW 17.8 (H) 11.5 - 14.9 %    Platelets 314 (L) 997 - 450 k/uL    MPV 10.8 6.0 - 12.0 fL    NRBC Automated NOT REPORTED per 100 WBC   APTT   Result Value Ref Range    PTT 45.7 (H) 24.0 - 36.0 sec   Protime-INR   Result Value Ref Range    Protime 14.7 (H) 11.8 - 14.6 sec    INR 1.1    Basic Metabolic Panel   Result Value Ref Range    Glucose 154 (H) 70 - 99 mg/dL    BUN 31 (H) 8 - 23 mg/dL    CREATININE 1.96 (H) 0.70 - 1.20 mg/dL    Bun/Cre Ratio NOT REPORTED 9 - 20    Calcium 9.2 8.6 - 10.4 mg/dL    Sodium 147 (H) 135 - 144 mmol/L    Potassium 5.8 (H) 3.7 - 5.3 mmol/L    Chloride 111 (H) 98 - 107 mmol/L    CO2 27 20 - 31 mmol/L    Anion Gap 9 9 - 17 mmol/L    GFR Non-African American 34 (L) >60 mL/min    GFR  American 41 (L) >60 mL/min    GFR Comment          GFR Staging NOT REPORTED    Magnesium   Result Value Ref Range    Magnesium 2.6 1.6 - 2.6 mg/dL   Phosphorus   Result Value Ref Range    Phosphorus 4.7 (H) 2.5 - 4.5 mg/dL   HEPARIN LEVEL/ANTI-XA   Result Value Ref Range    Anti-XA Unfrac Heparin 0.67 0.30 - 0.70 IU/L   HEPARIN LEVEL/ANTI-XA   Result Value Ref Range    Anti-XA Unfrac Heparin 0.76 (HH) 0.30 - 0.70 IU/L   HEPARIN LEVEL/ANTI-XA   Result Value Ref Range    Anti-XA Unfrac Heparin 0.55 0.30 - 0.70 IU/L   HEPARIN LEVEL/ANTI-XA   Result Value Ref Range    Anti-XA Unfrac Heparin 0.49 0.30 - 0.70 IU/L   Basic Metabolic Panel w/ Reflex to MG   Result Value Ref Range    Glucose 147 (H) 70 - 99 mg/dL    BUN 28 (H) 8 - 23 mg/dL    CREATININE 1.79 (H) 0.70 - 1.20 mg/dL    Bun/Cre Ratio NOT REPORTED 9 - 20    Calcium 9.3 8.6 - 10.4 mg/dL    Sodium 145 (H) 135 - 144 mmol/L    Potassium 5.6 (H) 3.7 - 5.3 mmol/L    Chloride 110 (H) 98 - 107 mmol/L    CO2 26 20 - 31 mmol/L    Anion Gap 9 9 - 17 mmol/L    GFR Non-African American 37 (L) >60 mL/min    GFR  45 (L) >60 mL/min    GFR Comment          GFR Staging NOT REPORTED    BLOOD GAS, VENOUS   Result Value Ref Range    pH, David 7.409 7.320 - 7.420    pCO2, David 44.5 39.0 - 55.0    pO2, David 37.7 30 - 50    HCO3, Venous 28.2 24.0 - 30.0 mmol/L    Positive Base Excess, David 3.5 (H) 0.0 - 2.0 mmol/L    Negative Base Excess, David NOT REPORTED 0.0 - 2.0 mmol/L    O2 Sat, David 69.2 60.0 - 85.0 %    Total Hb NOT REPORTED 12.0 - 16.0 g/dl    Oxyhemoglobin NOT REPORTED 95.0 - 98.0 %    Carboxyhemoglobin 2.1 0 - 5 %    Methemoglobin 0.8 0.0 - 1.9 %    Pt Temp NOT REPORTED     pH, David, Temp Adj NOT REPORTED 7.320 - 7.420    pCO2, David, Temp Adj NOT REPORTED 39.0 - 55.0 mmHg    pO2, David, Temp Adj NOT REPORTED 30.0 - 50.0 mmHg    O2 Device/Flow/% NOT REPORTED     Respiratory Rate NOT REPORTED     Adelfo Test NOT REPORTED     Sample Site NOT REPORTED     Pt.  Position NOT REPORTED     Mode NOT REPORTED     Set Rate NOT REPORTED     Total Rate NOT REPORTED     VT NOT REPORTED     FIO2 NOT REPORTED     Peep/Cpap NOT REPORTED     PSV NOT REPORTED     Text for Respiratory NOT REPORTED     NOTIFICATION NOT REPORTED     NOTIFICATION TIME NOT REPORTED    CK   Result Value Ref Range    Total  39 - 308 U/L   Myoglobin   Result Value Ref Range    Myoglobin 280 (H) 28 - 72 ng/mL   Ammonia   Result Value Ref Range    Ammonia 18 16 - 60 umol/L   HEPARIN LEVEL/ANTI-XA   Result Value Ref Range    Anti-XA Unfrac Heparin 0.37 0.30 - 0.70 IU/L   Microscopic Urinalysis   Result Value Ref Range    -          WBC, UA 2 TO 5 /HPF    RBC, UA 0 TO 2 /HPF    Casts UA NOT REPORTED /LPF    Crystals, UA NOT REPORTED None /HPF    Epithelial Cells UA 0 TO 2 /HPF    Renal Epithelial, UA NOT REPORTED 0 /HPF    Bacteria, UA FEW (A) None    Mucus, UA NOT REPORTED None    Trichomonas, UA NOT REPORTED None    Amorphous, UA 1+ (A) None    Other Observations UA NOT REPORTED NOT REQ.     Yeast, UA NOT REPORTED None   Potassium   Result Value Ref Range    Potassium 5.3 3.7 - 5.3 mmol/L   HEPARIN LEVEL/ANTI-XA   Result Value Ref Range    Anti-XA Unfrac Heparin 0.39 0.30 - 0.70 IU/L   Basic Metabolic Panel w/ Reflex to MG   Result Value Ref Range    Glucose 127 (H) 70 - 99 mg/dL    BUN 25 (H) 8 - 23 mg/dL    CREATININE 1.45 (H) 0.70 - 1.20 mg/dL    Bun/Cre Ratio NOT REPORTED 9 - 20    Calcium 8.7 8.6 - 10.4 mg/dL    Sodium 139 135 - 144 mmol/L    Potassium 4.4 3.7 - 5.3 mmol/L    Chloride 105 98 - 107 mmol/L    CO2 25 20 - 31 mmol/L    Anion Gap 9 9 - 17 mmol/L    GFR Non-African American 48 (L) >60 mL/min    GFR  58 (L) >60 mL/min    GFR Comment          GFR Staging NOT REPORTED    CBC   Result Value Ref Range    WBC 9.3 3.5 - 11.0 k/uL    RBC 4.48 (L) 4.5 - 5.9 m/uL    Hemoglobin 10.9 (L) 13.5 - 17.5 g/dL    Hematocrit 35.4 (L) 41 - 53 %    MCV 79.0 (L) 80 - 100 fL    MCH 24.4 (L) 26 - 34 pg    MCHC 30.9 (L) 31 - 37 g/dL    RDW 18.2 (H) 11.5 - 14.9 %    Platelets 754 (L) 425 - 450 k/uL    MPV 12.0 6.0 - 12.0 fL    NRBC Automated NOT REPORTED per 100 WBC   HEPARIN LEVEL/ANTI-XA   Result Value Ref Range    Anti-XA Unfrac Heparin 0.21 (L) 0.30 - 0.70 IU/L   HEPARIN LEVEL/ANTI-XA   Result Value Ref Range    Anti-XA Unfrac Heparin 0.73 (HH) 0.30 - 0.70 IU/L   Basic Metabolic Panel   Result Value Ref Range    Glucose 195 (H) 70 - 99 mg/dL    BUN 25 (H) 8 - 23 mg/dL    CREATININE 1.54 (H) 0.70 - 1.20 mg/dL    Bun/Cre Ratio NOT REPORTED 9 - 20    Calcium 8.6 8.6 - 10.4 mg/dL    Sodium 139 135 - 144 mmol/L    Potassium 4.4 3.7 - 5.3 mmol/L    Chloride 107 98 - 107 mmol/L    CO2 24 20 - 31 mmol/L    Anion Gap 8 (L) 9 - 17 mmol/L    GFR Non-African American 44 (L) >60 mL/min    GFR  54 (L) >60 mL/min    GFR Comment          GFR Staging NOT REPORTED    Magnesium   Result Value Ref Range    Magnesium 2.4 1.6 - 2.6 mg/dL   Phosphorus   Result Value Ref Range    Phosphorus 3.9 2.5 - 4.5 mg/dL   HEPARIN LEVEL/ANTI-XA   Result Value Ref Range    Anti-XA Unfrac Heparin 0.48 0.30 - 0.70 IU/L   HEPARIN LEVEL/ANTI-XA   Result Value Ref Range    Anti-XA Unfrac Heparin 0.45 0.30 - 0.70 IU/L   HEPARIN LEVEL/ANTI-XA   Result Value Ref Range    Anti-XA Unfrac Heparin 0.38 0.30 - 0.70 IU/L   HEPARIN LEVEL/ANTI-XA   Result Value Ref Range    Anti-XA Unfrac Heparin 0.29 (L) 0.30 - 0.70 IU/L   CBC   Result Value Ref Range    WBC 9.1 3.5 - 11.0 k/uL    RBC 4.41 (L) 4.5 - 5.9 m/uL    Hemoglobin 10.7 (L) 13.5 - 17.5 g/dL    Hematocrit 34.6 (L) 41 - 53 %    MCV 78.4 (L) 80 - 100 fL    MCH 24.1 (L) 26 - 34 pg    MCHC 30.8 (L) 31 - 37 g/dL    RDW 17.3 (H) 11.5 - 14.9 %    Platelets 213 485 - 903 k/uL    MPV 10.7 6.0 - 12.0 fL    NRBC Automated NOT REPORTED per 100 WBC   HEPARIN LEVEL/ANTI-XA   Result Value Ref Range    Anti-XA Unfrac Heparin 0.75 (HH) 0.30 - 0.70 IU/L   Basic Metabolic Panel   Result Value Ref Range    Glucose 301 Result Value Ref Range    POC Glucose 159 (H) 75 - 110 mg/dL   POC Glucose Fingerstick   Result Value Ref Range    POC Glucose 186 (H) 75 - 110 mg/dL   POC Glucose Fingerstick   Result Value Ref Range    POC Glucose 148 (H) 75 - 110 mg/dL   POC Glucose Fingerstick   Result Value Ref Range    POC Glucose 168 (H) 75 - 110 mg/dL   POC Glucose Fingerstick   Result Value Ref Range    POC Glucose 158 (H) 75 - 110 mg/dL   POC Glucose Fingerstick   Result Value Ref Range    POC Glucose 214 (H) 75 - 110 mg/dL   POC Glucose Fingerstick   Result Value Ref Range    POC Glucose 223 (H) 75 - 110 mg/dL   POC Glucose Fingerstick   Result Value Ref Range    POC Glucose 188 (H) 75 - 110 mg/dL   POC Glucose Fingerstick   Result Value Ref Range    POC Glucose 226 (H) 75 - 110 mg/dL   POC Glucose Fingerstick   Result Value Ref Range    POC Glucose 291 (H) 75 - 110 mg/dL   POC Glucose Fingerstick   Result Value Ref Range    POC Glucose 214 (H) 75 - 110 mg/dL   POC Glucose Fingerstick   Result Value Ref Range    POC Glucose 189 (H) 75 - 110 mg/dL   POC Glucose Fingerstick   Result Value Ref Range    POC Glucose 198 (H) 75 - 110 mg/dL   POC Glucose Fingerstick   Result Value Ref Range    POC Glucose 238 (H) 75 - 110 mg/dL   EKG 12 Lead   Result Value Ref Range    Ventricular Rate 54 BPM    Atrial Rate 54 BPM    P-R Interval 146 ms    QRS Duration 92 ms    Q-T Interval 426 ms    QTc Calculation (Bazett) 403 ms    P Axis 65 degrees    R Axis -35 degrees    T Axis 68 degrees         IMAGING DATA:    ECHO Complete 2D W Doppler W Color    Result Date: 7/15/2021  Transthoracic Echocardiography Report (TTE)  Patient Name GOFF      Date of Study               07/15/2021               YAYA DUBOIS   Date of      1945  Gender                      Male  Birth   Age          77 year(s)  Race                        Black   Room Number  0536        Height:                     70 inch, 177.8 cm   Corporate ID [id number]    Weight: 210 pounds, 95.3 kg  #   Patient Acct [de-identified]   BSA:          2.13 m^2      BMI:       30.13  #                                                               kg/m^2   MR #         [id number]     Sonographer                 Pablo Bates   Accession #  [id number]  Interpreting Physician      Yolande Vance   Fellow                   Referring Nurse                           Practitioner   Interpreting             Referring Physician         Carlo Malagon MD  Fellow  Additional Comments Technically difficult study, patient supine unable to be positioned for better acoustic windows. Type of Study   TTE procedure:2D Echocardiogram, M-Mode, Doppler, Color Doppler. Procedure Date Date: 07/15/2021 Start: 08:29 AM Study Location: [facility] Technical Quality: Adequate visualization Indications:CVA. History / Tech. Comments: Procedure explained to patient. Echo completed in the Echo Lab. RN performed bubble study. PMHx: Former smoker, COPD Hypertension, Diabetes, Hyperlipidemia Coronary artery disease, s/p stents Patient Status: Inpatient Height: 70 inches Weight: 210 pounds BSA: 2.13 m^2 BMI: 30.13 kg/m^2 CONCLUSIONS Summary Technically difficult study. Overall global left ventricular systolic function is normal, calculated ejection fraction is 50-55% Grade I (mild) left ventricular diastolic dysfunction. Technically difficult visualization of the right ventricle, but it does appear to be normal in size. Negative bubble study, no obvious intracardiac shunt noted within technical limitations of this study. No significant valvular regurgitation or stenosis seen. No significant pericardial effusion is seen.  Signature ----------------------------------------------------------------------------  Electronically signed by Joanne Munoz(Sonographer) on 07/15/2021 11:00 AM ---------------------------------------------------------------------------- ----------------------------------------------------------------------------  Electronically signed by Yolande Vance(Interpreting physician) on  07/15/2021 12:49 PM ---------------------------------------------------------------------------- FINDINGS Left Atrium Left atrium is normal in size. Inter-atrial septum is intact with no evidence for an atrial septal defect. Negative bubble study, no shunt noted. Left Ventricle Left ventricle is normal in size, normal wall thickness, global left ventricular systolic function is low normal, calculated ejection fraction is 50%. Grade I (mild) left ventricular diastolic dysfunction. Right Atrium Right atrium is normal in size. Right Ventricle Technically difficult visualization of the right ventricle, but it does appear to be normal in size. Mitral Valve Normal mitral valve structure. No mitral stenosis. Trivial mitral regurgitation. Aortic Valve Aortic valve is trileaflet. No evidence of aortic insufficiency or stenosis. Tricuspid Valve Tricuspid valve was not well visualized. Trivial tricuspid regurgitation. Insignificant tricuspid regurgitation, unable to estimate RVSP. Pulmonic Valve Pulmonic valve not well visualized but Doppler velocities are normal. Trivial pulmonic insufficiency. Pericardial Effusion No significant pericardial effusion is seen. Miscellaneous Normal aortic root dimension. E/E' average = 17.35. IVC not well visualized.  M-mode / 2D Measurements & Calculations:   LVIDd:5.6 cm(3.7 - 5.6 cm)        Diastolic PEUEUS:96.17 ml  IVSd:0.9 cm(0.6 - 1.1 cm)         Systolic RQIZMP:91.89 ml  LVPWd:0.8 cm(0.6 - 1.1 cm)        Aortic Root:3 cm(2.0 - 3.7 cm)                                    LA Dimension: 3.2 cm(1.9 - 4.0 cm)  Calculated LVEF (%): 50.46 %      LA volume/Index: 48.86 ml /23m^2                                    LVOT:2.1 cm                                    RVDd:3 cm   Mitral:                                 Aortic   Valve Area (P1/2-Time): 2.65 cm^2       Peak Velocity: 1.91 m/s  Peak E-Wave: 1.18 m/s                   Mean Velocity: 1.06 m/s  Peak A-Wave: 1.47 m/s                   Peak Gradient: 14.59 mmHg  E/A Ratio: 0.8                          Mean Gradient: 6 mmHg  Peak Gradient: 5.57 mmHg  Mean Gradient: 2 mmHg  Deceleration Time: 280 msec             Area (continuity): 2.83 cm^2  P1/2t: 83 msec                          AV VTI: 41.8 cm   Area (continuity): 2.2 cm^2  Mean Velocity: 0.62 m/s                                           Pulmonic:                                           Peak Velocity: 1.29 m/s                                          Peak Gradient: 6.66 mmHg  Diastology / Tissue Doppler Septal Wall E' velocity:0.06 m/s Septal Wall E/E':20.8 Lateral Wall E' velocity:0.08 m/s Lateral Wall E/E':13.9    CT HEAD WO CONTRAST    Result Date: 8/9/2021  EXAMINATION: CT OF THE HEAD WITHOUT CONTRAST  8/9/2021 2:04 pm TECHNIQUE: CT of the head was performed without the administration of intravenous contrast. Dose modulation, iterative reconstruction, and/or weight based adjustment of the mA/kV was utilized to reduce the radiation dose to as low as reasonably achievable. COMPARISON: MRI brain performed 08/07/2021. HISTORY: ORDERING SYSTEM PROVIDED HISTORY: stroke TECHNOLOGIST PROVIDED HISTORY: stroke Reason for Exam: stroke; ams Acuity: Unknown Type of Exam: Unknown Additional signs and symptoms: patient unable to stay still; turned head during exam FINDINGS: BRAIN/VENTRICLES: There is no acute intracranial hemorrhage, mass effect, or midline shift. There is satisfactory overall gray-white matter differentiation. There is extensive chronic microvascular disease. There are evolving areas of ischemia in the left periventricular white matter, and corpus callosum posteriorly, in the right basal ganglia. The ventricular structures are symmetric and unremarkable. The infratentorial structures are unremarkable.  ORBITS: The visualized portion of the orbits demonstrate no acute abnormality. SINUSES: The visualized paranasal sinuses and mastoid air cells demonstrate no acute abnormality. SOFT TISSUES/SKULL:  No acute abnormality of the visualized skull or soft tissues. Chronic microvascular disease with scattered areas of evolving ischemia as described above. CT HEAD WO CONTRAST    Result Date: 8/6/2021  EXAMINATION: CT OF THE HEAD WITHOUT CONTRAST  8/6/2021 2:21 pm TECHNIQUE: CT of the head was performed without the administration of intravenous contrast. Dose modulation, iterative reconstruction, and/or weight based adjustment of the mA/kV was utilized to reduce the radiation dose to as low as reasonably achievable. COMPARISON: 4 August 2021 HISTORY: ORDERING SYSTEM PROVIDED HISTORY: Pt w/ bilateral basal ganglia infarcts, right hemiparesis; minimally interactive over the last week and having increased lethargy the last few days. TECHNOLOGIST PROVIDED HISTORY: Pt w/ bilateral basal ganglia infarcts, right hemiparesis; minimally interactive over the last week and having increased lethargy the last few days. Reason for Exam: Increased lethargy the last few days. Acuity: Unknown Type of Exam: Unknown FINDINGS: BRAIN/VENTRICLES: There is no acute intracranial hemorrhage, mass effect or midline shift. No abnormal extra-axial fluid collection. The gray-white differentiation is maintained without evidence of an acute infarct. There is no evidence of hydrocephalus. Remote right-sided basal ganglial infarcts are noted. However, there is been interval development of hypodensity in the right mid basal ganglia since prior study consistent with an evolving subacute infarct. No significant mass effect is noted. Ventricles are proportionate to cerebral atrophy. ORBITS: The visualized portion of the orbits demonstrate no acute abnormality. SINUSES: The visualized paranasal sinuses and mastoid air cells demonstrate no acute abnormality.  SOFT TISSUES/SKULL:  No acute abnormality of the visualized skull or soft tissues. There has been interval development of a hypodensity in the right basal ganglia compared to prior study consistent with evolving subacute white matter infarct. No hemorrhage is noted. Chronic microvascular change in atrophy is demonstrated. No midline shift. CT HEAD WO CONTRAST    Result Date: 8/4/2021  EXAMINATION: CT OF THE HEAD WITHOUT CONTRAST  8/4/2021 1:25 pm TECHNIQUE: CT of the head was performed without the administration of intravenous contrast. Dose modulation, iterative reconstruction, and/or weight based adjustment of the mA/kV was utilized to reduce the radiation dose to as low as reasonably achievable. COMPARISON: CT head 07/21/2021 and 07/17/2021 HISTORY: ORDERING SYSTEM PROVIDED HISTORY: Patient with history of left-sided CVA with right hemiparesis, not communicating with staff, please evaluate for any change from previous imaging and any other abnormality TECHNOLOGIST PROVIDED HISTORY: Patient with history of left-sided CVA with right hemiparesis, not communicating with staff, please evaluate for any change from previous imaging and any other abnormality Reason for Exam: Patient with history of left-sided CVA with right hemiparesis, not communicating with staff, please evaluate for any change from previous imaging and any other abnormality Acuity: Unknown Type of Exam: Unknown FINDINGS: BRAIN/VENTRICLES: There is no acute intracranial hemorrhage, mass effect or midline shift. No abnormal extra-axial fluid collection. The gray-white differentiation is maintained without evidence of an acute infarct. There is prominence of the ventricles and sulci due to global parenchymal volume loss. There are nonspecific areas of hypoattenuation within the periventricular and subcortical white matter, which likely represent chronic microvascular ischemic change. 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 Remote lacunar stroke left caudate lobe. ORBITS: The visualized portion of the orbits demonstrate no acute abnormality. SINUSES: The visualized paranasal sinuses and mastoid air cells demonstrate no acute abnormality. SOFT TISSUES/SKULL: No acute abnormality of the visualized skull or soft tissues. 1. No acute intracranial abnormality. 2. 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 Remote lacunar stroke left caudate lobe. 3. Senescent changes. White matter hypoattenuation described is typical of microvascular ischemic disease or as sequela of dysmyelinating/demyelinating processes. CT HEAD WO CONTRAST    Result Date: 7/21/2021  EXAMINATION: CT OF THE HEAD WITHOUT CONTRAST  7/21/2021 11:53 am TECHNIQUE: CT of the head was performed without the administration of intravenous contrast. Dose modulation, iterative reconstruction, and/or weight based adjustment of the mA/kV was utilized to reduce the radiation dose to as low as reasonably achievable. COMPARISON: July 17, 2021, MRI July 14, 2021 HISTORY: ORDERING SYSTEM PROVIDED HISTORY: Altered mental status TECHNOLOGIST PROVIDED HISTORY: eval for change in status Reason for Exam: EVAL FOR CHANGE IN STATUS Type of Exam: Subsequent/Follow-up Additional signs and symptoms: PT BECAME AGTATED & COMBATIVE OVERNIGHT Relevant Medical/Surgical History: HX STROKE FINDINGS: BRAIN/VENTRICLES: No acute intracranial hemorrhage, mass effect or midline shift. Area of hypoattenuation within the left basal ganglia and small foci of hypoattenuation in the right basal ganglia compatible with subacute infarct. Diffuse cortical volume loss and compensatory ventricular enlargement appears unchanged. Periventricular and subcortical white matter hypoattenuation redemonstrated bilaterally compatible with severe chronic microvascular ischemic changes. Encephalomalacia of the right caudate redemonstrated compatible with remote lacunar infarct.  ORBITS: HISTORY: ORDERING SYSTEM PROVIDED HISTORY: Last known well 2 days right-sided facial droop right-sided weakness TECHNOLOGIST PROVIDED HISTORY: Last known well 2 days right-sided facial droop right-sided weakness Decision Support Exception - unselect if not a suspected or confirmed emergency medical condition->Emergency Medical Condition (MA) Reason for Exam: Last known well 2 days right-sided facial droop right-sided weakness FINDINGS: BRAIN/VENTRICLES: Ovoid area of low attenuation in the left frontal corona radiata measures 2 x 1.4 cm, new from prior study (series 2, image 39). No acute intracranial hemorrhage. No mass effect or midline shift. No hydrocephalus. Diffuse parenchymal volume loss with enlargement of the ventricles and cerebral sulci. Old infarction in the right basal ganglia. There are nonspecific areas of hypoattenuation within the periventricular and subcortical white matter, which likely represent chronic microvascular ischemic change. Intracranial atherosclerotic disease. ORBITS: The visualized portion of the orbits demonstrate no acute abnormality. SINUSES: The visualized paranasal sinuses and mastoid air cells demonstrate no acute abnormality. SOFT TISSUES/SKULL: No acute abnormality of the visualized skull or soft tissues. 1. New ovoid low-attenuation area in the left frontal corona radiata compatible with acute/subacute infarction. This measures 2 x 1.4 cm. No associated hemorrhage. 2. Diffuse parenchymal volume loss and sequela of severe chronic microvascular ischemic changes. Findings were discussed with Dr. Niru Ahn at 1:31 pm on 7/13/2021.      MRA HEAD WO CONTRAST    Result Date: 8/7/2021  EXAMINATION: MRA OF THE NECK WITHOUT CONTRAST; MRI OF THE BRAIN WITHOUT CONTRAST; MRA OF THE HEAD WITHOUT CONTRAST 8/7/2021 8:28 pm; 8/7/2021 8:34 pm; 8/7/2021 8:27 pm: TECHNIQUE: Multiplanar multisequence MRA of the neck was performed without the administration of intravenous contrast. Stenosis of the internal carotid arteries measured using NASCET criteria.; Multiplanar multisequence MRI of the brain was performed without the administration of intravenous contrast.; MRA of the head was performed utilizing time-of-flight imaging with MIP images. No intravenous contrast was administered. COMPARISON: None. HISTORY: ORDERING SYSTEM PROVIDED HISTORY: stroke TECHNOLOGIST PROVIDED HISTORY: stroke Reason for Exam: stroke Additional signs and symptoms: patient unable to give history and unable to follow exam instructions due to mental status FINDINGS: MRI BRAIN: INTRACRANIAL STRUCTURES/VENTRICLES: Multifocal diffusion restriction abnormality related to acute ischemia are seen involving the right basal ganglia within the putamen, caudate nucleus and anterior limb of the right internal capsule, the posterior limb of the left internal capsule and lateral margin of the left thalamus, the posterior commissure of the corpus callosum, and left greater than right frontal parietal paramedian subcortical and deep white matter . Can fluent increased T2/FLAIR signal is noted within the cerebral white matter consistent with severe microvascular ischemic change. No mass effect or midline shift. No evidence of an acute intracranial hemorrhage. Mild diffuse decrease in cerebral volume is noted with corresponding prominence of the sulci and ventricles. The sellar/suprasellar regions appear unremarkable. The normal signal voids within the major intracranial vessels appear maintained. ORBITS: The visualized portion of the orbits demonstrate no acute abnormality. SINUSES: The visualized paranasal sinuses and mastoid air cells are well aerated. BONES/SOFT TISSUES: The bone marrow signal intensity appears normal. The soft tissues demonstrate no acute abnormality. MRA NECK: AORTIC ARCH/ARCH VESSELS: No dissection or arterial injury. No significant stenosis of the brachiocephalic or subclavian arteries.  CAROTID ARTERIES: No dissection, arterial injury, or hemodynamically significant stenosis by NASCET criteria. VERTEBRAL ARTERIES: No dissection, arterial injury, or significant stenosis. MRA HEAD: ANTERIOR CIRCULATION: Suspected focal high-grade stenosis involving the A1 segment of the left anterior cerebral artery is identified. Right middle cerebral artery focal moderate grade stenosis involving the M1 segment is present. Suspected multifocal moderate to high-grade stenosis involving the right middle cerebral artery M2 segment is seen. No further evidence of significant stenosis of the intracranial internal carotid, anterior cerebral, or middle cerebral arteries. POSTERIOR CIRCULATION: Bilateral posterior cerebral artery P2 segment suspected focal moderate to high-grade stenosis are noted. No further evidence of significant stenosis of the vertebral, basilar, or posterior cerebral arteries. 1. Multifocal acute ischemia involving the bilateral basal ganglia, as discussed above, posterior commissure of the corpus callosum, lateral margin of left thalamus and the left greater than right frontal parietal paramedian subcortical and deep white matter. 2. No evidence of associated hemorrhagic conversion. 3. Finding suggestive of embolic phenomenon. Recommend clinical correlation. 4. Suspected focal high-grade stenosis involving A1 segment of the left anterior cerebral artery. 5. Suspected right middle cerebral artery M1 segment focal moderate grade stenosis. 6. Suspected right middle cerebral artery M2 segment multifocal moderate to high-grade stenosis. 7. Bilateral posterior cerebral artery P2 segment suspected multifocal moderate to high-grade stenosis. 8. Grossly unremarkable MR angiogram of the neck. Note: MR angiographic evaluation of the neck is severely limited by patient motion related artifact. 9. Severe cerebral white matter chronic microvascular ischemic disease. 10. Mild diffuse cerebral atrophy.  Critical results were called by Dr. Chantell Fang to Dr. Josefina Le On 8/7/2021 at 22:11. XR CHEST PORTABLE    Result Date: 8/8/2021  EXAMINATION: ONE XRAY VIEW OF THE CHEST 8/8/2021 11:58 am COMPARISON: 07/13/2021 HISTORY: ORDERING SYSTEM PROVIDED HISTORY: cough and fever TECHNOLOGIST PROVIDED HISTORY: cough and fever Reason for Exam: cough and fever Acuity: Acute Type of Exam: Initial FINDINGS: No lung infiltrate or consolidation. No pneumothorax or pleural effusion. Heart size is normal.     No acute abnormality. XR CHEST PORTABLE    Result Date: 7/13/2021  EXAMINATION: ONE XRAY VIEW OF THE CHEST 7/13/2021 10:30 am COMPARISON: September 24, 2019. HISTORY: ORDERING SYSTEM PROVIDED HISTORY: Found down TECHNOLOGIST PROVIDED HISTORY: Found down Reason for Exam: supine Acuity: Acute Type of Exam: Initial FINDINGS: A portable upright frontal view chest radiograph was obtained. The heart is enlarged. The mediastinal contour and pleural spaces are otherwise within normal limits. The lungs are grossly clear. There is no focal consolidation or pneumothorax. The pulmonary vascular pattern is within normal limits. No acute thoracic osseous abnormality. Clear lungs. Cardiomegaly. No acute cardiopulmonary abnormality.      VL DUP LOWER EXTREMITY VENOUS BILATERAL    Result Date: 7/15/2021    [facility]  Vascular Lower Extremities DVT Study Procedure   Patient Name GOFF      Date of Study           07/15/2021               YAYA DUBOIS   Date of      1945  Gender                  Male  Birth   Age          77 year(s)  Race                    Black   Room Number  0536        Height:                 70 inch, 177.8 cm   Corporate ID [id number]    Weight:                 210 pounds, 95.3 kg  #   Patient Acct [de-identified]   BSA:        2.13 m^2    BMI:        30.13 kg/m^2  #   MR #         [id number]     Sonographer             Vandana Kidney, RVT   Accession #  [id number]  Interpreting Physician  Tammie Hawkins Referring                Referring Physician     Maurice Connolly  Nurse  Practitioner  Procedure Type of Study:   Veins: Lower Extremities DVT Study, Venous Scan Lower Bilateral.  Indications for Study:CVA. Patient Status: In Patient. Technical Quality:Limited visualization. Limitation reason:legs rigid, extreme edema and resistance to compression . Conclusions   Summary   Simultaneous real time imaging utilizing B-Mode, color doppler and  spectral waveform analysis was performed on the bilateral lower  extremities for venous examination of the deep and superficial systems. Findings are:   Right:  No evidence of deep or superficial venous thrombosis. Left:  No evidence of deep or superficial venous thrombosis. Signature   ----------------------------------------------------------------  Electronically signed by Alexis Rosario RVT(Sonographer)  on 07/15/2021 04:23 PM  ----------------------------------------------------------------   ----------------------------------------------------------------  Electronically signed by Nevaeh Andrea(Interpreting  physician) on 07/15/2021 08:56 PM  ----------------------------------------------------------------  Findings:   Right Impression:                    Left Impression:  The common femoral, femoral,         The common femoral, femoral,  popliteal and tibial veins           popliteal and tibial veins  demonstrate normal compressibility   demonstrate normal compressibility  and augmentation. and augmentation. Normal compressibility of the great  Normal compressibility of the great  saphenous vein. saphenous vein. Normal compressibility of the small  Normal compressibility of the small  saphenous vein. saphenous vein. Risk Factors   - The patient's risk factor(s) include: chronic lung disease, diabetes     mellitus and arterial hypertension.  Velocities are measured in cm/s ; Diameters are measured in cm Right Lower Extremities DVT Study Measurements Right 2D Measurements +------------------------------------+----------+---------------+----------+ ! Location                            ! Visualized! Compressibility! Thrombosis! +------------------------------------+----------+---------------+----------+ ! Common Femoral                      !Yes       ! Yes            ! None      ! +------------------------------------+----------+---------------+----------+ ! Prox Femoral                        !Yes       ! Yes            ! None      ! +------------------------------------+----------+---------------+----------+ ! Mid Femoral                         !Yes       ! Yes            ! None      ! +------------------------------------+----------+---------------+----------+ ! Dist Femoral                        !Yes       ! Yes            ! None      ! +------------------------------------+----------+---------------+----------+ ! Deep Femoral                        !No        !               !          ! +------------------------------------+----------+---------------+----------+ ! Popliteal                           !Yes       ! Yes            ! None      ! +------------------------------------+----------+---------------+----------+ ! Sapheno Femoral Junction            ! Yes       ! Yes            ! None      ! +------------------------------------+----------+---------------+----------+ ! PTV                                 ! Yes       ! Yes            ! None      ! +------------------------------------+----------+---------------+----------+ ! Peroneal                            !No        !               !          ! +------------------------------------+----------+---------------+----------+ ! Gastroc                             ! Yes       ! Yes            ! None      ! +------------------------------------+----------+---------------+----------+ ! GSV Denisse                           ! Yes       ! Yes            ! None      ! +------------------------------------+----------+---------------+----------+ ! GSV Knee                            ! Yes       ! Yes            ! None      ! +------------------------------------+----------+---------------+----------+ ! GSV Ankle                           ! Yes       ! Yes            ! None      ! +------------------------------------+----------+---------------+----------+ ! SSV                                 ! Yes       ! Yes            ! None      ! +------------------------------------+----------+---------------+----------+ Right Doppler Measurements +---------------------------+------+------+--------------------------------+ ! Location                   ! Signal!Reflux! Reflux (msec)                   ! +---------------------------+------+------+--------------------------------+ ! Common Femoral             !Phasic!      !                                ! +---------------------------+------+------+--------------------------------+ ! Prox Femoral               !Phasic!      !                                ! +---------------------------+------+------+--------------------------------+ ! Popliteal                  !Phasic!      !                                ! +---------------------------+------+------+--------------------------------+ Left Lower Extremities DVT Study Measurements Left 2D Measurements +------------------------------------+----------+---------------+----------+ ! Location                            ! Visualized! Compressibility! Thrombosis! +------------------------------------+----------+---------------+----------+ ! Common Femoral                      !Yes       ! Yes            ! None      ! +------------------------------------+----------+---------------+----------+ ! Prox Femoral                        !Yes       ! Yes            ! None      ! +------------------------------------+----------+---------------+----------+ ! Mid Femoral                         !Yes       ! Yes            ! None      ! +------------------------------------+----------+---------------+----------+ ! Dist Femoral                        !Yes       ! Yes            ! None      ! +------------------------------------+----------+---------------+----------+ ! Deep Femoral                        !No        !               !          ! +------------------------------------+----------+---------------+----------+ ! Popliteal                           !Yes       ! Yes            ! None      ! +------------------------------------+----------+---------------+----------+ ! Sapheno Femoral Junction            ! Yes       ! Yes            ! None      ! +------------------------------------+----------+---------------+----------+ ! PTV                                 ! Yes       ! Yes            ! None      ! +------------------------------------+----------+---------------+----------+ ! Peroneal                            !Yes       ! Yes            ! None      ! +------------------------------------+----------+---------------+----------+ ! Gastroc                             ! Yes       ! Yes            ! None      ! +------------------------------------+----------+---------------+----------+ ! GSV Thigh                           ! Yes       ! Yes            ! None      ! +------------------------------------+----------+---------------+----------+ ! GSV Knee                            ! Yes       ! Yes            ! None      ! +------------------------------------+----------+---------------+----------+ ! GSV Ankle                           ! Yes       ! Yes            ! None      ! +------------------------------------+----------+---------------+----------+ ! SSV                                 ! Yes       ! Yes            ! None      ! +------------------------------------+----------+---------------+----------+ Left Doppler Measurements +---------------------------+------+------+--------------------------------+ ! Location                   ! Signal!Reflux! Reflux (msec) ! +---------------------------+------+------+--------------------------------+ ! Common Femoral             !Phasic!      !                                ! +---------------------------+------+------+--------------------------------+ ! Prox Femoral               !Phasic!      !                                ! +---------------------------+------+------+--------------------------------+ ! Popliteal                  !Phasic!      !                                ! +---------------------------+------+------+--------------------------------+    US SPLEEN    Result Date: 7/14/2021  EXAMINATION: ULTRASOUND OF THE SPLEEN 7/14/2021 8:29 am COMPARISON: None. HISTORY: ORDERING SYSTEM PROVIDED HISTORY: thrombocytopenia TECHNOLOGIST PROVIDED HISTORY: thrombocytopenia FINDINGS: Suboptimal study. The visualized spleen appears enlarged measuring 13.4 cm. No focal lesion is seen. No free fluid. Suboptimal study. Mild splenomegaly. CTA HEAD NECK W CONTRAST    Result Date: 7/13/2021  EXAMINATION: CTA OF THE HEAD AND NECK WITH CONTRAST 7/13/2021 1:11 pm: TECHNIQUE: CTA of the head and neck was performed with the administration of intravenous contrast. Multiplanar reformatted images are provided for review. MIP images are provided for review. Stenosis of the internal carotid arteries measured using NASCET criteria. Dose modulation, iterative reconstruction, and/or weight based adjustment of the mA/kV was utilized to reduce the radiation dose to as low as reasonably achievable. 3D surface reformatted and curved MIP images were submitted for review. COMPARISON: None. HISTORY: ORDERING SYSTEM PROVIDED HISTORY: stroke TECHNOLOGIST PROVIDED HISTORY: stroke Decision Support Exception - unselect if not a suspected or confirmed emergency medical condition->Emergency Medical Condition (MA) Reason for Exam: stroke, Cerebrovascular Accident; Fall FINDINGS: CTA NECK: AORTIC ARCH/ARCH VESSELS: No dissection or arterial injury.   No significant stenosis of the brachiocephalic or subclavian arteries. CAROTID ARTERIES: No dissection, arterial injury, or hemodynamically significant stenosis by NASCET criteria. VERTEBRAL ARTERIES: No dissection, arterial injury, or significant stenosis in the right vertebral artery. Severe stenosis in the V1 segment of the left vertebral artery. SOFT TISSUES: The lung apices are clear. No cervical or superior mediastinal lymphadenopathy. The larynx and pharynx are unremarkable. No acute abnormality of the salivary glands. Thyroid gland is diffusely enlarged and heterogeneous and contains left thyroid lobe nodule measuring 2.6 cm. BONES: Multilevel degenerative spondylosis throughout the cervical spine with fusion at C5-C6. CTA HEAD: ANTERIOR CIRCULATION: Calcified atherosclerotic plaque in the cavernous segments of the internal carotid arteries bilaterally results in mild-to-moderate cavernous ICA stenosis bilaterally. Mild stenosis in the proximal A2 segment of the right anterior cerebral artery. Severe stenosis in the proximal A3 segments of the anterior cerebral arteries bilaterally. Moderate-to-severe stenosis within M2 segment of the right middle cerebral artery. Moderate stenosis in the M1 and proximal M2 segments of the left middle cerebral artery. POSTERIOR CIRCULATION: No significant stenosis in the right intradural vertebral artery. Severe stenosis in the intracranial left vertebral artery. Mild stenosis in the basilar artery. Moderate stenosis in the P1/P2 junction of the left PCA. Severe stenosis and near complete occlusion of the right PCA. OTHER: No dural venous sinus thrombosis on this non-dedicated study. 1. Severe stenosis in the V1 segment of the left vertebral artery. 2. Extensive intracranial atherosclerotic plaque with near complete occlusion of the right PCA. 3. Severe stenoses in the proximal A3 segments of the ACAs bilaterally.  4. Moderate-to-severe proximal M2 segment stenosis in the right MCA. Moderate stenosis in the M1 and M2 segments of the left MCA. 5. Moderate stenosis in the P1/P2 junction of the left PCA. 6. Enlarged heterogeneous thyroid gland with 2.6 cm left thyroid lobe nodule. Nonemergent/outpatient thyroid ultrasound recommended. Findings were discussed with Dr. Graeme Dumont at 1:43 pm on 7/13/2021. RECOMMENDATIONS: 2.6 cm incidental left thyroid nodule with heterogeneous and enlarged thyroid. Recommend thyroid US. Reference: J Am Bruno Radiol. 2015 Feb;12(2): 143-50     MRA NECK WO CONTRAST    Result Date: 8/7/2021  EXAMINATION: MRA OF THE NECK WITHOUT CONTRAST; MRI OF THE BRAIN WITHOUT CONTRAST; MRA OF THE HEAD WITHOUT CONTRAST 8/7/2021 8:28 pm; 8/7/2021 8:34 pm; 8/7/2021 8:27 pm: TECHNIQUE: Multiplanar multisequence MRA of the neck was performed without the administration of intravenous contrast. Stenosis of the internal carotid arteries measured using NASCET criteria.; Multiplanar multisequence MRI of the brain was performed without the administration of intravenous contrast.; MRA of the head was performed utilizing time-of-flight imaging with MIP images. No intravenous contrast was administered. COMPARISON: None. HISTORY: ORDERING SYSTEM PROVIDED HISTORY: stroke TECHNOLOGIST PROVIDED HISTORY: stroke Reason for Exam: stroke Additional signs and symptoms: patient unable to give history and unable to follow exam instructions due to mental status FINDINGS: MRI BRAIN: INTRACRANIAL STRUCTURES/VENTRICLES: Multifocal diffusion restriction abnormality related to acute ischemia are seen involving the right basal ganglia within the putamen, caudate nucleus and anterior limb of the right internal capsule, the posterior limb of the left internal capsule and lateral margin of the left thalamus, the posterior commissure of the corpus callosum, and left greater than right frontal parietal paramedian subcortical and deep white matter .     Can fluent increased T2/FLAIR signal is noted within the cerebral white matter consistent with severe microvascular ischemic change. No mass effect or midline shift. No evidence of an acute intracranial hemorrhage. Mild diffuse decrease in cerebral volume is noted with corresponding prominence of the sulci and ventricles. The sellar/suprasellar regions appear unremarkable. The normal signal voids within the major intracranial vessels appear maintained. ORBITS: The visualized portion of the orbits demonstrate no acute abnormality. SINUSES: The visualized paranasal sinuses and mastoid air cells are well aerated. BONES/SOFT TISSUES: The bone marrow signal intensity appears normal. The soft tissues demonstrate no acute abnormality. MRA NECK: AORTIC ARCH/ARCH VESSELS: No dissection or arterial injury. No significant stenosis of the brachiocephalic or subclavian arteries. CAROTID ARTERIES: No dissection, arterial injury, or hemodynamically significant stenosis by NASCET criteria. VERTEBRAL ARTERIES: No dissection, arterial injury, or significant stenosis. MRA HEAD: ANTERIOR CIRCULATION: Suspected focal high-grade stenosis involving the A1 segment of the left anterior cerebral artery is identified. Right middle cerebral artery focal moderate grade stenosis involving the M1 segment is present. Suspected multifocal moderate to high-grade stenosis involving the right middle cerebral artery M2 segment is seen. No further evidence of significant stenosis of the intracranial internal carotid, anterior cerebral, or middle cerebral arteries. POSTERIOR CIRCULATION: Bilateral posterior cerebral artery P2 segment suspected focal moderate to high-grade stenosis are noted. No further evidence of significant stenosis of the vertebral, basilar, or posterior cerebral arteries.      1. Multifocal acute ischemia involving the bilateral basal ganglia, as discussed above, posterior commissure of the corpus callosum, lateral margin of left thalamus and the left greater than right frontal parietal paramedian subcortical and deep white matter. 2. No evidence of associated hemorrhagic conversion. 3. Finding suggestive of embolic phenomenon. Recommend clinical correlation. 4. Suspected focal high-grade stenosis involving A1 segment of the left anterior cerebral artery. 5. Suspected right middle cerebral artery M1 segment focal moderate grade stenosis. 6. Suspected right middle cerebral artery M2 segment multifocal moderate to high-grade stenosis. 7. Bilateral posterior cerebral artery P2 segment suspected multifocal moderate to high-grade stenosis. 8. Grossly unremarkable MR angiogram of the neck. Note: MR angiographic evaluation of the neck is severely limited by patient motion related artifact. 9. Severe cerebral white matter chronic microvascular ischemic disease. 10. Mild diffuse cerebral atrophy. Critical results were called by Dr. José Antonio Wagner to Dr. Ramandeep Hernandez On 8/7/2021 at 22:11. MRI BRAIN WO CONTRAST    Result Date: 8/7/2021  EXAMINATION: MRA OF THE NECK WITHOUT CONTRAST; MRI OF THE BRAIN WITHOUT CONTRAST; MRA OF THE HEAD WITHOUT CONTRAST 8/7/2021 8:28 pm; 8/7/2021 8:34 pm; 8/7/2021 8:27 pm: TECHNIQUE: Multiplanar multisequence MRA of the neck was performed without the administration of intravenous contrast. Stenosis of the internal carotid arteries measured using NASCET criteria.; Multiplanar multisequence MRI of the brain was performed without the administration of intravenous contrast.; MRA of the head was performed utilizing time-of-flight imaging with MIP images. No intravenous contrast was administered. COMPARISON: None.  HISTORY: ORDERING SYSTEM PROVIDED HISTORY: stroke TECHNOLOGIST PROVIDED HISTORY: stroke Reason for Exam: stroke Additional signs and symptoms: patient unable to give history and unable to follow exam instructions due to mental status FINDINGS: MRI BRAIN: INTRACRANIAL STRUCTURES/VENTRICLES: Multifocal diffusion restriction abnormality related to acute ischemia are seen involving the right basal ganglia within the putamen, caudate nucleus and anterior limb of the right internal capsule, the posterior limb of the left internal capsule and lateral margin of the left thalamus, the posterior commissure of the corpus callosum, and left greater than right frontal parietal paramedian subcortical and deep white matter . Can fluent increased T2/FLAIR signal is noted within the cerebral white matter consistent with severe microvascular ischemic change. No mass effect or midline shift. No evidence of an acute intracranial hemorrhage. Mild diffuse decrease in cerebral volume is noted with corresponding prominence of the sulci and ventricles. The sellar/suprasellar regions appear unremarkable. The normal signal voids within the major intracranial vessels appear maintained. ORBITS: The visualized portion of the orbits demonstrate no acute abnormality. SINUSES: The visualized paranasal sinuses and mastoid air cells are well aerated. BONES/SOFT TISSUES: The bone marrow signal intensity appears normal. The soft tissues demonstrate no acute abnormality. MRA NECK: AORTIC ARCH/ARCH VESSELS: No dissection or arterial injury. No significant stenosis of the brachiocephalic or subclavian arteries. CAROTID ARTERIES: No dissection, arterial injury, or hemodynamically significant stenosis by NASCET criteria. VERTEBRAL ARTERIES: No dissection, arterial injury, or significant stenosis. MRA HEAD: ANTERIOR CIRCULATION: Suspected focal high-grade stenosis involving the A1 segment of the left anterior cerebral artery is identified. Right middle cerebral artery focal moderate grade stenosis involving the M1 segment is present. Suspected multifocal moderate to high-grade stenosis involving the right middle cerebral artery M2 segment is seen. No further evidence of significant stenosis of the intracranial internal carotid, anterior cerebral, or middle cerebral arteries.  POSTERIOR CIRCULATION: Bilateral posterior cerebral artery P2 segment suspected focal moderate to high-grade stenosis are noted. No further evidence of significant stenosis of the vertebral, basilar, or posterior cerebral arteries. 1. Multifocal acute ischemia involving the bilateral basal ganglia, as discussed above, posterior commissure of the corpus callosum, lateral margin of left thalamus and the left greater than right frontal parietal paramedian subcortical and deep white matter. 2. No evidence of associated hemorrhagic conversion. 3. Finding suggestive of embolic phenomenon. Recommend clinical correlation. 4. Suspected focal high-grade stenosis involving A1 segment of the left anterior cerebral artery. 5. Suspected right middle cerebral artery M1 segment focal moderate grade stenosis. 6. Suspected right middle cerebral artery M2 segment multifocal moderate to high-grade stenosis. 7. Bilateral posterior cerebral artery P2 segment suspected multifocal moderate to high-grade stenosis. 8. Grossly unremarkable MR angiogram of the neck. Note: MR angiographic evaluation of the neck is severely limited by patient motion related artifact. 9. Severe cerebral white matter chronic microvascular ischemic disease. 10. Mild diffuse cerebral atrophy. Critical results were called by Dr. Rl Edmonds to Dr. Marlee Luu On 8/7/2021 at 22:11. MRI BRAIN WO CONTRAST    Result Date: 7/14/2021  EXAMINATION: MRI OF THE BRAIN WITHOUT CONTRAST  7/14/2021 4:08 pm TECHNIQUE: Multiplanar multisequence MRI of the brain was performed without the administration of intravenous contrast. COMPARISON: None. HISTORY: ORDERING SYSTEM PROVIDED HISTORY: stroke, right sided weakness TECHNOLOGIST PROVIDED HISTORY: stroke, right sided weakness Decision Support Exception - unselect if not a suspected or confirmed emergency medical condition->Emergency Medical Condition (MA) Reason for Exam: possible stroke. pt had a fall.  FINDINGS: INTRACRANIAL STRUCTURES/VENTRICLES: . acute infarcts in the left basal ganglia. Acute infarct in the right head of caudate and in the right putamen no mass effect or midline shift. No evidence of an acute intracranial hemorrhage. The ventricles and sulci are normal in size and configuration. The sellar/suprasellar regions appear unremarkable. The normal signal voids within the major intracranial vessels appear maintained. ORBITS: The visualized portion of the orbits demonstrate no acute abnormality. SINUSES: The visualized paranasal sinuses and mastoid air cells are well aerated. BONES/SOFT TISSUES: The bone marrow signal intensity appears normal. The soft tissues demonstrate no acute abnormality. Acute infarct in the left and right basal ganglia greater on the left. Diffuse volume loss with extensive chronic white matter changes. 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 Recurrent stroke  3. Altered mental status    RECOMMENDATIONS:  1. I personally reviewed results of lab work-up imaging studies and other relevant clinical data. 2. Continue to monitor platelet count  3. Patient is on Brilinta aspirin and heparin DVT prophylaxis  4. Prognosis is poor. Quality of life is poor. Patient will be an appropriate candidate for comfort measures  5. Patient's family is not able to come to a conclusion regarding patient's CODE STATUS  6. Okay to discharge from medical oncology standpoint      Discussed with  Nurse. Thank you for asking us to see this patient.           Mireya Luong MD          This note is created with the assistance of a speech recognition program.  While intending to generate a document that actually reflects the content of the visit, the document can still have some errors including those of syntax and sound a like substitutions which may escape proof reading. It such instances, actual meaning can be extrapolated by contextual diversion.

## 2021-08-14 NOTE — PROGRESS NOTES
BRONCHOSPASM/BRONCHOCONSTRICTION     [x]         IMPROVE AERATION/BREATH SOUNDS  [x]   ADMINISTER BRONCHODILATOR THERAPY AS APPROPRIATE  [x]   ASSESS BREATH SOUNDS  []   IMPLEMENT AEROSOL/MDI PROTOCOL  [x]   PATIENT EDUCATION AS NEEDED    PROVIDE ADEQUATE OXYGENATION WITH ACCEPTABLE SP02/ABG'S    [x]  IDENTIFY APPROPRIATE OXYGEN THERAPY  [x]   MONITOR SP02/ABG'S AS NEEDED   [x]   PATIENT EDUCATION AS NEEDED  Patient placed on BiPAP post breathing tx, due to elevated respiratory rate (30bpm)

## 2021-08-15 NOTE — PROGRESS NOTES
Today's Date: 8/15/2021  Patient Name: Mignon Garcia  Date of admission: 8/8/2021 11:30 AM  Patient's age: 77 y.o., 1945  Admission Dx: Altered mental status [R41.82]    Reason for Consult: management recommendations  Requesting Physician: No admitting provider for patient encounter. CHIEF COMPLAINT: Thrombocytopenia. Recurrent stroke. History Obtained From:  electronic medical record, medical team    Interval history:    Patient seen and examined  Labs and vital reviewed  Patient remains nonverbal  No clinically meaningful recovery      HISTORY OF PRESENT ILLNESS:      The patient is a 77 y.o.  male who is admitted to the hospital for worsening mental status and recurrent strokes    Patient was initially admitted to the hospital on 7/13 with strokelike symptoms. Imaging studies showed evidence of prior bilateral strokes on imaging. CTA done showed diffuse intracranial atherosclerosis disease and near occlusion of right PCA. After evaluation medical therapy was recommended. Further evaluation also revealed new left cortical ischemic stroke. Patient platelet count at presentation was only 4. 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 After recovery of the platelet count patient was started on antiplatelet therapy. Patient was discharged on 7/22 [place] [id number] rehab. Repeat MRI shows new infarcts in the right cerebellar right lentiform nucleus and left periventricular region. MRA confirms atherosclerotic disease. Patient is now placed on low intensity heparin with aspirin. Patient is being worked up with a IZABELLA.   Patient's most latest blood count is 140,000    Patient mentation has worsened and is nonverbal does not follow commands not able to give any history    Past Medical History:   has a past medical history of Centrilobular emphysema ([address] COPD (chronic obstructive pulmonary disease) ([facility] [address] Depression, Diabetes mellitus ([facility] [address] Former smoker, into both eyes 3 times daily as needed for Dry Eyes     Historical Provider, MD   ketotifen (ZADITOR) 0.025 % ophthalmic solution Place 1 drop into both eyes 2 times daily as needed (itchy eyes)     Historical Provider, MD   isosorbide mononitrate (IMDUR) 60 MG extended release tablet Take 30 mg by mouth daily Indications: 1/2 tablet (30mg)     Historical Provider, MD   finasteride (PROSCAR) 5 MG tablet Take 5 mg by mouth daily    Historical Provider, MD   tamsulosin (FLOMAX) 0.4 MG capsule Take 0.4 mg by mouth daily    Historical Provider, MD   fluticasone (FLONASE) 50 MCG/ACT nasal spray 1 spray by Nasal route 2 times daily  Patient taking differently: 1 spray by Nasal route 2 times daily as needed for Rhinitis  5/31/16   Francisco Connor DO   albuterol sulfate  (90 BASE) MCG/ACT inhaler Inhale 2 puffs into the lungs every 6 hours as needed for Wheezing    Historical Provider, MD   albuterol (PROVENTIL) (2.5 MG/3ML) 0.083% nebulizer solution Take 2.5 mg by nebulization every 6 hours as needed for Wheezing    Historical Provider, MD   aspirin 81 MG EC tablet Take 81 mg by mouth daily    Historical Provider, MD   losartan (COZAAR) 100 MG tablet Take 100 mg by mouth daily     Historical Provider, MD   nitroGLYCERIN (NITROSTAT) 0.4 MG SL tablet Place 0.4 mg under the tongue every 5 minutes as needed for Chest pain    Historical Provider, MD   FLUoxetine (PROZAC) 20 MG capsule Take 40 mg by mouth daily     Historical Provider, MD   furosemide (LASIX) 20 MG tablet Take 20 mg by mouth daily as needed (swelling)     Historical Provider, MD   clopidogrel (PLAVIX) 75 MG tablet Take 75 mg by mouth daily    Historical Provider, MD   rOPINIRole (REQUIP) 0.25 MG tablet Take 0.25 mg by mouth nightly as needed     Historical Provider, MD   budesonide-formoterol (SYMBICORT) 160-4.5 MCG/ACT AERO Inhale 2 puffs into the lungs 2 times daily.     Historical Provider, MD     Current Facility-Administered Medications   Medication Dose Route Frequency Provider Last Rate Last Admin    insulin lispro (HUMALOG) injection vial 0-18 Units  0-18 Units Subcutaneous TID WC Ashley Miner MD   6 Units at 08/15/21 0815    insulin lispro (HUMALOG) injection vial 0-9 Units  0-9 Units Subcutaneous Nightly Ashley Miner MD        insulin glargine (LANTUS) injection vial 10 Units  10 Units Subcutaneous Nightly Noe Rosales MD   10 Units at 08/14/21 2139    albuterol (PROVENTIL) nebulizer solution 2.5 mg  2.5 mg Nebulization As Directed RT PRN Noe Rosales MD        heparin (porcine) injection 5,000 Units  5,000 Units Subcutaneous 3 times per day Noe Rosales MD   5,000 Units at 08/15/21 0535    ticagrelor (BRILINTA) tablet 90 mg  90 mg Oral BID Payam Chirri, DO   90 mg at 08/15/21 0933    albuterol (PROVENTIL) nebulizer solution 2.5 mg  2.5 mg Nebulization TID Noe Rosales MD   2.5 mg at 08/15/21 1256    nystatin (MYCOSTATIN) 237999 UNIT/ML suspension 500,000 Units  5 mL Oral 4x Daily Gali Cade MD   500,000 Units at 08/15/21 1409    hydrALAZINE (APRESOLINE) injection 10 mg  10 mg Intravenous Q8H PRN Ashley Miner MD   10 mg at 08/13/21 0937    acetaminophen (TYLENOL) tablet 650 mg  650 mg Oral Q4H PRN Lynette Galdamez MD   650 mg at 08/14/21 1211    bisacodyl (DULCOLAX) suppository 10 mg  10 mg Rectal Daily PRN Lynette Galdamez MD        polyethylene glycol [facility]) packet 17 g  17 g Per G Tube Daily PRN Lynette Galdamez MD        [facility]) tablet 17.2 mg  2 tablet Oral Daily PRN Lynette Galdamez MD        albuterol sulfate  (90 Base) MCG/ACT inhaler 2 puff  2 puff Inhalation Q6H PRN Lynette Galdamez MD        aspirin chewable tablet 81 mg  81 mg PEG Tube Daily Lynette Galdamez MD   81 mg at 08/15/21 1006    dextrose 5 % solution  100 mL/hr Intravenous PRN Lynette Galdamez MD        dextrose 50 % IV solution  12.5 g Intravenous PRN Lynette Galdamez MD  famotidine (PEPCID) tablet 20 mg  20 mg PEG Tube Daily Bobby Ram MD   20 mg at 08/15/21 1006    glucagon (rDNA) injection 1 mg  1 mg Intramuscular PRN Bobby Ram MD        glucose (GLUTOSE) 40 % oral gel 15 g  15 g Oral PRN Bobby Ram MD        hydrALAZINE (APRESOLINE) tablet 10 mg  10 mg Oral 3 times per day Bobby Ram MD   10 mg at 08/15/21 0538    losartan (COZAAR) tablet 50 mg  50 mg Oral Daily Bobby Ram MD   50 mg at 08/15/21 100    melatonin tablet 6 mg  6 mg PEG Tube Nightly Bobby Ram MD   6 mg at 21    OLANZapine (ZYPREXA) tablet 5 mg  5 mg PEG Tube Nightly Bobby Ram MD   5 mg at 21    rosuvastatin (CRESTOR) tablet 40 mg  40 mg PEG Tube Nightly Bobby Ram MD   40 mg at 21    tiotropium (SPIRIVA RESPIMAT) 2.5 MCG/ACT inhaler 2 puff  2 puff Inhalation Lunch Bobby Ram MD   2 puff at 08/15/21 2377       Allergies:  Patient has no known allergies. Social History:   reports that he quit smoking about 8 years ago. He started smoking about 64 years ago. He has a 42.00 pack-year smoking history. He has never used smokeless tobacco. He reports that he does not drink alcohol and does not use drugs.      Family History: Unable to obtain due to altered mental status    REVIEW OF SYSTEMS:      Unable to obtain review of system due to patient being nonverbal    PHYSICAL EXAM:        BP (!) 128/58   Pulse 72   Temp 98.8 °F (37.1 °C) (Oral)   Resp 16   Ht 5' 10\" (1.778 m)   Wt 210 lb 5.1 oz (95.4 kg)   SpO2 94%   BMI 30.18 kg/m²    Temp (24hrs), Av.9 °F (37.2 °C), Min:98.2 °F (36.8 °C), Max:99.5 °F (37.5 °C)      General appearance -ill-appearing  Mental status -not alert or cooperative  Eyes -pupils reactive  Ears - bilateral TM's and external ear canals normal   Mouth - mucous membranes moist, pharynx normal without lesions   Neck - supple, no significant adenopathy   Lymphatics - no palpable lymphadenopathy, no hepatosplenomegaly   Chest -decreased breathing sounds  Heart - normal rate, regular rhythm, normal S1, S2, no murmurs  Abdomen - soft, nontender, nondistended, no masses or organomegaly   Neurological -patient is not alert awake oriented does not follow commands  Musculoskeletal - no joint tenderness, deformity or swelling   Extremities - peripheral pulses normal, no pedal edema, no clubbing or cyanosis   Skin - normal coloration and turgor, no rashes, no suspicious skin lesions noted ,      DATA:      Labs:     Results for orders placed or performed during the hospital encounter of 08/08/21   Culture, Blood 1    Specimen: Blood   Result Value Ref Range    Specimen Description . BLOOD  LEFT AC, NO VOLUMES LISTED     Special Requests NOT REPORTED     Culture NO GROWTH 6 DAYS    Culture, Blood 1    Specimen: Blood   Result Value Ref Range    Specimen Description . BLOOD  LEFT AC, NO VOLUMES LISTED     Special Requests NOT REPORTED     Culture NO GROWTH 6 DAYS    Legionella Ag, Ur    Specimen: Urine, straight catheter   Result Value Ref Range    Legionella Pneumophilia Ag, Urine NEGATIVE    STREP PNEUMONIAE ANTIGEN    Specimen: Urine, straight catheter   Result Value Ref Range    Source . CLEAN CATCH URINE     Strep pneumo Ag NEGATIVE    Urinalysis Reflex to Culture    Specimen: Urine, clean catch   Result Value Ref Range    Color, UA YELLOW YELLOW    Turbidity UA CLEAR CLEAR    Glucose, Ur NEGATIVE NEGATIVE    Bilirubin Urine NEGATIVE NEGATIVE    Ketones, Urine NEGATIVE NEGATIVE    Specific Gravity, UA 1.018 1.000 - 1.030    Urine Hgb NEGATIVE NEGATIVE    pH, UA 6.5 5.0 - 8.0    Protein, UA 1+ (A) NEGATIVE    Urobilinogen, Urine Normal Normal    Nitrite, Urine NEGATIVE NEGATIVE    Leukocyte Esterase, Urine NEGATIVE NEGATIVE    Urinalysis Comments NOT REPORTED    Procalcitonin   Result Value Ref Range    Procalcitonin 0.26 (H) <0.09 ng/mL   Mycoplasma Ab,IgM   Result Value Ref Range    Mycoplasma pneumo IgM 0.24 <0.91   Lactic Acid   Result Value Ref Range    Lactic Acid 2.0 0.5 - 2.2 mmol/L   CBC   Result Value Ref Range    WBC 11.4 (H) 3.5 - 11.0 k/uL    RBC 4.48 (L) 4.5 - 5.9 m/uL    Hemoglobin 10.7 (L) 13.5 - 17.5 g/dL    Hematocrit 35.2 (L) 41 - 53 %    MCV 78.7 (L) 80 - 100 fL    MCH 23.9 (L) 26 - 34 pg    MCHC 30.4 (L) 31 - 37 g/dL    RDW 17.9 (H) 11.5 - 14.9 %    Platelets 804 (L) 906 - 450 k/uL    MPV 10.9 6.0 - 12.0 fL    NRBC Automated NOT REPORTED per 100 WBC   HEPATIC FUNCTION PANEL   Result Value Ref Range    Albumin 2.9 (L) 3.5 - 5.2 g/dL    Alkaline Phosphatase 184 (H) 40 - 129 U/L     (H) 5 - 41 U/L     (H) <40 U/L    Total Bilirubin 0.21 (L) 0.3 - 1.2 mg/dL    Bilirubin, Direct 0.10 <0.31 mg/dL    Bilirubin, Indirect 0.11 0.00 - 1.00 mg/dL    Total Protein 6.3 (L) 6.4 - 8.3 g/dL    Globulin NOT REPORTED 1.5 - 3.8 g/dL    Albumin/Globulin Ratio NOT REPORTED 1.0 - 2.5   CBC   Result Value Ref Range    WBC 14.4 (H) 3.5 - 11.0 k/uL    RBC 4.38 (L) 4.5 - 5.9 m/uL    Hemoglobin 10.7 (L) 13.5 - 17.5 g/dL    Hematocrit 34.6 (L) 41 - 53 %    MCV 78.9 (L) 80 - 100 fL    MCH 24.3 (L) 26 - 34 pg    MCHC 30.9 (L) 31 - 37 g/dL    RDW 17.8 (H) 11.5 - 14.9 %    Platelets 053 (L) 964 - 450 k/uL    MPV 10.8 6.0 - 12.0 fL    NRBC Automated NOT REPORTED per 100 WBC   APTT   Result Value Ref Range    PTT 45.7 (H) 24.0 - 36.0 sec   Protime-INR   Result Value Ref Range    Protime 14.7 (H) 11.8 - 14.6 sec    INR 1.1    Basic Metabolic Panel   Result Value Ref Range    Glucose 154 (H) 70 - 99 mg/dL    BUN 31 (H) 8 - 23 mg/dL    CREATININE 1.96 (H) 0.70 - 1.20 mg/dL    Bun/Cre Ratio NOT REPORTED 9 - 20    Calcium 9.2 8.6 - 10.4 mg/dL    Sodium 147 (H) 135 - 144 mmol/L    Potassium 5.8 (H) 3.7 - 5.3 mmol/L    Chloride 111 (H) 98 - 107 mmol/L    CO2 27 20 - 31 mmol/L    Anion Gap 9 9 - 17 mmol/L    GFR Non-African American 34 (L) >60 mL/min    GFR  American 41 (L) >60 mL/min    GFR Comment          GFR Staging NOT REPORTED    Magnesium   Result Value Ref Range    Magnesium 2.6 1.6 - 2.6 mg/dL   Phosphorus   Result Value Ref Range    Phosphorus 4.7 (H) 2.5 - 4.5 mg/dL   HEPARIN LEVEL/ANTI-XA   Result Value Ref Range    Anti-XA Unfrac Heparin 0.67 0.30 - 0.70 IU/L   HEPARIN LEVEL/ANTI-XA   Result Value Ref Range    Anti-XA Unfrac Heparin 0.76 (HH) 0.30 - 0.70 IU/L   HEPARIN LEVEL/ANTI-XA   Result Value Ref Range    Anti-XA Unfrac Heparin 0.55 0.30 - 0.70 IU/L   HEPARIN LEVEL/ANTI-XA   Result Value Ref Range    Anti-XA Unfrac Heparin 0.49 0.30 - 0.70 IU/L   Basic Metabolic Panel w/ Reflex to MG   Result Value Ref Range    Glucose 147 (H) 70 - 99 mg/dL    BUN 28 (H) 8 - 23 mg/dL    CREATININE 1.79 (H) 0.70 - 1.20 mg/dL    Bun/Cre Ratio NOT REPORTED 9 - 20    Calcium 9.3 8.6 - 10.4 mg/dL    Sodium 145 (H) 135 - 144 mmol/L    Potassium 5.6 (H) 3.7 - 5.3 mmol/L    Chloride 110 (H) 98 - 107 mmol/L    CO2 26 20 - 31 mmol/L    Anion Gap 9 9 - 17 mmol/L    GFR Non-African American 37 (L) >60 mL/min    GFR  45 (L) >60 mL/min    GFR Comment          GFR Staging NOT REPORTED    BLOOD GAS, VENOUS   Result Value Ref Range    pH, David 7.409 7.320 - 7.420    pCO2, David 44.5 39.0 - 55.0    pO2, David 37.7 30 - 50    HCO3, Venous 28.2 24.0 - 30.0 mmol/L    Positive Base Excess, David 3.5 (H) 0.0 - 2.0 mmol/L    Negative Base Excess, David NOT REPORTED 0.0 - 2.0 mmol/L    O2 Sat, David 69.2 60.0 - 85.0 %    Total Hb NOT REPORTED 12.0 - 16.0 g/dl    Oxyhemoglobin NOT REPORTED 95.0 - 98.0 %    Carboxyhemoglobin 2.1 0 - 5 %    Methemoglobin 0.8 0.0 - 1.9 %    Pt Temp NOT REPORTED     pH, David, Temp Adj NOT REPORTED 7.320 - 7.420    pCO2, David, Temp Adj NOT REPORTED 39.0 - 55.0 mmHg    pO2, David, Temp Adj NOT REPORTED 30.0 - 50.0 mmHg    O2 Device/Flow/% NOT REPORTED     Respiratory Rate NOT REPORTED     Adelfo Test NOT REPORTED     Sample Site NOT REPORTED     Pt.  Position NOT REPORTED     Mode NOT REPORTED     Set Rate NOT REPORTED     Total Rate NOT REPORTED     VT NOT REPORTED     FIO2 NOT REPORTED     Peep/Cpap NOT REPORTED     PSV NOT REPORTED     Text for Respiratory NOT REPORTED     NOTIFICATION NOT REPORTED     NOTIFICATION TIME NOT REPORTED    CK   Result Value Ref Range    Total  39 - 308 U/L   Myoglobin   Result Value Ref Range    Myoglobin 280 (H) 28 - 72 ng/mL   Ammonia   Result Value Ref Range    Ammonia 18 16 - 60 umol/L   HEPARIN LEVEL/ANTI-XA   Result Value Ref Range    Anti-XA Unfrac Heparin 0.37 0.30 - 0.70 IU/L   Microscopic Urinalysis   Result Value Ref Range    -          WBC, UA 2 TO 5 /HPF    RBC, UA 0 TO 2 /HPF    Casts UA NOT REPORTED /LPF    Crystals, UA NOT REPORTED None /HPF    Epithelial Cells UA 0 TO 2 /HPF    Renal Epithelial, UA NOT REPORTED 0 /HPF    Bacteria, UA FEW (A) None    Mucus, UA NOT REPORTED None    Trichomonas, UA NOT REPORTED None    Amorphous, UA 1+ (A) None    Other Observations UA NOT REPORTED NOT REQ.     Yeast, UA NOT REPORTED None   Potassium   Result Value Ref Range    Potassium 5.3 3.7 - 5.3 mmol/L   HEPARIN LEVEL/ANTI-XA   Result Value Ref Range    Anti-XA Unfrac Heparin 0.39 0.30 - 0.70 IU/L   Basic Metabolic Panel w/ Reflex to MG   Result Value Ref Range    Glucose 127 (H) 70 - 99 mg/dL    BUN 25 (H) 8 - 23 mg/dL    CREATININE 1.45 (H) 0.70 - 1.20 mg/dL    Bun/Cre Ratio NOT REPORTED 9 - 20    Calcium 8.7 8.6 - 10.4 mg/dL    Sodium 139 135 - 144 mmol/L    Potassium 4.4 3.7 - 5.3 mmol/L    Chloride 105 98 - 107 mmol/L    CO2 25 20 - 31 mmol/L    Anion Gap 9 9 - 17 mmol/L    GFR Non-African American 48 (L) >60 mL/min    GFR  58 (L) >60 mL/min    GFR Comment          GFR Staging NOT REPORTED    CBC   Result Value Ref Range    WBC 9.3 3.5 - 11.0 k/uL    RBC 4.48 (L) 4.5 - 5.9 m/uL    Hemoglobin 10.9 (L) 13.5 - 17.5 g/dL    Hematocrit 35.4 (L) 41 - 53 %    MCV 79.0 (L) 80 - 100 fL    MCH 24.4 (L) 26 - 34 pg    MCHC 30.9 (L) 31 - 37 g/dL    RDW 18.2 (H) 11.5 - 14.9 %    Platelets 826 (L) 504 - 450 k/uL    MPV 12.0 6.0 - 12.0 fL    NRBC Automated NOT REPORTED per 100 WBC   HEPARIN LEVEL/ANTI-XA   Result Value Ref Range    Anti-XA Unfrac Heparin 0.21 (L) 0.30 - 0.70 IU/L   HEPARIN LEVEL/ANTI-XA   Result Value Ref Range    Anti-XA Unfrac Heparin 0.73 (HH) 0.30 - 0.70 IU/L   Basic Metabolic Panel   Result Value Ref Range    Glucose 195 (H) 70 - 99 mg/dL    BUN 25 (H) 8 - 23 mg/dL    CREATININE 1.54 (H) 0.70 - 1.20 mg/dL    Bun/Cre Ratio NOT REPORTED 9 - 20    Calcium 8.6 8.6 - 10.4 mg/dL    Sodium 139 135 - 144 mmol/L    Potassium 4.4 3.7 - 5.3 mmol/L    Chloride 107 98 - 107 mmol/L    CO2 24 20 - 31 mmol/L    Anion Gap 8 (L) 9 - 17 mmol/L    GFR Non-African American 44 (L) >60 mL/min    GFR  54 (L) >60 mL/min    GFR Comment          GFR Staging NOT REPORTED    Magnesium   Result Value Ref Range    Magnesium 2.4 1.6 - 2.6 mg/dL   Phosphorus   Result Value Ref Range    Phosphorus 3.9 2.5 - 4.5 mg/dL   HEPARIN LEVEL/ANTI-XA   Result Value Ref Range    Anti-XA Unfrac Heparin 0.48 0.30 - 0.70 IU/L   HEPARIN LEVEL/ANTI-XA   Result Value Ref Range    Anti-XA Unfrac Heparin 0.45 0.30 - 0.70 IU/L   HEPARIN LEVEL/ANTI-XA   Result Value Ref Range    Anti-XA Unfrac Heparin 0.38 0.30 - 0.70 IU/L   HEPARIN LEVEL/ANTI-XA   Result Value Ref Range    Anti-XA Unfrac Heparin 0.29 (L) 0.30 - 0.70 IU/L   CBC   Result Value Ref Range    WBC 9.1 3.5 - 11.0 k/uL    RBC 4.41 (L) 4.5 - 5.9 m/uL    Hemoglobin 10.7 (L) 13.5 - 17.5 g/dL    Hematocrit 34.6 (L) 41 - 53 %    MCV 78.4 (L) 80 - 100 fL    MCH 24.1 (L) 26 - 34 pg    MCHC 30.8 (L) 31 - 37 g/dL    RDW 17.3 (H) 11.5 - 14.9 %    Platelets 988 457 - 529 k/uL    MPV 10.7 6.0 - 12.0 fL    NRBC Automated NOT REPORTED per 100 WBC   HEPARIN LEVEL/ANTI-XA   Result Value Ref Range    Anti-XA Unfrac Heparin 0.75 (HH) 0.30 - 0.70 IU/L   Basic Metabolic Panel   Result Value Ref Range    Glucose 301 (H) 70 - 99 mg/dL    BUN 22 8 - 23 mg/dL    CREATININE 1.49 (H) 0.70 - 1.20 mg/dL    Bun/Cre Ratio NOT REPORTED 9 - 20    Calcium 9.0 8.6 - 10.4 mg/dL    Sodium 135 135 - 144 mmol/L    Potassium 5.1 3.7 - 5.3 mmol/L    Chloride 103 98 - 107 mmol/L    CO2 21 20 - 31 mmol/L    Anion Gap 11 9 - 17 mmol/L    GFR Non-African American 46 (L) >60 mL/min    GFR  56 (L) >60 mL/min    GFR Comment          GFR Staging NOT REPORTED    Magnesium   Result Value Ref Range    Magnesium 2.2 1.6 - 2.6 mg/dL   Phosphorus   Result Value Ref Range    Phosphorus 3.4 2.5 - 4.5 mg/dL   Prealbumin   Result Value Ref Range    Prealbumin 13.4 (L) 20 - 40 mg/dL   CBC   Result Value Ref Range    WBC 13.5 (H) 3.5 - 11.0 k/uL    RBC 4.71 4.5 - 5.9 m/uL    Hemoglobin 11.3 (L) 13.5 - 17.5 g/dL    Hematocrit 36.5 (L) 41 - 53 %    MCV 77.4 (L) 80 - 100 fL    MCH 23.9 (L) 26 - 34 pg    MCHC 30.9 (L) 31 - 37 g/dL    RDW 17.0 (H) 11.5 - 14.9 %    Platelets 744 817 - 482 k/uL    MPV 10.5 6.0 - 12.0 fL    NRBC Automated NOT REPORTED per 100 WBC   POC Glucose Fingerstick   Result Value Ref Range    POC Glucose 207 (H) 75 - 110 mg/dL   POC Glucose Fingerstick   Result Value Ref Range    POC Glucose 236 (H) 75 - 110 mg/dL   POC Glucose Fingerstick   Result Value Ref Range    POC Glucose 198 (H) 75 - 110 mg/dL   POC Glucose Fingerstick   Result Value Ref Range    POC Glucose 170 (H) 75 - 110 mg/dL   POC Glucose Fingerstick   Result Value Ref Range    POC Glucose 130 (H) 75 - 110 mg/dL   POC Glucose Fingerstick   Result Value Ref Range    POC Glucose 133 (H) 75 - 110 mg/dL   POC Glucose Fingerstick   Result Value Ref Range    POC Glucose 141 (H) 75 - 110 mg/dL   POC Glucose Fingerstick   Result Value Ref Range    POC Glucose 123 (H) 75 - 110 mg/dL   POC Glucose Fingerstick   Result Value Ref Range    POC Glucose 113 (H) 75 - 110 mg/dL   POC Glucose Fingerstick   Result Value Ref Range    POC Glucose 103 75 - 110 mg/dL   POC Glucose Fingerstick Result Value Ref Range    POC Glucose 159 (H) 75 - 110 mg/dL   POC Glucose Fingerstick   Result Value Ref Range    POC Glucose 186 (H) 75 - 110 mg/dL   POC Glucose Fingerstick   Result Value Ref Range    POC Glucose 148 (H) 75 - 110 mg/dL   POC Glucose Fingerstick   Result Value Ref Range    POC Glucose 168 (H) 75 - 110 mg/dL   POC Glucose Fingerstick   Result Value Ref Range    POC Glucose 158 (H) 75 - 110 mg/dL   POC Glucose Fingerstick   Result Value Ref Range    POC Glucose 214 (H) 75 - 110 mg/dL   POC Glucose Fingerstick   Result Value Ref Range    POC Glucose 223 (H) 75 - 110 mg/dL   POC Glucose Fingerstick   Result Value Ref Range    POC Glucose 188 (H) 75 - 110 mg/dL   POC Glucose Fingerstick   Result Value Ref Range    POC Glucose 226 (H) 75 - 110 mg/dL   POC Glucose Fingerstick   Result Value Ref Range    POC Glucose 291 (H) 75 - 110 mg/dL   POC Glucose Fingerstick   Result Value Ref Range    POC Glucose 214 (H) 75 - 110 mg/dL   POC Glucose Fingerstick   Result Value Ref Range    POC Glucose 189 (H) 75 - 110 mg/dL   POC Glucose Fingerstick   Result Value Ref Range    POC Glucose 198 (H) 75 - 110 mg/dL   POC Glucose Fingerstick   Result Value Ref Range    POC Glucose 238 (H) 75 - 110 mg/dL   POC Glucose Fingerstick   Result Value Ref Range    POC Glucose 248 (H) 75 - 110 mg/dL   POC Glucose Fingerstick   Result Value Ref Range    POC Glucose 257 (H) 75 - 110 mg/dL   POC Glucose Fingerstick   Result Value Ref Range    POC Glucose 222 (H) 75 - 110 mg/dL   POC Glucose Fingerstick   Result Value Ref Range    POC Glucose 280 (H) 75 - 110 mg/dL   POC Glucose Fingerstick   Result Value Ref Range    POC Glucose 227 (H) 75 - 110 mg/dL   POC Glucose Fingerstick   Result Value Ref Range    POC Glucose 211 (H) 75 - 110 mg/dL   EKG 12 Lead   Result Value Ref Range    Ventricular Rate 54 BPM    Atrial Rate 54 BPM    P-R Interval 146 ms    QRS Duration 92 ms    Q-T Interval 426 ms    QTc Calculation (Bazett) 403 ms    P Axis 65 degrees    R Axis -35 degrees    T Axis 68 degrees         IMAGING DATA:    ECHO Complete 2D W Doppler W Color    Result Date: 7/15/2021  Transthoracic Echocardiography Report (TTE)  Patient Name SHELL      Date of Study               07/15/2021               YAYA DUBOIS   Date of      1945  Gender                      Male  Birth   Age          77 year(s)  Race                        Black   Room Number  0536        Height:                     70 inch, 177.8 cm   Corporate ID [id number]    Weight:                     210 pounds, 95.3 kg  #   Patient Acct [de-identified]   BSA:          2.13 m^2      BMI:       30.13  #                                                               kg/m^2   MR #         [id number]     Sonographer                 Manuel Valdes   Accession #  [id number]  Interpreting Physician      Yolande Vance   Fellow                   Referring Nurse                           Practitioner   Interpreting             Referring Physician         Grzegorz Santiago MD  Fellow  Additional Comments Technically difficult study, patient supine unable to be positioned for better acoustic windows. Type of Study   TTE procedure:2D Echocardiogram, M-Mode, Doppler, Color Doppler. Procedure Date Date: 07/15/2021 Start: 08:29 AM Study Location: [facility] Technical Quality: Adequate visualization Indications:CVA. History / Tech. Comments: Procedure explained to patient. Echo completed in the Echo Lab. RN performed bubble study. PMHx: Former smoker, COPD Hypertension, Diabetes, Hyperlipidemia Coronary artery disease, s/p stents Patient Status: Inpatient Height: 70 inches Weight: 210 pounds BSA: 2.13 m^2 BMI: 30.13 kg/m^2 CONCLUSIONS Summary Technically difficult study. Overall global left ventricular systolic function is normal, calculated ejection fraction is 50-55% Grade I (mild) left ventricular diastolic dysfunction.  Technically difficult visualization of the right ventricle, but it does appear to be normal in size. Negative bubble study, no obvious intracardiac shunt noted within technical limitations of this study. No significant valvular regurgitation or stenosis seen. No significant pericardial effusion is seen. Signature ----------------------------------------------------------------------------  Electronically signed by Joanne Cummings(Sonographer) on 07/15/2021 11:00 AM ---------------------------------------------------------------------------- ----------------------------------------------------------------------------  Electronically signed by Yolande Vance(Interpreting physician) on  07/15/2021 12:49 PM ---------------------------------------------------------------------------- FINDINGS Left Atrium Left atrium is normal in size. Inter-atrial septum is intact with no evidence for an atrial septal defect. Negative bubble study, no shunt noted. Left Ventricle Left ventricle is normal in size, normal wall thickness, global left ventricular systolic function is low normal, calculated ejection fraction is 50%. Grade I (mild) left ventricular diastolic dysfunction. Right Atrium Right atrium is normal in size. Right Ventricle Technically difficult visualization of the right ventricle, but it does appear to be normal in size. Mitral Valve Normal mitral valve structure. No mitral stenosis. Trivial mitral regurgitation. Aortic Valve Aortic valve is trileaflet. No evidence of aortic insufficiency or stenosis. Tricuspid Valve Tricuspid valve was not well visualized. Trivial tricuspid regurgitation. Insignificant tricuspid regurgitation, unable to estimate RVSP. Pulmonic Valve Pulmonic valve not well visualized but Doppler velocities are normal. Trivial pulmonic insufficiency. Pericardial Effusion No significant pericardial effusion is seen. Miscellaneous Normal aortic root dimension. E/E' average = 17.35. IVC not well visualized.  M-mode / 2D Measurements & Calculations:   LVIDd:5.6 cm(3.7 - 5.6 cm) Diastolic UOTMUN:46.89 ml  IVSd:0.9 cm(0.6 - 1.1 cm)         Systolic ILIFYH:22.98 ml  LVPWd:0.8 cm(0.6 - 1.1 cm)        Aortic Root:3 cm(2.0 - 3.7 cm)                                    LA Dimension: 3.2 cm(1.9 - 4.0 cm)  Calculated LVEF (%): 50.46 %      LA volume/Index: 48.86 ml /23m^2                                    LVOT:2.1 cm                                    RVDd:3 cm   Mitral:                                 Aortic   Valve Area (P1/2-Time): 2.65 cm^2       Peak Velocity: 1.91 m/s  Peak E-Wave: 1.18 m/s                   Mean Velocity: 1.06 m/s  Peak A-Wave: 1.47 m/s                   Peak Gradient: 14.59 mmHg  E/A Ratio: 0.8                          Mean Gradient: 6 mmHg  Peak Gradient: 5.57 mmHg  Mean Gradient: 2 mmHg  Deceleration Time: 280 msec             Area (continuity): 2.83 cm^2  P1/2t: 83 msec                          AV VTI: 41.8 cm   Area (continuity): 2.2 cm^2  Mean Velocity: 0.62 m/s                                           Pulmonic:                                           Peak Velocity: 1.29 m/s                                          Peak Gradient: 6.66 mmHg  Diastology / Tissue Doppler Septal Wall E' velocity:0.06 m/s Septal Wall E/E':20.8 Lateral Wall E' velocity:0.08 m/s Lateral Wall E/E':13.9    CT HEAD WO CONTRAST    Result Date: 8/9/2021  EXAMINATION: CT OF THE HEAD WITHOUT CONTRAST  8/9/2021 2:04 pm TECHNIQUE: CT of the head was performed without the administration of intravenous contrast. Dose modulation, iterative reconstruction, and/or weight based adjustment of the mA/kV was utilized to reduce the radiation dose to as low as reasonably achievable. COMPARISON: MRI brain performed 08/07/2021.  HISTORY: ORDERING SYSTEM PROVIDED HISTORY: stroke TECHNOLOGIST PROVIDED HISTORY: stroke Reason for Exam: stroke; ams Acuity: Unknown Type of Exam: Unknown Additional signs and symptoms: patient unable to stay still; turned head during exam FINDINGS: BRAIN/VENTRICLES: There is no acute infarcts are noted. However, there is been interval development of hypodensity in the right mid basal ganglia since prior study consistent with an evolving subacute infarct. No significant mass effect is noted. Ventricles are proportionate to cerebral atrophy. ORBITS: The visualized portion of the orbits demonstrate no acute abnormality. SINUSES: The visualized paranasal sinuses and mastoid air cells demonstrate no acute abnormality. SOFT TISSUES/SKULL:  No acute abnormality of the visualized skull or soft tissues. There has been interval development of a hypodensity in the right basal ganglia compared to prior study consistent with evolving subacute white matter infarct. No hemorrhage is noted. Chronic microvascular change in atrophy is demonstrated. No midline shift. CT HEAD WO CONTRAST    Result Date: 8/4/2021  EXAMINATION: CT OF THE HEAD WITHOUT CONTRAST  8/4/2021 1:25 pm TECHNIQUE: CT of the head was performed without the administration of intravenous contrast. Dose modulation, iterative reconstruction, and/or weight based adjustment of the mA/kV was utilized to reduce the radiation dose to as low as reasonably achievable.  COMPARISON: CT head 07/21/2021 and 07/17/2021 HISTORY: ORDERING SYSTEM PROVIDED HISTORY: Patient with history of left-sided CVA with right hemiparesis, not communicating with staff, please evaluate for any change from previous imaging and any other abnormality TECHNOLOGIST PROVIDED HISTORY: Patient with history of left-sided CVA with right hemiparesis, not communicating with staff, please evaluate for any change from previous imaging and any other abnormality Reason for Exam: Patient with history of left-sided CVA with right hemiparesis, not communicating with staff, please evaluate for any change from previous imaging and any other abnormality Acuity: Unknown Type of Exam: Unknown FINDINGS: BRAIN/VENTRICLES: There is no acute intracranial hemorrhage, mass effect or midline shift. No abnormal extra-axial fluid collection. The gray-white differentiation is maintained without evidence of an acute infarct. There is prominence of the ventricles and sulci due to global parenchymal volume loss. There are nonspecific areas of hypoattenuation within the periventricular and subcortical white matter, which likely represent chronic microvascular ischemic change. 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 Remote lacunar stroke left caudate lobe. ORBITS: The visualized portion of the orbits demonstrate no acute abnormality. SINUSES: The visualized paranasal sinuses and mastoid air cells demonstrate no acute abnormality. SOFT TISSUES/SKULL: No acute abnormality of the visualized skull or soft tissues. 1. No acute intracranial abnormality. 2. 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 Remote lacunar stroke left caudate lobe. 3. Senescent changes. White matter hypoattenuation described is typical of microvascular ischemic disease or as sequela of dysmyelinating/demyelinating processes. CT HEAD WO CONTRAST    Result Date: 7/21/2021  EXAMINATION: CT OF THE HEAD WITHOUT CONTRAST  7/21/2021 11:53 am TECHNIQUE: CT of the head was performed without the administration of intravenous contrast. Dose modulation, iterative reconstruction, and/or weight based adjustment of the mA/kV was utilized to reduce the radiation dose to as low as reasonably achievable. COMPARISON: July 17, 2021, MRI July 14, 2021 HISTORY: ORDERING SYSTEM PROVIDED HISTORY: Altered mental status TECHNOLOGIST PROVIDED HISTORY: eval for change in status Reason for Exam: EVAL FOR CHANGE IN STATUS Type of Exam: Subsequent/Follow-up Additional signs and symptoms: PT BECAME AGTATED & COMBATIVE OVERNIGHT Relevant Medical/Surgical History: HX STROKE FINDINGS: BRAIN/VENTRICLES: No acute intracranial hemorrhage, mass effect or midline shift.   Area of hypoattenuation within the left basal ganglia and small foci of hypoattenuation in the right basal ganglia compatible with subacute infarct. Diffuse cortical volume loss and compensatory ventricular enlargement appears unchanged. Periventricular and subcortical white matter hypoattenuation redemonstrated bilaterally compatible with severe chronic microvascular ischemic changes. Encephalomalacia of the right caudate redemonstrated compatible with remote lacunar infarct. ORBITS: The visualized portion of the orbits demonstrate no acute abnormality. SINUSES: Air-fluid level within the left sphenoid sinus. Paranasal sinuses and mastoid air cells otherwise clear. SOFT TISSUES/SKULL:  No acute abnormality of the visualized skull or soft tissues. Subacute basal ganglia infarcts redemonstrated. No gross hemorrhagic conversion or significant mass effect. New air-fluid level within the left sphenoid sinus suggesting acute sinusitis. CT HEAD WO CONTRAST    Result Date: 7/17/2021  EXAMINATION: CT OF THE HEAD WITHOUT CONTRAST  7/17/2021 10:46 am TECHNIQUE: CT of the head was performed without the administration of intravenous contrast. Dose modulation, iterative reconstruction, and/or weight based adjustment of the mA/kV was utilized to reduce the radiation dose to as low as reasonably achievable. COMPARISON: None. HISTORY: ORDERING SYSTEM PROVIDED HISTORY: change in mentation TECHNOLOGIST PROVIDED HISTORY: change in mentation Reason for Exam: change in mentation FINDINGS: BRAIN/VENTRICLES: Small area of hypodensity in the left corona radiata is similar in appearance to the previous exam.  There is no evidence of hemorrhage. No new parenchymal abnormalities are identified. ORBITS: The visualized portion of the orbits demonstrate no acute abnormality. SINUSES: The visualized paranasal sinuses and mastoid air cells demonstrate no acute abnormality. SOFT TISSUES/SKULL:  No acute abnormality of the visualized skull or soft tissues. No acute intracranial abnormality. No significant interval change. CT HEAD WO CONTRAST    Result Date: 7/13/2021  EXAMINATION: CT OF THE HEAD WITHOUT CONTRAST  7/13/2021 1:10 pm TECHNIQUE: CT of the head was performed without the administration of intravenous contrast. Dose modulation, iterative reconstruction, and/or weight based adjustment of the mA/kV was utilized to reduce the radiation dose to as low as reasonably achievable. COMPARISON: 05/02/2021 HISTORY: ORDERING SYSTEM PROVIDED HISTORY: Last known well 2 days right-sided facial droop right-sided weakness TECHNOLOGIST PROVIDED HISTORY: Last known well 2 days right-sided facial droop right-sided weakness Decision Support Exception - unselect if not a suspected or confirmed emergency medical condition->Emergency Medical Condition (MA) Reason for Exam: Last known well 2 days right-sided facial droop right-sided weakness FINDINGS: BRAIN/VENTRICLES: Ovoid area of low attenuation in the left frontal corona radiata measures 2 x 1.4 cm, new from prior study (series 2, image 39). No acute intracranial hemorrhage. No mass effect or midline shift. No hydrocephalus. Diffuse parenchymal volume loss with enlargement of the ventricles and cerebral sulci. Old infarction in the right basal ganglia. There are nonspecific areas of hypoattenuation within the periventricular and subcortical white matter, which likely represent chronic microvascular ischemic change. Intracranial atherosclerotic disease. ORBITS: The visualized portion of the orbits demonstrate no acute abnormality. SINUSES: The visualized paranasal sinuses and mastoid air cells demonstrate no acute abnormality. SOFT TISSUES/SKULL: No acute abnormality of the visualized skull or soft tissues. 1. New ovoid low-attenuation area in the left frontal corona radiata compatible with acute/subacute infarction. This measures 2 x 1.4 cm. No associated hemorrhage.  2. Diffuse parenchymal volume loss and sequela of severe chronic microvascular ischemic changes. Findings were discussed with Dr. Lea Charles at 1:31 pm on 7/13/2021. MRA HEAD WO CONTRAST    Result Date: 8/7/2021  EXAMINATION: MRA OF THE NECK WITHOUT CONTRAST; MRI OF THE BRAIN WITHOUT CONTRAST; MRA OF THE HEAD WITHOUT CONTRAST 8/7/2021 8:28 pm; 8/7/2021 8:34 pm; 8/7/2021 8:27 pm: TECHNIQUE: Multiplanar multisequence MRA of the neck was performed without the administration of intravenous contrast. Stenosis of the internal carotid arteries measured using NASCET criteria.; Multiplanar multisequence MRI of the brain was performed without the administration of intravenous contrast.; MRA of the head was performed utilizing time-of-flight imaging with MIP images. No intravenous contrast was administered. COMPARISON: None. HISTORY: ORDERING SYSTEM PROVIDED HISTORY: stroke TECHNOLOGIST PROVIDED HISTORY: stroke Reason for Exam: stroke Additional signs and symptoms: patient unable to give history and unable to follow exam instructions due to mental status FINDINGS: MRI BRAIN: INTRACRANIAL STRUCTURES/VENTRICLES: Multifocal diffusion restriction abnormality related to acute ischemia are seen involving the right basal ganglia within the putamen, caudate nucleus and anterior limb of the right internal capsule, the posterior limb of the left internal capsule and lateral margin of the left thalamus, the posterior commissure of the corpus callosum, and left greater than right frontal parietal paramedian subcortical and deep white matter . Can fluent increased T2/FLAIR signal is noted within the cerebral white matter consistent with severe microvascular ischemic change. No mass effect or midline shift. No evidence of an acute intracranial hemorrhage. Mild diffuse decrease in cerebral volume is noted with corresponding prominence of the sulci and ventricles. The sellar/suprasellar regions appear unremarkable.   The normal signal voids within the major intracranial vessels appear maintained. ORBITS: The visualized portion of the orbits demonstrate no acute abnormality. SINUSES: The visualized paranasal sinuses and mastoid air cells are well aerated. BONES/SOFT TISSUES: The bone marrow signal intensity appears normal. The soft tissues demonstrate no acute abnormality. MRA NECK: AORTIC ARCH/ARCH VESSELS: No dissection or arterial injury. No significant stenosis of the brachiocephalic or subclavian arteries. CAROTID ARTERIES: No dissection, arterial injury, or hemodynamically significant stenosis by NASCET criteria. VERTEBRAL ARTERIES: No dissection, arterial injury, or significant stenosis. MRA HEAD: ANTERIOR CIRCULATION: Suspected focal high-grade stenosis involving the A1 segment of the left anterior cerebral artery is identified. Right middle cerebral artery focal moderate grade stenosis involving the M1 segment is present. Suspected multifocal moderate to high-grade stenosis involving the right middle cerebral artery M2 segment is seen. No further evidence of significant stenosis of the intracranial internal carotid, anterior cerebral, or middle cerebral arteries. POSTERIOR CIRCULATION: Bilateral posterior cerebral artery P2 segment suspected focal moderate to high-grade stenosis are noted. No further evidence of significant stenosis of the vertebral, basilar, or posterior cerebral arteries. 1. Multifocal acute ischemia involving the bilateral basal ganglia, as discussed above, posterior commissure of the corpus callosum, lateral margin of left thalamus and the left greater than right frontal parietal paramedian subcortical and deep white matter. 2. No evidence of associated hemorrhagic conversion. 3. Finding suggestive of embolic phenomenon. Recommend clinical correlation. 4. Suspected focal high-grade stenosis involving A1 segment of the left anterior cerebral artery. 5. Suspected right middle cerebral artery M1 segment focal moderate grade stenosis.  6. Suspected right middle cerebral artery M2 segment multifocal moderate to high-grade stenosis. 7. Bilateral posterior cerebral artery P2 segment suspected multifocal moderate to high-grade stenosis. 8. Grossly unremarkable MR angiogram of the neck. Note: MR angiographic evaluation of the neck is severely limited by patient motion related artifact. 9. Severe cerebral white matter chronic microvascular ischemic disease. 10. Mild diffuse cerebral atrophy. Critical results were called by Dr. Eulogio Retana to Dr. Santiago Romeo On 8/7/2021 at 22:11. XR CHEST PORTABLE    Result Date: 8/8/2021  EXAMINATION: ONE XRAY VIEW OF THE CHEST 8/8/2021 11:58 am COMPARISON: 07/13/2021 HISTORY: ORDERING SYSTEM PROVIDED HISTORY: cough and fever TECHNOLOGIST PROVIDED HISTORY: cough and fever Reason for Exam: cough and fever Acuity: Acute Type of Exam: Initial FINDINGS: No lung infiltrate or consolidation. No pneumothorax or pleural effusion. Heart size is normal.     No acute abnormality. XR CHEST PORTABLE    Result Date: 7/13/2021  EXAMINATION: ONE XRAY VIEW OF THE CHEST 7/13/2021 10:30 am COMPARISON: September 24, 2019. HISTORY: ORDERING SYSTEM PROVIDED HISTORY: Found down TECHNOLOGIST PROVIDED HISTORY: Found down Reason for Exam: supine Acuity: Acute Type of Exam: Initial FINDINGS: A portable upright frontal view chest radiograph was obtained. The heart is enlarged. The mediastinal contour and pleural spaces are otherwise within normal limits. The lungs are grossly clear. There is no focal consolidation or pneumothorax. The pulmonary vascular pattern is within normal limits. No acute thoracic osseous abnormality. Clear lungs. Cardiomegaly. No acute cardiopulmonary abnormality.      VL DUP LOWER EXTREMITY VENOUS BILATERAL    Result Date: 7/15/2021    [facility]  Vascular Lower Extremities DVT Study Procedure   Patient Name SHELL      Date of Study           07/15/2021               YAYA DUBOIS   Date of      1945 and augmentation. Normal compressibility of the great  Normal compressibility of the great  saphenous vein. saphenous vein. Normal compressibility of the small  Normal compressibility of the small  saphenous vein. saphenous vein. Risk Factors   - The patient's risk factor(s) include: chronic lung disease, diabetes     mellitus and arterial hypertension. Velocities are measured in cm/s ; Diameters are measured in cm Right Lower Extremities DVT Study Measurements Right 2D Measurements +------------------------------------+----------+---------------+----------+ ! Location                            ! Visualized! Compressibility! Thrombosis! +------------------------------------+----------+---------------+----------+ ! Common Femoral                      !Yes       ! Yes            ! None      ! +------------------------------------+----------+---------------+----------+ ! Prox Femoral                        !Yes       ! Yes            ! None      ! +------------------------------------+----------+---------------+----------+ ! Mid Femoral                         !Yes       ! Yes            ! None      ! +------------------------------------+----------+---------------+----------+ ! Dist Femoral                        !Yes       ! Yes            ! None      ! +------------------------------------+----------+---------------+----------+ ! Deep Femoral                        !No        !               !          ! +------------------------------------+----------+---------------+----------+ ! Popliteal                           !Yes       ! Yes            ! None      ! +------------------------------------+----------+---------------+----------+ ! Sapheno Femoral Junction            ! Yes       ! Yes            ! None      ! +------------------------------------+----------+---------------+----------+ ! PTV                                 ! Yes       ! Yes            ! None      ! +------------------------------------+----------+---------------+----------+ ! Peroneal                            !No        !               !          ! +------------------------------------+----------+---------------+----------+ ! Gastroc                             ! Yes       ! Yes            ! None      ! +------------------------------------+----------+---------------+----------+ ! GSV Thigh                           ! Yes       ! Yes            ! None      ! +------------------------------------+----------+---------------+----------+ ! GSV Knee                            ! Yes       ! Yes            ! None      ! +------------------------------------+----------+---------------+----------+ ! GSV Ankle                           ! Yes       ! Yes            ! None      ! +------------------------------------+----------+---------------+----------+ ! SSV                                 ! Yes       ! Yes            ! None      ! +------------------------------------+----------+---------------+----------+ Right Doppler Measurements +---------------------------+------+------+--------------------------------+ ! Location                   ! Signal!Reflux! Reflux (msec)                   ! +---------------------------+------+------+--------------------------------+ ! Common Femoral             !Phasic!      !                                ! +---------------------------+------+------+--------------------------------+ ! Prox Femoral               !Phasic!      !                                ! +---------------------------+------+------+--------------------------------+ ! Popliteal                  !Phasic!      !                                ! +---------------------------+------+------+--------------------------------+ Left Lower Extremities DVT Study Measurements Left 2D Measurements +------------------------------------+----------+---------------+----------+ ! Location                            ! Visualized! Compressibility! Thrombosis! +------------------------------------+----------+---------------+----------+ ! Common Femoral                      !Yes       ! Yes            ! None      ! +------------------------------------+----------+---------------+----------+ ! Prox Femoral                        !Yes       ! Yes            ! None      ! +------------------------------------+----------+---------------+----------+ ! Mid Femoral                         !Yes       ! Yes            ! None      ! +------------------------------------+----------+---------------+----------+ ! Dist Femoral                        !Yes       ! Yes            ! None      ! +------------------------------------+----------+---------------+----------+ ! Deep Femoral                        !No        !               !          ! +------------------------------------+----------+---------------+----------+ ! Popliteal                           !Yes       ! Yes            ! None      ! +------------------------------------+----------+---------------+----------+ ! Sapheno Femoral Junction            ! Yes       ! Yes            ! None      ! +------------------------------------+----------+---------------+----------+ ! PTV                                 ! Yes       ! Yes            ! None      ! +------------------------------------+----------+---------------+----------+ ! Peroneal                            !Yes       ! Yes            ! None      ! +------------------------------------+----------+---------------+----------+ ! Gastroc                             ! Yes       ! Yes            ! None      ! +------------------------------------+----------+---------------+----------+ ! GSV Thigh                           ! Yes       ! Yes            ! None      ! +------------------------------------+----------+---------------+----------+ ! GSV Knee                            ! Yes       ! Yes            ! None      ! +------------------------------------+----------+---------------+----------+ ! GSV Ankle !Yes       !Yes            ! None      ! +------------------------------------+----------+---------------+----------+ ! SSV                                 ! Yes       ! Yes            ! None      ! +------------------------------------+----------+---------------+----------+ Left Doppler Measurements +---------------------------+------+------+--------------------------------+ ! Location                   ! Signal!Reflux! Reflux (msec)                   ! +---------------------------+------+------+--------------------------------+ ! Common Femoral             !Phasic!      !                                ! +---------------------------+------+------+--------------------------------+ ! Prox Femoral               !Phasic!      !                                ! +---------------------------+------+------+--------------------------------+ ! Popliteal                  !Phasic!      !                                ! +---------------------------+------+------+--------------------------------+    US SPLEEN    Result Date: 7/14/2021  EXAMINATION: ULTRASOUND OF THE SPLEEN 7/14/2021 8:29 am COMPARISON: None. HISTORY: ORDERING SYSTEM PROVIDED HISTORY: thrombocytopenia TECHNOLOGIST PROVIDED HISTORY: thrombocytopenia FINDINGS: Suboptimal study. The visualized spleen appears enlarged measuring 13.4 cm. No focal lesion is seen. No free fluid. Suboptimal study. Mild splenomegaly. CTA HEAD NECK W CONTRAST    Result Date: 7/13/2021  EXAMINATION: CTA OF THE HEAD AND NECK WITH CONTRAST 7/13/2021 1:11 pm: TECHNIQUE: CTA of the head and neck was performed with the administration of intravenous contrast. Multiplanar reformatted images are provided for review. MIP images are provided for review. Stenosis of the internal carotid arteries measured using NASCET criteria.  Dose modulation, iterative reconstruction, and/or weight based adjustment of the mA/kV was utilized to reduce the radiation dose to as low as reasonably achievable. 3D surface reformatted and curved MIP images were submitted for review. COMPARISON: None. HISTORY: ORDERING SYSTEM PROVIDED HISTORY: stroke TECHNOLOGIST PROVIDED HISTORY: stroke Decision Support Exception - unselect if not a suspected or confirmed emergency medical condition->Emergency Medical Condition (MA) Reason for Exam: stroke, Cerebrovascular Accident; Fall FINDINGS: CTA NECK: AORTIC ARCH/ARCH VESSELS: No dissection or arterial injury. No significant stenosis of the brachiocephalic or subclavian arteries. CAROTID ARTERIES: No dissection, arterial injury, or hemodynamically significant stenosis by NASCET criteria. VERTEBRAL ARTERIES: No dissection, arterial injury, or significant stenosis in the right vertebral artery. Severe stenosis in the V1 segment of the left vertebral artery. SOFT TISSUES: The lung apices are clear. No cervical or superior mediastinal lymphadenopathy. The larynx and pharynx are unremarkable. No acute abnormality of the salivary glands. Thyroid gland is diffusely enlarged and heterogeneous and contains left thyroid lobe nodule measuring 2.6 cm. BONES: Multilevel degenerative spondylosis throughout the cervical spine with fusion at C5-C6. CTA HEAD: ANTERIOR CIRCULATION: Calcified atherosclerotic plaque in the cavernous segments of the internal carotid arteries bilaterally results in mild-to-moderate cavernous ICA stenosis bilaterally. Mild stenosis in the proximal A2 segment of the right anterior cerebral artery. Severe stenosis in the proximal A3 segments of the anterior cerebral arteries bilaterally. Moderate-to-severe stenosis within M2 segment of the right middle cerebral artery. Moderate stenosis in the M1 and proximal M2 segments of the left middle cerebral artery. POSTERIOR CIRCULATION: No significant stenosis in the right intradural vertebral artery. Severe stenosis in the intracranial left vertebral artery. Mild stenosis in the basilar artery.   Moderate stenosis in the P1/P2 junction of the left PCA. Severe stenosis and near complete occlusion of the right PCA. OTHER: No dural venous sinus thrombosis on this non-dedicated study. 1. Severe stenosis in the V1 segment of the left vertebral artery. 2. Extensive intracranial atherosclerotic plaque with near complete occlusion of the right PCA. 3. Severe stenoses in the proximal A3 segments of the ACAs bilaterally. 4. Moderate-to-severe proximal M2 segment stenosis in the right MCA. Moderate stenosis in the M1 and M2 segments of the left MCA. 5. Moderate stenosis in the P1/P2 junction of the left PCA. 6. Enlarged heterogeneous thyroid gland with 2.6 cm left thyroid lobe nodule. Nonemergent/outpatient thyroid ultrasound recommended. Findings were discussed with Dr. Kelvin Bridges at 1:43 pm on 7/13/2021. RECOMMENDATIONS: 2.6 cm incidental left thyroid nodule with heterogeneous and enlarged thyroid. Recommend thyroid US. Reference: J Am Bruno Radiol. 2015 Feb;12(2): 143-50     MRA NECK WO CONTRAST    Result Date: 8/7/2021  EXAMINATION: MRA OF THE NECK WITHOUT CONTRAST; MRI OF THE BRAIN WITHOUT CONTRAST; MRA OF THE HEAD WITHOUT CONTRAST 8/7/2021 8:28 pm; 8/7/2021 8:34 pm; 8/7/2021 8:27 pm: TECHNIQUE: Multiplanar multisequence MRA of the neck was performed without the administration of intravenous contrast. Stenosis of the internal carotid arteries measured using NASCET criteria.; Multiplanar multisequence MRI of the brain was performed without the administration of intravenous contrast.; MRA of the head was performed utilizing time-of-flight imaging with MIP images. No intravenous contrast was administered. COMPARISON: None.  HISTORY: ORDERING SYSTEM PROVIDED HISTORY: stroke TECHNOLOGIST PROVIDED HISTORY: stroke Reason for Exam: stroke Additional signs and symptoms: patient unable to give history and unable to follow exam instructions due to mental status FINDINGS: MRI BRAIN: INTRACRANIAL STRUCTURES/VENTRICLES: Multifocal diffusion restriction abnormality related to acute ischemia are seen involving the right basal ganglia within the putamen, caudate nucleus and anterior limb of the right internal capsule, the posterior limb of the left internal capsule and lateral margin of the left thalamus, the posterior commissure of the corpus callosum, and left greater than right frontal parietal paramedian subcortical and deep white matter . Can fluent increased T2/FLAIR signal is noted within the cerebral white matter consistent with severe microvascular ischemic change. No mass effect or midline shift. No evidence of an acute intracranial hemorrhage. Mild diffuse decrease in cerebral volume is noted with corresponding prominence of the sulci and ventricles. The sellar/suprasellar regions appear unremarkable. The normal signal voids within the major intracranial vessels appear maintained. ORBITS: The visualized portion of the orbits demonstrate no acute abnormality. SINUSES: The visualized paranasal sinuses and mastoid air cells are well aerated. BONES/SOFT TISSUES: The bone marrow signal intensity appears normal. The soft tissues demonstrate no acute abnormality. MRA NECK: AORTIC ARCH/ARCH VESSELS: No dissection or arterial injury. No significant stenosis of the brachiocephalic or subclavian arteries. CAROTID ARTERIES: No dissection, arterial injury, or hemodynamically significant stenosis by NASCET criteria. VERTEBRAL ARTERIES: No dissection, arterial injury, or significant stenosis. MRA HEAD: ANTERIOR CIRCULATION: Suspected focal high-grade stenosis involving the A1 segment of the left anterior cerebral artery is identified. Right middle cerebral artery focal moderate grade stenosis involving the M1 segment is present. Suspected multifocal moderate to high-grade stenosis involving the right middle cerebral artery M2 segment is seen.   No further evidence of significant stenosis of the intracranial internal carotid, anterior cerebral, or middle cerebral arteries. POSTERIOR CIRCULATION: Bilateral posterior cerebral artery P2 segment suspected focal moderate to high-grade stenosis are noted. No further evidence of significant stenosis of the vertebral, basilar, or posterior cerebral arteries. 1. Multifocal acute ischemia involving the bilateral basal ganglia, as discussed above, posterior commissure of the corpus callosum, lateral margin of left thalamus and the left greater than right frontal parietal paramedian subcortical and deep white matter. 2. No evidence of associated hemorrhagic conversion. 3. Finding suggestive of embolic phenomenon. Recommend clinical correlation. 4. Suspected focal high-grade stenosis involving A1 segment of the left anterior cerebral artery. 5. Suspected right middle cerebral artery M1 segment focal moderate grade stenosis. 6. Suspected right middle cerebral artery M2 segment multifocal moderate to high-grade stenosis. 7. Bilateral posterior cerebral artery P2 segment suspected multifocal moderate to high-grade stenosis. 8. Grossly unremarkable MR angiogram of the neck. Note: MR angiographic evaluation of the neck is severely limited by patient motion related artifact. 9. Severe cerebral white matter chronic microvascular ischemic disease. 10. Mild diffuse cerebral atrophy. Critical results were called by Dr. Lola Preciado to Dr. Danita Farias On 8/7/2021 at 22:11.      MRI BRAIN WO CONTRAST    Result Date: 8/7/2021  EXAMINATION: MRA OF THE NECK WITHOUT CONTRAST; MRI OF THE BRAIN WITHOUT CONTRAST; MRA OF THE HEAD WITHOUT CONTRAST 8/7/2021 8:28 pm; 8/7/2021 8:34 pm; 8/7/2021 8:27 pm: TECHNIQUE: Multiplanar multisequence MRA of the neck was performed without the administration of intravenous contrast. Stenosis of the internal carotid arteries measured using NASCET criteria.; Multiplanar multisequence MRI of the brain was performed without the administration of intravenous contrast.; MRA of the head was performed utilizing time-of-flight imaging with MIP images. No intravenous contrast was administered. COMPARISON: None. HISTORY: ORDERING SYSTEM PROVIDED HISTORY: stroke TECHNOLOGIST PROVIDED HISTORY: stroke Reason for Exam: stroke Additional signs and symptoms: patient unable to give history and unable to follow exam instructions due to mental status FINDINGS: MRI BRAIN: INTRACRANIAL STRUCTURES/VENTRICLES: Multifocal diffusion restriction abnormality related to acute ischemia are seen involving the right basal ganglia within the putamen, caudate nucleus and anterior limb of the right internal capsule, the posterior limb of the left internal capsule and lateral margin of the left thalamus, the posterior commissure of the corpus callosum, and left greater than right frontal parietal paramedian subcortical and deep white matter . Can fluent increased T2/FLAIR signal is noted within the cerebral white matter consistent with severe microvascular ischemic change. No mass effect or midline shift. No evidence of an acute intracranial hemorrhage. Mild diffuse decrease in cerebral volume is noted with corresponding prominence of the sulci and ventricles. The sellar/suprasellar regions appear unremarkable. The normal signal voids within the major intracranial vessels appear maintained. ORBITS: The visualized portion of the orbits demonstrate no acute abnormality. SINUSES: The visualized paranasal sinuses and mastoid air cells are well aerated. BONES/SOFT TISSUES: The bone marrow signal intensity appears normal. The soft tissues demonstrate no acute abnormality. MRA NECK: AORTIC ARCH/ARCH VESSELS: No dissection or arterial injury. No significant stenosis of the brachiocephalic or subclavian arteries. CAROTID ARTERIES: No dissection, arterial injury, or hemodynamically significant stenosis by NASCET criteria. VERTEBRAL ARTERIES: No dissection, arterial injury, or significant stenosis.  MRA HEAD: ANTERIOR CIRCULATION: Suspected focal high-grade stenosis involving the A1 segment of the left anterior cerebral artery is identified. Right middle cerebral artery focal moderate grade stenosis involving the M1 segment is present. Suspected multifocal moderate to high-grade stenosis involving the right middle cerebral artery M2 segment is seen. No further evidence of significant stenosis of the intracranial internal carotid, anterior cerebral, or middle cerebral arteries. POSTERIOR CIRCULATION: Bilateral posterior cerebral artery P2 segment suspected focal moderate to high-grade stenosis are noted. No further evidence of significant stenosis of the vertebral, basilar, or posterior cerebral arteries. 1. Multifocal acute ischemia involving the bilateral basal ganglia, as discussed above, posterior commissure of the corpus callosum, lateral margin of left thalamus and the left greater than right frontal parietal paramedian subcortical and deep white matter. 2. No evidence of associated hemorrhagic conversion. 3. Finding suggestive of embolic phenomenon. Recommend clinical correlation. 4. Suspected focal high-grade stenosis involving A1 segment of the left anterior cerebral artery. 5. Suspected right middle cerebral artery M1 segment focal moderate grade stenosis. 6. Suspected right middle cerebral artery M2 segment multifocal moderate to high-grade stenosis. 7. Bilateral posterior cerebral artery P2 segment suspected multifocal moderate to high-grade stenosis. 8. Grossly unremarkable MR angiogram of the neck. Note: MR angiographic evaluation of the neck is severely limited by patient motion related artifact. 9. Severe cerebral white matter chronic microvascular ischemic disease. 10. Mild diffuse cerebral atrophy. Critical results were called by Dr. Rendall Canavan to Dr. Jaswinder Milton On 8/7/2021 at 22:11.      MRI BRAIN WO CONTRAST    Result Date: 7/14/2021  EXAMINATION: MRI OF THE BRAIN WITHOUT CONTRAST  7/14/2021 4:08 pm TECHNIQUE: Multiplanar multisequence MRI of the brain was performed without the administration of intravenous contrast. COMPARISON: None. HISTORY: ORDERING SYSTEM PROVIDED HISTORY: stroke, right sided weakness TECHNOLOGIST PROVIDED HISTORY: stroke, right sided weakness Decision Support Exception - unselect if not a suspected or confirmed emergency medical condition->Emergency Medical Condition (MA) Reason for Exam: possible stroke. pt had a fall. FINDINGS: INTRACRANIAL STRUCTURES/VENTRICLES: . acute infarcts in the left basal ganglia. Acute infarct in the right head of caudate and in the right putamen no mass effect or midline shift. No evidence of an acute intracranial hemorrhage. The ventricles and sulci are normal in size and configuration. The sellar/suprasellar regions appear unremarkable. The normal signal voids within the major intracranial vessels appear maintained. ORBITS: The visualized portion of the orbits demonstrate no acute abnormality. SINUSES: The visualized paranasal sinuses and mastoid air cells are well aerated. BONES/SOFT TISSUES: The bone marrow signal intensity appears normal. The soft tissues demonstrate no acute abnormality. Acute infarct in the left and right basal ganglia greater on the left. Diffuse volume loss with extensive chronic white matter changes. 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 Recurrent stroke  3. Altered mental status    RECOMMENDATIONS:  1. I personally reviewed results of lab work-up imaging studies and other relevant clinical data.   2. Patient has not had any meaningful recovery. Prognosis is poor  3. Discussed with patient daughter who is the next of kin over the phone. Discussed prognosis which is poor  4. Daughter wants to proceed with DNR CC. She understand that the patient will not be intubated or resuscitated. She also understand that patient will not be getting any medication to prolong his life and start focus will be on managing symptoms only  5. Daughter requested to keep her updated once arrangements for hospice are made  6. Okay to discharge from medical oncology standpoint      Discussed with  Nurse. Thank you for asking us to see this patient. Zafar Luong MD          This note is created with the assistance of a speech recognition program.  While intending to generate a document that actually reflects the content of the visit, the document can still have some errors including those of syntax and sound a like substitutions which may escape proof reading. It such instances, actual meaning can be extrapolated by contextual diversion.

## 2021-08-15 NOTE — PROGRESS NOTES
[address] [facility] [address]    PROGRESS NOTE             8/15/2021    9:46 AM    Name:   Radha Baird  MRN:     [id number]     Acct:      [de-identified]   Room:   2099/2099[facility] Day:  6  Admit Date:  8/8/2021 11:30 AM    PCP:  No primary care provider on file. Code Status:  Full Code    Subjective:     C/C: No chief complaint on file. Interval History Status: not changed. No change in patient's mentation; still obtunded/nonresponsive. Only responds to tactile stimuli. 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 Patient has past medical history of CVA, coronary artery disease s/p percutaneous coronary angioplasty, hyperlipidemia, hypertension, diabetes, emphysema/COPD.     History difficult to obtain as patient is somnolent and nonresponsive. Per prior documentation, patient had increasing lethargy on 8/6. CT head obtained which showed: There has been interval development of a hypodensity in the right basal ganglia compared to prior study consistent with evolving subacute white matter infarct. No hemorrhage is noted. Chronic microvascular change in atrophy is demonstrated. No midline shift.      Spoke with family - sister and niece - who stated he is somewhat confused at baseline, but has become significantly more lethargic/began to be agitated and combative about 1.5 weeks ago while in the acute rehab unit.     He was transferred to medicine service for management of altered mental status.   For a few days prior to transfer, patient has had intermittent elevations in temperature, up to 100 °F  Patient had also been coughing intermittently; frothy/brown substance suctioned from mouth     Pneumonia/sepsis work-up: CXR no acute process, procalcitonin 0.26, lactic acid 2.0, TECHNOLOGIST PROVIDED HISTORY: stroke Reason for Exam: stroke; ams Acuity: Unknown Type of Exam: Unknown Additional signs and symptoms: patient unable to stay still; turned head during exam FINDINGS: BRAIN/VENTRICLES: There is no acute intracranial hemorrhage, mass effect, or midline shift. There is satisfactory overall gray-white matter differentiation. There is extensive chronic microvascular disease. There are evolving areas of ischemia in the left periventricular white matter, and corpus callosum posteriorly, in the right basal ganglia. The ventricular structures are symmetric and unremarkable. The infratentorial structures are unremarkable. ORBITS: The visualized portion of the orbits demonstrate no acute abnormality. SINUSES: The visualized paranasal sinuses and mastoid air cells demonstrate no acute abnormality. SOFT TISSUES/SKULL:  No acute abnormality of the visualized skull or soft tissues. Chronic microvascular disease with scattered areas of evolving ischemia as described above. CT HEAD WO CONTRAST    Result Date: 8/6/2021  EXAMINATION: CT OF THE HEAD WITHOUT CONTRAST  8/6/2021 2:21 pm TECHNIQUE: CT of the head was performed without the administration of intravenous contrast. Dose modulation, iterative reconstruction, and/or weight based adjustment of the mA/kV was utilized to reduce the radiation dose to as low as reasonably achievable. COMPARISON: 4 August 2021 HISTORY: ORDERING SYSTEM PROVIDED HISTORY: Pt w/ bilateral basal ganglia infarcts, right hemiparesis; minimally interactive over the last week and having increased lethargy the last few days. TECHNOLOGIST PROVIDED HISTORY: Pt w/ bilateral basal ganglia infarcts, right hemiparesis; minimally interactive over the last week and having increased lethargy the last few days. Reason for Exam: Increased lethargy the last few days.  Acuity: Unknown Type of Exam: Unknown FINDINGS: BRAIN/VENTRICLES: There is no acute intracranial hemorrhage, mass effect or midline shift. No abnormal extra-axial fluid collection. The gray-white differentiation is maintained without evidence of an acute infarct. There is no evidence of hydrocephalus. Remote right-sided basal ganglial infarcts are noted. However, there is been interval development of hypodensity in the right mid basal ganglia since prior study consistent with an evolving subacute infarct. No significant mass effect is noted. Ventricles are proportionate to cerebral atrophy. ORBITS: The visualized portion of the orbits demonstrate no acute abnormality. SINUSES: The visualized paranasal sinuses and mastoid air cells demonstrate no acute abnormality. SOFT TISSUES/SKULL:  No acute abnormality of the visualized skull or soft tissues. There has been interval development of a hypodensity in the right basal ganglia compared to prior study consistent with evolving subacute white matter infarct. No hemorrhage is noted. Chronic microvascular change in atrophy is demonstrated. No midline shift. CT HEAD WO CONTRAST    Result Date: 8/4/2021  EXAMINATION: CT OF THE HEAD WITHOUT CONTRAST  8/4/2021 1:25 pm TECHNIQUE: CT of the head was performed without the administration of intravenous contrast. Dose modulation, iterative reconstruction, and/or weight based adjustment of the mA/kV was utilized to reduce the radiation dose to as low as reasonably achievable.  COMPARISON: CT head 07/21/2021 and 07/17/2021 HISTORY: ORDERING SYSTEM PROVIDED HISTORY: Patient with history of left-sided CVA with right hemiparesis, not communicating with staff, please evaluate for any change from previous imaging and any other abnormality TECHNOLOGIST PROVIDED HISTORY: Patient with history of left-sided CVA with right hemiparesis, not communicating with staff, please evaluate for any change from previous imaging and any other abnormality Reason for Exam: Patient with history of left-sided CVA with right hemiparesis, not in cerebral volume is noted with corresponding prominence of the sulci and ventricles. The sellar/suprasellar regions appear unremarkable. The normal signal voids within the major intracranial vessels appear maintained. ORBITS: The visualized portion of the orbits demonstrate no acute abnormality. SINUSES: The visualized paranasal sinuses and mastoid air cells are well aerated. BONES/SOFT TISSUES: The bone marrow signal intensity appears normal. The soft tissues demonstrate no acute abnormality. MRA NECK: AORTIC ARCH/ARCH VESSELS: No dissection or arterial injury. No significant stenosis of the brachiocephalic or subclavian arteries. CAROTID ARTERIES: No dissection, arterial injury, or hemodynamically significant stenosis by NASCET criteria. VERTEBRAL ARTERIES: No dissection, arterial injury, or significant stenosis. MRA HEAD: ANTERIOR CIRCULATION: Suspected focal high-grade stenosis involving the A1 segment of the left anterior cerebral artery is identified. Right middle cerebral artery focal moderate grade stenosis involving the M1 segment is present. Suspected multifocal moderate to high-grade stenosis involving the right middle cerebral artery M2 segment is seen. No further evidence of significant stenosis of the intracranial internal carotid, anterior cerebral, or middle cerebral arteries. POSTERIOR CIRCULATION: Bilateral posterior cerebral artery P2 segment suspected focal moderate to high-grade stenosis are noted. No further evidence of significant stenosis of the vertebral, basilar, or posterior cerebral arteries. 1. Multifocal acute ischemia involving the bilateral basal ganglia, as discussed above, posterior commissure of the corpus callosum, lateral margin of left thalamus and the left greater than right frontal parietal paramedian subcortical and deep white matter. 2. No evidence of associated hemorrhagic conversion. 3. Finding suggestive of embolic phenomenon. Recommend clinical correlation. 4. Suspected focal high-grade stenosis involving A1 segment of the left anterior cerebral artery. 5. Suspected right middle cerebral artery M1 segment focal moderate grade stenosis. 6. Suspected right middle cerebral artery M2 segment multifocal moderate to high-grade stenosis. 7. Bilateral posterior cerebral artery P2 segment suspected multifocal moderate to high-grade stenosis. 8. Grossly unremarkable MR angiogram of the neck. Note: MR angiographic evaluation of the neck is severely limited by patient motion related artifact. 9. Severe cerebral white matter chronic microvascular ischemic disease. 10. Mild diffuse cerebral atrophy. Critical results were called by Dr. Jeovanny Zhou to Dr. Xavi Bai On 8/7/2021 at 22:11. XR CHEST PORTABLE    Result Date: 8/8/2021  EXAMINATION: ONE XRAY VIEW OF THE CHEST 8/8/2021 11:58 am COMPARISON: 07/13/2021 HISTORY: ORDERING SYSTEM PROVIDED HISTORY: cough and fever TECHNOLOGIST PROVIDED HISTORY: cough and fever Reason for Exam: cough and fever Acuity: Acute Type of Exam: Initial FINDINGS: No lung infiltrate or consolidation. No pneumothorax or pleural effusion. Heart size is normal.     No acute abnormality.      VL Lower Extremity Bilateral Venous Duplex    Result Date: 8/9/2021    [address] [place] [address]  Vascular Lower Extremities DVT Study Procedure   Patient Name   GOFF       Date of Study           08/09/2021                 YAYA DUBOIS   Date of Birth  1945   Gender                  Male   Age            77 year(s)   Race                    Black   Room Number    2099   Corporate ID # [id number]   Patient Acct # [de-identified]   MR #           [id number]       Sonographer             Canelo Marie   Accession #    [id number]   Interpreting Physician  Kristine Argueta   Referring      Dorlenvidya Flatness,   Referring Physician  Nurse          APRN-CNP  Practitioner  Procedure Type of Study:   Veins: Lower Extremities DVT Study, Venous Scan Lower Bilateral.  Indications for Study:R/O DVT. Patient Status: In Patient. Technical Quality:Adequate visualization. Conclusions   Summary   No evidence of superficial or deep venous thrombosis in both lower  extremities. Signature   ----------------------------------------------------------------  Electronically signed by Canelo Marie(Sonographer) on  08/09/2021 07:56 AM  ----------------------------------------------------------------   ----------------------------------------------------------------  Electronically signed by Madlyn Kindle Reyes,Arthur(Interpreting  physician) on 08/09/2021 11:02 PM  ----------------------------------------------------------------  Findings:   Right Impression:                    Left Impression:   The common femoral, femoral,         The common femoral, femoral,  popliteal and tibial veins           popliteal and tibial veins  demonstrate normal compressibility   demonstrate normal compressibility  and augmentation. and augmentation. Normal compressibility of the great  Normal compressibility of the great  saphenous vein. saphenous vein. Normal compressibility of the small  Normal compressibility of the small  saphenous vein. saphenous vein. Velocities are measured in cm/s ; Diameters are measured in cm Right Lower Extremities DVT Study Measurements Right 2D Measurements +------------------------------------+----------+---------------+----------+ ! Location                            ! Visualized! Compressibility! Thrombosis! +------------------------------------+----------+---------------+----------+ ! Common Femoral                      !Yes       ! Yes            ! None      ! +------------------------------------+----------+---------------+----------+ ! Prox Femoral                        !Yes       ! Yes            ! None      ! +------------------------------------+----------+---------------+----------+ ! Mid Femoral                         !Yes       ! Yes !None      ! +------------------------------------+----------+---------------+----------+ ! Dist Femoral                        !Yes       ! Yes            ! None      ! +------------------------------------+----------+---------------+----------+ ! Deep Femoral                        !No        !               !          ! +------------------------------------+----------+---------------+----------+ ! Popliteal                           !Yes       ! Yes            ! None      ! +------------------------------------+----------+---------------+----------+ ! Sapheno Femoral Junction            ! Yes       ! Yes            ! None      ! +------------------------------------+----------+---------------+----------+ ! PTV                                 ! Yes       ! Yes            ! None      ! +------------------------------------+----------+---------------+----------+ ! Peroneal                            !Partial   !Yes            ! None      ! +------------------------------------+----------+---------------+----------+ ! Gastroc                             ! Yes       ! Yes            ! None      ! +------------------------------------+----------+---------------+----------+ ! GSV Thigh                           ! Yes       ! Yes            ! None      ! +------------------------------------+----------+---------------+----------+ ! GSV Knee                            ! Yes       ! Yes            ! None      ! +------------------------------------+----------+---------------+----------+ ! GSV Ankle                           ! Yes       ! Yes            ! None      ! +------------------------------------+----------+---------------+----------+ ! SSV                                 ! Partial   !Yes            ! None      ! +------------------------------------+----------+---------------+----------+ Right Doppler Measurements +---------------------------+------+------+--------------------------------+ ! Location                   ! Signal!Reflux! Reflux (msec)                   ! +---------------------------+------+------+--------------------------------+ ! Common Femoral             !Phasic!      !                                ! +---------------------------+------+------+--------------------------------+ ! Prox Femoral               !Phasic!      !                                ! +---------------------------+------+------+--------------------------------+ ! Popliteal                  !Phasic!      !                                ! +---------------------------+------+------+--------------------------------+ Left Lower Extremities DVT Study Measurements Left 2D Measurements +------------------------------------+----------+---------------+----------+ ! Location                            ! Visualized! Compressibility! Thrombosis! +------------------------------------+----------+---------------+----------+ ! Common Femoral                      !Yes       ! Yes            ! None      ! +------------------------------------+----------+---------------+----------+ ! Prox Femoral                        !Yes       ! Yes            ! None      ! +------------------------------------+----------+---------------+----------+ ! Mid Femoral                         !Yes       ! Yes            ! None      ! +------------------------------------+----------+---------------+----------+ ! Dist Femoral                        !Yes       ! Yes            ! None      ! +------------------------------------+----------+---------------+----------+ ! Deep Femoral                        !No        !               !          ! +------------------------------------+----------+---------------+----------+ ! Popliteal                           !Yes       ! Yes            ! None      ! +------------------------------------+----------+---------------+----------+ ! Sapheno Femoral Junction            ! Yes       ! Yes            ! None      ! +------------------------------------+----------+---------------+----------+ ! PTV CONTRAST 8/7/2021 8:28 pm; 8/7/2021 8:34 pm; 8/7/2021 8:27 pm: TECHNIQUE: Multiplanar multisequence MRA of the neck was performed without the administration of intravenous contrast. Stenosis of the internal carotid arteries measured using NASCET criteria.; Multiplanar multisequence MRI of the brain was performed without the administration of intravenous contrast.; MRA of the head was performed utilizing time-of-flight imaging with MIP images. No intravenous contrast was administered. COMPARISON: None. HISTORY: ORDERING SYSTEM PROVIDED HISTORY: stroke TECHNOLOGIST PROVIDED HISTORY: stroke Reason for Exam: stroke Additional signs and symptoms: patient unable to give history and unable to follow exam instructions due to mental status FINDINGS: MRI BRAIN: INTRACRANIAL STRUCTURES/VENTRICLES: Multifocal diffusion restriction abnormality related to acute ischemia are seen involving the right basal ganglia within the putamen, caudate nucleus and anterior limb of the right internal capsule, the posterior limb of the left internal capsule and lateral margin of the left thalamus, the posterior commissure of the corpus callosum, and left greater than right frontal parietal paramedian subcortical and deep white matter . Can fluent increased T2/FLAIR signal is noted within the cerebral white matter consistent with severe microvascular ischemic change. No mass effect or midline shift. No evidence of an acute intracranial hemorrhage. Mild diffuse decrease in cerebral volume is noted with corresponding prominence of the sulci and ventricles. The sellar/suprasellar regions appear unremarkable. The normal signal voids within the major intracranial vessels appear maintained. ORBITS: The visualized portion of the orbits demonstrate no acute abnormality. SINUSES: The visualized paranasal sinuses and mastoid air cells are well aerated.  BONES/SOFT TISSUES: The bone marrow signal intensity appears normal. The soft tissues demonstrate no acute abnormality. MRA NECK: AORTIC ARCH/ARCH VESSELS: No dissection or arterial injury. No significant stenosis of the brachiocephalic or subclavian arteries. CAROTID ARTERIES: No dissection, arterial injury, or hemodynamically significant stenosis by NASCET criteria. VERTEBRAL ARTERIES: No dissection, arterial injury, or significant stenosis. MRA HEAD: ANTERIOR CIRCULATION: Suspected focal high-grade stenosis involving the A1 segment of the left anterior cerebral artery is identified. Right middle cerebral artery focal moderate grade stenosis involving the M1 segment is present. Suspected multifocal moderate to high-grade stenosis involving the right middle cerebral artery M2 segment is seen. No further evidence of significant stenosis of the intracranial internal carotid, anterior cerebral, or middle cerebral arteries. POSTERIOR CIRCULATION: Bilateral posterior cerebral artery P2 segment suspected focal moderate to high-grade stenosis are noted. No further evidence of significant stenosis of the vertebral, basilar, or posterior cerebral arteries. 1. Multifocal acute ischemia involving the bilateral basal ganglia, as discussed above, posterior commissure of the corpus callosum, lateral margin of left thalamus and the left greater than right frontal parietal paramedian subcortical and deep white matter. 2. No evidence of associated hemorrhagic conversion. 3. Finding suggestive of embolic phenomenon. Recommend clinical correlation. 4. Suspected focal high-grade stenosis involving A1 segment of the left anterior cerebral artery. 5. Suspected right middle cerebral artery M1 segment focal moderate grade stenosis. 6. Suspected right middle cerebral artery M2 segment multifocal moderate to high-grade stenosis. 7. Bilateral posterior cerebral artery P2 segment suspected multifocal moderate to high-grade stenosis. 8. Grossly unremarkable MR angiogram of the neck.   Note: MR angiographic evaluation of the neck is severely limited by patient motion related artifact. 9. Severe cerebral white matter chronic microvascular ischemic disease. 10. Mild diffuse cerebral atrophy. Critical results were called by Dr. Sherlyn Mcclellan to Dr. Virgen Jones On 8/7/2021 at 22:11. MRI BRAIN WO CONTRAST    Result Date: 8/7/2021  EXAMINATION: MRA OF THE NECK WITHOUT CONTRAST; MRI OF THE BRAIN WITHOUT CONTRAST; MRA OF THE HEAD WITHOUT CONTRAST 8/7/2021 8:28 pm; 8/7/2021 8:34 pm; 8/7/2021 8:27 pm: TECHNIQUE: Multiplanar multisequence MRA of the neck was performed without the administration of intravenous contrast. Stenosis of the internal carotid arteries measured using NASCET criteria.; Multiplanar multisequence MRI of the brain was performed without the administration of intravenous contrast.; MRA of the head was performed utilizing time-of-flight imaging with MIP images. No intravenous contrast was administered. COMPARISON: None. HISTORY: ORDERING SYSTEM PROVIDED HISTORY: stroke TECHNOLOGIST PROVIDED HISTORY: stroke Reason for Exam: stroke Additional signs and symptoms: patient unable to give history and unable to follow exam instructions due to mental status FINDINGS: MRI BRAIN: INTRACRANIAL STRUCTURES/VENTRICLES: Multifocal diffusion restriction abnormality related to acute ischemia are seen involving the right basal ganglia within the putamen, caudate nucleus and anterior limb of the right internal capsule, the posterior limb of the left internal capsule and lateral margin of the left thalamus, the posterior commissure of the corpus callosum, and left greater than right frontal parietal paramedian subcortical and deep white matter . Can fluent increased T2/FLAIR signal is noted within the cerebral white matter consistent with severe microvascular ischemic change. No mass effect or midline shift. No evidence of an acute intracranial hemorrhage.   Mild diffuse decrease in cerebral volume is noted with corresponding prominence of the sulci and ventricles. The sellar/suprasellar regions appear unremarkable. The normal signal voids within the major intracranial vessels appear maintained. ORBITS: The visualized portion of the orbits demonstrate no acute abnormality. SINUSES: The visualized paranasal sinuses and mastoid air cells are well aerated. BONES/SOFT TISSUES: The bone marrow signal intensity appears normal. The soft tissues demonstrate no acute abnormality. MRA NECK: AORTIC ARCH/ARCH VESSELS: No dissection or arterial injury. No significant stenosis of the brachiocephalic or subclavian arteries. CAROTID ARTERIES: No dissection, arterial injury, or hemodynamically significant stenosis by NASCET criteria. VERTEBRAL ARTERIES: No dissection, arterial injury, or significant stenosis. MRA HEAD: ANTERIOR CIRCULATION: Suspected focal high-grade stenosis involving the A1 segment of the left anterior cerebral artery is identified. Right middle cerebral artery focal moderate grade stenosis involving the M1 segment is present. Suspected multifocal moderate to high-grade stenosis involving the right middle cerebral artery M2 segment is seen. No further evidence of significant stenosis of the intracranial internal carotid, anterior cerebral, or middle cerebral arteries. POSTERIOR CIRCULATION: Bilateral posterior cerebral artery P2 segment suspected focal moderate to high-grade stenosis are noted. No further evidence of significant stenosis of the vertebral, basilar, or posterior cerebral arteries. 1. Multifocal acute ischemia involving the bilateral basal ganglia, as discussed above, posterior commissure of the corpus callosum, lateral margin of left thalamus and the left greater than right frontal parietal paramedian subcortical and deep white matter. 2. No evidence of associated hemorrhagic conversion. 3. Finding suggestive of embolic phenomenon. Recommend clinical correlation.  4. Suspected focal high-grade stenosis involving A1 segment of the left anterior cerebral artery. 5. Suspected right middle cerebral artery M1 segment focal moderate grade stenosis. 6. Suspected right middle cerebral artery M2 segment multifocal moderate to high-grade stenosis. 7. Bilateral posterior cerebral artery P2 segment suspected multifocal moderate to high-grade stenosis. 8. Grossly unremarkable MR angiogram of the neck. Note: MR angiographic evaluation of the neck is severely limited by patient motion related artifact. 9. Severe cerebral white matter chronic microvascular ischemic disease. 10. Mild diffuse cerebral atrophy. Critical results were called by Dr. Marilou Castillo to Dr. Hiral Mcallister On 8/7/2021 at 22:11. Physical Examination:        Physical Exam  Constitutional:       Comments: Obtunded, nonresponsive, lying comfortably in bed   HENT:      Mouth/Throat:      Comments: White coating on tongue  Cardiovascular:      Rate and Rhythm: Normal rate and regular rhythm. Heart sounds: No murmur heard. No friction rub. No gallop. Pulmonary:      Effort: Pulmonary effort is normal. No respiratory distress. Breath sounds: No wheezing, rhonchi or rales. Abdominal:      General: There is no distension. Palpations: Abdomen is soft. Musculoskeletal:      Right lower leg: No edema. Left lower leg: No edema. 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]     Cerebral multi-infarct state [I69.30] 08/08/2021    Encephalopathy [G93.40] 08/07/2021       Plan:         Altered mental status, likely 2/2 to multi-infarct state  -History of multiple CVA with acute multi-infarct; MRI brain on 8/7 showed multiple new infarcts bilaterally  -Neurology signing off  -Continue aspirin, brilinta 90mg, and rosuvastatin 40mg  -Palliative care on board, awaiting family decision regarding placement for patient (ECF?)      Electrolyte Disturbance, resolved   CELESTINO on CKD, likely due to poor intake vs rhabdomyolysis, resolved  -Continue to monitor, BMP MWF     Protein calorie malnutrition  -Albumin 2.9, total protein 6.1  -Patient receiving PEG tube feeds  -Dietary on board     Diabetes mellitus, uncontrolled  -Lantus 10 units nightly  -Increased to high dose SSI   -Continue to monitor blood glucose levels   -Hypoglycemia protocol     Essential Hypertension  -Cozaar 50mg daily  -Hydralazine 10mg TID     PEG tube POD #12  -General surgery following, no concerns at this time      Oral Candidiasis  -On nystatin oral swab via nurse     Obstructive sleep apnea  -Bipap overnight  -Respiratory care eval and treat  -Continue albuterol nebulizer treatments    DVT ppx: EPC cuffs in place, heparin 5000 units subQ TID  GI ppx: pepcid 20mg daily    Dispo: awaiting family to make decision regarding placement, likely ECF, SW on board    Ana Daniels MD  8/15/2021  9:46 AM     Attestation and add on       I have discussed the care of Rm Clifton , including pertinent history and exam findings,      8/15/21    with the resident. I have seen and examined the patient and the key elements of all parts of the encounter have been performed by me . I agree with the assessment, plan and orders as documented by the resident. Principal Problem:    Altered mental status  Active Problems:    Severe malnutrition (HCC)    Encephalopathy    Cerebral multi-infarct state    History of ITP    Oral candidiasis  Resolved Problems:    * No resolved hospital problems.  *         ---- ;     Shreyas Oneal MD      [facility] [address]    Phone [phone]   Fax: [phone]  Answering Service: [phone]

## 2021-08-15 NOTE — PLAN OF CARE
Problem: Safety:  Goal: Free from accidental physical injury  Description: Free from accidental physical injury  Outcome: Ongoing  Note: Pt remains free from accidental physical injury      Problem: Nutrition  Goal: Optimal nutrition therapy  Outcome: Ongoing  Note: Tube feeding infusing at 70ml/hr.  Pt tolerating well

## 2021-08-15 NOTE — PROGRESS NOTES
No new issues according to the nursing staff. Afebrile vital signs are stable. Tolerating tube feeds. Abdomen is benign. PEG tube site is clean. Extremity nontender. Awaiting discharge to ECF.

## 2021-08-15 NOTE — PLAN OF CARE
Problem: Infection:  Goal: Will remain free from infection  Outcome: Ongoing: Patient afebrile per shift.      Problem: Safety:  Goal: Free from accidental physical injury  8/15/2021 1737 by Nakia Caro RN  Outcome: Ongoing    Problem: Skin Integrity:  Goal: Skin integrity will stabilize  Outcome: OngoingNo new skin breakdown/pressure ulcer per shift         Problem: Daily Care:  Goal: Daily care needs are met  Outcome: Ongoing     Problem: Pain:  Goal: Patient's pain/discomfort is manageable  Outcome: Ongoing

## 2021-08-15 NOTE — PROGRESS NOTES
Spoke with Anna's daughter Charleen Preston whom resides in [place]. Per Augustin Gregg, she has decided to change code status to DNR CC and have Hospice manage symptoms and comfort care. RN [facility] also confirmed code status change per phone with patient's daughter Augustin Gregg. Dr. Zhen Tracy was also present and spoke with patient;s daughter Charleen Preston on prognosis, code change and hospice.

## 2021-08-15 NOTE — CARE COORDINATION
Discharge planner attempted to call this patient's daughter to talk about placement and where she might want him to go. Ollie Cherry was no answer, Discharge planner left a mssage and asked for a return call. Discharge planner left contact information.

## 2021-08-16 NOTE — CARE COORDINATION
NIYA received a call back from this patient's daughter and she reported that she would like hospice for her Dad. NIYA called Katelynn with Palliative care as they had been working with this daughter. Katelynn reported that she would call Hospice to get this process started.

## 2021-08-16 NOTE — PROGRESS NOTES
[address]   INPATIENT OCCUPATIONAL THERAPY  PROGRESS NOTE  Date: 2021  Patient Name: Valentine Abdul      Room: -01  MRN: [id number]    : 1945  (72 y.o.) Gender: male     Discharge Recommendations:  Further Occupational Therapy is recommended upon facility discharge. Equipment Needed:  (TBD)    Referring Practitioner: Kandy Reid MD  Diagnosis: AMS  General  Chart Reviewed: Yes  Patient assessed for rehabilitation services?: Yes  Additional Pertinent Hx: Valentine Abdul is a 77 y.o. RHD male admitted to [facility] on 2021. On admit, exam significant for right-sided facial droop, right-sided weakness and severe dysarthria. Significant history with recurrent strokes, remote left temporal lobe ischemic infarct and remote bilateral occipital lobe infarcts . MRI shows Bilateral basal ganglia ischemic infarcts. Pt admitted to rehab unit on 21  Response to previous treatment: Patient unable to report, no changes reported from family or staff  Family / Caregiver Present: No  Referring Practitioner: Kandy Reid MD  Diagnosis: AMS    Restrictions  Restrictions/Precautions: Fall Risk  Implants present? : Metal implants (cardiac stent, h/o neck surgery)  Other position/activity restrictions: NPO, up with assist, PEG tube, IV, telemetry  Required Braces or Orthoses?: No      Subjective  Subjective: Pt does not verbalize during this encounter. He does lightly rub writers hand numerous times in response to palm massage, he also shakes his L foot to the beat of the music once playing indicating task involvement and awareness. Comments: Per nursing, pt likely to have Hospice meeting today. Pt is in fowlers position; floating heels; head facing to L, eyes closed, RLE/RUE flexed, LLE straight and LUE elbow minimally flexed; LUE open palm, RUE closed palm.   Patient Currently in Pain: Other (comment) (KYA, R side >resistive than L, unsure if related to pain)  Overall Orientation Status: Impaired  Orientation Level: Unable to assess     Pain Assessment  Heredia-Kenney Pain Rating: No hurt  Clinical Progression: Not changed    Objective  Cognition  Overall Cognitive Status: Impaired  Additional Comments: Pt unable to follow >90% commands at this time. Grasps writers hand x3. Bed mobility  Comment: repositioned from L side to offload for side, assisted to midline in bed for joint alignment; floating heels engaged; pillow case replaced (plastic material directly to patients skin)  Balance  Sitting Balance: Unable to assess(comment)  Standing Balance: Unable to assess(comment)         ADL  Feeding: NPO;Dependent/Total (Peg tube. )  Grooming: Dependent/Total  UE Bathing: Dependent/Total  LE Bathing: Dependent/Total  UE Dressing: Dependent/Total  LE Dressing: Dependent/Total  Toileting: Dependent/Total  Additional Comments: ADL scores based on clinical reasoning and skilled observation unless otherwise noted. Lotion to BUE/B feet for skin protection (skin chapped dry) and to ease with muscular input. Pt opens hands while writer places lotion into L hand for functional  and participation, with Mod A pt assist with pronation to pour lotion into writers hand, MaxA requried to return to neutral position; pt did functionally release  when writer prompted; one of the few commands patient followed. He tolerated warm face with [place] using LUE and fluctuating high tone. Type of ROM/Therapeutic Exercise  Type of ROM/Therapeutic Exercise: AAROM (Joint protection/contracture mgt, attention to R/L side, neuro with guided imagery and use of horizontal abb for crossing midline)  Comment: PROM to RUE, PROM/AAROM to LUE; pt intermittently assisting with LUE, increased initiation with repetition. Trace movements noted to RLE with reflexive reponse using tactile input to foot; LLE moving to rhythm of music.  (15x BUE; intermittent RB for elevated tone R>L)  Exercises  Shoulder Flexion: x  Shoulder Extension: to neutral  Horizontal ABduction: x  Horizontal ADduction: to netural  Elbow Flexion: x  Elbow Extension: x  Supination: x  Pronation: x  Wrist Flexion: x  Wrist Extension: x  Finger Flexion: x  Finger Extension: x  Grasp/Release: 2/10 trials pt iniated upon command; did gently carress writers hand during palmar massage        Additional Activities: reflexive sensory tasks: F+ muscular response to cold to L side and RLE foot tickle; cervical relaxation present with warm compress to face/ears/neck      Vision  Vision Comment: trialled tracking/scanning x5 with poor return  Positioning  Adaptations: RLE pushing into bed rail: repositioned BLE to offload from rail, EPC cuff tubing adjusted to reduce pitting into leg, and padding placed between RLE and bedrail for skin protection; use of sensory engaging tasks (music, tactile input, muscle massage); barrier placed into palm between digits(nail) and palm for skin protection       Assessment  Performance deficits / Impairments: Decreased ADL status; Decreased functional mobility ; Decreased ROM; Decreased strength;Decreased safe awareness;Decreased cognition;Decreased endurance;Decreased sensation;Decreased balance;Decreased vision/visual deficit; Decreased high-level IADLs;Decreased fine motor control;Decreased coordination;Decreased posture  Assessment: poor ability to follow commands; tone fluctuation requires increased time for joint protection; does sintermittment assist with tasks within means; poor quality of life  Prognosis: Guarded  Discharge Recommendations: Patient would benefit from continued therapy after discharge;24 hour supervision or assist  Activity Tolerance: Treatment limited secondary to decreased cognition;Patient limited by fatigue  Safety Devices in place: Yes  Type of devices: All fall risk precautions in place; Bed alarm in place;Call light within reach; Patient at risk for falls; Left in bed;Nurse notified  Restraints  Initially in place: No  Equipment Recommendations  Equipment Needed:  (TBD)  Walker: Usman-walker  Transfer Tub Bench: w/back          Patient Education:   OT POCSTEPHF  Learner:patient  Method: demonstration and explanation       Outcome: needs reinforcement     Plan  Safety Devices  Safety Devices in place: Yes  Type of devices: All fall risk precautions in place, Bed alarm in place, Call light within reach, Patient at risk for falls, Left in bed, Nurse notified  Restraints  Initially in place: No  Plan  Times per week: 2-3  Times per day: Daily  Current Treatment Recommendations: Self-Care / ADL, Strengthening, ROM, Balance Training, Functional Mobility Training, Endurance Training, Neuromuscular Re-education, Cognitive Reorientation, Pain Management, Safety Education & Training, Patient/Caregiver Education & Training, Equipment Evaluation, Education, & procurement, Home Management Training, Cognitive/Perceptual Training  Plan Comment: -      Goals  Short term goals  Time Frame for Short term goals: By discharge  Short term goal 1: Pt will appropriately respond to stimuli 2:5 trials to increase participate in self care tasks  Short term goal 2: Pt will participate in simple grooming task with max A  Short term goal 3: Pt will participate in [address] [place] [address] for contracture management.    Short term goal 4: Pt will participate in bed mobility with max x 2 to assist with lower body hygiene  Short term goal 5: -  Short term goal 6: -  Long term goals  Time Frame for Long term goals : -  Long term goal 1: -  Long term goal 2: -  Long term goal 3: -  Long term goal 4: -  Long term goal 5: -  Long term goals 6: -  Long term goal 7: -    OT Individual Minutes  Time In: 1247  Time Out: [phone]  Minutes: 48      Electronically signed by ARASELI Lux on 8/16/21 at 2:40 PM EDT

## 2021-08-16 NOTE — PROGRESS NOTES
Patient seen and examined. Afebrile, VSS. WBC remain elevated, hemoglobin stable. Patient is awake but still not very responsive. Discussed with nursing staff. PEG site clean. Tolerating tube feeds at goal. Bowels are moving. Abdomen soft, non-tender, non-distended. Surgically stable. Continue tube feeds as tolerated. Hospice meeting today. Continue medical management.      Andrew De Leon PA-C  [facility]

## 2021-08-16 NOTE — PROGRESS NOTES
I return the call from the patient's daughter Patricia Pro. She tells me that she did talk to our SW and she does know about [facility] [address] [place] [address] and that she did receive the consent forms to sign. She understands that a nurse from [facility] [address] [place] [address] will be here to assess the patient, to qualify for Hospice services. Patricia Pro also asked about the patient's bills and insurance and all that goes along with that, wondering what her financial part would be. I tell her that I will reach out to a SW, to help assist her to find resources for the patient's financial needs. I do all [place] back and let her know that [place] a hospice nurse from [facility] [address] will be here at 5:30 pm today. I also update her that I did ask for a hospice Social Worker will as well reach out to her for resources, to help her navigate some financial concerns. Will follow for family support. Abhay Gaitan [address] [facility] [address], RN  [facility]: [phone]  [facility]: [phone]

## 2021-08-16 NOTE — PROGRESS NOTES
Physical Therapy  DATE: 2021  NAME: Elvis Bynum  MRN: [id number]   : 1945    Discharge Recommendations: Continue to Assess (pending progress)     Subjective: Pt is in bed upon arrival. RN ok's pt for PROM. Pain: KYA  Patient follows: Some Commands  Is patient on ventilator: No  Is patient on sedation: No  Precautions: NPO, up with assist, PEG tube, IV, telemetry    Therapeutic exercises:  LE(s)  Bilateral Passive range of motion all planes x 15 reps  bilateral gastrocnemius stretching 3 reps x 30 seconds    Goals  Short Term Goals  Short term goal 1: Pt will increase participation in PT session to maximize mobility  Short term goal 2: Pt will perform rolling in bed with Max A x1  Short term goal 3: Pt will demo WFL Bilat LE ROM  Short term goal 4: PT will participate in turning schedule and repositioning to prevent effects of immobility  Short term goal 5: .    Plan: Progress functional mobility as medically appropriate.    Time In: 1451  Time Out:1501  Time Coded Minutes (treatment minutes): 10  Rehab Potential: guarded  Treatments/week: 2x/wk    Earma Schlatter, PTA

## 2021-08-16 NOTE — PLAN OF CARE
Problem: Safety:  Goal: Free from accidental physical injury  Description: Free from accidental physical injury  8/16/2021 1611 by Shanthi Maravilla RN  Outcome: Ongoing   Pt has not experienced accidental injury  Problem: Daily Care:  Goal: Daily care needs are met  Description: Daily care needs are met  8/16/2021 1611 by Shanthi Maravilla RN  Outcome: Ongoing   Pt ADLs met  Problem: Pain:  Goal: Patient's pain/discomfort is manageable  Description: Patient's pain/discomfort is manageable  8/16/2021 1611 by Shanthi Maravilla RN  Outcome: Ongoing   Pt remains pain free through FACES assessment  Problem: Skin Integrity:  Goal: Absence of new skin breakdown  Description: Absence of new skin breakdown  Outcome: Ongoing   Pt has been assessed and no new skin breakdown has been noted.

## 2021-08-16 NOTE — PROGRESS NOTES
Comprehensive Nutrition Assessment    Type and Reason for Visit:  Reassess    Nutrition Recommendations/Plan: Continue tube feeding regimen as ordered. Nutrition Assessment:  Pt tolerating tube feeding regimen. Daughter has decided for hospice care with meeting planned for today. Malnutrition Assessment:  Malnutrition Status:  Severe malnutrition    Context:  Acute Illness     Findings of the 6 clinical characteristics of malnutrition:  Energy Intake:  7 - 50% or less of estimated energy requirements for 5 or more days  Weight Loss:  1 - 5% over 1 month     Body Fat Loss:  Unable to assess     Muscle Mass Loss:  Unable to assess    Fluid Accumulation:  No significant fluid accumulation     Strength:  Not Performed    Estimated Daily Nutrient Needs:  Energy (kcal):  9592-5048 kcals basede on [place]-St. Jeor with 1.1-1.2 factor using adm wt 96.2 kg; Weight Used for Energy Requirements:  Admission     Protein (g):  91-1-5 gm protein based on 1.2-1.4 gm/kg using IBW; Weight Used for Protein Requirements:  Ideal        Nutrition Related Findings:  No edema. labs & meds reviewed. Wounds:  None       Current Nutrition Therapies:    Diet NPO  ADULT TUBE FEEDING; PEG; Other Tube Feeding (specify); Jevity 1.2; Continuous; 20; Yes; 10; Q 4 hours; 70; 200; Q 8 hours  Current Tube Feeding (TF) Orders:  · Feeding Route: PEG  · Formula: Other Tube Feeding (Comment) (Jevity 1.2)  · Schedule: Continuous  · Water Flushes: 200 ml x 3 daily  · Goal TF & Flush Orders Provides: at goal: 2018 kcal, 93 gm protein      Anthropometric Measures:  · Height: 5' 10\" (177.8 cm)  · Current Body Weight: 212 lb (96.2 kg)   · Admission Body Weight: 212 lb (96.2 kg)    · Ideal Body Weight: 166 lbs; % Ideal Body Weight 127.7 %   · BMI: 30.4  · BMI Categories: Obese Class 1 (BMI 30.0-34. 9)       Nutrition Diagnosis:   · Severe malnutrition (based on assessment from 8-5) related to inadequate protein-energy intake as evidenced by

## 2021-08-16 NOTE — PROGRESS NOTES
RN spoke with Tim Diaz with palliative care and touched base about contacting pts daughter and hospice today. Katelynn is aware that this is the plan today but d/t time difference in [place], Katelynn will be in touch with the daughter when the time is appropriate. Katelynn RN will update this RN as needed.

## 2021-08-16 NOTE — PROGRESS NOTES
[address] [employer]    PROGRESS NOTE             8/16/2021    1:48 PM    Name:   Claudia Reyes  MRN:     [id number]     Acct:      [de-identified]   Room:   2099/2099-01  IP Day:  7  Admit Date:  8/8/2021 11:30 AM    PCP:  No primary care provider on file. Code Status:  DNR-CC    Subjective:     C/C: No chief complaint on file. Interval History Status: not changed. Patient continues to be unresponsive. CODE STATUS changed to DNR CC. Hospice meeting today with family. 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 Patient has past medical history of CVA, coronary artery disease s/p percutaneous coronary angioplasty, hyperlipidemia, hypertension, diabetes, emphysema/COPD.     History difficult to obtain as patient is somnolent and nonresponsive. Per prior documentation, patient had increasing lethargy on 8/6. CT head obtained which showed: There has been interval development of a hypodensity in the right basal ganglia compared to prior study consistent with evolving subacute white matter infarct. No hemorrhage is noted. Chronic microvascular change in atrophy is demonstrated. No midline shift.      Spoke with family - sister and niece - who stated he is somewhat confused at baseline, but has become significantly more lethargic/began to be agitated and combative about 1.5 weeks ago while in the acute rehab unit.     He was transferred to medicine service for management of altered mental status.   For a few days prior to transfer, patient has had intermittent elevations in temperature, up to 100 °F  Patient had also been coughing intermittently; frothy/brown substance suctioned from mouth     Pneumonia/sepsis work-up: CXR no acute process, procalcitonin 0.26, lactic acid 2.0, legionella/strep/mycoplasma negative,  blood culture no growth to date, urinalysis negative  Metabolic encephalopathy workup: LFTs elevated, ammonia wnl, myoglobin elevated, CK wnl.      CODE STATUS changed to DNR CC. Patient will be discharged to hospice. Hospice meeting today with family    Review of Systems:     Review of Systems   Reason unable to perform ROS: Patient obtunded and nonresponsive. Medications: Allergies:  No Known Allergies    Current Meds:   Scheduled Meds:    insulin lispro  0-18 Units Subcutaneous TID WC    insulin lispro  0-9 Units Subcutaneous Nightly    insulin glargine  10 Units Subcutaneous Nightly    heparin (porcine)  5,000 Units Subcutaneous 3 times per day    ticagrelor  90 mg Oral BID    albuterol  2.5 mg Nebulization TID    nystatin  5 mL Oral 4x Daily    aspirin  81 mg PEG Tube Daily    famotidine  20 mg PEG Tube Daily    hydrALAZINE  10 mg Oral 3 times per day    losartan  50 mg Oral Daily    melatonin  6 mg PEG Tube Nightly    OLANZapine  5 mg PEG Tube Nightly    rosuvastatin  40 mg PEG Tube Nightly    tiotropium  2 puff Inhalation Lunch     Continuous Infusions:    dextrose       PRN Meds: albuterol, hydrALAZINE, acetaminophen, bisacodyl, polyethylene glycol, senna, albuterol sulfate HFA, dextrose, dextrose, glucagon (rDNA), glucose    Data:     Past Medical History:   has a past medical history of Centrilobular emphysema (HCC), COPD (chronic obstructive pulmonary disease) ([facility] [address] Depression, Diabetes mellitus ([facility] [address] Former smoker, Hyperlipidemia, Hypertension, Mixed restrictive and obstructive lung disease ([facility] [address] Need for pneumococcal vaccine, and Personal history of tobacco use. Social History:   reports that he quit smoking about 8 years ago. He started smoking about 64 years ago. He has a 42.00 pack-year smoking history. He has never used smokeless tobacco. He reports that he does not drink alcohol and does not use drugs.      Family History: No family history on file. Vitals:  /77   Pulse 77   Temp 98.7 °F (37.1 °C) (Axillary)   Resp 26   Ht 5' 10\" (1.778 m)   Wt 212 lb 1.3 oz (96.2 kg)   SpO2 95%   BMI 30.43 kg/m²   Temp (24hrs), Av.5 °F (36.9 °C), Min:98.2 °F (36.8 °C), Max:98.7 °F (37.1 °C)    Recent Labs     08/15/21  1839 21  0035 21  0558 21  1149   POCGLU 232* 252* 270* 253*       I/O(24Hr): Intake/Output Summary (Last 24 hours) at 2021 1348  Last data filed at 2021 4168  Gross per 24 hour   Intake 1270 ml   Output 0 ml   Net 1270 ml       Labs:    CBC:   Lab Results   Component Value Date    WBC 13.5 2021    RBC 4.71 2021    RBC 4.88 2012    HGB 11.3 2021    HCT 36.8 2021    MCV 78.2 2021    MCH 23.9 2021    MCHC 30.6 2021    RDW 17.7 2021     2021     2012    MPV 10.5 2021     BMP:    Lab Results   Component Value Date     2021    K 5.4 2021     2021    CO2 22 2021    BUN 29 2021    LABALBU 2.6 2021    CREATININE 1.34 2021    CALCIUM 9.1 2021    GFRAA >60 2021    LABGLOM 52 2021    GLUCOSE 299 2021    GLUCOSE 122 2012       Lab Results   Component Value Date/Time    SPECIAL NOT REPORTED 2021 02:38 PM     Lab Results   Component Value Date/Time    CULTURE NO GROWTH 6 DAYS 2021 02:38 PM         Radiology:    CT HEAD WO CONTRAST    Result Date: 2021  EXAMINATION: CT OF THE HEAD WITHOUT CONTRAST  2021 2:04 pm TECHNIQUE: CT of the head was performed without the administration of intravenous contrast. Dose modulation, iterative reconstruction, and/or weight based adjustment of the mA/kV was utilized to reduce the radiation dose to as low as reasonably achievable. COMPARISON: MRI brain performed 2021.  HISTORY: ORDERING SYSTEM PROVIDED HISTORY: stroke TECHNOLOGIST PROVIDED HISTORY: stroke Reason for Exam: stroke; ams Acuity: Unknown Type of Exam: Unknown Additional signs and symptoms: patient unable to stay still; turned head during exam FINDINGS: BRAIN/VENTRICLES: There is no acute intracranial hemorrhage, mass effect, or midline shift. There is satisfactory overall gray-white matter differentiation. There is extensive chronic microvascular disease. There are evolving areas of ischemia in the left periventricular white matter, and corpus callosum posteriorly, in the right basal ganglia. The ventricular structures are symmetric and unremarkable. The infratentorial structures are unremarkable. ORBITS: The visualized portion of the orbits demonstrate no acute abnormality. SINUSES: The visualized paranasal sinuses and mastoid air cells demonstrate no acute abnormality. SOFT TISSUES/SKULL:  No acute abnormality of the visualized skull or soft tissues. Chronic microvascular disease with scattered areas of evolving ischemia as described above. CT HEAD WO CONTRAST    Result Date: 8/6/2021  EXAMINATION: CT OF THE HEAD WITHOUT CONTRAST  8/6/2021 2:21 pm TECHNIQUE: CT of the head was performed without the administration of intravenous contrast. Dose modulation, iterative reconstruction, and/or weight based adjustment of the mA/kV was utilized to reduce the radiation dose to as low as reasonably achievable. COMPARISON: 4 August 2021 HISTORY: ORDERING SYSTEM PROVIDED HISTORY: Pt w/ bilateral basal ganglia infarcts, right hemiparesis; minimally interactive over the last week and having increased lethargy the last few days. TECHNOLOGIST PROVIDED HISTORY: Pt w/ bilateral basal ganglia infarcts, right hemiparesis; minimally interactive over the last week and having increased lethargy the last few days. Reason for Exam: Increased lethargy the last few days. Acuity: Unknown Type of Exam: Unknown FINDINGS: BRAIN/VENTRICLES: There is no acute intracranial hemorrhage, mass effect or midline shift.   No abnormal extra-axial fluid collection. The gray-white differentiation is maintained without evidence of an acute infarct. There is no evidence of hydrocephalus. Remote right-sided basal ganglial infarcts are noted. However, there is been interval development of hypodensity in the right mid basal ganglia since prior study consistent with an evolving subacute infarct. No significant mass effect is noted. Ventricles are proportionate to cerebral atrophy. ORBITS: The visualized portion of the orbits demonstrate no acute abnormality. SINUSES: The visualized paranasal sinuses and mastoid air cells demonstrate no acute abnormality. SOFT TISSUES/SKULL:  No acute abnormality of the visualized skull or soft tissues. There has been interval development of a hypodensity in the right basal ganglia compared to prior study consistent with evolving subacute white matter infarct. No hemorrhage is noted. Chronic microvascular change in atrophy is demonstrated. No midline shift. CT HEAD WO CONTRAST    Result Date: 8/4/2021  EXAMINATION: CT OF THE HEAD WITHOUT CONTRAST  8/4/2021 1:25 pm TECHNIQUE: CT of the head was performed without the administration of intravenous contrast. Dose modulation, iterative reconstruction, and/or weight based adjustment of the mA/kV was utilized to reduce the radiation dose to as low as reasonably achievable.  COMPARISON: CT head 07/21/2021 and 07/17/2021 HISTORY: ORDERING SYSTEM PROVIDED HISTORY: Patient with history of left-sided CVA with right hemiparesis, not communicating with staff, please evaluate for any change from previous imaging and any other abnormality TECHNOLOGIST PROVIDED HISTORY: Patient with history of left-sided CVA with right hemiparesis, not communicating with staff, please evaluate for any change from previous imaging and any other abnormality Reason for Exam: Patient with history of left-sided CVA with right hemiparesis, not communicating with staff, please evaluate for any change from previous imaging and any other abnormality Acuity: Unknown Type of Exam: Unknown FINDINGS: BRAIN/VENTRICLES: There is no acute intracranial hemorrhage, mass effect or midline shift. No abnormal extra-axial fluid collection. The gray-white differentiation is maintained without evidence of an acute infarct. There is prominence of the ventricles and sulci due to global parenchymal volume loss. There are nonspecific areas of hypoattenuation within the periventricular and subcortical white matter, which likely represent chronic microvascular ischemic change. 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 Remote lacunar stroke left caudate lobe. ORBITS: The visualized portion of the orbits demonstrate no acute abnormality. SINUSES: The visualized paranasal sinuses and mastoid air cells demonstrate no acute abnormality. SOFT TISSUES/SKULL: No acute abnormality of the visualized skull or soft tissues. 1. No acute intracranial abnormality. 2. 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 Remote lacunar stroke left caudate lobe. 3. Senescent changes. White matter hypoattenuation described is typical of microvascular ischemic disease or as sequela of dysmyelinating/demyelinating processes. CT HEAD WO CONTRAST    Result Date: 7/21/2021  EXAMINATION: CT OF THE HEAD WITHOUT CONTRAST  7/21/2021 11:53 am TECHNIQUE: CT of the head was performed without the administration of intravenous contrast. Dose modulation, iterative reconstruction, and/or weight based adjustment of the mA/kV was utilized to reduce the radiation dose to as low as reasonably achievable.  COMPARISON: July 17, 2021, MRI July 14, 2021 HISTORY: ORDERING SYSTEM PROVIDED HISTORY: Altered mental status TECHNOLOGIST PROVIDED HISTORY: eval for change in status Reason for Exam: EVAL FOR CHANGE IN STATUS Type of Exam: Subsequent/Follow-up Additional signs and symptoms: PT BECAME AGTATED & COMBATIVE OVERNIGHT Relevant Medical/Surgical History: HX STROKE FINDINGS: BRAIN/VENTRICLES: No acute intracranial hemorrhage, mass effect or midline shift. Area of hypoattenuation within the left basal ganglia and small foci of hypoattenuation in the right basal ganglia compatible with subacute infarct. Diffuse cortical volume loss and compensatory ventricular enlargement appears unchanged. Periventricular and subcortical white matter hypoattenuation redemonstrated bilaterally compatible with severe chronic microvascular ischemic changes. Encephalomalacia of the right caudate redemonstrated compatible with remote lacunar infarct. ORBITS: The visualized portion of the orbits demonstrate no acute abnormality. SINUSES: Air-fluid level within the left sphenoid sinus. Paranasal sinuses and mastoid air cells otherwise clear. SOFT TISSUES/SKULL:  No acute abnormality of the visualized skull or soft tissues. Subacute basal ganglia infarcts redemonstrated. No gross hemorrhagic conversion or significant mass effect. New air-fluid level within the left sphenoid sinus suggesting acute sinusitis. MRA HEAD WO CONTRAST    Result Date: 8/7/2021  EXAMINATION: MRA OF THE NECK WITHOUT CONTRAST; MRI OF THE BRAIN WITHOUT CONTRAST; MRA OF THE HEAD WITHOUT CONTRAST 8/7/2021 8:28 pm; 8/7/2021 8:34 pm; 8/7/2021 8:27 pm: TECHNIQUE: Multiplanar multisequence MRA of the neck was performed without the administration of intravenous contrast. Stenosis of the internal carotid arteries measured using NASCET criteria.; Multiplanar multisequence MRI of the brain was performed without the administration of intravenous contrast.; MRA of the head was performed utilizing time-of-flight imaging with MIP images. No intravenous contrast was administered. COMPARISON: None.  HISTORY: ORDERING SYSTEM PROVIDED HISTORY: stroke TECHNOLOGIST PROVIDED HISTORY: stroke Reason for Exam: stroke Additional signs and symptoms: patient unable to give history and unable to follow exam instructions due to mental status FINDINGS: MRI BRAIN: INTRACRANIAL STRUCTURES/VENTRICLES: Multifocal diffusion restriction abnormality related to acute ischemia are seen involving the right basal ganglia within the putamen, caudate nucleus and anterior limb of the right internal capsule, the posterior limb of the left internal capsule and lateral margin of the left thalamus, the posterior commissure of the corpus callosum, and left greater than right frontal parietal paramedian subcortical and deep white matter . Can fluent increased T2/FLAIR signal is noted within the cerebral white matter consistent with severe microvascular ischemic change. No mass effect or midline shift. No evidence of an acute intracranial hemorrhage. Mild diffuse decrease in cerebral volume is noted with corresponding prominence of the sulci and ventricles. The sellar/suprasellar regions appear unremarkable. The normal signal voids within the major intracranial vessels appear maintained. ORBITS: The visualized portion of the orbits demonstrate no acute abnormality. SINUSES: The visualized paranasal sinuses and mastoid air cells are well aerated. BONES/SOFT TISSUES: The bone marrow signal intensity appears normal. The soft tissues demonstrate no acute abnormality. MRA NECK: AORTIC ARCH/ARCH VESSELS: No dissection or arterial injury. No significant stenosis of the brachiocephalic or subclavian arteries. CAROTID ARTERIES: No dissection, arterial injury, or hemodynamically significant stenosis by NASCET criteria. VERTEBRAL ARTERIES: No dissection, arterial injury, or significant stenosis. MRA HEAD: ANTERIOR CIRCULATION: Suspected focal high-grade stenosis involving the A1 segment of the left anterior cerebral artery is identified. Right middle cerebral artery focal moderate grade stenosis involving the M1 segment is present.   Suspected multifocal moderate to high-grade stenosis involving the right middle cerebral artery M2 segment is seen. No further evidence of significant stenosis of the intracranial internal carotid, anterior cerebral, or middle cerebral arteries. POSTERIOR CIRCULATION: Bilateral posterior cerebral artery P2 segment suspected focal moderate to high-grade stenosis are noted. No further evidence of significant stenosis of the vertebral, basilar, or posterior cerebral arteries. 1. Multifocal acute ischemia involving the bilateral basal ganglia, as discussed above, posterior commissure of the corpus callosum, lateral margin of left thalamus and the left greater than right frontal parietal paramedian subcortical and deep white matter. 2. No evidence of associated hemorrhagic conversion. 3. Finding suggestive of embolic phenomenon. Recommend clinical correlation. 4. Suspected focal high-grade stenosis involving A1 segment of the left anterior cerebral artery. 5. Suspected right middle cerebral artery M1 segment focal moderate grade stenosis. 6. Suspected right middle cerebral artery M2 segment multifocal moderate to high-grade stenosis. 7. Bilateral posterior cerebral artery P2 segment suspected multifocal moderate to high-grade stenosis. 8. Grossly unremarkable MR angiogram of the neck. Note: MR angiographic evaluation of the neck is severely limited by patient motion related artifact. 9. Severe cerebral white matter chronic microvascular ischemic disease. 10. Mild diffuse cerebral atrophy. Critical results were called by Dr. Kristi Brown to Dr. Cece Carmona On 8/7/2021 at 22:11. XR CHEST PORTABLE    Result Date: 8/8/2021  EXAMINATION: ONE XRAY VIEW OF THE CHEST 8/8/2021 11:58 am COMPARISON: 07/13/2021 HISTORY: ORDERING SYSTEM PROVIDED HISTORY: cough and fever TECHNOLOGIST PROVIDED HISTORY: cough and fever Reason for Exam: cough and fever Acuity: Acute Type of Exam: Initial FINDINGS: No lung infiltrate or consolidation. No pneumothorax or pleural effusion.  Heart size is normal.     No acute abnormality. VL Lower Extremity Bilateral Venous Duplex    Result Date: 8/9/2021    [facility]  Vascular Lower Extremities DVT Study Procedure   Patient Name   GOFF       Date of Study           08/09/2021                 YAYA DUBOIS   Date of Birth  1945   Gender                  Male   Age            77 year(s)   Race                    Black   Room Number    2099   Corporate ID # [id number]   Patient Acct # [de-identified]   MR #           [id number]       Sonographer             Canelo Marie   Accession #    [id number]   Interpreting Physician  Curtis Benson   Referring      Emani Zhou,   Referring Physician  Nurse          APRN-CNP  Practitioner  Procedure Type of Study:   Veins: Lower Extremities DVT Study, Venous Scan Lower Bilateral.  Indications for Study:R/O DVT. Patient Status: In Patient. Technical Quality:Adequate visualization. Conclusions   Summary   No evidence of superficial or deep venous thrombosis in both lower  extremities. Signature   ----------------------------------------------------------------  Electronically signed by Canelo Marie(Sonographer) on  08/09/2021 07:56 AM  ----------------------------------------------------------------   ----------------------------------------------------------------  Electronically signed by Christina Pipes Reyes,Arthur(Interpreting  physician) on 08/09/2021 11:02 PM  ----------------------------------------------------------------  Findings:   Right Impression:                    Left Impression:   The common femoral, femoral,         The common femoral, femoral,  popliteal and tibial veins           popliteal and tibial veins  demonstrate normal compressibility   demonstrate normal compressibility  and augmentation. and augmentation. Normal compressibility of the great  Normal compressibility of the great  saphenous vein. saphenous vein.    Normal compressibility of the small  Normal compressibility of the small  saphenous vein. saphenous vein. Velocities are measured in cm/s ; Diameters are measured in cm Right Lower Extremities DVT Study Measurements Right 2D Measurements +------------------------------------+----------+---------------+----------+ ! Location                            ! Visualized! Compressibility! Thrombosis! +------------------------------------+----------+---------------+----------+ ! Common Femoral                      !Yes       ! Yes            ! None      ! +------------------------------------+----------+---------------+----------+ ! Prox Femoral                        !Yes       ! Yes            ! None      ! +------------------------------------+----------+---------------+----------+ ! Mid Femoral                         !Yes       ! Yes            ! None      ! +------------------------------------+----------+---------------+----------+ ! Dist Femoral                        !Yes       ! Yes            ! None      ! +------------------------------------+----------+---------------+----------+ ! Deep Femoral                        !No        !               !          ! +------------------------------------+----------+---------------+----------+ ! Popliteal                           !Yes       ! Yes            ! None      ! +------------------------------------+----------+---------------+----------+ ! Sapheno Femoral Junction            ! Yes       ! Yes            ! None      ! +------------------------------------+----------+---------------+----------+ ! PTV                                 ! Yes       ! Yes            ! None      ! +------------------------------------+----------+---------------+----------+ ! Peroneal                            !Partial   !Yes            ! None      ! +------------------------------------+----------+---------------+----------+ ! Gastroc                             ! Yes       ! Yes            ! None      ! +------------------------------------+----------+---------------+----------+ ! GSV Thigh                           ! Yes       ! Yes            ! None      ! +------------------------------------+----------+---------------+----------+ ! GSV Knee                            ! Yes       ! Yes            ! None      ! +------------------------------------+----------+---------------+----------+ ! GSV Ankle                           ! Yes       ! Yes            ! None      ! +------------------------------------+----------+---------------+----------+ ! SSV                                 ! Partial   !Yes            ! None      ! +------------------------------------+----------+---------------+----------+ Right Doppler Measurements +---------------------------+------+------+--------------------------------+ ! Location                   ! Signal!Reflux! Reflux (msec)                   ! +---------------------------+------+------+--------------------------------+ ! Common Femoral             !Phasic!      !                                ! +---------------------------+------+------+--------------------------------+ ! Prox Femoral               !Phasic!      !                                ! +---------------------------+------+------+--------------------------------+ ! Popliteal                  !Phasic!      !                                ! +---------------------------+------+------+--------------------------------+ Left Lower Extremities DVT Study Measurements Left 2D Measurements +------------------------------------+----------+---------------+----------+ ! Location                            ! Visualized! Compressibility! Thrombosis! +------------------------------------+----------+---------------+----------+ ! Common Femoral                      !Yes       ! Yes            ! None      ! +------------------------------------+----------+---------------+----------+ ! Prox Femoral                        !Yes       ! Yes            ! None      ! +------------------------------------+----------+---------------+----------+ ! Mid Femoral                         !Yes       ! Yes            ! None      ! +------------------------------------+----------+---------------+----------+ ! Dist Femoral                        !Yes       ! Yes            ! None      ! +------------------------------------+----------+---------------+----------+ ! Deep Femoral                        !No        !               !          ! +------------------------------------+----------+---------------+----------+ ! Popliteal                           !Yes       ! Yes            ! None      ! +------------------------------------+----------+---------------+----------+ ! Sapheno Femoral Junction            ! Yes       ! Yes            ! None      ! +------------------------------------+----------+---------------+----------+ ! PTV                                 ! Yes       ! Yes            ! None      ! +------------------------------------+----------+---------------+----------+ ! Peroneal                            !Partial   !Yes            ! None      ! +------------------------------------+----------+---------------+----------+ ! Gastroc                             ! Yes       ! Yes            ! None      ! +------------------------------------+----------+---------------+----------+ ! GSV Thigh                           ! Yes       ! Yes            ! None      ! +------------------------------------+----------+---------------+----------+ ! GSV Knee                            ! Yes       ! Yes            ! None      ! +------------------------------------+----------+---------------+----------+ ! GSV Ankle                           ! Yes       ! Yes            ! None      ! +------------------------------------+----------+---------------+----------+ ! SSV                                 ! Yes       ! Yes            ! None      ! +------------------------------------+----------+---------------+----------+ Left Doppler Measurements +---------------------------+------+------+--------------------------------+ ! Location                   ! Signal!Reflux! Reflux (msec)                   ! +---------------------------+------+------+--------------------------------+ ! Common Femoral             !Phasic!      !                                ! +---------------------------+------+------+--------------------------------+ ! Prox Femoral               !Phasic!      !                                ! +---------------------------+------+------+--------------------------------+ ! Popliteal                  !Phasic!      !                                ! +---------------------------+------+------+--------------------------------+    MRA NECK WO CONTRAST    Result Date: 8/7/2021  EXAMINATION: MRA OF THE NECK WITHOUT CONTRAST; MRI OF THE BRAIN WITHOUT CONTRAST; MRA OF THE HEAD WITHOUT CONTRAST 8/7/2021 8:28 pm; 8/7/2021 8:34 pm; 8/7/2021 8:27 pm: TECHNIQUE: Multiplanar multisequence MRA of the neck was performed without the administration of intravenous contrast. Stenosis of the internal carotid arteries measured using NASCET criteria.; Multiplanar multisequence MRI of the brain was performed without the administration of intravenous contrast.; MRA of the head was performed utilizing time-of-flight imaging with MIP images. No intravenous contrast was administered. COMPARISON: None.  HISTORY: ORDERING SYSTEM PROVIDED HISTORY: stroke TECHNOLOGIST PROVIDED HISTORY: stroke Reason for Exam: stroke Additional signs and symptoms: patient unable to give history and unable to follow exam instructions due to mental status FINDINGS: MRI BRAIN: INTRACRANIAL STRUCTURES/VENTRICLES: Multifocal diffusion restriction abnormality related to acute ischemia are seen involving the right basal ganglia within the putamen, caudate nucleus and anterior limb of the right internal capsule, the posterior limb of the left internal capsule and lateral margin of the left thalamus, the posterior commissure of the corpus callosum, and left greater than right frontal parietal paramedian subcortical and deep white matter . Can fluent increased T2/FLAIR signal is noted within the cerebral white matter consistent with severe microvascular ischemic change. No mass effect or midline shift. No evidence of an acute intracranial hemorrhage. Mild diffuse decrease in cerebral volume is noted with corresponding prominence of the sulci and ventricles. The sellar/suprasellar regions appear unremarkable. The normal signal voids within the major intracranial vessels appear maintained. ORBITS: The visualized portion of the orbits demonstrate no acute abnormality. SINUSES: The visualized paranasal sinuses and mastoid air cells are well aerated. BONES/SOFT TISSUES: The bone marrow signal intensity appears normal. The soft tissues demonstrate no acute abnormality. MRA NECK: AORTIC ARCH/ARCH VESSELS: No dissection or arterial injury. No significant stenosis of the brachiocephalic or subclavian arteries. CAROTID ARTERIES: No dissection, arterial injury, or hemodynamically significant stenosis by NASCET criteria. VERTEBRAL ARTERIES: No dissection, arterial injury, or significant stenosis. MRA HEAD: ANTERIOR CIRCULATION: Suspected focal high-grade stenosis involving the A1 segment of the left anterior cerebral artery is identified. Right middle cerebral artery focal moderate grade stenosis involving the M1 segment is present. Suspected multifocal moderate to high-grade stenosis involving the right middle cerebral artery M2 segment is seen. No further evidence of significant stenosis of the intracranial internal carotid, anterior cerebral, or middle cerebral arteries. POSTERIOR CIRCULATION: Bilateral posterior cerebral artery P2 segment suspected focal moderate to high-grade stenosis are noted. No further evidence of significant stenosis of the vertebral, basilar, or posterior cerebral arteries.      1. Multifocal acute ischemia involving the bilateral basal ganglia, as discussed above, posterior commissure of the corpus callosum, lateral margin of left thalamus and the left greater than right frontal parietal paramedian subcortical and deep white matter. 2. No evidence of associated hemorrhagic conversion. 3. Finding suggestive of embolic phenomenon. Recommend clinical correlation. 4. Suspected focal high-grade stenosis involving A1 segment of the left anterior cerebral artery. 5. Suspected right middle cerebral artery M1 segment focal moderate grade stenosis. 6. Suspected right middle cerebral artery M2 segment multifocal moderate to high-grade stenosis. 7. Bilateral posterior cerebral artery P2 segment suspected multifocal moderate to high-grade stenosis. 8. Grossly unremarkable MR angiogram of the neck. Note: MR angiographic evaluation of the neck is severely limited by patient motion related artifact. 9. Severe cerebral white matter chronic microvascular ischemic disease. 10. Mild diffuse cerebral atrophy. Critical results were called by Dr. Jeremy Jones to Dr. Cuco Puri On 8/7/2021 at 22:11. MRI BRAIN WO CONTRAST    Result Date: 8/7/2021  EXAMINATION: MRA OF THE NECK WITHOUT CONTRAST; MRI OF THE BRAIN WITHOUT CONTRAST; MRA OF THE HEAD WITHOUT CONTRAST 8/7/2021 8:28 pm; 8/7/2021 8:34 pm; 8/7/2021 8:27 pm: TECHNIQUE: Multiplanar multisequence MRA of the neck was performed without the administration of intravenous contrast. Stenosis of the internal carotid arteries measured using NASCET criteria.; Multiplanar multisequence MRI of the brain was performed without the administration of intravenous contrast.; MRA of the head was performed utilizing time-of-flight imaging with MIP images. No intravenous contrast was administered. COMPARISON: None.  HISTORY: ORDERING SYSTEM PROVIDED HISTORY: stroke TECHNOLOGIST PROVIDED HISTORY: stroke Reason for Exam: stroke Additional signs and symptoms: patient unable to give history and unable to follow exam instructions due to mental status FINDINGS: MRI BRAIN: INTRACRANIAL STRUCTURES/VENTRICLES: Multifocal diffusion restriction abnormality related to acute ischemia are seen involving the right basal ganglia within the putamen, caudate nucleus and anterior limb of the right internal capsule, the posterior limb of the left internal capsule and lateral margin of the left thalamus, the posterior commissure of the corpus callosum, and left greater than right frontal parietal paramedian subcortical and deep white matter . Can fluent increased T2/FLAIR signal is noted within the cerebral white matter consistent with severe microvascular ischemic change. No mass effect or midline shift. No evidence of an acute intracranial hemorrhage. Mild diffuse decrease in cerebral volume is noted with corresponding prominence of the sulci and ventricles. The sellar/suprasellar regions appear unremarkable. The normal signal voids within the major intracranial vessels appear maintained. ORBITS: The visualized portion of the orbits demonstrate no acute abnormality. SINUSES: The visualized paranasal sinuses and mastoid air cells are well aerated. BONES/SOFT TISSUES: The bone marrow signal intensity appears normal. The soft tissues demonstrate no acute abnormality. MRA NECK: AORTIC ARCH/ARCH VESSELS: No dissection or arterial injury. No significant stenosis of the brachiocephalic or subclavian arteries. CAROTID ARTERIES: No dissection, arterial injury, or hemodynamically significant stenosis by NASCET criteria. VERTEBRAL ARTERIES: No dissection, arterial injury, or significant stenosis. MRA HEAD: ANTERIOR CIRCULATION: Suspected focal high-grade stenosis involving the A1 segment of the left anterior cerebral artery is identified. Right middle cerebral artery focal moderate grade stenosis involving the M1 segment is present.   Suspected multifocal moderate to high-grade stenosis involving the right middle cerebral artery M2 segment is seen. No further evidence of significant stenosis of the intracranial internal carotid, anterior cerebral, or middle cerebral arteries. POSTERIOR CIRCULATION: Bilateral posterior cerebral artery P2 segment suspected focal moderate to high-grade stenosis are noted. No further evidence of significant stenosis of the vertebral, basilar, or posterior cerebral arteries. 1. Multifocal acute ischemia involving the bilateral basal ganglia, as discussed above, posterior commissure of the corpus callosum, lateral margin of left thalamus and the left greater than right frontal parietal paramedian subcortical and deep white matter. 2. No evidence of associated hemorrhagic conversion. 3. Finding suggestive of embolic phenomenon. Recommend clinical correlation. 4. Suspected focal high-grade stenosis involving A1 segment of the left anterior cerebral artery. 5. Suspected right middle cerebral artery M1 segment focal moderate grade stenosis. 6. Suspected right middle cerebral artery M2 segment multifocal moderate to high-grade stenosis. 7. Bilateral posterior cerebral artery P2 segment suspected multifocal moderate to high-grade stenosis. 8. Grossly unremarkable MR angiogram of the neck. Note: MR angiographic evaluation of the neck is severely limited by patient motion related artifact. 9. Severe cerebral white matter chronic microvascular ischemic disease. 10. Mild diffuse cerebral atrophy. Critical results were called by Dr. Lauri Hicks to Dr. Macrina Sim On 8/7/2021 at 22:11. Physical Examination:        Physical Exam  Constitutional:       General: He is not in acute distress. Comments: Obtunded, nonresponsive   HENT:      Mouth/Throat:      Comments: White/yellow coating on tongue, improving  Cardiovascular:      Rate and Rhythm: Normal rate and regular rhythm. Heart sounds: No murmur heard. No friction rub. No gallop. Pulmonary:      Breath sounds:  No wheezing, rhonchi or rales. Comments: Apneic episodes followed by tachypnea  Abdominal:      General: Bowel sounds are normal. There is no distension. Palpations: Abdomen is soft. Musculoskeletal:      Right lower leg: No edema. Left lower leg: No edema. 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 Continue oral care for thrush. Patient's CODE STATUS has been changed to DNR CC. Hospice meeting with family today. Will adjust medication regimen accordingly. Brooke Foy MD  8/16/2021  1:48 PM     Attending Physician Statement    I have discussed the case of Ceferino Pearlta, including pertinent history and exam findings with the resident. I have seen and examined the patient and the key elements of the encounter have been performed by me. I agree with the assessment, plan, and orders as documented by the resident.   At present the patient is DNR CCand has no significant change in his status  Electronically signed by Francecsa Atkins MD on 8/16/2021 at 2:02 PM

## 2021-08-16 NOTE — PROGRESS NOTES
Today's Date: 8/16/2021  Patient Name: Irish Schultz  Date of admission: 8/8/2021 11:30 AM  Patient's age: 77 y.o., 1945  Admission Dx: Altered mental status [R41.82]    Reason for Consult: management recommendations  Requesting Physician: No admitting provider for patient encounter. CHIEF COMPLAINT: Thrombocytopenia. Recurrent stroke. History Obtained From:  electronic medical record, medical team    Interval history:    Patient seen and examined  Labs and vital reviewed  Patient remains unresponsive  No real progress in his condition     HISTORY OF PRESENT ILLNESS:      The patient is a 77 y.o.  male who is admitted to the hospital for worsening mental status and recurrent strokes    Patient was initially admitted to the hospital on 7/13 with strokelike symptoms. Imaging studies showed evidence of prior bilateral strokes on imaging. CTA done showed diffuse intracranial atherosclerosis disease and near occlusion of right PCA. After evaluation medical therapy was recommended. Further evaluation also revealed new left cortical ischemic stroke. Patient platelet count at presentation was only 4. 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 After recovery of the platelet count patient was started on antiplatelet therapy. Patient was discharged on 7/22 [place] [address] rehab. Repeat MRI shows new infarcts in the right cerebellar right lentiform nucleus and left periventricular region. MRA confirms atherosclerotic disease. Patient is now placed on low intensity heparin with aspirin. Patient is being worked up with a IZABELLA.   Patient's most latest blood count is 140,000    Patient mentation has worsened and is nonverbal does not follow commands not able to give any history    Past Medical History:   has a past medical history of Centrilobular emphysema ([address] COPD (chronic obstructive pulmonary disease) ([facility] [address] Depression, Diabetes mellitus ([facility] [address] Former smoker, Dose Route Frequency Provider Last Rate Last Admin    insulin lispro (HUMALOG) injection vial 0-18 Units  0-18 Units Subcutaneous TID  Melissa Collins MD   9 Units at 08/16/21 1155    insulin lispro (HUMALOG) injection vial 0-9 Units  0-9 Units Subcutaneous Nightly Melissa Collins MD   3 Units at 08/15/21 2059    insulin glargine (LANTUS) injection vial 10 Units  10 Units Subcutaneous Nightly Arely Paul MD   10 Units at 08/15/21 2059    albuterol (PROVENTIL) nebulizer solution 2.5 mg  2.5 mg Nebulization As Directed RT PRN Arely Paul MD        heparin (porcine) injection 5,000 Units  5,000 Units Subcutaneous 3 times per day Arely Paul MD   5,000 Units at 08/16/21 0540    ticagrelor (BRILINTA) tablet 90 mg  90 mg Oral BID Payam Chirri, DO   90 mg at 08/16/21 0933    albuterol (PROVENTIL) nebulizer solution 2.5 mg  2.5 mg Nebulization TID Arely Paul MD   2.5 mg at 08/16/21 0718    nystatin (MYCOSTATIN) 660056 UNIT/ML suspension 500,000 Units  5 mL Oral 4x Daily Hernan Rios MD   500,000 Units at 08/16/21 0933    hydrALAZINE (APRESOLINE) injection 10 mg  10 mg Intravenous Q8H PRN Melissa Collins MD   10 mg at 08/13/21 0937    acetaminophen (TYLENOL) tablet 650 mg  650 mg Oral Q4H PRN Dian Arnold MD   650 mg at 08/14/21 1211    bisacodyl (DULCOLAX) suppository 10 mg  10 mg Rectal Daily PRN Dian Arnold MD        polyethylene glycol Bryan Gathers) packet 17 g  17 g Per G Tube Daily PRN Dian Arnold MD        [facility]) tablet 17.2 mg  2 tablet Oral Daily PRN Dian Arnold MD        albuterol sulfate  (90 Base) MCG/ACT inhaler 2 puff  2 puff Inhalation Q6H PRN Dian Arnold MD        aspirin chewable tablet 81 mg  81 mg PEG Tube Daily Dian Arnold MD   81 mg at 08/16/21 0933    dextrose 5 % solution  100 mL/hr Intravenous PRN Dian Arnold MD        dextrose 50 % IV solution  12.5 g Intravenous PRN Yarely Marie Rita Haynes MD        famotidine (PEPCID) tablet 20 mg  20 mg PEG Tube Daily Pk Rosen MD   20 mg at 21    glucagon (rDNA) injection 1 mg  1 mg Intramuscular PRN Pk Rosen MD        glucose (GLUTOSE) 40 % oral gel 15 g  15 g Oral PRN Pk Rosen MD        hydrALAZINE (APRESOLINE) tablet 10 mg  10 mg Oral 3 times per day Pk Rosen MD   10 mg at 08/15/21 2058    losartan (COZAAR) tablet 50 mg  50 mg Oral Daily Pk Rosen MD   50 mg at 21    melatonin tablet 6 mg  6 mg PEG Tube Nightly Pk Rosen MD   6 mg at 08/15/21 2058    OLANZapine (ZYPREXA) tablet 5 mg  5 mg PEG Tube Nightly Pk Rosen MD   5 mg at 08/15/21 2058    rosuvastatin (CRESTOR) tablet 40 mg  40 mg PEG Tube Nightly Pk Rosen MD   40 mg at 08/15/21 2059    tiotropium (SPIRIVA RESPIMAT) 2.5 MCG/ACT inhaler 2 puff  2 puff Inhalation Lunch Pk Rosen MD   2 puff at 21       Allergies:  Patient has no known allergies. Social History:   reports that he quit smoking about 8 years ago. He started smoking about 64 years ago. He has a 42.00 pack-year smoking history. He has never used smokeless tobacco. He reports that he does not drink alcohol and does not use drugs.      Family History: Unable to obtain due to altered mental status    REVIEW OF SYSTEMS:      Unable to obtain review of system due to patient being nonverbal    PHYSICAL EXAM:        /77   Pulse 77   Temp 98.7 °F (37.1 °C) (Axillary)   Resp 26   Ht 5' 10\" (1.778 m)   Wt 212 lb 1.3 oz (96.2 kg)   SpO2 95%   BMI 30.43 kg/m²    Temp (24hrs), Av.5 °F (36.9 °C), Min:98.2 °F (36.8 °C), Max:98.7 °F (37.1 °C)      General appearance -ill-appearing  Mental status -not alert or cooperative  Eyes -pupils reactive  Ears - bilateral TM's and external ear canals normal   Mouth - mucous membranes moist, pharynx normal without lesions   Neck - supple, no significant adenopathy   Lymphatics - no palpable lymphadenopathy, no hepatosplenomegaly   Chest -decreased breathing sounds  Heart - normal rate, regular rhythm, normal S1, S2, no murmurs  Abdomen - soft, nontender, nondistended, no masses or organomegaly   Neurological -patient is not alert awake oriented does not follow commands  Musculoskeletal - no joint tenderness, deformity or swelling   Extremities - peripheral pulses normal, no pedal edema, no clubbing or cyanosis   Skin - normal coloration and turgor, no rashes, no suspicious skin lesions noted ,      DATA:      Labs:     Results for orders placed or performed during the hospital encounter of 08/08/21   Culture, Blood 1    Specimen: Blood   Result Value Ref Range    Specimen Description . BLOOD  LEFT AC, NO VOLUMES LISTED     Special Requests NOT REPORTED     Culture NO GROWTH 6 DAYS    Culture, Blood 1    Specimen: Blood   Result Value Ref Range    Specimen Description . BLOOD  LEFT AC, NO VOLUMES LISTED     Special Requests NOT REPORTED     Culture NO GROWTH 6 DAYS    Legionella Ag, Ur    Specimen: Urine, straight catheter   Result Value Ref Range    Legionella Pneumophilia Ag, Urine NEGATIVE    STREP PNEUMONIAE ANTIGEN    Specimen: Urine, straight catheter   Result Value Ref Range    Source . CLEAN CATCH URINE     Strep pneumo Ag NEGATIVE    Urinalysis Reflex to Culture    Specimen: Urine, clean catch   Result Value Ref Range    Color, UA YELLOW YELLOW    Turbidity UA CLEAR CLEAR    Glucose, Ur NEGATIVE NEGATIVE    Bilirubin Urine NEGATIVE NEGATIVE    Ketones, Urine NEGATIVE NEGATIVE    Specific Gravity, UA 1.018 1.000 - 1.030    Urine Hgb NEGATIVE NEGATIVE    pH, UA 6.5 5.0 - 8.0    Protein, UA 1+ (A) NEGATIVE    Urobilinogen, Urine Normal Normal    Nitrite, Urine NEGATIVE NEGATIVE    Leukocyte Esterase, Urine NEGATIVE NEGATIVE    Urinalysis Comments NOT REPORTED    Procalcitonin   Result Value Ref Range    Procalcitonin 0.26 (H) <0.09 ng/mL   Mycoplasma Ab,IgM   Result Value Ref Range    Mycoplasma pneumo IgM 0.24 <0.91   Lactic Acid   Result Value Ref Range    Lactic Acid 2.0 0.5 - 2.2 mmol/L   CBC   Result Value Ref Range    WBC 11.4 (H) 3.5 - 11.0 k/uL    RBC 4.48 (L) 4.5 - 5.9 m/uL    Hemoglobin 10.7 (L) 13.5 - 17.5 g/dL    Hematocrit 35.2 (L) 41 - 53 %    MCV 78.7 (L) 80 - 100 fL    MCH 23.9 (L) 26 - 34 pg    MCHC 30.4 (L) 31 - 37 g/dL    RDW 17.9 (H) 11.5 - 14.9 %    Platelets 124 (L) 679 - 450 k/uL    MPV 10.9 6.0 - 12.0 fL    NRBC Automated NOT REPORTED per 100 WBC   HEPATIC FUNCTION PANEL   Result Value Ref Range    Albumin 2.9 (L) 3.5 - 5.2 g/dL    Alkaline Phosphatase 184 (H) 40 - 129 U/L     (H) 5 - 41 U/L     (H) <40 U/L    Total Bilirubin 0.21 (L) 0.3 - 1.2 mg/dL    Bilirubin, Direct 0.10 <0.31 mg/dL    Bilirubin, Indirect 0.11 0.00 - 1.00 mg/dL    Total Protein 6.3 (L) 6.4 - 8.3 g/dL    Globulin NOT REPORTED 1.5 - 3.8 g/dL    Albumin/Globulin Ratio NOT REPORTED 1.0 - 2.5   CBC   Result Value Ref Range    WBC 14.4 (H) 3.5 - 11.0 k/uL    RBC 4.38 (L) 4.5 - 5.9 m/uL    Hemoglobin 10.7 (L) 13.5 - 17.5 g/dL    Hematocrit 34.6 (L) 41 - 53 %    MCV 78.9 (L) 80 - 100 fL    MCH 24.3 (L) 26 - 34 pg    MCHC 30.9 (L) 31 - 37 g/dL    RDW 17.8 (H) 11.5 - 14.9 %    Platelets 343 (L) 654 - 450 k/uL    MPV 10.8 6.0 - 12.0 fL    NRBC Automated NOT REPORTED per 100 WBC   APTT   Result Value Ref Range    PTT 45.7 (H) 24.0 - 36.0 sec   Protime-INR   Result Value Ref Range    Protime 14.7 (H) 11.8 - 14.6 sec    INR 1.1    Basic Metabolic Panel   Result Value Ref Range    Glucose 154 (H) 70 - 99 mg/dL    BUN 31 (H) 8 - 23 mg/dL    CREATININE 1.96 (H) 0.70 - 1.20 mg/dL    Bun/Cre Ratio NOT REPORTED 9 - 20    Calcium 9.2 8.6 - 10.4 mg/dL    Sodium 147 (H) 135 - 144 mmol/L    Potassium 5.8 (H) 3.7 - 5.3 mmol/L    Chloride 111 (H) 98 - 107 mmol/L    CO2 27 20 - 31 mmol/L    Anion Gap 9 9 - 17 mmol/L    GFR Non-African American 34 (L) >60 mL/min    GFR  41 (L) >60 mL/min    GFR Comment          GFR Staging NOT REPORTED    Magnesium   Result Value Ref Range    Magnesium 2.6 1.6 - 2.6 mg/dL   Phosphorus   Result Value Ref Range    Phosphorus 4.7 (H) 2.5 - 4.5 mg/dL   HEPARIN LEVEL/ANTI-XA   Result Value Ref Range    Anti-XA Unfrac Heparin 0.67 0.30 - 0.70 IU/L   HEPARIN LEVEL/ANTI-XA   Result Value Ref Range    Anti-XA Unfrac Heparin 0.76 (HH) 0.30 - 0.70 IU/L   HEPARIN LEVEL/ANTI-XA   Result Value Ref Range    Anti-XA Unfrac Heparin 0.55 0.30 - 0.70 IU/L   HEPARIN LEVEL/ANTI-XA   Result Value Ref Range    Anti-XA Unfrac Heparin 0.49 0.30 - 0.70 IU/L   Basic Metabolic Panel w/ Reflex to MG   Result Value Ref Range    Glucose 147 (H) 70 - 99 mg/dL    BUN 28 (H) 8 - 23 mg/dL    CREATININE 1.79 (H) 0.70 - 1.20 mg/dL    Bun/Cre Ratio NOT REPORTED 9 - 20    Calcium 9.3 8.6 - 10.4 mg/dL    Sodium 145 (H) 135 - 144 mmol/L    Potassium 5.6 (H) 3.7 - 5.3 mmol/L    Chloride 110 (H) 98 - 107 mmol/L    CO2 26 20 - 31 mmol/L    Anion Gap 9 9 - 17 mmol/L    GFR Non-African American 37 (L) >60 mL/min    GFR  45 (L) >60 mL/min    GFR Comment          GFR Staging NOT REPORTED    BLOOD GAS, VENOUS   Result Value Ref Range    pH, David 7.409 7.320 - 7.420    pCO2, David 44.5 39.0 - 55.0    pO2, David 37.7 30 - 50    HCO3, Venous 28.2 24.0 - 30.0 mmol/L    Positive Base Excess, David 3.5 (H) 0.0 - 2.0 mmol/L    Negative Base Excess, David NOT REPORTED 0.0 - 2.0 mmol/L    O2 Sat, David 69.2 60.0 - 85.0 %    Total Hb NOT REPORTED 12.0 - 16.0 g/dl    Oxyhemoglobin NOT REPORTED 95.0 - 98.0 %    Carboxyhemoglobin 2.1 0 - 5 %    Methemoglobin 0.8 0.0 - 1.9 %    Pt Temp NOT REPORTED     pH, David, Temp Adj NOT REPORTED 7.320 - 7.420    pCO2, David, Temp Adj NOT REPORTED 39.0 - 55.0 mmHg    pO2, David, Temp Adj NOT REPORTED 30.0 - 50.0 mmHg    O2 Device/Flow/% NOT REPORTED     Respiratory Rate NOT REPORTED     Adelfo Test NOT REPORTED     Sample Site NOT REPORTED 26 - 34 pg    MCHC 30.9 (L) 31 - 37 g/dL    RDW 18.2 (H) 11.5 - 14.9 %    Platelets 740 (L) 170 - 450 k/uL    MPV 12.0 6.0 - 12.0 fL    NRBC Automated NOT REPORTED per 100 WBC   HEPARIN LEVEL/ANTI-XA   Result Value Ref Range    Anti-XA Unfrac Heparin 0.21 (L) 0.30 - 0.70 IU/L   HEPARIN LEVEL/ANTI-XA   Result Value Ref Range    Anti-XA Unfrac Heparin 0.73 (HH) 0.30 - 0.70 IU/L   Basic Metabolic Panel   Result Value Ref Range    Glucose 195 (H) 70 - 99 mg/dL    BUN 25 (H) 8 - 23 mg/dL    CREATININE 1.54 (H) 0.70 - 1.20 mg/dL    Bun/Cre Ratio NOT REPORTED 9 - 20    Calcium 8.6 8.6 - 10.4 mg/dL    Sodium 139 135 - 144 mmol/L    Potassium 4.4 3.7 - 5.3 mmol/L    Chloride 107 98 - 107 mmol/L    CO2 24 20 - 31 mmol/L    Anion Gap 8 (L) 9 - 17 mmol/L    GFR Non-African American 44 (L) >60 mL/min    GFR  54 (L) >60 mL/min    GFR Comment          GFR Staging NOT REPORTED    Magnesium   Result Value Ref Range    Magnesium 2.4 1.6 - 2.6 mg/dL   Phosphorus   Result Value Ref Range    Phosphorus 3.9 2.5 - 4.5 mg/dL   HEPARIN LEVEL/ANTI-XA   Result Value Ref Range    Anti-XA Unfrac Heparin 0.48 0.30 - 0.70 IU/L   HEPARIN LEVEL/ANTI-XA   Result Value Ref Range    Anti-XA Unfrac Heparin 0.45 0.30 - 0.70 IU/L   HEPARIN LEVEL/ANTI-XA   Result Value Ref Range    Anti-XA Unfrac Heparin 0.38 0.30 - 0.70 IU/L   HEPARIN LEVEL/ANTI-XA   Result Value Ref Range    Anti-XA Unfrac Heparin 0.29 (L) 0.30 - 0.70 IU/L   CBC   Result Value Ref Range    WBC 9.1 3.5 - 11.0 k/uL    RBC 4.41 (L) 4.5 - 5.9 m/uL    Hemoglobin 10.7 (L) 13.5 - 17.5 g/dL    Hematocrit 34.6 (L) 41 - 53 %    MCV 78.4 (L) 80 - 100 fL    MCH 24.1 (L) 26 - 34 pg    MCHC 30.8 (L) 31 - 37 g/dL    RDW 17.3 (H) 11.5 - 14.9 %    Platelets 498 906 - 265 k/uL    MPV 10.7 6.0 - 12.0 fL    NRBC Automated NOT REPORTED per 100 WBC   HEPARIN LEVEL/ANTI-XA   Result Value Ref Range    Anti-XA Unfrac Heparin 0.75 () 0.30 - 0.70 IU/L   Basic Metabolic Panel   Result Value Ref Range    Glucose 301 (H) 70 - 99 mg/dL    BUN 22 8 - 23 mg/dL    CREATININE 1.49 (H) 0.70 - 1.20 mg/dL    Bun/Cre Ratio NOT REPORTED 9 - 20    Calcium 9.0 8.6 - 10.4 mg/dL    Sodium 135 135 - 144 mmol/L    Potassium 5.1 3.7 - 5.3 mmol/L    Chloride 103 98 - 107 mmol/L    CO2 21 20 - 31 mmol/L    Anion Gap 11 9 - 17 mmol/L    GFR Non-African American 46 (L) >60 mL/min    GFR  56 (L) >60 mL/min    GFR Comment          GFR Staging NOT REPORTED    Magnesium   Result Value Ref Range    Magnesium 2.2 1.6 - 2.6 mg/dL   Phosphorus   Result Value Ref Range    Phosphorus 3.4 2.5 - 4.5 mg/dL   Prealbumin   Result Value Ref Range    Prealbumin 13.4 (L) 20 - 40 mg/dL   CBC   Result Value Ref Range    WBC 13.5 (H) 3.5 - 11.0 k/uL    RBC 4.71 4.5 - 5.9 m/uL    Hemoglobin 11.3 (L) 13.5 - 17.5 g/dL    Hematocrit 36.5 (L) 41 - 53 %    MCV 77.4 (L) 80 - 100 fL    MCH 23.9 (L) 26 - 34 pg    MCHC 30.9 (L) 31 - 37 g/dL    RDW 17.0 (H) 11.5 - 14.9 %    Platelets 824 838 - 503 k/uL    MPV 10.5 6.0 - 12.0 fL    NRBC Automated NOT REPORTED per 100 WBC   CBC   Result Value Ref Range    WBC 13.5 (H) 3.5 - 11.0 k/uL    RBC 4.71 4.5 - 5.9 m/uL    Hemoglobin 11.3 (L) 13.5 - 17.5 g/dL    Hematocrit 36.8 (L) 41 - 53 %    MCV 78.2 (L) 80 - 100 fL    MCH 23.9 (L) 26 - 34 pg    MCHC 30.6 (L) 31 - 37 g/dL    RDW 17.7 (H) 11.5 - 14.9 %    Platelets 069 539 - 434 k/uL    MPV 10.5 6.0 - 12.0 fL    NRBC Automated NOT REPORTED per 100 WBC   HEPATIC FUNCTION PANEL   Result Value Ref Range    Albumin 2.6 (L) 3.5 - 5.2 g/dL    Alkaline Phosphatase 192 (H) 40 - 129 U/L     (H) 5 - 41 U/L     (H) <40 U/L    Total Bilirubin 0.24 (L) 0.3 - 1.2 mg/dL    Bilirubin, Direct 0.10 <0.31 mg/dL    Bilirubin, Indirect 0.14 0.00 - 1.00 mg/dL    Total Protein 6.6 6.4 - 8.3 g/dL    Globulin NOT REPORTED 1.5 - 3.8 g/dL    Albumin/Globulin Ratio NOT REPORTED 1.0 - 2.5   Basic Metabolic Panel   Result Value Ref Range    Glucose 299 (H) 70 - 99 mg/dL BUN 29 (H) 8 - 23 mg/dL    CREATININE 1.34 (H) 0.70 - 1.20 mg/dL    Bun/Cre Ratio NOT REPORTED 9 - 20    Calcium 9.1 8.6 - 10.4 mg/dL    Sodium 135 135 - 144 mmol/L    Potassium 5.4 (H) 3.7 - 5.3 mmol/L    Chloride 103 98 - 107 mmol/L    CO2 22 20 - 31 mmol/L    Anion Gap 10 9 - 17 mmol/L    GFR Non-African American 52 (L) >60 mL/min    GFR African American >60 >60 mL/min    GFR Comment          GFR Staging NOT REPORTED    Magnesium   Result Value Ref Range    Magnesium 2.4 1.6 - 2.6 mg/dL   Phosphorus   Result Value Ref Range    Phosphorus 4.6 (H) 2.5 - 4.5 mg/dL   POC Glucose Fingerstick   Result Value Ref Range    POC Glucose 207 (H) 75 - 110 mg/dL   POC Glucose Fingerstick   Result Value Ref Range    POC Glucose 236 (H) 75 - 110 mg/dL   POC Glucose Fingerstick   Result Value Ref Range    POC Glucose 198 (H) 75 - 110 mg/dL   POC Glucose Fingerstick   Result Value Ref Range    POC Glucose 170 (H) 75 - 110 mg/dL   POC Glucose Fingerstick   Result Value Ref Range    POC Glucose 130 (H) 75 - 110 mg/dL   POC Glucose Fingerstick   Result Value Ref Range    POC Glucose 133 (H) 75 - 110 mg/dL   POC Glucose Fingerstick   Result Value Ref Range    POC Glucose 141 (H) 75 - 110 mg/dL   POC Glucose Fingerstick   Result Value Ref Range    POC Glucose 123 (H) 75 - 110 mg/dL   POC Glucose Fingerstick   Result Value Ref Range    POC Glucose 113 (H) 75 - 110 mg/dL   POC Glucose Fingerstick   Result Value Ref Range    POC Glucose 103 75 - 110 mg/dL   POC Glucose Fingerstick   Result Value Ref Range    POC Glucose 159 (H) 75 - 110 mg/dL   POC Glucose Fingerstick   Result Value Ref Range    POC Glucose 186 (H) 75 - 110 mg/dL   POC Glucose Fingerstick   Result Value Ref Range    POC Glucose 148 (H) 75 - 110 mg/dL   POC Glucose Fingerstick   Result Value Ref Range    POC Glucose 168 (H) 75 - 110 mg/dL   POC Glucose Fingerstick   Result Value Ref Range    POC Glucose 158 (H) 75 - 110 mg/dL   POC Glucose Fingerstick   Result Value Ref Range    POC Glucose 214 (H) 75 - 110 mg/dL   POC Glucose Fingerstick   Result Value Ref Range    POC Glucose 223 (H) 75 - 110 mg/dL   POC Glucose Fingerstick   Result Value Ref Range    POC Glucose 188 (H) 75 - 110 mg/dL   POC Glucose Fingerstick   Result Value Ref Range    POC Glucose 226 (H) 75 - 110 mg/dL   POC Glucose Fingerstick   Result Value Ref Range    POC Glucose 291 (H) 75 - 110 mg/dL   POC Glucose Fingerstick   Result Value Ref Range    POC Glucose 214 (H) 75 - 110 mg/dL   POC Glucose Fingerstick   Result Value Ref Range    POC Glucose 189 (H) 75 - 110 mg/dL   POC Glucose Fingerstick   Result Value Ref Range    POC Glucose 198 (H) 75 - 110 mg/dL   POC Glucose Fingerstick   Result Value Ref Range    POC Glucose 238 (H) 75 - 110 mg/dL   POC Glucose Fingerstick   Result Value Ref Range    POC Glucose 248 (H) 75 - 110 mg/dL   POC Glucose Fingerstick   Result Value Ref Range    POC Glucose 257 (H) 75 - 110 mg/dL   POC Glucose Fingerstick   Result Value Ref Range    POC Glucose 222 (H) 75 - 110 mg/dL   POC Glucose Fingerstick   Result Value Ref Range    POC Glucose 280 (H) 75 - 110 mg/dL   POC Glucose Fingerstick   Result Value Ref Range    POC Glucose 227 (H) 75 - 110 mg/dL   POC Glucose Fingerstick   Result Value Ref Range    POC Glucose 211 (H) 75 - 110 mg/dL   POC Glucose Fingerstick   Result Value Ref Range    POC Glucose 232 (H) 75 - 110 mg/dL   POC Glucose Fingerstick   Result Value Ref Range    POC Glucose 252 (H) 75 - 110 mg/dL   POC Glucose Fingerstick   Result Value Ref Range    POC Glucose 270 (H) 75 - 110 mg/dL   POC Glucose Fingerstick   Result Value Ref Range    POC Glucose 253 (H) 75 - 110 mg/dL   EKG 12 Lead   Result Value Ref Range    Ventricular Rate 54 BPM    Atrial Rate 54 BPM    P-R Interval 146 ms    QRS Duration 92 ms    Q-T Interval 426 ms    QTc Calculation (Bazett) 403 ms    P Axis 65 degrees    R Axis -35 degrees    T Axis 68 degrees         IMAGING DATA:    ECHO Complete 2D W Doppler W Color    Result Date: 7/15/2021  Transthoracic Echocardiography Report (TTE)  Patient Name SHELL      Date of Study               07/15/2021               YAYA DUBOIS   Date of      1945  Gender                      Male  Birth   Age          77 year(s)  Race                        Black   Room Number  0536        Height:                     70 inch, 177.8 cm   Corporate ID [id number]    Weight:                     210 pounds, 95.3 kg  #   Patient Acct [de-identified]   BSA:          2.13 m^2      BMI:       30.13  #                                                               kg/m^2   MR #         [id number]     [facility]   Accession #  [id number]  Interpreting Physician      Yolande Vance   Fellow                   Referring Nurse                           Practitioner   Interpreting             Referring Physician         Ifeanyi Willard MD  Fellow  Additional Comments Technically difficult study, patient supine unable to be positioned for better acoustic windows. Type of Study   TTE procedure:2D Echocardiogram, M-Mode, Doppler, Color Doppler. Procedure Date Date: 07/15/2021 Start: 08:29 AM Study Location: [facility] Technical Quality: Adequate visualization Indications:CVA. History / Tech. Comments: Procedure explained to patient. Echo completed in the Echo Lab. RN performed bubble study. PMHx: Former smoker, COPD Hypertension, Diabetes, Hyperlipidemia Coronary artery disease, s/p stents Patient Status: Inpatient Height: 70 inches Weight: 210 pounds BSA: 2.13 m^2 BMI: 30.13 kg/m^2 CONCLUSIONS Summary Technically difficult study. Overall global left ventricular systolic function is normal, calculated ejection fraction is 50-55% Grade I (mild) left ventricular diastolic dysfunction. Technically difficult visualization of the right ventricle, but it does appear to be normal in size.  Negative bubble study, no obvious intracardiac shunt noted within technical limitations of this study. No significant valvular regurgitation or stenosis seen. No significant pericardial effusion is seen. Signature ----------------------------------------------------------------------------  Electronically signed by Joanne Yoder(Sonographer) on 07/15/2021 11:00 AM ---------------------------------------------------------------------------- ----------------------------------------------------------------------------  Electronically signed by Yolande Vance(Interpreting physician) on  07/15/2021 12:49 PM ---------------------------------------------------------------------------- FINDINGS Left Atrium Left atrium is normal in size. Inter-atrial septum is intact with no evidence for an atrial septal defect. Negative bubble study, no shunt noted. Left Ventricle Left ventricle is normal in size, normal wall thickness, global left ventricular systolic function is low normal, calculated ejection fraction is 50%. Grade I (mild) left ventricular diastolic dysfunction. Right Atrium Right atrium is normal in size. Right Ventricle Technically difficult visualization of the right ventricle, but it does appear to be normal in size. Mitral Valve Normal mitral valve structure. No mitral stenosis. Trivial mitral regurgitation. Aortic Valve Aortic valve is trileaflet. No evidence of aortic insufficiency or stenosis. Tricuspid Valve Tricuspid valve was not well visualized. Trivial tricuspid regurgitation. Insignificant tricuspid regurgitation, unable to estimate RVSP. Pulmonic Valve Pulmonic valve not well visualized but Doppler velocities are normal. Trivial pulmonic insufficiency. Pericardial Effusion No significant pericardial effusion is seen. Miscellaneous Normal aortic root dimension. E/E' average = 17.35. IVC not well visualized.  M-mode / 2D Measurements & Calculations:   LVIDd:5.6 cm(3.7 - 5.6 cm)        Diastolic CISLHL:82.32 ml  IVSd:0.9 cm(0.6 - 1.1 cm)         Systolic CEFJB.08 ml  LVPWd:0.8 cm(0.6 - 1.1 cm)        Aortic Root:3 cm(2.0 - 3.7 cm)                                    LA Dimension: 3.2 cm(1.9 - 4.0 cm)  Calculated LVEF (%): 50.46 %      LA volume/Index: 48.86 ml /23m^2                                    LVOT:2.1 cm                                    RVDd:3 cm   Mitral:                                 Aortic   Valve Area (P1/2-Time): 2.65 cm^2       Peak Velocity: 1.91 m/s  Peak E-Wave: 1.18 m/s                   Mean Velocity: 1.06 m/s  Peak A-Wave: 1.47 m/s                   Peak Gradient: 14.59 mmHg  E/A Ratio: 0.8                          Mean Gradient: 6 mmHg  Peak Gradient: 5.57 mmHg  Mean Gradient: 2 mmHg  Deceleration Time: 280 msec             Area (continuity): 2.83 cm^2  P1/2t: 83 msec                          AV VTI: 41.8 cm   Area (continuity): 2.2 cm^2  Mean Velocity: 0.62 m/s                                           Pulmonic:                                           Peak Velocity: 1.29 m/s                                          Peak Gradient: 6.66 mmHg  Diastology / Tissue Doppler Septal Wall E' velocity:0.06 m/s Septal Wall E/E':20.8 Lateral Wall E' velocity:0.08 m/s Lateral Wall E/E':13.9    CT HEAD WO CONTRAST    Result Date: 8/9/2021  EXAMINATION: CT OF THE HEAD WITHOUT CONTRAST  8/9/2021 2:04 pm TECHNIQUE: CT of the head was performed without the administration of intravenous contrast. Dose modulation, iterative reconstruction, and/or weight based adjustment of the mA/kV was utilized to reduce the radiation dose to as low as reasonably achievable. COMPARISON: MRI brain performed 08/07/2021. HISTORY: ORDERING SYSTEM PROVIDED HISTORY: stroke TECHNOLOGIST PROVIDED HISTORY: stroke Reason for Exam: stroke; ams Acuity: Unknown Type of Exam: Unknown Additional signs and symptoms: patient unable to stay still; turned head during exam FINDINGS: BRAIN/VENTRICLES: There is no acute intracranial hemorrhage, mass effect, or midline shift.   There is satisfactory overall gray-white matter differentiation. There is extensive chronic microvascular disease. There are evolving areas of ischemia in the left periventricular white matter, and corpus callosum posteriorly, in the right basal ganglia. The ventricular structures are symmetric and unremarkable. The infratentorial structures are unremarkable. ORBITS: The visualized portion of the orbits demonstrate no acute abnormality. SINUSES: The visualized paranasal sinuses and mastoid air cells demonstrate no acute abnormality. SOFT TISSUES/SKULL:  No acute abnormality of the visualized skull or soft tissues. Chronic microvascular disease with scattered areas of evolving ischemia as described above. CT HEAD WO CONTRAST    Result Date: 8/6/2021  EXAMINATION: CT OF THE HEAD WITHOUT CONTRAST  8/6/2021 2:21 pm TECHNIQUE: CT of the head was performed without the administration of intravenous contrast. Dose modulation, iterative reconstruction, and/or weight based adjustment of the mA/kV was utilized to reduce the radiation dose to as low as reasonably achievable. COMPARISON: 4 August 2021 HISTORY: ORDERING SYSTEM PROVIDED HISTORY: Pt w/ bilateral basal ganglia infarcts, right hemiparesis; minimally interactive over the last week and having increased lethargy the last few days. TECHNOLOGIST PROVIDED HISTORY: Pt w/ bilateral basal ganglia infarcts, right hemiparesis; minimally interactive over the last week and having increased lethargy the last few days. Reason for Exam: Increased lethargy the last few days. Acuity: Unknown Type of Exam: Unknown FINDINGS: BRAIN/VENTRICLES: There is no acute intracranial hemorrhage, mass effect or midline shift. No abnormal extra-axial fluid collection. The gray-white differentiation is maintained without evidence of an acute infarct. There is no evidence of hydrocephalus. Remote right-sided basal ganglial infarcts are noted.   However, there is been interval development of hypodensity in the right mid basal ganglia since prior study consistent with an evolving subacute infarct. No significant mass effect is noted. Ventricles are proportionate to cerebral atrophy. ORBITS: The visualized portion of the orbits demonstrate no acute abnormality. SINUSES: The visualized paranasal sinuses and mastoid air cells demonstrate no acute abnormality. SOFT TISSUES/SKULL:  No acute abnormality of the visualized skull or soft tissues. There has been interval development of a hypodensity in the right basal ganglia compared to prior study consistent with evolving subacute white matter infarct. No hemorrhage is noted. Chronic microvascular change in atrophy is demonstrated. No midline shift. CT HEAD WO CONTRAST    Result Date: 8/4/2021  EXAMINATION: CT OF THE HEAD WITHOUT CONTRAST  8/4/2021 1:25 pm TECHNIQUE: CT of the head was performed without the administration of intravenous contrast. Dose modulation, iterative reconstruction, and/or weight based adjustment of the mA/kV was utilized to reduce the radiation dose to as low as reasonably achievable. COMPARISON: CT head 07/21/2021 and 07/17/2021 HISTORY: ORDERING SYSTEM PROVIDED HISTORY: Patient with history of left-sided CVA with right hemiparesis, not communicating with staff, please evaluate for any change from previous imaging and any other abnormality TECHNOLOGIST PROVIDED HISTORY: Patient with history of left-sided CVA with right hemiparesis, not communicating with staff, please evaluate for any change from previous imaging and any other abnormality Reason for Exam: Patient with history of left-sided CVA with right hemiparesis, not communicating with staff, please evaluate for any change from previous imaging and any other abnormality Acuity: Unknown Type of Exam: Unknown FINDINGS: BRAIN/VENTRICLES: There is no acute intracranial hemorrhage, mass effect or midline shift. No abnormal extra-axial fluid collection.   The gray-white differentiation is maintained without with subacute infarct. Diffuse cortical volume loss and compensatory ventricular enlargement appears unchanged. Periventricular and subcortical white matter hypoattenuation redemonstrated bilaterally compatible with severe chronic microvascular ischemic changes. Encephalomalacia of the right caudate redemonstrated compatible with remote lacunar infarct. ORBITS: The visualized portion of the orbits demonstrate no acute abnormality. SINUSES: Air-fluid level within the left sphenoid sinus. Paranasal sinuses and mastoid air cells otherwise clear. SOFT TISSUES/SKULL:  No acute abnormality of the visualized skull or soft tissues. Subacute basal ganglia infarcts redemonstrated. No gross hemorrhagic conversion or significant mass effect. New air-fluid level within the left sphenoid sinus suggesting acute sinusitis. CT HEAD WO CONTRAST    Result Date: 7/17/2021  EXAMINATION: CT OF THE HEAD WITHOUT CONTRAST  7/17/2021 10:46 am TECHNIQUE: CT of the head was performed without the administration of intravenous contrast. Dose modulation, iterative reconstruction, and/or weight based adjustment of the mA/kV was utilized to reduce the radiation dose to as low as reasonably achievable. COMPARISON: None. HISTORY: ORDERING SYSTEM PROVIDED HISTORY: change in mentation TECHNOLOGIST PROVIDED HISTORY: change in mentation Reason for Exam: change in mentation FINDINGS: BRAIN/VENTRICLES: Small area of hypodensity in the left corona radiata is similar in appearance to the previous exam.  There is no evidence of hemorrhage. No new parenchymal abnormalities are identified. ORBITS: The visualized portion of the orbits demonstrate no acute abnormality. SINUSES: The visualized paranasal sinuses and mastoid air cells demonstrate no acute abnormality. SOFT TISSUES/SKULL:  No acute abnormality of the visualized skull or soft tissues. No acute intracranial abnormality. No significant interval change.      CT HEAD WO CONTRAST    Result Date: 7/13/2021  EXAMINATION: CT OF THE HEAD WITHOUT CONTRAST  7/13/2021 1:10 pm TECHNIQUE: CT of the head was performed without the administration of intravenous contrast. Dose modulation, iterative reconstruction, and/or weight based adjustment of the mA/kV was utilized to reduce the radiation dose to as low as reasonably achievable. COMPARISON: 05/02/2021 HISTORY: ORDERING SYSTEM PROVIDED HISTORY: Last known well 2 days right-sided facial droop right-sided weakness TECHNOLOGIST PROVIDED HISTORY: Last known well 2 days right-sided facial droop right-sided weakness Decision Support Exception - unselect if not a suspected or confirmed emergency medical condition->Emergency Medical Condition (MA) Reason for Exam: Last known well 2 days right-sided facial droop right-sided weakness FINDINGS: BRAIN/VENTRICLES: Ovoid area of low attenuation in the left frontal corona radiata measures 2 x 1.4 cm, new from prior study (series 2, image 39). No acute intracranial hemorrhage. No mass effect or midline shift. No hydrocephalus. Diffuse parenchymal volume loss with enlargement of the ventricles and cerebral sulci. Old infarction in the right basal ganglia. There are nonspecific areas of hypoattenuation within the periventricular and subcortical white matter, which likely represent chronic microvascular ischemic change. Intracranial atherosclerotic disease. ORBITS: The visualized portion of the orbits demonstrate no acute abnormality. SINUSES: The visualized paranasal sinuses and mastoid air cells demonstrate no acute abnormality. SOFT TISSUES/SKULL: No acute abnormality of the visualized skull or soft tissues. 1. New ovoid low-attenuation area in the left frontal corona radiata compatible with acute/subacute infarction. This measures 2 x 1.4 cm. No associated hemorrhage. 2. Diffuse parenchymal volume loss and sequela of severe chronic microvascular ischemic changes.  Findings were discussed with Dr. Shy Patel at 1:31 pm on 7/13/2021. MRA HEAD WO CONTRAST    Result Date: 8/7/2021  EXAMINATION: MRA OF THE NECK WITHOUT CONTRAST; MRI OF THE BRAIN WITHOUT CONTRAST; MRA OF THE HEAD WITHOUT CONTRAST 8/7/2021 8:28 pm; 8/7/2021 8:34 pm; 8/7/2021 8:27 pm: TECHNIQUE: Multiplanar multisequence MRA of the neck was performed without the administration of intravenous contrast. Stenosis of the internal carotid arteries measured using NASCET criteria.; Multiplanar multisequence MRI of the brain was performed without the administration of intravenous contrast.; MRA of the head was performed utilizing time-of-flight imaging with MIP images. No intravenous contrast was administered. COMPARISON: None. HISTORY: ORDERING SYSTEM PROVIDED HISTORY: stroke TECHNOLOGIST PROVIDED HISTORY: stroke Reason for Exam: stroke Additional signs and symptoms: patient unable to give history and unable to follow exam instructions due to mental status FINDINGS: MRI BRAIN: INTRACRANIAL STRUCTURES/VENTRICLES: Multifocal diffusion restriction abnormality related to acute ischemia are seen involving the right basal ganglia within the putamen, caudate nucleus and anterior limb of the right internal capsule, the posterior limb of the left internal capsule and lateral margin of the left thalamus, the posterior commissure of the corpus callosum, and left greater than right frontal parietal paramedian subcortical and deep white matter . Can fluent increased T2/FLAIR signal is noted within the cerebral white matter consistent with severe microvascular ischemic change. No mass effect or midline shift. No evidence of an acute intracranial hemorrhage. Mild diffuse decrease in cerebral volume is noted with corresponding prominence of the sulci and ventricles. The sellar/suprasellar regions appear unremarkable. The normal signal voids within the major intracranial vessels appear maintained. ORBITS: The visualized portion of the orbits demonstrate no acute abnormality. SINUSES: The visualized paranasal sinuses and mastoid air cells are well aerated. BONES/SOFT TISSUES: The bone marrow signal intensity appears normal. The soft tissues demonstrate no acute abnormality. MRA NECK: AORTIC ARCH/ARCH VESSELS: No dissection or arterial injury. No significant stenosis of the brachiocephalic or subclavian arteries. CAROTID ARTERIES: No dissection, arterial injury, or hemodynamically significant stenosis by NASCET criteria. VERTEBRAL ARTERIES: No dissection, arterial injury, or significant stenosis. MRA HEAD: ANTERIOR CIRCULATION: Suspected focal high-grade stenosis involving the A1 segment of the left anterior cerebral artery is identified. Right middle cerebral artery focal moderate grade stenosis involving the M1 segment is present. Suspected multifocal moderate to high-grade stenosis involving the right middle cerebral artery M2 segment is seen. No further evidence of significant stenosis of the intracranial internal carotid, anterior cerebral, or middle cerebral arteries. POSTERIOR CIRCULATION: Bilateral posterior cerebral artery P2 segment suspected focal moderate to high-grade stenosis are noted. No further evidence of significant stenosis of the vertebral, basilar, or posterior cerebral arteries. 1. Multifocal acute ischemia involving the bilateral basal ganglia, as discussed above, posterior commissure of the corpus callosum, lateral margin of left thalamus and the left greater than right frontal parietal paramedian subcortical and deep white matter. 2. No evidence of associated hemorrhagic conversion. 3. Finding suggestive of embolic phenomenon. Recommend clinical correlation. 4. Suspected focal high-grade stenosis involving A1 segment of the left anterior cerebral artery. 5. Suspected right middle cerebral artery M1 segment focal moderate grade stenosis. 6. Suspected right middle cerebral artery M2 segment multifocal moderate to high-grade stenosis.  7. Bilateral posterior cerebral artery P2 segment suspected multifocal moderate to high-grade stenosis. 8. Grossly unremarkable MR angiogram of the neck. Note: MR angiographic evaluation of the neck is severely limited by patient motion related artifact. 9. Severe cerebral white matter chronic microvascular ischemic disease. 10. Mild diffuse cerebral atrophy. Critical results were called by Dr. Barry Brooks to Dr. Yanna Vazquez On 8/7/2021 at 22:11. XR CHEST PORTABLE    Result Date: 8/8/2021  EXAMINATION: ONE XRAY VIEW OF THE CHEST 8/8/2021 11:58 am COMPARISON: 07/13/2021 HISTORY: ORDERING SYSTEM PROVIDED HISTORY: cough and fever TECHNOLOGIST PROVIDED HISTORY: cough and fever Reason for Exam: cough and fever Acuity: Acute Type of Exam: Initial FINDINGS: No lung infiltrate or consolidation. No pneumothorax or pleural effusion. Heart size is normal.     No acute abnormality. XR CHEST PORTABLE    Result Date: 7/13/2021  EXAMINATION: ONE XRAY VIEW OF THE CHEST 7/13/2021 10:30 am COMPARISON: September 24, 2019. HISTORY: ORDERING SYSTEM PROVIDED HISTORY: Found down TECHNOLOGIST PROVIDED HISTORY: Found down Reason for Exam: supine Acuity: Acute Type of Exam: Initial FINDINGS: A portable upright frontal view chest radiograph was obtained. The heart is enlarged. The mediastinal contour and pleural spaces are otherwise within normal limits. The lungs are grossly clear. There is no focal consolidation or pneumothorax. The pulmonary vascular pattern is within normal limits. No acute thoracic osseous abnormality. Clear lungs. Cardiomegaly. No acute cardiopulmonary abnormality.      VL DUP LOWER EXTREMITY VENOUS BILATERAL    Result Date: 7/15/2021    [facility]  Vascular Lower Extremities DVT Study Procedure   Patient Name GOFF      Date of Study           07/15/2021               YAYA DUBOIS   Date of      1945  Gender                  Male  Birth   Age          77 year(s)  Race                    Black saphenous vein. saphenous vein. Normal compressibility of the small  Normal compressibility of the small  saphenous vein. saphenous vein. Risk Factors   - The patient's risk factor(s) include: chronic lung disease, diabetes     mellitus and arterial hypertension. Velocities are measured in cm/s ; Diameters are measured in cm Right Lower Extremities DVT Study Measurements Right 2D Measurements +------------------------------------+----------+---------------+----------+ ! Location                            ! Visualized! Compressibility! Thrombosis! +------------------------------------+----------+---------------+----------+ ! Common Femoral                      !Yes       ! Yes            ! None      ! +------------------------------------+----------+---------------+----------+ ! Prox Femoral                        !Yes       ! Yes            ! None      ! +------------------------------------+----------+---------------+----------+ ! Mid Femoral                         !Yes       ! Yes            ! None      ! +------------------------------------+----------+---------------+----------+ ! Dist Femoral                        !Yes       ! Yes            ! None      ! +------------------------------------+----------+---------------+----------+ ! Deep Femoral                        !No        !               !          ! +------------------------------------+----------+---------------+----------+ ! Popliteal                           !Yes       ! Yes            ! None      ! +------------------------------------+----------+---------------+----------+ ! Sapheno Femoral Junction            ! Yes       ! Yes            ! None      ! +------------------------------------+----------+---------------+----------+ ! PTV                                 ! Yes       ! Yes            ! None      ! +------------------------------------+----------+---------------+----------+ ! Peroneal                            !No !               !          ! +------------------------------------+----------+---------------+----------+ ! Gastroc                             ! Yes       ! Yes            ! None      ! +------------------------------------+----------+---------------+----------+ ! GSV Thigh                           ! Yes       ! Yes            ! None      ! +------------------------------------+----------+---------------+----------+ ! GSV Knee                            ! Yes       ! Yes            ! None      ! +------------------------------------+----------+---------------+----------+ ! GSV Ankle                           ! Yes       ! Yes            ! None      ! +------------------------------------+----------+---------------+----------+ ! SSV                                 ! Yes       ! Yes            ! None      ! +------------------------------------+----------+---------------+----------+ Right Doppler Measurements +---------------------------+------+------+--------------------------------+ ! Location                   ! Signal!Reflux! Reflux (msec)                   ! +---------------------------+------+------+--------------------------------+ ! Common Femoral             !Phasic!      !                                ! +---------------------------+------+------+--------------------------------+ ! Prox Femoral               !Phasic!      !                                ! +---------------------------+------+------+--------------------------------+ ! Popliteal                  !Phasic!      !                                ! +---------------------------+------+------+--------------------------------+ Left Lower Extremities DVT Study Measurements Left 2D Measurements +------------------------------------+----------+---------------+----------+ ! Location                            ! Visualized! Compressibility! Thrombosis! +------------------------------------+----------+---------------+----------+ ! Common Femoral                      !Yes       ! Yes !None      ! +------------------------------------+----------+---------------+----------+ ! Prox Femoral                        !Yes       ! Yes            ! None      ! +------------------------------------+----------+---------------+----------+ ! Mid Femoral                         !Yes       ! Yes            ! None      ! +------------------------------------+----------+---------------+----------+ ! Dist Femoral                        !Yes       ! Yes            ! None      ! +------------------------------------+----------+---------------+----------+ ! Deep Femoral                        !No        !               !          ! +------------------------------------+----------+---------------+----------+ ! Popliteal                           !Yes       ! Yes            ! None      ! +------------------------------------+----------+---------------+----------+ ! Sapheno Femoral Junction            ! Yes       ! Yes            ! None      ! +------------------------------------+----------+---------------+----------+ ! PTV                                 ! Yes       ! Yes            ! None      ! +------------------------------------+----------+---------------+----------+ ! Peroneal                            !Yes       ! Yes            ! None      ! +------------------------------------+----------+---------------+----------+ ! Gastroc                             ! Yes       ! Yes            ! None      ! +------------------------------------+----------+---------------+----------+ ! GSV Thigh                           ! Yes       ! Yes            ! None      ! +------------------------------------+----------+---------------+----------+ ! GSV Knee                            ! Yes       ! Yes            ! None      ! +------------------------------------+----------+---------------+----------+ ! GSV Ankle                           ! Yes       ! Yes            ! None      ! +------------------------------------+----------+---------------+----------+ ! SSV                                 !Yes       ! Yes            ! None      ! +------------------------------------+----------+---------------+----------+ Left Doppler Measurements +---------------------------+------+------+--------------------------------+ ! Location                   ! Signal!Reflux! Reflux (msec)                   ! +---------------------------+------+------+--------------------------------+ ! Common Femoral             !Phasic!      !                                ! +---------------------------+------+------+--------------------------------+ ! Prox Femoral               !Phasic!      !                                ! +---------------------------+------+------+--------------------------------+ ! Popliteal                  !Phasic!      !                                ! +---------------------------+------+------+--------------------------------+    US SPLEEN    Result Date: 7/14/2021  EXAMINATION: ULTRASOUND OF THE SPLEEN 7/14/2021 8:29 am COMPARISON: None. HISTORY: ORDERING SYSTEM PROVIDED HISTORY: thrombocytopenia TECHNOLOGIST PROVIDED HISTORY: thrombocytopenia FINDINGS: Suboptimal study. The visualized spleen appears enlarged measuring 13.4 cm. No focal lesion is seen. No free fluid. Suboptimal study. Mild splenomegaly. CTA HEAD NECK W CONTRAST    Result Date: 7/13/2021  EXAMINATION: CTA OF THE HEAD AND NECK WITH CONTRAST 7/13/2021 1:11 pm: TECHNIQUE: CTA of the head and neck was performed with the administration of intravenous contrast. Multiplanar reformatted images are provided for review. MIP images are provided for review. Stenosis of the internal carotid arteries measured using NASCET criteria. Dose modulation, iterative reconstruction, and/or weight based adjustment of the mA/kV was utilized to reduce the radiation dose to as low as reasonably achievable. 3D surface reformatted and curved MIP images were submitted for review. COMPARISON: None.  HISTORY: ORDERING SYSTEM PROVIDED HISTORY: stroke TECHNOLOGIST PROVIDED HISTORY: stroke Decision Support Exception - unselect if not a suspected or confirmed emergency medical condition->Emergency Medical Condition (MA) Reason for Exam: stroke, Cerebrovascular Accident; Fall FINDINGS: CTA NECK: AORTIC ARCH/ARCH VESSELS: No dissection or arterial injury. No significant stenosis of the brachiocephalic or subclavian arteries. CAROTID ARTERIES: No dissection, arterial injury, or hemodynamically significant stenosis by NASCET criteria. VERTEBRAL ARTERIES: No dissection, arterial injury, or significant stenosis in the right vertebral artery. Severe stenosis in the V1 segment of the left vertebral artery. SOFT TISSUES: The lung apices are clear. No cervical or superior mediastinal lymphadenopathy. The larynx and pharynx are unremarkable. No acute abnormality of the salivary glands. Thyroid gland is diffusely enlarged and heterogeneous and contains left thyroid lobe nodule measuring 2.6 cm. BONES: Multilevel degenerative spondylosis throughout the cervical spine with fusion at C5-C6. CTA HEAD: ANTERIOR CIRCULATION: Calcified atherosclerotic plaque in the cavernous segments of the internal carotid arteries bilaterally results in mild-to-moderate cavernous ICA stenosis bilaterally. Mild stenosis in the proximal A2 segment of the right anterior cerebral artery. Severe stenosis in the proximal A3 segments of the anterior cerebral arteries bilaterally. Moderate-to-severe stenosis within M2 segment of the right middle cerebral artery. Moderate stenosis in the M1 and proximal M2 segments of the left middle cerebral artery. POSTERIOR CIRCULATION: No significant stenosis in the right intradural vertebral artery. Severe stenosis in the intracranial left vertebral artery. Mild stenosis in the basilar artery. Moderate stenosis in the P1/P2 junction of the left PCA. Severe stenosis and near complete occlusion of the right PCA.  OTHER: No dural venous sinus thrombosis on this non-dedicated study. 1. Severe stenosis in the V1 segment of the left vertebral artery. 2. Extensive intracranial atherosclerotic plaque with near complete occlusion of the right PCA. 3. Severe stenoses in the proximal A3 segments of the ACAs bilaterally. 4. Moderate-to-severe proximal M2 segment stenosis in the right MCA. Moderate stenosis in the M1 and M2 segments of the left MCA. 5. Moderate stenosis in the P1/P2 junction of the left PCA. 6. Enlarged heterogeneous thyroid gland with 2.6 cm left thyroid lobe nodule. Nonemergent/outpatient thyroid ultrasound recommended. Findings were discussed with Dr. Niru Ahn at 1:43 pm on 7/13/2021. RECOMMENDATIONS: 2.6 cm incidental left thyroid nodule with heterogeneous and enlarged thyroid. Recommend thyroid US. Reference: J Am Bruno Radiol. 2015 Feb;12(2): 143-50     MRA NECK WO CONTRAST    Result Date: 8/7/2021  EXAMINATION: MRA OF THE NECK WITHOUT CONTRAST; MRI OF THE BRAIN WITHOUT CONTRAST; MRA OF THE HEAD WITHOUT CONTRAST 8/7/2021 8:28 pm; 8/7/2021 8:34 pm; 8/7/2021 8:27 pm: TECHNIQUE: Multiplanar multisequence MRA of the neck was performed without the administration of intravenous contrast. Stenosis of the internal carotid arteries measured using NASCET criteria.; Multiplanar multisequence MRI of the brain was performed without the administration of intravenous contrast.; MRA of the head was performed utilizing time-of-flight imaging with MIP images. No intravenous contrast was administered. COMPARISON: None.  HISTORY: ORDERING SYSTEM PROVIDED HISTORY: stroke TECHNOLOGIST PROVIDED HISTORY: stroke Reason for Exam: stroke Additional signs and symptoms: patient unable to give history and unable to follow exam instructions due to mental status FINDINGS: MRI BRAIN: INTRACRANIAL STRUCTURES/VENTRICLES: Multifocal diffusion restriction abnormality related to acute ischemia are seen involving the right basal ganglia within the putamen, caudate nucleus and anterior limb of the right internal capsule, the posterior limb of the left internal capsule and lateral margin of the left thalamus, the posterior commissure of the corpus callosum, and left greater than right frontal parietal paramedian subcortical and deep white matter . Can fluent increased T2/FLAIR signal is noted within the cerebral white matter consistent with severe microvascular ischemic change. No mass effect or midline shift. No evidence of an acute intracranial hemorrhage. Mild diffuse decrease in cerebral volume is noted with corresponding prominence of the sulci and ventricles. The sellar/suprasellar regions appear unremarkable. The normal signal voids within the major intracranial vessels appear maintained. ORBITS: The visualized portion of the orbits demonstrate no acute abnormality. SINUSES: The visualized paranasal sinuses and mastoid air cells are well aerated. BONES/SOFT TISSUES: The bone marrow signal intensity appears normal. The soft tissues demonstrate no acute abnormality. MRA NECK: AORTIC ARCH/ARCH VESSELS: No dissection or arterial injury. No significant stenosis of the brachiocephalic or subclavian arteries. CAROTID ARTERIES: No dissection, arterial injury, or hemodynamically significant stenosis by NASCET criteria. VERTEBRAL ARTERIES: No dissection, arterial injury, or significant stenosis. MRA HEAD: ANTERIOR CIRCULATION: Suspected focal high-grade stenosis involving the A1 segment of the left anterior cerebral artery is identified. Right middle cerebral artery focal moderate grade stenosis involving the M1 segment is present. Suspected multifocal moderate to high-grade stenosis involving the right middle cerebral artery M2 segment is seen. No further evidence of significant stenosis of the intracranial internal carotid, anterior cerebral, or middle cerebral arteries.  POSTERIOR CIRCULATION: Bilateral posterior cerebral artery P2 segment suspected focal moderate to high-grade stenosis are noted. No further evidence of significant stenosis of the vertebral, basilar, or posterior cerebral arteries. 1. Multifocal acute ischemia involving the bilateral basal ganglia, as discussed above, posterior commissure of the corpus callosum, lateral margin of left thalamus and the left greater than right frontal parietal paramedian subcortical and deep white matter. 2. No evidence of associated hemorrhagic conversion. 3. Finding suggestive of embolic phenomenon. Recommend clinical correlation. 4. Suspected focal high-grade stenosis involving A1 segment of the left anterior cerebral artery. 5. Suspected right middle cerebral artery M1 segment focal moderate grade stenosis. 6. Suspected right middle cerebral artery M2 segment multifocal moderate to high-grade stenosis. 7. Bilateral posterior cerebral artery P2 segment suspected multifocal moderate to high-grade stenosis. 8. Grossly unremarkable MR angiogram of the neck. Note: MR angiographic evaluation of the neck is severely limited by patient motion related artifact. 9. Severe cerebral white matter chronic microvascular ischemic disease. 10. Mild diffuse cerebral atrophy. Critical results were called by Dr. Lauri Hicks to Dr. Macrina Sim On 8/7/2021 at 22:11. MRI BRAIN WO CONTRAST    Result Date: 8/7/2021  EXAMINATION: MRA OF THE NECK WITHOUT CONTRAST; MRI OF THE BRAIN WITHOUT CONTRAST; MRA OF THE HEAD WITHOUT CONTRAST 8/7/2021 8:28 pm; 8/7/2021 8:34 pm; 8/7/2021 8:27 pm: TECHNIQUE: Multiplanar multisequence MRA of the neck was performed without the administration of intravenous contrast. Stenosis of the internal carotid arteries measured using NASCET criteria.; Multiplanar multisequence MRI of the brain was performed without the administration of intravenous contrast.; MRA of the head was performed utilizing time-of-flight imaging with MIP images. No intravenous contrast was administered. COMPARISON: None.  HISTORY: ORDERING SYSTEM PROVIDED HISTORY: stroke TECHNOLOGIST PROVIDED HISTORY: stroke Reason for Exam: stroke Additional signs and symptoms: patient unable to give history and unable to follow exam instructions due to mental status FINDINGS: MRI BRAIN: INTRACRANIAL STRUCTURES/VENTRICLES: Multifocal diffusion restriction abnormality related to acute ischemia are seen involving the right basal ganglia within the putamen, caudate nucleus and anterior limb of the right internal capsule, the posterior limb of the left internal capsule and lateral margin of the left thalamus, the posterior commissure of the corpus callosum, and left greater than right frontal parietal paramedian subcortical and deep white matter . Can fluent increased T2/FLAIR signal is noted within the cerebral white matter consistent with severe microvascular ischemic change. No mass effect or midline shift. No evidence of an acute intracranial hemorrhage. Mild diffuse decrease in cerebral volume is noted with corresponding prominence of the sulci and ventricles. The sellar/suprasellar regions appear unremarkable. The normal signal voids within the major intracranial vessels appear maintained. ORBITS: The visualized portion of the orbits demonstrate no acute abnormality. SINUSES: The visualized paranasal sinuses and mastoid air cells are well aerated. BONES/SOFT TISSUES: The bone marrow signal intensity appears normal. The soft tissues demonstrate no acute abnormality. MRA NECK: AORTIC ARCH/ARCH VESSELS: No dissection or arterial injury. No significant stenosis of the brachiocephalic or subclavian arteries. CAROTID ARTERIES: No dissection, arterial injury, or hemodynamically significant stenosis by NASCET criteria. VERTEBRAL ARTERIES: No dissection, arterial injury, or significant stenosis. MRA HEAD: ANTERIOR CIRCULATION: Suspected focal high-grade stenosis involving the A1 segment of the left anterior cerebral artery is identified.   Right middle cerebral artery focal moderate grade stenosis involving the M1 segment is present. Suspected multifocal moderate to high-grade stenosis involving the right middle cerebral artery M2 segment is seen. No further evidence of significant stenosis of the intracranial internal carotid, anterior cerebral, or middle cerebral arteries. POSTERIOR CIRCULATION: Bilateral posterior cerebral artery P2 segment suspected focal moderate to high-grade stenosis are noted. No further evidence of significant stenosis of the vertebral, basilar, or posterior cerebral arteries. 1. Multifocal acute ischemia involving the bilateral basal ganglia, as discussed above, posterior commissure of the corpus callosum, lateral margin of left thalamus and the left greater than right frontal parietal paramedian subcortical and deep white matter. 2. No evidence of associated hemorrhagic conversion. 3. Finding suggestive of embolic phenomenon. Recommend clinical correlation. 4. Suspected focal high-grade stenosis involving A1 segment of the left anterior cerebral artery. 5. Suspected right middle cerebral artery M1 segment focal moderate grade stenosis. 6. Suspected right middle cerebral artery M2 segment multifocal moderate to high-grade stenosis. 7. Bilateral posterior cerebral artery P2 segment suspected multifocal moderate to high-grade stenosis. 8. Grossly unremarkable MR angiogram of the neck. Note: MR angiographic evaluation of the neck is severely limited by patient motion related artifact. 9. Severe cerebral white matter chronic microvascular ischemic disease. 10. Mild diffuse cerebral atrophy. Critical results were called by Dr. Ciarra Mitchell to Dr. Kiran Daugherty On 8/7/2021 at 22:11. MRI BRAIN WO CONTRAST    Result Date: 7/14/2021  EXAMINATION: MRI OF THE BRAIN WITHOUT CONTRAST  7/14/2021 4:08 pm TECHNIQUE: Multiplanar multisequence MRI of the brain was performed without the administration of intravenous contrast. COMPARISON: None.  HISTORY: ORDERING SYSTEM PROVIDED HISTORY: stroke, right sided weakness TECHNOLOGIST PROVIDED HISTORY: stroke, right sided weakness Decision Support Exception - unselect if not a suspected or confirmed emergency medical condition->Emergency Medical Condition (MA) Reason for Exam: possible stroke. pt had a fall. FINDINGS: INTRACRANIAL STRUCTURES/VENTRICLES: . acute infarcts in the left basal ganglia. Acute infarct in the right head of caudate and in the right putamen no mass effect or midline shift. No evidence of an acute intracranial hemorrhage. The ventricles and sulci are normal in size and configuration. The sellar/suprasellar regions appear unremarkable. The normal signal voids within the major intracranial vessels appear maintained. ORBITS: The visualized portion of the orbits demonstrate no acute abnormality. SINUSES: The visualized paranasal sinuses and mastoid air cells are well aerated. BONES/SOFT TISSUES: The bone marrow signal intensity appears normal. The soft tissues demonstrate no acute abnormality. Acute infarct in the left and right basal ganglia greater on the left. Diffuse volume loss with extensive chronic white matter changes. 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 Recurrent stroke  3. Altered mental status    RECOMMENDATIONS:  1. I personally reviewed results of lab work-up imaging studies and other relevant clinical data. 2. Patient has not had any meaningful recovery. Prognosis is poor  Ok with palliative and hospice care   We will be available as needed     Discussed with  Nurse.       Thank you for asking us to see this patient. Frankey Rud Al-Nsour,MD  Hematologist/Medical [id number] [facility] hematology oncology physicians            This note is created with the assistance of a speech recognition program.  While intending to generate a document that actually reflects the content of the visit, the document can still have some errors including those of syntax and sound a like substitutions which may escape proof reading. It such instances, actual meaning can be extrapolated by contextual diversion.

## 2021-08-16 NOTE — PROGRESS NOTES
RN was informed by hospice nurse that pt was accepted for inpatient hospice but they do not have a bed available. The nurse reported that someone from hospice should be out again tomorrow. She reportedly told pts daughter and updated her of this.  Electronically signed by Pavithra Silvestre RN on 8/16/2021 at 6:33 PM

## 2021-08-16 NOTE — FLOWSHEET NOTE
08/16/21 1704   Encounter Summary   Services provided to: Patient   Referral/Consult From: Rounding   Complexity of Encounter Low   Length of Encounter 15 minutes   Spiritual/Mormon   Type Spiritual support   Assessment Unable to respond   Intervention Prayer

## 2021-08-16 NOTE — PROGRESS NOTES
Note written to update attending physician prior to rounds. Full note to follow    Patient's condition unchanged. Daughter was contacted last night; CODE STATUS changed to DNR CC. Hospice meeting today.     Electronically signed by Eric Jones MD on 8/16/2021 at 9:22 AM

## 2021-08-16 NOTE — PROGRESS NOTES
Writer following up on IZABELLA order. Per Dr. Chanelle Tabares notes 8/13 \"no need for IZABELLA\", 8/14 \"no further neurologic work-up indicated at this time\", spoke w Brenda Bentley RN.

## 2021-08-16 NOTE — PLAN OF CARE
Nutrition Problem #1: Severe malnutrition (based on assessment from 8-5)  Intervention: Food and/or Nutrient Delivery: Continue NPO, Continue Current Tube Feeding  Nutritional Goals: Tube feeding to meet approximately 100% of estimated needs.

## 2021-08-17 NOTE — DISCHARGE SUMMARY
[address] [facility]    Discharge Summary     Patient ID: Ximena Morrow  :  1945   MRN: [id number]     ACCOUNT:  [de-identified]   Patient's PCP: No primary care provider on file. Admit Date: 2021   Discharge Date: 2021   Length of Stay: 8  Code Status:  Prior  Admitting Physician: No admitting provider for patient encounter. Discharge Physician: Roger Fierro MD     Active Discharge Diagnoses:       Primary Problem  Altered mental status      [place] Problems    Diagnosis Date Noted    Oral candidiasis [B37.0] 2021    Altered mental status [R41.82] 2021    Severe malnutrition ([address] [E43] 2021    History of ITP [Z86.2]     Cerebral multi-infarct state [I69.30] 2021    Encephalopathy [G93.40] 2021       Admission Condition:  poor     Discharged Condition: poor    Hospital Stay:       Hospital Course: Ximena Morrow is a 77 y.o. male who was admitted for the management of  Altered mental status. Prior to admission, patient was receiving rehab at the [facility] acute rehab unit. His mentation was decreased upon his arrival and history was difficult to obtain. Per family, patient is somewhat confused at baseline but had become significantly more lethargic, agitated, combative in the rehab unit. He declined even more and became unresponsive and was transferred to the medicine service. Infectious and metabolic causes for altered mental status were worked up. He was placed on Zosyn for empiric treatment of aspiration pneumonia, as he was high risk. This was discontinued as infectious work-up continue to be negative. Metabolic work-up also negative. MRI brain showed multiple infarcts. IZABELLA to rule out cardiac source of emboli was not able to be done due to patient's mental status.     Due to the persistent nature of patient's stupor, family has decided to change patient's CODE STATUS to DNR CC and to discharge him with hospice. Significant therapeutic interventions: n/a    Significant Diagnostic Studies:   Labs / Micro:  CBC:   Lab Results   Component Value Date    WBC 13.5 08/16/2021    RBC 4.71 08/16/2021    RBC 4.88 05/03/2012    HGB 11.3 08/16/2021    HCT 36.8 08/16/2021    MCV 78.2 08/16/2021    MCH 23.9 08/16/2021    MCHC 30.6 08/16/2021    RDW 17.7 08/16/2021     08/16/2021     05/03/2012     BMP:    Lab Results   Component Value Date    GLUCOSE 299 08/16/2021    GLUCOSE 122 05/03/2012     08/16/2021    K 5.4 08/16/2021     08/16/2021    CO2 22 08/16/2021    ANIONGAP 10 08/16/2021    BUN 29 08/16/2021    CREATININE 1.34 08/16/2021    BUNCRER NOT REPORTED 08/16/2021    CALCIUM 9.1 08/16/2021    LABGLOM 52 08/16/2021    GFRAA >60 08/16/2021    GFR      08/16/2021    GFR NOT REPORTED 08/16/2021       Radiology:    CT HEAD WO CONTRAST    Result Date: 8/9/2021  EXAMINATION: CT OF THE HEAD WITHOUT CONTRAST  8/9/2021 2:04 pm TECHNIQUE: CT of the head was performed without the administration of intravenous contrast. Dose modulation, iterative reconstruction, and/or weight based adjustment of the mA/kV was utilized to reduce the radiation dose to as low as reasonably achievable. COMPARISON: MRI brain performed 08/07/2021. HISTORY: ORDERING SYSTEM PROVIDED HISTORY: stroke TECHNOLOGIST PROVIDED HISTORY: stroke Reason for Exam: stroke; ams Acuity: Unknown Type of Exam: Unknown Additional signs and symptoms: patient unable to stay still; turned head during exam FINDINGS: BRAIN/VENTRICLES: There is no acute intracranial hemorrhage, mass effect, or midline shift. There is satisfactory overall gray-white matter differentiation. There is extensive chronic microvascular disease. There are evolving areas of ischemia in the left periventricular white matter, and corpus callosum posteriorly, in the right basal ganglia.   The ventricular structures are symmetric and unremarkable. The infratentorial structures are unremarkable. ORBITS: The visualized portion of the orbits demonstrate no acute abnormality. SINUSES: The visualized paranasal sinuses and mastoid air cells demonstrate no acute abnormality. SOFT TISSUES/SKULL:  No acute abnormality of the visualized skull or soft tissues. Chronic microvascular disease with scattered areas of evolving ischemia as described above. CT HEAD WO CONTRAST    Result Date: 8/6/2021  EXAMINATION: CT OF THE HEAD WITHOUT CONTRAST  8/6/2021 2:21 pm TECHNIQUE: CT of the head was performed without the administration of intravenous contrast. Dose modulation, iterative reconstruction, and/or weight based adjustment of the mA/kV was utilized to reduce the radiation dose to as low as reasonably achievable. COMPARISON: 4 August 2021 HISTORY: ORDERING SYSTEM PROVIDED HISTORY: Pt w/ bilateral basal ganglia infarcts, right hemiparesis; minimally interactive over the last week and having increased lethargy the last few days. TECHNOLOGIST PROVIDED HISTORY: Pt w/ bilateral basal ganglia infarcts, right hemiparesis; minimally interactive over the last week and having increased lethargy the last few days. Reason for Exam: Increased lethargy the last few days. Acuity: Unknown Type of Exam: Unknown FINDINGS: BRAIN/VENTRICLES: There is no acute intracranial hemorrhage, mass effect or midline shift. No abnormal extra-axial fluid collection. The gray-white differentiation is maintained without evidence of an acute infarct. There is no evidence of hydrocephalus. Remote right-sided basal ganglial infarcts are noted. However, there is been interval development of hypodensity in the right mid basal ganglia since prior study consistent with an evolving subacute infarct. No significant mass effect is noted. Ventricles are proportionate to cerebral atrophy. ORBITS: The visualized portion of the orbits demonstrate no acute abnormality.  SINUSES: The visualized paranasal sinuses and mastoid air cells demonstrate no acute abnormality. SOFT TISSUES/SKULL:  No acute abnormality of the visualized skull or soft tissues. There has been interval development of a hypodensity in the right basal ganglia compared to prior study consistent with evolving subacute white matter infarct. No hemorrhage is noted. Chronic microvascular change in atrophy is demonstrated. No midline shift. CT HEAD WO CONTRAST    Result Date: 8/4/2021  EXAMINATION: CT OF THE HEAD WITHOUT CONTRAST  8/4/2021 1:25 pm TECHNIQUE: CT of the head was performed without the administration of intravenous contrast. Dose modulation, iterative reconstruction, and/or weight based adjustment of the mA/kV was utilized to reduce the radiation dose to as low as reasonably achievable. COMPARISON: CT head 07/21/2021 and 07/17/2021 HISTORY: ORDERING SYSTEM PROVIDED HISTORY: Patient with history of left-sided CVA with right hemiparesis, not communicating with staff, please evaluate for any change from previous imaging and any other abnormality TECHNOLOGIST PROVIDED HISTORY: Patient with history of left-sided CVA with right hemiparesis, not communicating with staff, please evaluate for any change from previous imaging and any other abnormality Reason for Exam: Patient with history of left-sided CVA with right hemiparesis, not communicating with staff, please evaluate for any change from previous imaging and any other abnormality Acuity: Unknown Type of Exam: Unknown FINDINGS: BRAIN/VENTRICLES: There is no acute intracranial hemorrhage, mass effect or midline shift. No abnormal extra-axial fluid collection. The gray-white differentiation is maintained without evidence of an acute infarct. There is prominence of the ventricles and sulci due to global parenchymal volume loss.  There are nonspecific areas of hypoattenuation within the periventricular and subcortical white matter, which likely represent chronic microvascular ischemic change. 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 Remote lacunar stroke left caudate lobe. ORBITS: The visualized portion of the orbits demonstrate no acute abnormality. SINUSES: The visualized paranasal sinuses and mastoid air cells demonstrate no acute abnormality. SOFT TISSUES/SKULL: No acute abnormality of the visualized skull or soft tissues. 1. No acute intracranial abnormality. 2. 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 Remote lacunar stroke left caudate lobe. 3. Senescent changes. White matter hypoattenuation described is typical of microvascular ischemic disease or as sequela of dysmyelinating/demyelinating processes. CT HEAD WO CONTRAST    Result Date: 7/21/2021  EXAMINATION: CT OF THE HEAD WITHOUT CONTRAST  7/21/2021 11:53 am TECHNIQUE: CT of the head was performed without the administration of intravenous contrast. Dose modulation, iterative reconstruction, and/or weight based adjustment of the mA/kV was utilized to reduce the radiation dose to as low as reasonably achievable. COMPARISON: July 17, 2021, MRI July 14, 2021 HISTORY: ORDERING SYSTEM PROVIDED HISTORY: Altered mental status TECHNOLOGIST PROVIDED HISTORY: eval for change in status Reason for Exam: EVAL FOR CHANGE IN STATUS Type of Exam: Subsequent/Follow-up Additional signs and symptoms: PT BECAME AGTATED & COMBATIVE OVERNIGHT Relevant Medical/Surgical History: HX STROKE FINDINGS: BRAIN/VENTRICLES: No acute intracranial hemorrhage, mass effect or midline shift. Area of hypoattenuation within the left basal ganglia and small foci of hypoattenuation in the right basal ganglia compatible with subacute infarct. Diffuse cortical volume loss and compensatory ventricular enlargement appears unchanged.   Periventricular and subcortical white matter hypoattenuation redemonstrated bilaterally compatible with severe chronic microvascular ischemic changes. Encephalomalacia of the right caudate redemonstrated compatible with remote lacunar infarct. ORBITS: The visualized portion of the orbits demonstrate no acute abnormality. SINUSES: Air-fluid level within the left sphenoid sinus. Paranasal sinuses and mastoid air cells otherwise clear. SOFT TISSUES/SKULL:  No acute abnormality of the visualized skull or soft tissues. Subacute basal ganglia infarcts redemonstrated. No gross hemorrhagic conversion or significant mass effect. New air-fluid level within the left sphenoid sinus suggesting acute sinusitis. MRA HEAD WO CONTRAST    Result Date: 8/7/2021  EXAMINATION: MRA OF THE NECK WITHOUT CONTRAST; MRI OF THE BRAIN WITHOUT CONTRAST; MRA OF THE HEAD WITHOUT CONTRAST 8/7/2021 8:28 pm; 8/7/2021 8:34 pm; 8/7/2021 8:27 pm: TECHNIQUE: Multiplanar multisequence MRA of the neck was performed without the administration of intravenous contrast. Stenosis of the internal carotid arteries measured using NASCET criteria.; Multiplanar multisequence MRI of the brain was performed without the administration of intravenous contrast.; MRA of the head was performed utilizing time-of-flight imaging with MIP images. No intravenous contrast was administered. COMPARISON: None.  HISTORY: ORDERING SYSTEM PROVIDED HISTORY: stroke TECHNOLOGIST PROVIDED HISTORY: stroke Reason for Exam: stroke Additional signs and symptoms: patient unable to give history and unable to follow exam instructions due to mental status FINDINGS: MRI BRAIN: INTRACRANIAL STRUCTURES/VENTRICLES: Multifocal diffusion restriction abnormality related to acute ischemia are seen involving the right basal ganglia within the putamen, caudate nucleus and anterior limb of the right internal capsule, the posterior limb of the left internal capsule and lateral margin of the left thalamus, the posterior commissure of the corpus callosum, and left greater than right frontal parietal paramedian subcortical and deep white margin of left thalamus and the left greater than right frontal parietal paramedian subcortical and deep white matter. 2. No evidence of associated hemorrhagic conversion. 3. Finding suggestive of embolic phenomenon. Recommend clinical correlation. 4. Suspected focal high-grade stenosis involving A1 segment of the left anterior cerebral artery. 5. Suspected right middle cerebral artery M1 segment focal moderate grade stenosis. 6. Suspected right middle cerebral artery M2 segment multifocal moderate to high-grade stenosis. 7. Bilateral posterior cerebral artery P2 segment suspected multifocal moderate to high-grade stenosis. 8. Grossly unremarkable MR angiogram of the neck. Note: MR angiographic evaluation of the neck is severely limited by patient motion related artifact. 9. Severe cerebral white matter chronic microvascular ischemic disease. 10. Mild diffuse cerebral atrophy. Critical results were called by Dr. Kev Lira to Dr. Chema Neely On 8/7/2021 at 22:11. XR CHEST PORTABLE    Result Date: 8/8/2021  EXAMINATION: ONE XRAY VIEW OF THE CHEST 8/8/2021 11:58 am COMPARISON: 07/13/2021 HISTORY: ORDERING SYSTEM PROVIDED HISTORY: cough and fever TECHNOLOGIST PROVIDED HISTORY: cough and fever Reason for Exam: cough and fever Acuity: Acute Type of Exam: Initial FINDINGS: No lung infiltrate or consolidation. No pneumothorax or pleural effusion. Heart size is normal.     No acute abnormality.      VL Lower Extremity Bilateral Venous Duplex    Result Date: 8/9/2021    [facility]  Vascular Lower Extremities DVT Study Procedure   Patient Name   GOFF       Date of Study           08/09/2021                 YAYA DUBOIS   Date of Birth  1945   Gender                  Male   Age            77 year(s)   Race                    Black   Room Number    2099   Corporate ID # [id number]   Patient Acct # [de-identified]   MR #           [id number]       Sonographer             Canelo Marie   Accession #    [id number] Interpreting Physician  Stacey Saunders   Referring      Joni Guillory,   Referring Physician  Nurse          APRN-CNP  Practitioner  Procedure Type of Study:   Veins: Lower Extremities DVT Study, Venous Scan Lower Bilateral.  Indications for Study:R/O DVT. Patient Status: In Patient. Technical Quality:Adequate visualization. Conclusions   Summary   No evidence of superficial or deep venous thrombosis in both lower  extremities. Signature   ----------------------------------------------------------------  Electronically signed by Canelo Marie(Sonographer) on  08/09/2021 07:56 AM  ----------------------------------------------------------------   ----------------------------------------------------------------  Electronically signed by Margret Mares Reyes,Arthur(Interpreting  physician) on 08/09/2021 11:02 PM  ----------------------------------------------------------------  Findings:   Right Impression:                    Left Impression:   The common femoral, femoral,         The common femoral, femoral,  popliteal and tibial veins           popliteal and tibial veins  demonstrate normal compressibility   demonstrate normal compressibility  and augmentation. and augmentation. Normal compressibility of the great  Normal compressibility of the great  saphenous vein. saphenous vein. Normal compressibility of the small  Normal compressibility of the small  saphenous vein. saphenous vein. Velocities are measured in cm/s ; Diameters are measured in cm Right Lower Extremities DVT Study Measurements Right 2D Measurements +------------------------------------+----------+---------------+----------+ ! Location                            ! Visualized! Compressibility! Thrombosis! +------------------------------------+----------+---------------+----------+ ! Common Femoral                      !Yes       ! Yes            ! None      ! +------------------------------------+----------+---------------+----------+ ! Prox Femoral                        !Yes       ! Yes            ! None      ! +------------------------------------+----------+---------------+----------+ ! Mid Femoral                         !Yes       ! Yes            ! None      ! +------------------------------------+----------+---------------+----------+ ! Dist Femoral                        !Yes       ! Yes            ! None      ! +------------------------------------+----------+---------------+----------+ ! Deep Femoral                        !No        !               !          ! +------------------------------------+----------+---------------+----------+ ! Popliteal                           !Yes       ! Yes            ! None      ! +------------------------------------+----------+---------------+----------+ ! Sapheno Femoral Junction            ! Yes       ! Yes            ! None      ! +------------------------------------+----------+---------------+----------+ ! PTV                                 ! Yes       ! Yes            ! None      ! +------------------------------------+----------+---------------+----------+ ! Peroneal                            !Partial   !Yes            ! None      ! +------------------------------------+----------+---------------+----------+ ! Gastroc                             ! Yes       ! Yes            ! None      ! +------------------------------------+----------+---------------+----------+ ! GSV Thigh                           ! Yes       ! Yes            ! None      ! +------------------------------------+----------+---------------+----------+ ! GSV Knee                            ! Yes       ! Yes            ! None      ! +------------------------------------+----------+---------------+----------+ ! GSV Ankle                           ! Yes       ! Yes            ! None      ! +------------------------------------+----------+---------------+----------+ ! SSV !Partial   !Yes            ! None      ! +------------------------------------+----------+---------------+----------+ Right Doppler Measurements +---------------------------+------+------+--------------------------------+ ! Location                   ! Signal!Reflux! Reflux (msec)                   ! +---------------------------+------+------+--------------------------------+ ! Common Femoral             !Phasic!      !                                ! +---------------------------+------+------+--------------------------------+ ! Prox Femoral               !Phasic!      !                                ! +---------------------------+------+------+--------------------------------+ ! Popliteal                  !Phasic!      !                                ! +---------------------------+------+------+--------------------------------+ Left Lower Extremities DVT Study Measurements Left 2D Measurements +------------------------------------+----------+---------------+----------+ ! Location                            ! Visualized! Compressibility! Thrombosis! +------------------------------------+----------+---------------+----------+ ! Common Femoral                      !Yes       ! Yes            ! None      ! +------------------------------------+----------+---------------+----------+ ! Prox Femoral                        !Yes       ! Yes            ! None      ! +------------------------------------+----------+---------------+----------+ ! Mid Femoral                         !Yes       ! Yes            ! None      ! +------------------------------------+----------+---------------+----------+ ! Dist Femoral                        !Yes       ! Yes            ! None      ! +------------------------------------+----------+---------------+----------+ ! Deep Femoral                        !No        !               !          ! +------------------------------------+----------+---------------+----------+ ! Popliteal !Yes       !Yes            ! None      ! +------------------------------------+----------+---------------+----------+ ! Sapheno Femoral Junction            ! Yes       ! Yes            ! None      ! +------------------------------------+----------+---------------+----------+ ! PTV                                 ! Yes       ! Yes            ! None      ! +------------------------------------+----------+---------------+----------+ ! Peroneal                            !Partial   !Yes            ! None      ! +------------------------------------+----------+---------------+----------+ ! Gastroc                             ! Yes       ! Yes            ! None      ! +------------------------------------+----------+---------------+----------+ ! GSV Thigh                           ! Yes       ! Yes            ! None      ! +------------------------------------+----------+---------------+----------+ ! GSV Knee                            ! Yes       ! Yes            ! None      ! +------------------------------------+----------+---------------+----------+ ! GSV Ankle                           ! Yes       ! Yes            ! None      ! +------------------------------------+----------+---------------+----------+ ! SSV                                 ! Yes       ! Yes            ! None      ! +------------------------------------+----------+---------------+----------+ Left Doppler Measurements +---------------------------+------+------+--------------------------------+ ! Location                   ! Signal!Reflux! Reflux (msec)                   ! +---------------------------+------+------+--------------------------------+ ! Common Femoral             !Phasic!      !                                ! +---------------------------+------+------+--------------------------------+ ! Prox Femoral               !Phasic!      !                                ! +---------------------------+------+------+--------------------------------+ ! Popliteal !Phasic!      !                                ! +---------------------------+------+------+--------------------------------+    MRA NECK WO CONTRAST    Result Date: 8/7/2021  EXAMINATION: MRA OF THE NECK WITHOUT CONTRAST; MRI OF THE BRAIN WITHOUT CONTRAST; MRA OF THE HEAD WITHOUT CONTRAST 8/7/2021 8:28 pm; 8/7/2021 8:34 pm; 8/7/2021 8:27 pm: TECHNIQUE: Multiplanar multisequence MRA of the neck was performed without the administration of intravenous contrast. Stenosis of the internal carotid arteries measured using NASCET criteria.; Multiplanar multisequence MRI of the brain was performed without the administration of intravenous contrast.; MRA of the head was performed utilizing time-of-flight imaging with MIP images. No intravenous contrast was administered. COMPARISON: None. HISTORY: ORDERING SYSTEM PROVIDED HISTORY: stroke TECHNOLOGIST PROVIDED HISTORY: stroke Reason for Exam: stroke Additional signs and symptoms: patient unable to give history and unable to follow exam instructions due to mental status FINDINGS: MRI BRAIN: INTRACRANIAL STRUCTURES/VENTRICLES: Multifocal diffusion restriction abnormality related to acute ischemia are seen involving the right basal ganglia within the putamen, caudate nucleus and anterior limb of the right internal capsule, the posterior limb of the left internal capsule and lateral margin of the left thalamus, the posterior commissure of the corpus callosum, and left greater than right frontal parietal paramedian subcortical and deep white matter . Can fluent increased T2/FLAIR signal is noted within the cerebral white matter consistent with severe microvascular ischemic change. No mass effect or midline shift. No evidence of an acute intracranial hemorrhage. Mild diffuse decrease in cerebral volume is noted with corresponding prominence of the sulci and ventricles. The sellar/suprasellar regions appear unremarkable.   The normal signal voids within the major intracranial vessels appear maintained. ORBITS: The visualized portion of the orbits demonstrate no acute abnormality. SINUSES: The visualized paranasal sinuses and mastoid air cells are well aerated. BONES/SOFT TISSUES: The bone marrow signal intensity appears normal. The soft tissues demonstrate no acute abnormality. MRA NECK: AORTIC ARCH/ARCH VESSELS: No dissection or arterial injury. No significant stenosis of the brachiocephalic or subclavian arteries. CAROTID ARTERIES: No dissection, arterial injury, or hemodynamically significant stenosis by NASCET criteria. VERTEBRAL ARTERIES: No dissection, arterial injury, or significant stenosis. MRA HEAD: ANTERIOR CIRCULATION: Suspected focal high-grade stenosis involving the A1 segment of the left anterior cerebral artery is identified. Right middle cerebral artery focal moderate grade stenosis involving the M1 segment is present. Suspected multifocal moderate to high-grade stenosis involving the right middle cerebral artery M2 segment is seen. No further evidence of significant stenosis of the intracranial internal carotid, anterior cerebral, or middle cerebral arteries. POSTERIOR CIRCULATION: Bilateral posterior cerebral artery P2 segment suspected focal moderate to high-grade stenosis are noted. No further evidence of significant stenosis of the vertebral, basilar, or posterior cerebral arteries. 1. Multifocal acute ischemia involving the bilateral basal ganglia, as discussed above, posterior commissure of the corpus callosum, lateral margin of left thalamus and the left greater than right frontal parietal paramedian subcortical and deep white matter. 2. No evidence of associated hemorrhagic conversion. 3. Finding suggestive of embolic phenomenon. Recommend clinical correlation. 4. Suspected focal high-grade stenosis involving A1 segment of the left anterior cerebral artery. 5. Suspected right middle cerebral artery M1 segment focal moderate grade stenosis.  6. Suspected right middle cerebral artery M2 segment multifocal moderate to high-grade stenosis. 7. Bilateral posterior cerebral artery P2 segment suspected multifocal moderate to high-grade stenosis. 8. Grossly unremarkable MR angiogram of the neck. Note: MR angiographic evaluation of the neck is severely limited by patient motion related artifact. 9. Severe cerebral white matter chronic microvascular ischemic disease. 10. Mild diffuse cerebral atrophy. Critical results were called by Dr. Kathy Weaver to Dr. Savana Isbell On 8/7/2021 at 22:11. MRI BRAIN WO CONTRAST    Result Date: 8/7/2021  EXAMINATION: MRA OF THE NECK WITHOUT CONTRAST; MRI OF THE BRAIN WITHOUT CONTRAST; MRA OF THE HEAD WITHOUT CONTRAST 8/7/2021 8:28 pm; 8/7/2021 8:34 pm; 8/7/2021 8:27 pm: TECHNIQUE: Multiplanar multisequence MRA of the neck was performed without the administration of intravenous contrast. Stenosis of the internal carotid arteries measured using NASCET criteria.; Multiplanar multisequence MRI of the brain was performed without the administration of intravenous contrast.; MRA of the head was performed utilizing time-of-flight imaging with MIP images. No intravenous contrast was administered. COMPARISON: None. HISTORY: ORDERING SYSTEM PROVIDED HISTORY: stroke TECHNOLOGIST PROVIDED HISTORY: stroke Reason for Exam: stroke Additional signs and symptoms: patient unable to give history and unable to follow exam instructions due to mental status FINDINGS: MRI BRAIN: INTRACRANIAL STRUCTURES/VENTRICLES: Multifocal diffusion restriction abnormality related to acute ischemia are seen involving the right basal ganglia within the putamen, caudate nucleus and anterior limb of the right internal capsule, the posterior limb of the left internal capsule and lateral margin of the left thalamus, the posterior commissure of the corpus callosum, and left greater than right frontal parietal paramedian subcortical and deep white matter .     Can fluent increased T2/FLAIR signal is noted within the cerebral white matter consistent with severe microvascular ischemic change. No mass effect or midline shift. No evidence of an acute intracranial hemorrhage. Mild diffuse decrease in cerebral volume is noted with corresponding prominence of the sulci and ventricles. The sellar/suprasellar regions appear unremarkable. The normal signal voids within the major intracranial vessels appear maintained. ORBITS: The visualized portion of the orbits demonstrate no acute abnormality. SINUSES: The visualized paranasal sinuses and mastoid air cells are well aerated. BONES/SOFT TISSUES: The bone marrow signal intensity appears normal. The soft tissues demonstrate no acute abnormality. MRA NECK: AORTIC ARCH/ARCH VESSELS: No dissection or arterial injury. No significant stenosis of the brachiocephalic or subclavian arteries. CAROTID ARTERIES: No dissection, arterial injury, or hemodynamically significant stenosis by NASCET criteria. VERTEBRAL ARTERIES: No dissection, arterial injury, or significant stenosis. MRA HEAD: ANTERIOR CIRCULATION: Suspected focal high-grade stenosis involving the A1 segment of the left anterior cerebral artery is identified. Right middle cerebral artery focal moderate grade stenosis involving the M1 segment is present. Suspected multifocal moderate to high-grade stenosis involving the right middle cerebral artery M2 segment is seen. No further evidence of significant stenosis of the intracranial internal carotid, anterior cerebral, or middle cerebral arteries. POSTERIOR CIRCULATION: Bilateral posterior cerebral artery P2 segment suspected focal moderate to high-grade stenosis are noted. No further evidence of significant stenosis of the vertebral, basilar, or posterior cerebral arteries.      1. Multifocal acute ischemia involving the bilateral basal ganglia, as discussed above, posterior commissure of the corpus callosum, lateral margin of left thalamus and the left greater than right frontal parietal paramedian subcortical and deep white matter. 2. No evidence of associated hemorrhagic conversion. 3. Finding suggestive of embolic phenomenon. Recommend clinical correlation. 4. Suspected focal high-grade stenosis involving A1 segment of the left anterior cerebral artery. 5. Suspected right middle cerebral artery M1 segment focal moderate grade stenosis. 6. Suspected right middle cerebral artery M2 segment multifocal moderate to high-grade stenosis. 7. Bilateral posterior cerebral artery P2 segment suspected multifocal moderate to high-grade stenosis. 8. Grossly unremarkable MR angiogram of the neck. Note: MR angiographic evaluation of the neck is severely limited by patient motion related artifact. 9. Severe cerebral white matter chronic microvascular ischemic disease. 10. Mild diffuse cerebral atrophy. Critical results were called by Dr. Adama Barksdale to Dr. Tank Lazcano On 8/7/2021 at 22:11. Consultations:    Consults:     Final Specialist Recommendations/Findings:   IP CONSULT TO GENERAL SURGERY  IP CONSULT TO INTERNAL MEDICINE  IP CONSULT TO ONCOLOGY  IP CONSULT TO PALLIATIVE CARE  IP CONSULT TO DIETITIAN  IP CONSULT TO NEUROLOGY  IP CONSULT TO CARDIOLOGY  IP CONSULT TO DIETITIAN  IP CONSULT TO HOSPICE      The patient was seen and examined on day of discharge and this discharge summary is in conjunction with any daily progress note from day of discharge.     Discharge plan:       Disposition: hospice    Physician Follow Up: no need for follow up after discharge      Requiring Further Evaluation/Follow Up POST HOSPITALIZATION/Incidental Findings: n/a    Diet: Patient currently has PEG tube; diet per hospice recommendations    Activity: As tolerated    Instructions to Patient: n/a     Discharge Medications:      Medication List      ASK your doctor about these medications    * albuterol sulfate  (90 Base) MCG/ACT inhaler     * albuterol (2.5 MG/3ML) 0.083% nebulizer solution  Commonly known as: PROVENTIL     aspirin 81 MG EC tablet     atorvastatin 80 MG tablet  Commonly known as: LIPITOR  Take 0.5 tablets by mouth daily (Pt takes one-half of an 80mg tab = 40mg)     carboxymethylcellulose 1 % ophthalmic solution     clopidogrel 75 MG tablet  Commonly known as: PLAVIX     finasteride 5 MG tablet  Commonly known as: PROSCAR     FLUoxetine 20 MG capsule  Commonly known as: PROZAC     fluticasone 50 MCG/ACT nasal spray  Commonly known as: Flonase  1 spray by Nasal route 2 times daily     furosemide 20 MG tablet  Commonly known as: LASIX     heparin (porcine) 5000 UNIT/ML injection  Inject 1 mL into the skin every 8 hours     ibuprofen 800 MG tablet  Commonly known as: ADVIL;MOTRIN  Take 1 tablet by mouth every 8 hours as needed for Pain     isosorbide mononitrate 60 MG extended release tablet  Commonly known as: IMDUR     ketotifen 0.025 % ophthalmic solution  Commonly known as: ZADITOR     lidocaine 5 %  Commonly known as: Lidoderm  Place 1 patch onto the skin daily 12 hours on, 12 hours off.     losartan 100 MG tablet  Commonly known as: COZAAR     melatonin 3 MG Tabs tablet  Take 1 tablet by mouth nightly as needed (insomnia)     methylPREDNISolone sodium 40 MG injection  Commonly known as: SOLU-MEDROL  Infuse 1 mL intravenously every 12 hours     metoprolol succinate 50 MG extended release tablet  Commonly known as: TOPROL XL     naproxen 500 MG tablet  Commonly known as: Naprosyn  Take 1 tablet by mouth 2 times daily (with meals)     nitroGLYCERIN 0.4 MG SL tablet  Commonly known as: NITROSTAT     QUEtiapine 25 MG tablet  Commonly known as: SEROQUEL  Take 1 tablet by mouth nightly as needed for Agitation     rOPINIRole 0.25 MG tablet  Commonly known as: REQUIP     Spiriva Respimat 2.5 MCG/ACT Aers inhaler  Generic drug: tiotropium     Symbicort 160-4.5 MCG/ACT Aero  Generic drug: budesonide-formoterol     tamsulosin 0.4 MG capsule  Commonly known as: FLOMAX tiZANidine 2 MG tablet  Commonly known as: ZANAFLEX  Take 1 tablet by mouth 4 times daily as needed (muscle spasms)         * This list has 2 medication(s) that are the same as other medications prescribed for you. Read the directions carefully, and ask your doctor or other care provider to review them with you. Time Spent on discharge is  35 mins in patient examination, evaluation, counseling as well as medication reconciliation, prescriptions for required medications, discharge plan and follow up.     Electronically signed by   Lesli Rogers MD  8/17/2021  3:24 PM

## 2021-08-17 NOTE — PROGRESS NOTES
[place]  Speech Language Pathology    Date: 8/10/2021  Patient Name: Joann Hernandez  YOB: 1945   AGE: 77 y.o. MRN: [id number]        Patient Not Available for Speech Therapy     Due to:  [] Testing  [] Hemodialysis  [] Cancelled by RN  [] Surgery   [] Intubation/Sedation/Pain Medication  [] Medical instability  [x] Other:   Attempted evaluation this AM, pt remained with eyes open approximately 1 minute but not responding to verbal directions and not attempting to verbalize. Pt then closed his eyes and was unable to awaken with max verbal/tactile cues. RN reports that palliative care is on board. Next scheduled treatment: as able    Completed by:  Johnnye Fleischer, SLP, M.A., Mehdi Leiva Valtrex Pregnancy And Lactation Text: this medication is Pregnancy Category B and is considered safe during pregnancy. This medication is not directly found in breast milk but it's metabolite acyclovir is present.

## 2021-08-17 NOTE — PROGRESS NOTES
[facility]    INPATIENT OCCUPATIONAL THERAPY  PROGRESS NOTE  Date: 2021  Patient Name: Randell Yates      Room: -01  MRN: [id number]    : 1945  (72 y.o.) Gender: male     Discharge Recommendations:  Further Occupational Therapy is recommended upon facility discharge. Equipment Needed:  (Home with hospice)    Referring Practitioner: Dylan Terry MD  Diagnosis: AMS  General  Chart Reviewed: Yes  Patient assessed for rehabilitation services?: Yes  Additional Pertinent Hx: Randell Yates is a 77 y.o. RHD male admitted to [facility] on 2021. On admit, exam significant for right-sided facial droop, right-sided weakness and severe dysarthria. Significant history with recurrent strokes, remote left temporal lobe ischemic infarct and remote bilateral occipital lobe infarcts . MRI shows Bilateral basal ganglia ischemic infarcts. Pt admitted to rehab unit on 21  Response to previous treatment: Patient unable to report, no changes reported from family or staff  Family / Caregiver Present: No  Referring Practitioner: Dylan Terry MD  Diagnosis: AMS    Restrictions  Restrictions/Precautions: Fall Risk  Implants present? : Metal implants (cardiac stent, h/o neck surgery)  Other position/activity restrictions: NPO, up with assist, PEG tube, IV, telemetry  Required Braces or Orthoses?: No      Subjective  Subjective: Pt does not verbalize during this encounter. He does lightly rub writers hand numerous times in response to palm massage, he also shakes his L foot to the beat of the music once playing indicating task involvement and awareness. Comments: Pt is in fowlers position; floating heels; head facing to L, eyes closed, RLE/RUE flexed, LLE straight and LUE elbow minimally flexed; LUE open palm, RUE closed palm.   Patient Currently in Pain: Other (comment) (KYA, R side >resistive than L, unsure if related to pain)  Overall Orientation Status: Impaired  Orientation Level: Unable to assess     Pain Assessment  Heredia-Kenney Pain Rating: No hurt  Clinical Progression: Not changed    Objective  Cognition  Overall Cognitive Status: Impaired  Additional Comments: Pt unable to follow >90% commands at this time. Grasps writers hand x2/10  Bed mobility  Rolling to Left: Dependent/Total  Rolling to Right: Dependent/Total  Scooting: Dependent/Total  Comment: resistive tone  Balance  Sitting Balance: Unable to assess(comment)  Standing Balance: Unable to assess(comment)         ADL  Feeding: NPO;Dependent/Total (Peg tube. )  Grooming: Dependent/Total  UE Bathing: Dependent/Total  LE Bathing: Dependent/Total  UE Dressing: Dependent/Total  LE Dressing: Dependent/Total  Toileting: Dependent/Total  Additional Comments: Pt is total assist for tasks at this time. Incontient/soiled. Fair ease with repositioning; able to repositon BUE during rolling; RLE guarded and returning to hip with external rotation/hip/knee flexion. Type of ROM/Therapeutic Exercise  Type of ROM/Therapeutic Exercise: PROM (x10)  Comment: BUE as able for joint protection; guarded movements with R side-limited range; L side WFL                  Positioning  Adaptations: RLE pushing into bed rail: repositioned BLE to offload from rail, EPC cuff tubing adjusted to reduce pitting into leg, and padding placed between RLE and bedrail for skin protection; use of sensory engaging tasks (music, tactile input, muscle massage); barrier placed into palm between digits(nail) and palm for skin protection       Assessment  Performance deficits / Impairments: Decreased ADL status; Decreased functional mobility ; Decreased ROM; Decreased strength;Decreased safe awareness;Decreased cognition;Decreased endurance;Decreased sensation;Decreased balance;Decreased vision/visual deficit; Decreased high-level IADLs;Decreased fine motor control;Decreased coordination;Decreased posture  Assessment: poor ability to follow commands; tone fluctuation requires increased time for joint protection; does sintermittment assist with tasks within means; poor quality of life  Prognosis: Guarded  Discharge Recommendations: Patient would benefit from continued therapy after discharge;24 hour supervision or assist  Activity Tolerance: Treatment limited secondary to medical complications (free text); Treatment limited secondary to decreased cognition  Activity Tolerance: hospice today  Safety Devices in place: Yes  Type of devices: All fall risk precautions in place; Bed alarm in place;Call light within reach; Patient at risk for falls; Left in bed;Nurse notified  Restraints  Initially in place: No  Equipment Recommendations  Equipment Needed:  (Home with hospice)          Patient Education:   Joint protection, Skin protection  Learner:patient  Method: demonstration and explanation       Outcome: needs reinforcement     Plan  Safety Devices  Safety Devices in place: Yes  Type of devices: All fall risk precautions in place, Bed alarm in place, Call light within reach, Patient at risk for falls, Left in bed, Nurse notified  Restraints  Initially in place: No  Plan  Times per week: 2-3  Times per day: Daily  Current Treatment Recommendations: Self-Care / ADL, Strengthening, ROM, Balance Training, Functional Mobility Training, Endurance Training, Neuromuscular Re-education, Cognitive Reorientation, Pain Management, Safety Education & Training, Patient/Caregiver Education & Training, Equipment Evaluation, Education, & procurement, Home Management Training, Cognitive/Perceptual Training  Plan Comment: -      Goals  Short term goals  Time Frame for Short term goals: By discharge  Short term goal 1: Pt will appropriately respond to stimuli 2:5 trials to increase participate in self care tasks  Short term goal 2: Pt will participate in simple grooming task with max A  Short term goal 3: Pt will participate in [address] [facility] to [address] [place] [address] for contracture management.    Short term goal 4: Pt will participate in bed mobility with max x 2 to assist with lower body hygiene  Short term goal 5: -  Short term goal 6: -  Long term goals  Time Frame for Long term goals : -  Long term goal 1: -  Long term goal 2: -  Long term goal 3: -  Long term goal 4: -  Long term goal 5: -  Long term goals 6: -  Long term goal 7: -    OT Individual Minutes  Time In: 7887  Time Out: 1212  Minutes: 27      Electronically signed by ARASELI Hernandez on 8/17/21 at 3:58 PM EDT

## 2021-08-17 NOTE — PROGRESS NOTES
Patient seen and examined. Afebrile, VSS. No labs this morning. Patient sleeping comfortably on BiPAP. PEG site clean. Tolerating tube feeds at goal. Bowels are moving. Abdomen soft, non-tender, non-distended. Decreased air entry at lung bases, no wheezing. Surgically stable. Continue tube feeds as tolerated. Continue supportive care. Discharge to hospice planning.     Wilbur Srinivasan PA-C  [address] [facility] [place]

## 2021-08-17 NOTE — CARE COORDINATION
NIYA received info from Aislinn from [facility] and she asked that transport be set up for this patient to go to [facility] on [address]. In [facility]. NIYA set up transport for a 12:00 Noon  via stretcher by [employer]. Aislinn did contact the patient's daughter to inform her of the transfer.

## 2021-08-17 NOTE — PROGRESS NOTES
[facility] [employer]    PROGRESS NOTE             8/17/2021    1:52 PM    Name:   Michelle Dia  MRN:     [id number]     Acct:      [de-identified]   Room:   2099/2099-01  IP Day:  8  Admit Date:  8/8/2021 11:30 AM    PCP:  No primary care provider on file. Code Status:  DNR-CC    Subjective:     C/C: No chief complaint on file. Interval History Status: not changed. No change in patient's mentation today. Minimally responsive to tactile stimuli, aside from resistance to passive range of motion of limbs. On BiPAP, resting peacefully. No bed for him at hospice yesterday, will follow up on availability today. 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 Patient has past medical history of CVA, coronary artery disease s/p percutaneous coronary angioplasty, hyperlipidemia, hypertension, diabetes, emphysema/COPD.     History difficult to obtain as patient is somnolent and nonresponsive. Per prior documentation, patient had increasing lethargy on 8/6. CT head obtained which showed: There has been interval development of a hypodensity in the right basal ganglia compared to prior study consistent with evolving subacute white matter infarct. No hemorrhage is noted. Chronic microvascular change in atrophy is demonstrated. No midline shift.      Spoke with family - sister and niece - who stated he is somewhat confused at baseline, but has become significantly more lethargic/began to be agitated and combative about 1.5 weeks ago while in the acute rehab unit.     He was transferred to medicine service for management of altered mental status.   For a few days prior to transfer, patient has had intermittent elevations in temperature, up to 100 °F  Patient had also been coughing intermittently; frothy/brown substance suctioned from mouth     Pneumonia/sepsis work-up: CXR no acute process, procalcitonin 0.26, lactic acid 2.0, legionella/strep/mycoplasma negative,  blood culture no growth to date, urinalysis negative  Metabolic encephalopathy workup: LFTs elevated, ammonia wnl, myoglobin elevated, CK wnl.      CODE STATUS changed to DNR CC. Patient will be discharged to hospice. Awaiting bed. Review of Systems:     Review of Systems   Reason unable to perform ROS: patient unresponsive. Medications: Allergies:  No Known Allergies    Current Meds:   Scheduled Meds:    insulin lispro  0-18 Units Subcutaneous TID WC    insulin lispro  0-9 Units Subcutaneous Nightly    insulin glargine  10 Units Subcutaneous Nightly    heparin (porcine)  5,000 Units Subcutaneous 3 times per day    ticagrelor  90 mg Oral BID    albuterol  2.5 mg Nebulization TID    nystatin  5 mL Oral 4x Daily    aspirin  81 mg PEG Tube Daily    famotidine  20 mg PEG Tube Daily    hydrALAZINE  10 mg Oral 3 times per day    losartan  50 mg Oral Daily    melatonin  6 mg PEG Tube Nightly    OLANZapine  5 mg PEG Tube Nightly    rosuvastatin  40 mg PEG Tube Nightly    tiotropium  2 puff Inhalation Lunch     Continuous Infusions:    dextrose       PRN Meds: albuterol, hydrALAZINE, acetaminophen, bisacodyl, polyethylene glycol, senna, albuterol sulfate HFA, dextrose, dextrose, glucagon (rDNA), glucose    Data:     Past Medical History:   has a past medical history of Centrilobular emphysema (HCC), COPD (chronic obstructive pulmonary disease) ([facility] [address] Depression, Diabetes mellitus ([facility] [address] Former smoker, Hyperlipidemia, Hypertension, Mixed restrictive and obstructive lung disease ([facility] [address] Need for pneumococcal vaccine, and Personal history of tobacco use. Social History:   reports that he quit smoking about 8 years ago. He started smoking about 64 years ago. He has a 42.00 pack-year smoking history.  He has never used smokeless tobacco. He reports that he does not drink alcohol and does not use drugs. Family History: No family history on file. Vitals:  /69   Pulse 76   Temp 98.8 °F (37.1 °C) (Axillary)   Resp 18   Ht 5' 10\" (1.778 m)   Wt 214 lb 4.6 oz (97.2 kg)   SpO2 99%   BMI 30.75 kg/m²   Temp (24hrs), Av.7 °F (37.1 °C), Min:98 °F (36.7 °C), Max:99.2 °F (37.3 °C)    Recent Labs     21  1700 21  1959 21  0156 21  1036   POCGLU 232* 232* 207* 262*       I/O(24Hr): Intake/Output Summary (Last 24 hours) at 2021 1352  Last data filed at 2021 1200  Gross per 24 hour   Intake 3816 ml   Output    Net 3816 ml       Labs:    CBC:   Lab Results   Component Value Date    WBC 13.5 2021    RBC 4.71 2021    RBC 4.88 2012    HGB 11.3 2021    HCT 36.8 2021    MCV 78.2 2021    MCH 23.9 2021    MCHC 30.6 2021    RDW 17.7 2021     2021     2012    MPV 10.5 2021     BMP:    Lab Results   Component Value Date     2021    K 5.4 2021     2021    CO2 22 2021    BUN 29 2021    LABALBU 2.6 2021    CREATININE 1.34 2021    CALCIUM 9.1 2021    GFRAA >60 2021    LABGLOM 52 2021    GLUCOSE 299 2021    GLUCOSE 122 2012       Lab Results   Component Value Date/Time    SPECIAL NOT REPORTED 2021 02:38 PM     Lab Results   Component Value Date/Time    CULTURE NO GROWTH 6 DAYS 2021 02:38 PM         Radiology:    CT HEAD WO CONTRAST    Result Date: 2021  EXAMINATION: CT OF THE HEAD WITHOUT CONTRAST  2021 2:04 pm TECHNIQUE: CT of the head was performed without the administration of intravenous contrast. Dose modulation, iterative reconstruction, and/or weight based adjustment of the mA/kV was utilized to reduce the radiation dose to as low as reasonably achievable. COMPARISON: MRI brain performed 2021.  HISTORY: ORDERING SYSTEM PROVIDED HISTORY: stroke TECHNOLOGIST PROVIDED HISTORY: stroke Reason for Exam: stroke; ams Acuity: Unknown Type of Exam: Unknown Additional signs and symptoms: patient unable to stay still; turned head during exam FINDINGS: BRAIN/VENTRICLES: There is no acute intracranial hemorrhage, mass effect, or midline shift. There is satisfactory overall gray-white matter differentiation. There is extensive chronic microvascular disease. There are evolving areas of ischemia in the left periventricular white matter, and corpus callosum posteriorly, in the right basal ganglia. The ventricular structures are symmetric and unremarkable. The infratentorial structures are unremarkable. ORBITS: The visualized portion of the orbits demonstrate no acute abnormality. SINUSES: The visualized paranasal sinuses and mastoid air cells demonstrate no acute abnormality. SOFT TISSUES/SKULL:  No acute abnormality of the visualized skull or soft tissues. Chronic microvascular disease with scattered areas of evolving ischemia as described above. CT HEAD WO CONTRAST    Result Date: 8/6/2021  EXAMINATION: CT OF THE HEAD WITHOUT CONTRAST  8/6/2021 2:21 pm TECHNIQUE: CT of the head was performed without the administration of intravenous contrast. Dose modulation, iterative reconstruction, and/or weight based adjustment of the mA/kV was utilized to reduce the radiation dose to as low as reasonably achievable. COMPARISON: 4 August 2021 HISTORY: ORDERING SYSTEM PROVIDED HISTORY: Pt w/ bilateral basal ganglia infarcts, right hemiparesis; minimally interactive over the last week and having increased lethargy the last few days. TECHNOLOGIST PROVIDED HISTORY: Pt w/ bilateral basal ganglia infarcts, right hemiparesis; minimally interactive over the last week and having increased lethargy the last few days. Reason for Exam: Increased lethargy the last few days.  Acuity: Unknown Type of Exam: Unknown FINDINGS: BRAIN/VENTRICLES: There is no acute intracranial hemorrhage, mass effect or midline shift. No abnormal extra-axial fluid collection. The gray-white differentiation is maintained without evidence of an acute infarct. There is no evidence of hydrocephalus. Remote right-sided basal ganglial infarcts are noted. However, there is been interval development of hypodensity in the right mid basal ganglia since prior study consistent with an evolving subacute infarct. No significant mass effect is noted. Ventricles are proportionate to cerebral atrophy. ORBITS: The visualized portion of the orbits demonstrate no acute abnormality. SINUSES: The visualized paranasal sinuses and mastoid air cells demonstrate no acute abnormality. SOFT TISSUES/SKULL:  No acute abnormality of the visualized skull or soft tissues. There has been interval development of a hypodensity in the right basal ganglia compared to prior study consistent with evolving subacute white matter infarct. No hemorrhage is noted. Chronic microvascular change in atrophy is demonstrated. No midline shift. CT HEAD WO CONTRAST    Result Date: 8/4/2021  EXAMINATION: CT OF THE HEAD WITHOUT CONTRAST  8/4/2021 1:25 pm TECHNIQUE: CT of the head was performed without the administration of intravenous contrast. Dose modulation, iterative reconstruction, and/or weight based adjustment of the mA/kV was utilized to reduce the radiation dose to as low as reasonably achievable.  COMPARISON: CT head 07/21/2021 and 07/17/2021 HISTORY: ORDERING SYSTEM PROVIDED HISTORY: Patient with history of left-sided CVA with right hemiparesis, not communicating with staff, please evaluate for any change from previous imaging and any other abnormality TECHNOLOGIST PROVIDED HISTORY: Patient with history of left-sided CVA with right hemiparesis, not communicating with staff, please evaluate for any change from previous imaging and any other abnormality Reason for Exam: Patient with history of left-sided CVA with right hemiparesis, not communicating with staff, please evaluate for any change from previous imaging and any other abnormality Acuity: Unknown Type of Exam: Unknown FINDINGS: BRAIN/VENTRICLES: There is no acute intracranial hemorrhage, mass effect or midline shift. No abnormal extra-axial fluid collection. The gray-white differentiation is maintained without evidence of an acute infarct. There is prominence of the ventricles and sulci due to global parenchymal volume loss. There are nonspecific areas of hypoattenuation within the periventricular and subcortical white matter, which likely represent chronic microvascular ischemic change. 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 Remote lacunar stroke left caudate lobe. ORBITS: The visualized portion of the orbits demonstrate no acute abnormality. SINUSES: The visualized paranasal sinuses and mastoid air cells demonstrate no acute abnormality. SOFT TISSUES/SKULL: No acute abnormality of the visualized skull or soft tissues. 1. No acute intracranial abnormality. 2. 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 Remote lacunar stroke left caudate lobe. 3. Senescent changes. White matter hypoattenuation described is typical of microvascular ischemic disease or as sequela of dysmyelinating/demyelinating processes. CT HEAD WO CONTRAST    Result Date: 7/21/2021  EXAMINATION: CT OF THE HEAD WITHOUT CONTRAST  7/21/2021 11:53 am TECHNIQUE: CT of the head was performed without the administration of intravenous contrast. Dose modulation, iterative reconstruction, and/or weight based adjustment of the mA/kV was utilized to reduce the radiation dose to as low as reasonably achievable.  COMPARISON: July 17, 2021, MRI July 14, 2021 HISTORY: ORDERING SYSTEM PROVIDED HISTORY: Altered mental status TECHNOLOGIST PROVIDED HISTORY: eval for change in status Reason for Exam: EVAL FOR CHANGE IN STATUS Type of Exam: Subsequent/Follow-up Additional signs and symptoms: PT BECAME AGTATED & COMBATIVE OVERNIGHT Relevant Medical/Surgical History: HX STROKE FINDINGS: BRAIN/VENTRICLES: No acute intracranial hemorrhage, mass effect or midline shift. Area of hypoattenuation within the left basal ganglia and small foci of hypoattenuation in the right basal ganglia compatible with subacute infarct. Diffuse cortical volume loss and compensatory ventricular enlargement appears unchanged. Periventricular and subcortical white matter hypoattenuation redemonstrated bilaterally compatible with severe chronic microvascular ischemic changes. Encephalomalacia of the right caudate redemonstrated compatible with remote lacunar infarct. ORBITS: The visualized portion of the orbits demonstrate no acute abnormality. SINUSES: Air-fluid level within the left sphenoid sinus. Paranasal sinuses and mastoid air cells otherwise clear. SOFT TISSUES/SKULL:  No acute abnormality of the visualized skull or soft tissues. Subacute basal ganglia infarcts redemonstrated. No gross hemorrhagic conversion or significant mass effect. New air-fluid level within the left sphenoid sinus suggesting acute sinusitis. MRA HEAD WO CONTRAST    Result Date: 8/7/2021  EXAMINATION: MRA OF THE NECK WITHOUT CONTRAST; MRI OF THE BRAIN WITHOUT CONTRAST; MRA OF THE HEAD WITHOUT CONTRAST 8/7/2021 8:28 pm; 8/7/2021 8:34 pm; 8/7/2021 8:27 pm: TECHNIQUE: Multiplanar multisequence MRA of the neck was performed without the administration of intravenous contrast. Stenosis of the internal carotid arteries measured using NASCET criteria.; Multiplanar multisequence MRI of the brain was performed without the administration of intravenous contrast.; MRA of the head was performed utilizing time-of-flight imaging with MIP images. No intravenous contrast was administered. COMPARISON: None.  HISTORY: ORDERING SYSTEM PROVIDED HISTORY: stroke TECHNOLOGIST PROVIDED HISTORY: stroke Reason for Exam: stroke Additional signs and symptoms: patient unable to give history and unable to follow exam instructions due to mental status FINDINGS: MRI BRAIN: INTRACRANIAL STRUCTURES/VENTRICLES: Multifocal diffusion restriction abnormality related to acute ischemia are seen involving the right basal ganglia within the putamen, caudate nucleus and anterior limb of the right internal capsule, the posterior limb of the left internal capsule and lateral margin of the left thalamus, the posterior commissure of the corpus callosum, and left greater than right frontal parietal paramedian subcortical and deep white matter . Can fluent increased T2/FLAIR signal is noted within the cerebral white matter consistent with severe microvascular ischemic change. No mass effect or midline shift. No evidence of an acute intracranial hemorrhage. Mild diffuse decrease in cerebral volume is noted with corresponding prominence of the sulci and ventricles. The sellar/suprasellar regions appear unremarkable. The normal signal voids within the major intracranial vessels appear maintained. ORBITS: The visualized portion of the orbits demonstrate no acute abnormality. SINUSES: The visualized paranasal sinuses and mastoid air cells are well aerated. BONES/SOFT TISSUES: The bone marrow signal intensity appears normal. The soft tissues demonstrate no acute abnormality. MRA NECK: AORTIC ARCH/ARCH VESSELS: No dissection or arterial injury. No significant stenosis of the brachiocephalic or subclavian arteries. CAROTID ARTERIES: No dissection, arterial injury, or hemodynamically significant stenosis by NASCET criteria. VERTEBRAL ARTERIES: No dissection, arterial injury, or significant stenosis. MRA HEAD: ANTERIOR CIRCULATION: Suspected focal high-grade stenosis involving the A1 segment of the left anterior cerebral artery is identified. Right middle cerebral artery focal moderate grade stenosis involving the M1 segment is present. Suspected multifocal moderate to high-grade stenosis involving the right middle cerebral artery M2 segment is seen. No further evidence of significant stenosis of the intracranial internal carotid, anterior cerebral, or middle cerebral arteries. POSTERIOR CIRCULATION: Bilateral posterior cerebral artery P2 segment suspected focal moderate to high-grade stenosis are noted. No further evidence of significant stenosis of the vertebral, basilar, or posterior cerebral arteries. 1. Multifocal acute ischemia involving the bilateral basal ganglia, as discussed above, posterior commissure of the corpus callosum, lateral margin of left thalamus and the left greater than right frontal parietal paramedian subcortical and deep white matter. 2. No evidence of associated hemorrhagic conversion. 3. Finding suggestive of embolic phenomenon. Recommend clinical correlation. 4. Suspected focal high-grade stenosis involving A1 segment of the left anterior cerebral artery. 5. Suspected right middle cerebral artery M1 segment focal moderate grade stenosis. 6. Suspected right middle cerebral artery M2 segment multifocal moderate to high-grade stenosis. 7. Bilateral posterior cerebral artery P2 segment suspected multifocal moderate to high-grade stenosis. 8. Grossly unremarkable MR angiogram of the neck. Note: MR angiographic evaluation of the neck is severely limited by patient motion related artifact. 9. Severe cerebral white matter chronic microvascular ischemic disease. 10. Mild diffuse cerebral atrophy. Critical results were called by Dr. Rendall Canavan to Dr. Jaswinder Milton On 8/7/2021 at 22:11.      XR CHEST PORTABLE    Result Date: 8/8/2021  EXAMINATION: ONE XRAY VIEW OF THE CHEST 8/8/2021 11:58 am COMPARISON: 07/13/2021 HISTORY: ORDERING SYSTEM PROVIDED HISTORY: cough and fever TECHNOLOGIST PROVIDED HISTORY: cough and fever Reason for Exam: cough and fever Acuity: Acute Type of Exam: Initial FINDINGS: No lung infiltrate or consolidation. No pneumothorax or pleural effusion. Heart size is normal.     No acute abnormality. VL Lower Extremity Bilateral Venous Duplex    Result Date: 8/9/2021    [address] [place] [address] Lower Extremities DVT Study Procedure   Patient Name   GOFF       Date of Study           08/09/2021                 YAYA DUBOIS   Date of Birth  1945   Gender                  Male   Age            77 year(s)   Race                    Black   Room Number    2099   Corporate ID # [id number]   Patient Acct # [de-identified]   MR #           [id number]       Sonographer             Canelo Marie   Accession #    [id number]   Interpreting Physician  Estrellita Simmons   Referring      Julia Garcia,   Referring Physician  Nurse          APRN-CNP  Practitioner  Procedure Type of Study:   Veins: Lower Extremities DVT Study, Venous Scan Lower Bilateral.  Indications for Study:R/O DVT. Patient Status: In Patient. Technical Quality:Adequate visualization. Conclusions   Summary   No evidence of superficial or deep venous thrombosis in both lower  extremities. Signature   ----------------------------------------------------------------  Electronically signed by Canelo Marie(Sonographer) on  08/09/2021 07:56 AM  ----------------------------------------------------------------   ----------------------------------------------------------------  Electronically signed by Darlys Chuck Reyes,Arthur(Interpreting  physician) on 08/09/2021 11:02 PM  ----------------------------------------------------------------  Findings:   Right Impression:                    Left Impression:   The common femoral, femoral,         The common femoral, femoral,  popliteal and tibial veins           popliteal and tibial veins  demonstrate normal compressibility   demonstrate normal compressibility  and augmentation. and augmentation. Normal compressibility of the great  Normal compressibility of the great  saphenous vein. saphenous vein. Normal compressibility of the small  Normal compressibility of the small  saphenous vein. saphenous vein. Velocities are measured in cm/s ; Diameters are measured in cm Right Lower Extremities DVT Study Measurements Right 2D Measurements +------------------------------------+----------+---------------+----------+ ! Location                            ! Visualized! Compressibility! Thrombosis! +------------------------------------+----------+---------------+----------+ ! Common Femoral                      !Yes       ! Yes            ! None      ! +------------------------------------+----------+---------------+----------+ ! Prox Femoral                        !Yes       ! Yes            ! None      ! +------------------------------------+----------+---------------+----------+ ! Mid Femoral                         !Yes       ! Yes            ! None      ! +------------------------------------+----------+---------------+----------+ ! Dist Femoral                        !Yes       ! Yes            ! None      ! +------------------------------------+----------+---------------+----------+ ! Deep Femoral                        !No        !               !          ! +------------------------------------+----------+---------------+----------+ ! Popliteal                           !Yes       ! Yes            ! None      ! +------------------------------------+----------+---------------+----------+ ! Sapheno Femoral Junction            ! Yes       ! Yes            ! None      ! +------------------------------------+----------+---------------+----------+ ! PTV                                 ! Yes       ! Yes            ! None      ! +------------------------------------+----------+---------------+----------+ ! Peroneal                            !Partial   !Yes            ! None      ! +------------------------------------+----------+---------------+----------+ ! Gastroc                             ! Yes !Yes            !None      ! +------------------------------------+----------+---------------+----------+ ! GSV Thigh                           ! Yes       ! Yes            ! None      ! +------------------------------------+----------+---------------+----------+ ! GSV Knee                            ! Yes       ! Yes            ! None      ! +------------------------------------+----------+---------------+----------+ ! GSV Ankle                           ! Yes       ! Yes            ! None      ! +------------------------------------+----------+---------------+----------+ ! SSV                                 ! Partial   !Yes            ! None      ! +------------------------------------+----------+---------------+----------+ Right Doppler Measurements +---------------------------+------+------+--------------------------------+ ! Location                   ! Signal!Reflux! Reflux (msec)                   ! +---------------------------+------+------+--------------------------------+ ! Common Femoral             !Phasic!      !                                ! +---------------------------+------+------+--------------------------------+ ! Prox Femoral               !Phasic!      !                                ! +---------------------------+------+------+--------------------------------+ ! Popliteal                  !Phasic!      !                                ! +---------------------------+------+------+--------------------------------+ Left Lower Extremities DVT Study Measurements Left 2D Measurements +------------------------------------+----------+---------------+----------+ ! Location                            ! Visualized! Compressibility! Thrombosis! +------------------------------------+----------+---------------+----------+ ! Common Femoral                      !Yes       ! Yes            ! None      ! +------------------------------------+----------+---------------+----------+ ! Prox Femoral                        !Yes       ! Yes !None      ! +------------------------------------+----------+---------------+----------+ ! Mid Femoral                         !Yes       ! Yes            ! None      ! +------------------------------------+----------+---------------+----------+ ! Dist Femoral                        !Yes       ! Yes            ! None      ! +------------------------------------+----------+---------------+----------+ ! Deep Femoral                        !No        !               !          ! +------------------------------------+----------+---------------+----------+ ! Popliteal                           !Yes       ! Yes            ! None      ! +------------------------------------+----------+---------------+----------+ ! Sapheno Femoral Junction            ! Yes       ! Yes            ! None      ! +------------------------------------+----------+---------------+----------+ ! PTV                                 ! Yes       ! Yes            ! None      ! +------------------------------------+----------+---------------+----------+ ! Peroneal                            !Partial   !Yes            ! None      ! +------------------------------------+----------+---------------+----------+ ! Gastroc                             ! Yes       ! Yes            ! None      ! +------------------------------------+----------+---------------+----------+ ! GSV Thigh                           ! Yes       ! Yes            ! None      ! +------------------------------------+----------+---------------+----------+ ! GSV Knee                            ! Yes       ! Yes            ! None      ! +------------------------------------+----------+---------------+----------+ ! GSV Ankle                           ! Yes       ! Yes            ! None      ! +------------------------------------+----------+---------------+----------+ ! SSV                                 ! Yes       ! Yes            ! None      ! +------------------------------------+----------+---------------+----------+ Left Doppler Measurements +---------------------------+------+------+--------------------------------+ ! Location                   ! Signal!Reflux! Reflux (msec)                   ! +---------------------------+------+------+--------------------------------+ ! Common Femoral             !Phasic!      !                                ! +---------------------------+------+------+--------------------------------+ ! Prox Femoral               !Phasic!      !                                ! +---------------------------+------+------+--------------------------------+ ! Popliteal                  !Phasic!      !                                ! +---------------------------+------+------+--------------------------------+    MRA NECK WO CONTRAST    Result Date: 8/7/2021  EXAMINATION: MRA OF THE NECK WITHOUT CONTRAST; MRI OF THE BRAIN WITHOUT CONTRAST; MRA OF THE HEAD WITHOUT CONTRAST 8/7/2021 8:28 pm; 8/7/2021 8:34 pm; 8/7/2021 8:27 pm: TECHNIQUE: Multiplanar multisequence MRA of the neck was performed without the administration of intravenous contrast. Stenosis of the internal carotid arteries measured using NASCET criteria.; Multiplanar multisequence MRI of the brain was performed without the administration of intravenous contrast.; MRA of the head was performed utilizing time-of-flight imaging with MIP images. No intravenous contrast was administered. COMPARISON: None.  HISTORY: ORDERING SYSTEM PROVIDED HISTORY: stroke TECHNOLOGIST PROVIDED HISTORY: stroke Reason for Exam: stroke Additional signs and symptoms: patient unable to give history and unable to follow exam instructions due to mental status FINDINGS: MRI BRAIN: INTRACRANIAL STRUCTURES/VENTRICLES: Multifocal diffusion restriction abnormality related to acute ischemia are seen involving the right basal ganglia within the putamen, caudate nucleus and anterior limb of the right internal capsule, the posterior limb of the left internal capsule and lateral margin of the left thalamus, the posterior commissure of the corpus callosum, and left greater than right frontal parietal paramedian subcortical and deep white matter . Can fluent increased T2/FLAIR signal is noted within the cerebral white matter consistent with severe microvascular ischemic change. No mass effect or midline shift. No evidence of an acute intracranial hemorrhage. Mild diffuse decrease in cerebral volume is noted with corresponding prominence of the sulci and ventricles. The sellar/suprasellar regions appear unremarkable. The normal signal voids within the major intracranial vessels appear maintained. ORBITS: The visualized portion of the orbits demonstrate no acute abnormality. SINUSES: The visualized paranasal sinuses and mastoid air cells are well aerated. BONES/SOFT TISSUES: The bone marrow signal intensity appears normal. The soft tissues demonstrate no acute abnormality. MRA NECK: AORTIC ARCH/ARCH VESSELS: No dissection or arterial injury. No significant stenosis of the brachiocephalic or subclavian arteries. CAROTID ARTERIES: No dissection, arterial injury, or hemodynamically significant stenosis by NASCET criteria. VERTEBRAL ARTERIES: No dissection, arterial injury, or significant stenosis. MRA HEAD: ANTERIOR CIRCULATION: Suspected focal high-grade stenosis involving the A1 segment of the left anterior cerebral artery is identified. Right middle cerebral artery focal moderate grade stenosis involving the M1 segment is present. Suspected multifocal moderate to high-grade stenosis involving the right middle cerebral artery M2 segment is seen. No further evidence of significant stenosis of the intracranial internal carotid, anterior cerebral, or middle cerebral arteries. POSTERIOR CIRCULATION: Bilateral posterior cerebral artery P2 segment suspected focal moderate to high-grade stenosis are noted.   No further evidence of significant stenosis of the vertebral, basilar, or posterior cerebral arteries. 1. Multifocal acute ischemia involving the bilateral basal ganglia, as discussed above, posterior commissure of the corpus callosum, lateral margin of left thalamus and the left greater than right frontal parietal paramedian subcortical and deep white matter. 2. No evidence of associated hemorrhagic conversion. 3. Finding suggestive of embolic phenomenon. Recommend clinical correlation. 4. Suspected focal high-grade stenosis involving A1 segment of the left anterior cerebral artery. 5. Suspected right middle cerebral artery M1 segment focal moderate grade stenosis. 6. Suspected right middle cerebral artery M2 segment multifocal moderate to high-grade stenosis. 7. Bilateral posterior cerebral artery P2 segment suspected multifocal moderate to high-grade stenosis. 8. Grossly unremarkable MR angiogram of the neck. Note: MR angiographic evaluation of the neck is severely limited by patient motion related artifact. 9. Severe cerebral white matter chronic microvascular ischemic disease. 10. Mild diffuse cerebral atrophy. Critical results were called by Dr. Kevin Trivedi to Dr. Minerva Paredes On 8/7/2021 at 22:11. MRI BRAIN WO CONTRAST    Result Date: 8/7/2021  EXAMINATION: MRA OF THE NECK WITHOUT CONTRAST; MRI OF THE BRAIN WITHOUT CONTRAST; MRA OF THE HEAD WITHOUT CONTRAST 8/7/2021 8:28 pm; 8/7/2021 8:34 pm; 8/7/2021 8:27 pm: TECHNIQUE: Multiplanar multisequence MRA of the neck was performed without the administration of intravenous contrast. Stenosis of the internal carotid arteries measured using NASCET criteria.; Multiplanar multisequence MRI of the brain was performed without the administration of intravenous contrast.; MRA of the head was performed utilizing time-of-flight imaging with MIP images. No intravenous contrast was administered. COMPARISON: None.  HISTORY: ORDERING SYSTEM PROVIDED HISTORY: stroke TECHNOLOGIST PROVIDED HISTORY: stroke Reason for Exam: stroke Additional signs and symptoms: patient unable to give history and unable to follow exam instructions due to mental status FINDINGS: MRI BRAIN: INTRACRANIAL STRUCTURES/VENTRICLES: Multifocal diffusion restriction abnormality related to acute ischemia are seen involving the right basal ganglia within the putamen, caudate nucleus and anterior limb of the right internal capsule, the posterior limb of the left internal capsule and lateral margin of the left thalamus, the posterior commissure of the corpus callosum, and left greater than right frontal parietal paramedian subcortical and deep white matter . Can fluent increased T2/FLAIR signal is noted within the cerebral white matter consistent with severe microvascular ischemic change. No mass effect or midline shift. No evidence of an acute intracranial hemorrhage. Mild diffuse decrease in cerebral volume is noted with corresponding prominence of the sulci and ventricles. The sellar/suprasellar regions appear unremarkable. The normal signal voids within the major intracranial vessels appear maintained. ORBITS: The visualized portion of the orbits demonstrate no acute abnormality. SINUSES: The visualized paranasal sinuses and mastoid air cells are well aerated. BONES/SOFT TISSUES: The bone marrow signal intensity appears normal. The soft tissues demonstrate no acute abnormality. MRA NECK: AORTIC ARCH/ARCH VESSELS: No dissection or arterial injury. No significant stenosis of the brachiocephalic or subclavian arteries. CAROTID ARTERIES: No dissection, arterial injury, or hemodynamically significant stenosis by NASCET criteria. VERTEBRAL ARTERIES: No dissection, arterial injury, or significant stenosis. MRA HEAD: ANTERIOR CIRCULATION: Suspected focal high-grade stenosis involving the A1 segment of the left anterior cerebral artery is identified. Right middle cerebral artery focal moderate grade stenosis involving the M1 segment is present.   Suspected multifocal moderate to high-grade stenosis involving the right middle cerebral artery M2 segment is seen. No further evidence of significant stenosis of the intracranial internal carotid, anterior cerebral, or middle cerebral arteries. POSTERIOR CIRCULATION: Bilateral posterior cerebral artery P2 segment suspected focal moderate to high-grade stenosis are noted. No further evidence of significant stenosis of the vertebral, basilar, or posterior cerebral arteries. 1. Multifocal acute ischemia involving the bilateral basal ganglia, as discussed above, posterior commissure of the corpus callosum, lateral margin of left thalamus and the left greater than right frontal parietal paramedian subcortical and deep white matter. 2. No evidence of associated hemorrhagic conversion. 3. Finding suggestive of embolic phenomenon. Recommend clinical correlation. 4. Suspected focal high-grade stenosis involving A1 segment of the left anterior cerebral artery. 5. Suspected right middle cerebral artery M1 segment focal moderate grade stenosis. 6. Suspected right middle cerebral artery M2 segment multifocal moderate to high-grade stenosis. 7. Bilateral posterior cerebral artery P2 segment suspected multifocal moderate to high-grade stenosis. 8. Grossly unremarkable MR angiogram of the neck. Note: MR angiographic evaluation of the neck is severely limited by patient motion related artifact. 9. Severe cerebral white matter chronic microvascular ischemic disease. 10. Mild diffuse cerebral atrophy. Critical results were called by Dr. Daphnie Stuart to Dr. Hedy Arvizu On 8/7/2021 at 22:11. Physical Examination:        Physical Exam  Constitutional:       Comments: Nonresponsive, resting peacefully   HENT:      Mouth/Throat:      Pharynx: Oropharyngeal exudate (white/yellow coating, improving) present. Eyes:      Conjunctiva/sclera: Conjunctivae normal.   Pulmonary:      Effort: No respiratory distress. Breath sounds: No wheezing, rhonchi or rales.       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 Awaiting bed at hospice facility. Will switch him to hospice care in the hospital until placement. Francesca Atkins MD  8/17/2021  1:52 PM     Attending Physician Statement    I have discussed the case of Ceferino Peralta, including pertinent history and exam findings with the resident. I have seen and examined the patient and the key elements of the encounter have been performed by me. I agree with the assessment, plan, and orders as documented by the resident. No change in his clinical status.   He is DNR CC and will be switched to hospice care  Electronically signed by Francesca Atkins MD on 8/17/2021 at 1:52 PM

## 2021-08-17 NOTE — PLAN OF CARE
Problem: Infection:  Goal: Will remain free from infection  Description: Will remain free from infection  8/17/2021 0332 by Ean Venegas RN  Outcome: Ongoing  Note: Pt shows no signs or symptoms of infection at this time. VS stable, alert and oriented x4. Hand hygiene utilized upon entry and exit. Will continue to monitor. Problem: Safety:  Goal: Free from accidental physical injury  Description: Free from accidental physical injury  8/17/2021 0332 by Ean Venegas RN  Outcome: Ongoing     Problem: Daily Care:  Goal: Daily care needs are met  Description: Daily care needs are met  8/17/2021 0332 by Ean Venegas RN  Outcome: Ongoing  Note: Patient turned every two hours with assistance of staff, helped with rinsing mouth and medications. Will continue to monitor. Problem: Skin Integrity:  Goal: Skin integrity will stabilize  Description: Skin integrity will stabilize  8/17/2021 0332 by Ean Venegas RN  Outcome: Ongoing     Problem: Falls - Risk of:  Goal: Will remain free from falls  Description: Will remain free from falls  8/17/2021 0332 by Ean Venegas RN  Outcome: Ongoing  Note: The patient remained free from falls this shift, call light within reach, bed in locked and lowest position. Side rails up x2. Continue to monitor closely. Problem: Skin Integrity:  Goal: Will show no infection signs and symptoms  Description: Will show no infection signs and symptoms  8/17/2021 0332 by Ean Venegas RN  Outcome: Ongoing  Note: Skin assessment complete, waffle mattress in place, patient turned and repositioned every two hours with assistance from staff. Area kept free from moisture, no new breakdown this shift, will continue to monitor.

## 2022-03-15 NOTE — DISCHARGE SUMMARY
Recommendations  Equipment Needed:  (TBD)  Walker: Usman-walker  Transfer Tub Bench: w/back, Assessment: motor plan and decreased problem solving re new deficits and likely visual deficits impact adl performance but fair participation with step by step directions. Speech therapy:  Comprehension: 1 - Patient understands basic needs <25% of the time  Expression: 1 - Expresses basic ideas/needs < 25% of the time  Social Interaction: 1 - Patient appropriate < 25% of the time  Problem Solvin - Patient solves simple/routine tasks < 25%  Memory: 1 - Patient remembers < 25% of the time      Inpatient Rehabilitation Course: Mignon Garcia is a 77 y.o. male admitted to inpatient rehabilitation on 2021. He was originally admitted to [place] [address] on 21. He had been found down by his sister and was noted to have right facial weakness, right hemiparesis, and dysarthria. He had elevated CK, myoglobin, and troponin. MRI brain showed acute infarct in the left and right basal ganglia, greater on the left. He was started on aspirin and plavix. 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 Rehab course: The patient was admitted for an aggressive multidisciplinary inpatient rehabilitation program involving 900 minutes of rehabilitation over 7 days. He initially had agitation during admission, which was treated with seroquel. He started refusing medications and meals and required PEG tube placement on 8/3/21 due to dehydration and high risk for malnutrition. Due to his inability to communicate his reasoning for refusal of medical care, he was determined to lack capacity to refuse treatment. Medications were resumed via PEG tube on 8/3 and tube feeds were started on . Psychiatry was consulted for depression; prozac was increased and olanzapine was started for mood.   He developed CELESTINO due to dehydration, and free water flushes were increased. He was started on melatonin for insomnia to assist with sleep-wake cycle. Hematology was consulted and recommended that he could be off steroids with monitoring of platelets. Chronic conditions were otherwise stable on medications. Due to limited participation and progression with therapies, CT head was done on 8/4 and showed no acute intracranial abnormality. The order for the CT head was placed on 7/29 but patient had initially refused this testing. Repeat CT head was done on 8/6 and showed interval development of a hypodensity in the right basal ganglia compared to prior study consistent with evolving subacute what matter infarct. Neurology was consulted. MRI brain showed multifocal acute ischemia involving the bilateral basal ganglia, posterior commissure of the corpus callosum, lateral margin of left thalamus, and the left greater than right frontoparietal paramedian subcortical and deep white matter. Neurology recommended transfer to to acute care for medical management and changes in the patient's overall status. Consults:   neurology, psychiatry, general surgery, and internal medicine    Significant Diagnostics:   CBC:   Lab Results   Component Value Date    WBC 9 08/07/2021    RBC 4.74 08/07/2021    HGB 11.6 08/07/2021    HCT 37.3 08/07/2021    MCV 78.8 08/07/2021    MCH 24.4 08/07/2021    MCHC 31 08/07/2021    RDW 18.2 08/07/2021     08/07/2021    MPV 11 08/07/2021     BMP:    Lab Results   Component Value Date     08/07/2021    K 4.2 08/07/2021     08/07/2021    CO2 22 08/07/2021    BUN 24 08/07/2021    CREATININE 1.71 08/07/2021    CALCIUM 8.9 08/07/2021    GLUCOSE 200 08/07/2021         MRA NECK WO CONTRAST   Final Result   1.  Multifocal acute ischemia involving the bilateral basal ganglia, as   discussed above, posterior commissure of the corpus callosum, lateral margin   of left thalamus and the left greater than right frontal parietal paramedian cerebral white matter chronic microvascular ischemic disease. 10. Mild diffuse cerebral atrophy. Critical results were called by Dr. Chantell Fang to Dr. Josefina Le On   8/7/2021 at 22:11. MRA HEAD WO CONTRAST   Final Result   1. Multifocal acute ischemia involving the bilateral basal ganglia, as   discussed above, posterior commissure of the corpus callosum, lateral margin   of left thalamus and the left greater than right frontal parietal paramedian   subcortical and deep white matter. 2. No evidence of associated hemorrhagic conversion. 3. Finding suggestive of embolic phenomenon. Recommend clinical correlation. 4. Suspected focal high-grade stenosis involving A1 segment of the left   anterior cerebral artery. 5. Suspected right middle cerebral artery M1 segment focal moderate grade   stenosis. 6. Suspected right middle cerebral artery M2 segment multifocal moderate to   high-grade stenosis. 7. Bilateral posterior cerebral artery P2 segment suspected multifocal   moderate to high-grade stenosis. 8. Grossly unremarkable MR angiogram of the neck. Note: MR angiographic   evaluation of the neck is severely limited by patient motion related artifact. 9. Severe cerebral white matter chronic microvascular ischemic disease. 10. Mild diffuse cerebral atrophy. Critical results were called by Dr. Chantell Fang to Dr. Josefina Le On   8/7/2021 at 22:11.         [address]   Final Result   There has been interval development of a hypodensity in the right basal   ganglia compared to prior study consistent with evolving subacute white   matter infarct. No hemorrhage is noted. Chronic microvascular change in   atrophy is demonstrated. No midline shift. CT HEAD WO CONTRAST   Final Result   1. No acute intracranial abnormality. 2. Remote right external capsule and anterior lateral right basal ganglia   stroke. Remote lacunar stroke left caudate lobe. 3. Senescent changes. White matter hypoattenuation described is typical of   microvascular ischemic disease or as sequela of dysmyelinating/demyelinating   processes. CT HEAD WO CONTRAST   Final Result   Subacute basal ganglia infarcts redemonstrated. No gross hemorrhagic   conversion or significant mass effect. New air-fluid level within the left sphenoid sinus suggesting acute sinusitis. Disposition:   To acute care hospital      Néstor Jimenez MD

## 2023-02-09 NOTE — CARE COORDINATION
DISCHARGE PLANNING NOTE:    [facility] with sister French Taylor. Went to [facility] for [facility]LLA    CT head shows evolving ischemia. Will continue to follow along.      Electronically signed by Garth Ndiaye RN on 8/10/2021 at 12:02 PM Primary Defect Length In Cm (Final Defect Size - Required For Flaps/Grafts): 1.4

## 2023-07-25 NOTE — PROGRESS NOTES
Speech Language Pathology  [facility] at [facility]  Speech Language Pathology    Date: 8/5/2021  Patient Name: Fahad Storm  YOB: 1945   AGE: 77 y.o. MRN: [id number]        Patient Not Available for Speech Therapy     Due to:  [] Testing  [] Hemodialysis  [] Cancelled by RN  [] Surgery   [] Intubation/Sedation/Pain Medication  [] Medical instability  [x] Other: Unable to awaken pt despite max verbal/tactile cues, and cold wash cloth to face/arms. Pt. Sitter reports pt. Has been sleeping all day. Pt. Very warm to touch and very congested.    RN, Rita Zavaleta,  updated    Completed by: Angel Kaur M.A., CCC-SLP We would like to see you back in January.     I will resend the genetics referral.    If you have any questions please call [phone]    Other instructions:  none

## 2024-09-12 NOTE — PROGRESS NOTES
Discussed with nursing staff. Family has not made any decision in terms of PEG tube yet. Once sister okays with proceeding with PEG tube and the paperwork is in order then I will schedule him accordingly. [Follow-Up Visit_____] : a follow-up visit for [unfilled] [Urinary Urgency] : urinary urgency

## (undated) DEVICE — OINTMENT ANTIBIOTIC TRIPLE 0.9G PK

## (undated) DEVICE — MIC* SAFETY PERCUTANEOUS ENDOSCOPIC GASTROSTOMY PEG KIT - 20 FR - PUSH OTW: Brand: MIC PEG TUBE

## (undated) DEVICE — BITEBLOCK 54FR W/ DENT RIM BLOX

## (undated) DEVICE — SPONGE DRN W4XL4IN RAYON/POLYESTER 6 PLY NONWOVEN PRECUT

## (undated) DEVICE — SWABSTICK MEDICATED 10% POVIDONE IOD PVP SGL ANTISEP SAT

## (undated) DEVICE — PAD,ABDOMINAL,8"X7.5",ST,LF,20/BX: Brand: MEDLINE INDUSTRIES, INC.

## (undated) DEVICE — GAUZE,SPONGE,FLUFF,6"X6.75",STRL,5/TRAY: Brand: MEDLINE

## (undated) DEVICE — ENDO KIT W/SYRINGE: Brand: MEDLINE INDUSTRIES, INC.

## (undated) DEVICE — GLOVE ORANGE PI 7 1/2   MSG9075

## (undated) DEVICE — DEFENDO AIR WATER SUCTION AND BIOPSY VALVE KIT FOR  OLYMPUS: Brand: DEFENDO AIR/WATER/SUCTION AND BIOPSY VALVE